# Patient Record
Sex: FEMALE | Race: BLACK OR AFRICAN AMERICAN | Employment: OTHER | URBAN - METROPOLITAN AREA
[De-identification: names, ages, dates, MRNs, and addresses within clinical notes are randomized per-mention and may not be internally consistent; named-entity substitution may affect disease eponyms.]

---

## 2017-01-05 ENCOUNTER — APPOINTMENT (OUTPATIENT)
Dept: CT IMAGING | Age: 67
End: 2017-01-05
Attending: EMERGENCY MEDICINE
Payer: MEDICARE

## 2017-01-05 ENCOUNTER — HOSPITAL ENCOUNTER (OUTPATIENT)
Age: 67
Setting detail: OBSERVATION
Discharge: HOME | End: 2017-01-06
Attending: EMERGENCY MEDICINE | Admitting: INTERNAL MEDICINE
Payer: MEDICARE

## 2017-01-05 ENCOUNTER — CLINICAL SUPPORT (OUTPATIENT)
Dept: CARDIOLOGY CLINIC | Age: 67
End: 2017-01-05

## 2017-01-05 ENCOUNTER — APPOINTMENT (OUTPATIENT)
Dept: MRI IMAGING | Age: 67
End: 2017-01-05
Attending: INTERNAL MEDICINE
Payer: MEDICARE

## 2017-01-05 DIAGNOSIS — E78.2 MIXED HYPERLIPIDEMIA: ICD-10-CM

## 2017-01-05 DIAGNOSIS — R20.2 NUMBNESS AND TINGLING OF RIGHT SIDE OF FACE: ICD-10-CM

## 2017-01-05 DIAGNOSIS — I25.10 CORONARY ARTERY DISEASE INVOLVING NATIVE CORONARY ARTERY OF NATIVE HEART WITHOUT ANGINA PECTORIS: ICD-10-CM

## 2017-01-05 DIAGNOSIS — E11.9 TYPE 2 DIABETES MELLITUS WITHOUT COMPLICATION, UNSPECIFIED LONG TERM INSULIN USE STATUS: ICD-10-CM

## 2017-01-05 DIAGNOSIS — F32.A DEPRESSION, UNSPECIFIED DEPRESSION TYPE: ICD-10-CM

## 2017-01-05 DIAGNOSIS — R06.02 SOB (SHORTNESS OF BREATH): ICD-10-CM

## 2017-01-05 DIAGNOSIS — G43.109 COMPLICATED MIGRAINE: ICD-10-CM

## 2017-01-05 DIAGNOSIS — R20.2 PARESTHESIA OF RIGHT ARM AND LEG: Primary | ICD-10-CM

## 2017-01-05 DIAGNOSIS — R20.0 NUMBNESS AND TINGLING OF RIGHT SIDE OF FACE: ICD-10-CM

## 2017-01-05 DIAGNOSIS — Z98.61 S/P PTCA (PERCUTANEOUS TRANSLUMINAL CORONARY ANGIOPLASTY): ICD-10-CM

## 2017-01-05 DIAGNOSIS — I65.23 STENOSIS OF BOTH INTERNAL CAROTID ARTERIES: ICD-10-CM

## 2017-01-05 DIAGNOSIS — G44.211 INTRACTABLE EPISODIC TENSION-TYPE HEADACHE: ICD-10-CM

## 2017-01-05 DIAGNOSIS — R20.2 FACIAL PARESTHESIA: ICD-10-CM

## 2017-01-05 DIAGNOSIS — R51.9 NONINTRACTABLE HEADACHE, UNSPECIFIED CHRONICITY PATTERN, UNSPECIFIED HEADACHE TYPE: ICD-10-CM

## 2017-01-05 DIAGNOSIS — R43.2 DYSGEUSIA: ICD-10-CM

## 2017-01-05 PROBLEM — I63.9 CVA (CEREBRAL VASCULAR ACCIDENT) (HCC): Status: ACTIVE | Noted: 2017-01-05

## 2017-01-05 LAB
ALBUMIN SERPL BCP-MCNC: 3.9 G/DL (ref 3.5–5)
ALBUMIN/GLOB SERPL: 1.1 {RATIO} (ref 1.1–2.2)
ALP SERPL-CCNC: 106 U/L (ref 45–117)
ALT SERPL-CCNC: 30 U/L (ref 12–78)
AMPHET UR QL SCN: NEGATIVE
ANION GAP BLD CALC-SCNC: 10 MMOL/L (ref 5–15)
APPEARANCE UR: ABNORMAL
APTT PPP: 30 SEC (ref 22.1–32.5)
AST SERPL W P-5'-P-CCNC: 34 U/L (ref 15–37)
BACTERIA URNS QL MICRO: NEGATIVE /HPF
BARBITURATES UR QL SCN: POSITIVE
BASOPHILS # BLD AUTO: 0 K/UL (ref 0–0.1)
BASOPHILS # BLD: 0 % (ref 0–1)
BENZODIAZ UR QL: POSITIVE
BILIRUB SERPL-MCNC: 0.2 MG/DL (ref 0.2–1)
BILIRUB UR QL: NEGATIVE
BUN SERPL-MCNC: 12 MG/DL (ref 6–20)
BUN/CREAT SERPL: 14 (ref 12–20)
CALCIUM SERPL-MCNC: 8.7 MG/DL (ref 8.5–10.1)
CANNABINOIDS UR QL SCN: NEGATIVE
CHLORIDE SERPL-SCNC: 107 MMOL/L (ref 97–108)
CO2 SERPL-SCNC: 27 MMOL/L (ref 21–32)
COCAINE UR QL SCN: NEGATIVE
COLOR UR: ABNORMAL
CREAT SERPL-MCNC: 0.86 MG/DL (ref 0.55–1.02)
DRUG SCRN COMMENT,DRGCM: ABNORMAL
EOSINOPHIL # BLD: 0.2 K/UL (ref 0–0.4)
EOSINOPHIL NFR BLD: 3 % (ref 0–7)
EPITH CASTS URNS QL MICRO: ABNORMAL /LPF
ERYTHROCYTE [DISTWIDTH] IN BLOOD BY AUTOMATED COUNT: 13.7 % (ref 11.5–14.5)
ERYTHROCYTE [SEDIMENTATION RATE] IN BLOOD: 27 MM/HR (ref 0–30)
EST. AVERAGE GLUCOSE BLD GHB EST-MCNC: 157 MG/DL
GLOBULIN SER CALC-MCNC: 3.7 G/DL (ref 2–4)
GLUCOSE BLD STRIP.AUTO-MCNC: 88 MG/DL (ref 65–100)
GLUCOSE BLD STRIP.AUTO-MCNC: 98 MG/DL (ref 65–100)
GLUCOSE SERPL-MCNC: 85 MG/DL (ref 65–100)
GLUCOSE UR STRIP.AUTO-MCNC: NEGATIVE MG/DL
HBA1C MFR BLD: 7.1 % (ref 4.2–6.3)
HCT VFR BLD AUTO: 38.3 % (ref 35–47)
HGB BLD-MCNC: 12.6 G/DL (ref 11.5–16)
HGB UR QL STRIP: NEGATIVE
HYALINE CASTS URNS QL MICRO: ABNORMAL /LPF (ref 0–5)
INR PPP: 1 (ref 0.9–1.1)
KETONES UR QL STRIP.AUTO: NEGATIVE MG/DL
LEUKOCYTE ESTERASE UR QL STRIP.AUTO: ABNORMAL
LYMPHOCYTES # BLD AUTO: 26 % (ref 12–49)
LYMPHOCYTES # BLD: 1.8 K/UL (ref 0.8–3.5)
MCH RBC QN AUTO: 28.8 PG (ref 26–34)
MCHC RBC AUTO-ENTMCNC: 32.9 G/DL (ref 30–36.5)
MCV RBC AUTO: 87.4 FL (ref 80–99)
METHADONE UR QL: NEGATIVE
MONOCYTES # BLD: 0.4 K/UL (ref 0–1)
MONOCYTES NFR BLD AUTO: 6 % (ref 5–13)
NEUTS SEG # BLD: 4.4 K/UL (ref 1.8–8)
NEUTS SEG NFR BLD AUTO: 65 % (ref 32–75)
NITRITE UR QL STRIP.AUTO: NEGATIVE
OPIATES UR QL: NEGATIVE
PCP UR QL: NEGATIVE
PH UR STRIP: 6 [PH] (ref 5–8)
PLATELET # BLD AUTO: 276 K/UL (ref 150–400)
POTASSIUM SERPL-SCNC: 4.7 MMOL/L (ref 3.5–5.1)
PROT SERPL-MCNC: 7.6 G/DL (ref 6.4–8.2)
PROT UR STRIP-MCNC: NEGATIVE MG/DL
PROTHROMBIN TIME: 9.9 SEC (ref 9–11.1)
RBC # BLD AUTO: 4.38 M/UL (ref 3.8–5.2)
RBC #/AREA URNS HPF: ABNORMAL /HPF (ref 0–5)
SERVICE CMNT-IMP: NORMAL
SERVICE CMNT-IMP: NORMAL
SODIUM SERPL-SCNC: 144 MMOL/L (ref 136–145)
SP GR UR REFRACTOMETRY: 1.01 (ref 1–1.03)
T4 FREE SERPL-MCNC: 0.7 NG/DL (ref 0.8–1.5)
THERAPEUTIC RANGE,PTTT: NORMAL SECS (ref 58–77)
TROPONIN I SERPL-MCNC: <0.04 NG/ML
TROPONIN I SERPL-MCNC: <0.04 NG/ML
TSH SERPL DL<=0.05 MIU/L-ACNC: 6.63 UIU/ML (ref 0.36–3.74)
UA: UC IF INDICATED,UAUC: ABNORMAL
UROBILINOGEN UR QL STRIP.AUTO: 0.2 EU/DL (ref 0.2–1)
WBC # BLD AUTO: 6.8 K/UL (ref 3.6–11)
WBC URNS QL MICRO: ABNORMAL /HPF (ref 0–4)

## 2017-01-05 PROCEDURE — 84481 FREE ASSAY (FT-3): CPT | Performed by: INTERNAL MEDICINE

## 2017-01-05 PROCEDURE — 80053 COMPREHEN METABOLIC PANEL: CPT | Performed by: EMERGENCY MEDICINE

## 2017-01-05 PROCEDURE — 96372 THER/PROPH/DIAG INJ SC/IM: CPT

## 2017-01-05 PROCEDURE — 70450 CT HEAD/BRAIN W/O DYE: CPT

## 2017-01-05 PROCEDURE — 86141 C-REACTIVE PROTEIN HS: CPT | Performed by: INTERNAL MEDICINE

## 2017-01-05 PROCEDURE — 99218 HC RM OBSERVATION: CPT

## 2017-01-05 PROCEDURE — 86592 SYPHILIS TEST NON-TREP QUAL: CPT | Performed by: INTERNAL MEDICINE

## 2017-01-05 PROCEDURE — 82962 GLUCOSE BLOOD TEST: CPT

## 2017-01-05 PROCEDURE — 84443 ASSAY THYROID STIM HORMONE: CPT | Performed by: INTERNAL MEDICINE

## 2017-01-05 PROCEDURE — 74011250636 HC RX REV CODE- 250/636: Performed by: INTERNAL MEDICINE

## 2017-01-05 PROCEDURE — 36415 COLL VENOUS BLD VENIPUNCTURE: CPT | Performed by: INTERNAL MEDICINE

## 2017-01-05 PROCEDURE — 84439 ASSAY OF FREE THYROXINE: CPT | Performed by: INTERNAL MEDICINE

## 2017-01-05 PROCEDURE — 65390000012 HC CONDITION CODE 44 OBSERVATION

## 2017-01-05 PROCEDURE — 87086 URINE CULTURE/COLONY COUNT: CPT | Performed by: INTERNAL MEDICINE

## 2017-01-05 PROCEDURE — 70551 MRI BRAIN STEM W/O DYE: CPT

## 2017-01-05 PROCEDURE — 83036 HEMOGLOBIN GLYCOSYLATED A1C: CPT | Performed by: INTERNAL MEDICINE

## 2017-01-05 PROCEDURE — 65660000000 HC RM CCU STEPDOWN

## 2017-01-05 PROCEDURE — 84484 ASSAY OF TROPONIN QUANT: CPT | Performed by: EMERGENCY MEDICINE

## 2017-01-05 PROCEDURE — 86038 ANTINUCLEAR ANTIBODIES: CPT | Performed by: INTERNAL MEDICINE

## 2017-01-05 PROCEDURE — 70544 MR ANGIOGRAPHY HEAD W/O DYE: CPT

## 2017-01-05 PROCEDURE — 85610 PROTHROMBIN TIME: CPT | Performed by: EMERGENCY MEDICINE

## 2017-01-05 PROCEDURE — 99285 EMERGENCY DEPT VISIT HI MDM: CPT

## 2017-01-05 PROCEDURE — 80061 LIPID PANEL: CPT | Performed by: INTERNAL MEDICINE

## 2017-01-05 PROCEDURE — 81001 URINALYSIS AUTO W/SCOPE: CPT | Performed by: INTERNAL MEDICINE

## 2017-01-05 PROCEDURE — 80307 DRUG TEST PRSMV CHEM ANLYZR: CPT | Performed by: INTERNAL MEDICINE

## 2017-01-05 PROCEDURE — 85025 COMPLETE CBC W/AUTO DIFF WBC: CPT | Performed by: EMERGENCY MEDICINE

## 2017-01-05 PROCEDURE — 85730 THROMBOPLASTIN TIME PARTIAL: CPT | Performed by: EMERGENCY MEDICINE

## 2017-01-05 PROCEDURE — 74011250637 HC RX REV CODE- 250/637: Performed by: EMERGENCY MEDICINE

## 2017-01-05 PROCEDURE — 74011250637 HC RX REV CODE- 250/637: Performed by: INTERNAL MEDICINE

## 2017-01-05 PROCEDURE — 85652 RBC SED RATE AUTOMATED: CPT | Performed by: INTERNAL MEDICINE

## 2017-01-05 PROCEDURE — 96365 THER/PROPH/DIAG IV INF INIT: CPT

## 2017-01-05 RX ORDER — SUMATRIPTAN 25 MG/1
50 TABLET, FILM COATED ORAL DAILY PRN
Status: DISCONTINUED | OUTPATIENT
Start: 2017-01-05 | End: 2017-01-06 | Stop reason: HOSPADM

## 2017-01-05 RX ORDER — INSULIN GLARGINE 100 [IU]/ML
14 INJECTION, SOLUTION SUBCUTANEOUS
Status: DISCONTINUED | OUTPATIENT
Start: 2017-01-05 | End: 2017-01-06 | Stop reason: HOSPADM

## 2017-01-05 RX ORDER — ASPIRIN 325 MG
325 TABLET ORAL DAILY
Status: DISCONTINUED | OUTPATIENT
Start: 2017-01-05 | End: 2017-01-05

## 2017-01-05 RX ORDER — DEXTROSE 50 % IN WATER (D50W) INTRAVENOUS SYRINGE
12.5-25 AS NEEDED
Status: DISCONTINUED | OUTPATIENT
Start: 2017-01-05 | End: 2017-01-06 | Stop reason: HOSPADM

## 2017-01-05 RX ORDER — INSULIN LISPRO 100 [IU]/ML
INJECTION, SOLUTION INTRAVENOUS; SUBCUTANEOUS
Status: DISCONTINUED | OUTPATIENT
Start: 2017-01-05 | End: 2017-01-06 | Stop reason: HOSPADM

## 2017-01-05 RX ORDER — ALPRAZOLAM 0.5 MG/1
0.5 TABLET ORAL
Status: DISCONTINUED | OUTPATIENT
Start: 2017-01-05 | End: 2017-01-06 | Stop reason: HOSPADM

## 2017-01-05 RX ORDER — BUTALBITAL, ACETAMINOPHEN AND CAFFEINE 50; 325; 40 MG/1; MG/1; MG/1
2 TABLET ORAL
Status: DISCONTINUED | OUTPATIENT
Start: 2017-01-05 | End: 2017-01-06 | Stop reason: HOSPADM

## 2017-01-05 RX ORDER — LABETALOL HCL 20 MG/4 ML
10 SYRINGE (ML) INTRAVENOUS
Status: DISCONTINUED | OUTPATIENT
Start: 2017-01-05 | End: 2017-01-06 | Stop reason: HOSPADM

## 2017-01-05 RX ORDER — MAGNESIUM SULFATE HEPTAHYDRATE 40 MG/ML
2 INJECTION, SOLUTION INTRAVENOUS ONCE
Status: COMPLETED | OUTPATIENT
Start: 2017-01-05 | End: 2017-01-05

## 2017-01-05 RX ORDER — ACETAMINOPHEN 325 MG/1
650 TABLET ORAL
Status: DISCONTINUED | OUTPATIENT
Start: 2017-01-05 | End: 2017-01-06 | Stop reason: HOSPADM

## 2017-01-05 RX ORDER — ASPIRIN 325 MG
325 TABLET ORAL ONCE
Status: COMPLETED | OUTPATIENT
Start: 2017-01-05 | End: 2017-01-05

## 2017-01-05 RX ORDER — SODIUM CHLORIDE 0.9 % (FLUSH) 0.9 %
5-10 SYRINGE (ML) INJECTION AS NEEDED
Status: DISCONTINUED | OUTPATIENT
Start: 2017-01-05 | End: 2017-01-06 | Stop reason: HOSPADM

## 2017-01-05 RX ORDER — ENOXAPARIN SODIUM 100 MG/ML
40 INJECTION SUBCUTANEOUS EVERY 24 HOURS
Status: DISCONTINUED | OUTPATIENT
Start: 2017-01-05 | End: 2017-01-06 | Stop reason: HOSPADM

## 2017-01-05 RX ORDER — IPRATROPIUM BROMIDE AND ALBUTEROL SULFATE 2.5; .5 MG/3ML; MG/3ML
3 SOLUTION RESPIRATORY (INHALATION)
Status: DISCONTINUED | OUTPATIENT
Start: 2017-01-05 | End: 2017-01-06 | Stop reason: HOSPADM

## 2017-01-05 RX ORDER — METFORMIN HYDROCHLORIDE 500 MG/1
500 TABLET, EXTENDED RELEASE ORAL
Status: DISCONTINUED | OUTPATIENT
Start: 2017-01-06 | End: 2017-01-06 | Stop reason: HOSPADM

## 2017-01-05 RX ORDER — METOPROLOL SUCCINATE 25 MG/1
25 TABLET, EXTENDED RELEASE ORAL DAILY
Status: DISCONTINUED | OUTPATIENT
Start: 2017-01-06 | End: 2017-01-06 | Stop reason: HOSPADM

## 2017-01-05 RX ORDER — ASPIRIN 325 MG
325 TABLET ORAL DAILY
Status: DISCONTINUED | OUTPATIENT
Start: 2017-01-06 | End: 2017-01-06 | Stop reason: HOSPADM

## 2017-01-05 RX ORDER — FLUTICASONE PROPIONATE 50 MCG
2 SPRAY, SUSPENSION (ML) NASAL DAILY
Status: DISCONTINUED | OUTPATIENT
Start: 2017-01-06 | End: 2017-01-06 | Stop reason: HOSPADM

## 2017-01-05 RX ORDER — LISINOPRIL 5 MG/1
2.5 TABLET ORAL DAILY
Status: DISCONTINUED | OUTPATIENT
Start: 2017-01-06 | End: 2017-01-06 | Stop reason: HOSPADM

## 2017-01-05 RX ORDER — OXYCODONE HYDROCHLORIDE 5 MG/1
5 TABLET ORAL
Status: DISCONTINUED | OUTPATIENT
Start: 2017-01-05 | End: 2017-01-06 | Stop reason: HOSPADM

## 2017-01-05 RX ORDER — AMITRIPTYLINE HYDROCHLORIDE 10 MG/1
10 TABLET, FILM COATED ORAL
Status: DISCONTINUED | OUTPATIENT
Start: 2017-01-05 | End: 2017-01-06 | Stop reason: HOSPADM

## 2017-01-05 RX ORDER — GLIPIZIDE 5 MG/1
5 TABLET, FILM COATED, EXTENDED RELEASE ORAL
Status: DISCONTINUED | OUTPATIENT
Start: 2017-01-06 | End: 2017-01-06 | Stop reason: HOSPADM

## 2017-01-05 RX ORDER — PRAVASTATIN SODIUM 40 MG/1
40 TABLET ORAL
Status: DISCONTINUED | OUTPATIENT
Start: 2017-01-05 | End: 2017-01-06 | Stop reason: HOSPADM

## 2017-01-05 RX ORDER — MAGNESIUM SULFATE 100 %
4 CRYSTALS MISCELLANEOUS AS NEEDED
Status: DISCONTINUED | OUTPATIENT
Start: 2017-01-05 | End: 2017-01-06 | Stop reason: HOSPADM

## 2017-01-05 RX ORDER — SODIUM CHLORIDE 0.9 % (FLUSH) 0.9 %
5-10 SYRINGE (ML) INJECTION EVERY 8 HOURS
Status: DISCONTINUED | OUTPATIENT
Start: 2017-01-05 | End: 2017-01-06 | Stop reason: HOSPADM

## 2017-01-05 RX ADMIN — BUTALBITAL, ACETAMINOPHEN AND CAFFEINE 2 TABLET: 50; 325; 40 TABLET ORAL at 22:29

## 2017-01-05 RX ADMIN — ASPIRIN 325 MG ORAL TABLET 325 MG: 325 PILL ORAL at 17:34

## 2017-01-05 RX ADMIN — MAGNESIUM SULFATE HEPTAHYDRATE 2 G: 40 INJECTION, SOLUTION INTRAVENOUS at 20:34

## 2017-01-05 RX ADMIN — ENOXAPARIN SODIUM 40 MG: 40 INJECTION SUBCUTANEOUS at 20:34

## 2017-01-05 RX ADMIN — AMITRIPTYLINE HYDROCHLORIDE 10 MG: 10 TABLET, FILM COATED ORAL at 22:05

## 2017-01-05 RX ADMIN — PRAVASTATIN SODIUM 40 MG: 40 TABLET ORAL at 22:05

## 2017-01-05 RX ADMIN — Medication 10 ML: at 22:17

## 2017-01-05 NOTE — IP AVS SNAPSHOT
[address] [facility] 
[phone] Patient: Shankar Donato MRN: [id number] VBI:4/2/9702 You are allergic to the following Allergen Reactions No Known Allergies Other (comments) Demerol (Meperidine) Unknown (comments) Pt unsure of reaction Recent Documentation Height Weight Breastfeeding? BMI OB Status Smoking Status 1.626 m 77.1 kg No 29.18 kg/m2 Hysterectomy Never Smoker Unresulted Labs Order Current Status OSBALDO QL, W/REFLEX CASCADE In process CULTURE, URINE In process RPR In process Emergency Contacts Name Discharge Info Relation Home Work Mobile Miguel Boggs DISCHARGE CAREGIVER [3] Spouse [3] [phone] About your hospitalization You were admitted on:  January 5, 2017 You last received care in the:  [facility] 3 NEUROSCIENCE TELEMETRY You were discharged on:  January 6, 2017 Unit phone number:  [phone] Why you were hospitalized Your primary diagnosis was:  Not on File Your diagnoses also included:  Cva (Cerebral Vascular Accident) (Hcc), Headache, Type 2 Diabetes Mellitus Without Complication (Hcc), Complicated Migraine, Numbness And Tingling Of Right Side Of Face, Stenosis Of Both Internal Carotid Arteries Providers Seen During Your Hospitalizations Provider Role Specialty Primary office phone Justin Portillo MD Attending Provider Emergency Medicine [phone] Garrison Catalan MD Attending Provider Hospitalist [phone] Your Primary Care Physician (PCP) Primary Care Physician Office Phone Office Fax Olya Vega [phone] Follow-up Information Follow up With Details Comments Contact Info Tae Glass MD In 1 week  [address] [id number] [phone] Rose Morris MD In 3 weeks Neurology NP/PA for hospital follow up [address] [facility] [address] [place] 
[phone] Your Appointments Wednesday January 11, 2017  3:00 PM EST  
ROUTINE CARE with Maikol Weston MD  
[facility] [address] [facility] [place]  [id number] [phone] Current Discharge Medication List  
  
START taking these medications Dose & Instructions Dispensing Information Comments Morning Noon Evening Bedtime  
 aspirin-acetaminophen-caffeine 250-250-65 mg per tablet Commonly known as:  Adina Best Your next dose is: Today, Tomorrow Other:  _________ Dose:  1 Tab Take 1 Tab by mouth every eight (8) hours as needed for Headache. Quantity:  30 Tab Refills:  0  
     
   
   
   
  
 levothyroxine 25 mcg tablet Commonly known as:  synthroid Your next dose is: Today, Tomorrow Other:  _________ Dose:  25 mcg Take 1 Tab by mouth Daily (before breakfast). Quantity:  30 Tab Refills:  0 CONTINUE these medications which have NOT CHANGED Dose & Instructions Dispensing Information Comments Morning Noon Evening Bedtime  
 albuterol-ipratropium 2.5 mg-0.5 mg/3 ml Nebu Commonly known as:  Matthew Asters Your next dose is: Today, Tomorrow Other:  _________ Dose:  3 mL  
3 mL by Nebulization route every forty-eight (48) hours as needed. Refills:  0  
     
   
   
   
  
 alcohol swabs Padm Commonly known as:  BD Single Use Swabs Regular Your next dose is: Today, Tomorrow Other:  _________ Use to cleanse skin prior to obtaining blood sample for diabetic testing three times daily Quantity:  300 Pad Refills:  3  
 e11.9 ALPRAZolam 1 mg tablet Commonly known as:  Yossi Boyd Your next dose is: Today, Tomorrow Other:  _________ TAKE 1/2 TABLET EVERY DAY AS NEEDED FOR ANXIETY  AND TAKE 1 TABLET AT BEDTIME AS NEEDED  FOR  SLEEP Quantity:  135 Tab Refills:  1  
     
   
   
   
  
 amitriptyline 10 mg tablet Commonly known as:  ELAVIL Your next dose is: Today, Tomorrow Other:  _________ TAKE 1 TABLET NIGHTLY Quantity:  90 Tab Refills:  3  
     
   
   
   
  
 aspirin, buffered 81 mg Tab Your next dose is: Today, Tomorrow Other:  _________ Dose:  81 mg Take 81 mg by mouth daily. Refills:  0 Blood-Glucose Meter Misc Commonly known as:  ACCU-CHEK KRISTOFER PLUS METER Your next dose is: Today, Tomorrow Other:  _________ Use to monitor blood sugar 3 times daily Quantity:  1 Each Refills:  0  
 E11.9 , I10 Calcium-Cholecalciferol (D3) 600 mg(1,500mg) -400 unit Cap Your next dose is: Today, Tomorrow Other:  _________ Dose:  1 Tab Take 1 Tab by mouth daily. Refills:  0  
     
   
   
   
  
 fluticasone 50 mcg/actuation nasal spray Commonly known as:  Domnick Means Your next dose is: Today, Tomorrow Other:  _________ INSTILL 2 SPRAYS INTO BOTH NOSTRILS DAILY AS NEEDED FOR RHINITIS. Quantity:  48 g Refills:  3  
     
   
   
   
  
 furosemide 20 mg tablet Commonly known as:  LASIX Your next dose is: Today, Tomorrow Other:  _________ Dose:  20 mg Take 1 Tab by mouth daily as needed. Quantity:  90 Tab Refills:  3  
     
   
   
   
  
 glipiZIDE SR 5 mg CR tablet Commonly known as:  GLUCOTROL XL Your next dose is: Today, Tomorrow Other:  _________ Dose:  5 mg Take 1 Tab by mouth daily. Quantity:  90 Tab Refills:  3  
     
   
   
   
  
 glucophage  mg tablet Generic drug:  metFORMIN ER  
   
 Your next dose is: Today, Tomorrow Other:  _________ Dose:  500 mg Take 500 mg by mouth daily (with breakfast). Refills:  0  
     
   
   
   
  
 glucose blood VI test strips strip Commonly known as:  ACCU-CHEK KRISTOFER PLUS TEST STRP Your next dose is: Today, Tomorrow Other:  _________ Use to check blood glucose level three times daily Quantity:  300 Strip Refills:  3  
 e11.9  
    
   
   
   
  
 insulin glargine 100 unit/mL (3 mL) pen Commonly known as:  LANTUS SOLOSTAR Your next dose is: Today, Tomorrow Other:  _________ Dose:  14 Units 14 Units by SubCUTAneous route every evening. Quantity:  2 Each Refills:  5  
 e11.9 * Insulin Needles (Disposable) 31 gauge x 5/16\" Ndle Your next dose is: Today, Tomorrow Other:  _________ Use with lantus pen Quantity:  1 Package Refills:  11  
     
   
   
   
  
 * BD INSULIN PEN NEEDLE UF MINI 31 gauge x 3/16\" Ndle Generic drug:  Insulin Needles (Disposable) Your next dose is: Today, Tomorrow Other:  _________ Use to check blood glucose level three times a daily Quantity:  300 Pen Needle Refills:  3  
 e11.9 JANUVIA 100 mg tablet Generic drug:  SITagliptin Your next dose is: Today, Tomorrow Other:  _________ TAKE 1 TABLET EVERY DAY FOR DIABETES Quantity:  90 Tab Refills:  1 Lancets Misc Commonly known as:  ACCU-CHEK SOFTCLIX LANCETS Your next dose is: Today, Tomorrow Other:  _________ Use to obtain blood sample for diabetic testing three times a day Quantity:  1 Each Refills:  11  
 E11.9  
    
   
   
   
  
 lisinopril 2.5 mg tablet Commonly known as:  Brandie Loss Your next dose is: Today, Tomorrow Other:  _________ TAKE 1 TABLET EVERY DAY Quantity:  90 Tab Refills:  3  
     
   
   
   
  
 metoprolol succinate 25 mg XL tablet Commonly known as:  TOPROL-XL Your next dose is: Today, Tomorrow Other:  _________ TAKE 1 TABLET EVERY DAY Quantity:  90 Tab Refills:  3  
     
   
   
   
  
 pravastatin 40 mg tablet Commonly known as:  PRAVACHOL Your next dose is: Today, Tomorrow Other:  _________ TAKE 1 TABLET EVERY NIGHT Quantity:  90 Tab Refills:  3 PROAIR HFA 90 mcg/actuation inhaler Generic drug:  albuterol Your next dose is: Today, Tomorrow Other:  _________ Dose:  1 Puff Take 1 Puff by inhalation every four (4) hours as needed. Refills:  0  
     
   
   
   
  
 * Notice: This list has 2 medication(s) that are the same as other medications prescribed for you. Read the directions carefully, and ask your doctor or other care provider to review them with you. STOP taking these medications   
 butalbital-acetaminophen-caff -40 mg per capsule Commonly known as:  FIORICET  
   
  
 ibuprofen 800 mg tablet Commonly known as:  MOTRIN Where to Get Your Medications Information on where to get these meds will be given to you by the nurse or doctor. ! Ask your nurse or doctor about these medications  
  aspirin-acetaminophen-caffeine 250-250-65 mg per tablet  
 levothyroxine 25 mcg tablet Discharge Instructions HOSPITALIST DISCHARGE INSTRUCTIONS 
 
NAME: Jeannine Daugherty :  1950 MRN:  [id number] Date/Time:  2017 2:17 PM 
 
ADMIT DATE: 2017 DISCHARGE DATE: 2017 · It is important that you take the medication exactly as they are prescribed. · Keep your medication in the bottles provided by the pharmacist and keep a list of the medication names, dosages, and times to be taken in your wallet. · Do not take other medications without consulting your doctor. What to do at [facility] Recommended diet:  Regular Diet Recommended activity: Activity as tolerated If you have questions regarding the hospital related prescriptions or hospital related issues please call [facility] at . You can always direct your questions to your primary care doctor if you are unable to reach your hospital physician; your PCP works as an extension of your hospital doctor just like your hospital doctor is an extension of your PCP for your time at the [facility], [facility]) If you experience any of the following symptoms then please call your primary care physician or return to the emergency room if you cannot get hold of your doctor: 
 
Fever, chills, nausea, vomiting, or persistent diarrhea Worsening weakness or new problems with your speech or balance Dark stools or visible blood in your stools New Leg swelling or shortness of breath as this could be signs of a clot Additional Instructions: Follow up with neurology in 2-4 weeks Follow up with pcp in 1 week. Repeat thyroid studies in 2-4 weeks. Continue with aspirin daily and excedrin migraine as needed. Bring these papers with you to your follow up appointments. The papers will help your doctors be sure to continue the care plan from the hospital. 
 
 
 
 
 
 
Information obtained by : 
I understand that if any problems occur once I am at home I am to contact my physician. I understand and acknowledge receipt of the instructions indicated above. Physician's or R.N.'s Signature                                                                  Date/Time Patient or Representative Signature Discharge Orders None Introducing [facility]! [facility] introduces [employer] patient portal. Now you can access parts of your medical record, email your doctor's office, and request medication refills online. 1. In your internet browser, go to [email or web address] 2. Click on the First Time User? Click Here link in the Sign In box. You will see the New Member Sign Up page. 3. Enter your [employer] Access Code exactly as it appears below. You will not need to use this code after youve completed the sign-up process. If you do not sign up before the expiration date, you must request a new code. · [employer] Access Code: [email or web address] Expires: 3/2/2017 12:27 PM 
 
4. Enter the last four digits of your Social Security Number (xxxx) and Date of Birth (mm/dd/yyyy) as indicated and click Submit. You will be taken to the next sign-up page. 5. Create a [employer] ID. This will be your [employer] login ID and cannot be changed, so think of one that is secure and easy to remember. 6. Create a [employer] password. You can change your password at any time. 7. Enter your Password Reset Question and Answer. This can be used at a later time if you forget your password. 8. Enter your e-mail address. You will receive e-mail notification when new information is available in [address] 9. Click Sign Up. You can now view and download portions of your medical record. 10. Click the Download Summary menu link to download a portable copy of your medical information. If you have questions, please visit the Frequently Asked Questions section of the [employer] website. Remember, [employer] is NOT to be used for urgent needs. For medical emergencies, dial 911. Now available from your iPhone and Android! General Information Please provide this summary of care documentation to your next provider. Patient Signature:  ____________________________________________________________ Date:  ____________________________________________________________  
  
Katty Moulding Provider Signature:  ____________________________________________________________ Date:  ____________________________________________________________

## 2017-01-05 NOTE — ED NOTES
TRANSFER - OUT REPORT:    Verbal report given to [facility] RN (name) on Yeyo Harrison  being transferred to [facility] (unit) for routine progression of care       Report consisted of patients Situation, Background, Assessment and   Recommendations(SBAR). Information from the following report(s) SBAR, ED Summary and MAR was reviewed with the receiving nurse. Lines:       Opportunity for questions and clarification was provided. Patient transported with:   Elmer Vallecillo with MRI, patient to be transported to floor from MRI.

## 2017-01-05 NOTE — H&P
Hospitalist Admission Note    NAME: Governor Perry   :  1950   MRN:  [id number]     Date/Time:  2017 5:49 PM    Patient PCP: Dulce Escamilla MD  ________________________________________________________________________    My assessment of this patient's clinical condition and my plan of care is as follows. Assessment / Plan:  CVA vs TIA vs Complex Migraine: will check MRI/MRA and stroke work up, I'm more inclined to think this is complex migraine, will provide symptomatic treatment, c/w ASA, get Neurology evaluation. DM: c/w metoprolol, januvia, Lantus, place SSI, HBA1C.  HTN: c/w ACE, BB, use labetalol prn allowing permissive HTN. Hyperlipidemia: c/w Statin, check lipids  CAD: no chest pain at this time, c/w ASA, BB, statin, monitor. Chronic Pain: c/w pain medication. 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 Pt notes associated symptoms of intermittent right sided facial numbness/tingling (currently experiencing) and right sided extremity numbness/tingling (not current). Pt states that the episodes of numbness last ~20 minutes each. Pt states that her cardiologist sent her to the ED today, due to her symptoms. Pt denies any history of stroke. Pt specifically denies any N/V/D, chest pain, SOB or left sided numbness.   At this time patient lying in bed c/o headache, right sided face tingling and numbness, right  arm and leg tingling and numbness, denies chest pain, no SOB, no fever, no N/V no diarrhea, no cough, no urinary symptoms, no other associated symptoms. We were asked to admit for work up and evaluation of the above problems.      Past Medical History   Diagnosis Date    Anxiety     Arthritis     ASHD (arteriosclerotic heart disease)     CAD (coronary artery disease)     Chronic pain     Constipation     Depression     Diabetes ([address]     Diarrhea     Dysgeusia 11/21/2014    Esophageal motor disorder 1/23/2012    Flatulence/gas pain/belching 11/21/2014    Gastroparesis     GERD (gastroesophageal reflux disease)     Hypercholesterolemia     Hypertension     Psychiatric disorder      anxiety    S/P bypass gastrojejunostomy 1/23/2012    Sleep apnea      no cpap        Past Surgical History   Procedure Laterality Date    Pr gastrojejunostomy      Hx laparotomy  8/02     2/2 intra abd abscess    Pr abdomen surgery proc unlisted       s/p partial gastrectomy 2/2 gastroparesis    Hx gastric bypass  6/02    Pr removal of heel spur  2001     bilat    Hc bld carpel tunnel release       bilat    Hx cholecystectomy      Hx hysterectomy      Egd  7/13/2009    Hx urological       bladder surgery-bladder tac    Hx orthopaedic  1/08     laminectomy    Hx orthopaedic       Bilateral feet bone spurs removed    Hx orthopaedic       bilateral carpal tunnel    Hx orthopaedic       back surgery    Pr egd transoral biopsy single/multiple  7/11/2011          Pr colonoscopy flx dx w/collj spec when pfrmd  5/03/2006     Dr. Danice Collet transoral biopsy single/multiple  1/23/2012          Pr egd balloon dilation esophagus <30 mm diam  1/23/2012          Hx appendectomy      Pr colonoscopy w/biopsy single/multiple  2/9/2012          Pr cardiac surg procedure unlist  6/2013     stent placement X 2    Hx coronary stent placement  06/2013     3 stents    Colonoscopy N/A 5/26/2016     COLONOSCOPY performed by Brody Licea MD at [facility]       Social History   Substance Use Topics    Smoking status: Never Smoker    Smokeless tobacco: Never Used    Alcohol use No        Family History   Problem Relation Age of Onset    Heart Disease Mother     Hypertension Mother     Diabetes Mother     Diabetes Father     Heart Disease Father     Hypertension Father     Alcohol abuse Brother     Heart Disease Sister     Hypertension Sister     Diabetes Sister     No Known Problems Sister     Alcohol abuse Brother     Alcohol abuse Brother     Diabetes Brother      Allergies   Allergen Reactions    No Known Allergies Other (comments)    Demerol [Meperidine] Unknown (comments)     Pt unsure of reaction        Prior to Admission medications    Medication Sig Start Date End Date Taking? Authorizing Provider   butalbital-acetaminophen-caff (FIORICET) -40 mg per capsule Take 1 Cap by mouth two (2) times daily as needed for Pain (headache). Max Daily Amount: 2 Caps. 12/29/16   Hilary Soto NP   ALPRAZolam Kathy Kanner) 1 mg tablet TAKE 1/2 TABLET EVERY DAY AS NEEDED FOR ANXIETY  AND TAKE 1 TABLET AT BEDTIME AS NEEDED  FOR  SLEEP 12/28/16   Lisa Holt MD   furosemide (LASIX) 20 mg tablet Take 1 Tab by mouth daily as needed. 12/2/16   Lisa Holt MD   glipiZIDE SR (GLUCOTROL) 5 mg CR tablet Take 1 Tab by mouth daily. 10/24/16   Lisa Holt MD   metoprolol succinate (TOPROL-XL) 25 mg XL tablet TAKE 1 TABLET EVERY DAY 10/18/16   Lisa Holt MD   ibuprofen (MOTRIN) 800 mg tablet TAKE 1 TABLET EVERY 6 HOURS AS NEEDED (SUBSTITUTED FOR MOTRIN) 10/18/16   Lisa Holt MD   fluticasone (FLONASE) 50 mcg/actuation nasal spray INSTILL 2 SPRAYS INTO BOTH NOSTRILS DAILY AS NEEDED FOR RHINITIS. 10/18/16   Lisa Holt MD   JANUVIA 100 mg tablet TAKE 1 TABLET EVERY DAY FOR DIABETES 10/17/16   Lisa Holt MD   metFORMIN ER (GLUCOPHAGE XR) 500 mg tablet Take 500 mg by mouth daily (with breakfast).     Historical Provider insulin glargine (LANTUS SOLOSTAR) 100 unit/mL (3 mL) pen 14 Units by SubCUTAneous route every evening. 8/30/16   Rober Nguyen MD   lisinopril (PRINIVIL, ZESTRIL) 2.5 mg tablet TAKE 1 TABLET EVERY DAY 5/31/16   Rober Nguyen MD   pravastatin (PRAVACHOL) 40 mg tablet TAKE 1 TABLET EVERY NIGHT 4/21/16   Rober Nguyen MD   amitriptyline (ELAVIL) 10 mg tablet TAKE 1 TABLET NIGHTLY 4/21/16   Rober Nguyen MD   BD INSULIN PEN NEEDLE UF MINI 31 gauge x 3/16\" ndle Use to check blood glucose level three times a daily 2/26/16   Rober Nguyen MD   Blood-Glucose Meter (ACCU-CHEK KRISTOFER PLUS METER) misc Use to monitor blood sugar 3 times daily 2/22/16   Rober Nguyen MD   glucose blood VI test strips (ACCU-CHEK KRISTOFER PLUS TEST STRP) strip Use to check blood glucose level three times daily 2/9/16   Rober Nguyen MD   Lancets (ACCU-CHEK SOFTCLIX LANCETS) misc Use to obtain blood sample for diabetic testing three times a day 2/9/16   Rober Nguyen MD   alcohol swabs (BD SINGLE USE SWABS REGULAR) padm Use to cleanse skin prior to obtaining blood sample for diabetic testing three times daily 2/9/16   Rober Nguyen MD   PROAIR HFA 90 mcg/actuation inhaler Take 1 Puff by inhalation every four (4) hours as needed. 12/5/15   Historical Provider   albuterol-ipratropium (DUO-NEB) 2.5 mg-0.5 mg/3 ml nebulizer solution 3 mL by Nebulization route every forty-eight (48) hours as needed. 12/4/15   Historical Provider   Calcium-Cholecalciferol, D3, 600 mg(1,500mg) -400 unit cap Take 1 Tab by mouth daily. Historical Provider   Insulin Needles, Disposable, 31 gauge x 5/16\" ndle Use with lantus pen 12/30/15   Rober Nguyen MD   Aspirin, Buffered 81 mg tab Take 81 mg by mouth daily. Historical Provider       REVIEW OF SYSTEMS:     I am not able to complete the review of systems because:    The patient is intubated and sedated    The patient has altered mental status due to his acute medical problems    The patient has baseline aphasia from prior stroke(s)    The patient has baseline dementia and is not reliable historian    The patient is in acute medical distress and unable to provide information           Total of 12 systems reviewed as follows:       POSITIVE= underlined text  Negative = text not underlined  General:  fever, chills, sweats, generalized weakness, weight loss/gain,      loss of appetite   Eyes:    blurred vision, eye pain, loss of vision, double vision  ENT:    rhinorrhea, pharyngitis   Respiratory:   cough, sputum production, SOB, WISEMAN, wheezing, pleuritic pain   Cardiology:   chest pain, palpitations, orthopnea, PND, edema, syncope   Gastrointestinal:  abdominal pain , N/V, diarrhea, dysphagia, constipation, bleeding   Genitourinary:  frequency, urgency, dysuria, hematuria, incontinence   Muskuloskeletal :  arthralgia, myalgia, back pain  Hematology:  easy bruising, nose or gum bleeding, lymphadenopathy   Dermatological: rash, ulceration, pruritis, color change / jaundice  Endocrine:   hot flashes or polydipsia   Neurological:  headache, dizziness, confusion, focal weakness, paresthesia,     Speech difficulties, memory loss, gait difficulty  Psychological: Feelings of anxiety, depression, agitation    Objective:   VITALS:    Visit Vitals    BP (!) 148/94    Pulse 70    Temp 97.9 °F (36.6 °C)    Resp 18    Ht 5' 4\" (1.626 m)    Wt 77.1 kg (170 lb)    SpO2 100%    BMI 29.18 kg/m2       PHYSICAL EXAM:    General:    Alert, cooperative, no distress, appears stated age. HEENT: Atraumatic, anicteric sclerae, pink conjunctivae     No oral ulcers, mucosa moist, throat clear, dentition fair  Neck:  Supple, symmetrical,  thyroid: non tender  Lungs:   Coarse BS  Chest wall:  No tenderness  No Accessory muscle use. Heart:   Regular  rhythm,  No  murmur   No edema  Abdomen:   Soft, non-tender. Not distended.   Bowel sounds normal  Extremities: No cyanosis. No clubbing      Capillary refill normal,  Radial pulse 2+  Skin:     Not pale. Not Jaundiced  No rashes   Psych:  Good insight. Not depressed. Not anxious or agitated. Neurologic: EOMs intact. No facial asymmetry. No aphasia or slurred speech. Symmetrical strength, Sensation grossly intact. Alert and oriented X 4.     _______________________________________________________________________  Care Plan discussed with:    Comments   Patient y    Family  y    RN y    Care Manager                    Consultant:      _______________________________________________________________________  Expected  Disposition:   Home with Family y   HH/PT/OT/RN y   SNF/LTC    MICHELLE    ________________________________________________________________________  TOTAL TIME:  61 Minutes    Critical Care Provided     Minutes non procedure based      Comments    y Reviewed previous records   >50% of visit spent in counseling and coordination of care y Discussion with patient and/or family and questions answered       ________________________________________________________________________  Signed: Aydin Lentz MD    Procedures: see electronic medical records for all procedures/Xrays and details which were not copied into this note but were reviewed prior to creation of Plan. LAB DATA REVIEWED:    Recent Results (from the past 24 hour(s))   CBC WITH AUTOMATED DIFF    Collection Time: 01/05/17  4:02 PM   Result Value Ref Range    WBC 6.8 3.6 - 11.0 K/uL    RBC 4.38 3.80 - 5.20 M/uL    HGB 12.6 11.5 - 16.0 g/dL    HCT 38.3 35.0 - 47.0 %    MCV 87.4 80.0 - 99.0 FL    MCH 28.8 26.0 - 34.0 PG    MCHC 32.9 30.0 - 36.5 g/dL    RDW 13.7 11.5 - 14.5 %    PLATELET 861 081 - 338 K/uL    NEUTROPHILS 65 32 - 75 %    LYMPHOCYTES 26 12 - 49 %    MONOCYTES 6 5 - 13 %    EOSINOPHILS 3 0 - 7 %    BASOPHILS 0 0 - 1 %    ABS. NEUTROPHILS 4.4 1.8 - 8.0 K/UL    ABS. LYMPHOCYTES 1.8 0.8 - 3.5 K/UL    ABS.  MONOCYTES 0.4 0.0 - 1.0 K/UL    ABS. EOSINOPHILS 0.2 0.0 - 0.4 K/UL    ABS. BASOPHILS 0.0 0.0 - 0.1 K/UL   METABOLIC PANEL, COMPREHENSIVE    Collection Time: 01/05/17  4:02 PM   Result Value Ref Range    Sodium 144 136 - 145 mmol/L    Potassium 4.7 3.5 - 5.1 mmol/L    Chloride 107 97 - 108 mmol/L    CO2 27 21 - 32 mmol/L    Anion gap 10 5 - 15 mmol/L    Glucose 85 65 - 100 mg/dL    BUN 12 6 - 20 MG/DL    Creatinine 0.86 0.55 - 1.02 MG/DL    BUN/Creatinine ratio 14 12 - 20      GFR est AA >60 >60 ml/min/1.73m2    GFR est non-AA >60 >60 ml/min/1.73m2    Calcium 8.7 8.5 - 10.1 MG/DL    Bilirubin, total 0.2 0.2 - 1.0 MG/DL    ALT 30 12 - 78 U/L    AST 34 15 - 37 U/L    Alk.  phosphatase 106 45 - 117 U/L    Protein, total 7.6 6.4 - 8.2 g/dL    Albumin 3.9 3.5 - 5.0 g/dL    Globulin 3.7 2.0 - 4.0 g/dL    A-G Ratio 1.1 1.1 - 2.2     PROTHROMBIN TIME + INR    Collection Time: 01/05/17  4:02 PM   Result Value Ref Range    INR 1.0 0.9 - 1.1      Prothrombin time 9.9 9.0 - 11.1 sec   PTT    Collection Time: 01/05/17  4:02 PM   Result Value Ref Range    aPTT 30.0 22.1 - 32.5 sec    aPTT, therapeutic range     58.0 - 77.0 SECS   TROPONIN I    Collection Time: 01/05/17  4:02 PM   Result Value Ref Range    Troponin-I, Qt. <0.04 <0.05 ng/mL   GLUCOSE, POC    Collection Time: 01/05/17  4:09 PM   Result Value Ref Range    Glucose (POC) 88 65 - 100 mg/dL    Performed by Coni Hull

## 2017-01-05 NOTE — ED NOTES
Assumed care of patient from triage. Patient arrives with complaints of headache x 1 week with numbness to right side of body. Patient reports that numbness starts in the back of head and goes down into R legs. Patient was seen at cardiology and referred to ED for symptoms. Patient placed on the monitor x 3 with side rails and call bell in reach.

## 2017-01-05 NOTE — ED PROVIDER NOTES
HPI Comments: Sharona Fairchild is a 61 y.o. female with pertinent PMHx of hypercholesterolemia, DM, HTN, CAD, and chronic pain presenting ambulatory to the ED c/o constant posterior headache that \"feels like a pressure\" x 1 week. Pt notes associated symptoms of intermittent right sided facial numbness/tingling (currently experiencing) and right sided extremity numbness/tingling (not current). Pt states that the episodes of numbness last ~20 minutes each. Pt states that her cardiologist sent her to the ED today due to her symptoms. Pt denies any history of stroke. Pt specifically denies any N/V/D, chest pain, SOB or left sided numbness. PCP: Genevieve Erazo MD  PHMx: gastroparesis, anxiety, depression, GERD, sleep apnea  Surgical Hx: gastrojejunostomy, gastric bypass, cholecystectomy, hysterectomy, orthopaedic surgery  Social Hx: - smoking, - alcohol use, - illicit drug use      There are no other complaints, changes, or physical findings at this time. The history is provided by the patient. No  was used.         Past Medical History:   Diagnosis Date    Anxiety     Arthritis     ASHD (arteriosclerotic heart disease)     CAD (coronary artery disease)     Chronic pain     Constipation     Depression     Diabetes ([address]     Diarrhea     Dysgeusia 11/21/2014    Esophageal motor disorder 1/23/2012    Flatulence/gas pain/belching 11/21/2014    Gastroparesis     GERD (gastroesophageal reflux disease)     Hypercholesterolemia     Hypertension     Psychiatric disorder      anxiety    S/P bypass gastrojejunostomy 1/23/2012    Sleep apnea      no cpap       Past Surgical History:   Procedure Laterality Date    Pr gastrojejunostomy      Hx laparotomy  8/02     2/2 intra abd abscess    Pr abdomen surgery proc unlisted       s/p partial gastrectomy 2/2 gastroparesis    Hx gastric bypass  6/02    Pr removal of heel spur  2001     bilat    Hc bld carpel tunnel release bilat    Hx cholecystectomy      Hx hysterectomy      Egd  7/13/2009    Hx urological       bladder surgery-bladder tac    Hx orthopaedic  1/08     laminectomy    Hx orthopaedic       Bilateral feet bone spurs removed    Hx orthopaedic       bilateral carpal tunnel    Hx orthopaedic       back surgery    Pr egd transoral biopsy single/multiple  7/11/2011          Pr colonoscopy flx dx w/collj spec when pfrmd  5/03/2006     Dr. Aceves Meals transoral biopsy single/multiple  1/23/2012          Pr egd balloon dilation esophagus <30 mm diam  1/23/2012          Hx appendectomy      Pr colonoscopy w/biopsy single/multiple  2/9/2012          Pr cardiac surg procedure unlist  6/2013     stent placement X 2    Hx coronary stent placement  06/2013     3 stents    Colonoscopy N/A 5/26/2016     COLONOSCOPY performed by Mervat King MD at [facility] ENDOSCOPY         Family History:   Problem Relation Age of Onset    Heart Disease Mother     Hypertension Mother     Diabetes Mother     Diabetes Father     Heart Disease Father     Hypertension Father     Alcohol abuse Brother     Heart Disease Sister     Hypertension Sister     Diabetes Sister     No Known Problems Sister     Alcohol abuse Brother     Alcohol abuse Brother     Diabetes Brother        Social History     Social History    Marital status:      Spouse name: N/A    Number of children: N/A    Years of education: N/A     Occupational History    Not on file. Social History Main Topics    Smoking status: Never Smoker    Smokeless tobacco: Never Used    Alcohol use No    Drug use: No    Sexual activity: Not Currently     Other Topics Concern    Not on file     Social History Narrative         ALLERGIES: No known allergies and Demerol [meperidine]    Review of Systems   Constitutional: Negative for chills, fatigue and fever. HENT: Negative for congestion, rhinorrhea and sore throat.     Eyes: Negative for pain, discharge and visual disturbance. Respiratory: Negative for cough, chest tightness, shortness of breath and wheezing. Cardiovascular: Negative for chest pain, palpitations and leg swelling. Gastrointestinal: Negative for abdominal pain, constipation, diarrhea, nausea and vomiting. Genitourinary: Negative for dysuria, frequency and hematuria. Musculoskeletal: Negative for arthralgias, back pain and myalgias. Skin: Negative for rash. Neurological: Positive for numbness (intermittent right sided face and extremities) and headaches (posterior). Negative for dizziness, weakness and light-headedness. Psychiatric/Behavioral: Negative. Vitals:    01/05/17 1445 01/05/17 1618 01/05/17 1619   BP: 143/83 (!) 148/94    Pulse: 92  70   Resp: 16  18   Temp: 97.9 °F (36.6 °C)     SpO2: 100%     Weight: 77.1 kg (170 lb)     Height: 5' 4\" (1.626 m)              Physical Exam   Constitutional: She is oriented to person, place, and time. She appears well-developed and well-nourished. No distress. HENT:   Head: Normocephalic and atraumatic. Eyes: EOM are normal. Right eye exhibits no discharge. Left eye exhibits no discharge. No scleral icterus. Neck: Normal range of motion. Neck supple. No tracheal deviation present. Cardiovascular: Normal rate, regular rhythm, normal heart sounds and intact distal pulses. Exam reveals no gallop and no friction rub. No murmur heard. Pulmonary/Chest: Effort normal and breath sounds normal. No respiratory distress. She has no wheezes. She has no rales. Abdominal: Soft. She exhibits no distension. There is no tenderness. Musculoskeletal: Normal range of motion. She exhibits no edema. Lymphadenopathy:     She has no cervical adenopathy. Neurological: She is alert and oriented to person, place, and time.    No focal neuro deficits  Facial symmetry, tongue midline  Negative romberg  No pronator drift  5/5 strength to upper and lower extremities  Decreased sensation to the right side of the face, sensation to extremities is intact  Normal speech  Gait is stable   Skin: Skin is warm and dry. No rash noted. Psychiatric: She has a normal mood and affect. Nursing note and vitals reviewed. MDM  Number of Diagnoses or Management Options  Facial paresthesia:   Nonintractable headache, unspecified chronicity pattern, unspecified headache type:   Paresthesia of right arm and leg:   Diagnosis management comments:     Differential includes TIA, CVA, ICH, complicated migraine, hypoglycemia, ACS. Do not suspect SAH, meningitis  - CBC, CMP  - Alexia  - Coags  - HCT  - Discuss with teleneurology         Amount and/or Complexity of Data Reviewed  Clinical lab tests: reviewed and ordered  Tests in the radiology section of CPT®: ordered and reviewed  Review and summarize past medical records: yes  Discuss the patient with other providers: yes (Neurology, Hospitalist)    Patient Progress  Patient progress: stable    ED Course       Procedures    Consult Note:  4:12 PM  Gael Sims MD spoke with Dr. Bev Carmichael,  Specialty: Neurology  Discussed pt's hx, disposition, and available diagnostic and imaging results. Reviewed care plans. Consultant agrees with plans as outlined. Dr. Bev Carmichael agrees that the pt should be admitted for a stroke work up. Written by JESSICA Catherine Ace, as dictated by Gael Sims MD.     Progress Note:  4:12 PM  Pt and/or family have been updated on the consult with neurology. Pt and/or pt's family are aware of the plan of care and are in agreement. Written by JESSICA Catherine Ace, as dictated by Gael Sims MD.     CONSULT NOTE:   5:10 PM  Gael Sims MD spoke with Dr. Clint Cook,   Specialty: Hospitalist  Discussed pt's hx, disposition, and available diagnostic and imaging results. Reviewed care plans. Consultant will admit the pt.   Written by JESSICA Valadez, as dictated by Gael Sims MD.     LABORATORY TESTS:  Recent Results (from the past 12 hour(s))   CBC WITH AUTOMATED DIFF    Collection Time: 01/05/17  4:02 PM   Result Value Ref Range    WBC 6.8 3.6 - 11.0 K/uL    RBC 4.38 3.80 - 5.20 M/uL    HGB 12.6 11.5 - 16.0 g/dL    HCT 38.3 35.0 - 47.0 %    MCV 87.4 80.0 - 99.0 FL    MCH 28.8 26.0 - 34.0 PG    MCHC 32.9 30.0 - 36.5 g/dL    RDW 13.7 11.5 - 14.5 %    PLATELET 398 975 - 406 K/uL    NEUTROPHILS 65 32 - 75 %    LYMPHOCYTES 26 12 - 49 %    MONOCYTES 6 5 - 13 %    EOSINOPHILS 3 0 - 7 %    BASOPHILS 0 0 - 1 %    ABS. NEUTROPHILS 4.4 1.8 - 8.0 K/UL    ABS. LYMPHOCYTES 1.8 0.8 - 3.5 K/UL    ABS. MONOCYTES 0.4 0.0 - 1.0 K/UL    ABS. EOSINOPHILS 0.2 0.0 - 0.4 K/UL    ABS. BASOPHILS 0.0 0.0 - 0.1 K/UL   METABOLIC PANEL, COMPREHENSIVE    Collection Time: 01/05/17  4:02 PM   Result Value Ref Range    Sodium 144 136 - 145 mmol/L    Potassium 4.7 3.5 - 5.1 mmol/L    Chloride 107 97 - 108 mmol/L    CO2 27 21 - 32 mmol/L    Anion gap 10 5 - 15 mmol/L    Glucose 85 65 - 100 mg/dL    BUN 12 6 - 20 MG/DL    Creatinine 0.86 0.55 - 1.02 MG/DL    BUN/Creatinine ratio 14 12 - 20      GFR est AA >60 >60 ml/min/1.73m2    GFR est non-AA >60 >60 ml/min/1.73m2    Calcium 8.7 8.5 - 10.1 MG/DL    Bilirubin, total 0.2 0.2 - 1.0 MG/DL    ALT 30 12 - 78 U/L    AST 34 15 - 37 U/L    Alk.  phosphatase 106 45 - 117 U/L    Protein, total 7.6 6.4 - 8.2 g/dL    Albumin 3.9 3.5 - 5.0 g/dL    Globulin 3.7 2.0 - 4.0 g/dL    A-G Ratio 1.1 1.1 - 2.2     PROTHROMBIN TIME + INR    Collection Time: 01/05/17  4:02 PM   Result Value Ref Range    INR 1.0 0.9 - 1.1      Prothrombin time 9.9 9.0 - 11.1 sec   PTT    Collection Time: 01/05/17  4:02 PM   Result Value Ref Range    aPTT 30.0 22.1 - 32.5 sec    aPTT, therapeutic range     58.0 - 77.0 SECS   TROPONIN I    Collection Time: 01/05/17  4:02 PM   Result Value Ref Range    Troponin-I, Qt. <0.04 <0.05 ng/mL   GLUCOSE, POC    Collection Time: 01/05/17  4:09 PM   Result Value Ref Range    Glucose (POC) 88 65 - 100 mg/dL    Performed by [employer] [employer]        IMAGING RESULTS:  CT Results  (Last 48 hours)               01/05/17 1537  CT HEAD WO CONT Final result    Impression:  IMPRESSION:        No acute intracranial abnormality               Narrative:  EXAM:  CT HEAD WO CONT       INDICATION:   R sided numbness       COMPARISON: Comparison to 7/20/2013. TECHNIQUE: Unenhanced CT of the head was performed using 5 mm images. Brain and   bone windows were generated. CT dose reduction was achieved through use of a   standardized protocol tailored for this examination and automatic exposure   control for dose modulation. FINDINGS:   The ventricles and sulci are normal in size, shape and configuration and   midline. There is no significant white matter disease. There is no intracranial   hemorrhage, extra-axial collection, mass, mass effect or midline shift. The   basilar cisterns are open. No acute infarct is identified. The bone windows   demonstrate no abnormalities. There is minimal sinus mucosal inflammatory   change. Shira Shan MEDICATIONS GIVEN:  Medications   aspirin (ASPIRIN) tablet 325 mg (not administered)       IMPRESSION:  Paresthesia of right arm and leg  Facial paresthesia  Headache, unspecified pattern, not intractable    PLAN:  1. Admit to hospitalist  Admit Note:  5:12 PM  Patient is being admitted to the hospital by Dr. Heber Figueroa. The results of their tests and reasons for their admission have been discussed with the patient and/or available family. They convey agreement and understanding for the need to be admitted and for their admission diagnosis. Written by Chacho Nguyen, ED Scribe, as dictated by Cornel Alcazar MD.    Attestation: This note is prepared by Jerry Arias. Criss Nguyen, acting as Scribe for Cornel Alcazar MD.    Cornel Alcazar MD: The scribe's documentation has been prepared under my direction and personally reviewed by me in its entirety.  I confirm that the note above accurately reflects all work, treatment, procedures, and medical decision making performed by me.

## 2017-01-05 NOTE — IP AVS SNAPSHOT
Current Discharge Medication List  
  
Take these medications at their scheduled times Dose & Instructions Dispensing Information Comments Morning Noon Evening Bedtime  
 aspirin, buffered 81 mg Tab Your next dose is: Today, Tomorrow Other:  ____________ Dose:  81 mg Take 81 mg by mouth daily. Refills:  0 Calcium-Cholecalciferol (D3) 600 mg(1,500mg) -400 unit Cap Your next dose is: Today, Tomorrow Other:  ____________ Dose:  1 Tab Take 1 Tab by mouth daily. Refills:  0  
     
   
   
   
  
 glipiZIDE SR 5 mg CR tablet Commonly known as:  GLUCOTROL XL Your next dose is: Today, Tomorrow Other:  ____________ Dose:  5 mg Take 1 Tab by mouth daily. Quantity:  90 Tab Refills:  3  
     
   
   
   
  
 glucophage  mg tablet Generic drug:  metFORMIN ER Your next dose is: Today, Tomorrow Other:  ____________ Dose:  500 mg Take 500 mg by mouth daily (with breakfast). Refills:  0  
     
   
   
   
  
 insulin glargine 100 unit/mL (3 mL) pen Commonly known as:  LANTUS SOLOSTAR Your next dose is: Today, Tomorrow Other:  ____________ Dose:  14 Units 14 Units by SubCUTAneous route every evening. Quantity:  2 Each Refills:  5  
 e11.9  
    
   
   
   
  
 levothyroxine 25 mcg tablet Commonly known as:  synthroid Your next dose is: Today, Tomorrow Other:  ____________ Dose:  25 mcg Take 1 Tab by mouth Daily (before breakfast). Quantity:  30 Tab Refills:  0 Take these medications as needed Dose & Instructions Dispensing Information Comments Morning Noon Evening Bedtime  
 albuterol-ipratropium 2.5 mg-0.5 mg/3 ml Nebu Commonly known as:  Bibiana Pilar Your next dose is: Today, Tomorrow Other:  ____________ Dose:  3 mL 3 mL by Nebulization route every forty-eight (48) hours as needed. Refills:  0  
     
   
   
   
  
 aspirin-acetaminophen-caffeine 250-250-65 mg per tablet Commonly known as:  Lopez Heading Your next dose is: Today, Tomorrow Other:  ____________ Dose:  1 Tab Take 1 Tab by mouth every eight (8) hours as needed for Headache. Quantity:  30 Tab Refills:  0  
     
   
   
   
  
 furosemide 20 mg tablet Commonly known as:  LASIX Your next dose is: Today, Tomorrow Other:  ____________ Dose:  20 mg Take 1 Tab by mouth daily as needed. Quantity:  90 Tab Refills:  3 PROAIR HFA 90 mcg/actuation inhaler Generic drug:  albuterol Your next dose is: Today, Tomorrow Other:  ____________ Dose:  1 Puff Take 1 Puff by inhalation every four (4) hours as needed. Refills:  0 Take these medications as directed Dose & Instructions Dispensing Information Comments Morning Noon Evening Bedtime  
 alcohol swabs Padm Commonly known as:  BD Single Use Swabs Regular Your next dose is: Today, Tomorrow Other:  ____________ Use to cleanse skin prior to obtaining blood sample for diabetic testing three times daily Quantity:  300 Pad Refills:  3  
 e11.9 ALPRAZolam 1 mg tablet Commonly known as:  Pixjeanmarie Oz Your next dose is: Today, Tomorrow Other:  ____________ TAKE 1/2 TABLET EVERY DAY AS NEEDED FOR ANXIETY  AND TAKE 1 TABLET AT BEDTIME AS NEEDED  FOR  SLEEP Quantity:  135 Tab Refills:  1  
     
   
   
   
  
 amitriptyline 10 mg tablet Commonly known as:  ELAVIL Your next dose is: Today, Tomorrow Other:  ____________ TAKE 1 TABLET NIGHTLY Quantity:  90 Tab Refills:  3 Blood-Glucose Meter Misc Commonly known as:  ACCU-CHEK KRISTOFER PLUS METER  
   
 Your next dose is: Today, Tomorrow Other:  ____________ Use to monitor blood sugar 3 times daily Quantity:  1 Each Refills:  0  
 E11.9 , I10  
    
   
   
   
  
 fluticasone 50 mcg/actuation nasal spray Commonly known as:  Criss Caller Your next dose is: Today, Tomorrow Other:  ____________ INSTILL 2 SPRAYS INTO BOTH NOSTRILS DAILY AS NEEDED FOR RHINITIS. Quantity:  48 g Refills:  3  
     
   
   
   
  
 glucose blood VI test strips strip Commonly known as:  ACCU-CHEK KRISTOFER PLUS TEST STRP Your next dose is: Today, Tomorrow Other:  ____________ Use to check blood glucose level three times daily Quantity:  300 Strip Refills:  3  
 e11.9 * Insulin Needles (Disposable) 31 gauge x 5/16\" Ndle Your next dose is: Today, Tomorrow Other:  ____________ Use with lantus pen Quantity:  1 Package Refills:  11  
     
   
   
   
  
 * BD INSULIN PEN NEEDLE UF MINI 31 gauge x 3/16\" Ndle Generic drug:  Insulin Needles (Disposable) Your next dose is: Today, Tomorrow Other:  ____________ Use to check blood glucose level three times a daily Quantity:  300 Pen Needle Refills:  3  
 e11.9 JANUVIA 100 mg tablet Generic drug:  SITagliptin Your next dose is: Today, Tomorrow Other:  ____________ TAKE 1 TABLET EVERY DAY FOR DIABETES Quantity:  90 Tab Refills:  1 Lancets Misc Commonly known as:  ACCU-CHEK SOFTCLIX LANCETS Your next dose is: Today, Tomorrow Other:  ____________ Use to obtain blood sample for diabetic testing three times a day Quantity:  1 Each Refills:  11  
 E11.9  
    
   
   
   
  
 lisinopril 2.5 mg tablet Commonly known as:  Jones Yarelis Your next dose is: Today, Tomorrow Other:  ____________  TAKE 1 TABLET EVERY DAY  
 Quantity:  90 Tab Refills:  3  
     
   
   
   
  
 metoprolol succinate 25 mg XL tablet Commonly known as:  TOPROL-XL Your next dose is: Today, Tomorrow Other:  ____________ TAKE 1 TABLET EVERY DAY Quantity:  90 Tab Refills:  3  
     
   
   
   
  
 pravastatin 40 mg tablet Commonly known as:  PRAVACHOL Your next dose is: Today, Tomorrow Other:  ____________ TAKE 1 TABLET EVERY NIGHT Quantity:  90 Tab Refills:  3  
     
   
   
   
  
 * Notice: This list has 2 medication(s) that are the same as other medications prescribed for you. Read the directions carefully, and ask your doctor or other care provider to review them with you. Where to Get Your Medications Information about where to get these medications is not yet available ! Ask your nurse or doctor about these medications  
  aspirin-acetaminophen-caffeine 250-250-65 mg per tablet  
 levothyroxine 25 mcg tablet

## 2017-01-06 VITALS
SYSTOLIC BLOOD PRESSURE: 114 MMHG | TEMPERATURE: 97.9 F | HEART RATE: 75 BPM | DIASTOLIC BLOOD PRESSURE: 67 MMHG | HEIGHT: 64 IN | OXYGEN SATURATION: 100 % | BODY MASS INDEX: 29.02 KG/M2 | WEIGHT: 170 LBS | RESPIRATION RATE: 18 BRPM

## 2017-01-06 PROBLEM — G43.109 COMPLICATED MIGRAINE: Status: ACTIVE | Noted: 2017-01-06

## 2017-01-06 PROBLEM — R20.0 NUMBNESS AND TINGLING OF RIGHT SIDE OF FACE: Status: ACTIVE | Noted: 2017-01-06

## 2017-01-06 PROBLEM — R20.2 NUMBNESS AND TINGLING OF RIGHT SIDE OF FACE: Status: ACTIVE | Noted: 2017-01-06

## 2017-01-06 PROBLEM — I65.23 STENOSIS OF BOTH INTERNAL CAROTID ARTERIES: Status: ACTIVE | Noted: 2017-01-06

## 2017-01-06 LAB
ALBUMIN SERPL BCP-MCNC: 3.6 G/DL (ref 3.5–5)
ALBUMIN/GLOB SERPL: 1.1 {RATIO} (ref 1.1–2.2)
ALP SERPL-CCNC: 104 U/L (ref 45–117)
ALT SERPL-CCNC: 27 U/L (ref 12–78)
ANION GAP BLD CALC-SCNC: 8 MMOL/L (ref 5–15)
AST SERPL W P-5'-P-CCNC: 28 U/L (ref 15–37)
ATRIAL RATE: 68 BPM
BASOPHILS # BLD AUTO: 0 K/UL (ref 0–0.1)
BASOPHILS # BLD: 0 % (ref 0–1)
BILIRUB SERPL-MCNC: 0.1 MG/DL (ref 0.2–1)
BUN SERPL-MCNC: 11 MG/DL (ref 6–20)
BUN/CREAT SERPL: 13 (ref 12–20)
CALCIUM SERPL-MCNC: 8.6 MG/DL (ref 8.5–10.1)
CALCULATED P AXIS, ECG09: 55 DEGREES
CALCULATED T AXIS, ECG11: 31 DEGREES
CHLORIDE SERPL-SCNC: 107 MMOL/L (ref 97–108)
CHOLEST SERPL-MCNC: 139 MG/DL
CK MB CFR SERPL CALC: 1.4 % (ref 0–2.5)
CK MB SERPL-MCNC: 1.3 NG/ML (ref 5–25)
CK SERPL-CCNC: 92 U/L (ref 26–192)
CO2 SERPL-SCNC: 27 MMOL/L (ref 21–32)
CREAT SERPL-MCNC: 0.87 MG/DL (ref 0.55–1.02)
CRP SERPL HS-MCNC: >9.5 MG/L
DIAGNOSIS, 93000: NORMAL
EOSINOPHIL # BLD: 0.3 K/UL (ref 0–0.4)
EOSINOPHIL NFR BLD: 3 % (ref 0–7)
ERYTHROCYTE [DISTWIDTH] IN BLOOD BY AUTOMATED COUNT: 13.6 % (ref 11.5–14.5)
GLOBULIN SER CALC-MCNC: 3.4 G/DL (ref 2–4)
GLUCOSE BLD STRIP.AUTO-MCNC: 136 MG/DL (ref 65–100)
GLUCOSE BLD STRIP.AUTO-MCNC: 93 MG/DL (ref 65–100)
GLUCOSE BLD STRIP.AUTO-MCNC: 93 MG/DL (ref 65–100)
GLUCOSE SERPL-MCNC: 97 MG/DL (ref 65–100)
HCT VFR BLD AUTO: 36.3 % (ref 35–47)
HDLC SERPL-MCNC: 40 MG/DL
HDLC SERPL: 3.5 {RATIO} (ref 0–5)
HGB BLD-MCNC: 11.9 G/DL (ref 11.5–16)
LDLC SERPL CALC-MCNC: 59.8 MG/DL (ref 0–100)
LIPID PROFILE,FLP: ABNORMAL
LYMPHOCYTES # BLD AUTO: 34 % (ref 12–49)
LYMPHOCYTES # BLD: 2.7 K/UL (ref 0.8–3.5)
MAGNESIUM SERPL-MCNC: 2.8 MG/DL (ref 1.6–2.4)
MCH RBC QN AUTO: 28.4 PG (ref 26–34)
MCHC RBC AUTO-ENTMCNC: 32.8 G/DL (ref 30–36.5)
MCV RBC AUTO: 86.6 FL (ref 80–99)
MONOCYTES # BLD: 0.5 K/UL (ref 0–1)
MONOCYTES NFR BLD AUTO: 6 % (ref 5–13)
NEUTS SEG # BLD: 4.4 K/UL (ref 1.8–8)
NEUTS SEG NFR BLD AUTO: 57 % (ref 32–75)
P-R INTERVAL, ECG05: 166 MS
PLATELET # BLD AUTO: 264 K/UL (ref 150–400)
POTASSIUM SERPL-SCNC: 4.2 MMOL/L (ref 3.5–5.1)
PROT SERPL-MCNC: 7 G/DL (ref 6.4–8.2)
Q-T INTERVAL, ECG07: 406 MS
QRS DURATION, ECG06: 74 MS
QTC CALCULATION (BEZET), ECG08: 431 MS
RBC # BLD AUTO: 4.19 M/UL (ref 3.8–5.2)
RPR SER QL: NONREACTIVE
SERVICE CMNT-IMP: ABNORMAL
SERVICE CMNT-IMP: NORMAL
SERVICE CMNT-IMP: NORMAL
SODIUM SERPL-SCNC: 142 MMOL/L (ref 136–145)
T3FREE SERPL-MCNC: 2.1 PG/ML (ref 2.2–4)
TRIGL SERPL-MCNC: 196 MG/DL (ref ?–150)
TROPONIN I SERPL-MCNC: <0.04 NG/ML
TROPONIN I SERPL-MCNC: <0.04 NG/ML
VENTRICULAR RATE, ECG03: 68 BPM
VLDLC SERPL CALC-MCNC: 39.2 MG/DL
WBC # BLD AUTO: 7.8 K/UL (ref 3.6–11)

## 2017-01-06 PROCEDURE — 97161 PT EVAL LOW COMPLEX 20 MIN: CPT

## 2017-01-06 PROCEDURE — 65390000012 HC CONDITION CODE 44 OBSERVATION

## 2017-01-06 PROCEDURE — 97166 OT EVAL MOD COMPLEX 45 MIN: CPT | Performed by: OCCUPATIONAL THERAPIST

## 2017-01-06 PROCEDURE — 82962 GLUCOSE BLOOD TEST: CPT

## 2017-01-06 PROCEDURE — G8979 MOBILITY GOAL STATUS: HCPCS

## 2017-01-06 PROCEDURE — G8989 SELF CARE D/C STATUS: HCPCS | Performed by: OCCUPATIONAL THERAPIST

## 2017-01-06 PROCEDURE — 93306 TTE W/DOPPLER COMPLETE: CPT

## 2017-01-06 PROCEDURE — 83735 ASSAY OF MAGNESIUM: CPT | Performed by: INTERNAL MEDICINE

## 2017-01-06 PROCEDURE — G8988 SELF CARE GOAL STATUS: HCPCS | Performed by: OCCUPATIONAL THERAPIST

## 2017-01-06 PROCEDURE — 36415 COLL VENOUS BLD VENIPUNCTURE: CPT | Performed by: INTERNAL MEDICINE

## 2017-01-06 PROCEDURE — 82550 ASSAY OF CK (CPK): CPT | Performed by: INTERNAL MEDICINE

## 2017-01-06 PROCEDURE — 97110 THERAPEUTIC EXERCISES: CPT | Performed by: OCCUPATIONAL THERAPIST

## 2017-01-06 PROCEDURE — 80053 COMPREHEN METABOLIC PANEL: CPT | Performed by: INTERNAL MEDICINE

## 2017-01-06 PROCEDURE — G8978 MOBILITY CURRENT STATUS: HCPCS

## 2017-01-06 PROCEDURE — 82553 CREATINE MB FRACTION: CPT | Performed by: INTERNAL MEDICINE

## 2017-01-06 PROCEDURE — 84484 ASSAY OF TROPONIN QUANT: CPT | Performed by: INTERNAL MEDICINE

## 2017-01-06 PROCEDURE — 93880 EXTRACRANIAL BILAT STUDY: CPT

## 2017-01-06 PROCEDURE — G8987 SELF CARE CURRENT STATUS: HCPCS | Performed by: OCCUPATIONAL THERAPIST

## 2017-01-06 PROCEDURE — 85025 COMPLETE CBC W/AUTO DIFF WBC: CPT | Performed by: INTERNAL MEDICINE

## 2017-01-06 PROCEDURE — 99218 HC RM OBSERVATION: CPT

## 2017-01-06 PROCEDURE — G8980 MOBILITY D/C STATUS: HCPCS

## 2017-01-06 PROCEDURE — 93041 RHYTHM ECG TRACING: CPT

## 2017-01-06 PROCEDURE — 74011250637 HC RX REV CODE- 250/637: Performed by: INTERNAL MEDICINE

## 2017-01-06 RX ORDER — CALCIUM CARBONATE 200(500)MG
200 TABLET,CHEWABLE ORAL
Status: DISCONTINUED | OUTPATIENT
Start: 2017-01-06 | End: 2017-01-06 | Stop reason: HOSPADM

## 2017-01-06 RX ORDER — LEVOTHYROXINE SODIUM 25 UG/1
25 TABLET ORAL
Qty: 30 TAB | Refills: 0 | Status: SHIPPED | OUTPATIENT
Start: 2017-01-06 | End: 2017-01-11 | Stop reason: SDUPTHER

## 2017-01-06 RX ADMIN — LISINOPRIL 2.5 MG: 5 TABLET ORAL at 08:42

## 2017-01-06 RX ADMIN — METFORMIN HYDROCHLORIDE 500 MG: 500 TABLET, EXTENDED RELEASE ORAL at 08:41

## 2017-01-06 RX ADMIN — Medication 10 ML: at 05:49

## 2017-01-06 RX ADMIN — ASPIRIN 325 MG ORAL TABLET 325 MG: 325 PILL ORAL at 08:43

## 2017-01-06 RX ADMIN — METOPROLOL SUCCINATE 25 MG: 25 TABLET, EXTENDED RELEASE ORAL at 08:43

## 2017-01-06 RX ADMIN — ALPRAZOLAM 0.5 MG: 0.5 TABLET ORAL at 00:43

## 2017-01-06 RX ADMIN — LINAGLIPTIN 5 MG: 5 TABLET, FILM COATED ORAL at 08:41

## 2017-01-06 RX ADMIN — GLIPIZIDE 5 MG: 5 TABLET, FILM COATED, EXTENDED RELEASE ORAL at 08:41

## 2017-01-06 NOTE — ROUTINE PROCESS
No surgery found *  Bedside and Verbal shift change report given to Andi Samaniego RN (oncoming nurse) by Cathie Sanchez (offgoing nurse).  Report included the following information SBAR, Kardex, Recent Results, Med Rec Status and Cardiac Rhythm SR.     Zone Phone: [phone]        Significant changes during shift: Rapid Response called for chest pain           Patient Information     Sugar Alas  61 y.o.  1/5/2017 3:30 PM by Lelo Martinez MD. Sugar Alas was admitted from Home     Problem List          Patient Active Problem List     Diagnosis Date Noted    CVA (cerebral vascular accident) ([facility] [address] 01/05/2017    Headache 01/05/2017    Constipation 05/26/2016    Osteopenia 12/30/2015    Encounter for medication monitoring 12/30/2015    Type 2 diabetes mellitus without complication ([facility] [address] 43/68/7445    Diarrhea 01/29/2015    Flatulence/gas pain/belching 11/21/2014    Dysgeusia 11/21/2014    Mixed hyperlipidemia 07/23/2013    S/P PTCA (percutaneous transluminal coronary angioplasty) 06/28/2013    Angina pectoris ([facility] [address] 06/18/2013    CAD (coronary artery disease) 06/11/2013    SOB (shortness of breath) 03/29/2013    Esophageal motor disorder 01/23/2012    S/P bypass gastrojejunostomy 01/23/2012    GERD (gastroesophageal reflux disease) 03/18/2011    Edema 03/18/2011    Hypovitaminosis D 08/24/2010    Anxiety      Depression              Past Medical History   Diagnosis Date    Anxiety      Arthritis      ASHD (arteriosclerotic heart disease)      CAD (coronary artery disease)      Chronic pain      Constipation      Depression      Diabetes ([facility] [address]      Diarrhea      Dysgeusia 11/21/2014    Esophageal motor disorder 1/23/2012    Flatulence/gas pain/belching 11/21/2014    Gastroparesis      GERD (gastroesophageal reflux disease)      Hypercholesterolemia      Hypertension      Psychiatric disorder         anxiety    S/P bypass gastrojejunostomy 1/23/2012    Sleep apnea         no cpap            Core Measures:     CVA: YES YES  CHF:NO NO  PNA:NO NO     Post Op Surgical (If Applicable):      Number times ambulated in hallway past shift: 0  Number of times OOB to chair past shift: 0  NG Tube: NO  Incentive Spirometer: NO  Drains: NO Volume 0  Dressing Present: NO  Flatus: NOT APPLICABLE     Activity Status:     OOB to Chair NO  Ambulated this shift YES   Bed Rest NO     Supplemental O2: (If Applicable)     NC NO  NRB NO  Venti-mask NO  On 0 Liters/min        LINES AND DRAINS:     Central Line? NO      PICC LINE? NO      Urinary Catheter? NO      DVT prophylaxis:     DVT prophylaxis Med- YES  DVT prophylaxis SCD or LAURO- NO      Wounds: (If Applicable)     Wounds- NO     Location 0     Patient Safety:     Falls Score Total Score: 3  Safety Level_______  Bed Alarm On? YES  Sitter?  NO     Plan for upcoming shift: Neurochecks, fs ac and hs, Echo in AM, Carotids in AM           Discharge Plan: YES when stable     Active Consults:  IP CONSULT TO NEUROLOGY

## 2017-01-06 NOTE — INTERDISCIPLINARY ROUNDS
NeuroScience Telemetry Unit Interdisciplinary rounds were held today to discuss patient plan of care and outcomes. The interdisciplinary team collaborated to facilitate discharge planning needs. The following members were present; Nurse Manager, Pharmacist, Primary Nurse, , Physical Therapy, [facility] Liaison,  Physician, and Case Management.      PLAN:  PT/OT pending  MRI negative   Possible discharge today  Awaiting Neurology recommendations

## 2017-01-06 NOTE — PROGRESS NOTES
Problem: Ischemic Stroke: 0-24 hours  Goal: Diagnostic Test/Procedures  Outcome: Progressing Towards Goal  MRI negative for CVA. Pt scheduled to have carotid duplex and 2D Echo tomorrow. Still has mild numbness in L hand.

## 2017-01-06 NOTE — PROGRESS NOTES
PT note:    Orders received and acknowledged. Chart reviewed and and noted patient off the floor for testing. Will continue to follow.      Siena Jeff, PT, DPT

## 2017-01-06 NOTE — PROGRESS NOTES
Occupational Therapy neurological EVALUATION with discharge  Patient: Jignesh Harris (54 y.o. female)  Date: 1/6/2017  Primary Diagnosis: CVA (cerebral vascular accident) [facility])  CVA (cerebral vascular accident) ([facility]        Precautions:   Bed Alarm    ASSESSMENT:  Based on the objective data described below, the patient presents with decreased right hand strength and coordination with pinch especially in thumb and index finger. This causes pt to have increased effort and time to perform ADLS but functional.  Issued HEP for increased strength and coordination (see below). Recommend OP OT for hand therapy at discharge. Further acute care OT is not indicated at this time. Further skilled acute occupational therapy is not indicated at this time.   Discharge Recommendations: OP OT hand therapy  Further Equipment Recommendations for Discharge: none needed     SUBJECTIVE:   Patient stated I feel back to normal.    OBJECTIVE DATA SUMMARY:   HISTORY:   Past Medical History   Diagnosis Date    Anxiety     Arthritis     ASHD (arteriosclerotic heart disease)     CAD (coronary artery disease)     Chronic pain     Constipation     Depression     [facility]     Diarrhea     Dysgeusia 11/21/2014    Esophageal motor disorder 1/23/2012    Flatulence/gas pain/belching 11/21/2014    Gastroparesis     GERD (gastroesophageal reflux disease)     Hypercholesterolemia     Hypertension     Psychiatric disorder      anxiety    S/P bypass gastrojejunostomy 1/23/2012    Sleep apnea      no cpap     Past Surgical History   Procedure Laterality Date    Pr gastrojejunostomy      Hx laparotomy  8/02     2/2 intra abd abscess    Pr abdomen surgery proc unlisted       s/p partial gastrectomy 2/2 gastroparesis    Hx gastric bypass  6/02    Pr removal of heel spur  2001     bilat    Hc bld carpel tunnel release       bilat    Hx cholecystectomy      Hx hysterectomy      Egd  7/13/2009    Hx urological       bladder surgery-bladder tac    Hx orthopaedic  1/08     laminectomy    Hx orthopaedic       Bilateral feet bone spurs removed    Hx orthopaedic       bilateral carpal tunnel    Hx orthopaedic       back surgery    Pr egd transoral biopsy single/multiple  7/11/2011          Pr colonoscopy flx dx w/collj spec when pfrmd  5/03/2006     Dr. Faby Gonzalez transoral biopsy single/multiple  1/23/2012          Pr egd balloon dilation esophagus <30 mm diam  1/23/2012          Hx appendectomy      Pr colonoscopy w/biopsy single/multiple  2/9/2012          Pr cardiac surg procedure unlist  6/2013     stent placement X 2    Hx coronary stent placement  06/2013     3 stents    Colonoscopy N/A 5/26/2016     COLONOSCOPY performed by Alfonzo Quinn MD at [facility] ENDOSCOPY       Prior Level of Function/Home Situation: independent without assist devices  Expanded or extensive additional review of patient history: reviewed old records from previous admit, deficits with [address] and grasp that limits independence with dressing/toileting/feeding     Home Situation  Home Environment: Private residence  # Steps to Enter: 3  Wheelchair Ramp: Yes  One/Two Story Residence: One story  Living Alone: No  Support Systems: Spouse/Significant Other/Partner  Patient Expects to be Discharged to[de-identified] Private residence  Current DME Used/Available at Home: Blood pressure cuff, Glucometer  Tub or Shower Type: Shower  [x]  Right hand dominant   []  Left hand dominant    EXAMINATION OF PERFORMANCE DEFICITS:  Cognitive/Behavioral Status:  Neurologic State: Alert  Orientation Level: Oriented X4  Cognition: Follows commands  Perception: Appears intact  Perseveration: No perseveration noted  Safety/Judgement: Awareness of environment; Fall prevention    Vision/Perceptual:    Tracking: Able to track stimulus in all quadrants w/o difficulty                      Acuity: Within Defined Limits       Range of Motion:    AROM: Within functional limits  PROM: Within functional limits                    Strength:    Strength: Generally decreased, functional (R hand  may be cognition)              Coordination:  Coordination: Within functional limits  Fine Motor Skills-Upper: Left Intact; Right Impaired    Gross Motor Skills-Upper: Left Intact; Right Impaired  Tone & Sensation:    Tone: Normal  Sensation: Intact                      Balance:  Sitting: Intact; Without support  Standing: Impaired; Without support  Standing - Static: Good  Standing - Dynamic : Good    Functional Mobility and Transfers for ADLs:  Bed Mobility:  Rolling: Independent  Supine to Sit: Independent  Scooting: Independent    Transfers:  Sit to Stand: Independent  Stand to Sit: Independent  Bed to Chair: Independent  Toilet Transfer : Independent    ADL Assessment:  Feeding: Modified independent    Oral Facial Hygiene/Grooming: Modified Independent    Bathing: Modified independent    Upper Body Dressing: Modified independent    Lower Body Dressing: Modified independent    Toileting: Modified independent                ADL Intervention and task modifications:  Donned bilateral socks seated with crossed leg technique. Increased effort and time. Cognitive Retraining  Safety/Judgement: Awareness of environment; Fall prevention    Therapeutic Exercise:  Issued thera putty  (medium resistance orange); informed pt on hiding objects in putty and pinching items out of putty with right hand; issued booklet with exercises to focus on gross grasp (as well as green resistive sponge), thumb strength and pinch    Functional Measure:   Fugl-Elena Assessment of Motor Recovery after Stroke:     Reflex Activity  Flexors/Biceps/Fingers: Can be elicited  Extensors/Triceps: Can be elicited  Reflex Subtotal: 4    Volitional Movement Within Synergies  Shoulder Retraction: Full  Shoulder Elevation: Full  Shoulder Abduction (90 degrees): Full  Shoulder External Rotation: Full  Elbow Flexion: Full  Forearm Supination: Full  Shoulder Adduction/Internal Rotation: Full  Elbow Extension: Full  Forearm Pronation: Full  Subtotal: 18    Volitional Movement Mixing Synergies  Hand to Lumbar Spine: Full  Shoulder Flexion (0-90 degrees): Full  Pronation-Supination: Full  Subtotal: 6    Volitional Movement With Little or No Synergy  Shoulder Abduction (0-90 degrees): Full  Shoulder Flexion ( degrees): Full  Pronation/Supination: Full  Subtotal : 6    Normal Reflex Activity  Biceps, Triceps, Finger Flexors: Full  Subtotal : 2    Upper Extremity Total   Upper Extremity Total: 36    Wrist  Stability at 15 Degree Dorsiflexion: Full  Repeated Dorsiflexion/ Volar Flexion: Full  Stability at 15 Degree Dorsiflexion: Full  Repeated Dorsiflexion/ Volar Flexion: Full  Circumduction: Full  Wrist Total: 10    Hand  Mass Flexion: Full  Mass Extension: Full  Grasp A: Partial  Grasp B: Partial  Grasp C: Partial  Grasp D: Full  Grasp E: Partial  Hand Total: 10    Coordination/Speed  Tremor: None  Dysmetria: Slight  Time: <1s  Coordination/Speed Total : 5    Total A-D  Total A-D (Motor Function): 61/66     Percentage of impairment CH  0% CI  1-19% CJ  20-39% CK  40-59% CL  60-79% CM  80-99% CN  100%   Fugl-Elena score: 0-66 66 53-65 39-52 26-38 13-25 1-12   0      This is a reliable/valid measure of arm function after a neurological event. It has established value to characterize functional status and for measuring spontaneous and therapy-induced recovery; tests proximal and distal motor functions. Fugl-Elena Assessment  UE scores recorded between five and 30 days post neurologic event can be used to predict UE recovery at six months post neurologic event. Severe = 0-21 points   Moderately Severe = 22-33 points   Moderate = 34-47 points   Mild = 48-66 points  SUZY Rouse, LANE Olivas, & DALTON Peña (1992). Measurement of motor recovery after stroke: Outcome assessment and sample size requirements. Stroke, 23, pp. 5686-9798. ------------------------------------------------------------------------------------------------------------------------------------------------------------------  MCID:  Stroke:   Davi Orosco et al, 2001; n = 171; mean age 79 (6) years; assessed within 16 (12) days of stroke, Acute Stroke)  FMA Motor Scores from Admission to Discharge   10 point increase in FMA Upper Extremity = 1.5 change in discharge FIM   10 point increase in FMA Lower Extremity = 1.9 change in discharge FIM  MDC:   Stroke:   Eloy Meyers et al, 2008, n = 14, mean age = 59.9 (14.6) years, assessed on average 14 (6.5) months post stroke, Chronic Stroke)   FMA = 5.2 points for the Upper Extremity portion of the assessment     G codes: In compliance with CMSs Claims Based Outcome Reporting, the following G-code set was chosen for this patient based on their primary functional limitation being treated: The outcome measure chosen to determine the severity of the functional limitation was the fugl hunter with a score of 61/66 which was correlated with the impairment scale. ? Self Care:     - CURRENT STATUS: CI - 1%-19% impaired, limited or restricted    - GOAL STATUS: CI - 1%-19% impaired, limited or restricted    - D/C STATUS:  CI - 1%-19% impaired, limited or restricted      Occupational Therapy Evaluation Charge Determination   History Examination Decision-Making   MEDIUM Complexity : Expanded review of history including physical, cognitive and psychosocial  history  MEDIUM Complexity : 3-5 performance deficits relating to physical, cognitive , or psychosocial skils that result in activity limitations and / or participation restrictions MEDIUM Complexity : Patient may present with comorbidities that affect occupational performnce.  Miniml to moderate modification of tasks or assistance (eg, physical or verbal ) with assesment(s) is necessary to enable patient to complete evaluation       Based on the above components, the patient evaluation is determined to be of the following complexity level: MEDIUM    Pain:  Pain Scale 1: Numeric (0 - 10)  Pain Intensity 1: 0              Activity Tolerance:     Please refer to the flowsheet for vital signs taken during this treatment. After treatment:   [x]  Patient left in no apparent distress sitting up in chair  []  Patient left in no apparent distress in bed  [x]  Call bell left within reach  [x]  Nursing notified  [x]  Caregiver present  [x]  Bed alarm activated    COMMUNICATION/EDUCATION:   The patients plan of care was discussed with: Physical Therapist, Registered Nurse and patient and her husbadn. Patient was educated regarding Her deficit(s) of decreased right hand pinch and grasp as this relates to Her possible diagnosis of CVA. Pt was also educated on the BEFAST scale. She demonstrated Good understanding as evidenced by teach back. [x]      Home safety education was provided and the patient/caregiver indicated understanding. [x]      Patient/family have participated as able and agree with findings and recommendations. []      Patient is unable to participate in plan of care at this time.   Findings and recommendations were discussed with: Physical Therapist and Speech Therapist    Thank you for this referral.  Deepika Hung OTR/L  Time Calculation: 23 mins

## 2017-01-06 NOTE — CONSULTS
Dictated Pt stable and back to normal  Suspect complicated Migraine and treat ASA and excedrin for Migraine

## 2017-01-06 NOTE — PROGRESS NOTES
physical Therapy neuro EVALUATION/discharge     Patient: Sherry Garcia (68 y.o. female)  Date: 1/6/2017  Primary Diagnosis: CVA (cerebral vascular accident) [facility])        Precautions:   Bed Alarm   CT and MRI are negative for acute CVA    ASSESSMENT :  Based on the objective data described below, the patient presents with good LE strength, mild R  strength, good functional mobility, and steady gait following admission for CVA. PTA patient was independent with all aspects of functional mobility and ADLS. She lives with her . Currently, patient presents at her baseline and denies any residual deficits. Patient denies any sensation impairments. Patient is independent with most aspects of functional mobility. Patient ambulated 150 feet independently with safe and steady gait. Patient performed head turns and nods with no gait deviations or LOB noted. Patient reports she feels at her baseline. Patient left sitting in the chair at the conclusion of PT evaluation. PT services are not indicated at discharge although may need OT services due to decreased R hand strength. Patient and/or family was verbally educated on the BE FAST acronym for signs/symptoms of CVA and TIA. BE FAST was written on patient's communication board  for visual education and reinforcement. All questions answered with patient indicating good-fair understanding. Skilled physical therapy is not indicated at this time.      PLAN :  Discharge Recommendations: None-possible OT services  Further Equipment Recommendations for Discharge: none       SUBJECTIVE:   Patient stated I feel at my normal.    OBJECTIVE DATA SUMMARY:   HISTORY:    Past Medical History   Diagnosis Date    Anxiety     Arthritis     ASHD (arteriosclerotic heart disease)     CAD (coronary artery disease)     Chronic pain     Constipation     Depression     [facility]     Diarrhea     Dysgeusia 11/21/2014    Esophageal motor disorder 1/23/2012    Flatulence/gas pain/belching 11/21/2014    Gastroparesis     GERD (gastroesophageal reflux disease)     Hypercholesterolemia     Hypertension     Psychiatric disorder      anxiety    S/P bypass gastrojejunostomy 1/23/2012    Sleep apnea      no cpap     Past Surgical History   Procedure Laterality Date    Pr gastrojejunostomy      Hx laparotomy  8/02     2/2 intra abd abscess    Pr abdomen surgery proc unlisted       s/p partial gastrectomy 2/2 gastroparesis    Hx gastric bypass  6/02    Pr removal of heel spur  2001     bilat    Hc bld carpel tunnel release       bilat    Hx cholecystectomy      Hx hysterectomy      Egd  7/13/2009    Hx urological       bladder surgery-bladder tac    Hx orthopaedic  1/08     laminectomy    Hx orthopaedic       Bilateral feet bone spurs removed    Hx orthopaedic       bilateral carpal tunnel    Hx orthopaedic       back surgery    Pr egd transoral biopsy single/multiple  7/11/2011          Pr colonoscopy flx dx w/collj spec when pfrmd  5/03/2006     Dr. Howard Hernandes transoral biopsy single/multiple  1/23/2012          Pr egd balloon dilation esophagus <30 mm diam  1/23/2012          Hx appendectomy      Pr colonoscopy w/biopsy single/multiple  2/9/2012          Pr cardiac surg procedure unlist  6/2013     stent placement X 2    Hx coronary stent placement  06/2013     3 stents    Colonoscopy N/A 5/26/2016     COLONOSCOPY performed by [facility]as MD at [facility] ENDOSCOPY     Prior Level of Function/Home Situation:  patient was independent with all aspects of functional mobility and ADLS. She lives with her .   Personal factors and/or comorbidities impacting plan of care:     Home Situation  Home Environment: Private residence  # Steps to Enter: 3  Wheelchair Ramp: Yes  One/Two Story Residence: One story  Living Alone: No  Support Systems: Spouse/Significant Other/Partner  Patient Expects to be Discharged to[de-identified] Private residence  Current DME Used/Available at Home: Blood pressure cuff, Glucometer  Tub or Shower Type: Shower    EXAMINATION/PRESENTATION/DECISION MAKING:   Critical Behavior:  Neurologic State: Alert  Orientation Level: Oriented X4  Cognition: Follows commands     Skin:  Appears intact  Strength:    Strength: Generally decreased, functional (R hand  may be cognition)                    Tone & Sensation:   Tone: Normal              Sensation: Intact               Range Of Motion:  AROM: Within functional limits           PROM: Within functional limits           Coordination:  Coordination: Within functional limits    Functional Mobility:  Bed Mobility:  Rolling: Independent  Supine to Sit: Independent     Scooting: Independent  Transfers:  Sit to Stand: Independent  Stand to Sit: Independent        Bed to Chair: Independent              Balance:   Sitting: Intact; Without support  Standing: Impaired; Without support  Standing - Static: Good  Standing - Dynamic : Good  Ambulation/Gait Training:  Distance (ft): 200 Feet (ft)  Assistive Device: Gait belt  Ambulation - Level of Assistance: Supervision     Gait Description (WDL): Exceptions to WDL           Base of Support: Widened     Speed/Danita: Pace decreased (<100 feet/min)  Step Length: Right shortened;Left shortened                    Therapeutic Exercises:       Functional Measure:  Villanueva Balance Test:    Sitting to Standin  Standing Unsupported: 4  Sitting with Back Unsupported: 4  Standing to Sittin  Transfers: 4  Standing Unsupported with Eyes Closed: 4  Standing Unsupported with Feet Together: 4  Reach Forward with Outstretched Arm: 4   Object: 4  Turn to Look Over Shoulders: 4  Turn 360 Degrees: 4  Alternate Foot on Step/Stool: 4  Standing Unsupported One Foot in Front: 3  Stand on One Le  Total: 53         56=Maximum possible score;   0-20=High fall risk  21-40=Moderate fall risk   41-56=Low fall risk     Villanueva Balance Test and G-code impairment scale:  Percentage of Impairment CH    0%   CI    1-19% CJ    20-39% CK    40-59% CL    60-79% CM    80-99% CN     100%   Villanueva   Score 0-56 56 45-55 34-44 23-33 12-22 1-11 0     G codes: In compliance with CMSs Claims Based Outcome Reporting, the following G-code set was chosen for this patient based on their primary functional limitation being treated: The outcome measure chosen to determine the severity of the functional limitation was the Mcgrath with a score of 53/56 which was correlated with the impairment scale. ? Mobility - Walking and Moving Around:     - CURRENT STATUS: CI - 1%-19% impaired, limited or restricted    - GOAL STATUS: CI - 1%-19% impaired, limited or restricted    - D/C STATUS:  CI - 1%-19% impaired, limited or restricted      Physical Therapy Evaluation Charge Determination   History Examination Presentation Decision-Making   LOW Complexity : Zero comorbidities / personal factors that will impact the outcome / POC MEDIUM Complexity : 3 Standardized tests and measures addressing body structure, function, activity limitation and / or participation in recreation  LOW Complexity : Stable, uncomplicated  LOW Complexity : FOTO score of       Based on the above components, the patient evaluation is determined to be of the following complexity level: LOW     Pain:  Pain Scale 1: Numeric (0 - 10)  Pain Intensity 1: 0              Activity Tolerance:   Good at baseline  Please refer to the flowsheet for vital signs taken during this treatment. After treatment:   [x]         Patient left in no apparent distress sitting up in chair  []         Patient left in no apparent distress in bed  [x]         Call bell left within reach  [x]         Nursing notified  [x]         Caregiver present  [x]         Bed alarm activated    COMMUNICATION/EDUCATION:   Patient was educated regarding the BE FAST acronym for signs/symptoms of CVA and TIA.  BE FAST was written on patient's communication board  for visual education and reinforcement. All questions answered with patient indicating good understanding. .  [x]   Fall prevention education was provided and the patient/caregiver indicated understanding. [x]   Patient/family have participated as able and agree with findings and recommendations. []   Patient is unable to participate in plan of care at this time.   Findings and recommendations were discussed with: Occupational Therapist, Registered Nurse and     Thank you for this referral.  Dewey Watts, PT, DPT   Time Calculation: 19 mins

## 2017-01-06 NOTE — PROGRESS NOTES
Pt complained of chest pain. Stated she thought it to be \"heartburn\", but said had similar pain when she had stents placed. [facility] called. New labs drawn. EKG done. RR nurse and Dr Luiz Clemens came to examine patient.

## 2017-01-06 NOTE — PROGRESS NOTES
[id number] [address] [facility]  Preliminary Report:  Carotid Duplex Scan    Right:  No plaque noted in the right carotid system. Right ICA velocities suggest 0% diameter reduction. Right vertebral artery flow is antegrade. Left:  Minimal plaque noted in the left carotid system. Left ICA velocities suggest less than 50% diameter reduction. Left vertebral artery flow is antegrade. Final report to follow.

## 2017-01-06 NOTE — DISCHARGE INSTRUCTIONS
HOSPITALIST DISCHARGE INSTRUCTIONS    NAME: Yasir Hendrix   :  1950   MRN:  [id number]     Date/Time:  2017 2:17 PM    ADMIT DATE: 2017   DISCHARGE DATE: 2017         · It is important that you take the medication exactly as they are prescribed. · Keep your medication in the bottles provided by the pharmacist and keep a list of the medication names, dosages, and times to be taken in your wallet. · Do not take other medications without consulting your doctor. What to do at [address]:  Regular Diet    Recommended activity: Activity as tolerated      If you have questions regarding the hospital related prescriptions or hospital related issues please call [facility] at . You can always direct your questions to your primary care doctor if you are unable to reach your hospital physician; your PCP works as an extension of your hospital doctor just like your hospital doctor is an extension of your PCP for your time at the hospital [facility], [facility])    If you experience any of the following symptoms then please call your primary care physician or return to the emergency room if you cannot get hold of your doctor:    Fever, chills, nausea, vomiting, or persistent diarrhea  Worsening weakness or new problems with your speech or balance  Dark stools or visible blood in your stools  New Leg swelling or shortness of breath as this could be signs of a clot    Additional Instructions: Follow up with neurology in 2-4 weeks  Follow up with pcp in 1 week. Repeat thyroid studies in 2-4 weeks. Continue with aspirin daily and excedrin migraine as needed. Bring these papers with you to your follow up appointments. The papers will help your doctors be sure to continue the care plan from the hospital.              Information obtained by :  I understand that if any problems occur once I am at home I am to contact my physician.     I understand and acknowledge receipt of the instructions indicated above.                                                                                                                                            Physician's or R.N.'s Signature                                                                  Date/Time                                                                                                                                              Patient or Representative Signature

## 2017-01-06 NOTE — PROGRESS NOTES
* No surgery found *  Bedside and Verbal shift change report given to [address] (oncoming nurse) by Shana Gordillo RN (offgoing nurse). Report included the following information SBAR, Kardex, Recent Results, Med Rec Status and Cardiac Rhythm SR.     Zone Phone:   [phone]      Significant changes during shift:  Admission        Patient Information    Colin Plaza  61 y.o.  1/5/2017  3:30 PM by Della Jackson MD. Colin Plaza was admitted from Home    Problem List    Patient Active Problem List    Diagnosis Date Noted    CVA (cerebral vascular accident) ([facility] [address] 01/05/2017    Headache 01/05/2017    Constipation 05/26/2016    Osteopenia 12/30/2015    Encounter for medication monitoring 12/30/2015    Type 2 diabetes mellitus without complication ([address] 57/53/5024    Diarrhea 01/29/2015    Flatulence/gas pain/belching 11/21/2014    Dysgeusia 11/21/2014    Mixed hyperlipidemia 07/23/2013    S/P PTCA (percutaneous transluminal coronary angioplasty) 06/28/2013    Angina pectoris ([address] 06/18/2013    CAD (coronary artery disease) 06/11/2013    SOB (shortness of breath) 03/29/2013    Esophageal motor disorder 01/23/2012    S/P bypass gastrojejunostomy 01/23/2012    GERD (gastroesophageal reflux disease) 03/18/2011    Edema 03/18/2011    Hypovitaminosis D 08/24/2010    Anxiety     Depression      Past Medical History   Diagnosis Date    Anxiety     Arthritis     ASHD (arteriosclerotic heart disease)     CAD (coronary artery disease)     Chronic pain     Constipation     Depression     Diabetes ([address]     Diarrhea     Dysgeusia 11/21/2014    Esophageal motor disorder 1/23/2012    Flatulence/gas pain/belching 11/21/2014    Gastroparesis     GERD (gastroesophageal reflux disease)     Hypercholesterolemia     Hypertension     Psychiatric disorder      anxiety    S/P bypass gastrojejunostomy 1/23/2012    Sleep apnea      no cpap         Core Measures:    CVA: YES YES  CHF:NO NO  PNA:NO NO    Post Op Surgical (If Applicable):     Number times ambulated in hallway past shift:  0  Number of times OOB to chair past shift:   0  NG Tube: NO  Incentive Spirometer: NO  Drains: NO   Volume  0  Dressing Present:  NO  Flatus:  NOT APPLICABLE    Activity Status:    OOB to Chair NO  Ambulated this shift YES   Bed Rest NO    Supplemental O2: (If Applicable)    NC NO  NRB NO  Venti-mask NO  On 0 Liters/min      LINES AND DRAINS:    Central Line? NO     PICC LINE? NO     Urinary Catheter? NO     DVT prophylaxis:    DVT prophylaxis Med- YES  DVT prophylaxis SCD or LAURO- NO     Wounds: (If Applicable)    Wounds- NO    Location 0    Patient Safety:    Falls Score Total Score: 3  Safety Level_______  Bed Alarm On? YES  Sitter?  NO    Plan for upcoming shift: Neurochecks, fs ac and hs, Echo in AM, Carotids in AM        Discharge Plan: YES when stable    Active Consults:  IP CONSULT TO NEUROLOGY

## 2017-01-06 NOTE — PROCEDURES
[facility]  *** FINAL REPORT ***    Name: Rick Amato  MRN: [id number]    Inpatient  : 1950  HIS Order #: [id number]  [id number] [facility] Visit #: [id number]  Date: 2017    TYPE OF TEST: Cerebrovascular Duplex    REASON FOR TEST  CVA    Right Carotid:-             Proximal               Mid                 Distal  cm/s  Systolic  Diastolic  Systolic  Diastolic  Systolic  Diastolic  CCA:     85.6                                      65.0  Bulb:  ECA:     52.0  ICA:     39.0                 57.0                 52.0  ICA/CCA:  0.6    ICA Stenosis: Normal    Right Vertebral:-  Finding: Antegrade  Sys:       28.0  Lupe:    Right Subclavian: Normal    Left Carotid:-            Proximal                Mid                 Distal  cm/s  Systolic  Diastolic  Systolic  Diastolic  Systolic  Diastolic  CCA:     61.7                                      78.0  Bulb:  ECA:     57.0  ICA:     71.0                 45.0                 57.0  ICA/CCA:  0.9    ICA Stenosis: <50%    Left Vertebral:-  Finding: Antegrade  Sys:       41.0  Lupe:    Left Subclavian: Normal    INTERPRETATION/FINDINGS  PROCEDURE:  Carotid Duplex Examination using B-mode, color and  spectral Doppler of the extracranial cerebrovascular arteries. 1. No significant stenosis of the right internal carotid artery. 2. <50% stenosis in the left internal carotid artery. 3. No significant stenosis in the external carotid arteries  bilaterally. 4. Antegrade flow in both vertebral arteries. 5. Normal flow in both subclavian arteries. Plaque Morphology:  1. Heterogeneous plaque in the left ICA. ADDITIONAL COMMENTS    I have personally reviewed the data relevant to the interpretation of  this  study. TECHNOLOGIST: Tyrone King RVT, ROSA ELENAMS  Signed: 2017 10:33 AM    PHYSICIAN: Edilia Kendrick MD  Signed: 2017 02:10 PM

## 2017-01-06 NOTE — PROGRESS NOTES
Hospitalist cross cover          Hospitalist Progress Note    NAME: Clara Eaton   :  1950   MRN:  [id number]       Interim Hospital Summary: 61 y.o. female whom presented on 2017 with      Assessment / Plan:  Chest pain: r/o ACS  CAD with h/o 4 stents   12 lead ECG without ischemia   Cycle troponin - if negative no further w/u  Cont tele monitoring   Cont BB, ASA   Tums prn GERD          Subjective:     Chief Complaint / Reason for Physician Visit: chest pain   I was called by RN to evaluate pt for CP  CP started 5-10 minutes prior to RR was called. Constant. Slowly improving per pt, 10 now. Non radiating. Burning in nature. No dyspnea/palpitation/N/V/dizziness. Pt has h/o similar CP on and off - sh is attributing it to her h/o GERD. However, she also stated she had similar CP when she had her stents. Discussed with RN events overnight. Review of Systems:  Symptom Y/N Comments  Symptom Y/N Comments   Fever/Chills    Chest Pain y    Poor Appetite    Edema     Cough    Abdominal Pain     Sputum    Joint Pain     SOB/WISEMAN n   Pruritis/Rash     Nausea/vomit n   Tolerating PT/OT     Diarrhea    Tolerating Diet     Constipation    Other       Could NOT obtain due to:      Objective:     VITALS:   Last 24hrs VS reviewed since prior progress note. Most recent are:  Patient Vitals for the past 24 hrs:   Temp Pulse Resp BP SpO2   17 0012 - 78 16 151/85 98 %   17 1932 97.8 °F (36.6 °C) 68 18 172/84 100 %   17 1800 - 66 16 155/76 100 %   17 1619 - 70 18 - -   17 1618 - - - (!) 148/94 -   17 1445 97.9 °F (36.6 °C) 92 16 143/83 100 %     No intake or output data in the 24 hours ending 17 0021     PHYSICAL EXAM:  General: WD, WN. Alert, cooperative, no acute distress    EENT:  EOMI. Anicteric sclerae. MMM  Resp:  CTA bilaterally, no wheezing or rales.   No accessory muscle use  CV:  Regular  rhythm,  No edema  GI:  Soft, Non distended, Non tender.  +Bowel sounds  Neurologic:  Alert and oriented X 3, normal speech,   Psych:   Good insight. Not anxious nor agitated  Skin:  No rashes. No jaundice    Reviewed most current lab test results and cultures  YES  Reviewed most current radiology test results   YES  Review and summation of old records today    NO  Reviewed patient's current orders and MAR    YES  PMH/SH reviewed - no change compared to H&P  ________________________________________________________________________  Care Plan discussed with:    Comments   Patient y    Family  y Bedside    RN y    Care Manager     Consultant                        Multidiciplinary team rounds were held today with , nursing, pharmacist and clinical coordinator. Patient's plan of care was discussed; medications were reviewed and discharge planning was addressed. ________________________________________________________________________  Total NON critical care TIME:  Minutes    Total CRITICAL CARE TIME Spent: 35  Minutes non procedure based  Patient is critically ill and at high risk for further deterioration. Complex decision making was performed which includes reviewing the patient's past medical records, current laboratory results, and actual Xray films. I was immediately available to the patient. Comments   >50% of visit spent in counseling and coordination of care     ________________________________________________________________________  Remy Martinez MD     Procedures: see electronic medical records for all procedures/Xrays and details which were not copied into this note but were reviewed prior to creation of Plan. LABS:  I reviewed today's most current labs and imaging studies.   Pertinent labs include:  Recent Labs      01/05/17   1602   WBC  6.8   HGB  12.6   HCT  38.3   PLT  276     Recent Labs      01/05/17   1602   NA  144   K  4.7   CL  107   CO2  27   GLU  85   BUN  12   CREA  0.86   CA  8.7   ALB  3.9   TBILI  0.2   SGOT  34   ALT 30   INR  1.0       Signed: Etienne Buckner MD

## 2017-01-06 NOTE — CONSULTS
[address]       Name:  Corinne Lot   MR#:  [id number]   :  1950   Account #:  [de-identified]    Date of Consultation:  2017   Date of Adm:  2017       CHIEF COMPLAINT: Numbness of the left side, severe headache,   neck pain. HISTORY OF PRESENT ILLNESS: We are requested to evaluate this   61-year-old right-handed [facility] female as a new patient to   us for a new problem of severe headache for one week that began in   December with the right side of her face going numb, at the request of   Dr. Ellie Espinosa. The patient gives a history that she has had a headache   like a pressure sensation that began in the back of her neck and that   for the last week it radiated up into the right frontal region. It is   associated with some numbness of the right side of the face. She has   a history of possibly some stress. She has been clenching her teeth. She went to the cardiologist. She went to the emergency room. Denies   any history of stroke in the past. She also had numbness   that radiated to the right arm and leg in addition. She had no real neck   pain or meningismus. No unusual fever. No trauma. No precipitating   event. Her MRI scan of the brain was normal. Her MRA of the brain   was essentially normal, showing minimal narrowing of her left vertebral   artery, but no flow abnormality. There was minimal narrowing of the   right posterior cerebral artery, again, no flow abnormality or stenosis   and a carotid Doppler was unremarkable. The patient's lab studies look   unremarkable. The patient's headache is gone now. She feels much   better.     PAST MEDICAL HISTORY: Pertinent for anxiety, arthritis,   arteriosclerotic heart disease, coronary artery disease, chronic pain,   constipation, depression, diabetes, diarrhea, dysgeusia, esophageal   motility disorder, flatulence, belching, gastroparesis, GERD,   hypercholesterolemia, hypertension, status post gastrojejunostomy   and sleep apnea. PAST SURGICAL HISTORY: She has had a gastrojejunostomy. She   has had a laparotomy. She has had abdominal surgery, status post   partial gastrectomy and gastroparesis. She has had gastric bypass. She has had removal of a heel spur. She has had a history of bilateral   carpal tunnel release. History of gallbladder surgery, history of   hysterectomy. She has had breast surgery, and bladder tucking. She   has had a laminectomy of her back. She has had bilateral bone spurs   removed. She said her transoral biopsy and EGD. She has had   colonoscopy with removal of polyps. She has had dilatation of her   esophagus. She has had an appendectomy. She had a colonoscopy. She has had cardiac surgery with placement of 3 stents in 2013 in the   past.    ALLERGIES: SHE HAS ALLERGIES TO DEMEROL. SOCIAL HISTORY: She does not smoke, does not drink. Lives with   her , . MEDICATIONS PRIOR TO ADMISSION    1. DuoNeb inhalers. 2. Xanax 1 mg 1/2 tablet p.r.n. for anxiety at nighttime. 3. Elavil 10 mg at night for sleep. 4. Aspirin 81 mg.   5. Fioricet p.r.n.   6. Vitamin D.   7. Flonase nasal inhalers to both nostrils in the morning. 8. Lasix 20 mg a day. 9. Glucotrol 5 mg a day. 10. Motrin 800 mg p.r.n. for pain. 11. Lantus insulin 14 units every morning. 12. Metformin  mg daily. 13. Zestril 2.5 mg a day. 14. Januvia 100 mg a day. 15. Pravachol 40 mg a day. 16. ProAir inhaler p.r.n. FAMILY HISTORY: She has a family history of 2 children in good   health. Siblings have a history of alcohol abuse and hypertension. Mother had peripheral arterial disease, heart disease and   hypertension. Father had diabetes, heart disease and hypertension. REVIEW OF SYSTEMS   NEUROLOGIC: She had right-sided numbness and the headache as   mentioned above. Rest of the review is negative.     HEAD, EARS, EYES, NOSE, AND THROAT: The patient had   headache as mentioned above, numbness in the right face with no   other complaint. NECK: No neck pain. No meningismus. CHEST: No cough, shortness of breath. CARDIAC: No chest pain or palpitations. ABDOMEN: No nausea. No vomiting. BOWEL AND BLADDER: She has normal bowel and bladder control. MUSCULOSKELETAL: She has no unusual swollen joints or tender   joints. SKIN: No rashes. No neurocutaneous lesions seen. She has no   claudication. No edema in the ankles. LYMPHATIC: She had no   lymphadenopathy or tender nodes. SYSTEMIC: No fever or meningitis. No dizziness. PSYCHIATRIC: She seems to be a little depressed. Somewhat   anxious. Normal mental status. PHYSICAL EXAMINATION   VITAL SIGNS: Shows that she has a temperature of 98 degrees. Her   pulse was 77. Blood sugar was 162/90. Respirations are 18. NEUROLOGIC: The patient is awake, alert, oriented x4, cooperative   and fluent with normal mental status, but seems to be somewhat   depressed. The patient has pupils equal and reactive to light. Her   extraocular motility is intact. Visual fields are full to confrontation. Hearing seems to be normal. Her facial sensations are normal. Her   bulbar function looks normal. Sensory examination to pin and double   simultaneous stimulation is intact. Deep tendon reflexes are symmetric   throughout. Plantar responses are downgoing. No Babinski or clonus   are present. The patient can walk with a normal gait. She has normal   muscle tone. Neat in appearance. CEREBELLAR TESTING: Finger-nose-finger, Romberg, tandem all   normal.    HEAD, EARS, EYES, NOSE, AND THROAT: She has a slight   tenderness on temporal TMJ region on the right side. No other lesions   noted. NECK: She has some neck muscle tightness. No meningismus. No   bruits. CHEST: Clear lung fields without rales or rhonchi. CARDIAC: Regular rhythm with a soft systolic ejection murmur.    ABDOMEN: No abdominal pain or masses. BOWEL AND BLADDER: She has no diapers on. No incontinence   noted. MUSCULOSKELETAL: No unusual muscle pain or joint pain or   swelling. SKIN: No rashes. No neurocutaneous lesions seen. VASCULAR: She has good pulses throughout. No edema noted. LYMPHATIC: No lymphadenopathy or tender nodes. SYSTEMIC: She has supple neck. No fever. NEUROPSYCH: She is somewhat depressed. Otherwise normal exam.    IMPRESSION: Most likely this is a complicated migraine with tension   vascular headaches. Otherwise negative. Continue baby aspirin. She   is trying to watch her stress or bruxism in the temporomandibular joint   region. For her migraine take Fioricet p.r.n. May need some therapy for the   stress if needed. The patient will call with any further problem. A   somewhat difficult case, but in review of the records, it is very difficult   to find out what she has. We will follow closely with you.         MD [facility] / [facility]   D:  01/06/2017   13:11   T:  01/06/2017   14:44   Job #:  [id number]

## 2017-01-06 NOTE — PROGRESS NOTES
Reviewed discharge instructions including follow up appointments and new medications including side effects with patient and . They verbalized understanding.   80 Discharged via wheelchair by Len Yun. to car with

## 2017-01-06 NOTE — PROGRESS NOTES
[facility] Stroke Education Eliud Craven and Stroke Education provided to patient and the following topics were discussed    1. Patients personal risk factors for stroke are hypertension, hyperlipidemia and Other heart stents    2. Warning signs of Stroke:        * Sudden numbness or weakness of the face, arm or leg, especially on one side of          The body            * Sudden confusion, trouble speaking or understanding        * Sudden trouble seeing in one or both eyes        * Sudden trouble walking, dizziness, loss of balance or coordination        * Sudden severe headache with no known cause      3. Importance of activation Emergency Medical Services ( 9-1-1 ) immediately if                       experience any warning signs of stroke. 4. Be sure and schedule a follow-up appointment with your primary care doctor or any                  specialists as instructed. 5. You must take medicine every day to treat your risk factors for stroke. Be sure to take your medicines exactly as your doctor tells you: no more, no less. Know what your medicines are for , what they do. Anti-thrombotics /anticoagulants can help prevent strokes. You are taking the following medicine(s)  Aspirin, lovenox     6. Smoking and second-hand smoke greatly increase your risk of stroke, cardiovascular disease and death.  Smoking history never

## 2017-01-06 NOTE — PROGRESS NOTES
Rapid Response called for pt having chest pain. Upon arrival to room, pt was saying it felt like indigestion. BS 93, /85, HR 78, 98% on RA. Sitting with HOB elevated, smiling. Alert and oriented X 4. Family member in [address] EKG done, cpk and trop ordered. Dr Fany Chand called and came to evaluate the EKG. Stated it looked good and thought her pain was from GERD. Will order something for that. Labs being drawn at this time.

## 2017-01-06 NOTE — PROGRESS NOTES
CM met with pt to provide observation letter and Code 44. CM placed copy on bedside chart.      OMEGA Borrego, [address] [place] [address]   [phone]

## 2017-01-06 NOTE — PROGRESS NOTES
TRANSFER - IN REPORT:    Verbal report received from [facility] [facility] RN(name) on [facility]  being received from ER(unit) for routine progression of care      Report consisted of patients Situation, Background, Assessment and   Recommendations(SBAR). Information from the following report(s) SBAR, Kardex, Recent Results, Med Rec Status and Cardiac Rhythm SR was reviewed with the receiving nurse. Opportunity for questions and clarification was provided. Assessment completed upon patients arrival to unit and care assumed.

## 2017-01-06 NOTE — PROGRESS NOTES
Speech Pathology Note  Orders received and acknowledged. Chart reviewed and and noted patient off the floor for testing. Will follow-up this afternoon for swallow evaluation.     Aurelia Minion

## 2017-01-07 LAB
ANA SER QL: NEGATIVE
BACTERIA SPEC CULT: NORMAL
CC UR VC: NORMAL
SEE BELOW:, 164879: NORMAL
SERVICE CMNT-IMP: NORMAL

## 2017-01-07 NOTE — DISCHARGE SUMMARY
Hospitalist Discharge Summary     Patient ID:  Dannielle Andrews  [id number]  61 y.o.  1950    PCP on record: Rober Nguyen MD    Admit date: 1/5/2017  Discharge date and time: 1/6/2017      DISCHARGE DIAGNOSIS:    Complicated migraine with tension vascular headaches  Hypothyroidism  DM  HTN  HLD  CAD with h/o stents    CONSULTATIONS:  IP CONSULT TO NEUROLOGY    Excerpted HPI from H&P of Ingris Pacheco MD:  Dannielle Andrews is a 61 y.o.  female  with pertinent PMHx of hypercholesterolemia, DM, HTN, CAD, and chronic pain presenting ambulatory to the ED c/o constant 10/10 posterior headache that \"feels like a pressure\" x 1 week. Pt notes associated symptoms of intermittent right sided facial numbness/tingling (currently experiencing) and right sided extremity numbness/tingling (not current). Pt states that the episodes of numbness last ~20 minutes each. Pt states that her cardiologist sent her to the ED today, due to her symptoms. Pt denies any history of stroke. Pt specifically denies any N/V/D, chest pain, SOB or left sided numbness. At this time patient lying in bed c/o headache, right sided face tingling and numbness, right arm and leg tingling and numbness, denies chest pain, no SOB, no fever, no N/V no diarrhea, no cough, no urinary symptoms, no other associated symptoms. We were asked to admit for work up and evaluation of the above problems. ______________________________________________________________________  DISCHARGE SUMMARY/HOSPITAL COURSE:  for full details see H&P, daily progress notes, labs, consult notes. Complicated migraine with tension vascular headaches  -Admitted for work up of CVA vs TIA vs Complicated Migraine  -Symptoms were ongoing for 2 weeks and resolved shortly after admission.  -Neurology evaluated.   -CT head no acute abn.  -Her MRA of the brain \"was essentially normal, showing minimal narrowing of her left vertebral   artery, but no flow abnormality. There was minimal narrowing of the right posterior cerebral artery, again, no flow abnormality or stenosis\"   -MRI unremarkable.  -carotid Doppler was unremarkable. -ECHO EF 55-60%, no wma, mild MR, mild pulm htn. -A1C 6.7, TSH 6.63, LDL 59.8  -Continue ASA and statin. Low dose synthroid started as below.  -Neuro rec prn excedrin migraine and follow up in 2-4 weeks.  -Patient to NH home. Hypothyroidism  -TSH 6.63, free T4 low 0.7, Free T3 low at 2.1  -complains of cold intolerance, low energy, constipation.  -start low dose 25mcg synthroid  -follow up with PCP for repeat thyroid studies in 4 weeks with adjustment as needed. DM  -A1C 6.7  -resume home meds    CAD with h/o stents   HTN  -Had some CP overnight. No ischemic change on ECG, troponin negative x 2, telemetry unremarkable. -c/w ACE, BB    Hyperlipidemia   -c/w Statin    _______________________________________________________________________  Patient seen and examined by me on discharge day. Pertinent Findings:  Gen:    Not in distress  Chest: Clear lungs  CVS:   Regular rhythm. No edema  Abd:  Soft, not distended, not tender  Neuro:  Alert, oriented x 4, no focal deficits  _______________________________________________________________________  DISCHARGE MEDICATIONS:   Discharge Medication List as of 1/6/2017  3:36 PM      START taking these medications    Details   aspirin-acetaminophen-caffeine (EXCEDRIN MIGRAINE) 250-250-65 mg per tablet Take 1 Tab by mouth every eight (8) hours as needed for Headache., Print, Disp-30 Tab, R-0      levothyroxine (SYNTHROID) 25 mcg tablet Take 1 Tab by mouth Daily (before breakfast). , Print, Disp-30 Tab, R-0         CONTINUE these medications which have NOT CHANGED    Details   ALPRAZolam (XANAX) 1 mg tablet TAKE 1/2 TABLET EVERY DAY AS NEEDED FOR ANXIETY  AND TAKE 1 TABLET AT BEDTIME AS NEEDED  FOR  SLEEP, Print, Disp-135 Tab, R-1      furosemide (LASIX) 20 mg tablet Take 1 Tab by mouth daily as needed., Normal, Disp-90 Tab, R-3      glipiZIDE SR (GLUCOTROL) 5 mg CR tablet Take 1 Tab by mouth daily. , Normal, Disp-90 Tab, R-3      metoprolol succinate (TOPROL-XL) 25 mg XL tablet TAKE 1 TABLET EVERY DAY, Normal, Disp-90 Tab, R-3      fluticasone (FLONASE) 50 mcg/actuation nasal spray INSTILL 2 SPRAYS INTO BOTH NOSTRILS DAILY AS NEEDED FOR RHINITIS., Normal, Disp-48 g, R-3      JANUVIA 100 mg tablet TAKE 1 TABLET EVERY DAY FOR DIABETES, Normal, Disp-90 Tab, R-1      metFORMIN ER (GLUCOPHAGE XR) 500 mg tablet Take 500 mg by mouth daily (with breakfast). , Historical Med      insulin glargine (LANTUS SOLOSTAR) 100 unit/mL (3 mL) pen 14 Units by SubCUTAneous route every evening., Puzmsvq58.9Disp-2 Each, R-5      lisinopril (PRINIVIL, ZESTRIL) 2.5 mg tablet TAKE 1 TABLET EVERY DAY, Normal, Disp-90 Tab, R-3      pravastatin (PRAVACHOL) 40 mg tablet TAKE 1 TABLET EVERY NIGHT, Normal, Disp-90 Tab, R-3      amitriptyline (ELAVIL) 10 mg tablet TAKE 1 TABLET NIGHTLY, Normal, Disp-90 Tab, R-3      BD INSULIN PEN NEEDLE UF MINI 31 gauge x 3/16\" ndle Use to check blood glucose level three times a daily, Hxvtrtg82.9Disp-300 Pen Needle, R-3, IRINA      PROAIR HFA 90 mcg/actuation inhaler Take 1 Puff by inhalation every four (4) hours as needed., Historical Med, R-0, IRINA      albuterol-ipratropium (DUO-NEB) 2.5 mg-0.5 mg/3 ml nebulizer solution 3 mL by Nebulization route every forty-eight (48) hours as needed., Historical Med, R-0      Calcium-Cholecalciferol, D3, 600 mg(1,500mg) -400 unit cap Take 1 Tab by mouth daily. , Historical Med      Aspirin, Buffered 81 mg tab Take 81 mg by mouth daily. , Historical Med      Blood-Glucose Meter (ACCU-CHEK KRISTOFER PLUS METER) misc Use to monitor blood sugar 3 times daily, GancauA60.9 , P15Sgpo-4 Each, R-0      glucose blood VI test strips (ACCU-CHEK KRISTOFER PLUS TEST STRP) strip Use to check blood glucose level three times daily, Normal, Disp-300 Strip, R-3      Lancets (ACCU-CHEK SOFTCLIX LANCETS) misc Use to obtain blood sample for diabetic testing three times a day, Normal, Disp-1 Each, R-11      alcohol swabs (BD SINGLE USE SWABS REGULAR) padm Use to cleanse skin prior to obtaining blood sample for diabetic testing three times daily, Byyphkz42.9Disp-300 Pad, R-3      Insulin Needles, Disposable, 31 gauge x 5/16\" ndle Use with lantus pen, Normal, Disp-1 Package, R-11         STOP taking these medications       butalbital-acetaminophen-caff (FIORICET) -40 mg per capsule Comments:   Reason for Stopping:         ibuprofen (MOTRIN) 800 mg tablet Comments:   Reason for Stopping:               My Recommended Diet, Activity, Wound Care, and follow-up labs are listed in the patient's Discharge Insturctions which I have personally completed and reviewed.     _______________________________________________________________________  DISPOSITION:    Home with Family: x   Home with HH/PT/OT/RN:    SNF/LTC:    MICHELLE:    OTHER:        Condition at Discharge:  Stable  _______________________________________________________________________  Follow up with:   PCP : Eloina Wyatt MD  Follow-up Information     Follow up With Details 82 Marichuy Figueredo MD In 1 week  [facility] [address]      Jeannette Guido MD In 3 weeks Neurology NP/PA for hospital follow up [address].[facility] [phone]                Total time in minutes spent coordinating this discharge (includes going over instructions, follow-up, prescriptions, and preparing report for sign off to her PCP) :  45 minutes    Signed:  Yolanda Dominique MD

## 2017-01-09 ENCOUNTER — PATIENT OUTREACH (OUTPATIENT)
Dept: FAMILY MEDICINE CLINIC | Age: 67
End: 2017-01-09

## 2017-01-09 NOTE — PROGRESS NOTES
Patient listed on discharge [facility]) report on . Patient  to [facility] on - with ? CVA/TIA/HA. Contacted patient to perform post hospital discharge assessment. Verified  and address with patient as identifiers. Provided introduction to self, and explanation of the NN role. Performed medication reconciliation with patient, and patient verbalizes understanding of administration of home medications. Reviewed discharge instructions with patient. Patient verbalizes understand of discharge instructions and follow-up care. Patient scheduled to f/u with  Dr Ludivina Rick on . Patient given an opportunity to ask questions. Contact information provided to the patient for future reference or further questions. Patient main concern during the call was her Xanax, she feels that she had \"xanax withdrawl\". Patient states she is out of Xanax, reviewed that she had 135 ordered on  but it went to [facility] mail out. Patient states she does not have any and they will not fill them. [facility]

## 2017-01-11 ENCOUNTER — OFFICE VISIT (OUTPATIENT)
Dept: FAMILY MEDICINE CLINIC | Age: 67
End: 2017-01-11

## 2017-01-11 VITALS
TEMPERATURE: 98.6 F | SYSTOLIC BLOOD PRESSURE: 140 MMHG | HEART RATE: 94 BPM | OXYGEN SATURATION: 98 % | DIASTOLIC BLOOD PRESSURE: 86 MMHG | BODY MASS INDEX: 28.95 KG/M2 | WEIGHT: 169.6 LBS | HEIGHT: 64 IN | RESPIRATION RATE: 16 BRPM

## 2017-01-11 DIAGNOSIS — F51.01 PRIMARY INSOMNIA: ICD-10-CM

## 2017-01-11 DIAGNOSIS — H10.9 CONJUNCTIVITIS OF BOTH EYES, UNSPECIFIED CONJUNCTIVITIS TYPE: ICD-10-CM

## 2017-01-11 DIAGNOSIS — E03.9 ACQUIRED HYPOTHYROIDISM: ICD-10-CM

## 2017-01-11 DIAGNOSIS — G43.109 COMPLICATED MIGRAINE: Primary | ICD-10-CM

## 2017-01-11 RX ORDER — BUTALBITAL, ACETAMINOPHEN AND CAFFEINE 300; 40; 50 MG/1; MG/1; MG/1
CAPSULE ORAL
Refills: 0 | COMMUNITY
Start: 2016-12-29 | End: 2018-03-05

## 2017-01-11 RX ORDER — LEVOTHYROXINE SODIUM 25 UG/1
TABLET ORAL
Qty: 30 TAB | Refills: 3 | Status: SHIPPED | OUTPATIENT
Start: 2017-01-11 | End: 2017-03-02 | Stop reason: SDUPTHER

## 2017-01-11 RX ORDER — METOPROLOL SUCCINATE 25 MG/1
TABLET, EXTENDED RELEASE ORAL
Qty: 90 TAB | Refills: 3
Start: 2017-01-11 | End: 2017-09-18 | Stop reason: SDUPTHER

## 2017-01-11 RX ORDER — IBUPROFEN 800 MG/1
800 TABLET ORAL
COMMUNITY
Start: 2016-10-19 | End: 2017-11-13 | Stop reason: SDUPTHER

## 2017-01-11 RX ORDER — TOBRAMYCIN AND DEXAMETHASONE 3; 1 MG/ML; MG/ML
1 SUSPENSION/ DROPS OPHTHALMIC 4 TIMES DAILY
Qty: 10 ML | Refills: 0 | Status: SHIPPED | OUTPATIENT
Start: 2017-01-11 | End: 2017-01-18

## 2017-01-11 RX ORDER — AMITRIPTYLINE HYDROCHLORIDE 10 MG/1
TABLET, FILM COATED ORAL
Qty: 180 TAB | Refills: 3 | Status: SHIPPED | OUTPATIENT
Start: 2017-01-11 | End: 2017-02-22

## 2017-01-11 NOTE — PROGRESS NOTES
HISTORY OF PRESENT ILLNESS  Farhad Myers is a 61 y.o. female. HPI   Follow up hospital for migraine headache and facial numbness. TIA/CVA ruled out. MRI and MRA WNL. Carotid doppler showed no flow abnormality. No further headache sx since her discharge. Also started on synthroid for elevated TSH. C/o eyes feeling irritated and burning for the past 2-3 days. Wakes up with crusty drainage in the eyes and eyes are puffy and swollen. Patient Active Problem List   Diagnosis Code    Anxiety F41.9    Depression F32.9    Hypovitaminosis D E55.9    GERD (gastroesophageal reflux disease) K21.9    Edema R60.9    Esophageal motor disorder K22.4    S/P bypass gastrojejunostomy Z98.0    SOB (shortness of breath) R06.02    CAD (coronary artery disease) I25.10    Angina pectoris (HCC) I20.9    S/P PTCA (percutaneous transluminal coronary angioplasty) Z98.61    Mixed hyperlipidemia E78.2    Flatulence/gas pain/belching R14.0    Dysgeusia R43.2    Diarrhea R19.7    Type 2 diabetes mellitus without complication ([facility] [address] E33.8    Osteopenia M85.80    Encounter for medication monitoring Z51.81    Constipation K59.00    CVA (cerebral vascular accident) ([facility] [address] I63.9    Headache B70    Complicated migraine M06. 109    Numbness and tingling of right side of face R20.0, R20.2    Stenosis of both internal carotid arteries I65.23       Current Outpatient Prescriptions   Medication Sig Dispense Refill    butalbital-acetaminophen-caff (FIORICET) -40 mg per capsule take 1 capsule by mouth twice a day if needed for headache pain (MAX OF 2 CAPSULES A DAY)  0    ibuprofen (MOTRIN) 800 mg tablet Take 800 mg by mouth every six (6) hours as needed.  levothyroxine (SYNTHROID) 25 mcg tablet Take one pill with no other pills first in the morning and wait 30 minutes prior to taking any other medications.  30 Tab 3    metoprolol succinate (TOPROL-XL) 25 mg XL tablet TAKE 1 and 1/2 TABLET EVERY DAY 90 Tab 3  amitriptyline (ELAVIL) 10 mg tablet TAKE 2 TABLETS NIGHTLY 180 Tab 3    tobramycin-dexamethasone (TOBRADEX) ophthalmic suspension Administer 1 Drop to both eyes four (4) times daily for 7 days. 10 mL 0    furosemide (LASIX) 20 mg tablet Take 1 Tab by mouth daily as needed. 90 Tab 3    glipiZIDE SR (GLUCOTROL) 5 mg CR tablet Take 1 Tab by mouth daily. 90 Tab 3    fluticasone (FLONASE) 50 mcg/actuation nasal spray INSTILL 2 SPRAYS INTO BOTH NOSTRILS DAILY AS NEEDED FOR RHINITIS. 48 g 3    JANUVIA 100 mg tablet TAKE 1 TABLET EVERY DAY FOR DIABETES 90 Tab 1    metFORMIN ER (GLUCOPHAGE XR) 500 mg tablet Take 500 mg by mouth daily (with breakfast).  insulin glargine (LANTUS SOLOSTAR) 100 unit/mL (3 mL) pen 14 Units by SubCUTAneous route every evening. 2 Each 5    lisinopril (PRINIVIL, ZESTRIL) 2.5 mg tablet TAKE 1 TABLET EVERY DAY 90 Tab 3    pravastatin (PRAVACHOL) 40 mg tablet TAKE 1 TABLET EVERY NIGHT 90 Tab 3    BD INSULIN PEN NEEDLE UF MINI 31 gauge x 3/16\" ndle Use to check blood glucose level three times a daily 300 Pen Needle 3    Blood-Glucose Meter (ACCU-CHEK KRISTOFER PLUS METER) misc Use to monitor blood sugar 3 times daily 1 Each 0    glucose blood VI test strips (ACCU-CHEK KRISTOFER PLUS TEST STRP) strip Use to check blood glucose level three times daily 300 Strip 3    Lancets (ACCU-CHEK SOFTCLIX LANCETS) misc Use to obtain blood sample for diabetic testing three times a day 1 Each 11    alcohol swabs (BD SINGLE USE SWABS REGULAR) padm Use to cleanse skin prior to obtaining blood sample for diabetic testing three times daily 300 Pad 3    PROAIR HFA 90 mcg/actuation inhaler Take 1 Puff by inhalation every four (4) hours as needed. 0    albuterol-ipratropium (DUO-NEB) 2.5 mg-0.5 mg/3 ml nebulizer solution 3 mL by Nebulization route every forty-eight (48) hours as needed. 0    Calcium-Cholecalciferol, D3, 600 mg(1,500mg) -400 unit cap Take 1 Tab by mouth daily.       Insulin Needles, Disposable, 31 gauge x 5/16\" ndle Use with lantus pen 1 Package 11    Aspirin, Buffered 81 mg tab Take 81 mg by mouth daily.          Allergies   Allergen Reactions    No Known Allergies Other (comments)    Demerol [Meperidine] Unknown (comments)     Pt unsure of reaction       Past Medical History   Diagnosis Date    Anxiety     Arthritis     ASHD (arteriosclerotic heart disease)     CAD (coronary artery disease)     Chronic pain     Constipation     Depression     Diabetes ([facility] [address]     Diarrhea     Dysgeusia 11/21/2014    Esophageal motor disorder 1/23/2012    Flatulence/gas pain/belching 11/21/2014    Gastroparesis     GERD (gastroesophageal reflux disease)     Hypercholesterolemia     Hypertension     Psychiatric disorder      anxiety    S/P bypass gastrojejunostomy 1/23/2012    Sleep apnea      no cpap       Past Surgical History   Procedure Laterality Date    Pr gastrojejunostomy      Hx laparotomy  8/02     2/2 intra abd abscess    Pr abdomen surgery proc unlisted       s/p partial gastrectomy 2/2 gastroparesis    Hx gastric bypass  6/02    Pr removal of heel spur  2001     bilat    Hc bld carpel tunnel release       bilat    Hx cholecystectomy      Hx hysterectomy      Egd  7/13/2009    Hx urological       bladder surgery-bladder tac    Hx orthopaedic  1/08     laminectomy    Hx orthopaedic       Bilateral feet bone spurs removed    Hx orthopaedic       bilateral carpal tunnel    Hx orthopaedic       back surgery    Pr egd transoral biopsy single/multiple  7/11/2011          Pr colonoscopy flx dx w/collj spec when pfrmd  5/03/2006     Dr. Dickey Situ transoral biopsy single/multiple  1/23/2012          Pr egd balloon dilation esophagus <30 mm diam  1/23/2012          Hx appendectomy      Pr colonoscopy w/biopsy single/multiple  2/9/2012          Pr cardiac surg procedure unlist  6/2013     stent placement X 2    Hx coronary stent placement  06/2013     3 stents    Colonoscopy N/A 5/26/2016     COLONOSCOPY performed by Louisa Bourne MD at [facility] ENDOSCOPY       Family History   Problem Relation Age of Onset    Heart Disease Mother     Hypertension Mother     Diabetes Mother     Diabetes Father     Heart Disease Father     Hypertension Father     Alcohol abuse Brother     Heart Disease Sister     Hypertension Sister     Diabetes Sister     No Known Problems Sister     Alcohol abuse Brother     Alcohol abuse Brother     Diabetes Brother        Social History   Substance Use Topics    Smoking status: Never Smoker    Smokeless tobacco: Never Used    Alcohol use No        Lab Results  Component Value Date/Time   WBC 7.8 01/06/2017 12:23 AM   HGB 11.9 01/06/2017 12:23 AM   HCT 36.3 01/06/2017 12:23 AM   PLATELET 579 08/84/6502 12:23 AM   MCV 86.6 01/06/2017 12:23 AM       Lab Results  Component Value Date/Time   Cholesterol, total 139 01/05/2017 09:18 PM   HDL Cholesterol 40 01/05/2017 09:18 PM   LDL, calculated 59.8 01/05/2017 09:18 PM   Triglyceride 196 01/05/2017 09:18 PM   CHOL/HDL Ratio 3.5 01/05/2017 09:18 PM       Lab Results  Component Value Date/Time   TSH 6.63 01/05/2017 04:02 PM   TSH 6.290 11/17/2015 08:54 AM   T4, Free 0.7 01/05/2017 04:02 PM   T4, Total 5.6 11/17/2015 08:54 AM      Lab Results   Component Value Date/Time    Sodium 142 01/06/2017 12:23 AM    Potassium 4.2 01/06/2017 12:23 AM    Chloride 107 01/06/2017 12:23 AM    CO2 27 01/06/2017 12:23 AM    Anion gap 8 01/06/2017 12:23 AM    Glucose 97 01/06/2017 12:23 AM    BUN 11 01/06/2017 12:23 AM    Creatinine 0.87 01/06/2017 12:23 AM    BUN/Creatinine ratio 13 01/06/2017 12:23 AM    GFR est AA >60 01/06/2017 12:23 AM    GFR est non-AA >60 01/06/2017 12:23 AM    Calcium 8.6 01/06/2017 12:23 AM    Bilirubin, total 0.1 01/06/2017 12:23 AM    ALT 27 01/06/2017 12:23 AM    AST 28 01/06/2017 12:23 AM    Alk.  phosphatase 104 01/06/2017 12:23 AM    Protein, total 7.0 01/06/2017 12:23 AM    Albumin 3.6 01/06/2017 12:23 AM    Globulin 3.4 01/06/2017 12:23 AM    A-G Ratio 1.1 01/06/2017 12:23 AM      Lab Results   Component Value Date/Time    Hemoglobin A1c 7.1 01/05/2017 04:02 PM    Hemoglobin A1c (POC) 6.7 12/02/2016 12:06 PM         Review of Systems   Constitutional: Negative for malaise/fatigue. HENT: Negative for congestion. Eyes: Positive for pain and discharge. Negative for blurred vision. Respiratory: Negative for cough and shortness of breath. Cardiovascular: Negative for chest pain, palpitations and leg swelling. Gastrointestinal: Negative for abdominal pain, constipation and heartburn. Genitourinary: Negative for dysuria, frequency and urgency. Musculoskeletal: Negative for back pain and joint pain. Neurological: Negative for dizziness, tingling and headaches. Endo/Heme/Allergies: Negative for environmental allergies. Psychiatric/Behavioral: Negative for depression. The patient does not have insomnia. Physical Exam   Constitutional: She appears well-developed and well-nourished. /86 (BP 1 Location: Left arm, BP Patient Position: Sitting)  Pulse 94  Temp 98.6 °F (37 °C) (Oral)   Resp 16  Ht 5' 4\" (1.626 m)  Wt 169 lb 9.6 oz (76.9 kg)  SpO2 98%  BMI 29.11 kg/m2     HENT:   Right Ear: Tympanic membrane and ear canal normal.   Left Ear: Tympanic membrane and ear canal normal.   Nose: No mucosal edema or rhinorrhea. Mouth/Throat: Oropharynx is clear and moist and mucous membranes are normal.   Eyes: EOM are normal. Pupils are equal, round, and reactive to light. Right eye exhibits no discharge. Left eye exhibits no discharge. Right conjunctiva is not injected. Lids are swollen   Neck: Normal range of motion. Neck supple. No thyromegaly present. Cardiovascular: Normal rate and regular rhythm. No murmur heard. Pulmonary/Chest: Effort normal and breath sounds normal.   Abdominal: Soft. Bowel sounds are normal. There is no tenderness.    Musculoskeletal: Normal range of motion. She exhibits no edema. Lymphadenopathy:     She has no cervical adenopathy. Skin: Skin is warm and dry. Psychiatric: She has a normal mood and affect. Nursing note and vitals reviewed. ASSESSMENT and PLAN  [place] was seen today for hospital follow up. Diagnoses and all orders for this visit:    Complicated migraine  Improved. Acquired hypothyroidism  -     levothyroxine (SYNTHROID) 25 mcg tablet; Take one pill with no other pills first in the morning and wait 30 minutes prior to taking any other medications. Primary insomnia  Has been off of the xanax and does not want to resume this medication for sleep. -     Increase amitriptyline (ELAVIL) 10 mg tablet; TAKE 2 TABLETS NIGHTLY    Conjunctivitis of both eyes, unspecified conjunctivitis type  -     tobramycin-dexamethasone (TOBRADEX) ophthalmic suspension; Administer 1 Drop to both eyes four (4) times daily for 7 days. Warm compresses. Other orders  -     metoprolol succinate (TOPROL-XL) 25 mg XL tablet; TAKE 1 and 1/2 TABLET EVERY DAY      Follow-up Disposition:  Return in about 6 weeks (around 2/22/2017). reviewed diet, exercise and weight control  cardiovascular risk and specific lipid/LDL goals reviewed  reviewed medications and side effects in detail    I have discussed diagnosis listed in this note with pt and/or family. I have discussed treatment plans and options and the risk/benefit analysis of those options, including safe use of medications and possible medication side effects. Through the use of shared decision making we have agreed to the above plan. The patient has received an after-visit summary and questions were answered concerning future plans and follow up. Advise pt of any urgent changes then to proceed to the ER.

## 2017-01-11 NOTE — PROGRESS NOTES
Pt here for follow up from [facility] for migraine. Pt reports given prescription for Excedrin Migraine which she did not get fill. Pt also reports she was told her thyroid level was high and given prescription for Synthroid 25mcg which she did not fill.       CMP 1/6/2017    Lipid 1/5/2017    A1C 1/5/2017    TSH/T4 1/5/2017

## 2017-01-11 NOTE — MR AVS SNAPSHOT
Visit Information Date & Time Provider Department Dept. Phone Encounter #  
 1/11/2017  3:00 PM Kumar Polk MD [facility] [phone] [id number] Follow-up Instructions Return in about 6 weeks (around 2/22/2017). Your Appointments 4/7/2017 10:45 AM  
ROUTINE CARE with Kumar Polk MD  
[address]) Appt Note: 4 mnth fu  
 [address] [place] [address] [phone] [address] [phone]  
  
    
 6/29/2017 10:00 AM  
6 MONTH with Lary Allen MD  
[facility] [address] [place] [address]) Appt Note: Per Dr CRANE $45CP REM  
 [address]  
[phone] [address] Upcoming Health Maintenance Date Due  
 EYE EXAM RETINAL OR DILATED Q1 2/19/2017 FOOT EXAM Q1 5/12/2017 HEMOGLOBIN A1C Q6M 7/5/2017 MICROALBUMIN Q1 8/30/2017 MEDICARE YEARLY EXAM 8/31/2017 BREAST CANCER SCRN MAMMOGRAM 12/16/2017 LIPID PANEL Q1 1/5/2018 COLONOSCOPY 5/26/2021 DTaP/Tdap/Td series (2 - Td) 9/29/2025 Allergies as of 1/11/2017  Review Complete On: 1/11/2017 By: Kumar Polk MD  
  
 Severity Noted Reaction Type Reactions No Known Allergies Medium 04/29/2016    Other (comments) Demerol [Meperidine]  03/12/2010    Unknown (comments) Pt unsure of reaction Current Immunizations  Reviewed on 12/2/2016 Name Date Influenza Vaccine 11/17/2014, 11/11/2013 Influenza Vaccine Sarah Celai) 9/29/2015 Influenza Vaccine (Quad) PF 8/30/2016 Influenza Vaccine Split 11/16/2012, 9/26/2011, 10/15/2010 Pneumococcal Vaccine (Pcv) 11/20/2006 Tdap 9/29/2015 Not reviewed this visit Vitals BP Pulse Temp Resp Height(growth percentile) Weight(growth percentile) 140/86 (BP 1 Location: Left arm, BP Patient Position: Sitting) 94 98.6 °F (37 °C) (Oral) 16 5' 4\" (1.626 m) 169 lb 9.6 oz (76.9 kg) SpO2 BMI OB Status Smoking Status 98% 29.11 kg/m2 Hysterectomy Never Smoker BMI and BSA Data Body Mass Index Body Surface Area  
 29.11 kg/m 2 1.86 m 2 Preferred Pharmacy Pharmacy Name Phone [facility] [id number] [place], [address] [facility] [phone] Your Updated Medication List  
  
   
This list is accurate as of: 1/11/17  3:28 PM.  Always use your most recent med list.  
  
  
  
  
 albuterol-ipratropium 2.5 mg-0.5 mg/3 ml Nebu Commonly known as:  DUO-NEB  
3 mL by Nebulization route every forty-eight (48) hours as needed. alcohol swabs Padm Commonly known as:  BD Single Use Swabs Regular Use to cleanse skin prior to obtaining blood sample for diabetic testing three times daily  
  
 amitriptyline 10 mg tablet Commonly known as:  ELAVIL TAKE 2 TABLETS NIGHTLY  
  
 aspirin, buffered 81 mg Tab Take 81 mg by mouth daily. Blood-Glucose Meter Misc Commonly known as:  ACCU-CHEK KRISTOFER PLUS METER Use to monitor blood sugar 3 times daily  
  
 butalbital-acetaminophen-caff -40 mg per capsule Commonly known as:  FIORICET  
take 1 capsule by mouth twice a day if needed for headache pain (MAX OF 2 CAPSULES A DAY) Calcium-Cholecalciferol (D3) 600 mg(1,500mg) -400 unit Cap Take 1 Tab by mouth daily. fluticasone 50 mcg/actuation nasal spray Commonly known as:  Kimberly Placer INSTILL 2 SPRAYS INTO BOTH NOSTRILS DAILY AS NEEDED FOR RHINITIS. furosemide 20 mg tablet Commonly known as:  LASIX Take 1 Tab by mouth daily as needed. glipiZIDE SR 5 mg CR tablet Commonly known as:  GLUCOTROL XL Take 1 Tab by mouth daily. glucophage  mg tablet Generic drug:  metFORMIN ER Take 500 mg by mouth daily (with breakfast). glucose blood VI test strips strip Commonly known as:  ACCU-CHEK KRISTOFER PLUS TEST STRP Use to check blood glucose level three times daily  
  
 ibuprofen 800 mg tablet Commonly known as:  MOTRIN Take 800 mg by mouth every six (6) hours as needed. insulin glargine 100 unit/mL (3 mL) pen Commonly known as:  LANTUS SOLOSTAR  
14 Units by SubCUTAneous route every evening. * Insulin Needles (Disposable) 31 gauge x 5/16\" Ndle Use with lantus pen * BD INSULIN PEN NEEDLE UF MINI 31 gauge x 3/16\" Ndle Generic drug:  Insulin Needles (Disposable) Use to check blood glucose level three times a daily JANUVIA 100 mg tablet Generic drug:  SITagliptin TAKE 1 TABLET EVERY DAY FOR DIABETES Lancets Misc Commonly known as:  ACCU-CHEK SOFTCLIX LANCETS Use to obtain blood sample for diabetic testing three times a day  
  
 levothyroxine 25 mcg tablet Commonly known as:  synthroid Take one pill with no other pills first in the morning and wait 30 minutes prior to taking any other medications. lisinopril 2.5 mg tablet Commonly known as:  PRINIVIL, ZESTRIL  
TAKE 1 TABLET EVERY DAY  
  
 metoprolol succinate 25 mg XL tablet Commonly known as:  TOPROL-XL  
TAKE 1 and 1/2 TABLET EVERY DAY  
  
 pravastatin 40 mg tablet Commonly known as:  PRAVACHOL  
TAKE 1 TABLET EVERY NIGHT  
  
 PROAIR HFA 90 mcg/actuation inhaler Generic drug:  albuterol Take 1 Puff by inhalation every four (4) hours as needed. tobramycin-dexamethasone ophthalmic suspension Commonly known as:  Jayna Sample Administer 1 Drop to both eyes four (4) times daily for 7 days. * Notice: This list has 2 medication(s) that are the same as other medications prescribed for you. Read the directions carefully, and ask your doctor or other care provider to review them with you. Prescriptions Sent to Pharmacy  Refills  
 levothyroxine (SYNTHROID) 25 mcg tablet 3  
 Sig: Take one pill with no other pills first in the morning and wait 30 minutes prior to taking any other medications. Class: Normal  
 Pharmacy: [address] Ph #: [phone]  
 amitriptyline (ELAVIL) 10 mg tablet 3 Sig: TAKE 2 TABLETS NIGHTLY Class: Normal  
 Pharmacy: [address] Ph #: [phone]  
 tobramycin-dexamethasone (TOBRADEX) ophthalmic suspension 0 Sig: Administer 1 Drop to both eyes four (4) times daily for 7 days. Class: Normal  
 [facility]acy: [facility] [address] Ph #: [phone] Route: Both Eyes Follow-up Instructions Return in about 6 weeks (around 2/22/2017). Introducing [facility]! Douglas Carey introduces [employer] patient portal. Now you can access parts of your medical record, email your doctor's office, and request medication refills online. 1. In your internet browser, go to [email or web address] 2. Click on the First Time User? Click Here link in the Sign In box. You will see the New Member Sign Up page. 3. Enter your At[employer] Access Code exactly as it appears below. You will not need to use this code after youve completed the sign-up process. If you do not sign up before the expiration date, you must request a new code. · At[employer] Access Code: [email or web address] Expires: 3/2/2017 12:27 PM 
 
4. Enter the last four digits of your Social Security Number (xxxx) and Date of Birth (mm/dd/yyyy) as indicated and click Submit. You will be taken to the next sign-up page. 5. Create a In[employer] System[employer] ID. This will be your [employer] login ID and cannot be changed, so think of one that is secure and easy to remember. 6. Create a [employer] password. You can change your password at any time. 7. Enter your Password Reset Question and Answer.  This can be used at a later time if you forget your password. 8. Enter your e-mail address. You will receive e-mail notification when new information is available in [address] 9. Click Sign Up. You can now view and download portions of your medical record. 10. Click the Download Summary menu link to download a portable copy of your medical information. If you have questions, please visit the Frequently Asked Questions section of the [employer] website. Remember, [employer]ura is NOT to be used for urgent needs. For medical emergencies, dial 911. Now available from your iPhone and Android! Please provide this summary of care documentation to your next provider. Your primary care clinician is listed as Danielle Hyde. If you have any questions after today's visit, please call [phone].

## 2017-01-26 ENCOUNTER — HOSPITAL ENCOUNTER (OUTPATIENT)
Dept: MAMMOGRAPHY | Age: 67
Discharge: HOME | End: 2017-01-26
Payer: MEDICARE

## 2017-01-26 DIAGNOSIS — Z12.31 VISIT FOR SCREENING MAMMOGRAM: ICD-10-CM

## 2017-01-26 PROCEDURE — 77067 SCR MAMMO BI INCL CAD: CPT

## 2017-02-10 ENCOUNTER — NURSE NAVIGATOR (OUTPATIENT)
Dept: FAMILY MEDICINE CLINIC | Age: 67
End: 2017-02-10

## 2017-02-10 NOTE — PROGRESS NOTES
Patient is greater than 30 days post episode without further incidents. Patient has attended follow  up appointment as scheduled. Will close episode and follow as needed.

## 2017-02-22 ENCOUNTER — OFFICE VISIT (OUTPATIENT)
Dept: FAMILY MEDICINE CLINIC | Age: 67
End: 2017-02-22

## 2017-02-22 VITALS
RESPIRATION RATE: 16 BRPM | DIASTOLIC BLOOD PRESSURE: 78 MMHG | TEMPERATURE: 97.2 F | HEART RATE: 82 BPM | SYSTOLIC BLOOD PRESSURE: 118 MMHG | BODY MASS INDEX: 28.51 KG/M2 | WEIGHT: 167 LBS | HEIGHT: 64 IN | OXYGEN SATURATION: 98 %

## 2017-02-22 DIAGNOSIS — K21.9 GASTROESOPHAGEAL REFLUX DISEASE WITHOUT ESOPHAGITIS: ICD-10-CM

## 2017-02-22 DIAGNOSIS — M65.9 TENOSYNOVITIS OF THUMB: ICD-10-CM

## 2017-02-22 DIAGNOSIS — E07.9 THYROID DISEASE: ICD-10-CM

## 2017-02-22 DIAGNOSIS — Z51.81 ENCOUNTER FOR MEDICATION MONITORING: ICD-10-CM

## 2017-02-22 DIAGNOSIS — E03.9 ACQUIRED HYPOTHYROIDISM: Primary | ICD-10-CM

## 2017-02-22 DIAGNOSIS — F41.9 ANXIETY: ICD-10-CM

## 2017-02-22 DIAGNOSIS — R49.0 HOARSENESS: ICD-10-CM

## 2017-02-22 DIAGNOSIS — R13.13 PHARYNGEAL DYSPHAGIA: ICD-10-CM

## 2017-02-22 RX ORDER — ALPRAZOLAM 1 MG/1
TABLET ORAL
COMMUNITY
Start: 2016-12-30 | End: 2017-05-16 | Stop reason: SDUPTHER

## 2017-02-22 RX ORDER — AMITRIPTYLINE HYDROCHLORIDE 10 MG/1
10 TABLET, FILM COATED ORAL
COMMUNITY
End: 2017-09-18 | Stop reason: SDUPTHER

## 2017-02-22 NOTE — PROGRESS NOTES
Chief Complaint   Patient presents with    Migraine     6 week follow up    Hand Pain     pt c/o right hand pain on and off    Neck Pain     pt c/o pain in front of neck      Pt denies any hand or neck pain at the present time.

## 2017-02-22 NOTE — MR AVS SNAPSHOT
Visit Information Date & Time Provider Department Dept. Phone Encounter #  
 2/22/2017  7:30 AM Matty Blue MD [facility] [phone] [id number] Your Appointments 2/23/2017  1:30 PM  
STRESS ECHOCARDIOGRAMS with [facility] [address]) Appt Note: 2/6/17 r/s'd since never had [place] done[place] [address] [facility]  
[phone] [address] [facility]  
  
    
 4/7/2017 10:45 AM  
ROUTINE CARE with Matty Blue MD  
[facility] [address] [place] [address]) [facility]  
 [address] [facility] [place] [address]  
[phone] [address] [phone]  
  
    
 6/29/2017 10:00 AM  
6 MONTH with Ethan Allen MD  
[facility] [address] [facility]) Appt Note: Per Dr C $45CP REM  
 [address] [facility]  
[phone] [address] [facility] Upcoming Health Maintenance Date Due  
 EYE EXAM RETINAL OR DILATED Q1 3/22/2017* FOOT EXAM Q1 5/12/2017 HEMOGLOBIN A1C Q6M 7/5/2017 MICROALBUMIN Q1 8/30/2017 MEDICARE YEARLY EXAM 8/31/2017 LIPID PANEL Q1 1/5/2018 BREAST CANCER SCRN MAMMOGRAM 1/26/2019 COLONOSCOPY 5/26/2021 DTaP/Tdap/Td series (2 - Td) 9/29/2025 *Topic was postponed. The date shown is not the original due date. Allergies as of 2/22/2017  Review Complete On: 2/22/2017 By: Matty Blue MD  
  
 Severity Noted Reaction Type Reactions No Known Allergies Medium 04/29/2016    Other (comments) Demerol [Meperidine]  03/12/2010    Unknown (comments) Pt unsure of reaction Current Immunizations  Reviewed on 2/22/2017 Name Date Influenza Vaccine 11/17/2014, 11/11/2013 Influenza Vaccine Marivel Gomez) 9/29/2015 Influenza Vaccine (Quad) PF 8/30/2016 Influenza Vaccine Split 11/16/2012, 9/26/2011, 10/15/2010 Pneumococcal Vaccine (Pcv) 11/20/2006 Tdap 9/29/2015 Reviewed by Matty Blue MD on 2/22/2017 at  8:06 AM  
You Were Diagnosed With   
  
 Codes Comments Acquired hypothyroidism    -  Primary ICD-10-CM: E03.9 ICD-9-CM: 281. 9 Thyroid disease     ICD-10-CM: E07.9 ICD-9-CM: 246.9 Gastroesophageal reflux disease without esophagitis     ICD-10-CM: K21.9 ICD-9-CM: 530.81 Anxiety     ICD-10-CM: F41.9 ICD-9-CM: 300.00 Tenosynovitis of thumb     ICD-10-CM: M65.9 ICD-9-CM: 727.05 Hoarseness     ICD-10-CM: R49.0 ICD-9-CM: 784.42 Pharyngeal dysphagia     ICD-10-CM: R13.13 ICD-9-CM: 787.23 Encounter for medication monitoring     ICD-10-CM: Z51.81 
ICD-9-CM: V58.83 Vitals BP  
  
  
  
  
  
 118/78 (BP 1 Location: Left arm, BP Patient Position: Sitting) Vitals History BMI and BSA Data Body Mass Index Body Surface Area  
 28.67 kg/m 2 1.85 m 2 Preferred Pharmacy Pharmacy Name Phone [facility] [address] [place], [address] [place] [phone] Your Updated Medication List  
  
   
This list is accurate as of: 2/22/17  8:21 AM.  Always use your most recent med list.  
  
  
  
  
 albuterol-ipratropium 2.5 mg-0.5 mg/3 ml Nebu Commonly known as:  DUO-NEB  
3 mL by Nebulization route every forty-eight (48) hours as needed. alcohol swabs Padm Commonly known as:  BD Single Use Swabs Regular Use to cleanse skin prior to obtaining blood sample for diabetic testing three times daily ALPRAZolam 1 mg tablet Commonly known as:  Laura Leaks Take 1/2 pill daily and take 1 pill at bedtime  
  
 amitriptyline 10 mg tablet Commonly known as:  ELAVIL Take 10 mg by mouth nightly. aspirin, buffered 81 mg Tab Take 81 mg by mouth daily. Blood-Glucose Meter Misc Commonly known as:  ACCU-CHEK KRISTOFER PLUS METER Use to monitor blood sugar 3 times daily  
  
 butalbital-acetaminophen-caff -40 mg per capsule Commonly known as:  FIORICET  
take 1 capsule by mouth twice a day if needed for headache pain (MAX OF 2 CAPSULES A DAY) Calcium-Cholecalciferol (D3) 600 mg(1,500mg) -400 unit Cap Take 1 Tab by mouth daily. fluticasone 50 mcg/actuation nasal spray Commonly known as:  Darcie Shames INSTILL 2 SPRAYS INTO BOTH NOSTRILS DAILY AS NEEDED FOR RHINITIS. furosemide 20 mg tablet Commonly known as:  LASIX Take 1 Tab by mouth daily as needed. glipiZIDE SR 5 mg CR tablet Commonly known as:  GLUCOTROL XL Take 1 Tab by mouth daily. glucophage  mg tablet Generic drug:  metFORMIN ER Take 500 mg by mouth daily (with breakfast). glucose blood VI test strips strip Commonly known as:  ACCU-CHEK KRISTOFER PLUS TEST STRP Use to check blood glucose level three times daily  
  
 ibuprofen 800 mg tablet Commonly known as:  MOTRIN Take 800 mg by mouth every six (6) hours as needed. insulin glargine 100 unit/mL (3 mL) pen Commonly known as:  LANTUS SOLOSTAR  
14 Units by SubCUTAneous route every evening. * Insulin Needles (Disposable) 31 gauge x 5/16\" Ndle Use with lantus pen * BD INSULIN PEN NEEDLE UF MINI 31 gauge x 3/16\" Ndle Generic drug:  Insulin Needles (Disposable) Use to check blood glucose level three times a daily JANUVIA 100 mg tablet Generic drug:  SITagliptin TAKE 1 TABLET EVERY DAY FOR DIABETES Lancets Misc Commonly known as:  ACCU-CHEK SOFTCLIX LANCETS Use to obtain blood sample for diabetic testing three times a day  
  
 levothyroxine 25 mcg tablet Commonly known as:  synthroid Take one pill with no other pills first in the morning and wait 30 minutes prior to taking any other medications. lisinopril 2.5 mg tablet Commonly known as:  Solomon Quiroz  
 TAKE 1 TABLET EVERY DAY  
  
 metoprolol succinate 25 mg XL tablet Commonly known as:  TOPROL-XL  
TAKE 1 and 1/2 TABLET EVERY DAY  
  
 pravastatin 40 mg tablet Commonly known as:  PRAVACHOL  
TAKE 1 TABLET EVERY NIGHT  
  
 PROAIR HFA 90 mcg/actuation inhaler Generic drug:  albuterol Take 1 Puff by inhalation every four (4) hours as needed. * Notice: This list has 2 medication(s) that are the same as other medications prescribed for you. Read the directions carefully, and ask your doctor or other care provider to review them with you. We Performed the Following REFERRAL TO ENT-OTOLARYNGOLOGY [WMT17 Custom] REFERRAL TO ORTHOPEDIC SURGERY [REF62 Custom] T4, FREE [id number] CPT(R)] TSH 3RD GENERATION [36315 CPT(R)] To-Do List   
 02/22/2017 Imaging:  US THYROID/PARATHYROID/SOFT TISS   
  
 02/22/2017 Imaging:  XR BA SWALLOW ESOPHOGRAM   
  
 02/22/2017 Imaging:  XR HAND RT MIN 3 V Referral Information Referral ID Referred By Referred To  
  
 [id number] Vanessa Herbert MD   
   [address] [facility] [address] [place] [address] [address] [facility] [address] Phone: [phone] Fax: [phone] Visits Status Start Date End Date 1 New Request 2/22/17 2/22/18 If your referral has a status of pending review or denied, additional information will be sent to support the outcome of this decision. Referral ID Referred By Referred To  
 [id number] Sue Hull MD  
   [facility] [address] [place], [facility] Phone: [phone] Fax: [phone] Visits Status Start Date End Date 1 New Request 2/22/17 2/22/18 If your referral has a status of pending review or denied, additional information will be sent to support the outcome of this decision. Introducing [facility]!    
 Mayra Chua introduces [place] patient portal. Now you can access parts of your medical record, email your doctor's office, and request medication refills online. 1. In your internet browser, go to [email or web address] 2. Click on the First Time User? Click Here link in the Sign In box. You will see the New Member Sign Up page. 3. Enter your Just[employer] Access Code exactly as it appears below. You will not need to use this code after youve completed the sign-up process. If you do not sign up before the expiration date, you must request a new code. · [employer] Access Code: [email or web address] Expires: 3/2/2017 12:27 PM 
 
4. Enter the last four digits of your Social Security Number (xxxx) and Date of Birth (mm/dd/yyyy) as indicated and click Submit. You will be taken to the next sign-up page. 5. Create a [employer] ID. This will be your [employer] login ID and cannot be changed, so think of one that is secure and easy to remember. 6. Create a [employer] password. You can change your password at any time. 7. Enter your Password Reset Question and Answer. This can be used at a later time if you forget your password. 8. Enter your e-mail address. You will receive e-mail notification when new information is available in [address] 9. Click Sign Up. You can now view and download portions of your medical record. 10. Click the Download Summary menu link to download a portable copy of your medical information. If you have questions, please visit the Frequently Asked Questions section of the [employer] website. Remember, [employer] is NOT to be used for urgent needs. For medical emergencies, dial 911. Now available from your iPhone and Android! Please provide this summary of care documentation to your next provider. Your primary care clinician is listed as Luz Oswald. If you have any questions after today's visit, please call [phone].

## 2017-02-22 NOTE — PROGRESS NOTES
HISTORY OF PRESENT ILLNESS  Jan Munoz is a 61 y.o. female. HPI   Follow up today is for her thyroid. Was started on synthroid on last month from her hospital admission. Tolerating medication. Claims that she has had hoarseness since starting on the medication. Also feels soreness in the neck. Has had problem with her swallowing and feels like food is getting stuck in the neck area. Thyroid Review:  Patient is seen for followup of hypothyroidism. Thyroid ROS: denies fatigue, weight changes, heat/cold intolerance, bowel/skin changes or CVS symptoms. C/o right hand hand. Hurts in the palm of the hand and the thumb. No injury noted. No swelling. Has been wearing splint which has help decrease the pain. Depression Review:  Patient is seen for followup of depression  Currently not on medication for depression but had to restart back on the xanax to help with her anxiety. .    She denies depressed mood and insomnia. Sleeping well with taking the elavil at night and xanax as needed. Patient Active Problem List   Diagnosis Code    Anxiety F41.9    Depression F32.9    Hypovitaminosis D E55.9    GERD (gastroesophageal reflux disease) K21.9    Edema R60.9    Esophageal motor disorder K22.4    S/P bypass gastrojejunostomy Z98.0    SOB (shortness of breath) R06.02    CAD (coronary artery disease) I25.10    Angina pectoris (HCC) I20.9    S/P PTCA (percutaneous transluminal coronary angioplasty) Z98.61    Mixed hyperlipidemia E78.2    Flatulence/gas pain/belching R14.0    Dysgeusia R43.2    Diarrhea R19.7    Type 2 diabetes mellitus without complication ([address] D79.3    Osteopenia M85.80    Encounter for medication monitoring Z51.81    Constipation K59.00    CVA (cerebral vascular accident) ([facility] [address] I63.9    Headache G88    Complicated migraine C44. 109    Numbness and tingling of right side of face R20.0, R20.2    Stenosis of both internal carotid arteries I65.23       Current Outpatient Prescriptions   Medication Sig Dispense Refill    ALPRAZolam (XANAX) 1 mg tablet       butalbital-acetaminophen-caff (FIORICET) -40 mg per capsule take 1 capsule by mouth twice a day if needed for headache pain (MAX OF 2 CAPSULES A DAY)  0    ibuprofen (MOTRIN) 800 mg tablet Take 800 mg by mouth every six (6) hours as needed.  levothyroxine (SYNTHROID) 25 mcg tablet Take one pill with no other pills first in the morning and wait 30 minutes prior to taking any other medications. 30 Tab 3    metoprolol succinate (TOPROL-XL) 25 mg XL tablet TAKE 1 and 1/2 TABLET EVERY DAY 90 Tab 3    amitriptyline (ELAVIL) 10 mg tablet TAKE 2 TABLETS NIGHTLY 180 Tab 3    furosemide (LASIX) 20 mg tablet Take 1 Tab by mouth daily as needed. 90 Tab 3    glipiZIDE SR (GLUCOTROL) 5 mg CR tablet Take 1 Tab by mouth daily. 90 Tab 3    fluticasone (FLONASE) 50 mcg/actuation nasal spray INSTILL 2 SPRAYS INTO BOTH NOSTRILS DAILY AS NEEDED FOR RHINITIS. 48 g 3    JANUVIA 100 mg tablet TAKE 1 TABLET EVERY DAY FOR DIABETES 90 Tab 1    metFORMIN ER (GLUCOPHAGE XR) 500 mg tablet Take 500 mg by mouth daily (with breakfast).  insulin glargine (LANTUS SOLOSTAR) 100 unit/mL (3 mL) pen 14 Units by SubCUTAneous route every evening.  2 Each 5    lisinopril (PRINIVIL, ZESTRIL) 2.5 mg tablet TAKE 1 TABLET EVERY DAY 90 Tab 3    pravastatin (PRAVACHOL) 40 mg tablet TAKE 1 TABLET EVERY NIGHT 90 Tab 3    BD INSULIN PEN NEEDLE UF MINI 31 gauge x 3/16\" ndle Use to check blood glucose level three times a daily 300 Pen Needle 3    Blood-Glucose Meter (ACCU-CHEK KRISTOFER PLUS METER) misc Use to monitor blood sugar 3 times daily 1 Each 0    glucose blood VI test strips (ACCU-CHEK KRISTOFER PLUS TEST STRP) strip Use to check blood glucose level three times daily 300 Strip 3    Lancets (ACCU-CHEK SOFTCLIX LANCETS) misc Use to obtain blood sample for diabetic testing three times a day 1 Each 11    alcohol swabs (BD SINGLE USE SWABS REGULAR) padm Use to cleanse skin prior to obtaining blood sample for diabetic testing three times daily 300 Pad 3    PROAIR HFA 90 mcg/actuation inhaler Take 1 Puff by inhalation every four (4) hours as needed. 0    albuterol-ipratropium (DUO-NEB) 2.5 mg-0.5 mg/3 ml nebulizer solution 3 mL by Nebulization route every forty-eight (48) hours as needed. 0    Calcium-Cholecalciferol, D3, 600 mg(1,500mg) -400 unit cap Take 1 Tab by mouth daily.  Insulin Needles, Disposable, 31 gauge x 5/16\" ndle Use with lantus pen 1 Package 11    Aspirin, Buffered 81 mg tab Take 81 mg by mouth daily.          Allergies   Allergen Reactions    No Known Allergies Other (comments)    Demerol [Meperidine] Unknown (comments)     Pt unsure of reaction       Past Medical History:   Diagnosis Date    Anxiety     Arthritis     ASHD (arteriosclerotic heart disease)     CAD (coronary artery disease)     Chronic pain     Constipation     Depression     Diabetes ([facility] [address]     Diarrhea     Dysgeusia 11/21/2014    Esophageal motor disorder 1/23/2012    Flatulence/gas pain/belching 11/21/2014    Gastroparesis     GERD (gastroesophageal reflux disease)     Hypercholesterolemia     Hypertension     Psychiatric disorder     anxiety    S/P bypass gastrojejunostomy 1/23/2012    Sleep apnea     no cpap       Past Surgical History:   Procedure Laterality Date    ABDOMEN SURGERY PROC UNLISTED      s/p partial gastrectomy 2/2 gastroparesis    CARDIAC SURG PROCEDURE UNLIST  6/2013    stent placement X 2    COLONOSCOPY N/A 5/26/2016    COLONOSCOPY performed [facility]el [facility] at [facility] ENDOSCOPY    EGD  7/13/2009    GASTROJEJUNOSTOMY      HC BLD CARPEL TUNNEL RELEASE      bilat    HX APPENDECTOMY      HX CHOLECYSTECTOMY      HX CORONARY STENT PLACEMENT  06/2013    3 stents    HX GASTRIC BYPASS  6/02    HX HYSTERECTOMY      HX LAPAROTOMY  8/02    2/2 intra abd abscess    HX ORTHOPAEDIC  1/08    laminectomy    HX ORTHOPAEDIC Bilateral feet bone spurs removed    HX ORTHOPAEDIC      bilateral carpal tunnel    HX ORTHOPAEDIC      back surgery    HX UROLOGICAL      bladder surgery-bladder tac    HI COLONOSCOPY FLX DX W/COLLJ SPEC WHEN PFRMD  5/03/2006    Dr. Lauryn Rueda    HI COLONOSCOPY W/BIOPSY SINGLE/MULTIPLE  2/9/2012         HI EGD BALLOON DILATION ESOPHAGUS <30 MM DIAM  1/23/2012         HI EGD TRANSORAL BIOPSY SINGLE/MULTIPLE  7/11/2011         HI EGD TRANSORAL BIOPSY SINGLE/MULTIPLE  1/23/2012         REMOVAL OF HEEL SPUR  2001    bilat       Family History   Problem Relation Age of Onset    Heart Disease Mother     Hypertension Mother     Diabetes Mother     Diabetes Father     Heart Disease Father     Hypertension Father     Alcohol abuse Brother     Heart Disease Sister     Hypertension Sister     Diabetes Sister     No Known Problems Sister     Alcohol abuse Brother     Alcohol abuse Brother     Diabetes Brother        Social History   Substance Use Topics    Smoking status: Never Smoker    Smokeless tobacco: Never Used    Alcohol use No        Lab Results  Component Value Date/Time   WBC 7.8 01/06/2017 12:23 AM   HGB 11.9 01/06/2017 12:23 AM   HCT 36.3 01/06/2017 12:23 AM   PLATELET 336 69/30/8301 12:23 AM   MCV 86.6 01/06/2017 12:23 AM       Lab Results  Component Value Date/Time   Cholesterol, total 139 01/05/2017 09:18 PM   HDL Cholesterol 40 01/05/2017 09:18 PM   LDL, calculated 59.8 01/05/2017 09:18 PM   Triglyceride 196 01/05/2017 09:18 PM   CHOL/HDL Ratio 3.5 01/05/2017 09:18 PM       Lab Results  Component Value Date/Time   TSH 6.63 01/05/2017 04:02 PM   T4, Free 0.7 01/05/2017 04:02 PM   T4, Total 5.6 11/17/2015 08:54 AM      Lab Results   Component Value Date/Time    Sodium 142 01/06/2017 12:23 AM    Potassium 4.2 01/06/2017 12:23 AM    Chloride 107 01/06/2017 12:23 AM    CO2 27 01/06/2017 12:23 AM    Anion gap 8 01/06/2017 12:23 AM    Glucose 97 01/06/2017 12:23 AM    BUN 11 01/06/2017 12:23 AM Creatinine 0.87 01/06/2017 12:23 AM    BUN/Creatinine ratio 13 01/06/2017 12:23 AM    GFR est AA >60 01/06/2017 12:23 AM    GFR est non-AA >60 01/06/2017 12:23 AM    Calcium 8.6 01/06/2017 12:23 AM    Bilirubin, total 0.1 01/06/2017 12:23 AM    ALT (SGPT) 27 01/06/2017 12:23 AM    AST (SGOT) 28 01/06/2017 12:23 AM    Alk. phosphatase 104 01/06/2017 12:23 AM    Protein, total 7.0 01/06/2017 12:23 AM    Albumin 3.6 01/06/2017 12:23 AM    Globulin 3.4 01/06/2017 12:23 AM    A-G Ratio 1.1 01/06/2017 12:23 AM      Lab Results   Component Value Date/Time    Hemoglobin A1c 7.1 01/05/2017 04:02 PM    Hemoglobin A1c (POC) 6.7 12/02/2016 12:06 PM         Review of Systems   Constitutional: Negative for malaise/fatigue. HENT: Negative for congestion. Eyes: Negative for blurred vision. Respiratory: Negative for cough and shortness of breath. Cardiovascular: Negative for chest pain, palpitations and leg swelling. Gastrointestinal: Negative for abdominal pain, constipation and heartburn. Genitourinary: Negative for dysuria, frequency and urgency. Musculoskeletal: Positive for neck pain. Negative for back pain and joint pain. Neurological: Negative for dizziness, tingling and headaches. Endo/Heme/Allergies: Negative for environmental allergies. Psychiatric/Behavioral: Negative for depression. The patient does not have insomnia. Physical Exam   Constitutional: She appears well-developed and well-nourished. /78 (BP 1 Location: Left arm, BP Patient Position: Sitting)  Pulse 82  Temp 97.2 °F (36.2 °C) (Oral)   Resp 16  Ht 5' 4\" (1.626 m)  Wt 167 lb (75.8 kg)  SpO2 98%  BMI 28.67 kg/m2     HENT:   Right Ear: Tympanic membrane and ear canal normal.   Left Ear: Tympanic membrane and ear canal normal.   Nose: No mucosal edema or rhinorrhea. Mouth/Throat: Oropharynx is clear and moist and mucous membranes are normal.   Neck: Normal range of motion. Neck supple.  No thyromegaly (tenderness over the thyroid.) present. Cardiovascular: Normal rate and regular rhythm. No murmur heard. Pulmonary/Chest: Effort normal and breath sounds normal.   Abdominal: Soft. Bowel sounds are normal. There is no tenderness. Musculoskeletal: Normal range of motion. She exhibits no edema. Right hand: She exhibits tenderness. She exhibits normal range of motion, no bony tenderness and no swelling. Normal sensation noted. Normal strength noted. She exhibits no finger abduction and no wrist extension trouble. Lymphadenopathy:     She has no cervical adenopathy. Skin: Skin is warm and dry. Psychiatric: She has a normal mood and affect. Nursing note and vitals reviewed. ASSESSMENT and PLAN  Jackson Daniel was seen today for hand pain and neck pain. Diagnoses and all orders for this visit:    Acquired hypothyroidism  -     T4, FREE  -     TSH 3RD GENERATION    Thyroid disease  -     US THYROID/PARATHYROID/SOFT TISS; Future    Anxiety  Stable back on the xanax     Tenosynovitis of thumb  -     XR HAND RT MIN 3 V; Future  -     REFERRAL TO ORTHOPEDIC SURGERY    Hoarseness  -     US THYROID/PARATHYROID/SOFT TISS; Future  -     REFERRAL TO ENT-OTOLARYNGOLOGY    Pharyngeal dysphagia  -     XR BA SWALLOW ESOPHOGRAM; Future    Encounter for medication monitoring      Follow-up Disposition: 3 months  reviewed medications and side effects in detail      I have discussed diagnosis listed in this note with pt and/or family. I have discussed treatment plans and options and the risk/benefit analysis of those options, including safe use of medications and possible medication side effects. Through the use of shared decision making we have agreed to the above plan. The patient has received an after-visit summary and questions were answered concerning future plans and follow up. Advise pt of any urgent changes then to proceed to the ER.

## 2017-02-23 ENCOUNTER — CLINICAL SUPPORT (OUTPATIENT)
Dept: CARDIOLOGY CLINIC | Age: 67
End: 2017-02-23

## 2017-02-23 DIAGNOSIS — R06.02 SOB (SHORTNESS OF BREATH): Primary | ICD-10-CM

## 2017-02-23 LAB
T4 FREE SERPL-MCNC: 0.97 NG/DL (ref 0.82–1.77)
TSH SERPL DL<=0.005 MIU/L-ACNC: 5.73 UIU/ML (ref 0.45–4.5)

## 2017-03-01 ENCOUNTER — HOSPITAL ENCOUNTER (OUTPATIENT)
Dept: ULTRASOUND IMAGING | Age: 67
Discharge: HOME | End: 2017-03-01
Attending: FAMILY MEDICINE
Payer: MEDICARE

## 2017-03-01 DIAGNOSIS — R49.0 HOARSENESS: ICD-10-CM

## 2017-03-01 DIAGNOSIS — E07.9 THYROID DISEASE: ICD-10-CM

## 2017-03-01 PROCEDURE — 76536 US EXAM OF HEAD AND NECK: CPT

## 2017-03-02 DIAGNOSIS — E03.9 ACQUIRED HYPOTHYROIDISM: ICD-10-CM

## 2017-03-02 RX ORDER — LEVOTHYROXINE SODIUM 25 UG/1
TABLET ORAL
Qty: 45 TAB | Refills: 3 | Status: SHIPPED | OUTPATIENT
Start: 2017-03-02 | End: 2017-04-07 | Stop reason: SDUPTHER

## 2017-03-06 DIAGNOSIS — E11.9 TYPE 2 DIABETES MELLITUS WITHOUT COMPLICATION (HCC): ICD-10-CM

## 2017-03-06 RX ORDER — METFORMIN HYDROCHLORIDE 500 MG/1
TABLET, EXTENDED RELEASE ORAL
Qty: 180 TAB | Refills: 3 | Status: ON HOLD | OUTPATIENT
Start: 2017-03-06 | End: 2017-03-17

## 2017-03-09 ENCOUNTER — OFFICE VISIT (OUTPATIENT)
Dept: CARDIOLOGY CLINIC | Age: 67
End: 2017-03-09

## 2017-03-09 VITALS
DIASTOLIC BLOOD PRESSURE: 70 MMHG | HEIGHT: 64 IN | SYSTOLIC BLOOD PRESSURE: 110 MMHG | HEART RATE: 86 BPM | WEIGHT: 169.9 LBS | OXYGEN SATURATION: 97 % | BODY MASS INDEX: 29.01 KG/M2

## 2017-03-09 DIAGNOSIS — I25.10 CORONARY ARTERY DISEASE INVOLVING NATIVE CORONARY ARTERY OF NATIVE HEART WITHOUT ANGINA PECTORIS: ICD-10-CM

## 2017-03-09 DIAGNOSIS — Z98.61 S/P PTCA (PERCUTANEOUS TRANSLUMINAL CORONARY ANGIOPLASTY): ICD-10-CM

## 2017-03-09 DIAGNOSIS — R06.02 SOB (SHORTNESS OF BREATH): ICD-10-CM

## 2017-03-09 DIAGNOSIS — I20.9 ANGINA PECTORIS (HCC): ICD-10-CM

## 2017-03-09 DIAGNOSIS — I20.9 ANGINA, CLASS III (HCC): ICD-10-CM

## 2017-03-09 DIAGNOSIS — E78.2 MIXED HYPERLIPIDEMIA: Primary | ICD-10-CM

## 2017-03-09 DIAGNOSIS — R94.39 ABNORMAL STRESS ECHOCARDIOGRAPHY: ICD-10-CM

## 2017-03-09 DIAGNOSIS — I65.23 STENOSIS OF BOTH INTERNAL CAROTID ARTERIES: ICD-10-CM

## 2017-03-09 RX ORDER — RANOLAZINE 500 MG/1
500 TABLET, EXTENDED RELEASE ORAL 2 TIMES DAILY
Qty: 60 TAB | Refills: 3 | Status: SHIPPED | OUTPATIENT
Start: 2017-03-09 | End: 2017-03-09 | Stop reason: SDUPTHER

## 2017-03-09 RX ORDER — RANOLAZINE 500 MG/1
500 TABLET, EXTENDED RELEASE ORAL 2 TIMES DAILY
Qty: 60 TAB | Refills: 3 | Status: SHIPPED | COMMUNITY
Start: 2017-03-09 | End: 2017-04-25 | Stop reason: SDUPTHER

## 2017-03-09 NOTE — MR AVS SNAPSHOT
Visit Information Date & Time Provider Department Dept. Phone Encounter #  
 3/9/2017 10:00 AM Ian Mckee, [address] [place] [address] [facility] [phone] [id number] Your Appointments 4/7/2017 10:45 AM  
ROUTINE CARE with Marcheta Krabbe, MD  
[facility] [address]) Appt Note: 4 mnth fu  
 [address] [place] [address] [address] [phone] [phone]  
  
    
 6/29/2017 10:00 AM  
6 MONTH with Ian Mckee MD  
[facility] [address]) Appt Note: Per Dr ROSA MARIA $45CP REM  
 [address] [facility]  
[phone] [address] [facility] Upcoming Health Maintenance Date Due  
 FOOT EXAM Q1 5/12/2017 HEMOGLOBIN A1C Q6M 7/5/2017 MICROALBUMIN Q1 8/30/2017 MEDICARE YEARLY EXAM 8/31/2017 LIPID PANEL Q1 1/5/2018 EYE EXAM RETINAL OR DILATED Q1 2/21/2018 BREAST CANCER SCRN MAMMOGRAM 1/26/2019 COLONOSCOPY 5/26/2021 DTaP/Tdap/Td series (2 - Td) 9/29/2025 Allergies as of 3/9/2017  Review Complete On: 3/9/2017 By: Ian Mckee MD  
  
 Severity Noted Reaction Type Reactions No Known Allergies Medium 04/29/2016    Other (comments) Demerol [Meperidine]  03/12/2010    Unknown (comments) Pt unsure of reaction Current Immunizations  Reviewed on 2/22/2017 Name Date Influenza Vaccine 11/17/2014, 11/11/2013 Influenza Vaccine Nadean Kebede) 9/29/2015 Influenza Vaccine (Quad) PF 8/30/2016 Influenza Vaccine Split 11/16/2012, 9/26/2011, 10/15/2010 Pneumococcal Vaccine (Pcv) 11/20/2006 Tdap 9/29/2015 Not reviewed this visit You Were Diagnosed With   
  
 Codes Comments Mixed hyperlipidemia    -  Primary ICD-10-CM: Q92.8 ICD-9-CM: 272.2 Vitals BP Pulse Height(growth percentile) Weight(growth percentile) SpO2 BMI 110/70 (BP 1 Location: Right arm, BP Patient Position: Sitting) 86 5' 4\" (1.626 m) 169 lb 14.4 oz (77.1 kg) 97% 29.16 kg/m2 OB Status Smoking Status Hysterectomy Never Smoker Vitals History BMI and BSA Data Body Mass Index Body Surface Area  
 29.16 kg/m 2 1.87 m 2 Preferred Pharmacy Pharmacy Name Phone [id number] [facility], [address] [phone] Your Updated Medication List  
  
   
This list is accurate as of: 3/9/17 10:20 AM.  Always use your most recent med list.  
  
  
  
  
 albuterol-ipratropium 2.5 mg-0.5 mg/3 ml Nebu Commonly known as:  DUO-NEB  
3 mL by Nebulization route every forty-eight (48) hours as needed. alcohol swabs Padm Commonly known as:  BD Single Use Swabs Regular Use to cleanse skin prior to obtaining blood sample for diabetic testing three times daily ALPRAZolam 1 mg tablet Commonly known as:  Kurt Sequin Take 1/2 pill daily and take 1 pill at bedtime  
  
 amitriptyline 10 mg tablet Commonly known as:  ELAVIL Take 10 mg by mouth nightly. aspirin, buffered 81 mg Tab Take 81 mg by mouth daily. Blood-Glucose Meter Misc Commonly known as:  ACCU-CHEK KRISTOFER PLUS METER Use to monitor blood sugar 3 times daily  
  
 butalbital-acetaminophen-caff -40 mg per capsule Commonly known as:  FIORICET  
take 1 capsule by mouth twice a day if needed for headache pain (MAX OF 2 CAPSULES A DAY) Calcium-Cholecalciferol (D3) 600 mg(1,500mg) -400 unit Cap Take 1 Tab by mouth daily. fluticasone 50 mcg/actuation nasal spray Commonly known as:  Filbert Chard INSTILL 2 SPRAYS INTO BOTH NOSTRILS DAILY AS NEEDED FOR RHINITIS. furosemide 20 mg tablet Commonly known as:  LASIX Take 1 Tab by mouth daily as needed. glipiZIDE SR 5 mg CR tablet Commonly known as:  GLUCOTROL XL Take 1 Tab by mouth daily. glucose blood VI test strips strip Commonly known as:  ACCU-CHEK KRISTOFER PLUS TEST STRP Use to check blood glucose level three times daily  
  
 ibuprofen 800 mg tablet Commonly known as:  MOTRIN Take 800 mg by mouth every six (6) hours as needed. insulin glargine 100 unit/mL (3 mL) pen Commonly known as:  LANTUS SOLOSTAR  
14 Units by SubCUTAneous route every evening. * Insulin Needles (Disposable) 31 gauge x 5/16\" Ndle Use with lantus pen * BD INSULIN PEN NEEDLE UF MINI 31 gauge x 3/16\" Ndle Generic drug:  Insulin Needles (Disposable) Use to check blood glucose level three times a daily JANUVIA 100 mg tablet Generic drug:  SITagliptin TAKE 1 TABLET EVERY DAY FOR DIABETES Lancets Misc Commonly known as:  ACCU-CHEK SOFTCLIX LANCETS Use to obtain blood sample for diabetic testing three times a day  
  
 levothyroxine 25 mcg tablet Commonly known as:  synthroid Take one and one-half pills with no other pills first in the morning and wait 30 minutes prior to taking any other medications. lisinopril 2.5 mg tablet Commonly known as:  PRINIVIL, ZESTRIL  
TAKE 1 TABLET EVERY DAY  
  
 metFORMIN  mg tablet Commonly known as:  GLUCOPHAGE XR  
TAKE 1 TABLET TWICE DAILY WITH MEALS  
  
 metoprolol succinate 25 mg XL tablet Commonly known as:  TOPROL-XL  
TAKE 1 and 1/2 TABLET EVERY DAY  
  
 pravastatin 40 mg tablet Commonly known as:  PRAVACHOL  
TAKE 1 TABLET EVERY NIGHT  
  
 PROAIR HFA 90 mcg/actuation inhaler Generic drug:  albuterol Take 1 Puff by inhalation every four (4) hours as needed. ranolazine  mg SR tablet Commonly known as:  RANEXA Take 1 Tab by mouth two (2) times a day. * Notice: This list has 2 medication(s) that are the same as other medications prescribed for you. Read the directions carefully, and ask your doctor or other care provider to review them with you. We Performed the Following AMB POC EKG ROUTINE  LEADS, INTER & REP [24859 CPT(R)] To-Do List   
 2017 9:30 AM  
  Appointment with Namita Hodge MD; [address] [facility] ([phone]) **Exams including a Small Bowel series may take up to 4 hours. Patient needs to register 30 minutes prior to exam.  1.  Adults:  Nothing to eat or drink after midnight. 2.   to 6 months:  Nothing to eat or drink for 3 hours prior to the study. 3.  6 months to 1 year:  Nothing to eat or drink for 4 hours prior to the study. 4.  1 year to 4 years:  Nothing to eat or drink after midnight except for routine medications, if early morning study.  Otherwise, nothing to eat or drink 4 hours prior to study. 5.  5 years to 12 years:  Nothing to eat or drink after midnight except for routine medications, if early morning study.  Otherwise, nothing to eat or drink 6 hours prior to study.  6.  13 years to 18 years:  Nothing to eat or drink after midnight except for routing medications, if early morning study.  Otherwise, nothing to eat or drink 8 hours prior to study.  7.  You must bring a LIST or BAG of all current medication you are taking with you to your appointment. Introducing [facility]! [employer] introduces [employer] patient portal. Now you can access parts of your medical record, email your doctor's office, and request medication refills online. 1. In your internet browser, go to [email or web address] 2. Click on the First Time User? Click Here link in the Sign In box. You will see the New Member Sign Up page. 3. Enter your [employer] Access Code exactly as it appears below. You will not need to use this code after youve completed the sign-up process. If you do not sign up before the expiration date, you must request a new code. · [employer] Access Code: [email or web address] Expires: 2017  9:41 AM 
 
4.  Enter the last four digits of your Social Security Number (xxxx) and Date of Birth (mm/dd/yyyy) as indicated and click Submit. You will be taken to the next sign-up page. 5. Create a Talk[employer] ID. This will be your [employer] login ID and cannot be changed, so think of one that is secure and easy to remember. 6. Create a [employer] password. You can change your password at any time. 7. Enter your Password Reset Question and Answer. This can be used at a later time if you forget your password. 8. Enter your e-mail address. You will receive e-mail notification when new information is available in [address] 9. Click Sign Up. You can now view and download portions of your medical record. 10. Click the Download Summary menu link to download a portable copy of your medical information. If you have questions, please visit the Frequently Asked Questions section of the [employer] website. Remember, [employer] is NOT to be used for urgent needs. For medical emergencies, dial 911. Now available from your iPhone and Android! Please provide this summary of care documentation to your next provider. Your primary care clinician is listed as Matty Blue. If you have any questions after today's visit, please call [phone].

## 2017-03-09 NOTE — PROGRESS NOTES
NAME:  Hannah Perez   :   1950   MRN:   [id number]   PCP:  Priscilla Mascorro MD           Subjective: The patient is a 61y.o. year old female  who returns for test results. She complains of WISEMAN. Past Medical History:   Diagnosis Date    Anxiety     Arthritis     ASHD (arteriosclerotic heart disease)     CAD (coronary artery disease)     Chronic pain     Constipation     Depression     Diabetes ([address]     Diarrhea     Dysgeusia 2014    Esophageal motor disorder 2012    Flatulence/gas pain/belching 2014    Gastroparesis     GERD (gastroesophageal reflux disease)     Hypercholesterolemia     Hypertension     Psychiatric disorder     anxiety    S/P bypass gastrojejunostomy 2012    Sleep apnea     no cpap       Social History   Substance Use Topics    Smoking status: Never Smoker    Smokeless tobacco: Never Used    Alcohol use No      Family History   Problem Relation Age of Onset    Heart Disease Mother     Hypertension Mother     Diabetes Mother     Diabetes Father     Heart Disease Father     Hypertension Father     Alcohol abuse Brother     Heart Disease Sister     Hypertension Sister     Diabetes Sister     No Known Problems Sister     Alcohol abuse Brother     Alcohol abuse Brother     Diabetes Brother         Review of Systems  Constitutional: Negative for fever, chills, and diaphoresis. Respiratory: Negative for cough, hemoptysis, sputum production, shortness of breath and wheezing. Cardiovascular: Negative for chest pain, palpitations, orthopnea, claudication, leg swelling and PND. Gastrointestinal: Negative for heartburn, nausea, vomiting, blood in stool and melena. Genitourinary: Negative for dysuria and flank pain. Musculoskeletal: Negative for joint pain and back pain. Skin: Negative for rash. Neurological: Negative for focal weakness, seizures, loss of consciousness, weakness and headaches. Endo/Heme/Allergies: Does not bruise/bleed easily. Psychiatric/Behavioral: Negative for memory loss. The patient does not have insomnia. Objective:       Vitals:    03/09/17 0947 03/09/17 0953   BP: 100/70 110/70   Pulse: 86    SpO2: 97%    Weight: 169 lb 14.4 oz (77.1 kg)    Height: 5' 4\" (1.626 m)     Body mass index is 29.16 kg/(m^2). General PE    Gen: NAD     Mental Status - Alert. General Appearance - Not in acute distress. Neck - no JVD     Chest and Lung Exam     Inspection: Accessory muscles - No use of accessory muscles in breathing. Auscultation:   Breath sounds: - Normal.     Cardiovascular   Inspection: Jugular vein - Bilateral - Inspection Normal.   Palpation/Percussion:   Apical Impulse: - Normal.   Auscultation: Rhythm - Regular. Heart Sounds - S1 WNL and S2 WNL. No S3 or S4. Murmurs & Other Heart Sounds: Auscultation of the heart reveals - No Murmurs. Peripheral Vascular   Upper Extremity: Inspection - Bilateral - No Cyanotic nailbeds or Digital clubbing. Lower Extremity:   Palpation: Edema - Bilateral - No edema. Abdomen: Soft, non-tender, bowel sounds are active. Neuro: A&O times 3, CN and motor grossly WNL      Data Review:     EKG - Sinus  Rhythm   Low voltage in precordial leads. -RSR(V1) -nondiagnostic. Stress Test ECG conclusions: The stress ECG was normal with non specific ST changes. Baseline: Left ventricular size was normal. Overall left ventricular  systolic function was normal.    Peak stress: There was hypokinesis of the anterolateral and apical  anterior walls with peak stress, but all the other walls of the left  ventricle exhibited normal reduction and augmentation during exercise. Impressions and recommendations: Markedly abnormal study. This was an  abnormal stress echocardiography study.     Allergies reviewed  Allergies   Allergen Reactions    No Known Allergies Other (comments)    Demerol [Meperidine] Unknown (comments)     Pt unsure of reaction       Medications reviewed  Current Outpatient Prescriptions   Medication Sig    ranolazine ER (RANEXA) 500 mg SR tablet Take 1 Tab by mouth two (2) times a day.  metFORMIN ER (GLUCOPHAGE XR) 500 mg tablet TAKE 1 TABLET TWICE DAILY WITH MEALS    levothyroxine (SYNTHROID) 25 mcg tablet Take one and one-half pills with no other pills first in the morning and wait 30 minutes prior to taking any other medications.  ALPRAZolam (XANAX) 1 mg tablet Take 1/2 pill daily and take 1 pill at bedtime    amitriptyline (ELAVIL) 10 mg tablet Take 10 mg by mouth nightly.  butalbital-acetaminophen-caff (FIORICET) -40 mg per capsule take 1 capsule by mouth twice a day if needed for headache pain (MAX OF 2 CAPSULES A DAY)    ibuprofen (MOTRIN) 800 mg tablet Take 800 mg by mouth every six (6) hours as needed.  metoprolol succinate (TOPROL-XL) 25 mg XL tablet TAKE 1 and 1/2 TABLET EVERY DAY    furosemide (LASIX) 20 mg tablet Take 1 Tab by mouth daily as needed.  glipiZIDE SR (GLUCOTROL) 5 mg CR tablet Take 1 Tab by mouth daily.  fluticasone (FLONASE) 50 mcg/actuation nasal spray INSTILL 2 SPRAYS INTO BOTH NOSTRILS DAILY AS NEEDED FOR RHINITIS.  JANUVIA 100 mg tablet TAKE 1 TABLET EVERY DAY FOR DIABETES    insulin glargine (LANTUS SOLOSTAR) 100 unit/mL (3 mL) pen 14 Units by SubCUTAneous route every evening.     lisinopril (PRINIVIL, ZESTRIL) 2.5 mg tablet TAKE 1 TABLET EVERY DAY    pravastatin (PRAVACHOL) 40 mg tablet TAKE 1 TABLET EVERY NIGHT    BD INSULIN PEN NEEDLE UF MINI 31 gauge x 3/16\" ndle Use to check blood glucose level three times a daily    Blood-Glucose Meter (ACCU-CHEK KRISTOFER PLUS METER) misc Use to monitor blood sugar 3 times daily    glucose blood VI test strips (ACCU-CHEK KRISTOFER PLUS TEST STRP) strip Use to check blood glucose level three times daily    Lancets (ACCU-CHEK SOFTCLIX LANCETS) misc Use to obtain blood sample for diabetic testing three times a day    alcohol swabs (BD SINGLE USE SWABS REGULAR) padm Use to cleanse skin prior to obtaining blood sample for diabetic testing three times daily    PROAIR HFA 90 mcg/actuation inhaler Take 1 Puff by inhalation every four (4) hours as needed.  albuterol-ipratropium (DUO-NEB) 2.5 mg-0.5 mg/3 ml nebulizer solution 3 mL by Nebulization route every forty-eight (48) hours as needed.  Calcium-Cholecalciferol, D3, 600 mg(1,500mg) -400 unit cap Take 1 Tab by mouth daily.  Insulin Needles, Disposable, 31 gauge x 5/16\" ndle Use with lantus pen    Aspirin, Buffered 81 mg tab Take 81 mg by mouth daily. No current facility-administered medications for this visit. Assessment:       ICD-10-CM ICD-9-CM    1. Mixed hyperlipidemia E78.2 272.2 AMB POC EKG ROUTINE W/ 12 LEADS, INTER & REP      CBC W/O DIFF      METABOLIC PANEL, COMPREHENSIVE      PROTHROMBIN TIME + INR   2. Coronary artery disease involving native coronary artery of native heart without angina pectoris I25.10 414.01 CBC W/O DIFF      METABOLIC PANEL, COMPREHENSIVE      PROTHROMBIN TIME + INR   3. Angina pectoris (HCC) I20.9 413.9 CBC W/O DIFF      METABOLIC PANEL, COMPREHENSIVE      PROTHROMBIN TIME + INR   4. Stenosis of both internal carotid arteries I65.23 433.10 CBC W/O DIFF     930.58 METABOLIC PANEL, COMPREHENSIVE      PROTHROMBIN TIME + INR   5. SOB (shortness of breath) R06.02 786.05 CBC W/O DIFF      METABOLIC PANEL, COMPREHENSIVE      PROTHROMBIN TIME + INR   6. S/P PTCA (percutaneous transluminal coronary angioplasty) Z98.61 V45.82 CBC W/O DIFF      METABOLIC PANEL, COMPREHENSIVE      PROTHROMBIN TIME + INR   7.  Abnormal stress echocardiography R94.39 794.39 CBC W/O DIFF      METABOLIC PANEL, COMPREHENSIVE      PROTHROMBIN TIME + INR        Orders Placed This Encounter    CBC W/O DIFF    METABOLIC PANEL, COMPREHENSIVE    PROTHROMBIN TIME + INR    AMB POC EKG ROUTINE W/ 12 LEADS, INTER & REP     Order Specific Question:   Reason for Exam:     Answer:   Routine    DISCONTD: ranolazine ER (RANEXA) 500 mg SR tablet     Sig: Take 1 Tab by mouth two (2) times a day. Dispense:  60 Tab     Refill:  3    ranolazine ER (RANEXA) 500 mg SR tablet     Sig: Take 1 Tab by mouth two (2) times a day. Dispense:  60 Tab     Refill:  3       Patient Active Problem List   Diagnosis Code    Anxiety F41.9    Depression F32.9    Hypovitaminosis D E55.9    GERD (gastroesophageal reflux disease) K21.9    Edema R60.9    Esophageal motor disorder K22.4    S/P bypass gastrojejunostomy Z98.0    SOB (shortness of breath) R06.02    CAD (coronary artery disease) I25.10    Angina pectoris (HCC) I20.9    S/P PTCA (percutaneous transluminal coronary angioplasty) Z98.61    Mixed hyperlipidemia E78.2    Flatulence/gas pain/belching R14.0    Dysgeusia R43.2    Diarrhea R19.7    Type 2 diabetes mellitus without complication ([address] M79.5    Osteopenia M85.80    Encounter for medication monitoring Z51.81    Constipation K59.00    CVA (cerebral vascular accident) ([facility] [address] I63.9    Headache P85    Complicated migraine R21. 109    Numbness and tingling of right side of face R20.0, R20.2    Stenosis of both internal carotid arteries I65.23       Plan:     Patient presents for follow up. 1.  Atherosclerotic heart disease: Patient reporting dyspnea on exertion worse compared to 6 months previous. Markedly abnormal stress echo. Will schedule for cardiac cath. On beta blocker. Bp cannot tolerate IMDUR. Add Ranexa. 2. Hypertension: Controlled continue current therapy  3. High cholesterol: On statin therapy, most recent LDL 58 recheck next year. Maribel Peoples ANP       Pt seen and examined in details. Agree with NP A&P. 1. CAD s/p LAD, RCA, LCx BIA PCI 6/2013: Remy Doan is angina equivalent. Limiting symptoms. Stress test noted. Recommend cardiac cath. I discussed the risks/benefits/alternatives of the procedure with the patient.  Risks include (but are not limited to) bleeding, infection, cva/mi/tamponade/death. The patient understands and agrees to proceed. 2. Previously normal lower extremity arterial doppler.  No evidence of PAD    Ian Mckee MD

## 2017-03-10 LAB
ALBUMIN SERPL-MCNC: 4.3 G/DL (ref 3.6–4.8)
ALBUMIN/GLOB SERPL: 1.7 {RATIO} (ref 1.1–2.5)
ALP SERPL-CCNC: 135 IU/L (ref 39–117)
ALT SERPL-CCNC: 26 IU/L (ref 0–32)
AST SERPL-CCNC: 31 IU/L (ref 0–40)
BILIRUB SERPL-MCNC: 0.3 MG/DL (ref 0–1.2)
BUN SERPL-MCNC: 13 MG/DL (ref 8–27)
BUN/CREAT SERPL: 16 (ref 11–26)
CALCIUM SERPL-MCNC: 9.3 MG/DL (ref 8.7–10.3)
CHLORIDE SERPL-SCNC: 99 MMOL/L (ref 96–106)
CO2 SERPL-SCNC: 25 MMOL/L (ref 18–29)
CREAT SERPL-MCNC: 0.81 MG/DL (ref 0.57–1)
ERYTHROCYTE [DISTWIDTH] IN BLOOD BY AUTOMATED COUNT: 14.3 % (ref 12.3–15.4)
GLOBULIN SER CALC-MCNC: 2.5 G/DL (ref 1.5–4.5)
GLUCOSE SERPL-MCNC: 104 MG/DL (ref 65–99)
HCT VFR BLD AUTO: 36 % (ref 34–46.6)
HGB BLD-MCNC: 11.9 G/DL (ref 11.1–15.9)
INR PPP: 1 (ref 0.8–1.2)
MCH RBC QN AUTO: 28.1 PG (ref 26.6–33)
MCHC RBC AUTO-ENTMCNC: 33.1 G/DL (ref 31.5–35.7)
MCV RBC AUTO: 85 FL (ref 79–97)
PLATELET # BLD AUTO: 264 X10E3/UL (ref 150–379)
POTASSIUM SERPL-SCNC: 4.6 MMOL/L (ref 3.5–5.2)
PROT SERPL-MCNC: 6.8 G/DL (ref 6–8.5)
PROTHROMBIN TIME: 10.4 SEC (ref 9.1–12)
RBC # BLD AUTO: 4.23 X10E6/UL (ref 3.77–5.28)
SODIUM SERPL-SCNC: 140 MMOL/L (ref 134–144)
WBC # BLD AUTO: 5.3 X10E3/UL (ref 3.4–10.8)

## 2017-03-17 ENCOUNTER — HOSPITAL ENCOUNTER (OUTPATIENT)
Dept: CARDIAC CATH/INVASIVE PROCEDURES | Age: 67
Discharge: HOME | End: 2017-03-17
Attending: INTERNAL MEDICINE | Admitting: INTERNAL MEDICINE
Payer: MEDICARE

## 2017-03-17 VITALS
BODY MASS INDEX: 29.02 KG/M2 | HEIGHT: 64 IN | HEART RATE: 84 BPM | SYSTOLIC BLOOD PRESSURE: 133 MMHG | TEMPERATURE: 97.4 F | WEIGHT: 169.97 LBS | RESPIRATION RATE: 16 BRPM | DIASTOLIC BLOOD PRESSURE: 68 MMHG | OXYGEN SATURATION: 100 %

## 2017-03-17 DIAGNOSIS — E11.9 TYPE 2 DIABETES MELLITUS WITHOUT COMPLICATION (HCC): ICD-10-CM

## 2017-03-17 PROCEDURE — 77030029065 HC DRSG HEMO QCLOT ZMED -B

## 2017-03-17 PROCEDURE — C1769 GUIDE WIRE: HCPCS

## 2017-03-17 PROCEDURE — 74011250636 HC RX REV CODE- 250/636

## 2017-03-17 PROCEDURE — 77030022017 HC DRSG HEMO QCLOT ZMED -A

## 2017-03-17 PROCEDURE — 99152 MOD SED SAME PHYS/QHP 5/>YRS: CPT

## 2017-03-17 PROCEDURE — 74011636320 HC RX REV CODE- 636/320

## 2017-03-17 PROCEDURE — C1894 INTRO/SHEATH, NON-LASER: HCPCS

## 2017-03-17 PROCEDURE — 74011250636 HC RX REV CODE- 250/636: Performed by: INTERNAL MEDICINE

## 2017-03-17 PROCEDURE — 74011000250 HC RX REV CODE- 250

## 2017-03-17 PROCEDURE — 77030002996 HC SUT SLK J&J -A

## 2017-03-17 RX ORDER — LIDOCAINE HYDROCHLORIDE 10 MG/ML
1-20 INJECTION INFILTRATION; PERINEURAL
Status: DISCONTINUED | OUTPATIENT
Start: 2017-03-17 | End: 2017-03-17 | Stop reason: HOSPADM

## 2017-03-17 RX ORDER — FENTANYL CITRATE 50 UG/ML
25-50 INJECTION, SOLUTION INTRAMUSCULAR; INTRAVENOUS
Status: DISCONTINUED | OUTPATIENT
Start: 2017-03-17 | End: 2017-03-17 | Stop reason: HOSPADM

## 2017-03-17 RX ORDER — HEPARIN SODIUM 200 [USP'U]/100ML
500 INJECTION, SOLUTION INTRAVENOUS ONCE
Status: COMPLETED | OUTPATIENT
Start: 2017-03-17 | End: 2017-03-17

## 2017-03-17 RX ORDER — LIDOCAINE HYDROCHLORIDE 10 MG/ML
1-30 INJECTION, SOLUTION EPIDURAL; INFILTRATION; INTRACAUDAL; PERINEURAL
Status: DISCONTINUED | OUTPATIENT
Start: 2017-03-17 | End: 2017-03-17

## 2017-03-17 RX ORDER — LIDOCAINE HYDROCHLORIDE 10 MG/ML
INJECTION INFILTRATION; PERINEURAL
Status: COMPLETED
Start: 2017-03-17 | End: 2017-03-17

## 2017-03-17 RX ORDER — MIDAZOLAM HYDROCHLORIDE 1 MG/ML
INJECTION, SOLUTION INTRAMUSCULAR; INTRAVENOUS
Status: COMPLETED
Start: 2017-03-17 | End: 2017-03-17

## 2017-03-17 RX ORDER — FENTANYL CITRATE 50 UG/ML
INJECTION, SOLUTION INTRAMUSCULAR; INTRAVENOUS
Status: COMPLETED
Start: 2017-03-17 | End: 2017-03-17

## 2017-03-17 RX ORDER — HEPARIN SODIUM 200 [USP'U]/100ML
INJECTION, SOLUTION INTRAVENOUS
Status: COMPLETED
Start: 2017-03-17 | End: 2017-03-17

## 2017-03-17 RX ORDER — SODIUM CHLORIDE 9 MG/ML
100 INJECTION, SOLUTION INTRAVENOUS CONTINUOUS
Status: DISCONTINUED | OUTPATIENT
Start: 2017-03-17 | End: 2017-03-18 | Stop reason: HOSPADM

## 2017-03-17 RX ORDER — MIDAZOLAM HYDROCHLORIDE 1 MG/ML
.5-2 INJECTION, SOLUTION INTRAMUSCULAR; INTRAVENOUS
Status: DISCONTINUED | OUTPATIENT
Start: 2017-03-17 | End: 2017-03-17 | Stop reason: HOSPADM

## 2017-03-17 RX ORDER — HEPARIN SODIUM 1000 [USP'U]/ML
2500 INJECTION, SOLUTION INTRAVENOUS; SUBCUTANEOUS ONCE
Status: DISCONTINUED | OUTPATIENT
Start: 2017-03-17 | End: 2017-03-17

## 2017-03-17 RX ORDER — VERAPAMIL HYDROCHLORIDE 2.5 MG/ML
2.5 INJECTION, SOLUTION INTRAVENOUS ONCE
Status: DISCONTINUED | OUTPATIENT
Start: 2017-03-17 | End: 2017-03-17

## 2017-03-17 RX ORDER — METFORMIN HYDROCHLORIDE 500 MG/1
TABLET, EXTENDED RELEASE ORAL
Qty: 180 TAB | Refills: 3 | Status: ON HOLD | OUTPATIENT
Start: 2017-03-17 | End: 2017-11-17

## 2017-03-17 RX ADMIN — MIDAZOLAM HYDROCHLORIDE 1 MG: 1 INJECTION, SOLUTION INTRAMUSCULAR; INTRAVENOUS at 15:34

## 2017-03-17 RX ADMIN — HEPARIN SODIUM 1000 UNITS: 200 INJECTION, SOLUTION INTRAVENOUS at 15:35

## 2017-03-17 RX ADMIN — FENTANYL CITRATE 25 MCG: 50 INJECTION, SOLUTION INTRAMUSCULAR; INTRAVENOUS at 15:38

## 2017-03-17 RX ADMIN — LIDOCAINE HYDROCHLORIDE 6 ML: 10 INJECTION, SOLUTION INFILTRATION; PERINEURAL at 15:36

## 2017-03-17 RX ADMIN — FENTANYL CITRATE 50 MCG: 50 INJECTION, SOLUTION INTRAMUSCULAR; INTRAVENOUS at 15:34

## 2017-03-17 RX ADMIN — LIDOCAINE HYDROCHLORIDE 20 ML: 10 INJECTION INFILTRATION; PERINEURAL at 15:34

## 2017-03-17 RX ADMIN — LIDOCAINE HYDROCHLORIDE 6 ML: 10 INJECTION INFILTRATION; PERINEURAL at 15:36

## 2017-03-17 RX ADMIN — IOPAMIDOL 50 ML: 755 INJECTION, SOLUTION INTRAVENOUS at 15:43

## 2017-03-17 RX ADMIN — MIDAZOLAM HYDROCHLORIDE 1 MG: 1 INJECTION, SOLUTION INTRAMUSCULAR; INTRAVENOUS at 15:38

## 2017-03-17 RX ADMIN — MIDAZOLAM HYDROCHLORIDE 1 MG: 1 INJECTION, SOLUTION INTRAMUSCULAR; INTRAVENOUS at 15:15

## 2017-03-17 RX ADMIN — MIDAZOLAM HYDROCHLORIDE 1 MG: 1 INJECTION, SOLUTION INTRAMUSCULAR; INTRAVENOUS at 15:33

## 2017-03-17 RX ADMIN — LIDOCAINE HYDROCHLORIDE 20 ML: 10 INJECTION, SOLUTION INFILTRATION; PERINEURAL at 15:34

## 2017-03-17 NOTE — DISCHARGE INSTRUCTIONS
[id number] [id number]        Patient ID:  Dannielle Andrews  [id number]  39 y.o.  1950    Admit Date: 3/17/2017    Discharge Date: 3/17/2017     Admitting Physician: Armando Henning MD     Discharge Physician: Armando Henning MD    Admission Diagnoses:   cath    Discharge Diagnoses: Active Problems:    * No active hospital problems. *      Discharge Condition: Good    Cardiology Procedures this Admission:  Diagnostic left heart catheterization    Disposition: home    Reference discharge instructions provided by nursing for diet and activity. Signed: Armando Henning MD  3/17/2017  3:55 PM      Radial Cardiac Catheterization/Angiography Discharge Instructions    It is normal to feel tired the first couple days. Take it easy and follow the physicians instructions. CHECK THE CATHETER INSERTION SITE DAILY:    Remove the wrist dressing 24 hours after the procedure. You may shower 24 hours after the procedure. Wash with soap and water and pat dry. Gentle cleaning of the site with soap and water is sufficient, cover with a dry clean dressing or bandage. Do not apply creams or powders to the area. No soaking the wrist for 3 days. Leave the puncture site open to air after 24 hours post-procedure. CALL THE PHYSICIANS:     If the site becomes red, swollen or feels warm to the touch  If there is bleeding or drainage or if there is unusual pain at the radial site. If there is any minor oozing, you may apply a band-aid and remove after 12 hours. If the bleeding continues, hold pressure with the middle finger against the puncture site and the thumb against the back of the wrist,call 911 to be transported to the hospital.  DO NOT DRIVE YOURSELF, [address]     ACTIVITY:   For the first 24 hours do not manipulate the wrist.  No lifting, pushing or pulling over 3-5 pounds with the affected wrist for 7 daysand no straining the insertion site. Do not life grocery bags or the garbage can, do not run the vacuum  or  for 7 days. Start with short walks as in the hospital and gradually increase as tolerated each day. It is recommended to walk 30 minutes 5-7 days per week. Follow your physicians instructions on activity. Avoid walking outside in extremes of heat or cold. Walk inside when it is cold and windy or hot and humid. Things to keep in mind:  No driving for at least 24 hours, or as designated by your physician. Limit the number of times you go up and down the stairs  Take rests and pace yourself with activity. Be careful and do not strain with bowel movements. MEDICATIONS:    Take all medications as prescribed  Call your physician if you have any questions  Keep an updated list of your medications with you at all times and give a list to your physician and pharmacist    SIGNS AND SYMPTOMS:   Be cautious of symptoms of angina or recurrent symptoms such as chest discomfort, unusual shortness of breath or fatigue. These could be symptoms of restenosis, a new blockage or a heart attack. If your symptoms are relieved with rest it is still recommended that you notify your physician of recurrent chest pain or discomfort. For CHEST PAIN or symptoms of angina not relieved with rest:  If the discomfort is not relieved with rest, and you have been prescribed Nitroglycerin, take as directed (taken under the tongue, one at a time 5 minutes apart for a total of 3 doses). If the discomfort is not relieved after the 3rd nitroglycerin, call 911. If you have not been prescribed Nitroglycerin  and your chest discomfort is not relieved with rest, call 911. AFTER CARE:   Follow up with your physician as instructed.   Follow a heart healthy diet with proper portion control, daily stress management, daily exercise, blood pressure and cholesterol control , and smoking cessation.

## 2017-03-17 NOTE — IP AVS SNAPSHOT
[address] [facility] 
[phone] Patient: Deng Gonzales MRN: [id number] KEQ:2/3/0775 You are allergic to the following Allergen Reactions No Known Allergies Other (comments) Demerol (Meperidine) Unknown (comments) Pt unsure of reaction Recent Documentation Height Weight BMI OB Status Smoking Status 1.626 m 77.1 kg 29.18 kg/m2 Hysterectomy Never Smoker Emergency Contacts Name Discharge Info Relation Home Work Mobile Miguel Boggs DISCHARGE CAREGIVER [3] Spouse [3] [phone] About your hospitalization You were admitted on:  2017 You last received care in the:  [facility] 2 INTRVNTNL CARDIO You were discharged on:  2017 Unit phone number:  [phone] Why you were hospitalized Your primary diagnosis was:  Not on File Providers Seen During Your Hospitalizations Provider Role Specialty Primary office phone Omega Duque MD Attending Provider Cardiology [phone] Your Primary Care Physician (PCP) Primary Care Physician Office Phone Office Fax Raven Carpio [phone] Follow-up Information Follow up With Details [place]   [address] [facility] 7 [address] 
[phone] Your Appointments 2017  9:30 AM EDT  
XR BA SWALLOW with Rock Salinas MD, [facility] FLUORO 1  
[facility] RADIOLOGY ([address]) [facility]  
[phone] **Exams including a Small Bowel series may take up to 4 hours. Patient needs to register 30 minutes prior to exam.  1.  Adults:  Nothing to eat or drink after midnight.  2.  Rosalie to 6 months:  Nothing to eat or drink for 3 hours prior to the study. 3.  6 months to 1 year:  Nothing to eat or drink for 4 hours prior to the study. 4.  1 year to 4 years:  Nothing to eat or drink after midnight except for routine medications, if early morning study.  Otherwise, nothing to eat or drink 4 hours prior to study. 5.  5 years to 12 years:  Nothing to eat or drink after midnight except for routine medications, if early morning study.  Otherwise, nothing to eat or drink 6 hours prior to study.  6.  13 years to 18 years:  Nothing to eat or drink after midnight except for routing medications, if early morning study.  Otherwise, nothing to eat or drink 8 hours prior to study.  7.  You must bring a LIST or BAG of all current medication you are taking with you to your appointment. Patient should report to outpatient registration ([facility]) 30 minutes prior to the appointment time unless instructed otherwise. Friday April 07, 2017 10:45 AM EDT ROUTINE CARE with Renee Guzmán MD  
[place] [address] [place] [id number] [phone] Current Discharge Medication List  
  
CONTINUE these medications which have CHANGED Dose & Instructions Dispensing Information Comments Morning Noon Evening Bedtime  
 metFORMIN  mg tablet Commonly known as:  GLUCOPHAGE XR What changed:  See the new instructions. Your last dose was: Your next dose is: TAKE 1 TABLET TWICE DAILY WITH MEALS. RESUME FROM Steve 3/19/2017. Quantity:  180 Tab Refills:  3 CONTINUE these medications which have NOT CHANGED Dose & Instructions Dispensing Information Comments Morning Noon Evening Bedtime  
 albuterol-ipratropium 2.5 mg-0.5 mg/3 ml Nebu Commonly known as:  Sourav Joel Your last dose was: Your next dose is:    
   
   
 Dose:  3 mL 3 mL by Nebulization route every forty-eight (48) hours as needed. Refills:  0  
     
   
   
   
  
 alcohol swabs Padm Commonly known as:  BD Single Use Swabs Regular Your last dose was: Your next dose is:    
   
   
 Use to cleanse skin prior to obtaining blood sample for diabetic testing three times daily Quantity:  300 Pad Refills:  3  
 e11.9 ALPRAZolam 1 mg tablet Commonly known as:  [place] Fine Your last dose was: Your next dose is: Take 1/2 pill daily and take 1 pill at bedtime Refills:  0  
     
   
   
   
  
 amitriptyline 10 mg tablet Commonly known as:  ELAVIL Your last dose was: Your next dose is:    
   
   
 Dose:  10 mg Take 10 mg by mouth nightly. Refills:  0  
     
   
   
   
  
 aspirin, buffered 81 mg Tab Your last dose was: Your next dose is:    
   
   
 Dose:  81 mg Take 81 mg by mouth daily. Refills:  0 Blood-Glucose Meter Misc Commonly known as:  ACCU-CHEK KRISTOFER PLUS METER Your last dose was: Your next dose is:    
   
   
 Use to monitor blood sugar 3 times daily Quantity:  1 Each Refills:  0  
 E11.9 , I10  
    
   
   
   
  
 butalbital-acetaminophen-caff -40 mg per capsule Commonly known as:  FoodBox Your last dose was: Your next dose is:    
   
   
 take 1 capsule by mouth twice a day if needed for headache pain (MAX OF 2 CAPSULES A DAY) Refills:  0 Calcium-Cholecalciferol (D3) 600 mg(1,500mg) -400 unit Cap Your last dose was: Your next dose is:    
   
   
 Dose:  1 Tab Take 1 Tab by mouth daily. Refills:  0  
     
   
   
   
  
 fluticasone 50 mcg/actuation nasal spray Commonly known as:  Andres Mcelroy Your last dose was: Your next dose is:    
   
   
 INSTILL 2 SPRAYS INTO BOTH NOSTRILS DAILY AS NEEDED FOR RHINITIS. Quantity:  48 g Refills:  3  
     
   
   
   
  
 furosemide 20 mg tablet Commonly known as:  LASIX Your last dose was: Your next dose is:    
   
   
 Dose:  20 mg Take 1 Tab by mouth daily as needed. Quantity:  90 Tab Refills:  3  
     
   
   
   
  
 glipiZIDE SR 5 mg CR tablet Commonly known as:  GLUCOTROL XL Your last dose was: Your next dose is:    
   
   
 Dose:  5 mg Take 1 Tab by mouth daily. Quantity:  90 Tab Refills:  3  
     
   
   
   
  
 glucose blood VI test strips strip Commonly known as:  ACCU-CHEK KRISTOFER PLUS TEST STRP Your last dose was: Your next dose is:    
   
   
 Use to check blood glucose level three times daily Quantity:  300 Strip Refills:  3  
 e11.9  
    
   
   
   
  
 ibuprofen 800 mg tablet Commonly known as:  MOTRIN Your last dose was: Your next dose is:    
   
   
 Dose:  800 mg Take 800 mg by mouth every six (6) hours as needed. Refills:  0  
     
   
   
   
  
 insulin glargine 100 unit/mL (3 mL) pen Commonly known as:  LANTUS SOLOSTAR Your last dose was: Your next dose is:    
   
   
 Dose:  14 Units 14 Units by SubCUTAneous route every evening. Quantity:  2 Each Refills:  5  
 e11.9 * Insulin Needles (Disposable) 31 gauge x 5/16\" Ndle Your last dose was: Your next dose is:    
   
   
 Use with lantus pen Quantity:  1 Package Refills:  11  
     
   
   
   
  
 * BD INSULIN PEN NEEDLE UF MINI 31 gauge x 3/16\" Ndle Generic drug:  Insulin Needles (Disposable) Your last dose was: Your next dose is:    
   
   
 Use to check blood glucose level three times a daily Quantity:  300 Pen Needle Refills:  3  
 e11.9 JANUVIA 100 mg tablet Generic drug:  SITagliptin Your last dose was: Your next dose is: TAKE 1 TABLET EVERY DAY FOR DIABETES Quantity:  90 Tab Refills:  1 Lancets Misc Commonly known as:  ACCU-CHEK SOFTCLIX LANCETS Your last dose was: Your next dose is:    
   
   
 Use to obtain blood sample for diabetic testing three times a day Quantity:  1 Each Refills:  11  
 E11.9  
    
   
   
   
  
 levothyroxine 25 mcg tablet Commonly known as:  synthroid Your last dose was: Your next dose is: Take one and one-half pills with no other pills first in the morning and wait 30 minutes prior to taking any other medications. Quantity:  45 Tab Refills:  3  
     
   
   
   
  
 lisinopril 2.5 mg tablet Commonly known as:  Marge Lexy Your last dose was: Your next dose is: TAKE 1 TABLET EVERY DAY Quantity:  90 Tab Refills:  3  
     
   
   
   
  
 metoprolol succinate 25 mg XL tablet Commonly known as:  TOPROL-XL Your last dose was: Your next dose is: TAKE 1 and 1/2 TABLET EVERY DAY Quantity:  90 Tab Refills:  3  
     
   
   
   
  
 pravastatin 40 mg tablet Commonly known as:  PRAVACHOL Your last dose was: Your next dose is: TAKE 1 TABLET EVERY NIGHT Quantity:  90 Tab Refills:  3 PROAIR HFA 90 mcg/actuation inhaler Generic drug:  albuterol Your last dose was: Your next dose is:    
   
   
 Dose:  1 Puff Take 1 Puff by inhalation every four (4) hours as needed. Refills:  0  
     
   
   
   
  
 ranolazine  mg SR tablet Commonly known as:  RANEXA Your last dose was: Your next dose is:    
   
   
 Dose:  500 mg Take 1 Tab by mouth two (2) times a day. Quantity:  60 Tab Refills:  3  
     
   
   
   
  
 * Notice: This list has 2 medication(s) that are the same as other medications prescribed for you. Read the directions carefully, and ask your doctor or other care provider to review them with you. Where to Get Your Medications These medications were sent to [address] [facility], [address]  [address] [facility] [address] Phone:  [phone]  
  metFORMIN  mg tablet Discharge Instructions [address] [facility] [place], [address] [facility]  [phone] Patient ID: Jen Lion [id number] 
24 y.o. 
1950 Admit Date: 3/17/2017 Discharge Date: 3/17/2017 Admitting Physician: Breana Wallace MD  
 
Discharge Physician: Breana Wallace MD 
 
Admission Diagnoses:  
cath Discharge Diagnoses: Active Problems: * No active hospital problems. * Discharge Condition: Good Cardiology Procedures this Admission:  Diagnostic left heart catheterization Disposition: home Reference discharge instructions provided by nursing for diet and activity. Signed: Breana Wallace MD 
3/17/2017 
3:55 PM 
 
 
Radial Cardiac Catheterization/Angiography Discharge Instructions It is normal to feel tired the first couple days. Take it easy and follow the physicians instructions. CHECK THE CATHETER INSERTION SITE DAILY: 
 
Remove the wrist dressing 24 hours after the procedure. You may shower 24 hours after the procedure. Wash with soap and water and pat dry. Gentle cleaning of the site with soap and water is sufficient, cover with a dry clean dressing or bandage. Do not apply creams or powders to the area. No soaking the wrist for 3 days. Leave the puncture site open to air after 24 hours post-procedure. CALL THE PHYSICIANS:  
 
If the site becomes red, swollen or feels warm to the touch If there is bleeding or drainage or if there is unusual pain at the radial site. If there is any minor oozing, you may apply a band-aid and remove after 12 hours.   
If the bleeding continues, hold pressure with the middle finger against the puncture site and the thumb against the back of the wrist,call 911 to be transported to the hospital. 
DO  [place] [address]. ACTIVITY:  
For the first 24 hours do not manipulate the wrist. 
No lifting, pushing or pulling over 3-5 pounds with the affected wrist for 7 daysand no straining the insertion site. Do not life grocery bags or the garbage can, do not run the vacuum  or  for 7 days. Start with short walks as in the hospital and gradually increase as tolerated each day. It is recommended to walk 30 minutes 5-7 days per week. Follow your physicians instructions on activity. Avoid walking outside in extremes of heat or cold. Walk inside when it is cold and windy or hot and humid. Things to keep in mind: 
No driving for at least 24 hours, or as designated by your physician. Limit the number of times you go up and down the stairs Take rests and pace yourself with activity. Be careful and do not strain with bowel movements. MEDICATIONS: 
 
Take all medications as prescribed Call your physician if you have any questions Keep an updated list of your medications with you at all times and give a list to your physician and pharmacist 
 
SIGNS AND SYMPTOMS:  
Be cautious of symptoms of angina or recurrent symptoms such as chest discomfort, unusual shortness of breath or fatigue. These could be symptoms of restenosis, a new blockage or a heart attack. If your symptoms are relieved with rest it is still recommended that you notify your physician of recurrent chest pain or discomfort. For CHEST PAIN or symptoms of angina not relieved with rest:  If the discomfort is not relieved with rest, and you have been prescribed Nitroglycerin, take as directed (taken under the tongue, one at a time 5 minutes apart for a total of 3 doses). If the discomfort is not relieved after the 3rd nitroglycerin, call 911.   If you have not been prescribed Nitroglycerin  and your chest discomfort is not relieved with rest, call 911. AFTER CARE:  
Follow up with your physician as instructed. Follow a heart healthy diet with proper portion control, daily stress management, daily exercise, blood pressure and cholesterol control , and smoking cessation. Discharge Orders None Introducing [facility]! Elva Rizokirt introduces [employer] patient portal. Now you can access parts of your medical record, email your doctor's office, and request medication refills online. 1. In your internet browser, go to [email or web address] 2. Click on the First Time User? Click Here link in the Sign In box. You will see the New Member Sign Up page. 3. Enter your [employer] Access Code exactly as it appears below. You will not need to use this code after youve completed the sign-up process. If you do not sign up before the expiration date, you must request a new code. · [employer] Access Code: [email or web address] Expires: 6/7/2017 10:41 AM 
 
4. Enter the last four digits of your Social Security Number (xxxx) and Date of Birth (mm/dd/yyyy) as indicated and click Submit. You will be taken to the next sign-up page. 5. Create a [employer] ID. This will be your [employer] login ID and cannot be changed, so think of one that is secure and easy to remember. 6. Create a [employer] password. You can change your password at any time. 7. Enter your Password Reset Question and Answer. This can be used at a later time if you forget your password. 8. Enter your e-mail address. You will receive e-mail notification when new information is available in [address] 9. Click Sign Up. You can now view and download portions of your medical record. 10. Click the Download Summary menu link to download a portable copy of your medical information.  
 
If you have questions, please visit the Frequently Asked Questions section of the [employer]. Remember, MyChart is NOT to be used for urgent needs. For medical emergencies, dial 911. Now available from your iPhone and Android! General Information Please provide this summary of care documentation to your next provider. Patient Signature:  ____________________________________________________________ Date:  ____________________________________________________________  
  
Joelene Batman Provider Signature:  ____________________________________________________________ Date:  ____________________________________________________________

## 2017-03-17 NOTE — IP AVS SNAPSHOT
Current Discharge Medication List  
  
CONTINUE these medications which have CHANGED Dose & Instructions Dispensing Information Comments Morning Noon Evening Bedtime  
 metFORMIN  mg tablet Commonly known as:  GLUCOPHAGE XR What changed:  See the new instructions. Your last dose was: Your next dose is: TAKE 1 TABLET TWICE DAILY WITH MEALS. RESUME FROM Steve 3/19/2017. Quantity:  180 Tab Refills:  3 CONTINUE these medications which have NOT CHANGED Dose & Instructions Dispensing Information Comments Morning Noon Evening Bedtime  
 albuterol-ipratropium 2.5 mg-0.5 mg/3 ml Nebu Commonly known as:  Brittany Paco Your last dose was: Your next dose is:    
   
   
 Dose:  3 mL  
3 mL by Nebulization route every forty-eight (48) hours as needed. Refills:  0  
     
   
   
   
  
 alcohol swabs Padm Commonly known as:  BD Single Use Swabs Regular Your last dose was: Your next dose is:    
   
   
 Use to cleanse skin prior to obtaining blood sample for diabetic testing three times daily Quantity:  300 Pad Refills:  3  
 e11.9 ALPRAZolam 1 mg tablet Commonly known as:  Marielle Yee Your last dose was: Your next dose is: Take 1/2 pill daily and take 1 pill at bedtime Refills:  0  
     
   
   
   
  
 amitriptyline 10 mg tablet Commonly known as:  ELAVIL Your last dose was: Your next dose is:    
   
   
 Dose:  10 mg Take 10 mg by mouth nightly. Refills:  0  
     
   
   
   
  
 aspirin, buffered 81 mg Tab Your last dose was: Your next dose is:    
   
   
 Dose:  81 mg Take 81 mg by mouth daily. Refills:  0 Blood-Glucose Meter Misc Commonly known as:  ACCU-CHEK KRISTOFER PLUS METER Your last dose was: Your next dose is: Use to monitor blood sugar 3 times daily Quantity:  1 Each Refills:  0  
 E11.9 , I10  
    
   
   
   
  
 butalbital-acetaminophen-caff -40 mg per capsule Commonly known as:  [employer] Your last dose was: Your next dose is:    
   
   
 take 1 capsule by mouth twice a day if needed for headache pain (MAX OF 2 CAPSULES A DAY) Refills:  0 Calcium-Cholecalciferol (D3) 600 mg(1,500mg) -400 unit Cap Your last dose was: Your next dose is:    
   
   
 Dose:  1 Tab Take 1 Tab by mouth daily. Refills:  0  
     
   
   
   
  
 fluticasone 50 mcg/actuation nasal spray Commonly known as:  Yuni Gut Your last dose was: Your next dose is:    
   
   
 INSTILL 2 SPRAYS INTO BOTH NOSTRILS DAILY AS NEEDED FOR RHINITIS. Quantity:  48 g Refills:  3  
     
   
   
   
  
 furosemide 20 mg tablet Commonly known as:  LASIX Your last dose was: Your next dose is:    
   
   
 Dose:  20 mg Take 1 Tab by mouth daily as needed. Quantity:  90 Tab Refills:  3  
     
   
   
   
  
 glipiZIDE SR 5 mg CR tablet Commonly known as:  GLUCOTROL XL Your last dose was: Your next dose is:    
   
   
 Dose:  5 mg Take 1 Tab by mouth daily. Quantity:  90 Tab Refills:  3  
     
   
   
   
  
 glucose blood VI test strips strip Commonly known as:  ACCU-CHEK KRISTOFER PLUS TEST STRP Your last dose was: Your next dose is:    
   
   
 Use to check blood glucose level three times daily Quantity:  300 Strip Refills:  3  
 e11.9  
    
   
   
   
  
 ibuprofen 800 mg tablet Commonly known as:  MOTRIN Your last dose was: Your next dose is:    
   
   
 Dose:  800 mg Take 800 mg by mouth every six (6) hours as needed. Refills:  0  
     
   
   
   
  
 insulin glargine 100 unit/mL (3 mL) pen Commonly known as:  LANTUS SOLOSTAR Your last dose was: Your next dose is:    
   
   
 Dose:  14 Units 14 Units by SubCUTAneous route every evening. Quantity:  2 Each Refills:  5  
 e11.9 * Insulin Needles (Disposable) 31 gauge x 5/16\" Ndle Your last dose was: Your next dose is:    
   
   
 Use with lantus pen Quantity:  1 Package Refills:  11  
     
   
   
   
  
 * BD INSULIN PEN NEEDLE UF MINI 31 gauge x 3/16\" Ndle Generic drug:  Insulin Needles (Disposable) Your last dose was: Your next dose is:    
   
   
 Use to check blood glucose level three times a daily Quantity:  300 Pen Needle Refills:  3  
 e11.9 JANUVIA 100 mg tablet Generic drug:  SITagliptin Your last dose was: Your next dose is: TAKE 1 TABLET EVERY DAY FOR DIABETES Quantity:  90 Tab Refills:  1 Lancets Misc Commonly known as:  ACCU-CHEK SOFTCLIX LANCETS Your last dose was: Your next dose is:    
   
   
 Use to obtain blood sample for diabetic testing three times a day Quantity:  1 Each Refills:  11  
 E11.9  
    
   
   
   
  
 levothyroxine 25 mcg tablet Commonly known as:  synthroid Your last dose was: Your next dose is: Take one and one-half pills with no other pills first in the morning and wait 30 minutes prior to taking any other medications. Quantity:  45 Tab Refills:  3  
     
   
   
   
  
 lisinopril 2.5 mg tablet Commonly known as:  Ramiro Morgan Your last dose was: Your next dose is: TAKE 1 TABLET EVERY DAY Quantity:  90 Tab Refills:  3  
     
   
   
   
  
 metoprolol succinate 25 mg XL tablet Commonly known as:  TOPROL-XL Your last dose was: Your next dose is: TAKE 1 and 1/2 TABLET EVERY DAY Quantity:  90 Tab Refills:  3  
     
   
   
   
  
 pravastatin 40 mg tablet Commonly known as:  PRAVACHOL  
   
 Your last dose was: Your next dose is: TAKE 1 TABLET EVERY NIGHT Quantity:  90 Tab Refills:  3 PROAIR HFA 90 mcg/actuation inhaler Generic drug:  albuterol Your last dose was: Your next dose is:    
   
   
 Dose:  1 Puff Take 1 Puff by inhalation every four (4) hours as needed. Refills:  0  
     
   
   
   
  
 ranolazine  mg SR tablet Commonly known as:  RANEXA Your last dose was: Your next dose is:    
   
   
 Dose:  500 mg Take 1 Tab by mouth two (2) times a day. Quantity:  60 Tab Refills:  3  
     
   
   
   
  
 * Notice: This list has 2 medication(s) that are the same as other medications prescribed for you. Read the directions carefully, and ask your doctor or other care provider to review them with you. Where to Get Your Medications These medications were sent to [address] [place] [address], [address] [place] [address] [address] Phone:  [phone]  
  metFORMIN  mg tablet

## 2017-03-17 NOTE — PROGRESS NOTES
SHEATH PULL NOTE:    Patient informed of procedure with questions answered with review. Sheath site prepped with Chloraprep swab. 4 fr sheath in rfv pulled by JANETTE Ayala RN. Hand hold and quick clot 4x4, with manual compression to site. No bleeding, no hematoma, no pain at site. Hemostasis obtained with hand hold/manual compression at site. Patient tolerated well. No change in status. Handhold for 10 minutes. No change at site. Occlusive dressing applied to site. No bleeding, no hematoma, no pain/discomfort at site. Groin instructions provided with review. Continue to monitor procedure site and patient status. *Advised patient to keep head flat and extremity flat to decrease risk of bleeding. *Recommended that patient not drink for ONE HOUR post sheath pull completion. *Recommended that patient not eat for TWO HOURS post sheath pull completion. *Instructed patient on rationale for delay of PO products to decrease risk for aspiration and if additional treatment to procedure site is required. Patient verbalized understanding of instructions with review.

## 2017-03-17 NOTE — PROGRESS NOTES
Bedside and Verbal shift change report given to Charlotte Desai (oncoming nurse) by Mable Tejeda RN (offgoing nurse). Report included the following information SBAR, Procedure Summary, Intake/Output, MAR, Recent Results, Med Rec Status and Cardiac Rhythm NSR.

## 2017-03-18 NOTE — PROGRESS NOTES
Assisted out of bed at 2025. Pt ambulated to bathroom. Voided large amount of clear, yellow urine. Pt then ambulated in hallway with stand by assistance from nurse. Tolerated well. No bleeding or hematoma noted to right groin  cath site.

## 2017-03-18 NOTE — PROGRESS NOTES
Pt dressed. Reviewed discharge instructions with pt and . Transported pt via w/c to [facility] for home discharge. Denied pain at that time. Right groin site without bleeding or hematoma.

## 2017-04-07 ENCOUNTER — OFFICE VISIT (OUTPATIENT)
Dept: FAMILY MEDICINE CLINIC | Age: 67
End: 2017-04-07

## 2017-04-07 VITALS
SYSTOLIC BLOOD PRESSURE: 118 MMHG | OXYGEN SATURATION: 98 % | TEMPERATURE: 97.1 F | RESPIRATION RATE: 16 BRPM | HEIGHT: 64 IN | DIASTOLIC BLOOD PRESSURE: 68 MMHG | WEIGHT: 170.2 LBS | BODY MASS INDEX: 29.06 KG/M2 | HEART RATE: 90 BPM

## 2017-04-07 DIAGNOSIS — E11.9 TYPE 2 DIABETES MELLITUS WITHOUT COMPLICATION, UNSPECIFIED LONG TERM INSULIN USE STATUS: Primary | ICD-10-CM

## 2017-04-07 DIAGNOSIS — L65.9 HAIR THINNING: ICD-10-CM

## 2017-04-07 DIAGNOSIS — K21.9 GASTROESOPHAGEAL REFLUX DISEASE WITHOUT ESOPHAGITIS: ICD-10-CM

## 2017-04-07 DIAGNOSIS — I25.10 CORONARY ARTERY DISEASE INVOLVING NATIVE CORONARY ARTERY OF NATIVE HEART WITHOUT ANGINA PECTORIS: ICD-10-CM

## 2017-04-07 DIAGNOSIS — Z91.09 ENVIRONMENTAL ALLERGIES: ICD-10-CM

## 2017-04-07 DIAGNOSIS — F41.9 ANXIETY: ICD-10-CM

## 2017-04-07 DIAGNOSIS — E78.2 MIXED HYPERLIPIDEMIA: ICD-10-CM

## 2017-04-07 DIAGNOSIS — E03.9 ACQUIRED HYPOTHYROIDISM: ICD-10-CM

## 2017-04-07 DIAGNOSIS — Z51.81 ENCOUNTER FOR MEDICATION MONITORING: ICD-10-CM

## 2017-04-07 DIAGNOSIS — F32.A DEPRESSION, UNSPECIFIED DEPRESSION TYPE: ICD-10-CM

## 2017-04-07 LAB
GLUCOSE POC: 68 MG/DL
HBA1C MFR BLD HPLC: 6.6 %

## 2017-04-07 RX ORDER — LEVOTHYROXINE SODIUM 25 UG/1
TABLET ORAL
Qty: 135 TAB | Refills: 3 | Status: SHIPPED | OUTPATIENT
Start: 2017-04-07 | End: 2017-09-14 | Stop reason: SDUPTHER

## 2017-04-07 RX ORDER — CHOLECALCIFEROL (VITAMIN D3) 125 MCG
1 CAPSULE ORAL DAILY
COMMUNITY
End: 2017-12-05

## 2017-04-07 RX ORDER — FLUTICASONE PROPIONATE 50 MCG
SPRAY, SUSPENSION (ML) NASAL
Qty: 48 G | Refills: 3 | Status: SHIPPED | OUTPATIENT
Start: 2017-04-07 | End: 2018-07-05 | Stop reason: SDUPTHER

## 2017-04-07 RX ORDER — BISMUTH SUBSALICYLATE 262 MG
1 TABLET,CHEWABLE ORAL DAILY
COMMUNITY
End: 2017-12-05

## 2017-04-07 NOTE — PROGRESS NOTES
Pt here for follow up of her chronic medical conditions.     Patient complains that she is losing her hair X one month    CMP 3/9/2017    Lipid 1/5/2017    A1C 1/5/2017    TSH/T4 2/22/2017    Mammo 1/26/17    Colonoscopy 5/26/16 5 yrs Dr Sheyla Wright exam 2/21/17 Dr Susan Muñoz

## 2017-04-08 LAB
CHOLEST SERPL-MCNC: 138 MG/DL (ref 100–199)
HDLC SERPL-MCNC: 55 MG/DL
INTERPRETATION, 910389: NORMAL
LDLC SERPL CALC-MCNC: 46 MG/DL (ref 0–99)
TRIGL SERPL-MCNC: 187 MG/DL (ref 0–149)
VLDLC SERPL CALC-MCNC: 37 MG/DL (ref 5–40)

## 2017-04-10 NOTE — PROGRESS NOTES
HISTORY OF PRESENT ILLNESS  Symone Stoll is a 61 y.o. female. HPI   For routine follow up on chronic medical conditions. Overall has been feeling ogod. C/o hair thinning. She has noticed this over the past several months. Cardiovascular Review:  The patient has coronary artery disease and status post coronary artery stenting. Diet and Lifestyle: generally follows a low fat low cholesterol diet, generally follows a low sodium diet, follows a diabetic diet regularly, exercises sporadically  Home BP Monitoring: is not measured at home. Pertinent ROS: taking medications as instructed, no medication side effects noted, no TIA's, no chest pain on exertion, no dyspnea on exertion, no swelling of ankles. DM type II follow up:  Compliant w/ meds, diabetic diet, and exercise. Tolerating the lantus and feels much better with the lower sugars. Obtains home glucose monitoring averaging 100-140. Checks BS BID on most days and prn. Pt does not have BS log at visit today. No Rf needed for today. Denies any tingling sensation, polyuria and polydipsia. No blurred vision. No significant weight changes. Feeling well since last OV. Hypercholesterolemia follow up:  Compliant w/ meds, low fat, low cholesterol diet. Exercising some. No muscle nor abdominal pain, no skin discoloration. Patient fasting today. Depression Review:  Patient is seen for followup of depression and anxiety. Overall doing well. She denies depressed mood and insomnia. Sleeping well with taking the elavil at night and xanax as needed.       Patient Active Problem List   Diagnosis Code    Anxiety F41.9    Depression F32.9    Hypovitaminosis D E55.9    GERD (gastroesophageal reflux disease) K21.9    Edema R60.9    Esophageal motor disorder K22.4    S/P bypass gastrojejunostomy Z98.0    SOB (shortness of breath) R06.02    CAD (coronary artery disease) I25.10    Angina pectoris (HCC) I20.9    S/P PTCA (percutaneous transluminal coronary angioplasty) Z98.61    Mixed hyperlipidemia E78.2    Flatulence/gas pain/belching R14.0    Diarrhea R19.7    Type 2 diabetes mellitus without complication ([facility]) Z69.4    Osteopenia M85.80    Encounter for medication monitoring Z51.81    Constipation K59.00    CVA (cerebral vascular accident) ([facility] [address] I63.9    Headache B02    Complicated migraine U20. 109    Numbness and tingling of right side of face R20.0, R20.2    Stenosis of both internal carotid arteries I65.23    Angina, class III ([facility]) I20.9       Current Outpatient Prescriptions   Medication Sig Dispense Refill    fluticasone (FLONASE) 50 mcg/actuation nasal spray INSTILL 2 SPRAYS INTO BOTH NOSTRILS DAILY AS NEEDED FOR RHINITIS. 48 g 3    cholecalciferol, vitamin D3, (VITAMIN D3) 2,000 unit tab Take 1 Tab by mouth daily.  multivitamin (ONE A DAY) tablet Take 1 Tab by mouth daily.  levothyroxine (SYNTHROID) 25 mcg tablet Take one and one-half pills with no other pills first in the morning and wait 30 minutes prior to taking any other medications. 135 Tab 3    metFORMIN ER (GLUCOPHAGE XR) 500 mg tablet TAKE 1 TABLET TWICE DAILY WITH MEALS. RESUME FROM Steve 3/19/2017. 180 Tab 3    ranolazine ER (RANEXA) 500 mg SR tablet Take 1 Tab by mouth two (2) times a day. 60 Tab 3    ALPRAZolam (XANAX) 1 mg tablet Take 1/2 pill daily and take 1 pill at bedtime      amitriptyline (ELAVIL) 10 mg tablet Take 10 mg by mouth nightly.  butalbital-acetaminophen-caff (FIORICET) -40 mg per capsule take 1 capsule by mouth twice a day if needed for headache pain (MAX OF 2 CAPSULES A DAY)  0    metoprolol succinate (TOPROL-XL) 25 mg XL tablet TAKE 1 and 1/2 TABLET EVERY DAY (Patient taking differently: 25 mg daily. TAKE 1 and 1/2 TABLET EVERY DAY) 90 Tab 3    furosemide (LASIX) 20 mg tablet Take 1 Tab by mouth daily as needed. 90 Tab 3    glipiZIDE SR (GLUCOTROL) 5 mg CR tablet Take 1 Tab by mouth daily.  90 Tab 3    JANUVIA 100 mg tablet TAKE 1 TABLET EVERY DAY FOR DIABETES 90 Tab 1    insulin glargine (LANTUS SOLOSTAR) 100 unit/mL (3 mL) pen 14 Units by SubCUTAneous route every evening. 2 Each 5    lisinopril (PRINIVIL, ZESTRIL) 2.5 mg tablet TAKE 1 TABLET EVERY DAY 90 Tab 3    pravastatin (PRAVACHOL) 40 mg tablet TAKE 1 TABLET EVERY NIGHT 90 Tab 3    BD INSULIN PEN NEEDLE UF MINI 31 gauge x 3/16\" ndle Use to check blood glucose level three times a daily 300 Pen Needle 3    Blood-Glucose Meter (ACCU-CHEK KRISTOFER PLUS METER) misc Use to monitor blood sugar 3 times daily 1 Each 0    glucose blood VI test strips (ACCU-CHEK KRISTOFER PLUS TEST STRP) strip Use to check blood glucose level three times daily 300 Strip 3    Lancets (ACCU-CHEK SOFTCLIX LANCETS) misc Use to obtain blood sample for diabetic testing three times a day 1 Each 11    alcohol swabs (BD SINGLE USE SWABS REGULAR) padm Use to cleanse skin prior to obtaining blood sample for diabetic testing three times daily 300 Pad 3    Insulin Needles, Disposable, 31 gauge x 5/16\" ndle Use with lantus pen 1 Package 11    Aspirin, Buffered 81 mg tab Take 81 mg by mouth daily.  ibuprofen (MOTRIN) 800 mg tablet Take 800 mg by mouth every six (6) hours as needed.  Calcium-Cholecalciferol, D3, 600 mg(1,500mg) -400 unit cap Take 1 Tab by mouth daily.          Allergies   Allergen Reactions    No Known Allergies Other (comments)    Demerol [Meperidine] Unknown (comments)     Pt unsure of reaction         Past Medical History:   Diagnosis Date    Anxiety     Arthritis     ASHD (arteriosclerotic heart disease)     CAD (coronary artery disease)     Chronic pain     Constipation     Depression     Diabetes ([facility] [address]     Diarrhea     Dysgeusia 11/21/2014    Esophageal motor disorder 1/23/2012    Flatulence/gas pain/belching 11/21/2014    Gastroparesis     GERD (gastroesophageal reflux disease)     Hypercholesterolemia     Hypertension     Psychiatric disorder     anxiety    S/P bypass gastrojejunostomy 1/23/2012    Sleep apnea     no cpap         Past Surgical History:   Procedure Laterality Date    ABDOMEN SURGERY PROC UNLISTED      s/p partial gastrectomy 2/2 gastroparesis    CARDIAC SURG PROCEDURE UNLIST  6/2013    stent placement X 2    COLONOSCOPY N/A 5/26/2016    COLON[facility]    EGD  7/13/2009    GASTROJEJUNOSTOMY      HC BLD CARPEL TUNNEL RELEASE      bilat    HX APPENDECTOMY      HX CHOLECYSTECTOMY      HX CORONARY STENT PLACEMENT  06/2013    3 stents    HX GASTRIC BYPASS  6/02    HX HYSTERECTOMY      HX LAPAROTOMY  8/02    2/2 intra abd abscess    HX ORTHOPAEDIC  1/08    laminectomy    HX ORTHOPAEDIC      Bilateral feet bone spurs removed    HX ORTHOPAEDIC      bilateral carpal tunnel    HX ORTHOPAEDIC      back surgery    HX UROLOGICAL      bladder surgery-bladder tac    ND COLONOSCOPY FLX DX W/COLLJ SPEC WHEN PFRMD  5/03/2006    Dr. Antoine Sleet COLONOSCOPY W/BIOPSY SINGLE/MULTIPLE  2/9/2012         ND EGD BALLOON DILATION ESOPHAGUS <30 MM DIAM  1/23/2012         ND EGD TRANSORAL BIOPSY SINGLE/MULTIPLE  7/11/2011         ND EGD TRANSORAL BIOPSY SINGLE/MULTIPLE  1/23/2012         REMOVAL OF HEEL SPUR  2001    bilat         Family History   Problem Relation Age of Onset    Heart Disease Mother     Hypertension Mother     Diabetes Mother     Diabetes Father     Heart Disease Father     Hypertension Father     Alcohol abuse Brother     Heart Disease Sister     Hypertension Sister     Diabetes Sister     No Known Problems Sister     Alcohol abuse Brother     Alcohol abuse Brother     Diabetes Brother        Social History   Substance Use Topics    Smoking status: Never Smoker    Smokeless tobacco: Never Used    Alcohol use No        Lab Results   Component Value Date/Time    WBC 5.3 03/09/2017 10:53 AM    HGB 11.9 03/09/2017 10:53 AM    HCT 36.0 03/09/2017 10:53 AM    PLATELET 369 55/06/0964 10:53 AM    MCV 85 03/09/2017 10:53 AM       Lab Results   Component Value Date/Time    Cholesterol, total 138 04/07/2017 12:46 PM    HDL Cholesterol 55 04/07/2017 12:46 PM    LDL, calculated 46 04/07/2017 12:46 PM    Triglyceride 187 04/07/2017 12:46 PM    CHOL/HDL Ratio 3.5 01/05/2017 09:18 PM       Lab Results   Component Value Date/Time    Sodium 140 03/09/2017 10:53 AM    Potassium 4.6 03/09/2017 10:53 AM    Chloride 99 03/09/2017 10:53 AM    CO2 25 03/09/2017 10:53 AM    Anion gap 8 01/06/2017 12:23 AM    Glucose 104 03/09/2017 10:53 AM    BUN 13 03/09/2017 10:53 AM    Creatinine 0.81 03/09/2017 10:53 AM    BUN/Creatinine ratio 16 03/09/2017 10:53 AM    GFR est AA 89 03/09/2017 10:53 AM    GFR est non-AA 77 03/09/2017 10:53 AM    Calcium 9.3 03/09/2017 10:53 AM    Bilirubin, total 0.3 03/09/2017 10:53 AM    ALT (SGPT) 26 03/09/2017 10:53 AM    AST (SGOT) 31 03/09/2017 10:53 AM    Alk. phosphatase 135 03/09/2017 10:53 AM    Protein, total 6.8 03/09/2017 10:53 AM    Albumin 4.3 03/09/2017 10:53 AM    Globulin 3.4 01/06/2017 12:23 AM    A-G Ratio 1.7 03/09/2017 10:53 AM      Lab Results   Component Value Date/Time    Hemoglobin A1c 7.1 01/05/2017 04:02 PM    Hemoglobin A1c (POC) 6.6 04/07/2017 12:46 PM         Review of Systems   Constitutional: Negative for malaise/fatigue. HENT: Negative for congestion. Eyes: Negative for blurred vision. Respiratory: Negative for cough and shortness of breath. Cardiovascular: Negative for chest pain, palpitations and leg swelling. Gastrointestinal: Negative for abdominal pain, constipation and heartburn. Genitourinary: Negative for dysuria, frequency and urgency. Musculoskeletal: Negative for back pain and joint pain. Neurological: Negative for dizziness, tingling and headaches. Endo/Heme/Allergies: Negative for environmental allergies. Psychiatric/Behavioral: Negative for depression. The patient does not have insomnia.         Physical Exam Constitutional: She appears well-developed and well-nourished. Visit Vitals    /68 (BP 1 Location: Left arm, BP Patient Position: Sitting)    Pulse 90    Temp 97.1 °F (36.2 °C) (Oral)    Resp 16    Ht 5' 4\" (1.626 m)    Wt 170 lb 3.2 oz (77.2 kg)    SpO2 98%    BMI 29.21 kg/m2          HENT:   Right Ear: Tympanic membrane and ear canal normal.   Left Ear: Tympanic membrane and ear canal normal.   Nose: No mucosal edema or rhinorrhea. Mouth/Throat: Oropharynx is clear and moist and mucous membranes are normal.   Neck: Normal range of motion. Neck supple. No thyromegaly present. Cardiovascular: Normal rate and regular rhythm. No murmur heard. Pulmonary/Chest: Effort normal and breath sounds normal.   Abdominal: Soft. Bowel sounds are normal. There is no tenderness. Musculoskeletal: Normal range of motion. She exhibits no edema. Lymphadenopathy:     She has no cervical adenopathy. Skin: Skin is warm and dry. Psychiatric: She has a normal mood and affect. Nursing note and vitals reviewed. ASSESSMENT and PLAN  Jeanie Goodell was seen today for diabetes, cholesterol problem, hypertension, thyroid problem and coronary artery disease. Diagnoses and all orders for this visit:    Type 2 diabetes mellitus without complication, unspecified long term insulin use status  -     AMB POC HEMOGLOBIN A1C  -     AMB POC GLUCOSE, QUANTITATIVE, BLOOD    Coronary artery disease involving native coronary artery of native heart without angina pectoris  Stable     Mixed hyperlipidemia  -     LIPID PANEL    Acquired hypothyroidism  -     levothyroxine (SYNTHROID) 25 mcg tablet;  Take one and one-half pills with no other pills first in the morning and wait 30 minutes prior to taking any other medications.  -     TSH 3RD GENERATION    Depression, unspecified depression type//  Anxiety  Stable     Environmental allergies  -     fluticasone (FLONASE) 50 mcg/actuation nasal spray; INSTILL 2 SPRAYS INTO BOTH NOSTRILS DAILY AS NEEDED FOR RHINITIS. Gastroesophageal reflux disease without esophagitis  Stable     Encounter for medication monitoring    Hair thinning  -     TSH 3RD GENERATION    Other orders  -     CVD REPORT      Follow-up Disposition:  Return in about 5 months (around 9/7/2017). reviewed diet, exercise and weight control  cardiovascular risk and specific lipid/LDL goals reviewed  reviewed medications and side effects in detail  specific diabetic recommendations: low cholesterol diet, weight control and daily exercise discussed, home glucose monitoring emphasized, foot care discussed and Podiatry visits discussed, annual eye examinations at Ophthalmology discussed and glycohemoglobin and other lab monitoring discussed     I have discussed diagnosis listed in this note with pt and/or family. I have discussed treatment plans and options and the risk/benefit analysis of those options, including safe use of medications and possible medication side effects. Through the use of shared decision making we have agreed to the above plan. The patient has received an after-visit summary and questions were answered concerning future plans and follow up. Advise pt of any urgent changes then to proceed to the ER.

## 2017-04-12 DIAGNOSIS — E11.9 TYPE 2 DIABETES MELLITUS WITHOUT COMPLICATION (HCC): ICD-10-CM

## 2017-04-12 RX ORDER — PEN NEEDLE, DIABETIC 31 GX5/16"
NEEDLE, DISPOSABLE MISCELLANEOUS
Qty: 100 PEN NEEDLE | Refills: 3 | Status: SHIPPED | OUTPATIENT
Start: 2017-04-12 | End: 2017-04-19 | Stop reason: SDUPTHER

## 2017-04-13 ENCOUNTER — TELEPHONE (OUTPATIENT)
Dept: FAMILY MEDICINE CLINIC | Age: 67
End: 2017-04-13

## 2017-04-13 NOTE — TELEPHONE ENCOUNTER
----- Message from Yaya Hendricks sent at 4/11/2017  3:12 PM EDT -----      ----- Message -----     From: Areatha Mcardle, MD     Sent: 4/9/2017   9:53 PM       To:  Yaya Hendricks    Please ask lab to add TSH from Friday labs

## 2017-04-17 ENCOUNTER — OFFICE VISIT (OUTPATIENT)
Dept: CARDIOLOGY CLINIC | Age: 67
End: 2017-04-17

## 2017-04-17 VITALS
HEIGHT: 64 IN | HEART RATE: 98 BPM | WEIGHT: 169.9 LBS | BODY MASS INDEX: 29.01 KG/M2 | RESPIRATION RATE: 16 BRPM | OXYGEN SATURATION: 98 % | DIASTOLIC BLOOD PRESSURE: 70 MMHG | SYSTOLIC BLOOD PRESSURE: 116 MMHG

## 2017-04-17 DIAGNOSIS — E11.9 TYPE 2 DIABETES MELLITUS WITHOUT COMPLICATION, UNSPECIFIED LONG TERM INSULIN USE STATUS: ICD-10-CM

## 2017-04-17 DIAGNOSIS — K21.9 GASTROESOPHAGEAL REFLUX DISEASE WITHOUT ESOPHAGITIS: ICD-10-CM

## 2017-04-17 DIAGNOSIS — E78.2 MIXED HYPERLIPIDEMIA: ICD-10-CM

## 2017-04-17 DIAGNOSIS — Z98.61 S/P PTCA (PERCUTANEOUS TRANSLUMINAL CORONARY ANGIOPLASTY): ICD-10-CM

## 2017-04-17 DIAGNOSIS — I25.10 CORONARY ARTERY DISEASE INVOLVING NATIVE CORONARY ARTERY OF NATIVE HEART WITHOUT ANGINA PECTORIS: Primary | ICD-10-CM

## 2017-04-17 NOTE — MR AVS SNAPSHOT
Visit Information Date & Time Provider Department Dept. Phone Encounter #  
 4/17/2017  2:15 PM Everton Abdalla, [address] [facility] [phone] [id number] Follow-up Instructions Return in about 6 months (around 10/17/2017). Your Appointments 9/12/2017  8:15 AM  
COMPLETE PHYSICAL with Ricky Arreola MD  
[facility]) Appt Note: medicare wellness ok to schedule 2 slots per [address] [place] [address],[address] [facility] 7 [address] [phone] [address] [facility] [address]  
  
    
 11/7/2017 10:45 AM  
6 MONTH with Everton Abdalla MD  
[facility]) Appt Note: Per Dr CRANE $45CP REM; $45CP 4/17/17ksr /per Dr Leora Childers [facility]  
[phone] [address] [facility] Upcoming Health Maintenance Date Due  
 FOOT EXAM Q1 5/12/2017 MICROALBUMIN Q1 8/30/2017 MEDICARE YEARLY EXAM 8/31/2017 HEMOGLOBIN A1C Q6M 10/7/2017 EYE EXAM RETINAL OR DILATED Q1 2/21/2018 LIPID PANEL Q1 4/7/2018 BREAST CANCER SCRN MAMMOGRAM 1/26/2019 COLONOSCOPY 5/26/2021 DTaP/Tdap/Td series (2 - Td) 9/29/2025 Allergies as of 4/17/2017  Review Complete On: 4/17/2017 By: Everton Abdalla MD  
  
 Severity Noted Reaction Type Reactions No Known Allergies Medium 04/29/2016    Other (comments) Demerol [Meperidine]  03/12/2010    Unknown (comments) Pt unsure of reaction Current Immunizations  Reviewed on 2/22/2017 Name Date Influenza Vaccine 11/17/2014, 11/11/2013 Influenza Vaccine Wilber Brito) 9/29/2015 Influenza Vaccine (Quad) PF 8/30/2016 Influenza Vaccine Split 11/16/2012, 9/26/2011, 10/15/2010 Pneumococcal Vaccine (Pcv) 11/20/2006 Tdap 9/29/2015 Not reviewed this visit You Were Diagnosed With   
  
 Codes Comments Coronary artery disease involving native coronary artery of native heart without angina pectoris    -  Primary ICD-10-CM: I25.10 ICD-9-CM: 414.01 S/P PTCA (percutaneous transluminal coronary angioplasty)     ICD-10-CM: Z98.61 ICD-9-CM: V45.82 Mixed hyperlipidemia     ICD-10-CM: E78.2 ICD-9-CM: 272.2 Gastroesophageal reflux disease without esophagitis     ICD-10-CM: K21.9 ICD-9-CM: 530.81 Type 2 diabetes mellitus without complication, unspecified long term insulin use status     ICD-10-CM: E11.9 ICD-9-CM: 250.00 Vitals BP Pulse Resp Height(growth percentile) Weight(growth percentile) SpO2  
 116/70 (BP 1 Location: Left arm) 98 16 5' 4\" (1.626 m) 169 lb 14.4 oz (77.1 kg) 98% BMI OB Status Smoking Status 29.16 kg/m2 Hysterectomy Never Smoker Vitals History BMI and BSA Data Body Mass Index Body Surface Area  
 29.16 kg/m 2 1.87 m 2 Preferred Pharmacy Pharmacy Name Phone [address] [facility] [address] [place] [address] [phone] Your Updated Medication List  
  
   
This list is accurate as of: 4/17/17  2:34 PM.  Always use your most recent med list.  
  
  
  
  
 alcohol swabs Padm Commonly known as:  BD Single Use Swabs Regular Use to cleanse skin prior to obtaining blood sample for diabetic testing three times daily ALPRAZolam 1 mg tablet Commonly known as:  Peola My Take 1/2 pill daily and take 1 pill at bedtime  
  
 amitriptyline 10 mg tablet Commonly known as:  ELAVIL Take 10 mg by mouth nightly. aspirin, buffered 81 mg Tab Take 81 mg by mouth daily. Blood-Glucose Meter Misc Commonly known as:  ACCU-CHEK KRISTOFER PLUS METER Use to monitor blood sugar 3 times daily  
  
 butalbital-acetaminophen-caff -40 mg per capsule Commonly known as:  FIORICET  
take 1 capsule by mouth twice a day if needed for headache pain (MAX OF 2 CAPSULES A DAY) Calcium-Cholecalciferol (D3) 600 mg(1,500mg) -400 unit Cap Take 1 Tab by mouth daily. fluticasone 50 mcg/actuation nasal spray Commonly known as:  Ala Lips INSTILL 2 SPRAYS INTO BOTH NOSTRILS DAILY AS NEEDED FOR RHINITIS. furosemide 20 mg tablet Commonly known as:  LASIX Take 1 Tab by mouth daily as needed. glipiZIDE SR 5 mg CR tablet Commonly known as:  GLUCOTROL XL Take 1 Tab by mouth daily. glucose blood VI test strips strip Commonly known as:  ACCU-CHEK KRISTOFER PLUS TEST STRP Use to check blood glucose level three times daily  
  
 ibuprofen 800 mg tablet Commonly known as:  MOTRIN Take 800 mg by mouth every six (6) hours as needed. insulin glargine 100 unit/mL (3 mL) pen Commonly known as:  LANTUS SOLOSTAR  
14 Units by SubCUTAneous route every evening. * Insulin Needles (Disposable) 31 gauge x 5/16\" Ndle Use with lantus pen * BD INSULIN PEN NEEDLE UF MINI 31 gauge x 3/16\" Ndle Generic drug:  Insulin Needles (Disposable) USE TO CHECK BLOOD GLUCOSE LEVEL THREE TIMES A DAY  
  
 JANUVIA 100 mg tablet Generic drug:  SITagliptin TAKE 1 TABLET EVERY DAY FOR DIABETES Lancets Misc Commonly known as:  ACCU-CHEK SOFTCLIX LANCETS Use to obtain blood sample for diabetic testing three times a day  
  
 levothyroxine 25 mcg tablet Commonly known as:  synthroid Take one and one-half pills with no other pills first in the morning and wait 30 minutes prior to taking any other medications. lisinopril 2.5 mg tablet Commonly known as:  PRINIVIL, ZESTRIL  
TAKE 1 TABLET EVERY DAY  
  
 metFORMIN  mg tablet Commonly known as:  GLUCOPHAGE XR  
TAKE 1 TABLET TWICE DAILY WITH MEALS. RESUME FROM Steve 3/19/2017. metoprolol succinate 25 mg XL tablet Commonly known as:  TOPROL-XL  
TAKE 1 and 1/2 TABLET EVERY DAY  
  
 multivitamin tablet Commonly known as:  ONE A DAY Take 1 Tab by mouth daily. pravastatin 40 mg tablet Commonly known as:  PRAVACHOL  
TAKE 1 TABLET EVERY NIGHT  
  
 ranolazine  mg SR tablet Commonly known as:  RANEXA Take 1 Tab by mouth two (2) times a day. VITAMIN D3 2,000 unit Tab Generic drug:  cholecalciferol (vitamin D3) Take 1 Tab by mouth daily. * Notice: This list has 2 medication(s) that are the same as other medications prescribed for you. Read the directions carefully, and ask your doctor or other care provider to review them with you. We Performed the Following AMB POC EKG ROUTINE W/ 12 LEADS, INTER & REP [01676 CPT(R)] Follow-up Instructions Return in about 6 months (around 10/17/2017). Introducing [facility]! Michael Montoya introduces [employer] patient portal. Now you can access parts of your medical record, email your doctor's office, and request medication refills online. 1. In your internet browser, go to [email or web address]. [employer] 2. Click on the First Time User? Click Here link in the Sign In box. You will see the New Member Sign Up page. 3. Enter your [employer] Access Code exactly as it appears below. You will not need to use this code after youve completed the sign-up process. If you do not sign up before the expiration date, you must request a new code. · [employer] Access Code: [email or web address] Expires: 6/7/2017 10:41 AM 
 
4. Enter the last four digits of your Social Security Number (xxxx) and Date of Birth (mm/dd/yyyy) as indicated and click Submit. You will be taken to the next sign-up page. 5. Create a [employer] ID. This will be your [employer] login ID and cannot be changed, so think of one that is secure and easy to remember. 6. Create a [employer] password. You can change your password at any time. 7. Enter your Password Reset Question and Answer. This can be used at a later time if you forget your password. 8. Enter your e-mail address.  You will receive e-mail notification when new information is available in [employer]. 9. Click Sign Up. You can now view and download portions of your medical record. 10. Click the Download Summary menu link to download a portable copy of your medical information. If you have questions, please visit the Frequently Asked Questions section of the [employer] website. Remember, [employer] is NOT to be used for urgent needs. For medical emergencies, dial 911. Now available from your iPhone and Android! Please provide this summary of care documentation to your next provider. Your primary care clinician is listed as Ophelia Wagner. If you have any questions after today's visit, please call [phone].

## 2017-04-17 NOTE — PROGRESS NOTES
4/17/2017 2:32 PM      Subjective:     Migel Maria is seen in office today for f/u visit. After cardiac cath last month no significant CAD noted. Since added ranexa, feels much better. No cardiac complaints. Visit Vitals    /70 (BP 1 Location: Left arm)    Pulse 98    Resp 16    Ht 5' 4\" (1.626 m)    Wt 169 lb 14.4 oz (77.1 kg)    SpO2 98%    BMI 29.16 kg/m2     Current Outpatient Prescriptions   Medication Sig    fluticasone (FLONASE) 50 mcg/actuation nasal spray INSTILL 2 SPRAYS INTO BOTH NOSTRILS DAILY AS NEEDED FOR RHINITIS.  cholecalciferol, vitamin D3, (VITAMIN D3) 2,000 unit tab Take 1 Tab by mouth daily.  levothyroxine (SYNTHROID) 25 mcg tablet Take one and one-half pills with no other pills first in the morning and wait 30 minutes prior to taking any other medications.  metFORMIN ER (GLUCOPHAGE XR) 500 mg tablet TAKE 1 TABLET TWICE DAILY WITH MEALS. RESUME FROM Steve 3/19/2017.  ranolazine ER (RANEXA) 500 mg SR tablet Take 1 Tab by mouth two (2) times a day.  ALPRAZolam (XANAX) 1 mg tablet Take 1/2 pill daily and take 1 pill at bedtime    amitriptyline (ELAVIL) 10 mg tablet Take 10 mg by mouth nightly.  butalbital-acetaminophen-caff (FIORICET) -40 mg per capsule take 1 capsule by mouth twice a day if needed for headache pain (MAX OF 2 CAPSULES A DAY)    ibuprofen (MOTRIN) 800 mg tablet Take 800 mg by mouth every six (6) hours as needed.  metoprolol succinate (TOPROL-XL) 25 mg XL tablet TAKE 1 and 1/2 TABLET EVERY DAY (Patient taking differently: 25 mg daily. TAKE 1 and 1/2 TABLET EVERY DAY)    furosemide (LASIX) 20 mg tablet Take 1 Tab by mouth daily as needed.  glipiZIDE SR (GLUCOTROL) 5 mg CR tablet Take 1 Tab by mouth daily.  JANUVIA 100 mg tablet TAKE 1 TABLET EVERY DAY FOR DIABETES    insulin glargine (LANTUS SOLOSTAR) 100 unit/mL (3 mL) pen 14 Units by SubCUTAneous route every evening.     lisinopril (PRINIVIL, ZESTRIL) 2.5 mg tablet TAKE 1 TABLET EVERY DAY    pravastatin (PRAVACHOL) 40 mg tablet TAKE 1 TABLET EVERY NIGHT    Calcium-Cholecalciferol, D3, 600 mg(1,500mg) -400 unit cap Take 1 Tab by mouth daily.  Aspirin, Buffered 81 mg tab Take 81 mg by mouth daily.  BD INSULIN PEN NEEDLE UF MINI 31 gauge x 3/16\" ndle USE TO CHECK BLOOD GLUCOSE LEVEL THREE TIMES A DAY     multivitamin (ONE A DAY) tablet Take 1 Tab by mouth daily.  Blood-Glucose Meter (ACCU-CHEK KRISTOFER PLUS METER) misc Use to monitor blood sugar 3 times daily    glucose blood VI test strips (ACCU-CHEK KRISTOFER PLUS TEST STRP) strip Use to check blood glucose level three times daily    Lancets (ACCU-CHEK SOFTCLIX LANCETS) misc Use to obtain blood sample for diabetic testing three times a day    alcohol swabs (BD SINGLE USE SWABS REGULAR) padm Use to cleanse skin prior to obtaining blood sample for diabetic testing three times daily    Insulin Needles, Disposable, 31 gauge x 5/16\" ndle Use with lantus pen     No current facility-administered medications for this visit.           Objective:      Visit Vitals    /70 (BP 1 Location: Left arm)    Pulse 98    Resp 16    Ht 5' 4\" (1.626 m)    Wt 169 lb 14.4 oz (77.1 kg)    SpO2 98%    BMI 29.16 kg/m2       Data Review:     EKG: Normal sinus rhythm, old inferior infarct    Reviewed and/or ordered active problem list, medication list tests    Past Medical History:   Diagnosis Date    Anxiety     Arthritis     ASHD (arteriosclerotic heart disease)     CAD (coronary artery disease)     Chronic pain     Constipation     Depression     Diabetes ([facility] [address]     Diarrhea     Dysgeusia 11/21/2014    Esophageal motor disorder 1/23/2012    Flatulence/gas pain/belching 11/21/2014    Gastroparesis     GERD (gastroesophageal reflux disease)     Hypercholesterolemia     Hypertension     Psychiatric disorder     anxiety    S/P bypass gastrojejunostomy 1/23/2012    Sleep apnea     no cpap Past Surgical History:   Procedure Laterality Date    ABDOMEN SURGERY PROC UNLISTED      s/p partial gastrectomy 2/2 gastroparesis    CARDIAC SURG PROCEDURE UNLIST  6/2013    stent placement X 2    COLONOSCOPY N/A 5/26/2016    COL[facility]COPY [facility] ENDOSCOPY    EGD  7/13/2009    GASTROJEJUNOSTOMY      HC BLD CARPEL TUNNEL RELEASE      bilat    HX APPENDECTOMY      HX CHOLECYSTECTOMY      HX CORONARY STENT PLACEMENT  06/2013    3 stents    HX GASTRIC BYPASS  6/02    HX HYSTERECTOMY      HX LAPAROTOMY  8/02    2/2 intra abd abscess    HX ORTHOPAEDIC  1/08    laminectomy    HX ORTHOPAEDIC      Bilateral feet bone spurs removed    HX ORTHOPAEDIC      bilateral carpal tunnel    HX ORTHOPAEDIC      back surgery    HX UROLOGICAL      bladder surgery-bladder tac    NV COLONOSCOPY FLX DX W/COLLJ SPEC WHEN PFRMD  5/03/2006    Dr. Kami Agustin COLONOSCOPY W/BIOPSY SINGLE/MULTIPLE  2/9/2012         NV EGD BALLOON DILATION ESOPHAGUS <30 MM DIAM  1/23/2012         NV EGD TRANSORAL BIOPSY SINGLE/MULTIPLE  7/11/2011         NV EGD TRANSORAL BIOPSY SINGLE/MULTIPLE  1/23/2012         REMOVAL OF HEEL SPUR  2001    bilat     Allergies   Allergen Reactions    No Known Allergies Other (comments)    Demerol [Meperidine] Unknown (comments)     Pt unsure of reaction      Family History   Problem Relation Age of Onset    Heart Disease Mother     Hypertension Mother     Diabetes Mother     Diabetes Father     Heart Disease Father     Hypertension Father     Alcohol abuse Brother     Heart Disease Sister     Hypertension Sister     Diabetes Sister     No Known Problems Sister     Alcohol abuse Brother     Alcohol abuse Brother     Diabetes Brother       Social History     Social History    Marital status:      Spouse name: N/A    Number of children: N/A    Years of education: N/A     Occupational History    Not on file.      Social History Main Topics    Smoking status: Never Smoker    Smokeless tobacco: Never Used    Alcohol use No    Drug use: No    Sexual activity: Not Currently     Other Topics Concern    Not on file     Social History Narrative         Review of Systems     General: Not Present- Anorexia, Chills, Dietary Changes, Fatigue, Fever, Medication Changes, Night Sweats, Weight Gain > 10lbs. and Weight Loss > 10lbs. .  Skin: Not Present- Bruising and Excessive Sweating. HEENT: Not Present- Headache, Visual Loss and Vertigo. Respiratory: Not Present- Cough, Decreased Exercise Tolerance, Difficulty Breathing, Snoring and Wheezing. Cardiovascular: Not Present- Abnormal Blood Pressure, Chest Pain, Claudications, Difficulty Breathing On Exertion, Edema, Fainting / Blacking Out, Irregular Heart Beat, Night Cramps, Orthopnea, Palpitations, Paroxysmal Nocturnal Dyspnea, Rapid Heart Rate, Shortness of Breath and Swelling of Extremities. Gastrointestinal: Not Present- Black, Tarry Stool, Bloody Stool, Diarrhea, Hematemesis, Rectal Bleeding and Vomiting. Musculoskeletal: Not Present- Muscle Pain and Muscle Weakness. Neurological: Not Present- Dizziness. Psychiatric: Not Present- Depression. Endocrine: Not Present- Cold Intolerance, Heat Intolerance and Thyroid Problems. Hematology: Not Present- Abnormal Bleeding, Anemia, Blood Clots and Easy Bruising.       Physical Exam   The physical exam findings are as follows:       General   Mental Status - Alert. General Appearance - Not in acute distress. Chest and Lung Exam   Inspection: Accessory muscles - No use of accessory muscles in breathing. Auscultation:   Breath sounds: - Normal.      Cardiovascular   Inspection: Jugular vein - Bilateral - Inspection Normal.  Palpation/Percussion:   Apical Impulse: - Normal.  Auscultation: Rhythm - Regular. Heart Sounds - S1 WNL and S2 WNL. No S3 or S4. Murmurs & Other Heart Sounds: Auscultation of the heart reveals - No Murmurs.   Carotid arteries - No Carotid bruit. Peripheral Vascular   Upper Extremity: Inspection - Bilateral - No Cyanotic nailbeds or Digital clubbing. Lower Extremity:   Palpation: Edema - Bilateral - No edema. Assessment:       ICD-10-CM ICD-9-CM    1. Coronary artery disease involving native coronary artery of native heart without angina pectoris I25.10 414.01 AMB POC EKG ROUTINE W/ 12 LEADS, INTER & REP   2. S/P PTCA (percutaneous transluminal coronary angioplasty) Z98.61 V45.82    3. Mixed hyperlipidemia E78.2 272.2    4. Gastroesophageal reflux disease without esophagitis K21.9 530.81    5. Type 2 diabetes mellitus without complication, unspecified long term insulin use status E11.9 250.00        Plan:     1. CAD s/p LAD, RCA, LCx BIA PCI 6/2013: no significant stenosis on recent cath. Clinically stable. Continue current meds. 2. On statin. Last FLP noted. 3. Previously normal lower extremity arterial doppler.  No evidence of PAD

## 2017-04-18 LAB — TSH SERPL DL<=0.005 MIU/L-ACNC: 1.97 UIU/ML (ref 0.45–4.5)

## 2017-04-19 DIAGNOSIS — E11.9 TYPE 2 DIABETES MELLITUS WITHOUT COMPLICATION, WITH LONG-TERM CURRENT USE OF INSULIN (HCC): ICD-10-CM

## 2017-04-19 DIAGNOSIS — Z79.4 TYPE 2 DIABETES MELLITUS WITHOUT COMPLICATION, WITH LONG-TERM CURRENT USE OF INSULIN (HCC): ICD-10-CM

## 2017-04-19 RX ORDER — PEN NEEDLE, DIABETIC 30 GX3/16"
NEEDLE, DISPOSABLE MISCELLANEOUS
Qty: 3 PACKAGE | Refills: 3 | Status: SHIPPED | OUTPATIENT
Start: 2017-04-19 | End: 2018-07-11 | Stop reason: SDUPTHER

## 2017-04-19 RX ORDER — INSULIN GLARGINE 100 [IU]/ML
14 INJECTION, SOLUTION SUBCUTANEOUS EVERY EVENING
Qty: 3 EACH | Refills: 5 | Status: SHIPPED | OUTPATIENT
Start: 2017-04-19 | End: 2017-12-05

## 2017-04-26 RX ORDER — RANOLAZINE 500 MG/1
500 TABLET, EXTENDED RELEASE ORAL 2 TIMES DAILY
Qty: 180 TAB | Refills: 3 | Status: SHIPPED | COMMUNITY
Start: 2017-04-26 | End: 2018-07-27 | Stop reason: SDUPTHER

## 2017-05-11 ENCOUNTER — TELEPHONE (OUTPATIENT)
Dept: CARDIOLOGY CLINIC | Age: 67
End: 2017-05-11

## 2017-05-11 NOTE — TELEPHONE ENCOUNTER
FAXED RANEXA 500 MG TAB 1 TAB BY MOUTH DAILY 180 TABS REFILL 3. HAD [place] OUR NP SIGN IT AND FAXED TO [facility] AT [phone]. RECEIVED CONFIRMATION.

## 2017-05-16 DIAGNOSIS — F41.9 ANXIETY: ICD-10-CM

## 2017-05-16 RX ORDER — SITAGLIPTIN 100 MG/1
TABLET, FILM COATED ORAL
Qty: 90 TAB | Refills: 1 | Status: SHIPPED | OUTPATIENT
Start: 2017-05-16 | End: 2017-11-13 | Stop reason: SDUPTHER

## 2017-05-16 RX ORDER — PRAVASTATIN SODIUM 40 MG/1
TABLET ORAL
Qty: 90 TAB | Refills: 3 | Status: SHIPPED | OUTPATIENT
Start: 2017-05-16 | End: 2018-05-14 | Stop reason: SDUPTHER

## 2017-05-16 RX ORDER — ALPRAZOLAM 1 MG/1
TABLET ORAL
Qty: 135 TAB | Refills: 1 | OUTPATIENT
Start: 2017-05-16 | End: 2017-05-16 | Stop reason: SDUPTHER

## 2017-05-16 RX ORDER — ALPRAZOLAM 1 MG/1
TABLET ORAL
Qty: 135 TAB | Refills: 1 | Status: SHIPPED | OUTPATIENT
Start: 2017-05-16 | End: 2017-10-02 | Stop reason: SDUPTHER

## 2017-09-12 ENCOUNTER — OFFICE VISIT (OUTPATIENT)
Dept: FAMILY MEDICINE CLINIC | Age: 67
End: 2017-09-12

## 2017-09-12 VITALS
SYSTOLIC BLOOD PRESSURE: 128 MMHG | BODY MASS INDEX: 33.61 KG/M2 | TEMPERATURE: 96.4 F | WEIGHT: 171.2 LBS | HEIGHT: 60 IN | HEART RATE: 84 BPM | RESPIRATION RATE: 15 BRPM | DIASTOLIC BLOOD PRESSURE: 77 MMHG | OXYGEN SATURATION: 99 %

## 2017-09-12 DIAGNOSIS — Z00.00 MEDICARE ANNUAL WELLNESS VISIT, SUBSEQUENT: Primary | ICD-10-CM

## 2017-09-12 DIAGNOSIS — E55.9 HYPOVITAMINOSIS D: ICD-10-CM

## 2017-09-12 DIAGNOSIS — E78.2 MIXED HYPERLIPIDEMIA: ICD-10-CM

## 2017-09-12 DIAGNOSIS — Z51.81 ENCOUNTER FOR MEDICATION MONITORING: ICD-10-CM

## 2017-09-12 DIAGNOSIS — I25.10 CORONARY ARTERY DISEASE INVOLVING NATIVE CORONARY ARTERY OF NATIVE HEART WITHOUT ANGINA PECTORIS: ICD-10-CM

## 2017-09-12 DIAGNOSIS — L98.9 FACIAL SKIN LESION: ICD-10-CM

## 2017-09-12 DIAGNOSIS — Z23 ENCOUNTER FOR IMMUNIZATION: ICD-10-CM

## 2017-09-12 DIAGNOSIS — E03.9 ACQUIRED HYPOTHYROIDISM: ICD-10-CM

## 2017-09-12 DIAGNOSIS — E11.9 TYPE 2 DIABETES MELLITUS WITHOUT COMPLICATION, UNSPECIFIED LONG TERM INSULIN USE STATUS: ICD-10-CM

## 2017-09-12 LAB
BILIRUB UR QL STRIP: NEGATIVE
GLUCOSE POC: 90 MG/DL
GLUCOSE UR-MCNC: NEGATIVE MG/DL
KETONES P FAST UR STRIP-MCNC: NEGATIVE MG/DL
PH UR STRIP: 5.5 [PH] (ref 4.6–8)
PROT UR QL STRIP: NEGATIVE MG/DL
SP GR UR STRIP: 1.01 (ref 1–1.03)
UA UROBILINOGEN AMB POC: NORMAL (ref 0.2–1)
URINALYSIS CLARITY POC: CLEAR
URINALYSIS COLOR POC: YELLOW
URINE BLOOD POC: NEGATIVE
URINE LEUKOCYTES POC: NORMAL
URINE NITRITES POC: NEGATIVE

## 2017-09-12 NOTE — MR AVS SNAPSHOT
Visit Information Date & Time Provider Department Dept. Phone Encounter #  
 9/12/2017  8:15 AM Ana Vazquez MD [facility] [phone] [id number] Follow-up Instructions Return in about 4 months (around 1/12/2018). Your Appointments 11/7/2017 10:45 AM  
6 MONTH with Ladan Del Rio MD  
[facility] [address] [facility]) Appt Note: Per Dr CRANE $45CP REM; $45CP 4/17/17ksr /per Dr Patience Choi [facility]  
[phone] [address] [facility] Upcoming Health Maintenance Date Due Pneumococcal 65+ Low/Medium Risk (2 of 2 - PPSV23) 6/7/2017 INFLUENZA AGE 9 TO ADULT 8/1/2017 MICROALBUMIN Q1 8/30/2017 HEMOGLOBIN A1C Q6M 10/7/2017 EYE EXAM RETINAL OR DILATED Q1 2/21/2018 LIPID PANEL Q1 4/7/2018 FOOT EXAM Q1 9/12/2018 MEDICARE YEARLY EXAM 9/13/2018 BREAST CANCER SCRN MAMMOGRAM 1/26/2019 GLAUCOMA SCREENING Q2Y 2/21/2019 COLONOSCOPY 5/26/2021 DTaP/Tdap/Td series (2 - Td) 9/29/2025 Allergies as of 9/12/2017  Review Complete On: 9/12/2017 By: Ana Vazquez MD  
  
 Severity Noted Reaction Type Reactions No Known Allergies Medium 04/29/2016    Other (comments) Demerol [Meperidine]  03/12/2010    Unknown (comments) Pt unsure of reaction Current Immunizations  Reviewed on 9/12/2017 Name Date Influenza Vaccine 11/17/2014, 11/11/2013 Influenza Vaccine Felicia ) 9/29/2015 Influenza Vaccine (Quad) PF 8/30/2016 Influenza Vaccine Split 11/16/2012, 9/26/2011, 10/15/2010 Pneumococcal Vaccine (Pcv) 11/20/2006 Tdap 9/29/2015 Reviewed by Ana Vazquez MD on 9/12/2017 at  8:51 AM  
 Reviewed by Ana Vazquez MD on 9/12/2017 at  8:52 AM  
You Were Diagnosed With   
  
 Codes Comments Medicare annual wellness visit, subsequent    -  Primary ICD-10-CM: Z00.00 ICD-9-CM: V70.0 Type 2 diabetes mellitus without complication, unspecified long term insulin use status (HCC)     ICD-10-CM: E11.9 ICD-9-CM: 250.00 Mixed hyperlipidemia     ICD-10-CM: E78.2 ICD-9-CM: 272.2 Coronary artery disease involving native coronary artery of native heart without angina pectoris     ICD-10-CM: I25.10 ICD-9-CM: 414.01 Acquired hypothyroidism     ICD-10-CM: E03.9 ICD-9-CM: 244.9 Hypovitaminosis D     ICD-10-CM: E55.9 ICD-9-CM: 268.9 Encounter for medication monitoring     ICD-10-CM: Z51.81 
ICD-9-CM: V58.83 Vitals BP Pulse Temp Resp Height(growth percentile) Weight(growth percentile) 128/77 (BP 1 Location: Left arm, BP Patient Position: Sitting) 84 96.4 °F (35.8 °C) (Oral) 15 5' (1.524 m) 171 lb 3.2 oz (77.7 kg) SpO2 BMI OB Status Smoking Status 99% 33.44 kg/m2 Hysterectomy Never Smoker BMI and BSA Data Body Mass Index Body Surface Area  
 33.44 kg/m 2 1.81 m 2 Preferred Pharmacy Pharmacy Name Phone [place] [address] [address] [phone] Your Updated Medication List  
  
   
This list is accurate as of: 9/12/17  9:08 AM.  Always use your most recent med list.  
  
  
  
  
 alcohol swabs Padm Commonly known as:  BD Single Use Swabs Regular Use to cleanse skin prior to obtaining blood sample for diabetic testing three times daily ALPRAZolam 1 mg tablet Commonly known as:  XANAX  
TAKE 1/2 TABLET EVERY DAY AS NEEDED FOR ANXIETY  AND TAKE 1 TABLET AT BEDTIME AS NEEDED FOR SLEEP  
  
 amitriptyline 10 mg tablet Commonly known as:  ELAVIL Take 10 mg by mouth nightly. aspirin, buffered 81 mg Tab Take 81 mg by mouth daily. Blood-Glucose Meter Misc Commonly known as:  ACCU-CHEK KRISTOFER PLUS METER Use to monitor blood sugar 3 times daily  
  
 butalbital-acetaminophen-caff -40 mg per capsule Commonly known as:  [employer] take 1 capsule by mouth twice a day if needed for headache pain (MAX OF 2 CAPSULES A DAY) Calcium-Cholecalciferol (D3) 600 mg(1,500mg) -400 unit Cap Take 1 Tab by mouth daily. fluticasone 50 mcg/actuation nasal spray Commonly known as:  Jonatan Kathy INSTILL 2 SPRAYS INTO BOTH NOSTRILS DAILY AS NEEDED FOR RHINITIS. furosemide 20 mg tablet Commonly known as:  LASIX Take 1 Tab by mouth daily as needed. glipiZIDE SR 5 mg CR tablet Commonly known as:  GLUCOTROL XL Take 1 Tab by mouth daily. glucose blood VI test strips strip Commonly known as:  ACCU-CHEK KRISTOFER PLUS TEST STRP Use to check blood glucose level three times daily  
  
 ibuprofen 800 mg tablet Commonly known as:  MOTRIN Take 800 mg by mouth every six (6) hours as needed. insulin glargine 100 unit/mL (3 mL) Inpn Commonly known as:  LANTUS,BASAGLAR  
14 Units by SubCUTAneous route every evening. Insulin Needles (Disposable) 31 gauge x 5/16\" Ndle Use with lantus pen JANUVIA 100 mg tablet Generic drug:  SITagliptin TAKE 1 TABLET EVERY DAY FOR DIABETES Lancets Misc Commonly known as:  ACCU-CHEK SOFTCLIX LANCETS Use to obtain blood sample for diabetic testing three times a day  
  
 levothyroxine 25 mcg tablet Commonly known as:  synthroid Take one and one-half pills with no other pills first in the morning and wait 30 minutes prior to taking any other medications. lisinopril 2.5 mg tablet Commonly known as:  PRINIVIL, ZESTRIL  
TAKE 1 TABLET EVERY DAY  
  
 metFORMIN  mg tablet Commonly known as:  GLUCOPHAGE XR  
TAKE 1 TABLET TWICE DAILY WITH MEALS. RESUME FROM Steve 3/19/2017. metoprolol succinate 25 mg XL tablet Commonly known as:  TOPROL-XL  
TAKE 1 and 1/2 TABLET EVERY DAY  
  
 multivitamin tablet Commonly known as:  ONE A DAY Take 1 Tab by mouth daily. pravastatin 40 mg tablet Commonly known as:  PRAVACHOL  
TAKE 1 TABLET EVERY NIGHT ranolazine  mg SR tablet Commonly known as:  RANEXA Take 1 Tab by mouth two (2) times a day. VITAMIN D3 2,000 unit Tab Generic drug:  cholecalciferol (vitamin D3) Take 1 Tab by mouth daily. We Performed the Following AMB POC GLUCOSE, QUANTITATIVE, BLOOD [38344 CPT(R)] AMB POC URINALYSIS DIP STICK AUTO W/ MICRO [34058 CPT(R)] CBC W/O DIFF [46773 CPT(R)] HEMOGLOBIN A1C WITH EAG [39498 CPT(R)] LIPID PANEL [01966 CPT(R)] METABOLIC PANEL, COMPREHENSIVE [07282 CPT(R)] MICROALBUMIN, UR, RAND W/ MICROALBUMIN/CREA RATIO [id number] CPT(R)] TSH 3RD GENERATION [59423 CPT(R)] VITAMIN D, 25 HYDROXY [id number] CPT(R)] Follow-up Instructions Return in about 4 months (around 1/12/2018). Introducing [facility]! [facility] introduces [employer] patient portal. Now you can access parts of your medical record, email your doctor's office, and request medication refills online. 1. In your internet browser, go to [email or web address] 2. Click on the First Time User? Click Here link in the Sign In box. You will see the New Member Sign Up page. 3. Enter your [employer] Access Code exactly as it appears below. You will not need to use this code after youve completed the sign-up process. If you do not sign up before the expiration date, you must request a new code. · [employer] Access Code: [email or web address] Expires: 12/11/2017  9:08 AM 
 
4. Enter the last four digits of your Social Security Number (xxxx) and Date of Birth (mm/dd/yyyy) as indicated and click Submit. You will be taken to the next sign-up page. 5. Create a [employer] ID. This will be your [employer] login ID and cannot be changed, so think of one that is secure and easy to remember. 6. Create a [employer] password. You can change your password at any time. 7. Enter your Password Reset Question and Answer. This can be used at a later time if you forget your password. 8. Enter your e-mail address. You will receive e-mail notification when new information is available in [address] 9. Click Sign Up. You can now view and download portions of your medical record. 10. Click the Download Summary menu link to download a portable copy of your medical information. If you have questions, please visit the Frequently Asked Questions section of the [employer] website. Remember, [employer] is NOT to be used for urgent needs. For medical emergencies, dial 911. Now available from your iPhone and Android! Please provide this summary of care documentation to your next provider. Your primary care clinician is listed as Jade Albright. If you have any questions after today's visit, please call [phone].

## 2017-09-12 NOTE — PROGRESS NOTES
Chief Complaint   Patient presents with    Annual Wellness Visit     Medicare     TSH 4/17/2017    A1C 4/7/2017    CMP 3/9/2017    Mammo 1/26/17    Colonoscopy 5/26/16 by Dr Faheem Hicks- repeat in 5 years    Eye exam 2/21/17 Dr Dayna Dietz.

## 2017-09-12 NOTE — PROGRESS NOTES
Chief Complaint   Patient presents with    Annual Wellness Visit     Medicare     Pt states her face is breaking out. 1. Have you been to the ER, urgent care clinic since your last visit? Hospitalized since your last visit? No    2. Have you seen or consulted any other health care providers outside of the [address] [place] [address] since your last visit? Include any pap smears or colon screening.  No

## 2017-09-12 NOTE — PROGRESS NOTES
This is a Subsequent Medicare Annual Wellness Visit providing Personalized Prevention Plan Services (PPPS) (Performed 12 months after initial AWV and PPPS )    I have reviewed the patient's medical history in detail and updated the computerized patient record. Cardiovascular Review:  The patient has coronary artery disease and status post coronary artery stenting. Diet and Lifestyle: generally follows a low fat low cholesterol diet, generally follows a low sodium diet, follows a diabetic diet regularly, exercises sporadically  Home BP Monitoring: is not measured at home. Pertinent ROS: taking medications as instructed, no medication side effects noted, no TIA's, no chest pain on exertion, no dyspnea on exertion, no swelling of ankles. DM type II follow up:  Compliant w/ meds, diabetic diet, and exercise. Tolerating the lantus and feels much better with the lower sugars. Obtains home glucose monitoring averaging 100-140. Checks BS BID on most days and prn. Pt does not have BS log at visit today. No Rf needed for today. Denies any tingling sensation, polyuria and polydipsia. No blurred vision. No significant weight changes. Feeling well since last OV. Hypercholesterolemia follow up:  Compliant w/ meds, low fat, low cholesterol diet. Exercising some. No muscle nor abdominal pain, no skin discoloration. Patient fasting today. Depression Review:  Patient is seen for followup of depression and anxiety. Overall doing well. She denies depressed mood and insomnia. Sleeping well with taking the elavil at night and xanax as needed.       History     Past Medical History:   Diagnosis Date    Arthritis     osteo - right knee    GERD (gastroesophageal reflux disease)     Goiter     Ill-defined condition     borderline diabetes    Obstructive sleep apnea (adult) (pediatric)     Unspecified sleep apnea     CPAP      Past Surgical History:   Procedure Laterality Date    COLONOSCOPY N/A 1/23/2017 COLONOSCOPY performed by Amandeep Chen MD at [address] [facility], DIAGNOSTIC  2004    repeat 10 yrs     HX CATARACT REMOVAL  6/12    BILATERAL    HX GYN      C - section x 1    HX HEENT  2006    thyroid goiter removed    HX ORTHOPAEDIC  11/13/2013    right knee replacement    HX OTHER SURGICAL      no colon polyps per pt    FL COLONOSCOPY FLX DX W/COLLJ SPEC WHEN PFRMD  3/14/2007    dr. Kenisha Cao    FL COLONOSCOPY W/BIOPSY SINGLE/MULTIPLE  1/23/2012    dr Kenisha Cao     Current Outpatient Prescriptions   Medication Sig Dispense Refill    fexofenadine-pseudoephedrine (ALLEGRA-D 12 HOUR)  mg Tb12 Take 1 Tab by mouth every twelve (12) hours as needed.  traMADol (ULTRAM) 50 mg tablet TAKE 1 TABLET BY MOUTH EVERY 6 HOURS AS NEEDED FOR PAIN MAX 4/DAY 30 Tab 1    VAGIFEM 10 mcg tab vaginal tablet Insert 10 mcg into vagina two (2) times a week. 12    aspirin delayed-release 81 mg tablet Take 81 mg by mouth daily.  cholecalciferol, vitamin d3, (VITAMIN D) 1,000 unit tablet Take 2,000 Units by mouth daily.  MULTIVITAMIN PO Take 1 Tab by mouth daily. Allergies   Allergen Reactions    Oxycodone Other (comments)     Hallucinations     Family History   Problem Relation Age of Onset    Hypertension Mother     Asthma Mother     Heart Disease Mother     Hypertension Father     Heart Disease Father     Hypertension Sister     Alcohol abuse Brother     Heart Disease Brother     Hypertension Sister     Hypertension Sister     Hypertension Sister     Diabetes Brother     Hypertension Brother     Cancer Brother 71     stomach    Hypertension Brother     Other Brother      took awhile to wake up after anesthesia/pt states he had surgery on a Mon and then [de-identified] and Wed is when he had trouble waking up   [employer] Son      colon.     No Known Problems Sister     Hypertension Brother     Diabetes Maternal Uncle     Diabetes Maternal Grandmother      Social History   Substance Use Topics    Smoking status: Never Smoker    Smokeless tobacco: Never Used    Alcohol use Yes      Comment: occasional     Patient Active Problem List   Diagnosis Code    GERD (gastroesophageal reflux disease) K21.9    Obstructive sleep apnea (adult) (pediatric) G47.33    Thyroid disease E07.9    Family history of colon cancer Z80.0    OA (osteoarthritis) M19.90    Multinodular goiter E04.2    Osteoarthritis of right knee M17.11    Primary osteoarthritis of both knees M17.0    Prediabetes R73.03    Encounter for medication monitoring Z51.81    IGT (impaired glucose tolerance) R73.02     Lab Results  Component Value Date/Time   WBC 5.3 03/09/2017 10:53 AM   HGB 11.9 03/09/2017 10:53 AM   HCT 36.0 03/09/2017 10:53 AM   PLATELET 780 06/86/9851 10:53 AM   MCV 85 03/09/2017 10:53 AM   Lab Results  Component Value Date/Time   Cholesterol, total 138 04/07/2017 12:46 PM   HDL Cholesterol 55 04/07/2017 12:46 PM   LDL, calculated 46 04/07/2017 12:46 PM   Triglyceride 187 04/07/2017 12:46 PM   CHOL/HDL Ratio 3.5 01/05/2017 09:18 PM   Lab Results  Component Value Date/Time   TSH 1.970 04/17/2017 03:36 PM   T4, Free 0.97 02/22/2017 08:26 AM   T4, Total 5.6 11/17/2015 08:54 AM      Lab Results   Component Value Date/Time    Sodium 140 03/09/2017 10:53 AM    Potassium 4.6 03/09/2017 10:53 AM    Chloride 99 03/09/2017 10:53 AM    CO2 25 03/09/2017 10:53 AM    Anion gap 8 01/06/2017 12:23 AM    Glucose 104 03/09/2017 10:53 AM    BUN 13 03/09/2017 10:53 AM    Creatinine 0.81 03/09/2017 10:53 AM    BUN/Creatinine ratio 16 03/09/2017 10:53 AM    GFR est AA 89 03/09/2017 10:53 AM    GFR est non-AA 77 03/09/2017 10:53 AM    Calcium 9.3 03/09/2017 10:53 AM    Bilirubin, total 0.3 03/09/2017 10:53 AM    ALT (SGPT) 26 03/09/2017 10:53 AM    AST (SGOT) 31 03/09/2017 10:53 AM    Alk.  phosphatase 135 03/09/2017 10:53 AM    Protein, total 6.8 03/09/2017 10:53 AM    Albumin 4.3 03/09/2017 10:53 AM    Globulin 3.4 01/06/2017 12:23 AM    A-G Ratio 1.7 03/09/2017 10:53 AM      Lab Results   Component Value Date/Time    Hemoglobin A1c 7.1 01/05/2017 04:02 PM    Hemoglobin A1c (POC) 6.6 04/07/2017 12:46 PM        Depression Risk Factor Screening:     PHQ over the last two weeks 6/20/2017   Little interest or pleasure in doing things Not at all   Feeling down, depressed or hopeless Not at all   Total Score PHQ 2 0     Alcohol Risk Factor Screening: You do not drink alcohol or very rarely. Functional Ability and Level of Safety:     Hearing Loss   none    Activities of Daily Living   Self-care. Requires assistance with: no ADLs    Fall Risk     Fall Risk Assessment, last 12 mths 6/20/2017   Able to walk? Yes   Fall in past 12 months? No     Functional Ability:   Does the patient exhibit a steady gait? yes    How long did it take the patient to get up and walk from a sitting position? seconds    Is the patient self reliant? (ie can do own laundry, meals, household chores)  yes   Does the patient handle his/her own medications? yes   Does the patient handle his/her own money? yes   Is the patients home safe (ie good lighting, handrails on stairs and bath, etc.)? yes   Did you notice or did patient express any hearing difficulties? no   Did you notice or did patient express any vision difficulties?  no   Were distance and reading eye charts used? yes     Advance Care Planning:   Patient was offered the opportunity to discuss advance care planning:  yes    Does patient have an Advance Directive:  no   If no, did you provide information on Caring Connections?   yes          Abuse Screen   Patient is not abused    Review of Systems   Constitutional: negative for fatigue and malaise  Eyes: negative for irritation and redness  Ears, nose, mouth, throat, and face: negative for earaches, nasal congestion and hoarseness  Respiratory: negative for cough, sputum or dyspnea on exertion  Cardiovascular: negative for chest pain, chest pressure/discomfort, dyspnea, palpitations, irregular heart beats, fatigue, lower extremity edema  Gastrointestinal: negative for dysphagia, dyspepsia, reflux symptoms, nausea, vomiting, change in bowel habits, melena, constipation and abdominal pain  Genitourinary:negative for frequency, dysuria, nocturia, urinary incontinence   Integument/breast: negative for rash and dryness  Hematologic/lymphatic: negative for easy bruising and lymphadenopathy  Musculoskeletal:negative for myalgias, arthralgias, stiff joints, neck pain, back pain and muscle weakness  Neurological: negative for headaches, dizziness, tremor and weakness  Endocrine: negative for diabetic symptoms including polyuria and polydipsia        Physical Examination     Evaluation of Cognitive Function:  Mood/affect:  neutral  Appearance: age appropriate  Family member/caregiver input: NA    Visit Vitals    /77 (BP 1 Location: Left arm, BP Patient Position: Sitting)    Pulse 84    Temp 96.4 °F (35.8 °C) (Oral)    Resp 15    Ht 5' (1.524 m)    Wt 171 lb 3.2 oz (77.7 kg)    SpO2 99%    BMI 33.44 kg/m2     General:  Alert, cooperative, no distress, appears stated age. Head:  Normocephalic, without obvious abnormality, atraumatic. Ears:  Normal TMs and external ear canals both ears. Nose: Nares normal. Septum midline. Mucosa normal. No drainage or sinus tenderness. Throat: Lips, mucosa, and tongue normal. Teeth and gums normal.   Neck: Supple, symmetrical, trachea midline, no adenopathy, thyroid: no enlargement/tenderness/nodules, no carotid bruit and no JVD. Back:   Symmetric, no curvature. ROM normal. No CVA tenderness. Lungs:   Clear to auscultation bilaterally. Chest wall:  No tenderness or deformity. Heart:  Regular rate and rhythm, S1, S2 normal, no murmur, click, rub or gallop. Breast Exam:  No tenderness, masses, or nipple abnormality. Abdomen:   Soft, non-tender.  Bowel sounds normal. No masses,  No organomegaly. Rectal:  Normal tone,  no masses or tenderness  Guaiac negative stool. Extremities: Extremities normal, atraumatic, no cyanosis or edema. Pulses: 2+ and symmetric all extremities. Skin: Skin color, texture, turgor normal. No rashes or lesions. Has hyperpigmented lesion on the right side of the face (which pt describes as being \"tender\". )    Lymph nodes: Cervical, supraclavicular, and axillary nodes normal.   Neurologic: CNII-XII intact. Normal strength, sensation and reflexes throughout. Patient Care Team:  Mariam Means MD as PCP - Santy Shelby MD as Physician (Sleep Medicine)  Hima Christianson MD as Consulting Provider (Endocrinology)    Advice/Referrals/Counseling   Education and counseling provided:  Are appropriate based on today's review and evaluation  End-of-Life planning (with patient's consent)      Assessment/Plan     ASSESSMENT and PLAN  Diagnoses and all orders for this visit:    1. Medicare annual wellness visit, subsequent  -     AMB POC URINALYSIS DIP STICK AUTO W/ MICRO    2. Type 2 diabetes mellitus without complication, unspecified long term insulin use status (HCC)  -     AMB POC GLUCOSE, QUANTITATIVE, BLOOD  -     HEMOGLOBIN A1C WITH EAG  -     MICROALBUMIN, UR, RAND W/ MICROALBUMIN/CREA RATIO    3. Mixed hyperlipidemia  -     LIPID PANEL    4. Coronary artery disease involving native coronary artery of native heart without angina pectoris  Stable   Has appt with cardiology at the end of this month. 5. Acquired hypothyroidism  -     TSH 3RD GENERATION    6. Hypovitaminosis D  -     VITAMIN D, 25 HYDROXY    7. Encounter for medication monitoring  -     METABOLIC PANEL, COMPREHENSIVE  -     CBC W/O DIFF    8.  Skin Mole   Refer to derm    Follow-up Disposition: 4 months  reviewed diet, exercise and weight control  cardiovascular risk and specific lipid/LDL goals reviewed  reviewed medications and side effects in detail  specific diabetic recommendations: low cholesterol diet, weight control and daily exercise discussed, home glucose monitoring emphasized, all medications, side effects and compliance discussed carefully, foot care discussed and Podiatry visits discussed, annual eye examinations at Ophthalmology discussed and glycohemoglobin and other lab monitoring discussed     I have discussed diagnosis listed in this note with pt and/or family. I have discussed treatment plans and options and the risk/benefit analysis of those options, including safe use of medications and possible medication side effects. Through the use of shared decision making we have agreed to the above plan. The patient has received an after-visit summary and questions were answered concerning future plans and follow up. Advise pt of any urgent changes then to proceed to the ER.

## 2017-09-13 DIAGNOSIS — L98.9 FACIAL SKIN LESION: Primary | ICD-10-CM

## 2017-09-14 DIAGNOSIS — E03.9 ACQUIRED HYPOTHYROIDISM: ICD-10-CM

## 2017-09-14 LAB
25(OH)D3+25(OH)D2 SERPL-MCNC: 45.2 NG/ML (ref 30–100)
ALBUMIN SERPL-MCNC: 4.5 G/DL (ref 3.6–4.8)
ALBUMIN/CREAT UR: 5.6 MG/G CREAT (ref 0–30)
ALBUMIN/GLOB SERPL: 2 {RATIO} (ref 1.2–2.2)
ALP SERPL-CCNC: 100 IU/L (ref 39–117)
ALT SERPL-CCNC: 14 IU/L (ref 0–32)
AST SERPL-CCNC: 22 IU/L (ref 0–40)
BILIRUB SERPL-MCNC: 0.3 MG/DL (ref 0–1.2)
BUN SERPL-MCNC: 12 MG/DL (ref 8–27)
BUN/CREAT SERPL: 14 (ref 12–28)
CALCIUM SERPL-MCNC: 9.3 MG/DL (ref 8.7–10.3)
CHLORIDE SERPL-SCNC: 101 MMOL/L (ref 96–106)
CHOLEST SERPL-MCNC: 126 MG/DL (ref 100–199)
CO2 SERPL-SCNC: 20 MMOL/L (ref 18–29)
CREAT SERPL-MCNC: 0.85 MG/DL (ref 0.57–1)
CREAT UR-MCNC: 73.5 MG/DL
ERYTHROCYTE [DISTWIDTH] IN BLOOD BY AUTOMATED COUNT: 13.5 % (ref 12.3–15.4)
EST. AVERAGE GLUCOSE BLD GHB EST-MCNC: 137 MG/DL
GLOBULIN SER CALC-MCNC: 2.3 G/DL (ref 1.5–4.5)
GLUCOSE SERPL-MCNC: 88 MG/DL (ref 65–99)
HBA1C MFR BLD: 6.4 % (ref 4.8–5.6)
HCT VFR BLD AUTO: 38.5 % (ref 34–46.6)
HDLC SERPL-MCNC: 45 MG/DL
HGB BLD-MCNC: 12.8 G/DL (ref 11.1–15.9)
INTERPRETATION, 910389: NORMAL
LDLC SERPL CALC-MCNC: 49 MG/DL (ref 0–99)
Lab: NORMAL
MCH RBC QN AUTO: 29 PG (ref 26.6–33)
MCHC RBC AUTO-ENTMCNC: 33.2 G/DL (ref 31.5–35.7)
MCV RBC AUTO: 87 FL (ref 79–97)
MICROALBUMIN UR-MCNC: 4.1 UG/ML
PDF IMAGE, 910387: NORMAL
PLATELET # BLD AUTO: 325 X10E3/UL (ref 150–379)
POTASSIUM SERPL-SCNC: 4.7 MMOL/L (ref 3.5–5.2)
PROT SERPL-MCNC: 6.8 G/DL (ref 6–8.5)
RBC # BLD AUTO: 4.42 X10E6/UL (ref 3.77–5.28)
SODIUM SERPL-SCNC: 142 MMOL/L (ref 134–144)
TRIGL SERPL-MCNC: 158 MG/DL (ref 0–149)
TSH SERPL DL<=0.005 MIU/L-ACNC: 4.64 UIU/ML (ref 0.45–4.5)
VLDLC SERPL CALC-MCNC: 32 MG/DL (ref 5–40)
WBC # BLD AUTO: 6.5 X10E3/UL (ref 3.4–10.8)

## 2017-09-14 RX ORDER — LEVOTHYROXINE SODIUM 25 UG/1
TABLET ORAL
Qty: 180 TAB | Refills: 3 | Status: SHIPPED | OUTPATIENT
Start: 2017-09-14 | End: 2017-12-05

## 2017-09-18 DIAGNOSIS — F41.9 ANXIETY: ICD-10-CM

## 2017-09-18 RX ORDER — FUROSEMIDE 20 MG/1
TABLET ORAL
Qty: 90 TAB | Refills: 3 | Status: SHIPPED | OUTPATIENT
Start: 2017-09-18 | End: 2018-10-25 | Stop reason: SDUPTHER

## 2017-09-18 RX ORDER — AMITRIPTYLINE HYDROCHLORIDE 10 MG/1
TABLET, FILM COATED ORAL
Qty: 90 TAB | Refills: 3 | Status: SHIPPED | OUTPATIENT
Start: 2017-09-18 | End: 2018-05-30

## 2017-09-18 RX ORDER — METOPROLOL SUCCINATE 25 MG/1
TABLET, EXTENDED RELEASE ORAL
Qty: 90 TAB | Refills: 3 | Status: SHIPPED | OUTPATIENT
Start: 2017-09-18 | End: 2018-11-23 | Stop reason: SDUPTHER

## 2017-09-18 RX ORDER — GLIPIZIDE 5 MG/1
TABLET, FILM COATED, EXTENDED RELEASE ORAL
Qty: 90 TAB | Refills: 3 | Status: SHIPPED | OUTPATIENT
Start: 2017-09-18 | End: 2018-11-15 | Stop reason: SDUPTHER

## 2017-09-18 RX ORDER — LISINOPRIL 2.5 MG/1
TABLET ORAL
Qty: 90 TAB | Refills: 3 | Status: SHIPPED | OUTPATIENT
Start: 2017-09-18 | End: 2018-10-20 | Stop reason: SDUPTHER

## 2017-09-22 DIAGNOSIS — F41.9 ANXIETY: ICD-10-CM

## 2017-09-22 RX ORDER — ALPRAZOLAM 1 MG/1
TABLET ORAL
Qty: 135 TAB | Refills: 1 | OUTPATIENT
Start: 2017-09-22

## 2017-09-25 ENCOUNTER — TELEPHONE (OUTPATIENT)
Dept: FAMILY MEDICINE CLINIC | Age: 67
End: 2017-09-25

## 2017-09-25 NOTE — TELEPHONE ENCOUNTER
----- Message from Stephanie Storm sent at 9/25/2017  2:23 PM EDT -----  Regarding: Dr Yeyo Stevens  Pt said she was referred to a doctor due to blistering on her face, however the doctor she was referred to is not in her insurance network, and would like to be referred to another doctor. Contact [phone]

## 2017-10-02 DIAGNOSIS — F41.9 ANXIETY: ICD-10-CM

## 2017-10-02 RX ORDER — ALPRAZOLAM 1 MG/1
TABLET ORAL
Qty: 135 TAB | Refills: 1 | OUTPATIENT
Start: 2017-10-02 | End: 2017-11-13 | Stop reason: SDUPTHER

## 2017-10-04 ENCOUNTER — TELEPHONE (OUTPATIENT)
Dept: FAMILY MEDICINE CLINIC | Age: 67
End: 2017-10-04

## 2017-10-04 NOTE — TELEPHONE ENCOUNTER
Called pt LM please call back with which pharm you would like Xanax prescription to go to.  Again please call back

## 2017-10-05 ENCOUNTER — TELEPHONE (OUTPATIENT)
Dept: FAMILY MEDICINE CLINIC | Age: 67
End: 2017-10-05

## 2017-10-05 NOTE — TELEPHONE ENCOUNTER
----- Message from Jolene Lock sent at 10/5/2017 10:47 AM EDT -----  Regarding: Dr. Ivy Reid  Pt is returning call concerning which location to send Rx \"Alprazolam\" 1 mg. please send to the [facility], pharmacy number [phone]. Best contact number [phone].

## 2017-11-07 ENCOUNTER — OFFICE VISIT (OUTPATIENT)
Dept: CARDIOLOGY CLINIC | Age: 67
End: 2017-11-07

## 2017-11-07 VITALS
SYSTOLIC BLOOD PRESSURE: 118 MMHG | WEIGHT: 171 LBS | RESPIRATION RATE: 18 BRPM | BODY MASS INDEX: 33.57 KG/M2 | HEART RATE: 91 BPM | DIASTOLIC BLOOD PRESSURE: 80 MMHG | HEIGHT: 60 IN | OXYGEN SATURATION: 98 %

## 2017-11-07 DIAGNOSIS — I25.10 CORONARY ARTERY DISEASE INVOLVING NATIVE CORONARY ARTERY OF NATIVE HEART WITHOUT ANGINA PECTORIS: ICD-10-CM

## 2017-11-07 DIAGNOSIS — Z98.61 S/P PTCA (PERCUTANEOUS TRANSLUMINAL CORONARY ANGIOPLASTY): ICD-10-CM

## 2017-11-07 DIAGNOSIS — I73.9 PAD (PERIPHERAL ARTERY DISEASE) (HCC): Primary | ICD-10-CM

## 2017-11-07 DIAGNOSIS — E78.2 MIXED HYPERLIPIDEMIA: ICD-10-CM

## 2017-11-07 NOTE — PROGRESS NOTES
Chief Complaint   Patient presents with    Coronary Artery Disease    Cholesterol Problem    Results     patient states a nurse came to her home to do PAD testing and was told her SANDIE in her left leg was 0.60 and 0.87 in right leg    Medication Evaluation     patient would like to discuss coming off some of her medications     Irvin San RN

## 2017-11-07 NOTE — MR AVS SNAPSHOT
Visit Information Date & Time Provider Department Dept. Phone Encounter #  
 11/7/2017 10:45 AM Kari Traylor, [address] [place] [address] [facility] [phone] [id number] Follow-up Instructions Routing History Follow-up and Disposition History Your Appointments 12/15/2017 10:45 AM  
ROUTINE CARE with Josiah Johnston MD  
[facility]) Appt Note: 3 mnth fu  
 [address] [facility] [address] [phone] [address] Upcoming Health Maintenance Date Due  
 EYE EXAM RETINAL OR DILATED Q1 2/21/2018 HEMOGLOBIN A1C Q6M 3/12/2018 FOOT EXAM Q1 9/12/2018 MICROALBUMIN Q1 9/12/2018 LIPID PANEL Q1 9/12/2018 MEDICARE YEARLY EXAM 9/13/2018 BREAST CANCER SCRN MAMMOGRAM 1/26/2019 GLAUCOMA SCREENING Q2Y 2/21/2019 COLONOSCOPY 5/26/2021 DTaP/Tdap/Td series (2 - Td) 9/29/2025 Allergies as of 11/7/2017  Review Complete On: 11/7/2017 By: Kari Traylor MD  
  
 Severity Noted Reaction Type Reactions No Known Allergies Medium 04/29/2016    Other (comments) Demerol [Meperidine]  03/12/2010    Unknown (comments) Pt unsure of reaction Current Immunizations  Reviewed on 9/12/2017 Name Date Influenza High Dose Vaccine PF 9/12/2017 Influenza Vaccine 11/17/2014, 11/11/2013 Influenza Vaccine Manuelita Forge) 9/29/2015 Influenza Vaccine (Quad) PF 8/30/2016 Influenza Vaccine Split 11/16/2012, 9/26/2011, 10/15/2010 Pneumococcal Polysaccharide (PPSV-23) 9/12/2017 Pneumococcal Vaccine (Pcv) 11/20/2006 Tdap 9/29/2015 Not reviewed this visit You Were Diagnosed With   
  
 Codes Comments PAD (peripheral artery disease) (HCC)    -  Primary ICD-10-CM: I73.9 ICD-9-CM: 443.9 Coronary artery disease involving native coronary artery of native heart without angina pectoris     ICD-10-CM: I25.10 ICD-9-CM: 414.01 Mixed hyperlipidemia     ICD-10-CM: E78.2 ICD-9-CM: 272.2 S/P PTCA (percutaneous transluminal coronary angioplasty)     ICD-10-CM: Z98.61 ICD-9-CM: V45.82 Vitals BP Pulse Resp Height(growth percentile) Weight(growth percentile) SpO2  
 118/80 (BP 1 Location: Left arm, BP Patient Position: Sitting) 91 18 5' (1.524 m) 171 lb (77.6 kg) 98% BMI OB Status Smoking Status 33.4 kg/m2 Hysterectomy Never Smoker Vitals History BMI and BSA Data Body Mass Index Body Surface Area  
 33.4 kg/m 2 1.81 m 2 Preferred Pharmacy Pharmacy Name Phone [facility]  [address] [facility] [address] [phone] Your Updated Medication List  
  
   
This list is accurate as of: 11/7/17 11:42 AM.  Always use your most recent med list.  
  
  
  
  
 alcohol swabs Padm Commonly known as:  BD Single Use Swabs Regular Use to cleanse skin prior to obtaining blood sample for diabetic testing three times daily ALPRAZolam 1 mg tablet Commonly known as:  XANAX  
TAKE 1/2 TABLET EVERY DAY AS NEEDED FOR ANXIETY AND TAKE 1 TABLET AT BEDTIME AS NEEDED FOR SLEEP  
  
 amitriptyline 10 mg tablet Commonly known as:  ELAVIL TAKE 1 TABLET NIGHTLY  
  
 aspirin, buffered 81 mg Tab Take 81 mg by mouth daily. Blood-Glucose Meter Misc Commonly known as:  ACCU-CHEK KRISTOFER PLUS METER Use to monitor blood sugar 3 times daily  
  
 butalbital-acetaminophen-caff -40 mg per capsule Commonly known as:  FIORICET  
take 1 capsule by mouth twice a day if needed for headache pain (MAX OF 2 CAPSULES A DAY) Calcium-Cholecalciferol (D3) 600 mg(1,500mg) -400 unit Cap Take 1 Tab by mouth daily. fluticasone 50 mcg/actuation nasal spray Commonly known as:  Giorgio Mcnamara INSTILL 2 SPRAYS INTO BOTH NOSTRILS DAILY AS NEEDED FOR RHINITIS. furosemide 20 mg tablet Commonly known as:  LASIX TAKE 1 TABLET EVERY DAY AS NEEDED  
  
 glipiZIDE SR 5 mg CR tablet Commonly known as:  GLUCOTROL XL  
TAKE 1 TABLET EVERY DAY  
  
 glucose blood VI test strips strip Commonly known as:  ACCU-CHEK KRISTOFER PLUS TEST STRP Use to check blood glucose level three times daily  
  
 ibuprofen 800 mg tablet Commonly known as:  MOTRIN Take 800 mg by mouth every six (6) hours as needed. insulin glargine 100 unit/mL (3 mL) Inpn Commonly known as:  LANVIJIBASAGLAR  
14 Units by SubCUTAneous route every evening. Insulin Needles (Disposable) 31 gauge x 5/16\" Ndle Use with lantus pen JANUVIA 100 mg tablet Generic drug:  SITagliptin TAKE 1 TABLET EVERY DAY FOR DIABETES Lancets Misc Commonly known as:  ACCU-CHEK SOFTCLIX LANCETS Use to obtain blood sample for diabetic testing three times a day  
  
 levothyroxine 25 mcg tablet Commonly known as:  synthroid Take 2 pills with no other pills first in the morning and wait 30 minutes prior to taking any other medications. lisinopril 2.5 mg tablet Commonly known as:  PRINIVIL, ZESTRIL  
TAKE 1 TABLET EVERY DAY  
  
 metFORMIN  mg tablet Commonly known as:  GLUCOPHAGE XR  
TAKE 1 TABLET TWICE DAILY WITH MEALS. RESUME FROM Steve 3/19/2017. metoprolol succinate 25 mg XL tablet Commonly known as:  TOPROL-XL  
TAKE 1 TABLET EVERY DAY  
  
 multivitamin tablet Commonly known as:  ONE A DAY Take 1 Tab by mouth daily. pravastatin 40 mg tablet Commonly known as:  PRAVACHOL  
TAKE 1 TABLET EVERY NIGHT  
  
 ranolazine  mg SR tablet Commonly known as:  RANEXA Take 1 Tab by mouth two (2) times a day. VITAMIN D3 2,000 unit Tab Generic drug:  cholecalciferol (vitamin D3) Take 1 Tab by mouth daily. We Performed the Following AMB POC EKG ROUTINE W/ 12 LEADS, INTER & REP [89208 CPT(R)] CBC W/O DIFF [93585 CPT(R)] METABOLIC PANEL, COMPREHENSIVE [80118 CPT(R)] PROTHROMBIN TIME + INR [04544 CPT(R)] To-Do List   
 11/07/2017 Imaging:  IR ANGIO EXT LOWER BI   
  
 01/08/2018 1:00 PM  
  Appointment with [facility] CARLA 1 at [facility] ([phone]) Shower or bathe using soap and water. Do not use deodorant, powder, perfumes, or lotion the day of your exam.  If your prior mammograms were not performed at [facility] 6 please bring films with you or forward prior images 2 days before your procedure. Check in at registration 15min before your appointment time unless you were instructed to do otherwise. A script is not necessary, but if you have one, please bring it on the day of the mammogram or have it faxed to the department. [facility] [phone] [facility]  [phone] [facility]  [phone] [facility] [phone] [place] [phone] Introducing [facility] SERVICES! Romayne Duster introduces [employer] patient portal. Now you can access parts of your medical record, email your doctor's office, and request medication refills online. 1. In your internet browser, go to [email or web address] 2. Click on the First Time User? Click Here link in the Sign In box. You will see the New Member Sign Up page. 3. Enter your [employer] Access Code exactly as it appears below. You will not need to use this code after youve completed the sign-up process. If you do not sign up before the expiration date, you must request a new code. · [employer] Access Code: [email or web address] Expires: 12/11/2017  8:08 AM 
 
4. Enter the last four digits of your Social Security Number (xxxx) and Date of Birth (mm/dd/yyyy) as indicated and click Submit. You will be taken to the next sign-up page. 5. Create a [employer] ID. This will be your [employer] login ID and cannot be changed, so think of one that is secure and easy to remember. 6. Create a [employer] password. You can change your password at any time. 7. Enter your Password Reset Question and Answer. This can be used at a later time if you forget your password. 8. Enter your e-mail address. You will receive e-mail notification when new information is available in [address] 9. Click Sign Up. You can now view and download portions of your medical record. 10. Click the Download Summary menu link to download a portable copy of your medical information. If you have questions, please visit the Frequently Asked Questions section of the [employer] website. Remember, [employer] is NOT to be used for urgent needs. For medical emergencies, dial 911. Now available from your iPhone and Android! Please provide this summary of care documentation to your next provider. Your primary care clinician is listed as Lavell Patel. If you have any questions after today's visit, please call [phone].

## 2017-11-07 NOTE — PROGRESS NOTES
11/7/2017 11:35 AM      Subjective:     Tiana Mercer is here for f/u visit. Main complaint today is b/l LE claudication. [place] class 3 symptom. She had SANDIE study recently and noted to have moderate PAD. She denies chest pain, chest pressure/discomfort, dyspnea, palpitations, irregular heart beats, near-syncope, syncope, orthopnea, paroxysmal nocturnal dyspnea, exertional chest pressure/discomfort. Visit Vitals    /80 (BP 1 Location: Left arm, BP Patient Position: Sitting)    Pulse 91    Resp 18    Ht 5' (1.524 m)    Wt 171 lb (77.6 kg)    SpO2 98%    BMI 33.4 kg/m2     Current Outpatient Prescriptions   Medication Sig    ALPRAZolam (XANAX) 1 mg tablet TAKE 1/2 TABLET EVERY DAY AS NEEDED FOR ANXIETY AND TAKE 1 TABLET AT BEDTIME AS NEEDED FOR SLEEP    amitriptyline (ELAVIL) 10 mg tablet TAKE 1 TABLET NIGHTLY    lisinopril (PRINIVIL, ZESTRIL) 2.5 mg tablet TAKE 1 TABLET EVERY DAY    metoprolol succinate (TOPROL-XL) 25 mg XL tablet TAKE 1 TABLET EVERY DAY    glipiZIDE SR (GLUCOTROL XL) 5 mg CR tablet TAKE 1 TABLET EVERY DAY    furosemide (LASIX) 20 mg tablet TAKE 1 TABLET EVERY DAY AS NEEDED    levothyroxine (SYNTHROID) 25 mcg tablet Take 2 pills with no other pills first in the morning and wait 30 minutes prior to taking any other medications. (Patient taking differently: 50 mcg. Take 2 pills with no other pills first in the morning and wait 30 minutes prior to taking any other medications.)    JANUVIA 100 mg tablet TAKE 1 TABLET EVERY DAY FOR DIABETES    pravastatin (PRAVACHOL) 40 mg tablet TAKE 1 TABLET EVERY NIGHT    ranolazine ER (RANEXA) 500 mg SR tablet Take 1 Tab by mouth two (2) times a day.  insulin glargine (LANTUS SOLOSTAR) 100 unit/mL (3 mL) pen 14 Units by SubCUTAneous route every evening.     Insulin Needles, Disposable, 31 gauge x 5/16\" ndle Use with lantus pen    cholecalciferol, vitamin D3, (VITAMIN D3) 2,000 unit tab Take 1 Tab by mouth daily.    metFORMIN ER (GLUCOPHAGE XR) 500 mg tablet TAKE 1 TABLET TWICE DAILY WITH MEALS. RESUME FROM Steve 3/19/2017.  ibuprofen (MOTRIN) 800 mg tablet Take 800 mg by mouth every six (6) hours as needed.  Blood-Glucose Meter (ACCU-CHEK KRISTOFER PLUS METER) misc Use to monitor blood sugar 3 times daily    glucose blood VI test strips (ACCU-CHEK KRISTOFER PLUS TEST STRP) strip Use to check blood glucose level three times daily    Lancets (ACCU-CHEK SOFTCLIX LANCETS) misc Use to obtain blood sample for diabetic testing three times a day    alcohol swabs (BD SINGLE USE SWABS REGULAR) padm Use to cleanse skin prior to obtaining blood sample for diabetic testing three times daily    Aspirin, Buffered 81 mg tab Take 81 mg by mouth daily.  fluticasone (FLONASE) 50 mcg/actuation nasal spray INSTILL 2 SPRAYS INTO BOTH NOSTRILS DAILY AS NEEDED FOR RHINITIS.  multivitamin (ONE A DAY) tablet Take 1 Tab by mouth daily.  butalbital-acetaminophen-caff (FIORICET) -40 mg per capsule take 1 capsule by mouth twice a day if needed for headache pain (MAX OF 2 CAPSULES A DAY)    Calcium-Cholecalciferol, D3, 600 mg(1,500mg) -400 unit cap Take 1 Tab by mouth daily. No current facility-administered medications for this visit.           Objective:      Visit Vitals    /80 (BP 1 Location: Left arm, BP Patient Position: Sitting)    Pulse 91    Resp 18    Ht 5' (1.524 m)    Wt 171 lb (77.6 kg)    SpO2 98%    BMI 33.4 kg/m2       Data Review:     EKG: Normal sinus rhythm, no acute st/t changes    Reviewed and/or ordered active problem list, medication list tests    Past Medical History:   Diagnosis Date    Anxiety     Arthritis     ASHD (arteriosclerotic heart disease)     CAD (coronary artery disease)     Chronic pain     Constipation     Depression     [facility]     Diarrhea     Dysgeusia 11/21/2014    Esophageal motor disorder 1/23/2012    Flatulence/gas pain/belching 11/21/2014    Gastroparesis     GERD (gastroesophageal reflux disease)     Hypercholesterolemia     Hypertension     Psychiatric disorder     anxiety    S/P bypass gastrojejunostomy 1/23/2012    Sleep apnea     no cpap      Past Surgical History:   Procedure Laterality Date    ABDOMEN SURGERY PROC UNLISTED      s/p partial gastrectomy 2/2 gastroparesis    CARDIAC SURG PROCEDURE UNLIST  6/2013    stent placement X 2    COLONOSCOPY N/A 5/26/2016    COL[facility] ENDOSCOPY    EGD  7/13/2009    GASTROJEJUNOSTOMY      HC BLD CARPEL TUNNEL RELEASE      bilat    HX APPENDECTOMY      HX CHOLECYSTECTOMY      HX CORONARY STENT PLACEMENT  06/2013    3 stents    HX GASTRIC BYPASS  6/02    HX HYSTERECTOMY      HX LAPAROTOMY  8/02    2/2 intra abd abscess    HX ORTHOPAEDIC  1/08    laminectomy    HX ORTHOPAEDIC      Bilateral feet bone spurs removed    HX ORTHOPAEDIC      bilateral carpal tunnel    HX ORTHOPAEDIC      back surgery    HX UROLOGICAL      bladder surgery-bladder tac    CO COLONOSCOPY FLX DX W/COLLJ SPEC WHEN PFRMD  5/03/2006    Dr. Mary Aikns COLONOSCOPY W/BIOPSY SINGLE/MULTIPLE  2/9/2012         CO EGD BALLOON DILATION ESOPHAGUS <30 MM DIAM  1/23/2012         CO EGD TRANSORAL BIOPSY SINGLE/MULTIPLE  7/11/2011         CO EGD TRANSORAL BIOPSY SINGLE/MULTIPLE  1/23/2012         REMOVAL OF HEEL SPUR  2001    bilat     Allergies   Allergen Reactions    No Known Allergies Other (comments)    Demerol [Meperidine] Unknown (comments)     Pt unsure of reaction      Family History   Problem Relation Age of Onset    Heart Disease Mother     Hypertension Mother     Diabetes Mother     Diabetes Father     Heart Disease Father     Hypertension Father     Alcohol abuse Brother     Heart Disease Sister     Hypertension Sister     Diabetes Sister     No Known Problems Sister     Alcohol abuse Brother     Alcohol abuse Brother     Diabetes Brother       Social History Social History    Marital status:      Spouse name: N/A    Number of children: N/A    Years of education: N/A     Occupational History    Not on file. Social History Main Topics    Smoking status: Never Smoker    Smokeless tobacco: Never Used    Alcohol use No    Drug use: No    Sexual activity: Not Currently     Other Topics Concern    Not on file     Social History Narrative         Review of Systems     General: Not Present- Anorexia, Chills, Dietary Changes, Fatigue, Fever, Medication Changes, Night Sweats, Weight Gain > 10lbs. and Weight Loss > 10lbs. .  Skin: Not Present- Bruising and Excessive Sweating. HEENT: Not Present- Headache, Visual Loss and Vertigo. Respiratory: Not Present- Cough, Decreased Exercise Tolerance, Difficulty Breathing, Snoring and Wheezing. Cardiovascular: Not Present- Abnormal Blood Pressure, Chest Pain, Difficulty Breathing On Exertion, Edema, Fainting / Blacking Out, Irregular Heart Beat, Night Cramps, Orthopnea, Palpitations, Paroxysmal Nocturnal Dyspnea, Rapid Heart Rate, Shortness of Breath and Swelling of Extremities. Gastrointestinal: Not Present- Black, Tarry Stool, Bloody Stool, Diarrhea, Hematemesis, Rectal Bleeding and Vomiting. Musculoskeletal: Not Present- Muscle Pain and Muscle Weakness. Neurological: Not Present- Dizziness. Psychiatric: Not Present- Depression. Endocrine: Not Present- Cold Intolerance, Heat Intolerance and Thyroid Problems. Hematology: Not Present- Abnormal Bleeding, Anemia, Blood Clots and Easy Bruising.       Physical Exam   The physical exam findings are as follows:       General   Mental Status - Alert. General Appearance - Not in acute distress. Chest and Lung Exam   Inspection: Accessory muscles - No use of accessory muscles in breathing.   Auscultation:   Breath sounds: - Normal.      Cardiovascular   Inspection: Jugular vein - Bilateral - Inspection Normal.  Palpation/Percussion:   Apical Impulse: - Normal.  Auscultation: Rhythm - Regular. Heart Sounds - S1 WNL and S2 WNL. No S3 or S4. Murmurs & Other Heart Sounds: Auscultation of the heart reveals - No Murmurs. Carotid arteries - No Carotid bruit. Peripheral Vascular   Upper Extremity: Inspection - Bilateral - No Cyanotic nailbeds or Digital clubbing. Lower Extremity:   Palpation: Dorsalis pedis pulse - Bilateral - NP. Posterior tibia pulse - Bilateral - NP. Edema - Bilateral - No edema. Assessment:       ICD-10-CM ICD-9-CM    1. PAD (peripheral artery disease) ([facility]) I73.9 443.9 IR ANGIO EXT LOWER BI      CBC W/O DIFF      METABOLIC PANEL, COMPREHENSIVE      PROTHROMBIN TIME + INR   2. Coronary artery disease involving native coronary artery of native heart without angina pectoris I25.10 414.01 AMB POC EKG ROUTINE W/ 12 LEADS, INTER & REP      IR ANGIO EXT LOWER BI      CBC W/O DIFF      METABOLIC PANEL, COMPREHENSIVE      PROTHROMBIN TIME + INR   3. Mixed hyperlipidemia E78.2 272.2 IR ANGIO EXT LOWER BI      CBC W/O DIFF      METABOLIC PANEL, COMPREHENSIVE      PROTHROMBIN TIME + INR   4. S/P PTCA (percutaneous transluminal coronary angioplasty) Z98.61 V45.82 IR ANGIO EXT LOWER BI      CBC W/O DIFF      METABOLIC PANEL, COMPREHENSIVE      PROTHROMBIN TIME + INR       Plan:     1. CAD s/p LAD, RCA, LCx BIA PCI 6/2013: no significant stenosis on last cath. Clinically stable. Continue current meds. 2. On statin. Last FLP noted. 3. PAD: now symptomatic. Recent SANDIE reviewed. Recommend LE angio. Will schedule.

## 2017-11-08 LAB
ALBUMIN SERPL-MCNC: 4.3 G/DL (ref 3.6–4.8)
ALBUMIN/GLOB SERPL: 1.8 {RATIO} (ref 1.2–2.2)
ALP SERPL-CCNC: 104 IU/L (ref 39–117)
ALT SERPL-CCNC: 15 IU/L (ref 0–32)
AST SERPL-CCNC: 18 IU/L (ref 0–40)
BILIRUB SERPL-MCNC: 0.3 MG/DL (ref 0–1.2)
BUN SERPL-MCNC: 9 MG/DL (ref 8–27)
BUN/CREAT SERPL: 11 (ref 12–28)
CALCIUM SERPL-MCNC: 9.3 MG/DL (ref 8.7–10.3)
CHLORIDE SERPL-SCNC: 101 MMOL/L (ref 96–106)
CO2 SERPL-SCNC: 19 MMOL/L (ref 18–29)
CREAT SERPL-MCNC: 0.84 MG/DL (ref 0.57–1)
GFR SERPLBLD CREATININE-BSD FMLA CKD-EPI: 73 ML/MIN/1.73
GFR SERPLBLD CREATININE-BSD FMLA CKD-EPI: 84 ML/MIN/1.73
GLOBULIN SER CALC-MCNC: 2.4 G/DL (ref 1.5–4.5)
GLUCOSE SERPL-MCNC: 124 MG/DL (ref 65–99)
HCT VFR BLD AUTO: NORMAL %
HGB BLD-MCNC: NORMAL G/DL
INR PPP: 1 (ref 0.8–1.2)
PLATELET # BLD AUTO: NORMAL 10*3/UL
POTASSIUM SERPL-SCNC: 4.1 MMOL/L (ref 3.5–5.2)
PROT SERPL-MCNC: 6.7 G/DL (ref 6–8.5)
PROTHROMBIN TIME: 10.5 SEC (ref 9.1–12)
RBC # BLD AUTO: NORMAL 10*6/UL
SODIUM SERPL-SCNC: 141 MMOL/L (ref 134–144)
WBC # BLD AUTO: NORMAL X10E3/UL

## 2017-11-13 DIAGNOSIS — F41.9 ANXIETY: ICD-10-CM

## 2017-11-13 RX ORDER — SITAGLIPTIN 100 MG/1
TABLET, FILM COATED ORAL
Qty: 90 TAB | Refills: 3 | Status: SHIPPED | OUTPATIENT
Start: 2017-11-13 | End: 2018-05-05 | Stop reason: SDUPTHER

## 2017-11-13 RX ORDER — IBUPROFEN 800 MG/1
TABLET ORAL
Qty: 90 TAB | Refills: 1 | Status: SHIPPED | OUTPATIENT
Start: 2017-11-13 | End: 2017-11-17

## 2017-11-13 RX ORDER — ALPRAZOLAM 1 MG/1
TABLET ORAL
Qty: 135 TAB | Refills: 1 | OUTPATIENT
Start: 2017-11-13 | End: 2018-02-21 | Stop reason: SDUPTHER

## 2017-11-17 ENCOUNTER — HOSPITAL ENCOUNTER (OUTPATIENT)
Dept: CARDIAC CATH/INVASIVE PROCEDURES | Age: 67
Discharge: HOME | End: 2017-11-17
Attending: INTERNAL MEDICINE | Admitting: INTERNAL MEDICINE
Payer: MEDICARE

## 2017-11-17 VITALS
SYSTOLIC BLOOD PRESSURE: 111 MMHG | HEART RATE: 85 BPM | DIASTOLIC BLOOD PRESSURE: 55 MMHG | HEIGHT: 64 IN | OXYGEN SATURATION: 100 % | RESPIRATION RATE: 16 BRPM | WEIGHT: 160 LBS | TEMPERATURE: 97.9 F | BODY MASS INDEX: 27.31 KG/M2

## 2017-11-17 DIAGNOSIS — E78.2 MIXED HYPERLIPIDEMIA: ICD-10-CM

## 2017-11-17 DIAGNOSIS — I25.10 CORONARY ARTERY DISEASE INVOLVING NATIVE CORONARY ARTERY OF NATIVE HEART WITHOUT ANGINA PECTORIS: ICD-10-CM

## 2017-11-17 DIAGNOSIS — Z98.61 S/P PTCA (PERCUTANEOUS TRANSLUMINAL CORONARY ANGIOPLASTY): ICD-10-CM

## 2017-11-17 DIAGNOSIS — I73.9 PAD (PERIPHERAL ARTERY DISEASE) (HCC): ICD-10-CM

## 2017-11-17 LAB
GLUCOSE BLD STRIP.AUTO-MCNC: 95 MG/DL (ref 65–100)
SERVICE CMNT-IMP: NORMAL

## 2017-11-17 PROCEDURE — 76942 ECHO GUIDE FOR BIOPSY: CPT

## 2017-11-17 PROCEDURE — 74011250636 HC RX REV CODE- 250/636: Performed by: INTERNAL MEDICINE

## 2017-11-17 PROCEDURE — 77030029065 HC DRSG HEMO QCLOT ZMED -B

## 2017-11-17 PROCEDURE — 77030028837 HC SYR ANGI PWR INJ COEU -A

## 2017-11-17 PROCEDURE — 74011250637 HC RX REV CODE- 250/637: Performed by: INTERNAL MEDICINE

## 2017-11-17 PROCEDURE — 75630 X-RAY AORTA LEG ARTERIES: CPT

## 2017-11-17 PROCEDURE — C1894 INTRO/SHEATH, NON-LASER: HCPCS

## 2017-11-17 PROCEDURE — C1769 GUIDE WIRE: HCPCS

## 2017-11-17 PROCEDURE — 74011636320 HC RX REV CODE- 636/320

## 2017-11-17 PROCEDURE — C1887 CATHETER, GUIDING: HCPCS

## 2017-11-17 PROCEDURE — 74011250636 HC RX REV CODE- 250/636

## 2017-11-17 PROCEDURE — 77030010852 HC CATH NB ADVNTG COOK -B

## 2017-11-17 PROCEDURE — 82962 GLUCOSE BLOOD TEST: CPT

## 2017-11-17 PROCEDURE — 74011000250 HC RX REV CODE- 250

## 2017-11-17 PROCEDURE — 36246 INS CATH ABD/L-EXT ART 2ND: CPT

## 2017-11-17 PROCEDURE — 77030021533 HC CATH ANGI DX PRFMA MRTM -A

## 2017-11-17 RX ORDER — HEPARIN SODIUM 200 [USP'U]/100ML
500 INJECTION, SOLUTION INTRAVENOUS ONCE
Status: COMPLETED | OUTPATIENT
Start: 2017-11-17 | End: 2017-11-17

## 2017-11-17 RX ORDER — LIDOCAINE HYDROCHLORIDE 10 MG/ML
INJECTION INFILTRATION; PERINEURAL
Status: COMPLETED
Start: 2017-11-17 | End: 2017-11-17

## 2017-11-17 RX ORDER — HEPARIN SODIUM 1000 [USP'U]/ML
INJECTION, SOLUTION INTRAVENOUS; SUBCUTANEOUS
Status: DISCONTINUED
Start: 2017-11-17 | End: 2017-11-17 | Stop reason: HOSPADM

## 2017-11-17 RX ORDER — IODIXANOL 320 MG/ML
INJECTION, SOLUTION INTRAVASCULAR
Status: COMPLETED
Start: 2017-11-17 | End: 2017-11-17

## 2017-11-17 RX ORDER — IODIXANOL 320 MG/ML
100 INJECTION, SOLUTION INTRAVASCULAR
Status: DISCONTINUED | OUTPATIENT
Start: 2017-11-17 | End: 2017-11-17

## 2017-11-17 RX ORDER — FENTANYL CITRATE 50 UG/ML
25-50 INJECTION, SOLUTION INTRAMUSCULAR; INTRAVENOUS
Status: DISCONTINUED | OUTPATIENT
Start: 2017-11-17 | End: 2017-11-17

## 2017-11-17 RX ORDER — HEPARIN SODIUM 200 [USP'U]/100ML
INJECTION, SOLUTION INTRAVENOUS
Status: COMPLETED
Start: 2017-11-17 | End: 2017-11-17

## 2017-11-17 RX ORDER — LIDOCAINE HYDROCHLORIDE 10 MG/ML
1-20 INJECTION INFILTRATION; PERINEURAL
Status: DISCONTINUED | OUTPATIENT
Start: 2017-11-17 | End: 2017-11-17

## 2017-11-17 RX ORDER — MIDAZOLAM HYDROCHLORIDE 1 MG/ML
.5-2 INJECTION, SOLUTION INTRAMUSCULAR; INTRAVENOUS
Status: DISCONTINUED | OUTPATIENT
Start: 2017-11-17 | End: 2017-11-17

## 2017-11-17 RX ORDER — ISOSORBIDE MONONITRATE 30 MG/1
30 TABLET, EXTENDED RELEASE ORAL DAILY
Qty: 30 TAB | Refills: 11 | Status: SHIPPED | OUTPATIENT
Start: 2017-11-17 | End: 2017-12-07

## 2017-11-17 RX ORDER — MIDAZOLAM HYDROCHLORIDE 1 MG/ML
INJECTION, SOLUTION INTRAMUSCULAR; INTRAVENOUS
Status: COMPLETED
Start: 2017-11-17 | End: 2017-11-17

## 2017-11-17 RX ORDER — FENTANYL CITRATE 50 UG/ML
INJECTION, SOLUTION INTRAMUSCULAR; INTRAVENOUS
Status: COMPLETED
Start: 2017-11-17 | End: 2017-11-17

## 2017-11-17 RX ORDER — SODIUM CHLORIDE 9 MG/ML
125 INJECTION, SOLUTION INTRAVENOUS CONTINUOUS
Status: DISCONTINUED | OUTPATIENT
Start: 2017-11-17 | End: 2017-11-17 | Stop reason: HOSPADM

## 2017-11-17 RX ORDER — HEPARIN SODIUM 1000 [USP'U]/ML
1000-10000 INJECTION, SOLUTION INTRAVENOUS; SUBCUTANEOUS
Status: DISCONTINUED | OUTPATIENT
Start: 2017-11-17 | End: 2017-11-17

## 2017-11-17 RX ORDER — METFORMIN HYDROCHLORIDE 500 MG/1
TABLET, EXTENDED RELEASE ORAL
Qty: 180 TAB | Refills: 3 | Status: SHIPPED | OUTPATIENT
Start: 2017-11-17 | End: 2018-12-14 | Stop reason: ALTCHOICE

## 2017-11-17 RX ORDER — SODIUM CHLORIDE 9 MG/ML
100 INJECTION, SOLUTION INTRAVENOUS CONTINUOUS
Status: DISCONTINUED | OUTPATIENT
Start: 2017-11-17 | End: 2017-11-17 | Stop reason: HOSPADM

## 2017-11-17 RX ORDER — GUAIFENESIN 100 MG/5ML
LIQUID (ML) ORAL
Status: DISCONTINUED
Start: 2017-11-17 | End: 2017-11-17 | Stop reason: HOSPADM

## 2017-11-17 RX ORDER — GUAIFENESIN 100 MG/5ML
81 LIQUID (ML) ORAL DAILY
Status: DISCONTINUED | OUTPATIENT
Start: 2017-11-17 | End: 2017-11-17 | Stop reason: HOSPADM

## 2017-11-17 RX ADMIN — SODIUM CHLORIDE 125 ML/HR: 900 INJECTION, SOLUTION INTRAVENOUS at 09:00

## 2017-11-17 RX ADMIN — HEPARIN SODIUM: 200 INJECTION, SOLUTION INTRAVENOUS at 12:00

## 2017-11-17 RX ADMIN — MIDAZOLAM HYDROCHLORIDE 1 MG: 1 INJECTION, SOLUTION INTRAMUSCULAR; INTRAVENOUS at 12:48

## 2017-11-17 RX ADMIN — FENTANYL CITRATE 25 MCG: 50 INJECTION, SOLUTION INTRAMUSCULAR; INTRAVENOUS at 12:31

## 2017-11-17 RX ADMIN — IODIXANOL 24 ML: 320 INJECTION, SOLUTION INTRAVASCULAR at 13:03

## 2017-11-17 RX ADMIN — MIDAZOLAM 1 MG: 1 INJECTION INTRAMUSCULAR; INTRAVENOUS at 12:59

## 2017-11-17 RX ADMIN — MIDAZOLAM HYDROCHLORIDE 1 MG: 1 INJECTION, SOLUTION INTRAMUSCULAR; INTRAVENOUS at 13:02

## 2017-11-17 RX ADMIN — ASPIRIN 81 MG 81 MG: 81 TABLET ORAL at 09:00

## 2017-11-17 RX ADMIN — HEPARIN SODIUM 1000 UNITS: 200 INJECTION, SOLUTION INTRAVENOUS at 12:00

## 2017-11-17 RX ADMIN — LIDOCAINE HYDROCHLORIDE 5 ML: 10 INJECTION, SOLUTION INFILTRATION; PERINEURAL at 12:00

## 2017-11-17 RX ADMIN — MIDAZOLAM HYDROCHLORIDE 1 MG: 1 INJECTION, SOLUTION INTRAMUSCULAR; INTRAVENOUS at 12:52

## 2017-11-17 RX ADMIN — MIDAZOLAM 2 MG: 1 INJECTION INTRAMUSCULAR; INTRAVENOUS at 12:40

## 2017-11-17 RX ADMIN — FENTANYL CITRATE 25 MCG: 50 INJECTION, SOLUTION INTRAMUSCULAR; INTRAVENOUS at 13:02

## 2017-11-17 RX ADMIN — FENTANYL CITRATE 50 MCG: 50 INJECTION, SOLUTION INTRAMUSCULAR; INTRAVENOUS at 12:42

## 2017-11-17 RX ADMIN — MIDAZOLAM HYDROCHLORIDE 1 MG: 1 INJECTION, SOLUTION INTRAMUSCULAR; INTRAVENOUS at 12:45

## 2017-11-17 RX ADMIN — IODIXANOL 90 ML: 320 INJECTION, SOLUTION INTRAVASCULAR at 13:04

## 2017-11-17 RX ADMIN — MIDAZOLAM HYDROCHLORIDE 2 MG: 1 INJECTION, SOLUTION INTRAMUSCULAR; INTRAVENOUS at 12:40

## 2017-11-17 RX ADMIN — MIDAZOLAM HYDROCHLORIDE 1 MG: 1 INJECTION, SOLUTION INTRAMUSCULAR; INTRAVENOUS at 12:59

## 2017-11-17 RX ADMIN — FENTANYL CITRATE 25 MCG: 50 INJECTION, SOLUTION INTRAMUSCULAR; INTRAVENOUS at 12:49

## 2017-11-17 RX ADMIN — LIDOCAINE HYDROCHLORIDE 5 ML: 10 INJECTION INFILTRATION; PERINEURAL at 12:00

## 2017-11-17 NOTE — DISCHARGE INSTRUCTIONS
[address] [place] [address] [address] [facility]  [phone]      CARDIOLOGY DISCHARGE INSTRUCTIONS      Patient ID:  Clara Eaton  [id number]  74 y.o.  1950    Admit Date: 11/17/2017    Discharge Date: 11/17/2017     Admitting Physician: Elis Garcia MD     Discharge Physician: Elis Garcia MD    Admission Diagnoses:   Coronary artery disease involving native coronary artery of native heart without angina pectoris [I25.10]  PAD (peripheral artery disease) ([address] [I73.9]  Mixed hyperlipidemia [E78.2]  S/P PTCA (percutaneous transluminal coronary angioplasty) [Z98.61]    Discharge Diagnoses: Active Problems:    * No active hospital problems. *      Discharge Condition: Good    Cardiology Procedures this Admission:  abdominal aortogram, LE angio    Disposition: home    Reference discharge instructions provided by nursing for diet and activity. Signed: Elis Garcia MD  11/17/2017  1:21 PM  PERIPHERAL CATHETERIZATION/PCI DISCHARGE INSTRUCTIONS    It is normal to feel tired the first couple days. Take it easy and follow the physicians instructions. CHECK THE CATHETER INSERTION SITE DAILY:    You may shower 24 hours after the procedure, remove the bandage during showering. Wash with soap and water and pat dry. Gentle cleaning of the site with soap and water is sufficient, cover with a dry clean dressing or bandage. Do not apply creams or powders to the area. Do not sit in a bathtub or pool of water for 7 days or until wound has completely healed. Temporary bruising and discomfort is normal and may last a few weeks. You may have a  formation of a small lump at the site which may last up to 6 weeks. CALL THE PHYSICIANS:    If the site becomes red, swollen or feels warm to the touch  If there is bleeding or drainage or if there is unusual pain at the groin or down the leg.   If there is any bleeding, lie down, apply pressure or have someone apply pressure with a clean cloth until the bleeding stops. If the bleeding continues, call 911 to be transported to the hospital.  DO NOT DRIVE YOURSELF, [address] ACTIVITY:    For the first 24-48 hours or as instructed by the physician:  No lifting, pushing or pulling over 10 pounds and no straining the insertion site. Do not life grocery bags or the garbage can, do not run the vacuum  or  for 7 days. Start with short walks as in the hospital and gradually increase as tolerated each day. It is recommended to walk 30 minutes 5-7 days per week. Follow your physicians instructions on activity. Avoid walking outside in extremes of heat or cold. Walk inside when it is cold and windy or hot and humid. Things to keep in mind:  No driving for at least 24 hours, or as designated by your physician. Limit the number of times you go up and down the stairs  Take rests and pace yourself with activity. Be careful and do not strain with bowel movements. MEDICATIONS:    Take all medications as prescribed  Call your physician if you have any questions  Keep an updated list of your medications with you at all times and give a list to your physician and pharmacist      AFTER CARE:    Follow up with your physician as instructed. Follow a heart healthy diet with proper portion control, daily stress management, daily exercise, blood pressure and cholesterol control , and smoking cessation.

## 2017-11-17 NOTE — IP AVS SNAPSHOT
[address] [facility] 
[phone] Patient: Sugar Alas MRN: [id number] QYI:[id number] My Medications STOP taking these medications   
 ibuprofen 800 mg tablet Commonly known as:  MOTRIN  
   
  
  
TAKE these medications as instructed Instructions Each Dose to Equal  
 Morning Noon Evening Bedtime  
 alcohol swabs Padm Commonly known as:  BD Single Use Swabs Regular Your last dose was: Your next dose is:    
   
   
 Use to cleanse skin prior to obtaining blood sample for diabetic testing three times daily ALPRAZolam 1 mg tablet Commonly known as:  Gareth Griggs Your last dose was: Your next dose is: TAKE 1/2 TABLET EVERY DAY AS NEEDED FOR ANXIETY AND TAKE 1 TABLET AT BEDTIME AS NEEDED FOR SLEEP  
     
   
   
   
  
 amitriptyline 10 mg tablet Commonly known as:  ELAVIL Your last dose was: Your next dose is: TAKE 1 TABLET NIGHTLY  
     
   
   
   
  
 aspirin, buffered 81 mg Tab Your last dose was: Your next dose is: Take 81 mg by mouth daily. 81 mg Blood-Glucose Meter Misc Commonly known as:  ACCU-CHEK KRISTOFER PLUS METER Your last dose was: Your next dose is:    
   
   
 Use to monitor blood sugar 3 times daily  
     
   
   
   
  
 butalbital-acetaminophen-caff -40 mg per capsule Commonly known as:  CoolClouds Your last dose was: Your next dose is:    
   
   
 take 1 capsule by mouth twice a day if needed for headache pain (MAX OF 2 CAPSULES A DAY) Calcium-Cholecalciferol (D3) 600 mg(1,500mg) -400 unit Cap Your last dose was: Your next dose is: Take 1 Tab by mouth daily. 1 Tab  
    
   
   
   
  
 fluticasone 50 mcg/actuation nasal spray Commonly known as:  Joshua Padgett Your last dose was: Your next dose is:    
   
   
 INSTILL 2 SPRAYS INTO BOTH NOSTRILS DAILY AS NEEDED FOR RHINITIS. furosemide 20 mg tablet Commonly known as:  LASIX Your last dose was: Your next dose is: TAKE 1 TABLET EVERY DAY AS NEEDED  
     
   
   
   
  
 glipiZIDE SR 5 mg CR tablet Commonly known as:  GLUCOTROL XL Your last dose was: Your next dose is: TAKE 1 TABLET EVERY DAY  
     
   
   
   
  
 glucose blood VI test strips strip Commonly known as:  ACCU-CHEK KRISTOFER PLUS TEST STRP Your last dose was: Your next dose is:    
   
   
 Use to check blood glucose level three times daily  
     
   
   
   
  
 insulin glargine 100 unit/mL (3 mL) Inpn Commonly known as:  Archie Ingram Your last dose was: Your next dose is:    
   
   
 14 Units by SubCUTAneous route every evening. 14 Units Insulin Needles (Disposable) 31 gauge x 5/16\" Ndle Your last dose was: Your next dose is:    
   
   
 Use with lantus pen  
     
   
   
   
  
 isosorbide mononitrate ER 30 mg tablet Commonly known as:  IMDUR Your last dose was: Your next dose is: Take 1 Tab by mouth daily. 30 mg  
    
   
   
   
  
 JANUVIA 100 mg tablet Generic drug:  SITagliptin Your last dose was: Your next dose is: TAKE 1 TABLET EVERY DAY FOR DIABETES Lancets Misc Commonly known as:  ACCU-CHEK SOFTCLIX LANCETS Your last dose was: Your next dose is:    
   
   
 Use to obtain blood sample for diabetic testing three times a day  
     
   
   
   
  
 levothyroxine 25 mcg tablet Commonly known as:  synthroid Your last dose was: Your next dose is: Take 2 pills with no other pills first in the morning and wait 30 minutes prior to taking any other medications. lisinopril 2.5 mg tablet Commonly known as:  Ramiro Morgan Your last dose was: Your next dose is: TAKE 1 TABLET EVERY DAY  
     
   
   
   
  
 metFORMIN  mg tablet Commonly known as:  GLUCOPHAGE XR Your last dose was: Your next dose is: TAKE 1 TABLET TWICE DAILY WITH MEALS. RESUME FROM Sunday 11/19/2017. metoprolol succinate 25 mg XL tablet Commonly known as:  TOPROL-XL Your last dose was: Your next dose is: TAKE 1 TABLET EVERY DAY  
     
   
   
   
  
 multivitamin tablet Commonly known as:  ONE A DAY Your last dose was: Your next dose is: Take 1 Tab by mouth daily. 1 Tab  
    
   
   
   
  
 pravastatin 40 mg tablet Commonly known as:  PRAVACHOL Your last dose was: Your next dose is: TAKE 1 TABLET EVERY NIGHT  
     
   
   
   
  
 ranolazine  mg SR tablet Commonly known as:  RANEXA Your last dose was: Your next dose is: Take 1 Tab by mouth two (2) times a day. 500 mg  
    
   
   
   
  
 VITAMIN D3 2,000 unit Tab Generic drug:  cholecalciferol (vitamin D3) Your last dose was: Your next dose is: Take 1 Tab by mouth daily. 1 Tab Where to Get Your Medications These medications were sent to [address] [place] [address], [facility]  [address] [place] [address]. [address] Phone:  [phone]  
  isosorbide mononitrate ER 30 mg tablet  
 metFORMIN  mg tablet

## 2017-11-17 NOTE — IP AVS SNAPSHOT
[address] [facility] 
[phone] Patient: Samuel Toure MRN: [id number] HBE:[id number] About your hospitalization You were admitted on:  November 17, 2017 You last received care in the:  MRM 2 INTRVNTNL CARDIO You were discharged on:  November 17, 2017 Why you were hospitalized Your primary diagnosis was:  Not on File Things You Need To Do (next 8 weeks) Follow up with Belén Turner MD  
  
Phone:  [phone] Where:  [address], [facility], [facility] 7 [address] Friday Dec 15, 2017 ROUTINE CARE with Belén Turner MD at 10:45 AM  
Where:  [facility] Monday Jan 08, 2018 [facility] MAMMO SCREENING with [facility] 1 at  1:00 PM  
Shower or bathe using soap and water. Do not use deodorant, powder, perfumes, or lotion the day of your exam.  If your prior mammograms were not performed at [facility] 6 please bring films with you or forward prior images 2 days before your procedure. Check in at registration 15min before your appointment time unless you were instructed to do otherwise. A script is not necessary, but if you have one, please bring it on the day of the mammogram or have it faxed to the department. [facility] [phone] [facility] PSYCHIATRIC [place]  [phone] [address]  [phone] [facility] [phone] [address] [phone] Where: [facility] ([place] [address]) Discharge Orders None A check donovan indicates which time of day the medication should be taken. My Medications STOP taking these medications   
 ibuprofen 800 mg tablet Commonly known as:  MOTRIN  
   
  
  
TAKE these medications as instructed Instructions Each Dose to Equal  
 Morning Noon Evening Bedtime  
 alcohol swabs Padm Commonly known as:  BD Single Use Swabs Regular Your last dose was: Your next dose is:    
   
   
 Use to cleanse skin prior to obtaining blood sample for diabetic testing three times daily ALPRAZolam 1 mg tablet Commonly known as:  Trena Bautista Your last dose was: Your next dose is: TAKE 1/2 TABLET EVERY DAY AS NEEDED FOR ANXIETY AND TAKE 1 TABLET AT BEDTIME AS NEEDED FOR SLEEP  
     
   
   
   
  
 amitriptyline 10 mg tablet Commonly known as:  ELAVIL Your last dose was: Your next dose is: TAKE 1 TABLET NIGHTLY  
     
   
   
   
  
 aspirin, buffered 81 mg Tab Your last dose was: Your next dose is: Take 81 mg by mouth daily. 81 mg Blood-Glucose Meter Misc Commonly known as:  ACCU-CHEK KRISTOFER PLUS METER Your last dose was: Your next dose is:    
   
   
 Use to monitor blood sugar 3 times daily  
     
   
   
   
  
 butalbital-acetaminophen-caff -40 mg per capsule Commonly known as:  [employer] Your last dose was: Your next dose is:    
   
   
 take 1 capsule by mouth twice a day if needed for headache pain (MAX OF 2 CAPSULES A DAY) Calcium-Cholecalciferol (D3) 600 mg(1,500mg) -400 unit Cap Your last dose was: Your next dose is: Take 1 Tab by mouth daily. 1 Tab  
    
   
   
   
  
 fluticasone 50 mcg/actuation nasal spray Commonly known as:  Euna Jarred Your last dose was: Your next dose is:    
   
   
 INSTILL 2 SPRAYS INTO BOTH NOSTRILS DAILY AS NEEDED FOR RHINITIS. furosemide 20 mg tablet Commonly known as:  LASIX Your last dose was: Your next dose is: TAKE 1 TABLET EVERY DAY AS NEEDED  
     
   
   
   
  
 glipiZIDE SR 5 mg CR tablet Commonly known as:  GLUCOTROL XL Your last dose was: Your next dose is: TAKE 1 TABLET EVERY DAY  
     
   
   
   
  
 glucose blood VI test strips strip Commonly known as:  ACCU-CHEK KRISTOFER PLUS TEST STRP Your last dose was: Your next dose is:    
   
   
 Use to check blood glucose level three times daily  
     
   
   
   
  
 insulin glargine 100 unit/mL (3 mL) Inpn Commonly known as:  Chilo Fordyce Your last dose was: Your next dose is:    
   
   
 14 Units by SubCUTAneous route every evening. 14 Units Insulin Needles (Disposable) 31 gauge x 5/16\" Ndle Your last dose was: Your next dose is:    
   
   
 Use with lantus pen  
     
   
   
   
  
 isosorbide mononitrate ER 30 mg tablet Commonly known as:  IMDUR Your last dose was: Your next dose is: Take 1 Tab by mouth daily. 30 mg  
    
   
   
   
  
 JANUVIA 100 mg tablet Generic drug:  SITagliptin Your last dose was: Your next dose is: TAKE 1 TABLET EVERY DAY FOR DIABETES Lancets Misc Commonly known as:  ACCU-CHEK SOFTCLIX LANCETS Your last dose was: Your next dose is:    
   
   
 Use to obtain blood sample for diabetic testing three times a day  
     
   
   
   
  
 levothyroxine 25 mcg tablet Commonly known as:  synthroid Your last dose was: Your next dose is: Take 2 pills with no other pills first in the morning and wait 30 minutes prior to taking any other medications. lisinopril 2.5 mg tablet Commonly known as:  Auna Everardo Your last dose was: Your next dose is: TAKE 1 TABLET EVERY DAY  
     
   
   
   
  
 metFORMIN  mg tablet Commonly known as:  GLUCOPHAGE XR Your last dose was: Your next dose is: TAKE 1 TABLET TWICE DAILY WITH MEALS. RESUME FROM Sunday 11/19/2017. metoprolol succinate 25 mg XL tablet Commonly known as:  TOPROL-XL Your last dose was: Your next dose is: TAKE 1 TABLET EVERY DAY  
     
   
   
   
  
 multivitamin tablet Commonly known as:  ONE A DAY Your last dose was: Your next dose is: Take 1 Tab by mouth daily. 1 Tab  
    
   
   
   
  
 pravastatin 40 mg tablet Commonly known as:  PRAVACHOL Your last dose was: Your next dose is: TAKE 1 TABLET EVERY NIGHT  
     
   
   
   
  
 ranolazine  mg SR tablet Commonly known as:  RANEXA Your last dose was: Your next dose is: Take 1 Tab by mouth two (2) times a day. 500 mg  
    
   
   
   
  
 VITAMIN D3 2,000 unit Tab Generic drug:  cholecalciferol (vitamin D3) Your last dose was: Your next dose is: Take 1 Tab by mouth daily. 1 Tab Where to Get Your Medications These medications were sent to [facility], [address] [place] [address]. [facility] 21 Phone:  [phone]  
  isosorbide mononitrate ER 30 mg tablet  
 metFORMIN  mg tablet Discharge Instructions [address] [place] [address]  [phone] CARDIOLOGY DISCHARGE INSTRUCTIONS Patient ID: Belgica Coronel [id number] 
74 y.o. 
1950 Admit Date: 11/17/2017 Discharge Date: 11/17/2017 Admitting Physician: Milvia Cano MD  
 
Discharge Physician: Milvia Cano MD 
 
Admission Diagnoses:  
Coronary artery disease involving native coronary artery of native heart without angina pectoris [I25.10] PAD (peripheral artery disease) ([facility] [address] [I73.9] Mixed hyperlipidemia [E78.2] S/P PTCA (percutaneous transluminal coronary angioplasty) [Z98.61] Discharge Diagnoses: Active Problems: * No active hospital problems.  * 
 
 
 Discharge Condition: Good Cardiology Procedures this Admission:  abdominal aortogram, LE angio Disposition: home Reference discharge instructions provided by nursing for diet and activity. Signed: Con Siegel MD 
11/17/2017 
1:21 PM 
PERIPHERAL CATHETERIZATION/PCI DISCHARGE INSTRUCTIONS It is normal to feel tired the first couple days. Take it easy and follow the physicians instructions. CHECK THE CATHETER INSERTION SITE DAILY: 
 
You may shower 24 hours after the procedure, remove the bandage during showering. Wash with soap and water and pat dry. Gentle cleaning of the site with soap and water is sufficient, cover with a dry clean dressing or bandage. Do not apply creams or powders to the area. Do not sit in a bathtub or pool of water for 7 days or until wound has completely healed. Temporary bruising and discomfort is normal and may last a few weeks. You may have a  formation of a small lump at the site which may last up to 6 weeks. CALL THE PHYSICIANS: 
 
If the site becomes red, swollen or feels warm to the touch If there is bleeding or drainage or if there is unusual pain at the groin or down the leg. If there is any bleeding, lie down, apply pressure or have someone apply pressure with a clean cloth until the bleeding stops. If the bleeding continues, call 911 to be transported to the hospital. 
DO  [place] [address] ACTIVITY: 
 
For the first 24-48 hours or as instructed by the physician: No lifting, pushing or pulling over 10 pounds and no straining the insertion site. Do not life grocery bags or the garbage can, do not run the vacuum  or  for 7 days. Start with short walks as in the hospital and gradually increase as tolerated each day. It is recommended to walk 30 minutes 5-7 days per week. Follow your physicians instructions on activity.   Avoid walking outside in extremes of heat or cold. Walk inside when it is cold and windy or hot and humid. Things to keep in mind: 
No driving for at least 24 hours, or as designated by your physician. Limit the number of times you go up and down the stairs Take rests and pace yourself with activity. Be careful and do not strain with bowel movements. MEDICATIONS: 
 
Take all medications as prescribed Call your physician if you have any questions Keep an updated list of your medications with you at all times and give a list to your physician and pharmacist 
 
 
AFTER CARE: 
 
Follow up with your physician as instructed. Follow a heart healthy diet with proper portion control, daily stress management, daily exercise, blood pressure and cholesterol control , and smoking cessation. Introducing [facility] SERVICES! Rambo Lieberman introduces [employer] patient portal. Now you can access parts of your medical record, email your doctor's office, and request medication refills online. 1. In your internet browser, go to [email or web address] 2. Click on the First Time User? Click Here link in the Sign In box. You will see the New Member Sign Up page. 3. Enter your [employer] Access Code exactly as it appears below. You will not need to use this code after youve completed the sign-up process. If you do not sign up before the expiration date, you must request a new code. · [employer] Access Code: [email or web address] Expires: 12/11/2017  8:08 AM 
 
4. Enter the last four digits of your Social Security Number (xxxx) and Date of Birth (mm/dd/yyyy) as indicated and click Submit. You will be taken to the next sign-up page. 5. Create a [employer] ID. This will be your [employer] login ID and cannot be changed, so think of one that is secure and easy to remember. 6. Create a [employer] password. You can change your password at any time. 7. Enter your Password Reset Question and Answer. This can be used at a later time if you forget your password. 8. Enter your e-mail address. You will receive e-mail notification when new information is available in [address] 9. Click Sign Up. You can now view and download portions of your medical record. 10. Click the Download Summary menu link to download a portable copy of your medical information. If you have questions, please visit the Frequently Asked Questions section of the [employer] website. Remember, [employer] is NOT to be used for urgent needs. For medical emergencies, dial 911. Now available from your iPhone and Android! Providers Seen During Your Hospitalization Provider Specialty Primary office phone Lary Allen MD Cardiology [phone] Your Primary Care Physician (PCP) Primary Care Physician Office Phone Office Fax Champ Sidhu [phone] You are allergic to the following Allergen Reactions Demerol (Meperidine) Unknown (comments) Pt unsure of reaction Recent Documentation Height Weight BMI OB Status Smoking Status 1.626 m 72.6 kg 27.46 kg/m2 Hysterectomy Never Smoker Emergency Contacts Name Discharge Info Relation Home Work Mobile AmbroseViviMiguel DISCHARGE CAREGIVER [3] Spouse [3] [phone] Patient Belongings The following personal items are in your possession at time of discharge: 
  Dental Appliances: Uppers, At home         Home Medications: None   Jewelry: None  Clothing: At bedside    Other Valuables: None Please provide this summary of care documentation to your next provider. Signatures-by signing, you are acknowledging that this After Visit Summary has been reviewed with you and you have received a copy. Patient Signature:  ____________________________________________________________ Date:  ____________________________________________________________  
  
Joelene Batman Provider Signature:  ____________________________________________________________ Date:  ____________________________________________________________

## 2017-11-17 NOTE — PROGRESS NOTES
Pt ambulated in [place] without any difficulty. Dressing to right groin remains D&I. Pt denies any pain. Informed  that new med Imdur prescribed at discharge was sent to pt's mail order pharmacy; confirmed that pt was able to go a few days without med until med delivery.

## 2017-11-18 NOTE — PROCEDURES
[place], [facility]   PERIPHERAL ANGIOGRAPHY       Name:  Navi Michael   MR#:  [id number]   :  1950   Account #:  [de-identified]        Date of Adm:  2017       PROCEDURE   1. Abdominal aortogram with bilateral lower extremity angiography. 2. Third order catheter placement, right common femoral artery access   into the left common femoral artery. 3. Right common femoral arterial access using ultrasound guidance. 4. Moderate sedation for 30 minutes. INDICATION: Lower extremity claudication and abnormal SANDIE. FINDINGS     1. Abdominal aorta: No significant stenosis or aortic aneurysm   noted. Bilateral renal arteries are angiographically patent. 2. Right common iliac artery: No significant disease. 3. Right internal iliac artery: No significant disease. 4. Right external iliac artery: No significant disease. 5. Right common femoral artery: No significant disease. 6. Right profunda femoris artery: No significant disease. 7. Right superficial femoral artery: No significant disease. 8. Right popliteal artery: No significant disease. 9. Right infrapopliteal vessels: All 3 infrapopliteal vessels are   angiographically patent; however, angiographically appears to have a   component of vasospasm. 10. Left common iliac artery: No significant disease. 11. Left internal iliac artery: No significant disease. 12. Left external iliac artery: No significant disease. 13. Left common femoral artery: No significant disease. 14. Left profunda femoris artery: No significant disease. 15. Left superficial femoral artery: No significant disease. 16. Left popliteal artery: No significant disease. 17. Left infrapopliteal vessels: All 3 infrapopliteal vessels are   angiographically patent with 3-vessel distal runoff. Component of   vasospasm noted.  After administration of IA nitroglycerin significant Improvement of vasospasm. COMPLICATIONS: None. ESTIMATED BLOOD LOSS: Minimal.     SPECIMENS REMOVED: None. ANESTHESIA: IV conscious sedation with local anesthesia. BRIEF PROCEDURE NOTE: The right groin was prepped and draped. Right common femoral arterial access was obtained using the modified   Seldinger technique and ultrasound guidance. A 5-Danish femoral   artery sheath was placed. A 5-Danish pigtail catheter was advanced   into the abdominal aorta. Abdominal aortogram was performed. Dedicated angiogram of right lower extremity was performed   through the right common femoral arterial sheath. Rim catheter & stiff angled guide   wire was used to cross into contralateral superficial femoral artery. Dedicated   angiogram was performed. Afterwards, a DaPlutonium PaintyanSnipd catheter was   advanced over a long Versacore wire into popliteal artery. IV   nitroglycerin was given and angiogram was performed.        Danae Medina MD      [address] [place] [address] / Nereida Hollins   D:  11/17/2017   13:17   T:  11/18/2017   14:36   Job #:  [id number]

## 2017-12-05 ENCOUNTER — OFFICE VISIT (OUTPATIENT)
Dept: FAMILY MEDICINE CLINIC | Age: 67
End: 2017-12-05

## 2017-12-05 VITALS
HEIGHT: 64 IN | RESPIRATION RATE: 18 BRPM | BODY MASS INDEX: 28.99 KG/M2 | HEART RATE: 98 BPM | OXYGEN SATURATION: 100 % | WEIGHT: 169.8 LBS | SYSTOLIC BLOOD PRESSURE: 128 MMHG | TEMPERATURE: 97.1 F | DIASTOLIC BLOOD PRESSURE: 71 MMHG

## 2017-12-05 DIAGNOSIS — E11.9 TYPE 2 DIABETES MELLITUS WITHOUT COMPLICATION, UNSPECIFIED LONG TERM INSULIN USE STATUS: Primary | ICD-10-CM

## 2017-12-05 DIAGNOSIS — E78.2 MIXED HYPERLIPIDEMIA: ICD-10-CM

## 2017-12-05 DIAGNOSIS — R82.90 NONSPECIFIC FINDING ON EXAMINATION OF URINE: ICD-10-CM

## 2017-12-05 DIAGNOSIS — Z51.81 ENCOUNTER FOR MEDICATION MONITORING: ICD-10-CM

## 2017-12-05 DIAGNOSIS — I25.10 CORONARY ARTERY DISEASE INVOLVING NATIVE CORONARY ARTERY OF NATIVE HEART WITHOUT ANGINA PECTORIS: ICD-10-CM

## 2017-12-05 DIAGNOSIS — M51.36 DDD (DEGENERATIVE DISC DISEASE), LUMBAR: ICD-10-CM

## 2017-12-05 DIAGNOSIS — E03.9 ACQUIRED HYPOTHYROIDISM: ICD-10-CM

## 2017-12-05 PROBLEM — R51.9 HEADACHE: Status: RESOLVED | Noted: 2017-01-05 | Resolved: 2017-12-05

## 2017-12-05 LAB
BILIRUB UR QL STRIP: NEGATIVE
GLUCOSE POC: 87 MG/DL
GLUCOSE UR-MCNC: NEGATIVE MG/DL
HBA1C MFR BLD HPLC: 6 %
KETONES P FAST UR STRIP-MCNC: NEGATIVE MG/DL
PH UR STRIP: 5 [PH] (ref 4.6–8)
PROT UR QL STRIP: NEGATIVE
SP GR UR STRIP: 1 (ref 1–1.03)
UA UROBILINOGEN AMB POC: NORMAL (ref 0.2–1)
URINALYSIS CLARITY POC: CLEAR
URINALYSIS COLOR POC: YELLOW
URINE BLOOD POC: NEGATIVE
URINE LEUKOCYTES POC: NORMAL
URINE NITRITES POC: NEGATIVE

## 2017-12-05 RX ORDER — LEVOTHYROXINE SODIUM 25 UG/1
50 TABLET ORAL DAILY
COMMUNITY
Start: 2017-12-05 | End: 2017-12-07 | Stop reason: SDUPTHER

## 2017-12-05 RX ORDER — INSULIN GLARGINE 100 [IU]/ML
10 INJECTION, SOLUTION SUBCUTANEOUS
COMMUNITY
End: 2017-12-07 | Stop reason: SDUPTHER

## 2017-12-05 RX ORDER — LEVOTHYROXINE SODIUM 25 UG/1
TABLET ORAL DAILY
COMMUNITY
End: 2017-12-05 | Stop reason: SDUPTHER

## 2017-12-05 NOTE — PROGRESS NOTES
HISTORY OF PRESENT ILLNESS  Morales Short is a 74 y.o. female. HPI   For routine follow up on chronic medical conditions. Overall feeling well. Cardiovascular Review:  The patient has coronary artery disease, HF and status post coronary artery stenting. Diet and Lifestyle: generally follows a low fat low cholesterol diet, generally follows a low sodium diet, follows a diabetic diet regularly, exercises sporadically  Home BP Monitoring: is not measured at home. Pertinent ROS: taking medications as instructed, no medication side effects noted, no TIA's, no chest pain on exertion, no dyspnea on exertion, no swelling of ankles. DM type II follow up:  Compliant w/ meds, diabetic diet, and exercise. Tolerating the lantus and feels much better with the lower sugars. Obtains home glucose monitoring averaging 100-140. Checks BS BID on most days and prn. Pt does not have BS log at visit today. No Rf needed for today. Denies any tingling sensation, polyuria and polydipsia. No blurred vision. No significant weight changes. Feeling well since last OV. Hypercholesterolemia follow up:  Compliant w/ meds, low fat, low cholesterol diet. Exercising some. No muscle nor abdominal pain, no skin discoloration. Patient fasting today. Osteoarthritis:  Patient has osteoarthritis. She has been having more aching in the lower back. She is not getting in very much exercise. No radiation of pain from the back. Symptoms onset: problem is longstanding. Rheumatological ROS: stable, mild-to-moderate joint symptoms intermittently, reasonably well controlled by PRN meds. Response to treatment plan: stable and intermittent.        Patient Active Problem List   Diagnosis Code    Anxiety F41.9    Depression F32.9    Hypovitaminosis D E55.9    GERD (gastroesophageal reflux disease) K21.9    Edema R60.9    Esophageal motor disorder K22.4    S/P bypass gastrojejunostomy Z98.0    SOB (shortness of breath) R06.02    CAD (coronary artery disease) I25.10    Angina pectoris (HCC) I20.9    S/P PTCA (percutaneous transluminal coronary angioplasty) Z98.61    Mixed hyperlipidemia E78.2    Diarrhea R19.7    Type 2 diabetes mellitus without complication (HCC) C70.1    Osteopenia M85.80    Encounter for medication monitoring Z51.81    CVA (cerebral vascular accident) ([facility] [address] D86.2    Complicated migraine T19. 109    Numbness and tingling of right side of face R20.0, R20.2    Stenosis of both internal carotid arteries I65.23    Angina, class III ([facility]) I20.9       Current Outpatient Prescriptions   Medication Sig Dispense Refill    metFORMIN ER (GLUCOPHAGE XR) 500 mg tablet TAKE 1 TABLET TWICE DAILY WITH MEALS. RESUME FROM Sunday 11/19/2017. 180 Tab 3    isosorbide mononitrate ER (IMDUR) 30 mg tablet Take 1 Tab by mouth daily. 30 Tab 11    ALPRAZolam (XANAX) 1 mg tablet TAKE 1/2 TABLET EVERY DAY AS NEEDED FOR ANXIETY AND TAKE 1 TABLET AT BEDTIME AS NEEDED FOR SLEEP 135 Tab 1    JANUVIA 100 mg tablet TAKE 1 TABLET EVERY DAY FOR DIABETES 90 Tab 3    amitriptyline (ELAVIL) 10 mg tablet TAKE 1 TABLET NIGHTLY 90 Tab 3    lisinopril (PRINIVIL, ZESTRIL) 2.5 mg tablet TAKE 1 TABLET EVERY DAY 90 Tab 3    metoprolol succinate (TOPROL-XL) 25 mg XL tablet TAKE 1 TABLET EVERY DAY 90 Tab 3    glipiZIDE SR (GLUCOTROL XL) 5 mg CR tablet TAKE 1 TABLET EVERY DAY 90 Tab 3    furosemide (LASIX) 20 mg tablet TAKE 1 TABLET EVERY DAY AS NEEDED 90 Tab 3    levothyroxine (SYNTHROID) 25 mcg tablet Take 2 pills with no other pills first in the morning and wait 30 minutes prior to taking any other medications. (Patient taking differently: 25 mcg. Take 2 pills with no other pills first in the morning and wait 30 minutes prior to taking any other medications. ) 180 Tab 3    pravastatin (PRAVACHOL) 40 mg tablet TAKE 1 TABLET EVERY NIGHT 90 Tab 3    ranolazine ER (RANEXA) 500 mg SR tablet Take 1 Tab by mouth two (2) times a day.  180 Tab 3    insulin glargine (LANTUS SOLOSTAR) 100 unit/mL (3 mL) pen 14 Units by SubCUTAneous route every evening. (Patient taking differently: 10 Units by SubCUTAneous route every evening.) 3 Each 5    Insulin Needles, Disposable, 31 gauge x 5/16\" ndle Use with lantus pen 3 Package 3    fluticasone (FLONASE) 50 mcg/actuation nasal spray INSTILL 2 SPRAYS INTO BOTH NOSTRILS DAILY AS NEEDED FOR RHINITIS. 48 g 3    butalbital-acetaminophen-caff (FIORICET) -40 mg per capsule take 1 capsule by mouth twice a day if needed for headache pain (MAX OF 2 CAPSULES A DAY)  0    Aspirin, Buffered 81 mg tab Take 81 mg by mouth daily.          Allergies   Allergen Reactions    Demerol [Meperidine] Unknown (comments)     Pt unsure of reaction         Past Medical History:   Diagnosis Date    Anxiety     Arthritis     ASHD (arteriosclerotic heart disease)     CAD (coronary artery disease)     Chronic pain     Constipation     Depression     Diabetes ([facility] [address]     Diarrhea     Dysgeusia 11/21/2014    Esophageal motor disorder 1/23/2012    Flatulence/gas pain/belching 11/21/2014    Gastroparesis     GERD (gastroesophageal reflux disease)     Hypercholesterolemia     Hypertension     Psychiatric disorder     anxiety    S/P bypass gastrojejunostomy 1/23/2012    Sleep apnea     no cpap         Past Surgical History:   Procedure Laterality Date    ABDOMEN SURGERY PROC UNLISTED      s/p partial gastrectomy 2/2 gastroparesis    CARDIAC SURG PROCEDURE UNLIST  6/2013    stent placement X 2    COLONOSCOPY N/A 5/26/2016    COLONOSCOPY performed [facility] ENDOSCOPY    EGD  7/13/2009    GASTROJEJUNOSTOMY      HC BLD CARPEL TUNNEL RELEASE      bilat    HX APPENDECTOMY      HX CHOLECYSTECTOMY      HX CORONARY STENT PLACEMENT  06/2013    3 stents    HX FEMORAL BYPASS Right 11/2017    HX GASTRIC BYPASS  6/02    HX HYSTERECTOMY      HX LAPAROTOMY  8/02    2/2 intra abd abscess    HX ORTHOPAEDIC  1/08    laminectomy  HX ORTHOPAEDIC      Bilateral feet bone spurs removed    HX ORTHOPAEDIC      bilateral carpal tunnel    HX ORTHOPAEDIC      back surgery    HX UROLOGICAL      bladder surgery-bladder tac    GA COLONOSCOPY FLX DX W/COLLJ SPEC WHEN PFRMD  5/03/2006    Dr. Viv Chavez    GA COLONOSCOPY W/BIOPSY SINGLE/MULTIPLE  2/9/2012         GA EGD BALLOON DILATION ESOPHAGUS <30 MM DIAM  1/23/2012         GA EGD TRANSORAL BIOPSY SINGLE/MULTIPLE  7/11/2011         GA EGD TRANSORAL BIOPSY SINGLE/MULTIPLE  1/23/2012         REMOVAL OF HEEL SPUR  2001    bilat         Family History   Problem Relation Age of Onset    Heart Disease Mother     Hypertension Mother     Diabetes Mother     Diabetes Father     Heart Disease Father     Hypertension Father     Alcohol abuse Brother     Heart Disease Sister     Hypertension Sister     Diabetes Sister     No Known Problems Sister     Alcohol abuse Brother     Alcohol abuse Brother     Diabetes Brother        Social History   Substance Use Topics    Smoking status: Never Smoker    Smokeless tobacco: Never Used    Alcohol use No        Lab Results   Component Value Date/Time    WBC CANCELED 11/07/2017 12:15 PM    HGB CANCELED 11/07/2017 12:15 PM    HCT CANCELED 11/07/2017 12:15 PM    PLATELET CANCELED 33/27/8499 12:15 PM    MCV 87 09/12/2017 09:35 AM       Lab Results   Component Value Date/Time    Cholesterol, total 126 09/12/2017 09:35 AM    HDL Cholesterol 45 09/12/2017 09:35 AM    LDL, calculated 49 09/12/2017 09:35 AM    Triglyceride 158 09/12/2017 09:35 AM    CHOL/HDL Ratio 3.5 01/05/2017 09:18 PM       Lab Results   Component Value Date/Time    Sodium 141 11/07/2017 12:15 PM    Potassium 4.1 11/07/2017 12:15 PM    Chloride 101 11/07/2017 12:15 PM    CO2 19 11/07/2017 12:15 PM    Anion gap 8 01/06/2017 12:23 AM    Glucose 124 11/07/2017 12:15 PM    BUN 9 11/07/2017 12:15 PM    Creatinine 0.84 11/07/2017 12:15 PM    BUN/Creatinine ratio 11 11/07/2017 12:15 PM    GFR est AA 84 11/07/2017 12:15 PM    GFR est non-AA 73 11/07/2017 12:15 PM    Calcium 9.3 11/07/2017 12:15 PM    Bilirubin, total 0.3 11/07/2017 12:15 PM    ALT (SGPT) 15 11/07/2017 12:15 PM    AST (SGOT) 18 11/07/2017 12:15 PM    Alk. phosphatase 104 11/07/2017 12:15 PM    Protein, total 6.7 11/07/2017 12:15 PM    Albumin 4.3 11/07/2017 12:15 PM    Globulin 3.4 01/06/2017 12:23 AM    A-G Ratio 1.8 11/07/2017 12:15 PM      Lab Results   Component Value Date/Time    Hemoglobin A1c 6.4 09/12/2017 09:35 AM    Hemoglobin A1c (POC) 6.6 04/07/2017 12:46 PM         Review of Systems   Constitutional: Negative for malaise/fatigue. HENT: Negative for congestion. Eyes: Negative for blurred vision. Respiratory: Negative for cough and shortness of breath. Cardiovascular: Negative for chest pain, palpitations and leg swelling. Gastrointestinal: Negative for abdominal pain, constipation and heartburn. Genitourinary: Negative for dysuria, frequency and urgency. Musculoskeletal: Negative for back pain and joint pain. Neurological: Negative for dizziness, tingling and headaches. Endo/Heme/Allergies: Negative for environmental allergies. Psychiatric/Behavioral: Negative for depression. The patient does not have insomnia. Physical Exam   Constitutional: She appears well-developed and well-nourished. /71 (BP 1 Location: Right arm, BP Patient Position: Sitting)  Pulse 98  Temp 97.1 °F (36.2 °C) (Oral)   Resp 18  Ht 5' 4\" (1.626 m)  Wt 169 lb 12.8 oz (77 kg)  SpO2 100%  BMI 29.15 kg/m2     HENT:   Right Ear: Tympanic membrane and ear canal normal.   Left Ear: Tympanic membrane and ear canal normal.   Nose: No mucosal edema or rhinorrhea. Mouth/Throat: Oropharynx is clear and moist and mucous membranes are normal.   Neck: Normal range of motion. Neck supple. No thyromegaly present. Cardiovascular: Normal rate and regular rhythm. No murmur heard.   Pulmonary/Chest: Effort normal and breath sounds normal.   Abdominal: Soft. Bowel sounds are normal. There is no tenderness. Musculoskeletal: Normal range of motion. She exhibits no edema. Right sided lower back tenderness. Lymphadenopathy:     She has no cervical adenopathy. Skin: Skin is warm and dry. Psychiatric: She has a normal mood and affect. Nursing note and vitals reviewed. ASSESSMENT and PLAN  Diagnoses and all orders for this visit:    1. Type 2 diabetes mellitus without complication, unspecified long term insulin use status (HCC)  -     AMB POC HEMOGLOBIN A1C  -     AMB POC GLUCOSE, QUANTITATIVE, BLOOD  -     AMB POC URINALYSIS DIP STICK AUTO W/ MICRO    2. Mixed hyperlipidemia  Stable   Continue to monitor. Work on diet and exercise. 3. Coronary artery disease involving native coronary artery of native heart without angina pectoris  Stable     4. DDD (degenerative disc disease), lumbar  Instructions for exercises given and reviewed with pt. Pt also to use heat to the area 3-4 times a day over the next several days until sx are improved. 5. Acquired hypothyroidism  -     TSH 3RD GENERATION    6. Encounter for medication monitoring  -     METABOLIC PANEL, BASIC  -     CBC W/O DIFF    7. Nonspecific finding on examination of urine  -     CULTURE, URINE      Follow-up Disposition:  Return in about 3 months (around 3/5/2018). reviewed diet, exercise and weight control  cardiovascular risk and specific lipid/LDL goals reviewed  reviewed medications and side effects in detail  specific diabetic recommendations: low cholesterol diet, weight control and daily exercise discussed, home glucose monitoring emphasized, all medications, side effects and compliance discussed carefully, foot care discussed and Podiatry visits discussed, annual eye examinations at Ophthalmology discussed and glycohemoglobin and other lab monitoring discussed     I have discussed diagnosis listed in this note with pt and/or family.  I have discussed treatment plans and options and the risk/benefit analysis of those options, including safe use of medications and possible medication side effects. Through the use of shared decision making we have agreed to the above plan. The patient has received an after-visit summary and questions were answered concerning future plans and follow up. Advise pt of any urgent changes then to proceed to the ER.

## 2017-12-05 NOTE — PROGRESS NOTES
Chief Complaint   Patient presents with    Diabetes     3 months follow up     1. Have you been to the ER, urgent care clinic since your last visit? Hospitalized since your last visit? No    2. Have you seen or consulted any other health care providers outside of the [address] [place] [address] since your last visit? Include any pap smears or colon screening.  No

## 2017-12-05 NOTE — MR AVS SNAPSHOT
Visit Information Date & Time Provider Department Dept. Phone Encounter #  
 12/5/2017  3:30 PM Matty Blue MD [address] [phone] [id number] Follow-up Instructions Return in about 3 months (around 3/5/2018). Your Appointments 12/7/2017  1:00 PM  
1 MONTH with Ethan Allen MD  
[facility]) Appt Note: Per Pt Dr CRANE wanted to see a month-scc [id number] [facility]  
[phone] [id number] [facility] Upcoming Health Maintenance Date Due  
 EYE EXAM RETINAL OR DILATED Q1 2/21/2018 HEMOGLOBIN A1C Q6M 3/12/2018 FOOT EXAM Q1 9/12/2018 MICROALBUMIN Q1 9/12/2018 LIPID PANEL Q1 9/12/2018 MEDICARE YEARLY EXAM 9/13/2018 BREAST CANCER SCRN MAMMOGRAM 1/26/2019 GLAUCOMA SCREENING Q2Y 2/21/2019 COLONOSCOPY 5/26/2021 DTaP/Tdap/Td series (2 - Td) 9/29/2025 Allergies as of 12/5/2017  Review Complete On: 12/5/2017 By: Matty Blue MD  
  
 Severity Noted Reaction Type Reactions Demerol [Meperidine]  03/12/2010    Unknown (comments) Pt unsure of reaction Current Immunizations  Reviewed on 12/5/2017 Name Date Influenza High Dose Vaccine PF 9/12/2017 Influenza Vaccine 11/17/2014, 11/11/2013 Influenza Vaccine Jancuba Jason) 9/29/2015 Influenza Vaccine (Quad) PF 8/30/2016 Influenza Vaccine Split 11/16/2012, 9/26/2011, 10/15/2010 Pneumococcal Polysaccharide (PPSV-23) 9/12/2017 Pneumococcal Vaccine (Pcv) 11/20/2006 Tdap 9/29/2015 Reviewed by Matty Blue MD on 12/5/2017 at  3:58 PM  
You Were Diagnosed With   
  
 Codes Comments Type 2 diabetes mellitus without complication, unspecified long term insulin use status (HCC)    -  Primary ICD-10-CM: E11.9 ICD-9-CM: 250.00 Mixed hyperlipidemia     ICD-10-CM: E78.2 ICD-9-CM: 272.2 Coronary artery disease involving native coronary artery of native heart without angina pectoris     ICD-10-CM: I25.10 ICD-9-CM: 414.01   
 DDD (degenerative disc disease), lumbar     ICD-10-CM: M51.36 
ICD-9-CM: 722.52 Acquired hypothyroidism     ICD-10-CM: E03.9 ICD-9-CM: 244.9 Hypovitaminosis D     ICD-10-CM: E55.9 ICD-9-CM: 268.9 Gastroesophageal reflux disease without esophagitis     ICD-10-CM: K21.9 ICD-9-CM: 530.81 Encounter for medication monitoring     ICD-10-CM: Z51.81 
ICD-9-CM: V58.83 Nonspecific finding on examination of urine     ICD-10-CM: R82.90 ICD-9-CM: 791.9 Vitals BP Pulse Temp Resp Height(growth percentile) Weight(growth percentile) 128/71 (BP 1 Location: Right arm, BP Patient Position: Sitting) 98 97.1 °F (36.2 °C) (Oral) 18 5' 4\" (1.626 m) 169 lb 12.8 oz (77 kg) SpO2 BMI OB Status Smoking Status 100% 29.15 kg/m2 Hysterectomy Never Smoker BMI and BSA Data Body Mass Index Body Surface Area  
 29.15 kg/m 2 1.86 m 2 Preferred Pharmacy Pharmacy Name Phone [facility] [place] [facility] [place] [facility] [phone] Your Updated Medication List  
  
   
This list is accurate as of: 12/5/17  5:13 PM.  Always use your most recent med list.  
  
  
  
  
 ALPRAZolam 1 mg tablet Commonly known as:  XANAX  
TAKE 1/2 TABLET EVERY DAY AS NEEDED FOR ANXIETY AND TAKE 1 TABLET AT BEDTIME AS NEEDED FOR SLEEP  
  
 amitriptyline 10 mg tablet Commonly known as:  ELAVIL TAKE 1 TABLET NIGHTLY  
  
 aspirin, buffered 81 mg Tab Take 81 mg by mouth daily. butalbital-acetaminophen-caff -40 mg per capsule Commonly known as:  FIORICET  
take 1 capsule by mouth twice a day if needed for headache pain (MAX OF 2 CAPSULES A DAY)  
  
 fluticasone 50 mcg/actuation nasal spray Commonly known as:  Criss Arzate INSTILL 2 SPRAYS INTO BOTH NOSTRILS DAILY AS NEEDED FOR RHINITIS. furosemide 20 mg tablet Commonly known as:  LASIX TAKE 1 TABLET EVERY DAY AS NEEDED  
  
 glipiZIDE SR 5 mg CR tablet Commonly known as:  GLUCOTROL XL  
TAKE 1 TABLET EVERY DAY  
  
 * insulin glargine 100 unit/mL injection Commonly known as:  LANTUS  
10 Units by SubCUTAneous route nightly. * insulin glargine 100 unit/mL (3 mL) Inpn Commonly known as:  LANTUS,BASAGLAR  
14 Units by SubCUTAneous route every evening. Insulin Needles (Disposable) 31 gauge x 5/16\" Ndle Use with lantus pen  
  
 isosorbide mononitrate ER 30 mg tablet Commonly known as:  IMDUR Take 1 Tab by mouth daily. JANUVIA 100 mg tablet Generic drug:  SITagliptin TAKE 1 TABLET EVERY DAY FOR DIABETES  
  
 * levothyroxine 25 mcg tablet Commonly known as:  SYNTHROID Take  by mouth daily. * levothyroxine 25 mcg tablet Commonly known as:  synthroid Take 2 pills with no other pills first in the morning and wait 30 minutes prior to taking any other medications. lisinopril 2.5 mg tablet Commonly known as:  PRINIVIL, ZESTRIL  
TAKE 1 TABLET EVERY DAY  
  
 metFORMIN  mg tablet Commonly known as:  GLUCOPHAGE XR  
TAKE 1 TABLET TWICE DAILY WITH MEALS. RESUME FROM Sunday 11/19/2017. metoprolol succinate 25 mg XL tablet Commonly known as:  TOPROL-XL  
TAKE 1 TABLET EVERY DAY  
  
 pravastatin 40 mg tablet Commonly known as:  PRAVACHOL  
TAKE 1 TABLET EVERY NIGHT  
  
 ranolazine  mg SR tablet Commonly known as:  RANEXA Take 1 Tab by mouth two (2) times a day. * Notice: This list has 4 medication(s) that are the same as other medications prescribed for you. Read the directions carefully, and ask your doctor or other care provider to review them with you. We Performed the Following AMB POC GLUCOSE, QUANTITATIVE, BLOOD [33092 CPT(R)] AMB POC HEMOGLOBIN A1C [12946 CPT(R)] AMB POC URINALYSIS DIP STICK AUTO W/ MICRO [02552 CPT(R)] CBC W/O DIFF [71185 CPT(R)] CULTURE, URINE [id number] CPT(R)] METABOLIC PANEL, BASIC [33660 CPT(R)] Follow-up Instructions Return in about 3 months (around 3/5/2018). To-Do List   
 01/08/2018 1:00 PM  
  Appointment with [facility] CARLA 1 at [facility] ([phone]) Shower or bathe using soap and water. Do not use deodorant, powder, perfumes, or lotion the day of your exam.  If your prior mammograms were not performed at [facility] please bring films with you or forward prior images 2 days before your procedure. Check in at registration 15min before your appointment time unless you were instructed to do otherwise. A script is not necessary, but if you have one, please bring it on the day of the mammogram or have it faxed to the department. [facility] [phone] [facility]  [phone] [facility] [address] [place]  [phone] [facility] [phone] [address] [place] [phone] Introducing [facility]! [facility] introduces [employer] patient portal. Now you can access parts of your medical record, email your doctor's office, and request medication refills online. 1. In your internet browser, go to [email or web address] - [email or web address] 2. Click on the First Time User? Click Here link in the Sign In box. You will see the New Member Sign Up page. 3. Enter your [employer] Access Code exactly as it appears below. You will not need to use this code after youve completed the sign-up process. If you do not sign up before the expiration date, you must request a new code. · Team[employer] Access Code: [email or web address] Expires: 12/11/2017  8:08 AM 
 
4. Enter the last four digits of your Social Security Number (xxxx) and Date of Birth (mm/dd/yyyy) as indicated and click Submit. You will be taken to the next sign-up page. 5. Create a [employer] ID. This will be your [employer] login ID and cannot be changed, so think of one that is secure and easy to remember. 6. Create a [employer] password. You can change your password at any time. 7. Enter your Password Reset Question and Answer. This can be used at a later time if you forget your password. 8. Enter your e-mail address. You will receive e-mail notification when new information is available in [address] 9. Click Sign Up. You can now view and download portions of your medical record. 10. Click the Download Summary menu link to download a portable copy of your medical information. If you have questions, please visit the Frequently Asked Questions section of the [employer] website. Remember, [employer] is NOT to be used for urgent needs. For medical emergencies, dial 911. Now available from your iPhone and Android! Please provide this summary of care documentation to your next provider. Your primary care clinician is listed as Moe Vargas. If you have any questions after today's visit, please call [phone].

## 2017-12-06 LAB
BUN SERPL-MCNC: 10 MG/DL (ref 8–27)
BUN/CREAT SERPL: 14 (ref 12–28)
CALCIUM SERPL-MCNC: 9.2 MG/DL (ref 8.7–10.3)
CHLORIDE SERPL-SCNC: 101 MMOL/L (ref 96–106)
CO2 SERPL-SCNC: 25 MMOL/L (ref 18–29)
CREAT SERPL-MCNC: 0.72 MG/DL (ref 0.57–1)
ERYTHROCYTE [DISTWIDTH] IN BLOOD BY AUTOMATED COUNT: 13.4 % (ref 12.3–15.4)
GFR SERPLBLD CREATININE-BSD FMLA CKD-EPI: 102 ML/MIN/1.73
GFR SERPLBLD CREATININE-BSD FMLA CKD-EPI: 88 ML/MIN/1.73
GLUCOSE SERPL-MCNC: 97 MG/DL (ref 65–99)
HCT VFR BLD AUTO: 34.8 % (ref 34–46.6)
HGB BLD-MCNC: 11.6 G/DL (ref 11.1–15.9)
MCH RBC QN AUTO: 29.2 PG (ref 26.6–33)
MCHC RBC AUTO-ENTMCNC: 33.3 G/DL (ref 31.5–35.7)
MCV RBC AUTO: 88 FL (ref 79–97)
PLATELET # BLD AUTO: 296 X10E3/UL (ref 150–379)
POTASSIUM SERPL-SCNC: 4.4 MMOL/L (ref 3.5–5.2)
RBC # BLD AUTO: 3.97 X10E6/UL (ref 3.77–5.28)
SODIUM SERPL-SCNC: 142 MMOL/L (ref 134–144)
TSH SERPL DL<=0.005 MIU/L-ACNC: 0.27 UIU/ML (ref 0.45–4.5)
WBC # BLD AUTO: 6 X10E3/UL (ref 3.4–10.8)

## 2017-12-07 ENCOUNTER — OFFICE VISIT (OUTPATIENT)
Dept: CARDIOLOGY CLINIC | Age: 67
End: 2017-12-07

## 2017-12-07 VITALS
RESPIRATION RATE: 14 BRPM | WEIGHT: 168.7 LBS | DIASTOLIC BLOOD PRESSURE: 70 MMHG | BODY MASS INDEX: 28.8 KG/M2 | HEART RATE: 96 BPM | OXYGEN SATURATION: 97 % | HEIGHT: 64 IN | SYSTOLIC BLOOD PRESSURE: 120 MMHG

## 2017-12-07 DIAGNOSIS — E78.2 MIXED HYPERLIPIDEMIA: ICD-10-CM

## 2017-12-07 DIAGNOSIS — Z98.61 S/P PTCA (PERCUTANEOUS TRANSLUMINAL CORONARY ANGIOPLASTY): ICD-10-CM

## 2017-12-07 DIAGNOSIS — I25.10 CORONARY ARTERY DISEASE INVOLVING NATIVE CORONARY ARTERY OF NATIVE HEART WITHOUT ANGINA PECTORIS: Primary | ICD-10-CM

## 2017-12-07 DIAGNOSIS — E03.9 ACQUIRED HYPOTHYROIDISM: ICD-10-CM

## 2017-12-07 LAB — BACTERIA UR CULT: NORMAL

## 2017-12-07 RX ORDER — LEVOTHYROXINE SODIUM 25 UG/1
37.5 TABLET ORAL DAILY
COMMUNITY
Start: 2017-12-07 | End: 2018-05-30

## 2017-12-07 RX ORDER — INSULIN GLARGINE 100 [IU]/ML
10 INJECTION, SOLUTION SUBCUTANEOUS
COMMUNITY
Start: 2017-11-24 | End: 2018-07-11 | Stop reason: SDUPTHER

## 2017-12-07 RX ORDER — IBUPROFEN 800 MG/1
TABLET ORAL
COMMUNITY
Start: 2017-11-14 | End: 2018-03-05

## 2017-12-07 RX ORDER — METRONIDAZOLE 7.5 MG/G
CREAM TOPICAL
Refills: 0 | COMMUNITY
Start: 2017-10-11 | End: 2018-05-14

## 2017-12-07 NOTE — PROGRESS NOTES
Chief Complaint   Patient presents with   [facility] Follow Up     no cardiac complaints. 1. Have you been to the ER, urgent care clinic since your last visit? Hospitalized since your last visit? No    2. Have you seen or consulted any other health care providers outside of the [address] [place] [address] since your last visit? Include any pap smears or colon screening.  No

## 2017-12-30 ENCOUNTER — HOSPITAL ENCOUNTER (OUTPATIENT)
Dept: MRI IMAGING | Age: 67
Discharge: HOME | End: 2017-12-30
Attending: ORTHOPAEDIC SURGERY
Payer: MEDICARE

## 2017-12-30 DIAGNOSIS — M48.061 SPINAL STENOSIS OF LUMBAR REGION WITH RADICULOPATHY: ICD-10-CM

## 2017-12-30 DIAGNOSIS — Z98.1 HISTORY OF LUMBAR SPINAL FUSION: ICD-10-CM

## 2017-12-30 DIAGNOSIS — M54.16 LUMBAR RADICULOPATHY: ICD-10-CM

## 2017-12-30 DIAGNOSIS — M43.16 SPONDYLOLISTHESIS OF LUMBAR REGION: ICD-10-CM

## 2017-12-30 DIAGNOSIS — M54.16 SPINAL STENOSIS OF LUMBAR REGION WITH RADICULOPATHY: ICD-10-CM

## 2017-12-30 PROCEDURE — 72148 MRI LUMBAR SPINE W/O DYE: CPT

## 2018-01-02 DIAGNOSIS — M89.9 DISORDER OF BONE AND CARTILAGE: Primary | ICD-10-CM

## 2018-01-02 DIAGNOSIS — M94.9 DISORDER OF BONE AND CARTILAGE: Primary | ICD-10-CM

## 2018-02-07 ENCOUNTER — HOSPITAL ENCOUNTER (OUTPATIENT)
Dept: MAMMOGRAPHY | Age: 68
Discharge: HOME | End: 2018-02-07
Attending: FAMILY MEDICINE
Payer: MEDICARE

## 2018-02-07 ENCOUNTER — HOSPITAL ENCOUNTER (OUTPATIENT)
Dept: BONE DENSITY | Age: 68
Discharge: HOME | End: 2018-02-07
Attending: FAMILY MEDICINE
Payer: MEDICARE

## 2018-02-07 DIAGNOSIS — M94.9 DISORDER OF BONE AND CARTILAGE: ICD-10-CM

## 2018-02-07 DIAGNOSIS — M89.9 DISORDER OF BONE AND CARTILAGE: ICD-10-CM

## 2018-02-07 DIAGNOSIS — Z12.31 VISIT FOR SCREENING MAMMOGRAM: ICD-10-CM

## 2018-02-07 PROCEDURE — 77080 DXA BONE DENSITY AXIAL: CPT

## 2018-02-07 PROCEDURE — 77067 SCR MAMMO BI INCL CAD: CPT

## 2018-02-21 DIAGNOSIS — F41.9 ANXIETY: ICD-10-CM

## 2018-02-21 RX ORDER — ALPRAZOLAM 1 MG/1
TABLET ORAL
Qty: 135 TAB | Refills: 1 | OUTPATIENT
Start: 2018-02-21 | End: 2018-05-14 | Stop reason: SDUPTHER

## 2018-02-21 NOTE — TELEPHONE ENCOUNTER
Pt called in requesting a refill on Alprazolam 1mg 1/2 tablet during the day and 1 tablet at night. [address] [facility] [phone]. Pt# [phone]. Pt is requesting 90 days.  Pt does not use mail order any more

## 2018-03-05 ENCOUNTER — OFFICE VISIT (OUTPATIENT)
Dept: FAMILY MEDICINE CLINIC | Age: 68
End: 2018-03-05

## 2018-03-05 VITALS
TEMPERATURE: 98.6 F | BODY MASS INDEX: 29.16 KG/M2 | DIASTOLIC BLOOD PRESSURE: 78 MMHG | OXYGEN SATURATION: 96 % | WEIGHT: 170.8 LBS | SYSTOLIC BLOOD PRESSURE: 126 MMHG | RESPIRATION RATE: 14 BRPM | HEIGHT: 64 IN | HEART RATE: 109 BPM

## 2018-03-05 DIAGNOSIS — I25.10 CORONARY ARTERY DISEASE INVOLVING NATIVE CORONARY ARTERY OF NATIVE HEART WITHOUT ANGINA PECTORIS: ICD-10-CM

## 2018-03-05 DIAGNOSIS — E11.9 TYPE 2 DIABETES MELLITUS WITHOUT COMPLICATION, UNSPECIFIED LONG TERM INSULIN USE STATUS: Primary | ICD-10-CM

## 2018-03-05 DIAGNOSIS — Z51.81 ENCOUNTER FOR MEDICATION MONITORING: ICD-10-CM

## 2018-03-05 DIAGNOSIS — E78.2 MIXED HYPERLIPIDEMIA: ICD-10-CM

## 2018-03-05 DIAGNOSIS — E03.9 ACQUIRED HYPOTHYROIDISM: ICD-10-CM

## 2018-03-05 LAB
GLUCOSE POC: 156 MG/DL
HBA1C MFR BLD HPLC: 6.4 %

## 2018-03-05 NOTE — PROGRESS NOTES
Chief Complaint   Patient presents with    Diabetes     follow up    Cholesterol Problem     follow up    Coronary Artery Disease     follow up     Depression     follow up     1. Have you been to the ER, urgent care clinic since your last visit? Hospitalized since your last visit? NO    2. Have you seen or consulted any other health care providers outside of the [address] [place] [address] since your last visit? Include any pap smears or colon screening.  NO

## 2018-03-05 NOTE — MR AVS SNAPSHOT
Romana Halo 
 
 
 [address] [facility] [address] 
[phone] Patient: Lady Jurado MRN: [id number] FYD:6[birth year] Visit Information Date & Time Provider Department Dept. Phone Encounter #  
 3/5/2018  3:00 PM Rob Matamoros MD [facility] [phone] [id number] Follow-up Instructions Return in about 4 months (around 7/18/2018). Your Appointments 6/14/2018  1:15 PM  
6 MONTH with Stephany Lennox, MD  
[facility] Appt Note: Dr. Brinda Dumont [facility]  
[phone] [address] [facility] Upcoming Health Maintenance Date Due  
 EYE EXAM RETINAL OR DILATED Q1 2/21/2018 HEMOGLOBIN A1C Q6M 6/5/2018 FOOT EXAM Q1 9/12/2018 MICROALBUMIN Q1 9/12/2018 LIPID PANEL Q1 9/12/2018 MEDICARE YEARLY EXAM 9/13/2018 GLAUCOMA SCREENING Q2Y 2/21/2019 BREAST CANCER SCRN MAMMOGRAM 2/7/2020 COLONOSCOPY 5/26/2021 DTaP/Tdap/Td series (2 - Td) 9/29/2025 Allergies as of 3/5/2018  Review Complete On: 3/5/2018 By: Suzan Rodriguez LPN Severity Noted Reaction Type Reactions Demerol [Meperidine]  03/12/2010    Unknown (comments) Pt unsure of reaction Current Immunizations  Reviewed on 3/5/2018 Name Date Influenza High Dose Vaccine PF 9/12/2017 Influenza Vaccine 11/17/2014, 11/11/2013 Influenza Vaccine Roise Fiddler) 9/29/2015 Influenza Vaccine (Quad) PF 8/30/2016 Influenza Vaccine Split 11/16/2012, 9/26/2011, 10/15/2010 Pneumococcal Polysaccharide (PPSV-23) 9/12/2017 Pneumococcal Vaccine (Pcv) 11/20/2006 Tdap 9/29/2015 Reviewed by Rob Matamoros MD on 3/5/2018 at  3:29 PM  
You Were Diagnosed With   
  
 Codes Comments  Type 2 diabetes mellitus without complication, unspecified long term insulin use status (HCC)    -  Primary ICD-10-CM: E11.9 ICD-9-CM: 250.00 Mixed hyperlipidemia     ICD-10-CM: E78.2 ICD-9-CM: 272.2 Coronary artery disease involving native coronary artery of native heart without angina pectoris     ICD-10-CM: I25.10 ICD-9-CM: 414.01 Mild depression (HCC)     ICD-10-CM: F32.0 ICD-9-CM: 441 Encounter for medication monitoring     ICD-10-CM: Z51.81 
ICD-9-CM: V58.83 Vitals BP Pulse Temp Resp Height(growth percentile) Weight(growth percentile) 126/78 (BP 1 Location: Left arm, BP Patient Position: Sitting) (!) 109 98.6 °F (37 °C) (Oral) 14 5' 4\" (1.626 m) 170 lb 12.8 oz (77.5 kg) SpO2 BMI OB Status Smoking Status 96% 29.32 kg/m2 Hysterectomy Never Smoker Vitals History BMI and BSA Data Body Mass Index Body Surface Area  
 29.32 kg/m 2 1.87 m 2 Preferred Pharmacy Pharmacy Name Phone [id number] [address] [facility] [phone] Your Updated Medication List  
  
   
This list is accurate as of 3/5/18  4:01 PM.  Always use your most recent med list.  
  
  
  
  
 ALPRAZolam 1 mg tablet Commonly known as:  XANAX  
TAKE 1/2 TABLET EVERY DAY AS NEEDED FOR ANXIETY AND TAKE 1 TABLET AT BEDTIME AS NEEDED FOR SLEEP  
  
 amitriptyline 10 mg tablet Commonly known as:  ELAVIL TAKE 1 TABLET NIGHTLY  
  
 aspirin, buffered 81 mg Tab Take 81 mg by mouth daily. fluticasone 50 mcg/actuation nasal spray Commonly known as:  Marlaine Romance INSTILL 2 SPRAYS INTO BOTH NOSTRILS DAILY AS NEEDED FOR RHINITIS. furosemide 20 mg tablet Commonly known as:  LASIX TAKE 1 TABLET EVERY DAY AS NEEDED  
  
 glipiZIDE SR 5 mg CR tablet Commonly known as:  GLUCOTROL XL  
TAKE 1 TABLET EVERY DAY Insulin Needles (Disposable) 31 gauge x 5/16\" Ndle Use with lantus pen JANUVIA 100 mg tablet Generic drug:  SITagliptin TAKE 1 TABLET EVERY DAY FOR DIABETES  
  
 LANTUS SOLOSTAR U-100 INSULIN 100 unit/mL (3 mL) Inpn Generic drug:  insulin glargine 10 Units by SubCUTAneous route nightly. levothyroxine 25 mcg tablet Commonly known as:  SYNTHROID Take 1.5 Tabs by mouth daily. lisinopril 2.5 mg tablet Commonly known as:  PRINIVIL, ZESTRIL  
TAKE 1 TABLET EVERY DAY  
  
 metFORMIN  mg tablet Commonly known as:  GLUCOPHAGE XR  
TAKE 1 TABLET TWICE DAILY WITH MEALS. RESUME FROM Sunday 11/19/2017. metoprolol succinate 25 mg XL tablet Commonly known as:  TOPROL-XL  
TAKE 1 TABLET EVERY DAY  
  
 metroNIDAZOLE 0.75 % topical cream  
Commonly known as:  METROCREAM  
APPLY CREAM TWO TIMES A DAY TO FACE  
  
 pravastatin 40 mg tablet Commonly known as:  PRAVACHOL  
TAKE 1 TABLET EVERY NIGHT  
  
 ranolazine  mg SR tablet Commonly known as:  RANEXA Take 1 Tab by mouth two (2) times a day. We Performed the Following AMB POC GLUCOSE, QUANTITATIVE, BLOOD [12871 CPT(R)] AMB POC HEMOGLOBIN A1C [12793 CPT(R)] LIPID PANEL [85851 CPT(R)] METABOLIC PANEL, COMPREHENSIVE [87017 CPT(R)] Follow-up Instructions Return in about 4 months (around 7/18/2018). Introducing [facility]! Ingrid Tony introduces [employer] patient portal. Now you can access parts of your medical record, email your doctor's office, and request medication refills online. 1. In your internet browser, go to [email or web address] 2. Click on the First Time User? Click Here link in the Sign In box. You will see the New Member Sign Up page. 3. Enter your [employer] Access Code exactly as it appears below. You will not need to use this code after youve completed the sign-up process. If you do not sign up before the expiration date, you must request a new code. · [employer] Access Code: [email or web address] Expires: 3/20/2018  3:42 PM 
 
4.  Enter the last four digits of your Social Security Number (xxxx) and Date of Birth (mm/dd/yyyy) as indicated and click Submit. You will be taken to the next sign-up page. 5. Create a [employer] ID. This will be your [employer] login ID and cannot be changed, so think of one that is secure and easy to remember. 6. Create a [employer] password. You can change your password at any time. 7. Enter your Password Reset Question and Answer. This can be used at a later time if you forget your password. 8. Enter your e-mail address. You will receive e-mail notification when new information is available in [address] 9. Click Sign Up. You can now view and download portions of your medical record. 10. Click the Download Summary menu link to download a portable copy of your medical information. If you have questions, please visit the Frequently Asked Questions section of the [employer] website. Remember, [employer] is NOT to be used for urgent needs. For medical emergencies, dial 911. Now available from your iPhone and Android! Please provide this summary of care documentation to your next provider. Your primary care clinician is listed as Clarence Huerta. If you have any questions after today's visit, please call [phone].

## 2018-03-05 NOTE — PROGRESS NOTES
HISTORY OF PRESENT ILLNESS  Glenn Riojas is a 74 y.o. female. HPI   For routine follow up on chronic medical conditions. Overall feeling well. Cardiovascular Review:  The patient has coronary artery disease, HF and status post coronary artery stenting. Diet and Lifestyle: generally follows a low fat low cholesterol diet, generally follows a low sodium diet, follows a diabetic diet regularly, exercises sporadically  Home BP Monitoring: is not measured at home. Pertinent ROS: taking medications as instructed, no medication side effects noted, no TIA's, no chest pain on exertion, no dyspnea on exertion, no swelling of ankles. DM type II follow up:  Compliant w/ meds, diabetic diet, and exercise. Tolerating the lantus and feels much better with the lower sugars. Obtains home glucose monitoring averaging 100-140. Checks BS BID on most days and prn. Pt does not have BS log at visit today. No Rf needed for today. Denies any tingling sensation, polyuria and polydipsia. No blurred vision. No significant weight changes. Feeling well since last OV. Hypercholesterolemia follow up:  Compliant w/ meds, low fat, low cholesterol diet. Exercising some. No muscle nor abdominal pain, no skin discoloration. Patient fasting today. Patient Active Problem List   Diagnosis Code    Anxiety F41.9    Depression F32.9    Hypovitaminosis D E55.9    GERD (gastroesophageal reflux disease) K21.9    Edema R60.9    Esophageal motor disorder K22.4    S/P bypass gastrojejunostomy Z98.0    SOB (shortness of breath) R06.02    CAD (coronary artery disease) I25.10    Angina pectoris (HCC) I20.9    S/P PTCA (percutaneous transluminal coronary angioplasty) Z98.61    Mixed hyperlipidemia E78.2    Diarrhea R19.7    Type 2 diabetes mellitus without complication (HCC) V01.0    Osteopenia M85.80    Encounter for medication monitoring Z51.81    CVA (cerebral vascular accident) ([facility] [address] X94.8    Complicated migraine E34. [facility]  Numbness and tingling of right side of face R20.0, R20.2    Stenosis of both internal carotid arteries I65.23    Angina, class III (HCC) I20.9       Current Outpatient Prescriptions   Medication Sig Dispense Refill    ALPRAZolam (XANAX) 1 mg tablet TAKE 1/2 TABLET EVERY DAY AS NEEDED FOR ANXIETY AND TAKE 1 TABLET AT BEDTIME AS NEEDED FOR SLEEP 135 Tab 1    levothyroxine (SYNTHROID) 25 mcg tablet Take 1.5 Tabs by mouth daily.  LANTUS SOLOSTAR 100 unit/mL (3 mL) inpn 10 Units by SubCUTAneous route nightly.  metroNIDAZOLE (METROCREAM) 0.75 % topical cream APPLY CREAM TWO TIMES A DAY TO FACE  0    metFORMIN ER (GLUCOPHAGE XR) 500 mg tablet TAKE 1 TABLET TWICE DAILY WITH MEALS. RESUME FROM Sunday 11/19/2017. 180 Tab 3    JANUVIA 100 mg tablet TAKE 1 TABLET EVERY DAY FOR DIABETES 90 Tab 3    amitriptyline (ELAVIL) 10 mg tablet TAKE 1 TABLET NIGHTLY 90 Tab 3    lisinopril (PRINIVIL, ZESTRIL) 2.5 mg tablet TAKE 1 TABLET EVERY DAY 90 Tab 3    metoprolol succinate (TOPROL-XL) 25 mg XL tablet TAKE 1 TABLET EVERY DAY 90 Tab 3    glipiZIDE SR (GLUCOTROL XL) 5 mg CR tablet TAKE 1 TABLET EVERY DAY 90 Tab 3    furosemide (LASIX) 20 mg tablet TAKE 1 TABLET EVERY DAY AS NEEDED 90 Tab 3    pravastatin (PRAVACHOL) 40 mg tablet TAKE 1 TABLET EVERY NIGHT 90 Tab 3    ranolazine ER (RANEXA) 500 mg SR tablet Take 1 Tab by mouth two (2) times a day. 180 Tab 3    Insulin Needles, Disposable, 31 gauge x 5/16\" ndle Use with lantus pen 3 Package 3    fluticasone (FLONASE) 50 mcg/actuation nasal spray INSTILL 2 SPRAYS INTO BOTH NOSTRILS DAILY AS NEEDED FOR RHINITIS. 48 g 3    Aspirin, Buffered 81 mg tab Take 81 mg by mouth daily.          Allergies   Allergen Reactions    Demerol [Meperidine] Unknown (comments)     Pt unsure of reaction       Past Medical History:   Diagnosis Date    Anxiety     Arthritis     ASHD (arteriosclerotic heart disease)     CAD (coronary artery disease)     Chronic pain     Constipation     Depression     Diabetes ([facility] [address]     Diarrhea     Dysgeusia 11/21/2014    Esophageal motor disorder 1/23/2012    Flatulence/gas pain/belching 11/21/2014    Gastroparesis     GERD (gastroesophageal reflux disease)     Hypercholesterolemia     Hypertension     Psychiatric disorder     anxiety    S/P bypass gastrojejunostomy 1/23/2012    Sleep apnea     no cpap       Past Surgical History:   Procedure Laterality Date    ABDOMEN SURGERY PROC UNLISTED      s/p partial gastrectomy 2/2 gastroparesis    CARDIAC SURG PROCEDURE UNLIST  6/2013    stent placement X 2    COLONOSCOPY N/A 5/26/2016    COLONOSCOPY performed [facility] Levon Sorensen [facility] ENDOSCOPY    EGD  7/13/2009    GASTROJEJUNOSTOMY      HC BLD CARPEL TUNNEL RELEASE      bilat    HX APPENDECTOMY      HX CHOLECYSTECTOMY      HX CORONARY STENT PLACEMENT  06/2013    3 stents    HX FEMORAL BYPASS Right 11/2017    HX GASTRIC BYPASS  6/02    HX HYSTERECTOMY      HX LAPAROTOMY  8/02    2/2 intra abd abscess    HX ORTHOPAEDIC  1/08    laminectomy    HX ORTHOPAEDIC      Bilateral feet bone spurs removed    HX ORTHOPAEDIC      bilateral carpal tunnel    HX ORTHOPAEDIC      back surgery    HX UROLOGICAL      bladder surgery-bladder tac    SC COLONOSCOPY FLX DX W/COLLJ SPEC WHEN PFRMD  5/03/2006    Dr. Velazquez [facility] COLONOSCOPY W/BIOPSY SINGLE/MULTIPLE  2/9/2012         SC EGD BALLOON DILATION ESOPHAGUS <30 MM DIAM  1/23/2012         SC EGD TRANSORAL BIOPSY SINGLE/MULTIPLE  7/11/2011         SC EGD TRANSORAL BIOPSY SINGLE/MULTIPLE  1/23/2012         REMOVAL OF HEEL SPUR  2001    bilat       Family History   Problem Relation Age of Onset    Heart Disease Mother     Hypertension Mother     Diabetes Mother     Diabetes Father     Heart Disease Father     Hypertension Father     Alcohol abuse Brother     Heart Disease Sister     Hypertension Sister     Diabetes Sister     No Known Problems Sister     Alcohol abuse Brother  Alcohol abuse Brother     Diabetes Brother        Social History   Substance Use Topics    Smoking status: Never Smoker    Smokeless tobacco: Never Used    Alcohol use No        Lab Results  Component Value Date/Time   WBC 6.0 12/05/2017 05:28 PM   HGB 11.6 12/05/2017 05:28 PM   HCT 34.8 12/05/2017 05:28 PM   PLATELET 176 17/56/8873 05:28 PM   MCV 88 12/05/2017 05:28 PM     Lab Results  Component Value Date/Time   Cholesterol, total 126 09/12/2017 09:35 AM   HDL Cholesterol 45 09/12/2017 09:35 AM   LDL, calculated 49 09/12/2017 09:35 AM   Triglyceride 158 (H) 09/12/2017 09:35 AM   CHOL/HDL Ratio 3.5 01/05/2017 09:18 PM     Lab Results  Component Value Date/Time   TSH 0.273 (L) 12/05/2017 05:28 PM   T4, Free 0.97 02/22/2017 08:26 AM   T4, Total 5.6 11/17/2015 08:54 AM      Lab Results   Component Value Date/Time    Sodium 142 12/05/2017 05:28 PM    Potassium 4.4 12/05/2017 05:28 PM    Chloride 101 12/05/2017 05:28 PM    CO2 25 12/05/2017 05:28 PM    Anion gap 8 01/06/2017 12:23 AM    Glucose 97 12/05/2017 05:28 PM    BUN 10 12/05/2017 05:28 PM    Creatinine 0.72 12/05/2017 05:28 PM    BUN/Creatinine ratio 14 12/05/2017 05:28 PM    GFR est  12/05/2017 05:28 PM    GFR est non-AA 88 12/05/2017 05:28 PM    Calcium 9.2 12/05/2017 05:28 PM    Bilirubin, total 0.3 11/07/2017 12:15 PM    ALT (SGPT) 15 11/07/2017 12:15 PM    AST (SGOT) 18 11/07/2017 12:15 PM    Alk. phosphatase 104 11/07/2017 12:15 PM    Protein, total 6.7 11/07/2017 12:15 PM    Albumin 4.3 11/07/2017 12:15 PM    Globulin 3.4 01/06/2017 12:23 AM    A-G Ratio 1.8 11/07/2017 12:15 PM      Lab Results   Component Value Date/Time    Hemoglobin A1c 6.4 (H) 09/12/2017 09:35 AM    Hemoglobin A1c (POC) 6.4 03/05/2018 03:40 PM       Review of Systems   Constitutional: Negative for malaise/fatigue. HENT: Negative for congestion. Eyes: Negative for blurred vision. Respiratory: Negative for cough and shortness of breath.     Cardiovascular: Negative for chest pain, palpitations and leg swelling. Gastrointestinal: Negative for abdominal pain, constipation and heartburn. Genitourinary: Negative for dysuria, frequency and urgency. Musculoskeletal: Negative for back pain and joint pain. Neurological: Negative for dizziness, tingling and headaches. Endo/Heme/Allergies: Negative for environmental allergies. Psychiatric/Behavioral: Negative for depression. The patient does not have insomnia. Physical Exam   Constitutional: She appears well-developed and well-nourished. /78 (BP 1 Location: Left arm, BP Patient Position: Sitting)  Pulse (!) 109  Temp 98.6 °F (37 °C) (Oral)   Resp 14  Ht 5' 4\" (1.626 m)  Wt 170 lb 12.8 oz (77.5 kg)  SpO2 96%  BMI 29.32 kg/m2     HENT:   Right Ear: Tympanic membrane and ear canal normal.   Left Ear: Tympanic membrane and ear canal normal.   Nose: No mucosal edema or rhinorrhea. Mouth/Throat: Oropharynx is clear and moist and mucous membranes are normal.   Neck: Normal range of motion. Neck supple. No thyromegaly present. Cardiovascular: Normal rate and regular rhythm. No murmur heard. Pulmonary/Chest: Effort normal and breath sounds normal.   Abdominal: Soft. Bowel sounds are normal. There is no tenderness. Musculoskeletal: Normal range of motion. She exhibits no edema. Lymphadenopathy:     She has no cervical adenopathy. Skin: Skin is warm and dry. Psychiatric: She has a normal mood and affect. Nursing note and vitals reviewed. ASSESSMENT and PLAN  Diagnoses and all orders for this visit:    1. Type 2 diabetes mellitus without complication, unspecified long term insulin use status ([facility])  a1c level stable at 6.4%  -     AMB POC GLUCOSE, QUANTITATIVE, BLOOD  -     AMB POC HEMOGLOBIN A1C  -     REFERRAL TO OPHTHALMOLOGY    2. Mixed hyperlipidemia  -     LIPID PANEL    3. Coronary artery disease involving native coronary artery of native heart without angina pectoris  Stable     4. Acquired hypothyroidism  -     TSH 3RD GENERATION    5. Encounter for medication monitoring  -     METABOLIC PANEL, COMPREHENSIVE      Follow-up Disposition:  Return in about 4 months (around 7/18/2018). reviewed diet, exercise and weight control  cardiovascular risk and specific lipid/LDL goals reviewed  reviewed medications and side effects in detail  specific diabetic recommendations: low cholesterol diet, weight control and daily exercise discussed, home glucose monitoring emphasized, all medications, side effects and compliance discussed carefully, foot care discussed and Podiatry visits discussed, annual eye examinations at Ophthalmology discussed and glycohemoglobin and other lab monitoring discussed      I have discussed diagnosis listed in this note with pt and/or family. I have discussed treatment plans and options and the risk/benefit analysis of those options, including safe use of medications and possible medication side effects. Through the use of shared decision making we have agreed to the above plan. The patient has received an after-visit summary and questions were answered concerning future plans and follow up. Advise pt of any urgent changes then to proceed to the ER.

## 2018-03-06 LAB
ALBUMIN SERPL-MCNC: 4.2 G/DL (ref 3.6–4.8)
ALBUMIN/GLOB SERPL: 1.8 {RATIO} (ref 1.2–2.2)
ALP SERPL-CCNC: 102 IU/L (ref 39–117)
ALT SERPL-CCNC: 17 IU/L (ref 0–32)
AST SERPL-CCNC: 21 IU/L (ref 0–40)
BILIRUB SERPL-MCNC: 0.2 MG/DL (ref 0–1.2)
BUN SERPL-MCNC: 18 MG/DL (ref 8–27)
BUN/CREAT SERPL: 23 (ref 12–28)
CALCIUM SERPL-MCNC: 8.8 MG/DL (ref 8.7–10.3)
CHLORIDE SERPL-SCNC: 102 MMOL/L (ref 96–106)
CHOLEST SERPL-MCNC: 120 MG/DL (ref 100–199)
CO2 SERPL-SCNC: 18 MMOL/L (ref 18–29)
CREAT SERPL-MCNC: 0.8 MG/DL (ref 0.57–1)
GFR SERPLBLD CREATININE-BSD FMLA CKD-EPI: 78 ML/MIN/1.73
GFR SERPLBLD CREATININE-BSD FMLA CKD-EPI: 89 ML/MIN/1.73
GLOBULIN SER CALC-MCNC: 2.3 G/DL (ref 1.5–4.5)
GLUCOSE SERPL-MCNC: 142 MG/DL (ref 65–99)
HDLC SERPL-MCNC: 38 MG/DL
INTERPRETATION, 910389: NORMAL
LDLC SERPL CALC-MCNC: 25 MG/DL (ref 0–99)
POTASSIUM SERPL-SCNC: 5 MMOL/L (ref 3.5–5.2)
PROT SERPL-MCNC: 6.5 G/DL (ref 6–8.5)
SODIUM SERPL-SCNC: 141 MMOL/L (ref 134–144)
TRIGL SERPL-MCNC: 286 MG/DL (ref 0–149)
VLDLC SERPL CALC-MCNC: 57 MG/DL (ref 5–40)

## 2018-03-08 LAB
SPECIMEN STATUS REPORT, ROLRST: NORMAL
TSH SERPL DL<=0.005 MIU/L-ACNC: 0.98 UIU/ML (ref 0.45–4.5)

## 2018-03-08 RX ORDER — FENOFIBRATE 145 MG/1
145 TABLET, COATED ORAL DAILY
Qty: 30 TAB | Refills: 3 | Status: SHIPPED | OUTPATIENT
Start: 2018-03-08 | End: 2018-04-17 | Stop reason: SDUPTHER

## 2018-04-17 DIAGNOSIS — E78.2 MIXED HYPERLIPIDEMIA: Primary | ICD-10-CM

## 2018-04-17 RX ORDER — FENOFIBRATE 145 MG/1
145 TABLET, COATED ORAL DAILY
Qty: 90 TAB | Refills: 3 | Status: SHIPPED | OUTPATIENT
Start: 2018-04-17 | End: 2018-12-14 | Stop reason: ALTCHOICE

## 2018-05-14 ENCOUNTER — APPOINTMENT (OUTPATIENT)
Dept: CT IMAGING | Age: 68
End: 2018-05-14
Attending: EMERGENCY MEDICINE
Payer: MEDICARE

## 2018-05-14 ENCOUNTER — APPOINTMENT (OUTPATIENT)
Dept: GENERAL RADIOLOGY | Age: 68
End: 2018-05-14
Attending: EMERGENCY MEDICINE
Payer: MEDICARE

## 2018-05-14 ENCOUNTER — HOSPITAL ENCOUNTER (EMERGENCY)
Age: 68
Discharge: HOME | End: 2018-05-14
Attending: EMERGENCY MEDICINE
Payer: MEDICARE

## 2018-05-14 VITALS
OXYGEN SATURATION: 99 % | BODY MASS INDEX: 29.18 KG/M2 | WEIGHT: 170 LBS | TEMPERATURE: 98.3 F | RESPIRATION RATE: 17 BRPM | HEART RATE: 87 BPM | DIASTOLIC BLOOD PRESSURE: 81 MMHG | SYSTOLIC BLOOD PRESSURE: 149 MMHG

## 2018-05-14 DIAGNOSIS — M25.511 ACUTE PAIN OF RIGHT SHOULDER: ICD-10-CM

## 2018-05-14 DIAGNOSIS — F41.9 ANXIETY: ICD-10-CM

## 2018-05-14 DIAGNOSIS — N39.0 URINARY TRACT INFECTION WITHOUT HEMATURIA, SITE UNSPECIFIED: ICD-10-CM

## 2018-05-14 DIAGNOSIS — R56.9 SEIZURE (HCC): Primary | ICD-10-CM

## 2018-05-14 LAB
ALBUMIN SERPL-MCNC: 3.9 G/DL (ref 3.5–5)
ALBUMIN/GLOB SERPL: 1.4 {RATIO} (ref 1.1–2.2)
ALP SERPL-CCNC: 70 U/L (ref 45–117)
ALT SERPL-CCNC: 40 U/L (ref 12–78)
ANION GAP SERPL CALC-SCNC: 6 MMOL/L (ref 5–15)
APPEARANCE UR: ABNORMAL
AST SERPL-CCNC: 33 U/L (ref 15–37)
BACTERIA URNS QL MICRO: ABNORMAL /HPF
BASOPHILS # BLD: 0.1 K/UL (ref 0–0.1)
BASOPHILS NFR BLD: 1 % (ref 0–1)
BILIRUB SERPL-MCNC: 0.3 MG/DL (ref 0.2–1)
BILIRUB UR QL: NEGATIVE
BUN SERPL-MCNC: 13 MG/DL (ref 6–20)
BUN/CREAT SERPL: 12 (ref 12–20)
CALCIUM SERPL-MCNC: 9 MG/DL (ref 8.5–10.1)
CHLORIDE SERPL-SCNC: 108 MMOL/L (ref 97–108)
CK SERPL-CCNC: 144 U/L (ref 26–192)
CO2 SERPL-SCNC: 24 MMOL/L (ref 21–32)
COLOR UR: ABNORMAL
CREAT SERPL-MCNC: 1.06 MG/DL (ref 0.55–1.02)
DIFFERENTIAL METHOD BLD: ABNORMAL
EOSINOPHIL # BLD: 0.1 K/UL (ref 0–0.4)
EOSINOPHIL NFR BLD: 1 % (ref 0–7)
EPITH CASTS URNS QL MICRO: ABNORMAL /LPF
ERYTHROCYTE [DISTWIDTH] IN BLOOD BY AUTOMATED COUNT: 13.3 % (ref 11.5–14.5)
GLOBULIN SER CALC-MCNC: 2.8 G/DL (ref 2–4)
GLUCOSE BLD STRIP.AUTO-MCNC: 74 MG/DL (ref 65–100)
GLUCOSE BLD STRIP.AUTO-MCNC: 83 MG/DL (ref 65–100)
GLUCOSE SERPL-MCNC: 152 MG/DL (ref 65–100)
GLUCOSE UR STRIP.AUTO-MCNC: NEGATIVE MG/DL
HCT VFR BLD AUTO: 36.5 % (ref 35–47)
HGB BLD-MCNC: 11.6 G/DL (ref 11.5–16)
HGB UR QL STRIP: NEGATIVE
IMM GRANULOCYTES # BLD: 0 K/UL (ref 0–0.04)
IMM GRANULOCYTES NFR BLD AUTO: 0 % (ref 0–0.5)
KETONES UR QL STRIP.AUTO: NEGATIVE MG/DL
LEUKOCYTE ESTERASE UR QL STRIP.AUTO: ABNORMAL
LYMPHOCYTES # BLD: 1 K/UL (ref 0.8–3.5)
LYMPHOCYTES NFR BLD: 10 % (ref 12–49)
MCH RBC QN AUTO: 29.4 PG (ref 26–34)
MCHC RBC AUTO-ENTMCNC: 31.8 G/DL (ref 30–36.5)
MCV RBC AUTO: 92.4 FL (ref 80–99)
MONOCYTES # BLD: 0.5 K/UL (ref 0–1)
MONOCYTES NFR BLD: 5 % (ref 5–13)
NEUTS SEG # BLD: 8.5 K/UL (ref 1.8–8)
NEUTS SEG NFR BLD: 83 % (ref 32–75)
NITRITE UR QL STRIP.AUTO: NEGATIVE
NRBC # BLD: 0 K/UL (ref 0–0.01)
NRBC BLD-RTO: 0 PER 100 WBC
PH UR STRIP: 6 [PH] (ref 5–8)
PLATELET # BLD AUTO: 312 K/UL (ref 150–400)
PMV BLD AUTO: 10.5 FL (ref 8.9–12.9)
POTASSIUM SERPL-SCNC: 4.6 MMOL/L (ref 3.5–5.1)
PROT SERPL-MCNC: 6.7 G/DL (ref 6.4–8.2)
PROT UR STRIP-MCNC: NEGATIVE MG/DL
RBC # BLD AUTO: 3.95 M/UL (ref 3.8–5.2)
RBC #/AREA URNS HPF: ABNORMAL /HPF (ref 0–5)
SERVICE CMNT-IMP: NORMAL
SERVICE CMNT-IMP: NORMAL
SODIUM SERPL-SCNC: 138 MMOL/L (ref 136–145)
SP GR UR REFRACTOMETRY: 1.02 (ref 1–1.03)
TROPONIN I SERPL-MCNC: <0.04 NG/ML
UA: UC IF INDICATED,UAUC: ABNORMAL
UROBILINOGEN UR QL STRIP.AUTO: 1 EU/DL (ref 0.2–1)
WBC # BLD AUTO: 10.2 K/UL (ref 3.6–11)
WBC URNS QL MICRO: ABNORMAL /HPF (ref 0–4)

## 2018-05-14 PROCEDURE — 87077 CULTURE AEROBIC IDENTIFY: CPT | Performed by: PHYSICIAN ASSISTANT

## 2018-05-14 PROCEDURE — 99285 EMERGENCY DEPT VISIT HI MDM: CPT

## 2018-05-14 PROCEDURE — 93005 ELECTROCARDIOGRAM TRACING: CPT

## 2018-05-14 PROCEDURE — 96372 THER/PROPH/DIAG INJ SC/IM: CPT

## 2018-05-14 PROCEDURE — 73030 X-RAY EXAM OF SHOULDER: CPT

## 2018-05-14 PROCEDURE — 82962 GLUCOSE BLOOD TEST: CPT

## 2018-05-14 PROCEDURE — 80053 COMPREHEN METABOLIC PANEL: CPT | Performed by: PHYSICIAN ASSISTANT

## 2018-05-14 PROCEDURE — 74011250637 HC RX REV CODE- 250/637: Performed by: EMERGENCY MEDICINE

## 2018-05-14 PROCEDURE — 85025 COMPLETE CBC W/AUTO DIFF WBC: CPT | Performed by: PHYSICIAN ASSISTANT

## 2018-05-14 PROCEDURE — 87086 URINE CULTURE/COLONY COUNT: CPT | Performed by: PHYSICIAN ASSISTANT

## 2018-05-14 PROCEDURE — 87186 SC STD MICRODIL/AGAR DIL: CPT | Performed by: PHYSICIAN ASSISTANT

## 2018-05-14 PROCEDURE — 36415 COLL VENOUS BLD VENIPUNCTURE: CPT | Performed by: PHYSICIAN ASSISTANT

## 2018-05-14 PROCEDURE — 84484 ASSAY OF TROPONIN QUANT: CPT | Performed by: PHYSICIAN ASSISTANT

## 2018-05-14 PROCEDURE — 82550 ASSAY OF CK (CPK): CPT | Performed by: PHYSICIAN ASSISTANT

## 2018-05-14 PROCEDURE — 70450 CT HEAD/BRAIN W/O DYE: CPT

## 2018-05-14 PROCEDURE — 81001 URINALYSIS AUTO W/SCOPE: CPT | Performed by: PHYSICIAN ASSISTANT

## 2018-05-14 PROCEDURE — 74011250636 HC RX REV CODE- 250/636: Performed by: EMERGENCY MEDICINE

## 2018-05-14 RX ORDER — ALPRAZOLAM 1 MG/1
TABLET ORAL
Qty: 45 TAB | Refills: 3 | OUTPATIENT
Start: 2018-05-14 | End: 2018-05-30 | Stop reason: SDUPTHER

## 2018-05-14 RX ORDER — CEPHALEXIN 500 MG/1
500 CAPSULE ORAL 3 TIMES DAILY
Qty: 21 CAP | Refills: 0 | Status: SHIPPED | OUTPATIENT
Start: 2018-05-14 | End: 2018-05-21

## 2018-05-14 RX ORDER — ALPRAZOLAM 0.5 MG/1
1 TABLET ORAL
Status: COMPLETED | OUTPATIENT
Start: 2018-05-14 | End: 2018-05-14

## 2018-05-14 RX ORDER — KETOROLAC TROMETHAMINE 30 MG/ML
15 INJECTION, SOLUTION INTRAMUSCULAR; INTRAVENOUS
Status: COMPLETED | OUTPATIENT
Start: 2018-05-14 | End: 2018-05-14

## 2018-05-14 RX ORDER — PRAVASTATIN SODIUM 40 MG/1
TABLET ORAL
Qty: 90 TAB | Refills: 3 | Status: SHIPPED | OUTPATIENT
Start: 2018-05-14 | End: 2018-12-14 | Stop reason: ALTCHOICE

## 2018-05-14 RX ADMIN — KETOROLAC TROMETHAMINE 15 MG: 30 INJECTION, SOLUTION INTRAMUSCULAR; INTRAVENOUS at 20:54

## 2018-05-14 RX ADMIN — ALPRAZOLAM 1 MG: 0.5 TABLET ORAL at 20:15

## 2018-05-14 NOTE — ED NOTES
Bedside and Verbal shift change report given to Tran Rick RN (oncoming nurse) by Hina Quevedo RN (offgoing nurse). Report included the following information SBAR, Kardex, ED Summary, Intake/Output, MAR, Recent Results, Med Rec Status and Cardiac Rhythm Sinus Rhythm.

## 2018-05-14 NOTE — ED PROVIDER NOTES
PROVIDER IN TRIAGE NOTE:  3:27 PM  Ronit Mcclure PA-C has evaluated the patient as the Provider in Triage (PIT). Pt presents to the Emergency Dept with c/o altered mental status. Pt has only eaten an apple today per EMS, but pt has reportedly had similar sx with withdrawal from her anxiety medications in the past and states the pharmacist was unable to refill her medication until she is seen by her provider. They have reviewed the vital signs and the triage nurse assessment. They have talked with the patient and any available family and advised that the appropriate studies have been ordered to initiate the work up based on the clinical presentation during the assessment. The pt has been advised that they will be accommodated in the Main ED as soon as possible. The pt has been requested to contact the triage nurse or PIT immediately if they experiences any changes in their condition during this brief waiting period. EMERGENCY DEPARTMENT HISTORY AND PHYSICAL EXAM      Date: 5/14/2018  Patient Name: Antonette Babcock    History of Presenting Illness     Chief Complaint   Patient presents with    Withdrawal     Pt states she is going through withdraws from xanax. Last dose Saturday. Pt c/o acute onset of diarrhea, fatigue, decreased appetite this afternoon       History Provided By: Patient and Patient's     HPI: Antonette Babcock, 74 y.o. female with PMHx significant for depression/anxiety, DM, CAD, HTN, presents via EMS to the ED for evaluation after seizure like activity lasting 3 minutes witnessed by her  just PTA. Her  reports pt was shaking uncontrollably. Patient's  notes she was also foaming at the mouth and bit her tongue. According to , once activity stopped, pt was slower than normal. He notes pt was sitting at the time and denies any injuries. Pt with no prior hx of seizures. She currently c/o generalized weakness and R shoulder pain, which began after seizure like activity. She notes she is prescribed Xanax 1mg BID, but ran out of prescription 3 days ago. Pt reports chronic leg swelling. She further adds experiencing constant diarrhea all day today. Pt notes her urine has been foul smelling and darker than normal. She specifically denies any recent CP, SOB, HA, visual changes, N/V, lightheadedness, or dizziness. There are no other complaints, changes, or physical findings at this time. PCP: Anthony Espinal MD    Current Outpatient Prescriptions   Medication Sig Dispense Refill    pravastatin (PRAVACHOL) 40 mg tablet take 1 tablet by mouth EVERY NIGHT 90 Tab 3    ALPRAZolam (XANAX) 1 mg tablet TAKE 1/2 TABLET EVERY DAY AS NEEDED FOR ANXIETY AND TAKE 1 TABLET AT BEDTIME AS NEEDED FOR SLEEP 45 Tab 3    cephALEXin (KEFLEX) 500 mg capsule Take 1 Cap by mouth three (3) times daily for 7 days. 21 Cap 0    JANUVIA 100 mg tablet take 1 tablet by mouth once daily for diabetes 90 Tab 3    fenofibrate nanocrystallized (TRICOR) 145 mg tablet Take 1 Tab by mouth daily. 90 Tab 3    levothyroxine (SYNTHROID) 25 mcg tablet Take 1.5 Tabs by mouth daily.  LANTUS SOLOSTAR 100 unit/mL (3 mL) inpn 10 Units by SubCUTAneous route nightly.  metFORMIN ER (GLUCOPHAGE XR) 500 mg tablet TAKE 1 TABLET TWICE DAILY WITH MEALS. RESUME FROM Sunday 11/19/2017. 180 Tab 3    amitriptyline (ELAVIL) 10 mg tablet TAKE 1 TABLET NIGHTLY 90 Tab 3    lisinopril (PRINIVIL, ZESTRIL) 2.5 mg tablet TAKE 1 TABLET EVERY DAY 90 Tab 3    metoprolol succinate (TOPROL-XL) 25 mg XL tablet TAKE 1 TABLET EVERY DAY 90 Tab 3    glipiZIDE SR (GLUCOTROL XL) 5 mg CR tablet TAKE 1 TABLET EVERY DAY 90 Tab 3    furosemide (LASIX) 20 mg tablet TAKE 1 TABLET EVERY DAY AS NEEDED 90 Tab 3    ranolazine ER (RANEXA) 500 mg SR tablet Take 1 Tab by mouth two (2) times a day.  180 Tab 3    Insulin Needles, Disposable, 31 gauge x 5/16\" ndle Use with lantus pen 3 Package 3    fluticasone (FLONASE) 50 mcg/actuation nasal spray INSTILL 2 SPRAYS INTO BOTH NOSTRILS DAILY AS NEEDED FOR RHINITIS. 48 g 3    Aspirin, Buffered 81 mg tab Take 81 mg by mouth daily.          Past History     Past Medical History:  Past Medical History:   Diagnosis Date    Anxiety     Arthritis     ASHD (arteriosclerotic heart disease)     CAD (coronary artery disease)     Chronic pain     Constipation     Depression     Diabetes ([address]     Diarrhea     Dysgeusia 11/21/2014    Esophageal motor disorder 1/23/2012    Flatulence/gas pain/belching 11/21/2014    Gastroparesis     GERD (gastroesophageal reflux disease)     Hypercholesterolemia     Hypertension     Psychiatric disorder     anxiety    S/P bypass gastrojejunostomy 1/23/2012    Sleep apnea     no cpap       Past Surgical History:  Past Surgical History:   Procedure Laterality Date    ABDOMEN SURGERY PROC UNLISTED      s/p partial gastrectomy 2/2 gastroparesis    CARDIAC SURG PROCEDURE UNLIST  6/2013    stent placement X 2    COLONOSCOPY N/A 5/26/2016    COLONOSCOPY performed by Tierra [facility] at [facility] ENDOSCOPY    EGD  7/13/2009    GASTROJEJUNOSTOMY      HC BLD CARPEL TUNNEL RELEASE      bilat    HX APPENDECTOMY      HX CHOLECYSTECTOMY      HX CORONARY STENT PLACEMENT  06/2013    3 stents    HX FEMORAL BYPASS Right 11/2017    HX GASTRIC BYPASS  6/02    HX HYSTERECTOMY      HX LAPAROTOMY  8/02    2/2 intra abd abscess    HX ORTHOPAEDIC  1/08    laminectomy    HX ORTHOPAEDIC      Bilateral feet bone spurs removed    HX ORTHOPAEDIC      bilateral carpal tunnel    HX ORTHOPAEDIC      back surgery    HX UROLOGICAL      bladder surgery-bladder tac    ND COLONOSCOPY FLX DX W/COLLJ SPEC WHEN PFRMD  5/03/2006    Dr. Marv Perez COLONOSCOPY W/BIOPSY SINGLE/MULTIPLE  2/9/2012         ND EGD BALLOON DILATION ESOPHAGUS <30 MM DIAM  1/23/2012         ND EGD TRANSORAL BIOPSY SINGLE/MULTIPLE  7/11/2011         ND EGD TRANSORAL BIOPSY SINGLE/MULTIPLE  1/23/2012         REMOVAL OF HEEL SPUR  2001    bilat       Family History:  Family History   Problem Relation Age of Onset    Heart Disease Mother     Hypertension Mother     Diabetes Mother     Diabetes Father     Heart Disease Father     Hypertension Father     Alcohol abuse Brother     Heart Disease Sister     Hypertension Sister     Diabetes Sister     No Known Problems Sister     Alcohol abuse Brother     Alcohol abuse Brother     Diabetes Brother        Social History:  Social History   Substance Use Topics    Smoking status: Never Smoker    Smokeless tobacco: Never Used    Alcohol use No       Allergies: Allergies   Allergen Reactions    Demerol [Meperidine] Unknown (comments)     Pt unsure of reaction         Review of Systems   Review of Systems   Constitutional: Negative for fatigue and fever. HENT: Negative. Eyes: Negative. Negative for visual disturbance. Respiratory: Negative for shortness of breath and wheezing. Cardiovascular: Negative for chest pain and leg swelling. Gastrointestinal: Negative for abdominal pain, blood in stool, constipation, diarrhea, nausea and vomiting. Endocrine: Negative. Genitourinary: Negative for difficulty urinating and dysuria. Musculoskeletal: Negative. Skin: Negative for rash. Allergic/Immunologic: Negative. Neurological: Positive for weakness (generalized). Negative for dizziness, light-headedness and numbness. +seizure like activity   Hematological: Negative. Psychiatric/Behavioral: Negative. Physical Exam   Physical Exam   Constitutional: She is oriented to person, place, and time. She appears well-developed and well-nourished. No distress. HENT:   Head: Normocephalic. Mouth/Throat: Oropharynx is clear and moist.   R tongue contusion and L lower lip contusion   Eyes: Conjunctivae and EOM are normal.   Neck: Neck supple. No JVD present. No tracheal deviation present.    Cardiovascular: Normal rate, regular rhythm and intact distal pulses. Exam reveals no gallop and no friction rub. No murmur heard. Pulmonary/Chest: Effort normal and breath sounds normal. No stridor. No respiratory distress. She has no wheezes. Abdominal: Soft. Bowel sounds are normal. She exhibits no distension and no mass. There is no tenderness. There is no guarding. Musculoskeletal: She exhibits no edema or deformity. Full ROM of R wrist and R elbow, but diminished R shoulder ROM secondary to pain. No obvious deformity or swelling of shoulder. 2+ radial pulses on the R.    Neurological: She is alert and oriented to person, place, and time. She has normal strength. No focal deficits. Sensation intact throughout all extremities. 5/5 strength bilateral upper and lower extremities. Skin: Skin is warm, dry and intact. No rash noted. Psychiatric: She has a normal mood and affect. Her behavior is normal. Judgment and thought content normal.   Nursing note and vitals reviewed.         Diagnostic Study Results     Labs -     Recent Results (from the past 12 hour(s))   GLUCOSE, POC    Collection Time: 05/14/18  3:26 PM   Result Value Ref Range    Glucose (POC) 74 65 - 100 mg/dL    Performed by Marlin Gill    EKG, 12 LEAD, INITIAL    Collection Time: 05/14/18  3:31 PM   Result Value Ref Range    Ventricular Rate 88 BPM    Atrial Rate 88 BPM    P-R Interval 154 ms    QRS Duration 82 ms    Q-T Interval 378 ms    QTC Calculation (Bezet) 457 ms    Calculated P Axis 45 degrees    Calculated R Axis -8 degrees    Calculated T Axis 28 degrees    Diagnosis       Normal sinus rhythm  Normal ECG  When compared with ECG of 06-JAN-2017 00:04,  premature ventricular complexes are no longer present     GLUCOSE, POC    Collection Time: 05/14/18  3:46 PM   Result Value Ref Range    Glucose (POC) 83 65 - 100 mg/dL    Performed by Valente Stout    CBC WITH AUTOMATED DIFF    Collection Time: 05/14/18  4:08 PM   Result Value Ref Range    WBC 10.2 3.6 - 11.0 K/uL    RBC 3.95 3.80 - 5.20 M/uL    HGB 11.6 11.5 - 16.0 g/dL    HCT 36.5 35.0 - 47.0 %    MCV 92.4 80.0 - 99.0 FL    MCH 29.4 26.0 - 34.0 PG    MCHC 31.8 30.0 - 36.5 g/dL    RDW 13.3 11.5 - 14.5 %    PLATELET 744 052 - 109 K/uL    MPV 10.5 8.9 - 12.9 FL    NRBC 0.0 0  WBC    ABSOLUTE NRBC 0.00 0.00 - 0.01 K/uL    NEUTROPHILS 83 (H) 32 - 75 %    LYMPHOCYTES 10 (L) 12 - 49 %    MONOCYTES 5 5 - 13 %    EOSINOPHILS 1 0 - 7 %    BASOPHILS 1 0 - 1 %    IMMATURE GRANULOCYTES 0 0.0 - 0.5 %    ABS. NEUTROPHILS 8.5 (H) 1.8 - 8.0 K/UL    ABS. LYMPHOCYTES 1.0 0.8 - 3.5 K/UL    ABS. MONOCYTES 0.5 0.0 - 1.0 K/UL    ABS. EOSINOPHILS 0.1 0.0 - 0.4 K/UL    ABS. BASOPHILS 0.1 0.0 - 0.1 K/UL    ABS. IMM. GRANS. 0.0 0.00 - 0.04 K/UL    DF AUTOMATED     METABOLIC PANEL, COMPREHENSIVE    Collection Time: 05/14/18  4:08 PM   Result Value Ref Range    Sodium 138 136 - 145 mmol/L    Potassium 4.6 3.5 - 5.1 mmol/L    Chloride 108 97 - 108 mmol/L    CO2 24 21 - 32 mmol/L    Anion gap 6 5 - 15 mmol/L    Glucose 152 (H) 65 - 100 mg/dL    BUN 13 6 - 20 MG/DL    Creatinine 1.06 (H) 0.55 - 1.02 MG/DL    BUN/Creatinine ratio 12 12 - 20      GFR est AA >60 >60 ml/min/1.73m2    GFR est non-AA 52 (L) >60 ml/min/1.73m2    Calcium 9.0 8.5 - 10.1 MG/DL    Bilirubin, total 0.3 0.2 - 1.0 MG/DL    ALT (SGPT) 40 12 - 78 U/L    AST (SGOT) 33 15 - 37 U/L    Alk.  phosphatase 70 45 - 117 U/L    Protein, total 6.7 6.4 - 8.2 g/dL    Albumin 3.9 3.5 - 5.0 g/dL    Globulin 2.8 2.0 - 4.0 g/dL    A-G Ratio 1.4 1.1 - 2.2     CK W/ REFLX CKMB    Collection Time: 05/14/18  4:08 PM   Result Value Ref Range     26 - 192 U/L   TROPONIN I    Collection Time: 05/14/18  4:08 PM   Result Value Ref Range    Troponin-I, Qt. <0.04 <0.05 ng/mL   URINALYSIS W/ REFLEX CULTURE    Collection Time: 05/14/18  5:55 PM   Result Value Ref Range    Color YELLOW/STRAW      Appearance CLOUDY (A) CLEAR      Specific gravity 1.017 1.003 - 1.030      pH (UA) 6.0 5.0 - 8.0      Protein NEGATIVE  NEG mg/dL Glucose NEGATIVE  NEG mg/dL    Ketone NEGATIVE  NEG mg/dL    Bilirubin NEGATIVE  NEG      Blood NEGATIVE  NEG      Urobilinogen 1.0 0.2 - 1.0 EU/dL    Nitrites NEGATIVE  NEG      Leukocyte Esterase LARGE (A) NEG      WBC 20-50 0 - 4 /hpf    RBC 0-5 0 - 5 /hpf    Epithelial cells FEW FEW /lpf    Bacteria 2+ (A) NEG /hpf    UA:UC IF INDICATED URINE CULTURE ORDERED (A) CNI         Radiologic Studies -   XR SHOULDER RT AP/LAT MIN 2 V   Final Result   EXAM:  XR SHOULDER RT AP/LAT MIN 2 V     INDICATION:   Trauma.     COMPARISON: None.     FINDINGS: Three views of the right shoulder demonstrate no fracture, dislocation  or other acute abnormality. DJD     IMPRESSION  IMPRESSION:  No acute abnormality.        CT Results  (Last 48 hours)               05/14/18 1846  CT HEAD WO CONT Final result    Impression:   impression: No acute changes . Narrative:  EXAM:  CT HEAD WO CONT       INDICATION:   Seizure, new, >39yo, no trauma       COMPARISON: January 5, 2017. CONTRAST:  None. TECHNIQUE: Unenhanced CT of the head was performed using 5 mm images. Brain and   bone windows were generated. CT dose reduction was achieved through use of a   standardized protocol tailored for this examination and automatic exposure   control for dose modulation. FINDINGS:   There is no extra-axial fluid collection hemorrhage or shift. Ventricles are   normal in size and midline. No masses . Medical Decision Making   I am the first provider for this patient. I reviewed the vital signs, available nursing notes, past medical history, past surgical history, family history and social history. Vital Signs-Reviewed the patient's vital signs.   Patient Vitals for the past 12 hrs:   Temp Pulse Resp BP SpO2   05/14/18 2000 - 87 17 149/81 99 %   05/14/18 1931 - 89 17 166/74 98 %   05/14/18 1522 98.3 °F (36.8 °C) 84 16 156/87 100 %       EKG interpretation: (Preliminary) 15:31  Rhythm: normal sinus rhythm; and regular . Rate (approx.): 88; Axis: normal; UT interval: normal; QRS interval: normal ; ST/T wave: normal.  Written by Shedrick Meigs, ED Scribe, as dictated by Yadira Alvarenga DO. Records Reviewed: Nursing Notes, Old Medical Records, Previous Radiology Studies and Previous Laboratory Studies    Provider Notes (Medical Decision Making):   Pt with no hx of seizure presents s/p seizure now at baseline mental status. Has not had chronic Xanax for several days now because she ran out of prescription. Pt has no other complaints except for R shoulder pain. Will check labs, UA, head CT, and shoulder X-Ray to evaluate for electrolyte abnormality, dehydration, UTI, and trauma. Will tx with home dose of Xanax. ED Course:   Initial assessment performed. The patients presenting problems have been discussed, and they are in agreement with the care plan formulated and outlined with them. I have encouraged them to ask questions as they arise throughout their visit. 8:30 PM  Discussed all labs and imaging results with patient and family. She states she is feeling better after home dose of Xanax. Discharge Note:  8:44 PM  The patient has been re-evaluated and is ready for discharge. Reviewed available results with patient. Counseled patient on diagnosis and care plan. Patient has expressed understanding, and all questions have been answered. Patient agrees with plan and agrees to follow up as recommended, or to return to the ED if their symptoms worsen. Discharge instructions have been provided and explained to the patient, along with reasons to return to the ED. PLAN:  1.    Discharge Medication List as of 5/14/2018  8:37 PM      CONTINUE these medications which have CHANGED    Details   pravastatin (PRAVACHOL) 40 mg tablet take 1 tablet by mouth EVERY NIGHT, Normal, Disp-90 Tab, R-3      ALPRAZolam (XANAX) 1 mg tablet TAKE 1/2 TABLET EVERY DAY AS NEEDED FOR ANXIETY AND TAKE 1 TABLET AT BEDTIME AS NEEDED FOR SLEEP, Phone In, Disp-45 Tab, R-3         CONTINUE these medications which have NOT CHANGED    Details   JANUVIA 100 mg tablet take 1 tablet by mouth once daily for diabetes, Normal, Disp-90 Tab, R-3      fenofibrate nanocrystallized (TRICOR) 145 mg tablet Take 1 Tab by mouth daily. , Normal, Disp-90 Tab, R-3      levothyroxine (SYNTHROID) 25 mcg tablet Take 1.5 Tabs by mouth daily. , Historical Med      LANTUS SOLOSTAR 100 unit/mL (3 mL) inpn 10 Units by SubCUTAneous route nightly., Historical Med, IRINA      metFORMIN ER (GLUCOPHAGE XR) 500 mg tablet TAKE 1 TABLET TWICE DAILY WITH MEALS. RESUME FROM Sunday 11/19/2017., Normal, Disp-180 Tab, R-3      amitriptyline (ELAVIL) 10 mg tablet TAKE 1 TABLET NIGHTLY, Normal, Disp-90 Tab, R-3      lisinopril (PRINIVIL, ZESTRIL) 2.5 mg tablet TAKE 1 TABLET EVERY DAY, Normal, Disp-90 Tab, R-3      metoprolol succinate (TOPROL-XL) 25 mg XL tablet TAKE 1 TABLET EVERY DAY, Normal, Disp-90 Tab, R-3      glipiZIDE SR (GLUCOTROL XL) 5 mg CR tablet TAKE 1 TABLET EVERY DAY, Normal, Disp-90 Tab, R-3      furosemide (LASIX) 20 mg tablet TAKE 1 TABLET EVERY DAY AS NEEDED, Normal, Disp-90 Tab, R-3      ranolazine ER (RANEXA) 500 mg SR tablet Take 1 Tab by mouth two (2) times a day., Mail Order, Disp-180 Tab, R-3      Insulin Needles, Disposable, 31 gauge x 5/16\" ndle Use with lantus pen, Normal, Disp-3 Package, R-3      fluticasone (FLONASE) 50 mcg/actuation nasal spray INSTILL 2 SPRAYS INTO BOTH NOSTRILS DAILY AS NEEDED FOR RHINITIS., Normal, Disp-48 g, R-3      Aspirin, Buffered 81 mg tab Take 81 mg by mouth daily. , Historical Med           2.    Follow-up Information     Follow up With Details Comments Contact Lilian Becker MD Schedule an appointment as soon as possible for a visit  [address] [place] [address]  [phone]      [facility] EMERGENCY DEPT  As needed, If symptoms worsen [address]  [place] [phone]        Return to ED if worse     Diagnosis     Clinical Impression:   1. Seizure ([address]    2. Acute pain of right shoulder    3. Urinary tract infection without hematuria, site unspecified        Attestations: This note is prepared by Pradeep Crow, acting as Scribe for Sho Campos DO. The scribe's documentation has been prepared under my direction and personally reviewed by me in its entirety. I confirm that the note above accurately reflects all work, treatment, procedures, and medical decision making performed by me. Sho Campos DO        This note will not be viewable in [address]

## 2018-05-14 NOTE — ED NOTES
Patient returned from x-ray at this time. Bedside shift report received from [facility], [address]. Assumed care of patient. Patient placed in position of comfort. Call bell in reach.

## 2018-05-14 NOTE — ED NOTES
Patient presents to ED with complaints of a witnessed seizure that lasted about 1-3 minutes per her .  states the patient did not hit her head, however, she bit her tongue and was foaming at the mouth. When patient's seizure was complete, the  states she was weak and slow reacting, but was not confused. Patient was unaware that she was about to have a seizure. She states she has had a seizure before. Patient does not have a neurologist.     Patient is complaining of right arm pain. Family at bedside. Call bell within reach. Side rails x2.  Patient on the monitor x3

## 2018-05-15 DIAGNOSIS — F41.9 ANXIETY: ICD-10-CM

## 2018-05-15 LAB
ATRIAL RATE: 88 BPM
CALCULATED P AXIS, ECG09: 45 DEGREES
CALCULATED R AXIS, ECG10: -8 DEGREES
CALCULATED T AXIS, ECG11: 28 DEGREES
DIAGNOSIS, 93000: NORMAL
P-R INTERVAL, ECG05: 154 MS
Q-T INTERVAL, ECG07: 378 MS
QRS DURATION, ECG06: 82 MS
QTC CALCULATION (BEZET), ECG08: 457 MS
VENTRICULAR RATE, ECG03: 88 BPM

## 2018-05-15 RX ORDER — ALPRAZOLAM 1 MG/1
TABLET ORAL
Qty: 45 TAB | Refills: 3 | Status: CANCELLED | OUTPATIENT
Start: 2018-05-15

## 2018-05-15 NOTE — ED NOTES
Dr. Leo Serrano at bedside to provide discharge paperwork. Vital signs stable. Pt in no apparent distress at this time. Mental status at baseline. Patient wheeled to waiting room with discharge paperwork in hand. Accompanied by family.

## 2018-05-15 NOTE — DISCHARGE INSTRUCTIONS
Shoulder Pain: Care Instructions  Your Care Instructions    You can hurt your shoulder by using it too much during an activity, such as fishing or baseball. It can also happen as part of the everyday wear and tear of getting older. Shoulder injuries can be slow to heal, but your shoulder should get better with time. Your doctor may recommend a sling to rest your shoulder. If you have injured your shoulder, you may need testing and treatment. Follow-up care is a key part of your treatment and safety. Be sure to make and go to all appointments, and call your doctor if you are having problems. It's also a good idea to know your test results and keep a list of the medicines you take. How can you care for yourself at home? · Take pain medicines exactly as directed. ¨ If the doctor gave you a prescription medicine for pain, take it as prescribed. ¨ If you are not taking a prescription pain medicine, ask your doctor if you can take an over-the-counter medicine. ¨ Do not take two or more pain medicines at the same time unless the doctor told you to. Many pain medicines contain acetaminophen, which is Tylenol. Too much acetaminophen (Tylenol) can be harmful. · If your doctor recommends that you wear a sling, use it as directed. Do not take it off before your doctor tells you to. · Put ice or a cold pack on the sore area for 10 to 20 minutes at a time. Put a thin cloth between the ice and your skin. · If there is no swelling, you can put moist heat, a heating pad, or a warm cloth on your shoulder. Some doctors suggest alternating between hot and cold. · Rest your shoulder for a few days. If your doctor recommends it, you can then begin gentle exercise of the shoulder, but do not lift anything heavy. When should you call for help? Call 911 anytime you think you may need emergency care. For example, call if:  ? · You have chest pain or pressure. This may occur with:  ¨ Sweating.   ¨ Shortness of breath. ¨ Nausea or vomiting. ¨ Pain that spreads from the chest to the neck, jaw, or one or both shoulders or arms. ¨ Dizziness or lightheadedness. ¨ A fast or uneven pulse. After calling 911, chew 1 adult-strength aspirin. Wait for an ambulance. Do not try to drive yourself. ? · Your arm or hand is cool or pale or changes color. ?Call your doctor now or seek immediate medical care if:  ? · You have signs of infection, such as:  ¨ Increased pain, swelling, warmth, or redness in your shoulder. ¨ Red streaks leading from a place on your shoulder. ¨ Pus draining from an area of your shoulder. ¨ Swollen lymph nodes in your neck, armpits, or groin. ¨ A fever. ? Watch closely for changes in your health, and be sure to contact your doctor if:  ? · You cannot use your shoulder. ? · Your shoulder does not get better as expected. Where can you learn more? Go to http://kassi-eliu.info/. Enter S862 in the search box to learn more about \"Shoulder Pain: Care Instructions. \"  Current as of: March 21, 2017  Content Version: 11.4  © 2266-9949 [employer]. Care instructions adapted under license by [employer] (which disclaims liability or warranty for this information). If you have questions about a medical condition or this instruction, always ask your healthcare professional. [place] [address] any warranty or liability for your use of this information. Seizure: Care Instructions  Your Care Instructions    Seizures are caused by abnormal patterns of electrical signals in the brain. They are different for each person. Seizures can affect movement, speech, vision, or awareness. Some people have only slight shaking of a hand and do not pass out. Other people may pass out and have violent shaking of the whole body. Some people appear to stare into space. They are awake, but they can't respond normally.  Later, they may not remember what happened. You may need tests to identify the type and cause of the seizures. A seizure may occur only once, or you may have them more than one time. Taking medicines as directed and following up with your doctor may help keep you from having more seizures. The doctor has checked you carefully, but problems can develop later. If you notice any problems or new symptoms, get medical treatment right away. Follow-up care is a key part of your treatment and safety. Be sure to make and go to all appointments, and call your doctor if you are having problems. It's also a good idea to know your test results and keep a list of the medicines you take. How can you care for yourself at home? · Be safe with medicines. Take your medicines exactly as prescribed. Call your doctor if you think you are having a problem with your medicine. · Do not do any activity that could be dangerous to you or others until your doctor says it is safe to do so. For example, do not drive a car, operate machinery, swim, or climb ladders. · Be sure that anyone treating you for any health problem knows that you have had a seizure and what medicines you are taking for it. · Identify and avoid things that may make you more likely to have a seizure. These may include lack of sleep, alcohol or drug use, stress, or not eating. · Make sure you go to your follow-up appointment. When should you call for help? Call 911 anytime you think you may need emergency care. For example, call if:  ? · You have another seizure. ? · You have more than one seizure in 24 hours. ? · You have new symptoms, such as trouble walking, speaking, or thinking clearly. ?Call your doctor now or seek immediate medical care if:  ? · You are not acting normally. ? Watch closely for changes in your health, and be sure to contact your doctor if you have any problems. Where can you learn more? Go to http://kassi-eliu.info/.   Enter L737 in the search box to learn more about \"Seizure: Care Instructions. \"  Current as of: October 14, 2016  Content Version: 11.4  © 5423-1094 [employer], [employer]. Care instructions adapted under license by [employer] (which disclaims liability or warranty for this information). If you have questions about a medical condition or this instruction, always ask your healthcare professional. [address] any warranty or liability for your use of this information.

## 2018-05-16 LAB
BACTERIA SPEC CULT: ABNORMAL
BACTERIA SPEC CULT: ABNORMAL
CC UR VC: ABNORMAL
SERVICE CMNT-IMP: ABNORMAL

## 2018-05-30 ENCOUNTER — OFFICE VISIT (OUTPATIENT)
Dept: FAMILY MEDICINE CLINIC | Age: 68
End: 2018-05-30

## 2018-05-30 DIAGNOSIS — F41.9 ANXIETY: Primary | ICD-10-CM

## 2018-05-30 DIAGNOSIS — Z51.81 ENCOUNTER FOR MEDICATION MONITORING: ICD-10-CM

## 2018-05-30 DIAGNOSIS — Z91.14 NONCOMPLIANCE WITH MEDICATION REGIMEN: ICD-10-CM

## 2018-05-30 DIAGNOSIS — F13.939 WITHDRAWAL FROM SEDATIVE, HYPNOTIC, OR ANXIOLYTIC DRUG (HCC): ICD-10-CM

## 2018-05-30 RX ORDER — MELOXICAM 15 MG/1
15 TABLET ORAL DAILY
COMMUNITY
End: 2018-12-14 | Stop reason: ALTCHOICE

## 2018-05-30 RX ORDER — ALPRAZOLAM 1 MG/1
TABLET ORAL
Qty: 45 TAB | Refills: 3 | Status: SHIPPED | OUTPATIENT
Start: 2018-05-30 | End: 2018-10-22 | Stop reason: SDUPTHER

## 2018-05-30 RX ORDER — CHOLECALCIFEROL (VITAMIN D3) 125 MCG
CAPSULE ORAL
COMMUNITY

## 2018-05-30 RX ORDER — LEVOTHYROXINE SODIUM 25 UG/1
TABLET ORAL
COMMUNITY
End: 2018-12-20 | Stop reason: SDUPTHER

## 2018-05-30 NOTE — MR AVS SNAPSHOT
[address] [facility], [place], operated by [facility] 
 
 
 [address] [facility] [address] 
[phone] Patient: Donna Wade MRN: [id number] IAK:[id number] Visit Information Date & Time Provider Department Dept. Phone Encounter #  
 5/30/2018 11:15 AM Valeriy Weems MD [facility] [phone] [id number] Your Appointments 6/14/2018  1:15 PM  
6 MONTH with Chester Huitron MD  
[facility] [address] [place] [address]) Appt Note: Dr. Low [address]  
[phone] [id number] [facility]  
  
    
 7/16/2018  2:00 PM  
ROUTINE CARE with Valeriy Weems MD  
[address]) Appt Note: routine follow up; r/s from 07/11  
 [address] [place] [id number]  
[phone] [facility] [address] Upcoming Health Maintenance Date Due Influenza Age 5 to Adult 8/1/2018 HEMOGLOBIN A1C Q6M 9/5/2018 FOOT EXAM Q1 9/12/2018 MICROALBUMIN Q1 9/12/2018 MEDICARE YEARLY EXAM 9/13/2018 EYE EXAM RETINAL OR DILATED Q1 2/23/2019 LIPID PANEL Q1 3/5/2019 BREAST CANCER SCRN MAMMOGRAM 2/7/2020 GLAUCOMA SCREENING Q2Y 2/23/2020 COLONOSCOPY 5/26/2021 DTaP/Tdap/Td series (2 - Td) 9/29/2025 Allergies as of 5/30/2018  Review Complete On: 5/30/2018 By: Charlotte Goodman LPN Severity Noted Reaction Type Reactions Demerol [Meperidine]  03/12/2010    Unknown (comments) Pt unsure of reaction Current Immunizations  Reviewed on 3/5/2018 Name Date Influenza High Dose Vaccine PF 9/12/2017 Influenza Vaccine 11/17/2014, 11/11/2013 Influenza Vaccine Rocio Elise) 9/29/2015 Influenza Vaccine (Quad) PF 8/30/2016 Influenza Vaccine Split 11/16/2012, 9/26/2011, 10/15/2010 Pneumococcal Polysaccharide (PPSV-23) 9/12/2017 Pneumococcal Vaccine (Pcv) 11/20/2006 Tdap 9/29/2015 Not reviewed this visit You Were Diagnosed With   
  
 Codes Comments Anxiety     ICD-10-CM: F41.9 ICD-9-CM: 300.00 Vitals BP Pulse Temp Resp Height(growth percentile) Weight(growth percentile) (P) 125/73 (BP 1 Location: Left arm, BP Patient Position: Sitting) (P) 83 (P) 98.4 °F (36.9 °C) (Oral) (P) 18 (P) 5' 4\" (1.626 m) (P) 169 lb (76.7 kg) SpO2 BMI OB Status Smoking Status (P) 98% (P) 29.01 kg/m2 Hysterectomy Never Smoker BMI and BSA Data Body Mass Index Body Surface Area (P) 29.01 kg/m 2 (P) 1.86 m 2 Preferred Pharmacy Pharmacy Name Phone [facility] [phone] Your Updated Medication List  
  
   
This list is accurate as of 5/30/18  2:58 PM.  Always use your most recent med list.  
  
  
  
  
 ALPRAZolam 1 mg tablet Commonly known as:  XANAX  
TAKE 1/2 TABLET EVERY DAY AS NEEDED FOR ANXIETY AND TAKE 1 TABLET AT BEDTIME AS NEEDED FOR SLEEP  
  
 aspirin, buffered 81 mg Tab Take 81 mg by mouth daily. CENTRUM VITAMINTS PO Take 1 tablet by mouth daily  
  
 fenofibrate nanocrystallized 145 mg tablet Commonly known as:  Jimmye Leather Take 1 Tab by mouth daily. fluticasone 50 mcg/actuation nasal spray Commonly known as:  Star Serve INSTILL 2 SPRAYS INTO BOTH NOSTRILS DAILY AS NEEDED FOR RHINITIS. furosemide 20 mg tablet Commonly known as:  LASIX TAKE 1 TABLET EVERY DAY AS NEEDED  
  
 glipiZIDE SR 5 mg CR tablet Commonly known as:  GLUCOTROL XL  
TAKE 1 TABLET EVERY DAY Insulin Needles (Disposable) 31 gauge x 5/16\" Ndle Use with lantus pen JANUVIA 100 mg tablet Generic drug:  SITagliptin  
take 1 tablet by mouth once daily for diabetes LANTUS SOLOSTAR U-100 INSULIN 100 unit/mL (3 mL) Inpn Generic drug:  insulin glargine 10 Units by SubCUTAneous route nightly. levothyroxine 25 mcg tablet Commonly known as:  SYNTHROID Take 2 tablets by mouth before breakfast  
  
 lisinopril 2.5 mg tablet Commonly known as:  PRINIVIL, ZESTRIL  
TAKE 1 TABLET EVERY DAY  
  
 meloxicam 15 mg tablet Commonly known as:  MOBIC Take 15 mg by mouth daily. metFORMIN  mg tablet Commonly known as:  GLUCOPHAGE XR  
TAKE 1 TABLET TWICE DAILY WITH MEALS. RESUME FROM 2017. metoprolol succinate 25 mg XL tablet Commonly known as:  TOPROL-XL  
TAKE 1 TABLET EVERY DAY  
  
 pravastatin 40 mg tablet Commonly known as:  PRAVACHOL  
take 1 tablet by mouth EVERY NIGHT  
  
 ranolazine  mg SR tablet Commonly known as:  RANEXA Take 1 Tab by mouth two (2) times a day. varicella-zoster recombinant (PF) 50 mcg/0.5 mL Susr injection Commonly known as:  SHINGRIX  
0.5 mL by IntraMUSCular route once for 1 dose. Repeat second dose in 6 months. VITAMIN D3 2,000 unit Tab Generic drug:  cholecalciferol (vitamin D3) Take 1 capsule by mouth daily Prescriptions Printed Refills  
 varicella-zoster recombinant, PF, (SHINGRIX) 50 mcg/0.5 mL susr injection 1 Si.5 mL by IntraMUSCular route once for 1 dose. Repeat second dose in 6 months. Class: Print Route: IntraMUSCular ALPRAZolam (XANAX) 1 mg tablet 3 Sig: TAKE 1/2 TABLET EVERY DAY AS NEEDED FOR ANXIETY AND TAKE 1 TABLET AT BEDTIME AS NEEDED FOR SLEEP Class: Print Introducing [facility]! Nakia Cano introduces Epunchit patient portal. Now you can access parts of your medical record, email your doctor's office, and request medication refills online. 1. In your internet browser, go to [email or web address]. [employer] 2. Click on the First Time User? Click Here link in the Sign In box. You will see the New Member Sign Up page. 3. Enter your Epunchit Access Code exactly as it appears below.  You will not need to use this code after youve completed the sign-up process. If you do not sign up before the expiration date, you must request a new code. · BioBeats Access Code: [email or web address] Expires: 8/12/2018  3:21 PM 
 
4. Enter the last four digits of your Social Security Number (xxxx) and Date of Birth (mm/dd/yyyy) as indicated and click Submit. You will be taken to the next sign-up page. 5. Create a BioBeats ID. This will be your BioBeats login ID and cannot be changed, so think of one that is secure and easy to remember. 6. Create a BioBeats password. You can change your password at any time. 7. Enter your Password Reset Question and Answer. This can be used at a later time if you forget your password. 8. Enter your e-mail address. You will receive e-mail notification when new information is available in [address]. 9. Click Sign Up. You can now view and download portions of your medical record. 10. Click the Download Summary menu link to download a portable copy of your medical information. If you have questions, please visit the Frequently Asked Questions section of the BioBeats website. Remember, BioBeats is NOT to be used for urgent needs. For medical emergencies, dial 911. Now available from your iPhone and Android! Please provide this summary of care documentation to your next provider. Your primary care clinician is listed as Johnny Gerard. If you have any questions after today's visit, please call [phone].

## 2018-05-30 NOTE — PROGRESS NOTES
Chief Complaint   Patient presents with   [facility] Follow Up     patient was seen at the hospital from fall     Mammogram 02/07/2018    Colonoscopy 05/26/2016  repeat in 5 years    Eye exam 02/23/2018    Patient denies pain at this time. 1. Have you been to the ER, urgent care clinic since your last visit? Yes for fall 05/14/2018    Hospitalized since your last visit?no    2. Have you seen or consulted any other health care providers outside of the [address] [place] [address] since your last visit? Include any pap smears or colon screening.  No

## 2018-05-30 NOTE — PROGRESS NOTES
HISTORY OF PRESENT ILLNESS  Akash Culver is a 74 y.o. female. HPI   Follow up from the ER. Had ran of out the xanax d/t taking more that what had been prescribed. She become very anxious with shaking all over and had generalized weakness. She also suffered a fall d/t her feeling weak in the legs. Was told thru the ER that she was going thru [employer]". Medication was refill thru the ER. She is feeling back to her usual baseline. She understand that she has to not take more than what is prescribed. We reviewed all of her medications and pill bottles at this visit. Patient Active Problem List   Diagnosis Code    Anxiety F41.9    Depression F32.9    Hypovitaminosis D E55.9    GERD (gastroesophageal reflux disease) K21.9    Edema R60.9    Esophageal motor disorder K22.4    S/P bypass gastrojejunostomy Z98.0    SOB (shortness of breath) R06.02    CAD (coronary artery disease) I25.10    Angina pectoris (HCC) I20.9    S/P PTCA (percutaneous transluminal coronary angioplasty) Z98.61    Mixed hyperlipidemia E78.2    Diarrhea R19.7    Type 2 diabetes mellitus without complication (HCC) C14.1    Osteopenia M85.80    Encounter for medication monitoring Z51.81    CVA (cerebral vascular accident) ([facility] [address] W13.7    Complicated migraine V26. 109    Numbness and tingling of right side of face R20.0, R20.2    Stenosis of both internal carotid arteries I65.23    Angina, class III (HCC) I20.9       Current Outpatient Prescriptions   Medication Sig Dispense Refill    meloxicam (MOBIC) 15 mg tablet Take 15 mg by mouth daily.       levothyroxine (SYNTHROID) 25 mcg tablet Take 2 tablets by mouth before breakfast      multivit-minerals/folic acid (CENTRUM VITAMINTS PO) Take 1 tablet by mouth daily      cholecalciferol, vitamin D3, (VITAMIN D3) 2,000 unit tab Take 1 capsule by mouth daily      varicella-zoster recombinant, PF, (SHINGRIX) 50 mcg/0.5 mL susr injection 0.5 mL by IntraMUSCular route once for 1 dose. Repeat second dose in 6 months. 0.5 mL 1    ALPRAZolam (XANAX) 1 mg tablet TAKE 1/2 TABLET EVERY DAY AS NEEDED FOR ANXIETY AND TAKE 1 TABLET AT BEDTIME AS NEEDED FOR SLEEP 45 Tab 3    pravastatin (PRAVACHOL) 40 mg tablet take 1 tablet by mouth EVERY NIGHT 90 Tab 3    JANUVIA 100 mg tablet take 1 tablet by mouth once daily for diabetes 90 Tab 3    fenofibrate nanocrystallized (TRICOR) 145 mg tablet Take 1 Tab by mouth daily. 90 Tab 3    LANTUS SOLOSTAR 100 unit/mL (3 mL) inpn 10 Units by SubCUTAneous route nightly.  metFORMIN ER (GLUCOPHAGE XR) 500 mg tablet TAKE 1 TABLET TWICE DAILY WITH MEALS. RESUME FROM Sunday 11/19/2017. 180 Tab 3    lisinopril (PRINIVIL, ZESTRIL) 2.5 mg tablet TAKE 1 TABLET EVERY DAY 90 Tab 3    metoprolol succinate (TOPROL-XL) 25 mg XL tablet TAKE 1 TABLET EVERY DAY 90 Tab 3    glipiZIDE SR (GLUCOTROL XL) 5 mg CR tablet TAKE 1 TABLET EVERY DAY 90 Tab 3    furosemide (LASIX) 20 mg tablet TAKE 1 TABLET EVERY DAY AS NEEDED 90 Tab 3    ranolazine ER (RANEXA) 500 mg SR tablet Take 1 Tab by mouth two (2) times a day. 180 Tab 3    Insulin Needles, Disposable, 31 gauge x 5/16\" ndle Use with lantus pen 3 Package 3    fluticasone (FLONASE) 50 mcg/actuation nasal spray INSTILL 2 SPRAYS INTO BOTH NOSTRILS DAILY AS NEEDED FOR RHINITIS. 48 g 3    Aspirin, Buffered 81 mg tab Take 81 mg by mouth daily.          Allergies   Allergen Reactions    Demerol [Meperidine] Unknown (comments)     Pt unsure of reaction       Past Medical History:   Diagnosis Date    Anxiety     Arthritis     ASHD (arteriosclerotic heart disease)     CAD (coronary artery disease)     Chronic pain     Constipation     Depression     Diabetes ([address]     Diarrhea     Dysgeusia 11/21/2014    Esophageal motor disorder 1/23/2012    Flatulence/gas pain/belching 11/21/2014    Gastroparesis     GERD (gastroesophageal reflux disease)     Hypercholesterolemia     Hypertension     Psychiatric disorder anxiety    S/P bypass gastrojejunostomy 1/23/2012    Sleep apnea     no cpap       Past Surgical History:   Procedure Laterality Date    ABDOMEN SURGERY PROC UNLISTED      s/p partial gastrectomy 2/2 gastroparesis    CARDIAC SURG PROCEDURE UNLIST  6/2013    stent placement X 2    COLONOSCOPY N/A 5/26/2016    COLON[facility] ENDOSCOPY    EGD  7/13/2009    GASTROJEJUNOSTOMY      HC BLD CARPEL TUNNEL RELEASE      bilat    HX APPENDECTOMY      HX CHOLECYSTECTOMY      HX CORONARY STENT PLACEMENT  06/2013    3 stents    HX FEMORAL BYPASS Right 11/2017    HX GASTRIC BYPASS  6/02    HX HYSTERECTOMY      HX LAPAROTOMY  8/02    2/2 intra abd abscess    HX ORTHOPAEDIC  1/08    laminectomy    HX ORTHOPAEDIC      Bilateral feet bone spurs removed    HX ORTHOPAEDIC      bilateral carpal tunnel    HX ORTHOPAEDIC      back surgery    HX UROLOGICAL      bladder surgery-bladder tac    HI COLONOSCOPY FLX DX W/COLLJ SPEC WHEN PFRMD  5/03/2006    Dr. Johnny Shin COLONOSCOPY W/BIOPSY SINGLE/MULTIPLE  2/9/2012         HI EGD BALLOON DILATION ESOPHAGUS <30 MM DIAM  1/23/2012         HI EGD TRANSORAL BIOPSY SINGLE/MULTIPLE  7/11/2011         HI EGD TRANSORAL BIOPSY SINGLE/MULTIPLE  1/23/2012         REMOVAL OF HEEL SPUR  2001    bilat       Family History   Problem Relation Age of Onset    Heart Disease Mother     Hypertension Mother     Diabetes Mother     Diabetes Father     Heart Disease Father     Hypertension Father     Alcohol abuse Brother     Heart Disease Sister     Hypertension Sister     Diabetes Sister     No Known Problems Sister     Alcohol abuse Brother     Alcohol abuse Brother     Diabetes Brother        Social History   Substance Use Topics    Smoking status: Never Smoker    Smokeless tobacco: Never Used    Alcohol use No        Lab Results  Component Value Date/Time   WBC 10.2 05/14/2018 04:08 PM   HGB 11.6 05/14/2018 04:08 PM   HCT 36.5 05/14/2018 04:08 PM   PLATELET 870 [birth year]1986 04:08 PM   MCV 92.4 05/14/2018 04:08 PM     Lab Results  Component Value Date/Time   Cholesterol, total 120 03/05/2018 03:40 PM   HDL Cholesterol 38 (L) 03/05/2018 03:40 PM   LDL, calculated 25 03/05/2018 03:40 PM   Triglyceride 286 (H) 03/05/2018 03:40 PM   CHOL/HDL Ratio 3.5 01/05/2017 09:18 PM     Lab Results  Component Value Date/Time   TSH 0.979 03/05/2018 03:40 PM   T4, Free 0.97 02/22/2017 08:26 AM   T4, Total 5.6 11/17/2015 08:54 AM      Lab Results   Component Value Date/Time    Sodium 138 05/14/2018 04:08 PM    Potassium 4.6 05/14/2018 04:08 PM    Chloride 108 05/14/2018 04:08 PM    CO2 24 05/14/2018 04:08 PM    Anion gap 6 05/14/2018 04:08 PM    Glucose 152 (H) 05/14/2018 04:08 PM    BUN 13 05/14/2018 04:08 PM    Creatinine 1.06 (H) 05/14/2018 04:08 PM    BUN/Creatinine ratio 12 05/14/2018 04:08 PM    GFR est AA >60 05/14/2018 04:08 PM    GFR est non-AA 52 (L) 05/14/2018 04:08 PM    Calcium 9.0 05/14/2018 04:08 PM    Bilirubin, total 0.3 05/14/2018 04:08 PM    ALT (SGPT) 40 05/14/2018 04:08 PM    AST (SGOT) 33 05/14/2018 04:08 PM    Alk. phosphatase 70 05/14/2018 04:08 PM    Protein, total 6.7 05/14/2018 04:08 PM    Albumin 3.9 05/14/2018 04:08 PM    Globulin 2.8 05/14/2018 04:08 PM    A-G Ratio 1.4 05/14/2018 04:08 PM      Lab Results   Component Value Date/Time    Hemoglobin A1c 6.4 (H) 09/12/2017 09:35 AM    Hemoglobin A1c (POC) 6.4 03/05/2018 03:40 PM         Review of Systems   Constitutional: Negative for malaise/fatigue. HENT: Negative for congestion. Eyes: Negative for blurred vision. Respiratory: Negative for cough and shortness of breath. Cardiovascular: Negative for chest pain, palpitations and leg swelling. Gastrointestinal: Negative for abdominal pain, constipation and heartburn. Genitourinary: Negative for dysuria, frequency and urgency. Musculoskeletal: Negative for back pain and joint pain.    Neurological: Negative for dizziness, tingling and headaches. Endo/Heme/Allergies: Negative for environmental allergies. Psychiatric/Behavioral: Negative for depression. The patient does not have insomnia. Physical Exam   Constitutional: She is oriented to person, place, and time. She appears well-developed and well-nourished. BP (P) 125/73 (BP 1 Location: Left arm, BP Patient Position: Sitting)  Pulse (P) 83  Temp (P) 98.4 °F (36.9 °C) (Oral)   Resp (P) 18  Ht (P) 5' 4\" (1.626 m)  Wt (P) 169 lb (76.7 kg)  SpO2 (P) 98%  BMI (P) 29.01 kg/m2   HENT:   Right Ear: Tympanic membrane and ear canal normal.   Left Ear: Tympanic membrane and ear canal normal.   Nose: No mucosal edema or rhinorrhea. Mouth/Throat: Oropharynx is clear and moist and mucous membranes are normal.   Neck: Normal range of motion. Neck supple. No thyromegaly present. Cardiovascular: Normal rate and regular rhythm. No murmur heard. Pulmonary/Chest: Effort normal and breath sounds normal.   Abdominal: Soft. Bowel sounds are normal. There is no tenderness. Musculoskeletal: Normal range of motion. She exhibits no edema. Lymphadenopathy:     She has no cervical adenopathy. Neurological: She is alert and oriented to person, place, and time. She has normal strength. No cranial nerve deficit or sensory deficit. She exhibits normal muscle tone. Coordination and gait normal.   Skin: Skin is warm and dry. Psychiatric: She has a normal mood and affect. Nursing note and vitals reviewed. ASSESSMENT and PLAN  Diagnoses and all orders for this visit:    1. Anxiety  -     ALPRAZolam (XANAX) 1 mg tablet; TAKE 1/2 TABLET EVERY DAY AS NEEDED FOR ANXIETY AND TAKE 1 TABLET AT BEDTIME AS NEEDED FOR SLEEP    2. Withdrawal from sedative, hypnotic, or anxiolytic drug ([address]  Resolved     3. Noncompliance with medication regimen  Discussed compliance to take medication as directed.       4. Encounter for medication monitoring    Follow-up Disposition:   reviewed medications and side effects in detail    I have discussed diagnosis listed in this note with pt and/or family. I have discussed treatment plans and options and the risk/benefit analysis of those options, including safe use of medications and possible medication side effects. Through the use of shared decision making we have agreed to the above plan. The patient has received an after-visit summary and questions were answered concerning future plans and follow up. Advise pt of any urgent changes then to proceed to the ER.

## 2018-06-16 ENCOUNTER — HOSPITAL ENCOUNTER (OUTPATIENT)
Dept: MRI IMAGING | Age: 68
Discharge: HOME | End: 2018-06-16
Attending: ORTHOPAEDIC SURGERY
Payer: MEDICARE

## 2018-06-16 DIAGNOSIS — S46.011A STRAIN OF TENDON OF RIGHT ROTATOR CUFF: ICD-10-CM

## 2018-06-16 PROCEDURE — 73221 MRI JOINT UPR EXTREM W/O DYE: CPT

## 2018-06-29 ENCOUNTER — HOSPITAL ENCOUNTER (OUTPATIENT)
Dept: CT IMAGING | Age: 68
Discharge: HOME | End: 2018-06-29
Attending: ORTHOPAEDIC SURGERY
Payer: MEDICARE

## 2018-06-29 DIAGNOSIS — M25.511 RIGHT SHOULDER PAIN: ICD-10-CM

## 2018-06-29 PROCEDURE — 73200 CT UPPER EXTREMITY W/O DYE: CPT

## 2018-07-02 ENCOUNTER — OFFICE VISIT (OUTPATIENT)
Dept: CARDIOLOGY CLINIC | Age: 68
End: 2018-07-02

## 2018-07-02 VITALS
BODY MASS INDEX: 27.42 KG/M2 | RESPIRATION RATE: 16 BRPM | HEART RATE: 88 BPM | HEIGHT: 64 IN | DIASTOLIC BLOOD PRESSURE: 62 MMHG | OXYGEN SATURATION: 97 % | WEIGHT: 160.6 LBS | SYSTOLIC BLOOD PRESSURE: 112 MMHG

## 2018-07-02 DIAGNOSIS — E11.9 TYPE 2 DIABETES MELLITUS WITHOUT COMPLICATION, WITHOUT LONG-TERM CURRENT USE OF INSULIN (HCC): ICD-10-CM

## 2018-07-02 DIAGNOSIS — Z98.61 S/P PTCA (PERCUTANEOUS TRANSLUMINAL CORONARY ANGIOPLASTY): ICD-10-CM

## 2018-07-02 DIAGNOSIS — I25.10 ASHD (ARTERIOSCLEROTIC HEART DISEASE): Primary | ICD-10-CM

## 2018-07-02 DIAGNOSIS — E78.2 MIXED HYPERLIPIDEMIA: ICD-10-CM

## 2018-07-02 NOTE — MR AVS SNAPSHOT
[address] [facility] 
[phone] Patient: Tiana Mercer MRN:  [id number] Visit Information Date & Time Provider Department Dept. Phone Encounter #  
 7/2/2018 10:15 AM Saray Pacheco, [address] [facility] [phone] [id number] Follow-up Instructions Return in about 6 months (around 1/2/2019). Routing History Follow-up and Disposition History Your Appointments 7/16/2018  2:00 PM  
ROUTINE CARE with Sofya Gloria MD  
[facility]) Appt Note: routine follow up; r/s from 07/11  
 [address] [facility] [address] [id number] [phone] [id number] [id number]  
  
    
 1/3/2019  1:00 PM  
ESTABLISHED PATIENT with Saray Pacheco MD  
[facility]) Appt Note: Per Dr. Chuck Mcbride, 6 mo f/u, cardiac patient-scc07/02/2018  
 [id number] [facility]  
[phone] [address] [facility] Upcoming Health Maintenance Date Due Influenza Age 5 to Adult 8/1/2018 HEMOGLOBIN A1C Q6M 9/5/2018 FOOT EXAM Q1 9/12/2018 MICROALBUMIN Q1 9/12/2018 MEDICARE YEARLY EXAM 9/13/2018 EYE EXAM RETINAL OR DILATED Q1 2/23/2019 LIPID PANEL Q1 3/5/2019 BREAST CANCER SCRN MAMMOGRAM 2/7/2020 GLAUCOMA SCREENING Q2Y 2/23/2020 COLONOSCOPY 5/26/2021 DTaP/Tdap/Td series (2 - Td) 9/29/2025 Allergies as of 7/2/2018  Review Complete On: 7/2/2018 By: Saray Pacheco MD  
  
 Severity Noted Reaction Type Reactions Demerol [Meperidine]  03/12/2010    Unknown (comments) Pt unsure of reaction Current Immunizations  Reviewed on 3/5/2018 Name Date Influenza High Dose Vaccine PF 9/12/2017 Influenza Vaccine 11/17/2014, 11/11/2013 Influenza Vaccine Yanet Monse) 9/29/2015 Influenza Vaccine (Quad) PF 8/30/2016 Influenza Vaccine Split 11/16/2012, 9/26/2011, 10/15/2010 Pneumococcal Polysaccharide (PPSV-23) 9/12/2017 Pneumococcal Vaccine (Pcv) 11/20/2006 Tdap 9/29/2015 Not reviewed this visit You Were Diagnosed With   
  
 Codes Comments ASHD (arteriosclerotic heart disease)    -  Primary ICD-10-CM: I25.10 ICD-9-CM: 414.00 Mixed hyperlipidemia     ICD-10-CM: E78.2 ICD-9-CM: 272.2 S/P PTCA (percutaneous transluminal coronary angioplasty)     ICD-10-CM: Z98.61 ICD-9-CM: V45.82 Type 2 diabetes mellitus without complication, without long-term current use of insulin (HCC)     ICD-10-CM: E11.9 ICD-9-CM: 250.00 Vitals BP Pulse Resp Height(growth percentile) Weight(growth percentile) SpO2  
 112/62 (BP 1 Location: Right arm, BP Patient Position: Sitting) 88 16 5' 4\" (1.626 m) 160 lb 9.6 oz (72.8 kg) 97% BMI OB Status Smoking Status 27.57 kg/m2 Hysterectomy Never Smoker Vitals History BMI and BSA Data Body Mass Index Body Surface Area  
 27.57 kg/m 2 1.81 m 2 Preferred Pharmacy Pharmacy Name [facility] [id number] [phone] Your Updated Medication List  
  
   
This list is accurate as of 7/2/18 11:02 AM.  Always use your most recent med list.  
  
  
  
  
 ALPRAZolam 1 mg tablet Commonly known as:  XANAX  
TAKE 1/2 TABLET EVERY DAY AS NEEDED FOR ANXIETY AND TAKE 1 TABLET AT BEDTIME AS NEEDED FOR SLEEP  
  
 aspirin, buffered 81 mg Tab Take 81 mg by mouth daily. CENTRUM VITAMINTS PO Take 1 tablet by mouth daily  
  
 fenofibrate nanocrystallized 145 mg tablet Commonly known as:  [employer] Take 1 Tab by mouth daily. fluticasone 50 mcg/actuation nasal spray Commonly known as:  Darcie Shames INSTILL 2 SPRAYS INTO BOTH NOSTRILS DAILY AS NEEDED FOR RHINITIS. furosemide 20 mg tablet Commonly known as:  LASIX TAKE 1 TABLET EVERY DAY AS NEEDED  
  
 glipiZIDE SR 5 mg CR tablet Commonly known as:  GLUCOTROL XL  
TAKE 1 TABLET EVERY DAY Insulin Needles (Disposable) 31 gauge x 5/16\" Ndle Use with lantus pen JANUVIA 100 mg tablet Generic drug:  SITagliptin  
take 1 tablet by mouth once daily for diabetes LANTUS SOLOSTAR U-100 INSULIN 100 unit/mL (3 mL) Inpn Generic drug:  insulin glargine 10 Units by SubCUTAneous route nightly. levothyroxine 25 mcg tablet Commonly known as:  SYNTHROID Take 2 tablets by mouth before breakfast  
  
 lisinopril 2.5 mg tablet Commonly known as:  PRINIVIL, ZESTRIL  
TAKE 1 TABLET EVERY DAY  
  
 meloxicam 15 mg tablet Commonly known as:  MOBIC Take 15 mg by mouth daily. metFORMIN  mg tablet Commonly known as:  GLUCOPHAGE XR  
TAKE 1 TABLET TWICE DAILY WITH MEALS. RESUME FROM Sunday 11/19/2017. metoprolol succinate 25 mg XL tablet Commonly known as:  TOPROL-XL  
TAKE 1 TABLET EVERY DAY  
  
 pravastatin 40 mg tablet Commonly known as:  PRAVACHOL  
take 1 tablet by mouth EVERY NIGHT  
  
 ranolazine  mg SR tablet Commonly known as:  RANEXA Take 1 Tab by mouth two (2) times a day. VITAMIN D3 2,000 unit Tab Generic drug:  cholecalciferol (vitamin D3) Take 1 capsule by mouth daily We Performed the Following AMB POC EKG ROUTINE W/ 12 LEADS, INTER & REP [34551 CPT(R)] Follow-up Instructions Return in about 6 months (around 1/2/2019). Introducing [facility]! [employer] introduces [employer] patient portal. Now you can access parts of your medical record, email your doctor's office, and request medication refills online. 1. In your internet browser, go to [email or web address] [email or web address] 2. Click on the First Time User? Click Here link in the Sign In box. You will see the New Member Sign Up page. 3. Enter your Nonoba Access Code exactly as it appears below. You will not need to use this code after youve completed the sign-up process. If you do not sign up before the expiration date, you must request a new code. · Nonoba Access Code: [email or web address] Expires: 8/12/2018  3:21 PM 
 
4. Enter the last four digits of your Social Security Number (xxxx) and Date of Birth (mm/dd/yyyy) as indicated and click Submit. You will be taken to the next sign-up page. 5. Create a Nonoba ID. This will be your Nonoba login ID and cannot be changed, so think of one that is secure and easy to remember. 6. Create a Nonoba password. You can change your password at any time. 7. Enter your Password Reset Question and Answer. This can be used at a later time if you forget your password. 8. Enter your e-mail address. You will receive e-mail notification when new information is available in [address] 9. Click Sign Up. You can now view and download portions of your medical record. 10. Click the Download Summary menu link to download a portable copy of your medical information. If you have questions, please visit the Frequently Asked Questions section of the Nonoba website. Remember, Nonoba is NOT to be used for urgent needs. For medical emergencies, dial 911. Now available from your iPhone and Android! Please provide this summary of care documentation to your next provider. Your primary care clinician is listed as [facility]. If you have any questions after today's visit, please call [phone].

## 2018-07-02 NOTE — PROGRESS NOTES
Justyn Christopher DNP, ANP-BC  Subjective/HPI:     Cassi Estrada is a 79 y.o. female is here for routine f/u. The patient denies chest pain/ shortness of breath, orthopnea, PND, LE edema, palpitations, syncope, presyncope or fatigue. Ms. Anuel Barraza reports feeling well in her usual state of health. She suffered a seizure in May which she has been dealing with a right shoulder injury recent CT MRI pending Ortho follow-up next week. Denies ambulatory leg pain, denies dizziness denies lightheadedness with positional change. She has been taking all medications without side effects. Tolerating statins well.          PCP Provider  Regulo De La Vega MD  Past Medical History:   Diagnosis Date    Anxiety     Arthritis     ASHD (arteriosclerotic heart disease)     CAD (coronary artery disease)     Chronic pain     Constipation     Depression     Diabetes ([address]     Diarrhea     Dysgeusia 11/21/2014    Esophageal motor disorder 1/23/2012    Flatulence/gas pain/belching 11/21/2014    Gastroparesis     GERD (gastroesophageal reflux disease)     Hypercholesterolemia     Hypertension     Psychiatric disorder     anxiety    S/P bypass gastrojejunostomy 1/23/2012    Sleep apnea     no cpap      Past Surgical History:   Procedure Laterality Date    ABDOMEN SURGERY PROC UNLISTED      s/p partial gastrectomy 2/2 gastroparesis    CARDIAC SURG PROCEDURE UNLIST  6/2013    stent placement X 2    COLONOSCOPY N/A 5/26/2016    COLONOSCOPY performed [facility] ENDOSCOPY    EGD  7/13/2009    GASTROJEJUNOSTOMY      HC BLD CARPEL TUNNEL RELEASE      bilat    HX APPENDECTOMY      HX CHOLECYSTECTOMY      HX CORONARY STENT PLACEMENT  06/2013    3 stents    HX FEMORAL BYPASS Right 11/2017    HX GASTRIC BYPASS  6/02    HX HYSTERECTOMY      HX LAPAROTOMY  8/02    2/2 intra abd abscess    HX ORTHOPAEDIC  1/08    laminectomy    HX ORTHOPAEDIC      Bilateral feet bone spurs removed    HX ORTHOPAEDIC bilateral carpal tunnel    HX ORTHOPAEDIC      back surgery    HX UROLOGICAL      bladder surgery-bladder tac    FL COLONOSCOPY FLX DX W/COLLJ SPEC WHEN PFRMD  5/03/2006    Dr. Joselyn Llanes  2/9/2012         FL EGD BALLOON DILATION ESOPHAGUS <30 MM DIAM  1/23/2012         FL EGD TRANSORAL BIOPSY SINGLE/MULTIPLE  7/11/2011         FL EGD TRANSORAL BIOPSY SINGLE/MULTIPLE  1/23/2012         REMOVAL OF HEEL SPUR  2001    bilat     Allergies   Allergen Reactions    Demerol [Meperidine] Unknown (comments)     Pt unsure of reaction      Family History   Problem Relation Age of Onset    Heart Disease Mother     Hypertension Mother     Diabetes Mother     Diabetes Father     Heart Disease Father     Hypertension Father     Alcohol abuse Brother     Heart Disease Sister     Hypertension Sister     Diabetes Sister     No Known Problems Sister     Alcohol abuse Brother     Alcohol abuse Brother     Diabetes Brother       Current Outpatient Prescriptions   Medication Sig    meloxicam (MOBIC) 15 mg tablet Take 15 mg by mouth daily.  levothyroxine (SYNTHROID) 25 mcg tablet Take 2 tablets by mouth before breakfast    multivit-minerals/folic acid (CENTRUM VITAMINTS PO) Take 1 tablet by mouth daily    cholecalciferol, vitamin D3, (VITAMIN D3) 2,000 unit tab Take 1 capsule by mouth daily    ALPRAZolam (XANAX) 1 mg tablet TAKE 1/2 TABLET EVERY DAY AS NEEDED FOR ANXIETY AND TAKE 1 TABLET AT BEDTIME AS NEEDED FOR SLEEP    pravastatin (PRAVACHOL) 40 mg tablet take 1 tablet by mouth EVERY NIGHT    JANUVIA 100 mg tablet take 1 tablet by mouth once daily for diabetes    fenofibrate nanocrystallized (TRICOR) 145 mg tablet Take 1 Tab by mouth daily.  LANTUS SOLOSTAR 100 unit/mL (3 mL) inpn 10 Units by SubCUTAneous route nightly.  metFORMIN ER (GLUCOPHAGE XR) 500 mg tablet TAKE 1 TABLET TWICE DAILY WITH MEALS. RESUME FROM Sunday 11/19/2017.     lisinopril (PRINIVIL, ZESTRIL) 2.5 mg tablet TAKE 1 TABLET EVERY DAY    metoprolol succinate (TOPROL-XL) 25 mg XL tablet TAKE 1 TABLET EVERY DAY    glipiZIDE SR (GLUCOTROL XL) 5 mg CR tablet TAKE 1 TABLET EVERY DAY    furosemide (LASIX) 20 mg tablet TAKE 1 TABLET EVERY DAY AS NEEDED    ranolazine ER (RANEXA) 500 mg SR tablet Take 1 Tab by mouth two (2) times a day.  Insulin Needles, Disposable, 31 gauge x 5/16\" ndle Use with lantus pen    fluticasone (FLONASE) 50 mcg/actuation nasal spray INSTILL 2 SPRAYS INTO BOTH NOSTRILS DAILY AS NEEDED FOR RHINITIS.  Aspirin, Buffered 81 mg tab Take 81 mg by mouth daily. No current facility-administered medications for this visit. Vitals:    07/02/18 1003 07/02/18 1012   BP: 110/60 112/62   Pulse: 88    Resp: 16    SpO2: 97%    Weight: 160 lb 9.6 oz (72.8 kg)    Height: 5' 4\" (1.626 m)      Social History     Social History    Marital status:      Spouse name: N/A    Number of children: N/A    Years of education: N/A     Occupational History    Not on file. Social History Main Topics    Smoking status: Never Smoker    Smokeless tobacco: Never Used    Alcohol use No    Drug use: No    Sexual activity: Not Currently     Other Topics Concern    Not on file     Social History Narrative       I have reviewed the nurses notes, vitals, problem list, allergy list, medical history, family, social history and medications. Review of Symptoms:    General: Pt denies excessive weight gain or loss. Pt is able to conduct ADL's  HEENT: Denies blurred vision, headaches, epistaxis and difficulty swallowing. Respiratory: Denies shortness of breath, WISEMAN, wheezing or stridor.   Cardiovascular: Denies precordial pain, palpitations, edema or PND  Gastrointestinal: Denies poor appetite, indigestion, abdominal pain or blood in stool  Musculoskeletal: Denies pain or swelling from muscles or joints with the exception of the right shoulder which he is seeing orthopedics for possible tear.  Neurologic: Denies tremor, paresthesias, or sensory motor disturbance  Skin: Denies rash, itching or texture change. Physical Exam:      General: Well developed, in no acute distress, cooperative and alert  HEENT: No carotid bruits, no JVD, trach is midline. Neck Supple, PEERL, EOM intact. Heart:  Normal S1/S2 negative S3 or S4. Regular, no murmur, gallop or rub.   Respiratory: Clear bilaterally x 4, no wheezing or rales  Abdomen:   Soft, non-tender, no masses, bowel sounds are active.   Extremities:  No edema, normal cap refill, no cyanosis, atraumatic. Neuro: A&Ox3, speech clear, gait stable. Skin: Skin color is normal. No rashes or lesions.  Non diaphoretic  Vascular: 2+ pulses symmetric in all extremities    Cardiographics    ECG: Normal sinus rhythm  Results for orders placed or performed during the hospital encounter of 05/14/18   EKG, 12 LEAD, INITIAL   Result Value Ref Range    Ventricular Rate 88 BPM    Atrial Rate 88 BPM    P-R Interval 154 ms    QRS Duration 82 ms    Q-T Interval 378 ms    QTC Calculation (Bezet) 457 ms    Calculated P Axis 45 degrees    Calculated R Axis -8 degrees    Calculated T Axis 28 degrees    Diagnosis       Normal sinus rhythm  Normal ECG  When compared with ECG of 06-JAN-2017 00:04,  premature ventricular complexes are no longer present  Confirmed by Kristine Gutierrez ([id number]) on 5/15/2018 8:41:04 AM     Results for orders placed or performed in visit on 08/18/10   AMB POC EKG ROUTINE W/ 12 LEADS, INTER & REP    Impression    WNL         Cardiology Labs:  Lab Results   Component Value Date/Time    Cholesterol, total 120 03/05/2018 03:40 PM    HDL Cholesterol 38 (L) 03/05/2018 03:40 PM    LDL, calculated 25 03/05/2018 03:40 PM    Triglyceride 286 (H) 03/05/2018 03:40 PM    CHOL/HDL Ratio 3.5 01/05/2017 09:18 PM       Lab Results   Component Value Date/Time    Sodium 138 05/14/2018 04:08 PM    Potassium 4.6 05/14/2018 04:08 PM    Chloride 108 05/14/2018 04:08 PM    CO2 24 05/14/2018 04:08 PM    Anion gap 6 05/14/2018 04:08 PM    Glucose 152 (H) 05/14/2018 04:08 PM    BUN 13 05/14/2018 04:08 PM    Creatinine 1.06 (H) 05/14/2018 04:08 PM    BUN/Creatinine ratio 12 05/14/2018 04:08 PM    GFR est AA >60 05/14/2018 04:08 PM    GFR est non-AA 52 (L) 05/14/2018 04:08 PM    Calcium 9.0 05/14/2018 04:08 PM    Bilirubin, total 0.3 05/14/2018 04:08 PM    AST (SGOT) 33 05/14/2018 04:08 PM    Alk. phosphatase 70 05/14/2018 04:08 PM    Protein, total 6.7 05/14/2018 04:08 PM    Albumin 3.9 05/14/2018 04:08 PM    Globulin 2.8 05/14/2018 04:08 PM    A-G Ratio 1.4 05/14/2018 04:08 PM    ALT (SGPT) 40 05/14/2018 04:08 PM           Assessment:     Assessment:     Diagnoses and all orders for this visit:    1. ASHD (arteriosclerotic heart disease)    2. Mixed hyperlipidemia  -     AMB POC EKG ROUTINE W/ 12 LEADS, INTER & REP    3. S/P PTCA (percutaneous transluminal coronary angioplasty)    4. Type 2 diabetes mellitus without complication, without long-term current use of insulin ([facility]        ICD-10-CM ICD-9-CM    1. ASHD (arteriosclerotic heart disease) I25.10 414.00    2. Mixed hyperlipidemia E78.2 272.2 AMB POC EKG ROUTINE W/ 12 LEADS, INTER & REP   3. S/P PTCA (percutaneous transluminal coronary angioplasty) Z98.61 V45.82    4. Type 2 diabetes mellitus without complication, without long-term current use of insulin ([facility]) E11.9 250.00      Orders Placed This Encounter    AMB POC EKG ROUTINE W/ 12 LEADS, INTER & REP     Order Specific Question:   Reason for Exam:     Answer:   routine        Plan:     1. Atherosclerotic heart disease: History of PTCA stenting 2013 total RCA. Continue aspirin therapy long-term  2. Hypertension controlled 112/62  3. Hyperlipidemia: LDL at target 25 followed by primary care  4. Carotid stenosis as diagnosis in chart: 2017 carotid duplex negative obstructions. Follow-up in 6 months. Ricarda Awe, NP    This note was created using voice recognition software.  Despite editing, there may be syntax errors. Pt seen and examined in details. Agree with NP A&P. No significant PAD noted on last LE angio. Component of vasospasm. She never started imdur and not interested in taking. Feels better without medicine. Exercise d/w pt.      Norman Mcneil MD

## 2018-07-02 NOTE — PROGRESS NOTES
Chief Complaint   Patient presents with    Coronary Artery Disease     6 mo f/u      1. Have you been to the ER, urgent care clinic since your last visit? Hospitalized since your last visit? Yes Reason for visit: Seizure/Shoulder Out of Place, May 2018    2. Have you seen or consulted any other health care providers outside of the [address] [place] [address] since your last visit? Include any pap smears or colon screening.  Yes, Roslyn Bright, Dr. Gilford Punter

## 2018-07-05 DIAGNOSIS — Z91.09 ENVIRONMENTAL ALLERGIES: ICD-10-CM

## 2018-07-05 RX ORDER — FLUTICASONE PROPIONATE 50 MCG
SPRAY, SUSPENSION (ML) NASAL
Qty: 48 G | Refills: 0 | Status: SHIPPED | OUTPATIENT
Start: 2018-07-05 | End: 2019-01-03 | Stop reason: ALTCHOICE

## 2018-07-11 DIAGNOSIS — Z79.4 TYPE 2 DIABETES MELLITUS WITHOUT COMPLICATION, WITH LONG-TERM CURRENT USE OF INSULIN (HCC): ICD-10-CM

## 2018-07-11 DIAGNOSIS — E11.9 TYPE 2 DIABETES MELLITUS WITHOUT COMPLICATION, WITH LONG-TERM CURRENT USE OF INSULIN (HCC): ICD-10-CM

## 2018-07-12 RX ORDER — INSULIN GLARGINE 100 [IU]/ML
INJECTION, SOLUTION SUBCUTANEOUS
Qty: 15 ML | Refills: 0 | Status: SHIPPED | OUTPATIENT
Start: 2018-07-12 | End: 2018-07-27 | Stop reason: SDUPTHER

## 2018-07-12 RX ORDER — PEN NEEDLE, DIABETIC 31 GX5/16"
NEEDLE, DISPOSABLE MISCELLANEOUS
Qty: 100 PEN NEEDLE | Refills: 0 | Status: SHIPPED | OUTPATIENT
Start: 2018-07-12 | End: 2018-12-14 | Stop reason: ALTCHOICE

## 2018-07-26 RX ORDER — RANOLAZINE 500 MG/1
TABLET, FILM COATED, EXTENDED RELEASE ORAL
Qty: 60 TAB | Refills: 3 | Status: SHIPPED | OUTPATIENT
Start: 2018-07-26 | End: 2018-12-14 | Stop reason: ALTCHOICE

## 2018-07-27 ENCOUNTER — TELEPHONE (OUTPATIENT)
Dept: CARDIOLOGY CLINIC | Age: 68
End: 2018-07-27

## 2018-07-27 ENCOUNTER — OFFICE VISIT (OUTPATIENT)
Dept: FAMILY MEDICINE CLINIC | Age: 68
End: 2018-07-27

## 2018-07-27 VITALS
RESPIRATION RATE: 14 BRPM | BODY MASS INDEX: 26.8 KG/M2 | DIASTOLIC BLOOD PRESSURE: 73 MMHG | HEIGHT: 64 IN | TEMPERATURE: 97 F | OXYGEN SATURATION: 97 % | HEART RATE: 89 BPM | WEIGHT: 157 LBS | SYSTOLIC BLOOD PRESSURE: 137 MMHG

## 2018-07-27 DIAGNOSIS — K21.9 GASTROESOPHAGEAL REFLUX DISEASE WITHOUT ESOPHAGITIS: ICD-10-CM

## 2018-07-27 DIAGNOSIS — E11.9 TYPE 2 DIABETES MELLITUS WITHOUT COMPLICATION, WITHOUT LONG-TERM CURRENT USE OF INSULIN (HCC): Primary | ICD-10-CM

## 2018-07-27 DIAGNOSIS — I25.10 CORONARY ARTERY DISEASE INVOLVING NATIVE CORONARY ARTERY OF NATIVE HEART WITHOUT ANGINA PECTORIS: ICD-10-CM

## 2018-07-27 DIAGNOSIS — F41.9 ANXIETY: ICD-10-CM

## 2018-07-27 DIAGNOSIS — Z51.81 ENCOUNTER FOR MEDICATION MONITORING: ICD-10-CM

## 2018-07-27 DIAGNOSIS — E78.2 MIXED HYPERLIPIDEMIA: ICD-10-CM

## 2018-07-27 LAB
GLUCOSE POC: 193 MG/DL
HBA1C MFR BLD HPLC: 5.6 %

## 2018-07-27 RX ORDER — TRAMADOL HYDROCHLORIDE 50 MG/1
50-100 TABLET ORAL
COMMUNITY
Start: 2018-06-25 | End: 2018-12-14 | Stop reason: ALTCHOICE

## 2018-07-27 RX ORDER — INSULIN GLARGINE 100 [IU]/ML
5 INJECTION, SOLUTION SUBCUTANEOUS
Qty: 15 ML | Refills: 0 | Status: SHIPPED | OUTPATIENT
Start: 2018-07-27 | End: 2018-10-28 | Stop reason: SDUPTHER

## 2018-07-27 RX ORDER — NAPROXEN 375 MG/1
TABLET ORAL
Refills: 0 | COMMUNITY
Start: 2018-07-16 | End: 2018-12-14 | Stop reason: ALTCHOICE

## 2018-07-27 NOTE — PROGRESS NOTES
HISTORY OF PRESENT ILLNESS Aisha Park is a 79 y.o. female. HPI For routine follow up on chronic medical conditions. Overall feeling well. Cardiovascular Review: 
The patient has coronary artery disease, HF and status post coronary artery stenting. Diet and Lifestyle: generally follows a low fat low cholesterol diet, generally follows a low sodium diet, follows a diabetic diet regularly, exercises sporadically Home BP Monitoring: is not measured at home. Pertinent ROS: taking medications as instructed, no medication side effects noted, no TIA's, no chest pain on exertion, no dyspnea on exertion, no swelling of ankles. DM type II follow up: 
Compliant w/ meds, diabetic diet, and exercise. Has been watching her diet and lost weight since her last visit. Her BS have been in the 80-90s. Checks BS BID on most days and prn. Pt does not have BS log at visit today. No Rf needed for today. Denies any tingling sensation, polyuria and polydipsia. No blurred vision. No significant weight changes. Feeling well since last OV. Hypercholesterolemia follow up: 
Compliant w/ meds, low fat, low cholesterol diet. Exercising some. No muscle nor abdominal pain, no skin discoloration. Patient fasting today. Depression Review: 
Patient is seen for followup of depression and anxiety. Overall doing well. She denies depressed mood and insomnia. Sleeping well at night. Patient Active Problem List  
Diagnosis Code  Anxiety F41.9  Depression F32.9  Hypovitaminosis D E55.9  GERD (gastroesophageal reflux disease) K21.9  Edema R60.9  Esophageal motor disorder K22.4  
 S/P bypass gastrojejunostomy Z98.0  
 SOB (shortness of breath) R06.02  
 CAD (coronary artery disease) I25.10  Angina pectoris (HCC) I20.9  S/P PTCA (percutaneous transluminal coronary angioplasty) Z98.61  
 Mixed hyperlipidemia E78.2  Diarrhea R19.7  Type 2 diabetes mellitus without complication (HCC) Z91.1  Osteopenia M85.80  Encounter for medication monitoring Z51.81  
 CVA (cerebral vascular accident) ([facility] [address] I63.9  Complicated migraine F27. [address] [facility]  Numbness and tingling of right side of face R20.0, R20.2  Stenosis of both internal carotid arteries I65.23  
 Angina, class III (HCC) I20.9 Current Outpatient Prescriptions Medication Sig Dispense Refill  traMADol (ULTRAM) 50 mg tablet Take  mg by mouth daily as needed.  naproxen (NAPROSYN) 375 mg tablet take 1 tablet by mouth every 12 hours if needed for pain  0  
 insulin glargine (LANTUS SOLOSTAR U-100 INSULIN) 100 unit/mL (3 mL) inpn 5 Units by SubCUTAneous route nightly. 15 mL 0  
 RANEXA 500 mg SR tablet take 1 tablet by mouth twice a day 60 Tab 3  BD ULTRA-FINE SHORT PEN NEEDLE 31 gauge x 5/16\" ndle use to inject LANTUS INSULIN 100 Pen Needle 0  
 fluticasone (FLONASE) 50 mcg/actuation nasal spray instill 2 sprays into each nostril once daily if needed for RHINITIS 48 g 0  
 meloxicam (MOBIC) 15 mg tablet Take 15 mg by mouth daily.  levothyroxine (SYNTHROID) 25 mcg tablet Take 2 tablets by mouth before breakfast    
 multivit-minerals/folic acid (CENTRUM VITAMINTS PO) Take 1 tablet by mouth daily  cholecalciferol, vitamin D3, (VITAMIN D3) 2,000 unit tab Take 1 capsule by mouth daily  ALPRAZolam (XANAX) 1 mg tablet TAKE 1/2 TABLET EVERY DAY AS NEEDED FOR ANXIETY AND TAKE 1 TABLET AT BEDTIME AS NEEDED FOR SLEEP 45 Tab 3  pravastatin (PRAVACHOL) 40 mg tablet take 1 tablet by mouth EVERY NIGHT 90 Tab 3  JANUVIA 100 mg tablet take 1 tablet by mouth once daily for diabetes 90 Tab 3  
 fenofibrate nanocrystallized (TRICOR) 145 mg tablet Take 1 Tab by mouth daily. 90 Tab 3  
 metFORMIN ER (GLUCOPHAGE XR) 500 mg tablet TAKE 1 TABLET TWICE DAILY WITH MEALS.  RESUME FROM Sunday 11/19/2017. 180 Tab 3  
 lisinopril (PRINIVIL, ZESTRIL) 2.5 mg tablet TAKE 1 TABLET EVERY DAY 90 Tab 3  
 metoprolol succinate (TOPROL-XL) 25 mg XL tablet TAKE 1 TABLET EVERY DAY 90 Tab 3  
 glipiZIDE SR (GLUCOTROL XL) 5 mg CR tablet TAKE 1 TABLET EVERY DAY 90 Tab 3  furosemide (LASIX) 20 mg tablet TAKE 1 TABLET EVERY DAY AS NEEDED 90 Tab 3  Aspirin, Buffered 81 mg tab Take 81 mg by mouth daily. Allergies Allergen Reactions  Demerol [Meperidine] Unknown (comments) Pt unsure of reaction Past Medical History:  
Diagnosis Date  Anxiety  Arthritis  ASHD (arteriosclerotic heart disease)  CAD (coronary artery disease)  Chronic pain  Constipation  Depression  Diabetes ([facility] [address]  Diarrhea  Dysgeusia 11/21/2014  Esophageal motor disorder 1/23/2012  Flatulence/gas pain/belching 11/21/2014  Gastroparesis  GERD (gastroesophageal reflux disease)  Hypercholesterolemia  Hypertension  Psychiatric disorder   
 anxiety  S/P bypass gastrojejunostomy 1/23/2012  Sleep apnea   
 no cpap Past Surgical History:  
Procedure Laterality Date  ABDOMEN SURGERY PROC UNLISTED    
 s/p partial gastrectomy 2/2 gastroparesis  CARDIAC SURG PROCEDURE UNLIST  6/2013  
 stent placement X 2  
 COLONOSCOPY N/A 5/26/2016 COLONOSCOPY performed by Mikel Norton MD at [facility] ENDOSCOPY  EGD  7/13/2009  GASTROJEJUNOSTOMY  HC BLD CARPEL TUNNEL RELEASE    
 bilat  HX APPENDECTOMY  HX CHOLECYSTECTOMY  HX CORONARY STENT PLACEMENT  06/2013  
 3 stents  HX FEMORAL BYPASS Right 11/2017  HX GASTRIC BYPASS  6/02  HX HYSTERECTOMY  HX LAPAROTOMY  8/02  
 2/2 intra abd abscess  HX ORTHOPAEDIC  1/08  
 laminectomy  HX ORTHOPAEDIC Bilateral feet bone spurs removed  HX ORTHOPAEDIC    
 bilateral carpal tunnel  HX ORTHOPAEDIC    
 back surgery  HX UROLOGICAL    
 bladder surgery-bladder tac  DC COLONOSCOPY FLX DX W/COLLJ SPEC WHEN PFRMD  5/03/2006 Dr. Raymond Patterson  DC COLONOSCOPY W/BIOPSY SINGLE/MULTIPLE  2/9/2012  DC EGD BALLOON DILATION ESOPHAGUS <30 MM DIAM  1/23/2012  TX EGD TRANSORAL BIOPSY SINGLE/MULTIPLE  7/11/2011  TX EGD TRANSORAL BIOPSY SINGLE/MULTIPLE  1/23/2012  REMOVAL OF HEEL SPUR  2001  
 bilat Family History Problem Relation Age of Onset  Heart Disease Mother  Hypertension Mother  Diabetes Mother  Diabetes Father  Heart Disease Father  Hypertension Father  Alcohol abuse Brother  Heart Disease Sister  Hypertension Sister  Diabetes Sister  No Known Problems Sister  Alcohol abuse Brother  Alcohol abuse Brother  Diabetes Brother Social History Substance Use Topics  Smoking status: Never Smoker  Smokeless tobacco: Never Used  Alcohol use No  
 
  
Lab Results Component Value Date/Time WBC 10.2 05/14/2018 04:08 PM  
HGB 11.6 05/14/2018 04:08 PM  
HCT 36.5 05/14/2018 04:08 PM  
PLATELET 758 93/71/0413 04:08 PM  
MCV 92.4 05/14/2018 04:08 PM  
 
Lab Results Component Value Date/Time Cholesterol, total 120 03/05/2018 03:40 PM  
HDL Cholesterol 38 (L) 03/05/2018 03:40 PM  
LDL, calculated 25 03/05/2018 03:40 PM  
Triglyceride 286 (H) 03/05/2018 03:40 PM  
CHOL/HDL Ratio 3.5 01/05/2017 09:18 PM  
 
Lab Results Component Value Date/Time TSH 0.979 03/05/2018 03:40 PM  
T4, Free 0.97 02/22/2017 08:26 AM  
T4, Total 5.6 11/17/2015 08:54 AM  
  
Lab Results Component Value Date/Time  Sodium 138 05/14/2018 04:08 PM  
 Potassium 4.6 05/14/2018 04:08 PM  
 Chloride 108 05/14/2018 04:08 PM  
 CO2 24 05/14/2018 04:08 PM  
 Anion gap 6 05/14/2018 04:08 PM  
 Glucose 152 (H) 05/14/2018 04:08 PM  
 BUN 13 05/14/2018 04:08 PM  
 Creatinine 1.06 (H) 05/14/2018 04:08 PM  
 BUN/Creatinine ratio 12 05/14/2018 04:08 PM  
 GFR est AA >60 05/14/2018 04:08 PM  
 GFR est non-AA 52 (L) 05/14/2018 04:08 PM  
 Calcium 9.0 05/14/2018 04:08 PM  
 Bilirubin, total 0.3 05/14/2018 04:08 PM  
 ALT (SGPT) 40 05/14/2018 04:08 PM  
 AST (SGOT) 33 05/14/2018 04:08 PM  
 Alk. phosphatase 70 05/14/2018 04:08 PM  
 Protein, total 6.7 05/14/2018 04:08 PM  
 Albumin 3.9 05/14/2018 04:08 PM  
 Globulin 2.8 05/14/2018 04:08 PM  
 A-G Ratio 1.4 05/14/2018 04:08 PM  
  
Lab Results Component Value Date/Time Hemoglobin A1c 6.4 (H) 09/12/2017 09:35 AM  
 Hemoglobin A1c (POC) 5.6 07/27/2018 09:01 AM  
   
 
Review of Systems Constitutional: Negative for malaise/fatigue. HENT: Negative for congestion. Eyes: Negative for blurred vision. Respiratory: Negative for cough and shortness of breath. Cardiovascular: Negative for chest pain, palpitations and leg swelling. Gastrointestinal: Negative for abdominal pain, constipation and heartburn. Genitourinary: Negative for dysuria, frequency and urgency. Musculoskeletal: Negative for back pain and joint pain. Neurological: Negative for dizziness, tingling and headaches. Endo/Heme/Allergies: Negative for environmental allergies. Psychiatric/Behavioral: Negative for depression. The patient does not have insomnia. Physical Exam  
Constitutional: She appears well-developed and well-nourished. /73 (BP 1 Location: Right arm, BP Patient Position: Sitting)  Pulse 89  Temp 97 °F (36.1 °C) (Oral)   Resp 14  Ht 5' 4\" (1.626 m)  Wt 157 lb (71.2 kg)  SpO2 97%  BMI 26.95 kg/m2 HENT:  
Right Ear: Tympanic membrane and ear canal normal.  
Left Ear: Tympanic membrane and ear canal normal.  
Nose: No mucosal edema or rhinorrhea. Mouth/Throat: Oropharynx is clear and moist and mucous membranes are normal.  
Neck: Normal range of motion. Neck supple. No thyromegaly present. Cardiovascular: Normal rate and regular rhythm. No murmur heard. Pulmonary/Chest: Effort normal and breath sounds normal.  
Abdominal: Soft. Bowel sounds are normal. There is no tenderness. Musculoskeletal: Normal range of motion. She exhibits no edema. Lymphadenopathy:  
  She has no cervical adenopathy.   
Skin: Skin is warm and dry. Psychiatric: She has a normal mood and affect. Nursing note and vitals reviewed. ASSESSMENT and PLAN Diagnoses and all orders for this visit: 
 
1. Type 2 diabetes mellitus without complication, without long-term current use of insulin ([address] a1c level is at 5.6%. Her RASHID is 193. -     AMB POC HEMOGLOBIN A1C 
-     AMB POC GLUCOSE, QUANTITATIVE, BLOOD 
-     Reduce insulin glargine (LANTUS SOLOSTAR U-100 INSULIN) 100 unit/mL (3 mL) inpn; 5 Units by SubCUTAneous route nightly. May be able to come off of the Lantus. 2. Mixed hyperlipidemia -     LIPID PANEL 3. Coronary artery disease involving native coronary artery of native heart without angina pectoris Stable 4. Gastroesophageal reflux disease without esophagitis Stable 5. Anxiety Stable. Taking prn Xanax. 6. Encounter for medication monitoring -     METABOLIC PANEL, BASIC Follow-up Disposition: 
Return in about 4 months (around 12/3/2018) for physical. 
reviewed diet, exercise and weight control 
cardiovascular risk and specific lipid/LDL goals reviewed 
reviewed medications and side effects in detail 
specific diabetic recommendations: low cholesterol diet, weight control and daily exercise discussed, home glucose monitoring emphasized, all medications, side effects and compliance discussed carefully, foot care discussed and Podiatry visits discussed, annual eye examinations at Ophthalmology discussed and glycohemoglobin and other lab monitoring discussed I have discussed diagnosis listed in this note with pt and/or family. I have discussed treatment plans and options and the risk/benefit analysis of those options, including safe use of medications and possible medication side effects. Through the use of shared decision making we have agreed to the above plan. The patient has received an after-visit summary and questions were answered concerning future plans and follow up.   Advise pt of any urgent changes then to proceed to the ER.

## 2018-07-27 NOTE — MR AVS SNAPSHOT
[facility] 
 
 
 [address] [facility] [address] [address] 
[phone] Patient: Sebastián Casey MRN: [id number] NZN:[id number] Visit Information Date & Time Provider Department Dept. Phone Encounter #  
 7/27/2018  8:30 AM Jameson Araya [phone] [id number] Follow-up Instructions Return in about 4 months (around 12/3/2018) for physical.  
  
Your Appointments 1/3/2019  1:00 PM  
ESTABLISHED PATIENT with Rachel Cardenas MD  
[facility]) Appt Note: Per Dr. Charlotte Talbot, 6 mo f/u, cardiac patient-scc07/02/2018  
 [id number] [facility]  
[phone] [id number] [facility] Upcoming Health Maintenance Date Due Influenza Age 5 to Adult 8/1/2018 HEMOGLOBIN A1C Q6M 9/5/2018 FOOT EXAM Q1 9/12/2018 MICROALBUMIN Q1 9/12/2018 MEDICARE YEARLY EXAM 9/13/2018 EYE EXAM RETINAL OR DILATED Q1 2/23/2019 LIPID PANEL Q1 3/5/2019 BREAST CANCER SCRN MAMMOGRAM 2/7/2020 GLAUCOMA SCREENING Q2Y 2/23/2020 COLONOSCOPY 5/26/2021 DTaP/Tdap/Td series (2 - Td) 9/29/2025 Allergies as of 7/27/2018  Review Complete On: 7/27/2018 By: Darlyn Cobb MD  
  
 Severity Noted Reaction Type Reactions Demerol [Meperidine]  03/12/2010    Unknown (comments) Pt unsure of reaction Current Immunizations  Reviewed on 7/27/2018 Name Date Influenza High Dose Vaccine PF 9/12/2017 Influenza Vaccine 11/17/2014, 11/11/2013 Influenza Vaccine Isabel Xochilt) 9/29/2015 Influenza Vaccine (Quad) PF 8/30/2016 Influenza Vaccine Split 11/16/2012, 9/26/2011, 10/15/2010 Pneumococcal Polysaccharide (PPSV-23) 9/12/2017 Pneumococcal Vaccine (Pcv) 11/20/2006 Tdap 9/29/2015 Reviewed by Darlyn Cobb MD on 7/27/2018 at  8:44 AM  
You Were Diagnosed With   
  
 Codes Comments Type 2 diabetes mellitus without complication, without long-term current use of insulin (HCC)    -  Primary ICD-10-CM: E11.9 ICD-9-CM: 250.00 Mixed hyperlipidemia     ICD-10-CM: E78.2 ICD-9-CM: 272.2 Coronary artery disease involving native coronary artery of native heart without angina pectoris     ICD-10-CM: I25.10 ICD-9-CM: 414.01 Gastroesophageal reflux disease without esophagitis     ICD-10-CM: K21.9 ICD-9-CM: 530.81 Anxiety     ICD-10-CM: F41.9 ICD-9-CM: 300.00 Encounter for medication monitoring     ICD-10-CM: Z51.81 
ICD-9-CM: V58.83 Type 2 diabetes mellitus without complication, with long-term current use of insulin (HCC)     ICD-10-CM: E11.9, Z79.4 ICD-9-CM: 250.00, V58.67 Vitals BP Pulse Temp Resp Height(growth percentile) Weight(growth percentile)  
 137/73 (BP 1 Location: Right arm, BP Patient Position: Sitting) 89 97 °F (36.1 °C) (Oral) 14 5' 4\" (1.626 m) 157 lb (71.2 kg) SpO2 BMI OB Status Smoking Status 97% 26.95 kg/m2 Hysterectomy Never Smoker Vitals History BMI and BSA Data Body Mass Index Body Surface Area  
 26.95 kg/m 2 1.79 m 2 Preferred Pharmacy Pharmacy Name Phone [id number] [address] [facility] [phone] Your Updated Medication List  
  
   
This list is accurate as of 7/27/18  9:28 AM.  Always use your most recent med list.  
  
  
  
  
 ALPRAZolam 1 mg tablet Commonly known as:  XANAX  
TAKE 1/2 TABLET EVERY DAY AS NEEDED FOR ANXIETY AND TAKE 1 TABLET AT BEDTIME AS NEEDED FOR SLEEP  
  
 aspirin, buffered 81 mg Tab Take 81 mg by mouth daily. BD ULTRA-FINE SHORT PEN NEEDLE 31 gauge x 5/16\" Ndle Generic drug:  Insulin Needles (Disposable)  
use to inject LANTUS INSULIN  
  
 CENTRUM VITAMINTS PO Take 1 tablet by mouth daily  
  
 fenofibrate nanocrystallized 145 mg tablet Commonly known as:  [employer] Take 1 Tab by mouth daily. fluticasone 50 mcg/actuation nasal spray Commonly known as:  FLONASE  
instill 2 sprays into each nostril once daily if needed for RHINITIS  
  
 furosemide 20 mg tablet Commonly known as:  LASIX TAKE 1 TABLET EVERY DAY AS NEEDED  
  
 glipiZIDE SR 5 mg CR tablet Commonly known as:  GLUCOTROL XL  
TAKE 1 TABLET EVERY DAY  
  
 insulin glargine 100 unit/mL (3 mL) Inpn Commonly known as:  LANTUS SOLOSTAR U-100 INSULIN  
5 Units by SubCUTAneous route nightly. JANUVIA 100 mg tablet Generic drug:  SITagliptin  
take 1 tablet by mouth once daily for diabetes  
  
 levothyroxine 25 mcg tablet Commonly known as:  SYNTHROID Take 2 tablets by mouth before breakfast  
  
 lisinopril 2.5 mg tablet Commonly known as:  PRINIVIL, ZESTRIL  
TAKE 1 TABLET EVERY DAY  
  
 meloxicam 15 mg tablet Commonly known as:  MOBIC Take 15 mg by mouth daily. metFORMIN  mg tablet Commonly known as:  GLUCOPHAGE XR  
TAKE 1 TABLET TWICE DAILY WITH MEALS. RESUME FROM 2017. metoprolol succinate 25 mg XL tablet Commonly known as:  TOPROL-XL  
TAKE 1 TABLET EVERY DAY  
  
 naproxen 375 mg tablet Commonly known as:  NAPROSYN  
take 1 tablet by mouth every 12 hours if needed for pain  
  
 pravastatin 40 mg tablet Commonly known as:  PRAVACHOL  
take 1 tablet by mouth EVERY NIGHT  
  
 RANEXA 500 mg SR tablet Generic drug:  ranolazine ER  
take 1 tablet by mouth twice a day  
  
 traMADol 50 mg tablet Commonly known as:  ULTRAM  
Take  mg by mouth daily as needed. VITAMIN D3 2,000 unit Tab Generic drug:  cholecalciferol (vitamin D3) Take 1 capsule by mouth daily Prescriptions Sent to Pharmacy Refills  
 insulin glargine (LANTUS SOLOSTAR U-100 INSULIN) 100 unit/mL (3 mL) inpn 0 Si Units by SubCUTAneous route nightly. Class: Normal  
 Pharmacy: [id number] [address], [address]  #: [phone] Route: SubCUTAneous We Performed the Following AMB POC GLUCOSE, QUANTITATIVE, BLOOD [14807 CPT(R)] AMB POC HEMOGLOBIN A1C [77798 CPT(R)] LIPID PANEL [26538 CPT(R)] METABOLIC PANEL, BASIC [54823 CPT(R)] Follow-up Instructions Return in about 4 months (around 12/3/2018) for physical.  
  
  
Introducing [facility] SERVICES! Poonam Ortega introduces [employer] patient portal. Now you can access parts of your medical record, email your doctor's office, and request medication refills online. 1. In your internet browser, go to [email or web address] 2. Click on the First Time User? Click Here link in the Sign In box. You will see the New Member Sign Up page. 3. Enter your [employer] Access Code exactly as it appears below. You will not need to use this code after youve completed the sign-up process. If you do not sign up before the expiration date, you must request a new code. · [employer] Access Code: [email or web address] Expires: 8/12/2018  3:21 PM 
 
4. Enter the last four digits of your Social Security Number (xxxx) and Date of Birth (mm/dd/yyyy) as indicated and click Submit. You will be taken to the next sign-up page. 5. Create a [employer] ID. This will be your [employer] login ID and cannot be changed, so think of one that is secure and easy to remember. 6. Create a [employer] password. You can change your password at any time. 7. Enter your Password Reset Question and Answer. This can be used at a later time if you forget your password. 8. Enter your e-mail address. You will receive e-mail notification when new information is available in [address] 9. Click Sign Up. You can now view and download portions of your medical record. 10. Click the Download Summary menu link to download a portable copy of your medical information. If you have questions, please visit the Frequently Asked Questions section of the [employer] website.  Remember, [employer] is NOT to be used for urgent needs. For medical emergencies, dial 911. Now available from your iPhone and Android! Please provide this summary of care documentation to your next provider. Your primary care clinician is listed as Tiago Givens. If you have any questions after today's visit, please call [phone].

## 2018-07-27 NOTE — PROGRESS NOTES
Chief Complaint Patient presents with  Diabetes  Hypertension  Thyroid Problem 1. Have you been to the ER, urgent care clinic since your last visit? Hospitalized since your last visit? No 
 
2. Have you seen or consulted any other health care providers outside of the [address] [place] [address] since your last visit? Include any pap smears or colon screening.  No

## 2018-07-27 NOTE — TELEPHONE ENCOUNTER
Received prior auth on pt's Ranexa. Called [facility] prior auth, spoke to Haim, she advised that she could start the prior auth process. She advised that everything was put in the computer and someone from the pharmacy will be calling me or faxing me the prior auth. Filled out prior auth for Ranexa and had the Dr sign. Faxed into Ranexa prior auth @ [phone] and received fax confirmation. Received fax stating that Ranexa is not covered. Re-faxed in the prior auth to [phone] and received fax confirmation. Received fax stating that Ranexa is not covered. Called pt,verified pt with two pt identifiers, advised pt that I had been working on her prior auth for Ranexa since the beginning of August but her insurance is denying the medication. Pt advised she does not have any of the medication at home. I advised her I would call the pharmacy to see what can be done and call her back on Monday. Pt verbalized understanding. Called the [address] [place] [address] pharmacy to see when the last time the pt had her Ranexa filled. They advised that the last time she had it filled was 3/10/17. She advised her insurance has probably changed also.         MD Kaela Juares, VIOLET       Caller: Unspecified (1 month ago)                     Stop it      Called pt,verified pt with two pt identifiers, told pt that  advised since she was not taking the Ranexa and she is having no symptoms and her insurance will cover it, she can stop taking it. Pt verbalized understanding.

## 2018-07-28 LAB
BUN SERPL-MCNC: 27 MG/DL (ref 8–27)
BUN/CREAT SERPL: 17 (ref 12–28)
CALCIUM SERPL-MCNC: 9.4 MG/DL (ref 8.7–10.3)
CHLORIDE SERPL-SCNC: 101 MMOL/L (ref 96–106)
CHOLEST SERPL-MCNC: 119 MG/DL (ref 100–199)
CO2 SERPL-SCNC: 22 MMOL/L (ref 20–29)
CREAT SERPL-MCNC: 1.56 MG/DL (ref 0.57–1)
GLUCOSE SERPL-MCNC: 186 MG/DL (ref 65–99)
HDLC SERPL-MCNC: 50 MG/DL
INTERPRETATION, 910389: NORMAL
INTERPRETATION: NORMAL
LDLC SERPL CALC-MCNC: 43 MG/DL (ref 0–99)
PDF IMAGE, 910387: NORMAL
POTASSIUM SERPL-SCNC: 4.5 MMOL/L (ref 3.5–5.2)
SODIUM SERPL-SCNC: 140 MMOL/L (ref 134–144)
TRIGL SERPL-MCNC: 128 MG/DL (ref 0–149)
VLDLC SERPL CALC-MCNC: 26 MG/DL (ref 5–40)

## 2018-10-08 ENCOUNTER — TELEPHONE (OUTPATIENT)
Dept: CARDIOLOGY CLINIC | Age: 68
End: 2018-10-08

## 2018-10-08 NOTE — TELEPHONE ENCOUNTER
Returned pt's call,verified pt with two pt identifiers, pt stated that for the past month her feet have been swelling and itching. They are swelling,puffy , red on top of feet to her toes. Swelling up to ankles. No wt gain, no pain, no new medications. No itching any where else or different soaps or detergents or lotions. Some warmth. She has been elevating, icing her feet and using hydrocortisone cream. No other cardiac symptoms. She is diabetic. Advised her to f/u with her PCP and she may refer her to a podiatrist. Néstor Loose her it doesn't sound cardiac, could be diabetic related and to f/u with her PCP. Pt verbalized understanding and she would do that.

## 2018-10-22 DIAGNOSIS — F41.9 ANXIETY: ICD-10-CM

## 2018-10-22 RX ORDER — LISINOPRIL 2.5 MG/1
TABLET ORAL
Qty: 90 TAB | Refills: 1 | Status: SHIPPED | OUTPATIENT
Start: 2018-10-22 | End: 2019-04-11 | Stop reason: SDUPTHER

## 2018-10-22 RX ORDER — ALPRAZOLAM 1 MG/1
TABLET ORAL
Qty: 45 TAB | Refills: 3 | OUTPATIENT
Start: 2018-10-22 | End: 2019-04-19 | Stop reason: SDUPTHER

## 2018-10-25 RX ORDER — FUROSEMIDE 20 MG/1
TABLET ORAL
Qty: 90 TAB | Refills: 1 | Status: SHIPPED | OUTPATIENT
Start: 2018-10-25 | End: 2018-12-14 | Stop reason: SDUPTHER

## 2018-10-28 DIAGNOSIS — E11.9 TYPE 2 DIABETES MELLITUS WITHOUT COMPLICATION, WITHOUT LONG-TERM CURRENT USE OF INSULIN (HCC): ICD-10-CM

## 2018-10-29 RX ORDER — INSULIN GLARGINE 100 [IU]/ML
INJECTION, SOLUTION SUBCUTANEOUS
Qty: 3 ML | Refills: 5 | Status: SHIPPED | OUTPATIENT
Start: 2018-10-29 | End: 2018-12-14 | Stop reason: ALTCHOICE

## 2018-11-15 RX ORDER — GLIPIZIDE 5 MG/1
TABLET, FILM COATED, EXTENDED RELEASE ORAL
Qty: 90 TAB | Refills: 1 | Status: SHIPPED | OUTPATIENT
Start: 2018-11-15 | End: 2018-12-14 | Stop reason: ALTCHOICE

## 2018-11-23 RX ORDER — METOPROLOL SUCCINATE 25 MG/1
TABLET, EXTENDED RELEASE ORAL
Qty: 90 TAB | Refills: 3 | Status: SHIPPED | OUTPATIENT
Start: 2018-11-23 | End: 2020-01-27 | Stop reason: SDUPTHER

## 2018-11-23 NOTE — TELEPHONE ENCOUNTER
Last Visit: 7/27  Next Appt: 12/14  Previous Refill Encounter: 9/18/17-90+3    Requested Prescriptions     Pending Prescriptions Disp Refills    metoprolol succinate (TOPROL-XL) 25 mg XL tablet 90 Tab 3     Sig: TAKE 1 TABLET EVERY DAY

## 2018-12-05 DIAGNOSIS — E11.9 TYPE 2 DIABETES MELLITUS WITHOUT COMPLICATION, WITHOUT LONG-TERM CURRENT USE OF INSULIN (HCC): Primary | ICD-10-CM

## 2018-12-05 RX ORDER — BLOOD-GLUCOSE METER
EACH MISCELLANEOUS
Qty: 1 EACH | Refills: 0 | Status: SHIPPED | OUTPATIENT
Start: 2018-12-05 | End: 2020-08-31 | Stop reason: SDUPTHER

## 2018-12-05 NOTE — TELEPHONE ENCOUNTER
Patient called stating that she would like a call back regarding her monitor. Patient stated that her monitor stopped working and her insurance will only cover two meters. Patient can be reached at  [phone].

## 2018-12-14 ENCOUNTER — OFFICE VISIT (OUTPATIENT)
Dept: FAMILY MEDICINE CLINIC | Age: 68
End: 2018-12-14

## 2018-12-14 VITALS
TEMPERATURE: 96.2 F | OXYGEN SATURATION: 99 % | HEART RATE: 72 BPM | DIASTOLIC BLOOD PRESSURE: 62 MMHG | BODY MASS INDEX: 26.53 KG/M2 | RESPIRATION RATE: 16 BRPM | WEIGHT: 155.4 LBS | HEIGHT: 64 IN | SYSTOLIC BLOOD PRESSURE: 115 MMHG

## 2018-12-14 DIAGNOSIS — E03.9 ACQUIRED HYPOTHYROIDISM: ICD-10-CM

## 2018-12-14 DIAGNOSIS — Z51.81 ENCOUNTER FOR MEDICATION MONITORING: ICD-10-CM

## 2018-12-14 DIAGNOSIS — I25.10 CORONARY ARTERY DISEASE INVOLVING NATIVE CORONARY ARTERY OF NATIVE HEART WITHOUT ANGINA PECTORIS: ICD-10-CM

## 2018-12-14 DIAGNOSIS — Z12.31 ENCOUNTER FOR SCREENING MAMMOGRAM FOR BREAST CANCER: ICD-10-CM

## 2018-12-14 DIAGNOSIS — Z23 NEED FOR SHINGLES VACCINE: ICD-10-CM

## 2018-12-14 DIAGNOSIS — Z23 ENCOUNTER FOR IMMUNIZATION: ICD-10-CM

## 2018-12-14 DIAGNOSIS — E11.9 TYPE 2 DIABETES MELLITUS WITHOUT COMPLICATION, WITHOUT LONG-TERM CURRENT USE OF INSULIN (HCC): ICD-10-CM

## 2018-12-14 DIAGNOSIS — Z00.00 MEDICARE ANNUAL WELLNESS VISIT, SUBSEQUENT: Primary | ICD-10-CM

## 2018-12-14 DIAGNOSIS — E78.2 MIXED HYPERLIPIDEMIA: ICD-10-CM

## 2018-12-14 DIAGNOSIS — F41.9 ANXIETY: ICD-10-CM

## 2018-12-14 DIAGNOSIS — Z79.82 LONG TERM (CURRENT) USE OF ASPIRIN: ICD-10-CM

## 2018-12-14 DIAGNOSIS — Z12.11 ENCOUNTER FOR SCREENING FECAL OCCULT BLOOD TESTING: ICD-10-CM

## 2018-12-14 PROBLEM — E11.21 TYPE 2 DIABETES WITH NEPHROPATHY (HCC): Status: ACTIVE | Noted: 2018-12-14

## 2018-12-14 LAB
BILIRUB UR QL STRIP: NEGATIVE
GLUCOSE POC: 70 MG/DL
GLUCOSE UR-MCNC: NEGATIVE MG/DL
HBA1C MFR BLD HPLC: 5.8 %
HEMOCCULT STL QL: NEGATIVE
KETONES P FAST UR STRIP-MCNC: NORMAL MG/DL
PH UR STRIP: 5 [PH] (ref 4.6–8)
PROT UR QL STRIP: NEGATIVE
SP GR UR STRIP: 1.02 (ref 1–1.03)
UA UROBILINOGEN AMB POC: NORMAL (ref 0.2–1)
URINALYSIS CLARITY POC: CLEAR
URINALYSIS COLOR POC: YELLOW
URINE BLOOD POC: NEGATIVE
URINE LEUKOCYTES POC: NORMAL
URINE NITRITES POC: NEGATIVE
VALID INTERNAL CONTROL?: YES

## 2018-12-14 RX ORDER — FUROSEMIDE 20 MG/1
20 TABLET ORAL DAILY
COMMUNITY
End: 2019-02-08 | Stop reason: SDUPTHER

## 2018-12-14 RX ORDER — METFORMIN HYDROCHLORIDE 500 MG/1
500 TABLET, EXTENDED RELEASE ORAL DAILY
COMMUNITY
End: 2018-12-14 | Stop reason: SINTOL

## 2018-12-14 RX ORDER — PRAVASTATIN SODIUM 40 MG/1
40 TABLET ORAL
COMMUNITY
End: 2019-06-21 | Stop reason: SDUPTHER

## 2018-12-14 RX ORDER — FENOFIBRATE 145 MG/1
145 TABLET, COATED ORAL DAILY
COMMUNITY
End: 2019-06-21 | Stop reason: SDUPTHER

## 2018-12-14 NOTE — PROGRESS NOTES
This is a Subsequent Medicare Annual Wellness Visit providing Personalized Prevention Plan Services (PPPS) (Performed 12 months after initial AWV and PPPS )    I have reviewed the patient's medical history in detail and updated the computerized patient record. Cardiovascular Review:  The patient has coronary artery disease and status post coronary artery stenting. Diet and Lifestyle: generally follows a low fat low cholesterol diet, generally follows a low sodium diet, follows a diabetic diet regularly, exercises sporadically  Home BP Monitoring: is not measured at home. Pertinent ROS: taking medications as instructed, no medication side effects noted, no TIA's, no chest pain on exertion, no dyspnea on exertion, no swelling of ankles since she has been taking the lasix daily. DM type II follow up:  Compliant w/ meds, diabetic diet, and exercise. She is still having low BS. Also c/o loose stools related to the metformin. Her lantus insulin was stopped from her last visit d/t A1c level at 5.6%. Obtains home glucose monitoring averaging 90s. Checks BS BID on most days and prn. Pt does not have BS log at visit today. No Rf needed for today. Denies any polyuria and polydipsia. She does admit to having tingling in the feet. No blurred vision. No significant weight changes. Feeling well since last OV. Hypercholesterolemia follow up:  Compliant w/ meds, low fat, low cholesterol diet. Exercising some. No muscle nor abdominal pain, no skin discoloration. Patient fasting today. Depression Review:  Patient is seen for followup of depression and anxiety. Overall doing well with just taking the xanax as needed. She denies depressed mood and insomnia. HM:  Colonoscopy 5/26/16 by Dr Claudia Loving- repeat in 5 years  Eye exam 2/23/2018 Dr Oddis Goldberg.       History     Past Medical History:   Diagnosis Date    Arthritis     osteo - right knee    GERD (gastroesophageal reflux disease)     Goiter     Ill-defined condition     borderline diabetes    Obstructive sleep apnea (adult) (pediatric)     Unspecified sleep apnea     CPAP      Past Surgical History:   Procedure Laterality Date    COLONOSCOPY N/A 1/23/2017    COLONOSCOPY performed by Jones Ordoñez MD at [address] [facility], DIAGNOSTIC  2004    repeat 10 yrs     HX CATARACT REMOVAL  6/12    BILATERAL    HX GYN      C - section x 1    HX HEENT  2006    thyroid goiter removed    HX ORTHOPAEDIC  11/13/2013    right knee replacement    HX OTHER SURGICAL      no colon polyps per pt    MS COLONOSCOPY FLX DX W/COLLJ SPEC WHEN PFRMD  3/14/2007    dr. Jin Tomlin    MS COLONOSCOPY W/BIOPSY SINGLE/MULTIPLE  1/23/2012    dr Jin Tomlin     Current Outpatient Prescriptions   Medication Sig Dispense Refill    fexofenadine-pseudoephedrine (ALLEGRA-D 12 HOUR)  mg Tb12 Take 1 Tab by mouth every twelve (12) hours as needed.  traMADol (ULTRAM) 50 mg tablet TAKE 1 TABLET BY MOUTH EVERY 6 HOURS AS NEEDED FOR PAIN MAX 4/DAY 30 Tab 1    VAGIFEM 10 mcg tab vaginal tablet Insert 10 mcg into vagina two (2) times a week. 12    aspirin delayed-release 81 mg tablet Take 81 mg by mouth daily.  cholecalciferol, vitamin d3, (VITAMIN D) 1,000 unit tablet Take 2,000 Units by mouth daily.  MULTIVITAMIN PO Take 1 Tab by mouth daily.        Allergies   Allergen Reactions    Oxycodone Other (comments)     Hallucinations     Family History   Problem Relation Age of Onset    Hypertension Mother     Asthma Mother     Heart Disease Mother     Hypertension Father     Heart Disease Father     Hypertension Sister     Alcohol abuse Brother     Heart Disease Brother     Hypertension Sister     Hypertension Sister     Hypertension Sister     Diabetes Brother     Hypertension Brother     Cancer Brother 71     stomach    Hypertension Brother     Other Brother      took awhile to wake up after anesthesia/pt states he had surgery on a Mon and then Wed and Wed is when he had trouble waking up    Cancer Son      colon.     No Known Problems Sister     Hypertension Brother     Diabetes Maternal Uncle     Diabetes Maternal Grandmother      Social History   Substance Use Topics    Smoking status: Never Smoker    Smokeless tobacco: Never Used    Alcohol use Yes      Comment: occasional     Patient Active Problem List   Diagnosis Code    GERD (gastroesophageal reflux disease) K21.9    Obstructive sleep apnea (adult) (pediatric) G47.33    Thyroid disease E07.9    Family history of colon cancer Z80.0    OA (osteoarthritis) M19.90    Multinodular goiter E04.2    Osteoarthritis of right knee M17.11    Primary osteoarthritis of both knees M17.0    Prediabetes R73.03    Encounter for medication monitoring Z51.81    IGT (impaired glucose tolerance) R73.02     Lab Results   Component Value Date/Time    WBC 10.2 05/14/2018 04:08 PM    HGB 11.6 05/14/2018 04:08 PM    HCT 36.5 05/14/2018 04:08 PM    PLATELET 035 70/09/2988 04:08 PM    MCV 92.4 05/14/2018 04:08 PM     Lab Results   Component Value Date/Time    Cholesterol, total 119 07/27/2018 09:01 AM    HDL Cholesterol 50 07/27/2018 09:01 AM    LDL, calculated 43 07/27/2018 09:01 AM    Triglyceride 128 07/27/2018 09:01 AM    CHOL/HDL Ratio 3.5 01/05/2017 09:18 PM     Lab Results   Component Value Date/Time    TSH 0.979 03/05/2018 03:40 PM    T4, Free 0.97 02/22/2017 08:26 AM    T4, Total 5.6 11/17/2015 08:54 AM      Lab Results   Component Value Date/Time    Sodium 140 07/27/2018 09:01 AM    Potassium 4.5 07/27/2018 09:01 AM    Chloride 101 07/27/2018 09:01 AM    CO2 22 07/27/2018 09:01 AM    Anion gap 6 05/14/2018 04:08 PM    Glucose 186 (H) 07/27/2018 09:01 AM    BUN 27 07/27/2018 09:01 AM    Creatinine 1.56 (H) 07/27/2018 09:01 AM    BUN/Creatinine ratio 17 07/27/2018 09:01 AM    GFR est AA 40 (L) 07/27/2018 09:01 AM    GFR est non-AA 34 (L) 07/27/2018 09:01 AM    Calcium 9.4 07/27/2018 09:01 AM    Bilirubin, total 0.3 05/14/2018 04:08 PM    ALT (SGPT) 40 05/14/2018 04:08 PM    AST (SGOT) 33 05/14/2018 04:08 PM    Alk. phosphatase 70 05/14/2018 04:08 PM    Protein, total 6.7 05/14/2018 04:08 PM    Albumin 3.9 05/14/2018 04:08 PM    Globulin 2.8 05/14/2018 04:08 PM    A-G Ratio 1.4 05/14/2018 04:08 PM      Lab Results   Component Value Date/Time    Hemoglobin A1c 6.4 (H) 09/12/2017 09:35 AM    Hemoglobin A1c (POC) 5.6 07/27/2018 09:01 AM        Depression Risk Factor Screening:     PHQ over the last two weeks 6/20/2017   Little interest or pleasure in doing things Not at all   Feeling down, depressed or hopeless Not at all   Total Score PHQ 2 0     Alcohol Risk Factor Screening: You do not drink alcohol or very rarely. Functional Ability and Level of Safety:     Hearing Loss   none    Activities of Daily Living   Self-care. Requires assistance with: no ADLs    Fall Risk     Fall Risk Assessment, last 12 mths 6/20/2017   Able to walk? Yes   Fall in past 12 months? No     Functional Ability:   Does the patient exhibit a steady gait? yes    How long did it take the patient to get up and walk from a sitting position? seconds    Is the patient self reliant? (ie can do own laundry, meals, household chores)  yes   Does the patient handle his/her own medications? yes   Does the patient handle his/her own money? yes   Is the patients home safe (ie good lighting, handrails on stairs and bath, etc.)? yes   Did you notice or did patient express any hearing difficulties? no   Did you notice or did patient express any vision difficulties? no        Advance Care Planning:   Patient was offered the opportunity to discuss advance care planning:  yes    Does patient have an Advance Directive:  no   If no, did you provide information on Caring Connections?   yes          Abuse Screen   Patient is not abused    Review of Systems   Constitutional: negative for fatigue and malaise  Eyes: negative for irritation and redness  Ears, nose, mouth, throat, and face: negative for earaches, nasal congestion and hoarseness  Respiratory: negative for cough, sputum or dyspnea on exertion  Cardiovascular: negative for chest pain, chest pressure/discomfort, dyspnea, palpitations, irregular heart beats, fatigue, lower extremity edema  Gastrointestinal: negative for dysphagia, dyspepsia, reflux symptoms, nausea, vomiting, melena, constipation and abdominal pain  Genitourinary:negative for frequency, dysuria, nocturia, urinary incontinence   Integument/breast: negative for rash and dryness  Hematologic/lymphatic: negative for easy bruising and lymphadenopathy  Musculoskeletal:negative for myalgias, arthralgias, stiff joints, neck pain, back pain and muscle weakness  Neurological: negative for headaches, dizziness, tremor and weakness  Endocrine: negative for diabetic symptoms including polyuria and polydipsia        Physical Examination     Evaluation of Cognitive Function:  Mood/affect:  neutral  Appearance: age appropriate  Family member/caregiver input: NA    Visit Vitals  /62 (BP 1 Location: Left arm, BP Patient Position: Sitting)   Pulse 72   Temp 96.2 °F (35.7 °C) (Oral)   Resp 16   Ht 5' 3.5\" (1.613 m)   Wt 155 lb 6.4 oz (70.5 kg)   SpO2 99%   BMI 27.10 kg/m²     General:  Alert, cooperative, no distress, appears stated age. Head:  Normocephalic, without obvious abnormality, atraumatic. Ears:  Normal TMs and external ear canals both ears. Nose: Nares normal. Septum midline. Mucosa normal. No drainage or sinus tenderness. Throat: Lips, mucosa, and tongue normal. Teeth and gums normal.   Neck: Supple, symmetrical, trachea midline, no adenopathy, thyroid: no enlargement/tenderness/nodules, no carotid bruit and no JVD. Back:   Symmetric, no curvature. ROM normal. No CVA tenderness. Lungs:   Clear to auscultation bilaterally. Chest wall:  No tenderness or deformity.    Heart:  Regular rate and rhythm, S1, S2 normal, no murmur, click, rub or gallop. Breast Exam:  No tenderness, masses, or nipple abnormality. Abdomen:   Soft, non-tender. Bowel sounds normal. No masses,  No organomegaly. Rectal:  Normal tone,  no masses or tenderness  Guaiac negative stool. Extremities: Extremities normal, atraumatic, no cyanosis or edema. Foot exam done . Normal sensation to PP, LT and vibration. Sensation intact to microfilament testing. Pulses were decreased in the feet. No swelling. No skin lesions or sores noted. No tinea present. Pulses: 2+ and symmetric all extremities. Skin: Skin color, texture, turgor normal. No rashes or lesions. Lymph nodes: Cervical, supraclavicular, and axillary nodes normal.   Neurologic: CNII-XII intact. Normal strength, sensation and reflexes throughout. Advice/Referrals/Counseling   Education and counseling provided:  Are appropriate based on today's review and evaluation  End-of-Life planning (with patient's consent)      Assessment/Plan     ASSESSMENT and PLAN  Diagnoses and all orders for this visit:    1. Medicare annual wellness visit, subsequent  -     AMB POC URINALYSIS DIP STICK AUTO W/ MICRO    2. Type 2 diabetes mellitus without complication, without long-term current use of insulin (HCC)  -     AMB POC HEMOGLOBIN A1C  -     AMB POC GLUCOSE, QUANTITATIVE, BLOOD  -     MICROALBUMIN, UR, RAND W/ MICROALB/CREAT RATIO  A1c level is 5.8% will stop metformin and glipizide. 3. Mixed hyperlipidemia  -     LIPID PANEL    4. Coronary artery disease involving native coronary artery of native heart without angina pectoris    5. Acquired hypothyroidism  -     TSH 3RD GENERATION    6. Anxiety  Stable with prn xanax. 7. Encounter for medication monitoring  -     METABOLIC PANEL, COMPREHENSIVE  -     CBC W/O DIFF    8. Encounter for screening fecal occult blood testing  9. Long term (current) use of aspirin  -     AMB POC FECAL BLOOD, OCCULT, QL 1 CARD    10. Encounter for screening mammogram for breast cancer  -     [facility] MAMMO BI SCREENING INCL CAD; Future    11. Encounter for immunization  -     PNEUMOCOCCAL CONJ VACCINE 13 VALENT IM  -     [place] IMMUNIZ ADMIN,1 SINGLE/COMB VAC/TOXOID    12. Need for shingles vaccine  -     varicella-zoster recombinant, PF, (SHINGRIX) 50 mcg/0.5 mL susr injection; 0.5 mL by IntraMUSCular route once for 1 dose. Repeat second dose in 6 months. Follow-up Disposition:  Return in about 6 weeks (around 1/25/2019). reviewed diet, exercise and weight control  cardiovascular risk and specific lipid/LDL goals reviewed  reviewed medications and side effects in detail    I have discussed diagnosis listed in this note with pt and/or family. I have discussed treatment plans and options and the risk/benefit analysis of those options, including safe use of medications and possible medication side effects. Through the use of shared decision making we have agreed to the above plan. The patient has received an after-visit summary and questions were answered concerning future plans and follow up. Advise pt of any urgent changes then to proceed to the ER.

## 2018-12-14 NOTE — PROGRESS NOTES
Chief Complaint   Patient presents with    Annual Wellness Visit     Medicare         Colonoscopy 5/26/16 by Dr Travis Ruelas- repeat in 5 years    Eye exam 2/23/2018 Dr Chinyere Stephens. Verbal order received per Dr. Karla Soto 13 IM-VORB. Pt received Prevnar 13 IM in left deltoid without any difficulty. 1. Have you been to the ER, urgent care clinic since your last visit? Hospitalized since your last visit? 2. Have you seen or consulted any other health care providers outside of the [address] [place] [address] since your last visit? Include any pap smears or colon screening.

## 2018-12-15 LAB
ALBUMIN SERPL-MCNC: 4.5 G/DL (ref 3.6–4.8)
ALBUMIN/CREAT UR: 3.3 MG/G CREAT (ref 0–30)
ALBUMIN/GLOB SERPL: 2.1 {RATIO} (ref 1.2–2.2)
ALP SERPL-CCNC: 70 IU/L (ref 39–117)
ALT SERPL-CCNC: 22 IU/L (ref 0–32)
AST SERPL-CCNC: 29 IU/L (ref 0–40)
BILIRUB SERPL-MCNC: <0.2 MG/DL (ref 0–1.2)
BUN SERPL-MCNC: 18 MG/DL (ref 8–27)
BUN/CREAT SERPL: 17 (ref 12–28)
CALCIUM SERPL-MCNC: 9.9 MG/DL (ref 8.7–10.3)
CHLORIDE SERPL-SCNC: 108 MMOL/L (ref 96–106)
CHOLEST SERPL-MCNC: 132 MG/DL (ref 100–199)
CO2 SERPL-SCNC: 23 MMOL/L (ref 20–29)
CREAT SERPL-MCNC: 1.05 MG/DL (ref 0.57–1)
CREAT UR-MCNC: 103.5 MG/DL
ERYTHROCYTE [DISTWIDTH] IN BLOOD BY AUTOMATED COUNT: 14.2 % (ref 12.3–15.4)
GLOBULIN SER CALC-MCNC: 2.1 G/DL (ref 1.5–4.5)
GLUCOSE SERPL-MCNC: 68 MG/DL (ref 65–99)
HCT VFR BLD AUTO: 37.2 % (ref 34–46.6)
HDLC SERPL-MCNC: 47 MG/DL
HGB BLD-MCNC: 12.3 G/DL (ref 11.1–15.9)
INTERPRETATION, 910389: NORMAL
INTERPRETATION: NORMAL
LDLC SERPL CALC-MCNC: 58 MG/DL (ref 0–99)
Lab: NORMAL
MCH RBC QN AUTO: 29.3 PG (ref 26.6–33)
MCHC RBC AUTO-ENTMCNC: 33.1 G/DL (ref 31.5–35.7)
MCV RBC AUTO: 89 FL (ref 79–97)
MICROALBUMIN UR-MCNC: 3.4 UG/ML
PDF IMAGE, 910387: NORMAL
PLATELET # BLD AUTO: 329 X10E3/UL (ref 150–379)
POTASSIUM SERPL-SCNC: 4.5 MMOL/L (ref 3.5–5.2)
PROT SERPL-MCNC: 6.6 G/DL (ref 6–8.5)
RBC # BLD AUTO: 4.2 X10E6/UL (ref 3.77–5.28)
SODIUM SERPL-SCNC: 144 MMOL/L (ref 134–144)
TRIGL SERPL-MCNC: 136 MG/DL (ref 0–149)
TSH SERPL DL<=0.005 MIU/L-ACNC: 0.18 UIU/ML (ref 0.45–4.5)
VLDLC SERPL CALC-MCNC: 27 MG/DL (ref 5–40)
WBC # BLD AUTO: 5.9 X10E3/UL (ref 3.4–10.8)

## 2018-12-17 ENCOUNTER — DOCUMENTATION ONLY (OUTPATIENT)
Dept: FAMILY MEDICINE CLINIC | Age: 68
End: 2018-12-17

## 2018-12-20 RX ORDER — LEVOTHYROXINE SODIUM 25 UG/1
TABLET ORAL
COMMUNITY
Start: 2018-12-20 | End: 2019-01-22 | Stop reason: SDUPTHER

## 2019-01-03 ENCOUNTER — OFFICE VISIT (OUTPATIENT)
Dept: CARDIOLOGY CLINIC | Age: 69
End: 2019-01-03

## 2019-01-03 VITALS
HEART RATE: 76 BPM | WEIGHT: 155 LBS | BODY MASS INDEX: 26.46 KG/M2 | HEIGHT: 64 IN | RESPIRATION RATE: 16 BRPM | OXYGEN SATURATION: 97 % | DIASTOLIC BLOOD PRESSURE: 60 MMHG | SYSTOLIC BLOOD PRESSURE: 110 MMHG

## 2019-01-03 DIAGNOSIS — I10 ESSENTIAL HYPERTENSION: ICD-10-CM

## 2019-01-03 DIAGNOSIS — E11.9 TYPE 2 DIABETES MELLITUS WITHOUT COMPLICATION, WITHOUT LONG-TERM CURRENT USE OF INSULIN (HCC): ICD-10-CM

## 2019-01-03 DIAGNOSIS — I63.9 CEREBROVASCULAR ACCIDENT (CVA), UNSPECIFIED MECHANISM (HCC): ICD-10-CM

## 2019-01-03 DIAGNOSIS — I25.10 CORONARY ARTERY DISEASE INVOLVING NATIVE CORONARY ARTERY OF NATIVE HEART WITHOUT ANGINA PECTORIS: Primary | ICD-10-CM

## 2019-01-03 DIAGNOSIS — E78.2 MIXED HYPERLIPIDEMIA: ICD-10-CM

## 2019-01-03 DIAGNOSIS — Z98.61 S/P PTCA (PERCUTANEOUS TRANSLUMINAL CORONARY ANGIOPLASTY): ICD-10-CM

## 2019-01-03 NOTE — PROGRESS NOTES
1. Have you been to the ER, urgent care clinic since your last visit? Hospitalized since your last visit? No    2. Have you seen or consulted any other health care providers outside of the [address] [place] [address] since your last visit? Include any pap smears or colon screening. Yes When: Yes Nov 2018 Orthopedic     Patient has no cardiac complaints.

## 2019-01-03 NOTE — PROGRESS NOTES
1/3/2019 1:17 PM      Subjective:     Alejandra Felipe is here for f/u visit. She denies chest pain, chest pressure/discomfort, dyspnea, palpitations, irregular heart beats, near-syncope, syncope, fatigue, orthopnea, paroxysmal nocturnal dyspnea, exertional chest pressure/discomfort, claudication, lower extremity edema, tachypnea. Visit Vitals  /60 (BP 1 Location: Right arm, BP Patient Position: Sitting)   Pulse 76   Resp 16   Ht 5' 3.5\" (1.613 m)   Wt 155 lb (70.3 kg)   SpO2 97%   BMI 27.03 kg/m²     Current Outpatient Medications   Medication Sig    levothyroxine (SYNTHROID) 25 mcg tablet Take 2 tabs Monday thru Friday and 1 tab on Saturday and Sunday    fenofibrate nanocrystallized (TRICOR) 145 mg tablet Take 145 mg by mouth daily.  pravastatin (PRAVACHOL) 40 mg tablet Take 40 mg by mouth nightly.  furosemide (LASIX) 20 mg tablet Take 20 mg by mouth daily.  Blood-Glucose Meter (CONTOUR NEXT METER) misc Use to check blood sugar 2 times daily    glucose blood VI test strips (CONTOUR NEXT TEST STRIPS) strip Use to check blood sugar 2 times daily    metoprolol succinate (TOPROL-XL) 25 mg XL tablet TAKE 1 TABLET EVERY DAY    lisinopril (PRINIVIL, ZESTRIL) 2.5 mg tablet take 1 tablet by mouth once daily    ALPRAZolam (XANAX) 1 mg tablet take 1/2 tablet by mouth once daily if needed for anxiety and take 1 tablet by mouth at bedtime if needed for sleep    multivit-minerals/folic acid (CENTRUM VITAMINTS PO) Take 1 tablet by mouth daily    cholecalciferol, vitamin D3, (VITAMIN D3) 2,000 unit tab Take 1 capsule by mouth daily    JANUVIA 100 mg tablet take 1 tablet by mouth once daily for diabetes    Aspirin, Buffered 81 mg tab Take 81 mg by mouth daily. No current facility-administered medications for this visit.           Objective:      Visit Vitals  /60 (BP 1 Location: Right arm, BP Patient Position: Sitting)   Pulse 76   Resp 16   Ht 5' 3.5\" (1.613 m)   Wt 155 lb (70.3 kg)   SpO2 97%   BMI 27.03 kg/m²       Data Review:    EKG: Normal sinus rhythm, no acute st/t changes    Reviewed and/or ordered active problem list, medication list tests    Past Medical History:   Diagnosis Date    Anxiety     Arthritis     ASHD (arteriosclerotic heart disease)     CAD (coronary artery disease)     Chronic pain     Constipation     Depression     Diabetes ([facility] [address]     Diarrhea     Dysgeusia 11/21/2014    Esophageal motor disorder 1/23/2012    Flatulence/gas pain/belching 11/21/2014    Gastroparesis     GERD (gastroesophageal reflux disease)     Hypercholesterolemia     Hypertension     Psychiatric disorder     anxiety    S/P bypass gastrojejunostomy 1/23/2012    Sleep apnea     no cpap      Past Surgical History:   Procedure Laterality Date    ABDOMEN SURGERY PROC UNLISTED      s/p partial gastrectomy 2/2 gastroparesis    CARDIAC SURG PROCEDURE UNLIST  6/2013    stent placement X 2    COLONOSCOPY N/A 5/26/2016    [facility]COPY    EGD  7/13/2009    GASTROJEJUNOSTOMY      HC BLD CARPEL TUNNEL RELEASE      bilat    HX APPENDECTOMY      HX CHOLECYSTECTOMY      HX CORONARY STENT PLACEMENT  06/2013    3 stents    HX FEMORAL BYPASS Right 11/2017    HX GASTRIC BYPASS  6/02    HX HYSTERECTOMY      HX LAPAROTOMY  8/02    2/2 intra abd abscess    HX ORTHOPAEDIC  1/08    laminectomy    HX ORTHOPAEDIC      Bilateral feet bone spurs removed    HX ORTHOPAEDIC      bilateral carpal tunnel    HX ORTHOPAEDIC      back surgery    HX UROLOGICAL      bladder surgery-bladder tac    NM COLONOSCOPY FLX DX W/COLLJ SPEC WHEN PFRMD  5/03/2006    Dr. Stan Martinez COLONOSCOPY W/BIOPSY SINGLE/MULTIPLE  2/9/2012         NM EGD BALLOON DILATION ESOPHAGUS <30 MM DIAM  1/23/2012         NM EGD TRANSORAL BIOPSY SINGLE/MULTIPLE  7/11/2011         NM EGD TRANSORAL BIOPSY SINGLE/MULTIPLE  1/23/2012         REMOVAL OF HEEL SPUR  2001    bilat Allergies   Allergen Reactions    Demerol [Meperidine] Unknown (comments)     Pt unsure of reaction      Family History   Problem Relation Age of Onset    Heart Disease Mother     Hypertension Mother     Diabetes Mother     Diabetes Father     Heart Disease Father     Hypertension Father     Alcohol abuse Brother     Heart Disease Sister     Hypertension Sister     Diabetes Sister     No Known Problems Sister     Alcohol abuse Brother     Alcohol abuse Brother     Diabetes Brother       Social History     Socioeconomic History    Marital status:      Spouse name: Not on file    Number of children: Not on file    Years of education: Not on file    Highest education level: Not on file   Social Needs    Financial resource strain: Not on file    Food insecurity - worry: Not on file    Food insecurity - inability: Not on file   [employer] needs - medical: Not on file   [employer] needs - non-medical: Not on file   Occupational History    Not on file   Tobacco Use    Smoking status: Never Smoker    Smokeless tobacco: Never Used   Substance and Sexual Activity    Alcohol use: No     Alcohol/week: 0.0 oz    Drug use: No    Sexual activity: Not Currently   Other Topics Concern    Not on file   Social History Narrative    Not on file         Review of Systems     General: Not Present- Anorexia, Chills, Dietary Changes, Fatigue, Fever, Medication Changes, Night Sweats, Weight Gain > 10lbs. and Weight Loss > 10lbs. .  Skin: Not Present- Bruising and Excessive Sweating. HEENT: Not Present- Headache, Visual Loss and Vertigo. Respiratory: Not Present- Cough, Decreased Exercise Tolerance, Difficulty Breathing, Snoring and Wheezing.   Cardiovascular: Not Present- Abnormal Blood Pressure, Chest Pain, Claudications, Difficulty Breathing On Exertion, Edema, Fainting / Blacking Out, Irregular Heart Beat, Night Cramps, Orthopnea, Palpitations, Paroxysmal Nocturnal Dyspnea, Rapid Heart Rate, Shortness of Breath and Swelling of Extremities. Gastrointestinal: Not Present- Black, Tarry Stool, Bloody Stool, Diarrhea, Hematemesis, Rectal Bleeding and Vomiting. Musculoskeletal: Not Present- Muscle Pain and Muscle Weakness. Neurological: Not Present- Dizziness. Psychiatric: Not Present- Depression. Endocrine: Not Present- Cold Intolerance, Heat Intolerance and Thyroid Problems. Hematology: Not Present- Abnormal Bleeding, Anemia, Blood Clots and Easy Bruising.       Physical Exam   The physical exam findings are as follows:       General   Mental Status - Alert. General Appearance - Not in acute distress. Chest and Lung Exam   Inspection: Accessory muscles - No use of accessory muscles in breathing. Auscultation:   Breath sounds: - Normal.      Cardiovascular   Inspection: Jugular vein - Bilateral - Inspection Normal.  Palpation/Percussion:   Apical Impulse: - Normal.  Auscultation: Rhythm - Regular. Heart Sounds - S1 WNL and S2 WNL. No S3 or S4. Murmurs & Other Heart Sounds: Auscultation of the heart reveals - No Murmurs. Carotid arteries - No Carotid bruit. Peripheral Vascular   Upper Extremity: Inspection - Bilateral - No Cyanotic nailbeds or Digital clubbing. Lower Extremity:   Palpation: Edema - Bilateral - No edema. Assessment:       ICD-10-CM ICD-9-CM    1. Coronary artery disease involving native coronary artery of native heart without angina pectoris I25.10 414.01 AMB POC EKG ROUTINE W/ 12 LEADS, INTER & REP   2. S/P PTCA (percutaneous transluminal coronary angioplasty) Z98.61 V45.82    3. Mixed hyperlipidemia E78.2 272.2    4. Cerebrovascular accident (CVA), unspecified mechanism ([facility] [address] I63.9 434.91    5. Type 2 diabetes mellitus without complication, without long-term current use of insulin (HCC) E11.9 250.00    6. Essential hypertension I10 401.9        Plan:     1. Atherosclerotic heart disease: History of PTCA stenting 2013 RCA. Continue current meds.   2. Hypertension controlled. Continue current meds. 3. Hyperlipidemia: on statin. Last FLP noted. 4. No significant PAD noted on LE angio previously. Component of vasospasm. She never started imdur and not interested in taking. Asymptomatic now. Exercise d/w pt.

## 2019-01-18 RX ORDER — METFORMIN HYDROCHLORIDE 500 MG/1
TABLET, EXTENDED RELEASE ORAL
Qty: 180 TAB | Refills: 3 | Status: SHIPPED | OUTPATIENT
Start: 2019-01-18 | End: 2019-06-21

## 2019-01-22 RX ORDER — LEVOTHYROXINE SODIUM 25 UG/1
TABLET ORAL
Qty: 144 TAB | Refills: 3 | Status: SHIPPED | OUTPATIENT
Start: 2019-01-22 | End: 2019-06-21

## 2019-01-22 NOTE — TELEPHONE ENCOUNTER
Last Visit: 12/14  Next Appt: 2/8  Previous Refill Encounter: 12/20    Requested Prescriptions     Pending Prescriptions Disp Refills    levothyroxine (SYNTHROID) 25 mcg tablet 144 Tab 3     Sig: Take 2 tabs Monday thru Friday and 1 tab on Saturday and Sunday

## 2019-02-08 ENCOUNTER — OFFICE VISIT (OUTPATIENT)
Dept: FAMILY MEDICINE CLINIC | Age: 69
End: 2019-02-08

## 2019-02-08 VITALS
BODY MASS INDEX: 26.19 KG/M2 | SYSTOLIC BLOOD PRESSURE: 112 MMHG | OXYGEN SATURATION: 100 % | RESPIRATION RATE: 16 BRPM | TEMPERATURE: 96.3 F | HEART RATE: 74 BPM | WEIGHT: 153.4 LBS | DIASTOLIC BLOOD PRESSURE: 60 MMHG | HEIGHT: 64 IN

## 2019-02-08 DIAGNOSIS — E07.89 PAINFUL THYROID: ICD-10-CM

## 2019-02-08 DIAGNOSIS — E03.9 ACQUIRED HYPOTHYROIDISM: Primary | ICD-10-CM

## 2019-02-08 RX ORDER — FUROSEMIDE 20 MG/1
20 TABLET ORAL DAILY
Qty: 90 TAB | Refills: 3 | Status: SHIPPED | OUTPATIENT
Start: 2019-02-08 | End: 2020-04-03

## 2019-02-08 NOTE — PROGRESS NOTES
HISTORY OF PRESENT ILLNESS Yoli Ross is a 79 y.o. female. HPI Follow up on her thyroid. She admits that she sometimes is not taking the medication correctly and forgets to cut it back to just one tab on the weekend. She is also feeling a pain over her thyroid in the neck for the past week. No swelling noted. No sore throat. No trouble with her swallowing. Patient Active Problem List  
Diagnosis Code  Anxiety F41.9  Depression F32.9  Hypovitaminosis D E55.9  GERD (gastroesophageal reflux disease) K21.9  Edema R60.9  Esophageal motor disorder K22.4  
 S/P bypass gastrojejunostomy Z98.0  
 SOB (shortness of breath) R06.02  
 CAD (coronary artery disease) I25.10  Angina pectoris (HCC) I20.9  S/P PTCA (percutaneous transluminal coronary angioplasty) Z98.61  
 Mixed hyperlipidemia E78.2  Diarrhea R19.7  Type 2 diabetes mellitus without complication ([facility]) P88.8  Osteopenia M85.80  Encounter for medication monitoring Z51.81  
 CVA (cerebral vascular accident) ([facility] [address] I63.9  Complicated migraine K95. [facility]  Numbness and tingling of right side of face R20.0, R20.2  Stenosis of both internal carotid arteries I65.23  
 Angina, class III ([facility]) I20.9  Type 2 diabetes with nephropathy ([facility]) E11.21  
 Essential hypertension I10 Current Outpatient Medications Medication Sig Dispense Refill  furosemide (LASIX) 20 mg tablet Take 1 Tab by mouth daily. 90 Tab 3  
 levothyroxine (SYNTHROID) 25 mcg tablet Take 2 tabs Monday thru Friday and 1 tab on Saturday and Sunday 144 Tab 3  
 fenofibrate nanocrystallized (TRICOR) 145 mg tablet Take 145 mg by mouth daily.  pravastatin (PRAVACHOL) 40 mg tablet Take 40 mg by mouth nightly.     
 Blood-Glucose Meter (CONTOUR NEXT METER) misc Use to check blood sugar 2 times daily 1 Each 0  
 glucose blood VI test strips (CONTOUR NEXT TEST STRIPS) strip Use to check blood sugar 2 times daily 100 Strip 11  
  metoprolol succinate (TOPROL-XL) 25 mg XL tablet TAKE 1 TABLET EVERY DAY 90 Tab 3  
 lisinopril (PRINIVIL, ZESTRIL) 2.5 mg tablet take 1 tablet by mouth once daily 90 Tab 1  ALPRAZolam (XANAX) 1 mg tablet take 1/2 tablet by mouth once daily if needed for anxiety and take 1 tablet by mouth at bedtime if needed for sleep 45 Tab 3  
 multivit-minerals/folic acid (CENTRUM VITAMINTS PO) Take 1 tablet by mouth daily  cholecalciferol, vitamin D3, (VITAMIN D3) 2,000 unit tab Take 1 capsule by mouth daily  JANUVIA 100 mg tablet take 1 tablet by mouth once daily for diabetes 90 Tab 3  Aspirin, Buffered 81 mg tab Take 81 mg by mouth daily.  metFORMIN ER (GLUCOPHAGE XR) 500 mg tablet take 1 tablet by mouth twice a day with meals 180 Tab 3 Allergies Allergen Reactions  Demerol [Meperidine] Unknown (comments) Pt unsure of reaction Past Medical History:  
Diagnosis Date  Anxiety  Arthritis  ASHD (arteriosclerotic heart disease)  CAD (coronary artery disease)  Chronic pain  Constipation  Depression  Diabetes ([facility] [address]  Diarrhea  Dysgeusia 11/21/2014  Esophageal motor disorder 1/23/2012  Flatulence/gas pain/belching 11/21/2014  Gastroparesis  GERD (gastroesophageal reflux disease)  Hypercholesterolemia  Hypertension  Psychiatric disorder   
 anxiety  S/P bypass gastrojejunostomy 1/23/2012  Sleep apnea   
 no cpap Past Surgical History:  
Procedure Laterality Date  ABDOMEN SURGERY PROC UNLISTED    
 s/p partial gastrectomy 2/2 gastroparesis  CARDIAC SURG PROCEDURE UNLIST  6/2013  
 stent placement X 2  
 COLONOSCOPY N/A 5/26/2016 COLONOSCOPY performed by Agatha Mcneil MD at [facility] ENDOSCOPY  EGD  7/13/2009  GASTROJEJUNOSTOMY  HC BLD CARPEL TUNNEL RELEASE    
 bilat  HX APPENDECTOMY  HX CHOLECYSTECTOMY  HX CORONARY STENT PLACEMENT  06/2013  
 3 stents  HX FEMORAL BYPASS Right 11/2017  HX GASTRIC BYPASS  6/02  HX HYSTERECTOMY  HX LAPAROTOMY  8/02  
 2/2 intra abd abscess  HX ORTHOPAEDIC  1/08  
 laminectomy  HX ORTHOPAEDIC Bilateral feet bone spurs removed  HX ORTHOPAEDIC    
 bilateral carpal tunnel  HX ORTHOPAEDIC    
 back surgery  HX UROLOGICAL    
 bladder surgery-bladder tac  VT COLONOSCOPY FLX DX W/COLLJ SPEC WHEN PFRMD  5/03/2006 Dr. Prasanth Saldivar  VT COLONOSCOPY W/BIOPSY SINGLE/MULTIPLE  2/9/2012  VT EGD BALLOON DILATION ESOPHAGUS <30 MM DIAM  1/23/2012  VT EGD TRANSORAL BIOPSY SINGLE/MULTIPLE  7/11/2011  VT EGD TRANSORAL BIOPSY SINGLE/MULTIPLE  1/23/2012  REMOVAL OF HEEL SPUR  2001  
 bilat Family History Problem Relation Age of Onset  Heart Disease Mother  Hypertension Mother  Diabetes Mother  Diabetes Father  Heart Disease Father  Hypertension Father  Alcohol abuse Brother  Heart Disease Sister  Hypertension Sister  Diabetes Sister  No Known Problems Sister  Alcohol abuse Brother  Alcohol abuse Brother  Diabetes Brother Social History Tobacco Use  Smoking status: Never Smoker  Smokeless tobacco: Never Used Substance Use Topics  Alcohol use: No  
  Alcohol/week: 0.0 oz Lab Results Component Value Date/Time WBC 5.9 12/14/2018 11:40 AM  
 HGB 12.3 12/14/2018 11:40 AM  
 HCT 37.2 12/14/2018 11:40 AM  
 PLATELET 066 45/30/2742 11:40 AM  
 MCV 89 12/14/2018 11:40 AM  
 
Lab Results Component Value Date/Time Cholesterol, total 132 12/14/2018 11:40 AM  
 HDL Cholesterol 47 12/14/2018 11:40 AM  
 LDL, calculated 58 12/14/2018 11:40 AM  
 Triglyceride 136 12/14/2018 11:40 AM  
 CHOL/HDL Ratio 3.5 01/05/2017 09:18 PM  
 
Lab Results Component Value Date/Time TSH 0.184 (L) 12/14/2018 11:40 AM  
 T4, Free 0.97 02/22/2017 08:26 AM  
 T4, Total 5.6 11/17/2015 08:54 AM  
  
Lab Results Component Value Date/Time Sodium 144 12/14/2018 11:40 AM  
 Potassium 4.5 12/14/2018 11:40 AM  
 Chloride 108 (H) 12/14/2018 11:40 AM  
 CO2 23 12/14/2018 11:40 AM  
 Anion gap 6 05/14/2018 04:08 PM  
 Glucose 68 12/14/2018 11:40 AM  
 BUN 18 12/14/2018 11:40 AM  
 Creatinine 1.05 (H) 12/14/2018 11:40 AM  
 BUN/Creatinine ratio 17 12/14/2018 11:40 AM  
 GFR est AA 64 12/14/2018 11:40 AM  
 GFR est non-AA 55 (L) 12/14/2018 11:40 AM  
 Calcium 9.9 12/14/2018 11:40 AM  
 Bilirubin, total <0.2 12/14/2018 11:40 AM  
 ALT (SGPT) 22 12/14/2018 11:40 AM  
 AST (SGOT) 29 12/14/2018 11:40 AM  
 Alk. phosphatase 70 12/14/2018 11:40 AM  
 Protein, total 6.6 12/14/2018 11:40 AM  
 Albumin 4.5 12/14/2018 11:40 AM  
 Globulin 2.8 05/14/2018 04:08 PM  
 A-G Ratio 2.1 12/14/2018 11:40 AM  
  
Lab Results Component Value Date/Time Hemoglobin A1c 6.4 (H) 09/12/2017 09:35 AM  
 Hemoglobin A1c (POC) 5.8 12/14/2018 11:40 AM  
   
Review of Systems Constitutional: Negative for malaise/fatigue. HENT: Negative for congestion. Eyes: Negative for blurred vision. Respiratory: Negative for cough and shortness of breath. Cardiovascular: Negative for chest pain, palpitations and leg swelling. Gastrointestinal: Negative for abdominal pain, constipation and heartburn. Genitourinary: Negative for dysuria, frequency and urgency. Musculoskeletal: Negative for back pain and joint pain. Neurological: Negative for dizziness, tingling and headaches. Endo/Heme/Allergies: Negative for environmental allergies. Psychiatric/Behavioral: Negative for depression. The patient does not have insomnia. Physical Exam  
Constitutional: She appears well-developed and well-nourished. /60 (BP 1 Location: Left arm, BP Patient Position: Sitting)   Pulse 74   Temp 96.3 °F (35.7 °C) (Oral)   Resp 16   Ht 5' 3.5\" (1.613 m)   Wt 153 lb 6.4 oz (69.6 kg)   SpO2 100%   BMI 26.75 kg/m² HENT:  
Right Ear: Tympanic membrane and ear canal normal.  
 Left Ear: Tympanic membrane and ear canal normal.  
Nose: No mucosal edema or rhinorrhea. Mouth/Throat: Oropharynx is clear and moist and mucous membranes are normal.  
Neck: Normal range of motion. Neck supple. Tracheal tenderness (at the base of the neck over the thyroid but no swelling noted.  ) present. No thyroid mass and no thyromegaly present. Cardiovascular: Normal rate, regular rhythm and normal heart sounds. Exam reveals no gallop. Pulmonary/Chest: Effort normal and breath sounds normal.  
Abdominal: Soft. Normal appearance and bowel sounds are normal. She exhibits no mass. There is no tenderness. Musculoskeletal: Normal range of motion. She exhibits no edema. Lymphadenopathy:  
  She has no cervical adenopathy. Skin: Skin is warm and dry. Psychiatric: She has a normal mood and affect. Nursing note and vitals reviewed. ASSESSMENT and PLAN Diagnoses and all orders for this visit: 
 
1. Acquired hypothyroidism 
-     TSH 3RD GENERATION 
-     T4, FREE 2. Painful thyroid ? thyroiditis -     US THYROID/PARATHYROID/SOFT TISS; Future Follow-up Disposition: 
Return in about 4 months (around 6/8/2019). I have discussed diagnosis listed in this note with pt and/or family. I have discussed treatment plans and options and the risk/benefit analysis of those options, including safe use of medications and possible medication side effects. Through the use of shared decision making we have agreed to the above plan. The patient has received an after-visit summary and questions were answered concerning future plans and follow up. Advise pt of any urgent changes then to proceed to the ER.

## 2019-02-08 NOTE — PROGRESS NOTES
Chief Complaint Patient presents with  Thyroid Problem 6 week follow up 1. Have you been to the ER, urgent care clinic since your last visit? Hospitalized since your last visit? no 
 
2. Have you seen or consulted any other health care providers outside of the [address] [place] [address] since your last visit? Include any pap smears or colon screening.  no

## 2019-02-09 LAB
T4 FREE SERPL-MCNC: 1.77 NG/DL (ref 0.82–1.77)
TSH SERPL DL<=0.005 MIU/L-ACNC: 0.22 UIU/ML (ref 0.45–4.5)

## 2019-02-11 NOTE — PROGRESS NOTES
Please call and have her to reduce her synthroid 50 mcg to just one pill daily.  (change in med list). Please schedule her to come in for lab only in 2 months to repeat the thyroid level.

## 2019-02-12 ENCOUNTER — TELEPHONE (OUTPATIENT)
Dept: FAMILY MEDICINE CLINIC | Age: 69
End: 2019-02-12

## 2019-02-12 NOTE — TELEPHONE ENCOUNTER
Called patient LM, per Dr. Butcher Peoples need reduce her synthroid 50 mcg just take one pill daily and need to follow up for lab only in 2 months. Appt has been scheduled for 4/2/2019 at 11:45am. Please call back to verify message was received.

## 2019-02-15 ENCOUNTER — HOSPITAL ENCOUNTER (OUTPATIENT)
Dept: ULTRASOUND IMAGING | Age: 69
Discharge: HOME | End: 2019-02-15
Attending: FAMILY MEDICINE
Payer: MEDICARE

## 2019-02-15 DIAGNOSIS — E03.9 ACQUIRED HYPOTHYROIDISM: ICD-10-CM

## 2019-02-15 DIAGNOSIS — E07.89 PAINFUL THYROID: ICD-10-CM

## 2019-02-15 PROCEDURE — 76536 US EXAM OF HEAD AND NECK: CPT

## 2019-02-21 ENCOUNTER — TELEPHONE (OUTPATIENT)
Dept: FAMILY MEDICINE CLINIC | Age: 69
End: 2019-02-21

## 2019-02-21 DIAGNOSIS — R22.1 SENSATION OF LUMP IN THROAT: Primary | ICD-10-CM

## 2019-04-02 ENCOUNTER — LAB ONLY (OUTPATIENT)
Dept: FAMILY MEDICINE CLINIC | Age: 69
End: 2019-04-02

## 2019-04-02 DIAGNOSIS — E03.9 ACQUIRED HYPOTHYROIDISM: Primary | ICD-10-CM

## 2019-04-03 LAB — TSH SERPL DL<=0.005 MIU/L-ACNC: 2.8 UIU/ML (ref 0.45–4.5)

## 2019-04-04 ENCOUNTER — TELEPHONE (OUTPATIENT)
Dept: FAMILY MEDICINE CLINIC | Age: 69
End: 2019-04-04

## 2019-04-04 ENCOUNTER — DOCUMENTATION ONLY (OUTPATIENT)
Dept: FAMILY MEDICINE CLINIC | Age: 69
End: 2019-04-04

## 2019-04-04 NOTE — PROGRESS NOTES
Please call pt and let her know that her thyroid level is normal and to continue with current dose of the thyroid medication.

## 2019-04-04 NOTE — TELEPHONE ENCOUNTER
----- Message from Phong Stallworth MD sent at 4/4/2019  9:35 AM EDT -----  Please call pt and let her know that her thyroid level is normal and to continue with current dose of the thyroid medication.

## 2019-04-12 RX ORDER — LISINOPRIL 2.5 MG/1
TABLET ORAL
Qty: 30 TAB | Refills: 0 | Status: SHIPPED | OUTPATIENT
Start: 2019-04-12 | End: 2019-07-22 | Stop reason: SDUPTHER

## 2019-04-16 RX ORDER — PRAVASTATIN SODIUM 40 MG/1
TABLET ORAL
Qty: 90 TAB | Refills: 3 | Status: SHIPPED | OUTPATIENT
Start: 2019-04-16 | End: 2020-03-31

## 2019-04-17 DIAGNOSIS — E78.2 MIXED HYPERLIPIDEMIA: ICD-10-CM

## 2019-04-17 RX ORDER — FENOFIBRATE 145 MG/1
TABLET, COATED ORAL
Qty: 90 TAB | Refills: 3 | Status: SHIPPED | OUTPATIENT
Start: 2019-04-17 | End: 2020-04-03

## 2019-04-17 RX ORDER — SITAGLIPTIN 100 MG/1
TABLET, FILM COATED ORAL
Qty: 90 TAB | Refills: 3 | Status: SHIPPED | OUTPATIENT
Start: 2019-04-17 | End: 2020-03-31

## 2019-04-19 DIAGNOSIS — F41.9 ANXIETY: ICD-10-CM

## 2019-04-19 RX ORDER — ALPRAZOLAM 1 MG/1
TABLET ORAL
Qty: 45 TAB | Refills: 3 | OUTPATIENT
Start: 2019-04-19 | End: 2019-09-13 | Stop reason: SDUPTHER

## 2019-04-22 ENCOUNTER — TELEPHONE (OUTPATIENT)
Dept: FAMILY MEDICINE CLINIC | Age: 69
End: 2019-04-22

## 2019-04-22 NOTE — TELEPHONE ENCOUNTER
Alprazolam called in on 4/19/19 by Remy Urbano. Contacted pharmacy to see if they received. Pharmacy states that they never received a call. Called in script. Tried to reach patient at number left but no answer or VM.

## 2019-06-08 NOTE — PROGRESS NOTES
Speech pathology note  Reviewed chart and discussed case with RN. Note patient passed the STAND and a regular diet was ordered. Also note MRI revealed no acute abnormality. Introduced self and role of SLP to patient. Patient denies change in swallow function, including no coughing, choking, or globus sensation. Patient also denies decline in motor speech, cognitive, or language function. Patient alert and oriented x4. Formal SLP evaluation not clinically indicated at this time. Will sign off. Please re-consult if further needs arise. Thank you.     Reinaldo Gonzalez, Najma Holland, CCC-SLP Subjective:  Susanna Cavanaugh is 61 year old formerly smoker lady with significant past medical history included but not limited to hypertension that is here today for her routine follow-up, she has been compliant with her medication and denies having any chest pain, orthopnea, leg swelling. She denies any side effect of her medications.  Current Outpatient Medications   Medication Sig Dispense Refill   • lisinopril (ZESTRIL) 10 MG tablet TAKE 1 TABLET BY MOUTH DAILY 90 tablet 0   • atorvastatin (LIPITOR) 40 MG tablet TAKE 1 TABLET BY MOUTH DAILY 90 tablet 0   • HYDROcodone-acetaminophen (NORCO) 5-325 MG per tablet Take 1 tablet by mouth daily as needed for Pain. Do not start before May 11, 2019. 30 tablet 0   • pregabalin (LYRICA) 200 MG capsule Take 1 capsule by mouth 3 times daily. 90 capsule 3   • tiZANidine (ZANAFLEX) 4 MG tablet Take 1 tablet by mouth 3 times daily. 90 tablet 3   • DULoxetine (CYMBALTA) 60 MG capsule Take 1 capsule by mouth daily. 30 capsule 3   • HYDROcodone-acetaminophen (NORCO) 5-325 MG per tablet Take 1 tablet by mouth daily as needed for Pain. 30 tablet 0   • zolpidem (AMBIEN) 5 MG tablet Take 1 tablet by mouth nightly as needed for Sleep. 30 tablet 3   • docusate sodium (COLACE) 100 MG capsule Take 1 capsule by mouth 2 times daily. 20 capsule 0   • albuterol 108 (90 Base) MCG/ACT inhaler Inhale 2 puffs into the lungs every 4 hours as needed for Shortness of Breath or Wheezing. 1 Inhaler 0   • Lidocaine HCl 3 % Gel Apply 2 g topically daily as needed (pain). 1 Bottle 0   • Lidocaine 0.5 % Gel Apply 2 g topically daily as needed (pain). 1 Tube 0   • ketotifen (ZADITOR) 0.025 % ophthalmic solution Place 1 drop into both eyes 2 times daily. 5 mL 0   • diclofenac (VOLTAREN) 1 % gel Apply topically 4 times daily. Apply to the right wrist in painful area 300 g 3     No current facility-administered medications for this visit.       Objective:  Visit Vitals  /70 (BP Location: LUE, Patient  Position: Sitting, Cuff Size: Regular)   Pulse 94   Temp 98.3 °F (36.8 °C) (Oral)   Ht 5' (1.524 m)   Wt 74.7 kg   SpO2 98%   BMI 32.15 kg/m²   Physical Exam   Constitutional: She is oriented to person, place, and time. She appears well-developed and well-nourished.   Cardiovascular: Normal rate and regular rhythm.   No murmur heard.  Pulmonary/Chest: Effort normal and breath sounds normal. She has no rales.   Genitourinary: Vagina normal and uterus normal. Cervix exhibits no motion tenderness, no discharge and no friability. Right adnexum displays no mass, no tenderness and no fullness. Left adnexum displays no mass, no tenderness and no fullness.   Neurological: She is alert and oriented to person, place, and time. No cranial nerve deficit.   Nursing note and vitals reviewed.    System and plan:  (I10) Essential hypertension  (primary encounter diagnosis)  Blood pressure is in goal range, patient has preserved kidney function, continue same treatment and follow-up in 6 months or sooner if symptoms change.  Advised to follow a low carbohydrate, low saturated fat, low sugar, low glycemic index, high fiber diet.   Advised to exercise for at least 30 minutes most days of the week.   Advised to add a daily vitamin D supplement.   Cervical cancer screening performed today.    (Z12.4) Screening for cervical cancer  Plan: THINPREP PAP TEST WITH HPV REGARDLESS

## 2019-06-21 ENCOUNTER — TELEPHONE (OUTPATIENT)
Dept: CARDIOLOGY CLINIC | Age: 69
End: 2019-06-21

## 2019-06-21 ENCOUNTER — OFFICE VISIT (OUTPATIENT)
Dept: FAMILY MEDICINE CLINIC | Age: 69
End: 2019-06-21

## 2019-06-21 VITALS
RESPIRATION RATE: 18 BRPM | DIASTOLIC BLOOD PRESSURE: 66 MMHG | HEART RATE: 68 BPM | SYSTOLIC BLOOD PRESSURE: 115 MMHG | TEMPERATURE: 96.9 F | OXYGEN SATURATION: 100 % | BODY MASS INDEX: 26.36 KG/M2 | HEIGHT: 64 IN | WEIGHT: 154.4 LBS

## 2019-06-21 DIAGNOSIS — I25.10 CORONARY ARTERY DISEASE INVOLVING NATIVE CORONARY ARTERY OF NATIVE HEART WITHOUT ANGINA PECTORIS: ICD-10-CM

## 2019-06-21 DIAGNOSIS — Z51.81 ENCOUNTER FOR MEDICATION MONITORING: ICD-10-CM

## 2019-06-21 DIAGNOSIS — K21.9 GASTROESOPHAGEAL REFLUX DISEASE WITHOUT ESOPHAGITIS: ICD-10-CM

## 2019-06-21 DIAGNOSIS — E03.9 ACQUIRED HYPOTHYROIDISM: ICD-10-CM

## 2019-06-21 DIAGNOSIS — E78.2 MIXED HYPERLIPIDEMIA: ICD-10-CM

## 2019-06-21 DIAGNOSIS — E11.9 TYPE 2 DIABETES MELLITUS WITHOUT COMPLICATION, WITHOUT LONG-TERM CURRENT USE OF INSULIN (HCC): Primary | ICD-10-CM

## 2019-06-21 LAB
GLUCOSE POC: 154 MG/DL
HBA1C MFR BLD HPLC: 7.3 %

## 2019-06-21 RX ORDER — LEVOTHYROXINE SODIUM 25 UG/1
25 TABLET ORAL
COMMUNITY
End: 2019-12-15 | Stop reason: SDUPTHER

## 2019-06-21 RX ORDER — METFORMIN HYDROCHLORIDE 500 MG/1
TABLET, EXTENDED RELEASE ORAL
Qty: 90 TAB | Refills: 3 | Status: SHIPPED | OUTPATIENT
Start: 2019-06-21 | End: 2020-01-27

## 2019-06-21 RX ORDER — PANTOPRAZOLE SODIUM 40 MG/1
40 TABLET, DELAYED RELEASE ORAL DAILY
Qty: 90 TAB | Refills: 1 | Status: SHIPPED | OUTPATIENT
Start: 2019-06-21 | End: 2020-01-27

## 2019-06-21 NOTE — PROGRESS NOTES
Chief Complaint   Patient presents with    Thyroid Problem     follow up    Diabetes     follow up    Hypertension     follow up    Cholesterol Problem     follow up       Colonoscopy 5/26/16 by Dr Tamica Comer- repeat in 5 years    Eye exam 2/23/2018 Dr Leodan Amaro. 1. Have you been to the ER, urgent care clinic since your last visit? Hospitalized since your last visit? No    2. Have you seen or consulted any other health care providers outside of the [address] [place] [address] since your last visit? Include any pap smears or colon screening. No                1. Have you been to the ER, urgent care clinic since your last visit? Hospitalized since your last visit? 2. Have you seen or consulted any other health care providers outside of the [address] [place] [address] since your last visit? Include any pap smears or colon screening.

## 2019-06-21 NOTE — PROGRESS NOTES
HISTORY OF PRESENT ILLNESS  Belgica Coronel is a 81 y.o. female. HPI   For routine follow up on chronic medical conditions. Overall feeling well. Cardiovascular Review:  The patient has coronary artery disease, HF and status post coronary artery stenting. Diet and Lifestyle: generally follows a low fat low cholesterol diet, generally follows a low sodium diet, follows a diabetic diet regularly, exercises sporadically  Home BP Monitoring: is not measured at home. Pertinent ROS: taking medications as instructed, no medication side effects noted, no TIA's, no chest pain on exertion, no dyspnea on exertion, no swelling of ankles. DM type II follow up:  Compliant w/ meds, diabetic diet, and exercise. Has been watching her diet. Her BS have been in the 80-90s. Checks BS BID on most days and prn. Pt does not have BS log at visit today. No Rf needed for today. Denies any tingling sensation, polyuria and polydipsia. No blurred vision. No significant weight changes. Feeling well since last OV. Hypercholesterolemia follow up:  Compliant w/ meds, low fat, low cholesterol diet. Exercising some. No muscle nor abdominal pain, no skin discoloration. Patient fasting today. Depression Review:  Patient is seen for followup of depression and anxiety. Overall doing well. She denies depressed mood and insomnia. Sleeping well at night.         Patient Active Problem List   Diagnosis Code    Anxiety F41.9    Depression F32.9    Hypovitaminosis D E55.9    GERD (gastroesophageal reflux disease) K21.9    Edema R60.9    Esophageal motor disorder K22.4    S/P bypass gastrojejunostomy Z98.0    SOB (shortness of breath) R06.02    CAD (coronary artery disease) I25.10    Angina pectoris (HCC) I20.9    S/P PTCA (percutaneous transluminal coronary angioplasty) Z98.61    Mixed hyperlipidemia E78.2    Diarrhea R19.7    Type 2 diabetes mellitus without complication ([facility] [address] D57.3    Osteopenia M85.80    Encounter for medication monitoring Z51.81    CVA (cerebral vascular accident) ([facility] [address] S05.4    Complicated migraine G20. 109    Numbness and tingling of right side of face R20.0, R20.2    Stenosis of both internal carotid arteries I65.23    Angina, class III (HCC) I20.9    Type 2 diabetes with nephropathy ([facility]) E11.21    Essential hypertension I10       Current Outpatient Medications   Medication Sig Dispense Refill    ALPRAZolam (XANAX) 1 mg tablet take 1/2 tablet by mouth once daily if needed for anxiety and take 1 tablet by mouth at bedtime if needed for sleep 45 Tab 3    fenofibrate nanocrystallized (TRICOR) 145 mg tablet take 1 tablet by mouth once daily 90 Tab 3    JANUVIA 100 mg tablet take 1 tablet by mouth once daily for diabetes 90 Tab 3    pravastatin (PRAVACHOL) 40 mg tablet TAKE 1 TABLET BY MOUTH EVERY NIGHT 90 Tab 3    lisinopril (PRINIVIL, ZESTRIL) 2.5 mg tablet take 1 tablet by mouth once daily 30 Tab 0    furosemide (LASIX) 20 mg tablet Take 1 Tab by mouth daily. 90 Tab 3    levothyroxine (SYNTHROID) 25 mcg tablet Take 2 tabs Monday thru Friday and 1 tab on Saturday and Sunday (Patient taking differently: Take 1 tab by mouth daily) 144 Tab 3    metFORMIN ER (GLUCOPHAGE XR) 500 mg tablet take 1 tablet by mouth twice a day with meals 180 Tab 3    fenofibrate nanocrystallized (TRICOR) 145 mg tablet Take 145 mg by mouth daily.  pravastatin (PRAVACHOL) 40 mg tablet Take 40 mg by mouth nightly.       Blood-Glucose Meter (CONTOUR NEXT METER) misc Use to check blood sugar 2 times daily 1 Each 0    glucose blood VI test strips (CONTOUR NEXT TEST STRIPS) strip Use to check blood sugar 2 times daily 100 Strip 11    metoprolol succinate (TOPROL-XL) 25 mg XL tablet TAKE 1 TABLET EVERY DAY 90 Tab 3    multivit-minerals/folic acid (CENTRUM VITAMINTS PO) Take 1 tablet by mouth daily      cholecalciferol, vitamin D3, (VITAMIN D3) 2,000 unit tab Take 1 capsule by mouth daily      Aspirin, Buffered 81 mg tab Take 81 mg by mouth daily.          Allergies   Allergen Reactions    Demerol [Meperidine] Unknown (comments)     Pt unsure of reaction         Past Medical History:   Diagnosis Date    Anxiety     Arthritis     ASHD (arteriosclerotic heart disease)     CAD (coronary artery disease)     Chronic pain     Constipation     Depression     Diabetes ([address]     Diarrhea     Dysgeusia 11/21/2014    Esophageal motor disorder 1/23/2012    Flatulence/gas pain/belching 11/21/2014    Gastroparesis     GERD (gastroesophageal reflux disease)     Hypercholesterolemia     Hypertension     Psychiatric disorder     anxiety    S/P bypass gastrojejunostomy 1/23/2012    Sleep apnea     no cpap         Past Surgical History:   Procedure Laterality Date    ABDOMEN SURGERY PROC UNLISTED      s/p partial gastrectomy 2/2 gastroparesis    CARDIAC SURG PROCEDURE UNLIST  6/2013    stent placement X 2    COLONOSCOPY N/A 5/26/2016    COLONOSCOPY performed by Louisa Bourne MD at \[facility]\"" ENDOSCOPY    EGD  7/13/2009    GASTROJEJUNOSTOMY      HC BLD CARPEL TUNNEL RELEASE      bilat    HX APPENDECTOMY      HX CHOLECYSTECTOMY      HX CORONARY STENT PLACEMENT  06/2013    3 stents    HX FEMORAL BYPASS Right 11/2017    HX GASTRIC BYPASS  6/02    HX HYSTERECTOMY      HX LAPAROTOMY  8/02    2/2 intra abd abscess    HX ORTHOPAEDIC  1/08    laminectomy    HX ORTHOPAEDIC      Bilateral feet bone spurs removed    HX ORTHOPAEDIC      bilateral carpal tunnel    HX ORTHOPAEDIC      back surgery    HX UROLOGICAL      bladder surgery-bladder tac    SD COLONOSCOPY FLX DX W/COLLJ SPEC WHEN PFRMD  5/03/2006    Dr. Lisa Quintanilla COLONOSCOPY W/BIOPSY SINGLE/MULTIPLE  2/9/2012         SD EGD BALLOON DILATION ESOPHAGUS <30 MM DIAM  1/23/2012         SD EGD TRANSORAL BIOPSY SINGLE/MULTIPLE  7/11/2011         SD EGD TRANSORAL BIOPSY SINGLE/MULTIPLE  1/23/2012         REMOVAL OF HEEL SPUR  2001    bilat         Family History   Problem Relation Age of Onset    Heart Disease Mother     Hypertension Mother     Diabetes Mother     Diabetes Father     Heart Disease Father     Hypertension Father     Alcohol abuse Brother     Heart Disease Sister     Hypertension Sister     Diabetes Sister     No Known Problems Sister     Alcohol abuse Brother     Alcohol abuse Brother     Diabetes Brother        Social History     Tobacco Use    Smoking status: Never Smoker    Smokeless tobacco: Never Used   Substance Use Topics    Alcohol use: No     Alcohol/week: 0.0 oz        Lab Results   Component Value Date/Time    WBC 5.9 12/14/2018 11:40 AM    HGB 12.3 12/14/2018 11:40 AM    HCT 37.2 12/14/2018 11:40 AM    PLATELET 023 53/28/4443 11:40 AM    MCV 89 12/14/2018 11:40 AM     Lab Results   Component Value Date/Time    Cholesterol, total 132 12/14/2018 11:40 AM    HDL Cholesterol 47 12/14/2018 11:40 AM    LDL, calculated 58 12/14/2018 11:40 AM    Triglyceride 136 12/14/2018 11:40 AM    CHOL/HDL Ratio 3.5 01/05/2017 09:18 PM     Lab Results   Component Value Date/Time    TSH 2.800 04/02/2019 12:09 PM    T4, Free 1.77 02/08/2019 12:18 PM    T4, Total 5.6 11/17/2015 08:54 AM      Lab Results   Component Value Date/Time    Sodium 144 12/14/2018 11:40 AM    Potassium 4.5 12/14/2018 11:40 AM    Chloride 108 (H) 12/14/2018 11:40 AM    CO2 23 12/14/2018 11:40 AM    Anion gap 6 05/14/2018 04:08 PM    Glucose 68 12/14/2018 11:40 AM    BUN 18 12/14/2018 11:40 AM    Creatinine 1.05 (H) 12/14/2018 11:40 AM    BUN/Creatinine ratio 17 12/14/2018 11:40 AM    GFR est AA 64 12/14/2018 11:40 AM    GFR est non-AA 55 (L) 12/14/2018 11:40 AM    Calcium 9.9 12/14/2018 11:40 AM    Bilirubin, total <0.2 12/14/2018 11:40 AM    ALT (SGPT) 22 12/14/2018 11:40 AM    AST (SGOT) 29 12/14/2018 11:40 AM    Alk.  phosphatase 70 12/14/2018 11:40 AM    Protein, total 6.6 12/14/2018 11:40 AM    Albumin 4.5 12/14/2018 11:40 AM    Globulin 2.8 05/14/2018 04:08 PM    A-G Ratio 2.1 12/14/2018 11:40 AM      Lab Results   Component Value Date/Time    Hemoglobin A1c 6.4 (H) 09/12/2017 09:35 AM    Hemoglobin A1c (POC) 5.8 12/14/2018 11:40 AM         Review of Systems   Constitutional: Negative for malaise/fatigue. HENT: Negative for congestion. Eyes: Negative for blurred vision. Respiratory: Negative for cough and shortness of breath. Cardiovascular: Negative for chest pain, palpitations and leg swelling. Gastrointestinal: Negative for abdominal pain, constipation and heartburn. Seen by ENT for throat pain and told \"coming from reflux\". However was not given any mediation for reflux. Genitourinary: Negative for dysuria, frequency and urgency. Musculoskeletal: Negative for back pain and joint pain. Neurological: Negative for dizziness, tingling and headaches. Endo/Heme/Allergies: Negative for environmental allergies. Psychiatric/Behavioral: Negative for depression. The patient does not have insomnia. Physical Exam   Constitutional: She appears well-developed and well-nourished. /66 (BP 1 Location: Left arm, BP Patient Position: Sitting)   Pulse 68   Temp 96.9 °F (36.1 °C) (Oral)   Resp 18   Ht 5' 3.5\" (1.613 m)   Wt 154 lb 6.4 oz (70 kg)   SpO2 100%   BMI 26.92 kg/m²    HENT:   Right Ear: Tympanic membrane and ear canal normal.   Left Ear: Tympanic membrane and ear canal normal.   Nose: No mucosal edema or rhinorrhea. Mouth/Throat: Oropharynx is clear and moist and mucous membranes are normal.   Neck: Normal range of motion. Neck supple. No thyromegaly present. Cardiovascular: Normal rate, regular rhythm and normal heart sounds. Exam reveals no gallop. Pulmonary/Chest: Effort normal and breath sounds normal.   Abdominal: Soft. Normal appearance and bowel sounds are normal. She exhibits no mass. There is no tenderness. Musculoskeletal: Normal range of motion. She exhibits no edema. Lymphadenopathy:     She has no cervical adenopathy. Skin: Skin is warm and dry. Psychiatric: She has a normal mood and affect. Nursing note and vitals reviewed. ASSESSMENT and PLAN  Diagnoses and all orders for this visit:    1. Type 2 diabetes mellitus without complication, without long-term current use of insulin ([facility])  a1c level is 7.4%. Was 5.8% in December.    -     AMB POC HEMOGLOBIN A1C  -     AMB POC GLUCOSE, QUANTITATIVE, BLOOD  -    Restart metFORMIN ER (GLUCOPHAGE XR) 500 mg tablet; take 1 tablet by mouth once a day with dinner    2. Mixed hyperlipidemia  -     LIPID PANEL    3. Coronary artery disease involving native coronary artery of native heart without angina pectoris  Stable     4. Acquired hypothyroidism  -     TSH 3RD GENERATION    5. Gastroesophageal reflux disease without esophagitis  -     pantoprazole (PROTONIX) 40 mg tablet; Take 1 Tab by mouth daily. For acid reflux  The pathophysiology of reflux is discussed. Anti-reflux measures such as raising the head of the bed, avoiding tight clothing or belts, avoiding eating late at night and not lying down shortly after mealtime and achieving weight loss are discussed.  Avoid ASA, NSAID's, caffeine, peppermints, chocolate and other food triggers, alcohol and tobacco.     6. Encounter for medication monitoring  -     METABOLIC PANEL, COMPREHENSIVE  -     CBC W/O DIFF      Follow-up and Dispositions    · Return in about 3 months        reviewed diet, exercise and weight control  cardiovascular risk and specific lipid/LDL goals reviewed  reviewed medications and side effects in detail  specific diabetic recommendations: low cholesterol diet, weight control and daily exercise discussed, home glucose monitoring emphasized, all medications, side effects and compliance discussed carefully, annual eye examinations at Ophthalmology discussed, glycohemoglobin and other lab monitoring discussed and long term diabetic complications discussed     I have discussed diagnosis listed in this note with pt and/or family. I have discussed treatment plans and options and the risk/benefit analysis of those options, including safe use of medications and possible medication side effects. Through the use of shared decision making we have agreed to the above plan. The patient has received an after-visit summary and questions were answered concerning future plans and follow up. Advise pt of any urgent changes then to proceed to the ER.

## 2019-06-21 NOTE — TELEPHONE ENCOUNTER
Spoke to patient using 2 identifiers. She called the office with c/o bluish discoloration to her left leg from the groin area down to her ankle x 2-3 wks. She denied any recent procedures/injuries/falls/pain/numbness/tingling sensation/or any other cardiac complaints. She stated she has been elevating her legs and applied ice which helped with the swelling and can walk. Please advise.

## 2019-06-22 LAB
ALBUMIN SERPL-MCNC: 4.5 G/DL (ref 3.6–4.8)
ALBUMIN/GLOB SERPL: 2 {RATIO} (ref 1.2–2.2)
ALP SERPL-CCNC: 73 IU/L (ref 39–117)
ALT SERPL-CCNC: 15 IU/L (ref 0–32)
AST SERPL-CCNC: 22 IU/L (ref 0–40)
BILIRUB SERPL-MCNC: 0.3 MG/DL (ref 0–1.2)
BUN SERPL-MCNC: 18 MG/DL (ref 8–27)
BUN/CREAT SERPL: 14 (ref 12–28)
CALCIUM SERPL-MCNC: 9.5 MG/DL (ref 8.7–10.3)
CHLORIDE SERPL-SCNC: 105 MMOL/L (ref 96–106)
CHOLEST SERPL-MCNC: 131 MG/DL (ref 100–199)
CO2 SERPL-SCNC: 23 MMOL/L (ref 20–29)
CREAT SERPL-MCNC: 1.3 MG/DL (ref 0.57–1)
ERYTHROCYTE [DISTWIDTH] IN BLOOD BY AUTOMATED COUNT: 14.2 % (ref 12.3–15.4)
GLOBULIN SER CALC-MCNC: 2.2 G/DL (ref 1.5–4.5)
GLUCOSE SERPL-MCNC: 139 MG/DL (ref 65–99)
HCT VFR BLD AUTO: 35.7 % (ref 34–46.6)
HDLC SERPL-MCNC: 48 MG/DL
HGB BLD-MCNC: 12 G/DL (ref 11.1–15.9)
INTERPRETATION, 910389: NORMAL
INTERPRETATION: NORMAL
LDLC SERPL CALC-MCNC: 58 MG/DL (ref 0–99)
MCH RBC QN AUTO: 29.8 PG (ref 26.6–33)
MCHC RBC AUTO-ENTMCNC: 33.6 G/DL (ref 31.5–35.7)
MCV RBC AUTO: 89 FL (ref 79–97)
PDF IMAGE, 910387: NORMAL
PLATELET # BLD AUTO: 302 X10E3/UL (ref 150–450)
POTASSIUM SERPL-SCNC: 4.2 MMOL/L (ref 3.5–5.2)
PROT SERPL-MCNC: 6.7 G/DL (ref 6–8.5)
RBC # BLD AUTO: 4.03 X10E6/UL (ref 3.77–5.28)
SODIUM SERPL-SCNC: 143 MMOL/L (ref 134–144)
TRIGL SERPL-MCNC: 125 MG/DL (ref 0–149)
TSH SERPL DL<=0.005 MIU/L-ACNC: 3.05 UIU/ML (ref 0.45–4.5)
VLDLC SERPL CALC-MCNC: 25 MG/DL (ref 5–40)
WBC # BLD AUTO: 5.9 X10E3/UL (ref 3.4–10.8)

## 2019-06-24 ENCOUNTER — OFFICE VISIT (OUTPATIENT)
Dept: CARDIOLOGY CLINIC | Age: 69
End: 2019-06-24

## 2019-06-24 ENCOUNTER — TELEPHONE (OUTPATIENT)
Dept: FAMILY MEDICINE CLINIC | Age: 69
End: 2019-06-24

## 2019-06-24 VITALS
HEIGHT: 64 IN | OXYGEN SATURATION: 98 % | BODY MASS INDEX: 26.21 KG/M2 | HEART RATE: 83 BPM | RESPIRATION RATE: 16 BRPM | SYSTOLIC BLOOD PRESSURE: 120 MMHG | WEIGHT: 153.5 LBS | DIASTOLIC BLOOD PRESSURE: 64 MMHG

## 2019-06-24 DIAGNOSIS — K21.9 GASTROESOPHAGEAL REFLUX DISEASE WITHOUT ESOPHAGITIS: Primary | ICD-10-CM

## 2019-06-24 DIAGNOSIS — E78.2 MIXED HYPERLIPIDEMIA: ICD-10-CM

## 2019-06-24 DIAGNOSIS — I25.10 CORONARY ARTERY DISEASE INVOLVING NATIVE CORONARY ARTERY OF NATIVE HEART WITHOUT ANGINA PECTORIS: Primary | ICD-10-CM

## 2019-06-24 DIAGNOSIS — I10 ESSENTIAL HYPERTENSION: ICD-10-CM

## 2019-06-24 NOTE — TELEPHONE ENCOUNTER
Notified patient of upcoming appointment with Dr. Trish COLEMAN , Berenice Belcher on July 26 th at 9:30 am     [facility] [address]  [address]

## 2019-06-24 NOTE — TELEPHONE ENCOUNTER
Patient called to say Pantoprazole not working, food is backing up in her throat after eating, per Dr. Cahmbers  refer to GI.

## 2019-06-24 NOTE — PROGRESS NOTES
1. Have you been to the ER, urgent care clinic since your last visit? Hospitalized since your last visit? No    2. Have you seen or consulted any other health care providers outside of the [address] [place] [address] since your last visit? Include any pap smears or colon screening.  No     Chief Complaint   Patient presents with    Other     pt c/o bluish discoloration, swelling, and soreness to L leg from groin down to ankle x 2 wks; got better with ice and elevation; denies any procedure/injury or pain now

## 2019-06-24 NOTE — PROGRESS NOTES
Jacob Beck, [facility]    Subjective/HPI:     Ms. Juan Jose John is a 81 y.o. female is here with concerns of left leg bruising. She has a PMHx of CAD, DM2, HTN and HLD. She called in late last week complaining of bruising of her left leg, starting from the groin going down to the left ankle. She reported a bluish discoloration. It felt a little sore, but otherwise not painful. There was no swelling, redness. No decreased in ROM of her leg. She was able to bear weight on it without assistance. During visit today, her discoloration has completely resolved. She notes no new symptoms. She otherwise denies complaints of chest pains, dizziness, orthopnea, PND or edema. She denies shortness of breath or palpitations. She denies claudication symptoms.          PCP Provider  Chandrakant Meraz MD  Past Medical History:   Diagnosis Date    Anxiety     Arthritis     ASHD (arteriosclerotic heart disease)     CAD (coronary artery disease)     Chronic pain     Constipation     Depression     Diabetes ([address]     Diarrhea     Dysgeusia 11/21/2014    Esophageal motor disorder 1/23/2012    Flatulence/gas pain/belching 11/21/2014    Gastroparesis     GERD (gastroesophageal reflux disease)     Hypercholesterolemia     Hypertension     Psychiatric disorder     anxiety    S/P bypass gastrojejunostomy 1/23/2012    Sleep apnea     no cpap      Past Surgical History:   Procedure Laterality Date    ABDOMEN SURGERY PROC UNLISTED      s/p partial gastrectomy 2/2 gastroparesis    CARDIAC SURG PROCEDURE UNLIST  6/2013    stent placement X 2    COLONOSCOPY N/A 5/26/2016    COL[facility]COPY performed by Luz [facility] at [facility] END[facility]COPY    EGD  7/13/2009    GASTROJEJUNOSTOMY      HC BLD CARPEL TUNNEL RELEASE      bilat    HX APPENDECTOMY      HX CHOLECYSTECTOMY      HX CORONARY STENT PLACEMENT  06/2013    3 stents    HX FEMORAL BYPASS Right 11/2017    HX GASTRIC BYPASS  6/02    HX HYSTERECTOMY  HX LAPAROTOMY  8/02    2/2 intra abd abscess    HX ORTHOPAEDIC  1/08    laminectomy    HX ORTHOPAEDIC      Bilateral feet bone spurs removed    HX ORTHOPAEDIC      bilateral carpal tunnel    HX ORTHOPAEDIC      back surgery    HX UROLOGICAL      bladder surgery-bladder tac    TX COLONOSCOPY FLX DX W/COLLJ SPEC WHEN PFRMD  5/03/2006    Dr. Tai Pisano  2/9/2012         TX EGD BALLOON DILATION ESOPHAGUS <30 MM DIAM  1/23/2012         TX EGD TRANSORAL BIOPSY SINGLE/MULTIPLE  7/11/2011         TX EGD TRANSORAL BIOPSY SINGLE/MULTIPLE  1/23/2012         REMOVAL OF HEEL SPUR  2001    bilat     Family History   Problem Relation Age of Onset    Heart Disease Mother     Hypertension Mother     Diabetes Mother     Diabetes Father     Heart Disease Father     Hypertension Father     Alcohol abuse Brother     Heart Disease Sister     Hypertension Sister     Diabetes Sister     No Known Problems Sister     Alcohol abuse Brother     Alcohol abuse Brother     Diabetes Brother      Social History     Socioeconomic History    Marital status:      Spouse name: Not on file    Number of children: Not on file    Years of education: Not on file    Highest education level: Not on file   Occupational History    Not on file   Social Needs    Financial resource strain: Not on file    Food insecurity:     Worry: Not on file     Inability: Not on file    Transportation needs:     Medical: Not on file     Non-medical: Not on file   Tobacco Use    Smoking status: Never Smoker    Smokeless tobacco: Never Used   Substance and Sexual Activity    Alcohol use: No     Alcohol/week: 0.0 oz    Drug use: No    Sexual activity: Not Currently   Lifestyle    Physical activity:     Days per week: Not on file     Minutes per session: Not on file    Stress: Not on file   Relationships    Social connections:     Talks on phone: Not on file     Gets together: Not on file Attends Qu[employer] service: Not on file     Active member of club or organization: Not on file     Attends meetings of clubs or organizations: Not on file     Relationship status: Not on file    Intimate partner violence:     Fear of current or ex partner: Not on file     Emotionally abused: Not on file     Physically abused: Not on file     Forced sexual activity: Not on file   Other Topics Concern    Not on file   Social History Narrative    Not on file       Allergies   Allergen Reactions    Demerol [Meperidine] Unknown (comments)     Pt unsure of reaction        Current Outpatient Medications   Medication Sig    levothyroxine (SYNTHROID) 25 mcg tablet Take 25 mcg by mouth Daily (before breakfast).  pantoprazole (PROTONIX) 40 mg tablet Take 1 Tab by mouth daily. For acid reflux    metFORMIN ER (GLUCOPHAGE XR) 500 mg tablet take 1 tablet by mouth once a day with dinner    ALPRAZolam (XANAX) 1 mg tablet take 1/2 tablet by mouth once daily if needed for anxiety and take 1 tablet by mouth at bedtime if needed for sleep    fenofibrate nanocrystallized (TRICOR) 145 mg tablet take 1 tablet by mouth once daily    JANUVIA 100 mg tablet take 1 tablet by mouth once daily for diabetes    pravastatin (PRAVACHOL) 40 mg tablet TAKE 1 TABLET BY MOUTH EVERY NIGHT    lisinopril (PRINIVIL, ZESTRIL) 2.5 mg tablet take 1 tablet by mouth once daily    furosemide (LASIX) 20 mg tablet Take 1 Tab by mouth daily.  Blood-Glucose Meter (CONTOUR NEXT METER) misc Use to check blood sugar 2 times daily    glucose blood VI test strips (CONTOUR NEXT TEST STRIPS) strip Use to check blood sugar 2 times daily    metoprolol succinate (TOPROL-XL) 25 mg XL tablet TAKE 1 TABLET EVERY DAY    multivit-minerals/folic acid (CENTRUM VITAMINTS PO) Take 1 tablet by mouth daily    cholecalciferol, vitamin D3, (VITAMIN D3) 2,000 unit tab Take 1 capsule by mouth daily    Aspirin, Buffered 81 mg tab Take 81 mg by mouth daily.      No current facility-administered medications for this visit. I have reviewed the problem list, allergy list, medical history, family, social history and medications. Review of Symptoms:    Review of Systems   Constitutional: Negative for chills, fever and weight loss. HENT: Negative for nosebleeds. Eyes: Negative for blurred vision and double vision. Respiratory: Negative for cough, shortness of breath and wheezing. Cardiovascular: Negative for chest pain, palpitations, orthopnea, leg swelling and PND. Gastrointestinal: Negative for abdominal pain, blood in stool, diarrhea, nausea and vomiting. Musculoskeletal: Negative for joint pain. Skin: Negative for rash. Neurological: Negative for dizziness, tingling and loss of consciousness. Endo/Heme/Allergies: Does not bruise/bleed easily. Physical Exam:      General: Well developed, in no acute distress, cooperative and alert  HEENT: No carotid bruits, no JVD, trach is midline. Neck Supple, PEERL, EOM intact. Heart:  reg rate and rhythm; normal S1/S2; no murmurs, gallops or rubs. Respiratory: Clear bilaterally x 4, no wheezing or rales  Abdomen:   Soft, non-tender, no distention, no masses. + BS. Extremities:  Normal cap refill, no cyanosis, atraumatic. No edema. Neuro: A&Ox3, speech clear, gait stable. Skin: Skin color is normal. No rashes or lesions.  Non diaphoretic  Vascular: 2+ pulses symmetric in all extremities    Vitals:    06/24/19 1028 06/24/19 1040   BP: 110/60 120/64   Pulse: 83    Resp: 16    SpO2: 98%    Weight: 153 lb 8 oz (69.6 kg)    Height: 5' 3.5\" (1.613 m)        Cardiographics    ECG: sinus rhythm  Results for orders placed or performed during the hospital encounter of 05/14/18   EKG, 12 LEAD, INITIAL   Result Value Ref Range    Ventricular Rate 88 BPM    Atrial Rate 88 BPM    P-R Interval 154 ms    QRS Duration 82 ms    Q-T Interval 378 ms    QTC Calculation (Bezet) 457 ms    Calculated P Axis 45 degrees Calculated R Axis -8 degrees    Calculated T Axis 28 degrees    Diagnosis       Normal sinus rhythm  Normal ECG  When compared with ECG of 06-JAN-2017 00:04,  premature ventricular complexes are no longer present  Confirmed by Kiki Jeffries ([id number]) on 5/15/2018 8:41:04 AM     Results for orders placed or performed in visit on 08/18/10   AMB POC EKG ROUTINE W/ 12 LEADS, INTER & REP    Impression    WNL       Cardiology Labs:  Lab Results   Component Value Date/Time    Cholesterol, total 131 06/21/2019 12:50 PM    HDL Cholesterol 48 06/21/2019 12:50 PM    LDL, calculated 58 06/21/2019 12:50 PM    Triglyceride 125 06/21/2019 12:50 PM    CHOL/HDL Ratio 3.5 01/05/2017 09:18 PM       Lab Results   Component Value Date/Time    Sodium 143 06/21/2019 12:50 PM    Potassium 4.2 06/21/2019 12:50 PM    Chloride 105 06/21/2019 12:50 PM    CO2 23 06/21/2019 12:50 PM    Anion gap 6 05/14/2018 04:08 PM    Glucose 139 (H) 06/21/2019 12:50 PM    BUN 18 06/21/2019 12:50 PM    Creatinine 1.30 (H) 06/21/2019 12:50 PM    BUN/Creatinine ratio 14 06/21/2019 12:50 PM    GFR est AA 49 (L) 06/21/2019 12:50 PM    GFR est non-AA 43 (L) 06/21/2019 12:50 PM    Calcium 9.5 06/21/2019 12:50 PM    Bilirubin, total 0.3 06/21/2019 12:50 PM    AST (SGOT) 22 06/21/2019 12:50 PM    Alk. phosphatase 73 06/21/2019 12:50 PM    Protein, total 6.7 06/21/2019 12:50 PM    Albumin 4.5 06/21/2019 12:50 PM    Globulin 2.8 05/14/2018 04:08 PM    A-G Ratio 2.0 06/21/2019 12:50 PM    ALT (SGPT) 15 06/21/2019 12:50 PM           Assessment:     Assessment:       ICD-10-CM ICD-9-CM    1. Coronary artery disease involving native coronary artery of native heart without angina pectoris I25.10 414.01    2. Mixed hyperlipidemia E78.2 272.2 AMB POC EKG ROUTINE W/ 12 LEADS, INTER & REP   3. Essential hypertension I10 401.9         Plan:     1.  Coronary artery disease involving native coronary artery of native heart without angina pectoris  Stable; recent cath in 3/2017 without evidence of new obstructive lesions  Denies anginal or anginal equivalent symptoms  Continue with present medical management and risk factor modification. 2. Mixed hyperlipidemia  LDL 58 in 6/2019  Continue statin therapy and low fat, low cholesterol diet    3. Essential hypertension  BP controlled. Continue anti-hypertensive therapy and low sodium diet    4. Leg bruise  Resolved now; bruise extended from the left groin down correction to the mid-thigh. No pain, no edema, no redness reported. Reassured no concern for DVT at this time. LE angio in 11/2017 without evidence of PAD. If recurs, then would f/u with us. Maryjean Hatchet, NP       Patient seen and examined by me with nurse practitioner. Aniceto Lopez personally performed all components of the history, physical, and medical decision making and agree with the assessment and plan with minor modifications as noted. No bruise or skin changes at this time. Stable from cardiac standpoint. Labs reviewed.      Jaylin Patel MD

## 2019-08-02 ENCOUNTER — HOSPITAL ENCOUNTER (OUTPATIENT)
Dept: GENERAL RADIOLOGY | Age: 69
Discharge: HOME | End: 2019-08-02
Payer: MEDICARE

## 2019-08-02 DIAGNOSIS — R13.19 OTHER DYSPHAGIA: ICD-10-CM

## 2019-08-02 PROCEDURE — 74018 RADEX ABDOMEN 1 VIEW: CPT

## 2019-09-12 NOTE — PROGRESS NOTES
HISTORY OF PRESENT ILLNESS  Remy Romero is a 81 y.o. female. HPI   For routine follow up on chronic medical conditions. Overall feeling well. Cardiovascular Review:  The patient has coronary artery disease, HF and status post coronary artery stenting. Diet and Lifestyle: generally follows a low fat low cholesterol diet, generally follows a low sodium diet, follows a diabetic diet regularly, exercises sporadically  Home BP Monitoring: is not measured at home. Pertinent ROS: taking medications as instructed, no medication side effects noted, no TIA's, no chest pain on exertion, no dyspnea on exertion, no swelling of ankles. DM type II follow up:  Compliant w/ meds, diabetic diet, and exercise. Has been watching her diet. Her BS have been in the 80-90s. Checks BS BID on most days and prn. Pt does not have BS log at visit today. No Rf needed for today. Denies any tingling sensation, polyuria and polydipsia. No blurred vision. No significant weight changes. Feeling well since last OV. Hypercholesterolemia follow up:  Compliant w/ meds, low fat, low cholesterol diet. Exercising some. No muscle nor abdominal pain, no skin discoloration. Patient fasting today. Depression Review:  Patient is seen for followup of depression and anxiety. Overall doing well. She denies depressed mood and insomnia. Sleeping well at night. Thyroid Review:  Patient is seen for followup of hypothyroidism. Thyroid ROS: denies fatigue, weight changes, heat/cold intolerance, bowel/skin changes or CVS symptoms.         Patient Active Problem List   Diagnosis Code    Anxiety F41.9    Depression F32.9    Hypovitaminosis D E55.9    GERD (gastroesophageal reflux disease) K21.9    Edema R60.9    Esophageal motor disorder K22.4    S/P bypass gastrojejunostomy Z98.0    SOB (shortness of breath) R06.02    CAD (coronary artery disease) I25.10    Angina pectoris (HCC) I20.9    S/P PTCA (percutaneous transluminal coronary angioplasty) Z98.61    Mixed hyperlipidemia E78.2    Diarrhea R19.7    Type 2 diabetes mellitus without complication (HCC) H12.0    Osteopenia M85.80    Encounter for medication monitoring Z51.81    CVA (cerebral vascular accident) ([facility] [address] D68.4    Complicated migraine Q93. 109    Numbness and tingling of right side of face R20.0, R20.2    Stenosis of both internal carotid arteries I65.23    Angina, class III ([facility]) I20.9    Type 2 diabetes with nephropathy ([facility]) E11.21    Essential hypertension I10       Current Outpatient Medications   Medication Sig Dispense Refill    lisinopril (PRINIVIL, ZESTRIL) 2.5 mg tablet Take 1 Tab by mouth daily. 90 Tab 3    levothyroxine (SYNTHROID) 25 mcg tablet Take 25 mcg by mouth Daily (before breakfast).  pantoprazole (PROTONIX) 40 mg tablet Take 1 Tab by mouth daily. For acid reflux 90 Tab 1    metFORMIN ER (GLUCOPHAGE XR) 500 mg tablet take 1 tablet by mouth once a day with dinner 90 Tab 3    ALPRAZolam (XANAX) 1 mg tablet take 1/2 tablet by mouth once daily if needed for anxiety and take 1 tablet by mouth at bedtime if needed for sleep 45 Tab 3    fenofibrate nanocrystallized (TRICOR) 145 mg tablet take 1 tablet by mouth once daily 90 Tab 3    JANUVIA 100 mg tablet take 1 tablet by mouth once daily for diabetes 90 Tab 3    pravastatin (PRAVACHOL) 40 mg tablet TAKE 1 TABLET BY MOUTH EVERY NIGHT 90 Tab 3    furosemide (LASIX) 20 mg tablet Take 1 Tab by mouth daily.  90 Tab 3    Blood-Glucose Meter (CONTOUR NEXT METER) misc Use to check blood sugar 2 times daily 1 Each 0    glucose blood VI test strips (CONTOUR NEXT TEST STRIPS) strip Use to check blood sugar 2 times daily 100 Strip 11    metoprolol succinate (TOPROL-XL) 25 mg XL tablet TAKE 1 TABLET EVERY DAY 90 Tab 3    multivit-minerals/folic acid (CENTRUM VITAMINTS PO) Take 1 tablet by mouth daily      cholecalciferol, vitamin D3, (VITAMIN D3) 2,000 unit tab Take 1 capsule by mouth daily      Aspirin, Buffered 81 mg tab Take 81 mg by mouth daily.          Allergies   Allergen Reactions    Demerol [Meperidine] Unknown (comments)     Pt unsure of reaction       Past Medical History:   Diagnosis Date    Anxiety     Arthritis     ASHD (arteriosclerotic heart disease)     CAD (coronary artery disease)     Chronic pain     Constipation     Depression     Diabetes ([address]     Diarrhea     Dysgeusia 11/21/2014    Esophageal motor disorder 1/23/2012    Flatulence/gas pain/belching 11/21/2014    Gastroparesis     GERD (gastroesophageal reflux disease)     Hypercholesterolemia     Hypertension     Psychiatric disorder     anxiety    S/P bypass gastrojejunostomy 1/23/2012    Sleep apnea     no cpap       Past Surgical History:   Procedure Laterality Date    ABDOMEN SURGERY PROC UNLISTED      s/p partial gastrectomy 2/2 gastroparesis    CARDIAC SURG PROCEDURE UNLIST  6/2013    stent placement X 2    COLONOSCOPY N/A 5/26/2016    [facility]    EGD  7/13/2009    GASTROJEJUNOSTOMY      HC BLD CARPEL TUNNEL RELEASE      bilat    HX APPENDECTOMY      HX CHOLECYSTECTOMY      HX CORONARY STENT PLACEMENT  06/2013    3 stents    HX FEMORAL BYPASS Right 11/2017    HX GASTRIC BYPASS  6/02    HX HYSTERECTOMY      HX LAPAROTOMY  8/02    2/2 intra abd abscess    HX ORTHOPAEDIC  1/08    laminectomy    HX ORTHOPAEDIC      Bilateral feet bone spurs removed    HX ORTHOPAEDIC      bilateral carpal tunnel    HX ORTHOPAEDIC      back surgery    HX UROLOGICAL      bladder surgery-bladder tac    CT COLONOSCOPY FLX DX W/COLLJ SPEC WHEN PFRMD  5/03/2006    Dr. Bradly Donaldson COLONOSCOPY W/BIOPSY SINGLE/MULTIPLE  2/9/2012         CT EGD BALLOON DILATION ESOPHAGUS <30 MM DIAM  1/23/2012         CT EGD TRANSORAL BIOPSY SINGLE/MULTIPLE  7/11/2011         CT EGD TRANSORAL BIOPSY SINGLE/MULTIPLE  1/23/2012         REMOVAL OF HEEL SPUR  2001    bilat       Family History   Problem Relation Age of Onset    Heart Disease Mother     Hypertension Mother     Diabetes Mother     Diabetes Father     Heart Disease Father     Hypertension Father     Alcohol abuse Brother     Heart Disease Sister     Hypertension Sister     Diabetes Sister     No Known Problems Sister     Alcohol abuse Brother     Alcohol abuse Brother     Diabetes Brother        Social History     Tobacco Use    Smoking status: Never Smoker    Smokeless tobacco: Never Used   Substance Use Topics    Alcohol use: No     Alcohol/week: 0.0 standard drinks        Lab Results   Component Value Date/Time    WBC 5.9 06/21/2019 12:50 PM    HGB 12.0 06/21/2019 12:50 PM    HCT 35.7 06/21/2019 12:50 PM    PLATELET 039 10/73/3451 12:50 PM    MCV 89 06/21/2019 12:50 PM     Lab Results   Component Value Date/Time    Cholesterol, total 131 06/21/2019 12:50 PM    HDL Cholesterol 48 06/21/2019 12:50 PM    LDL, calculated 58 06/21/2019 12:50 PM    Triglyceride 125 06/21/2019 12:50 PM    CHOL/HDL Ratio 3.5 01/05/2017 09:18 PM     Lab Results   Component Value Date/Time    TSH 3.050 06/21/2019 12:50 PM    T4, Free 1.77 02/08/2019 12:18 PM    T4, Total 5.6 11/17/2015 08:54 AM      Lab Results   Component Value Date/Time    Sodium 143 06/21/2019 12:50 PM    Potassium 4.2 06/21/2019 12:50 PM    Chloride 105 06/21/2019 12:50 PM    CO2 23 06/21/2019 12:50 PM    Anion gap 6 05/14/2018 04:08 PM    Glucose 139 (H) 06/21/2019 12:50 PM    BUN 18 06/21/2019 12:50 PM    Creatinine 1.30 (H) 06/21/2019 12:50 PM    BUN/Creatinine ratio 14 06/21/2019 12:50 PM    GFR est AA 49 (L) 06/21/2019 12:50 PM    GFR est non-AA 43 (L) 06/21/2019 12:50 PM    Calcium 9.5 06/21/2019 12:50 PM    Bilirubin, total 0.3 06/21/2019 12:50 PM    ALT (SGPT) 15 06/21/2019 12:50 PM    AST (SGOT) 22 06/21/2019 12:50 PM    Alk.  phosphatase 73 06/21/2019 12:50 PM    Protein, total 6.7 06/21/2019 12:50 PM    Albumin 4.5 06/21/2019 12:50 PM    Globulin 2.8 05/14/2018 04:08 PM    A-G Ratio 2.0 06/21/2019 12:50 PM      Lab Results   Component Value Date/Time    Hemoglobin A1c 6.4 (H) 09/12/2017 09:35 AM    Hemoglobin A1c (POC) 7.3 06/21/2019 12:50 PM         Review of Systems   Constitutional: Negative for malaise/fatigue. HENT: Negative for congestion. Eyes: Negative for blurred vision. Respiratory: Negative for cough and shortness of breath. Cardiovascular: Negative for chest pain, palpitations and leg swelling. Gastrointestinal: Negative for abdominal pain, constipation and heartburn. Genitourinary: Negative for dysuria, frequency and urgency. Musculoskeletal: Negative for back pain and joint pain. Neurological: Negative for dizziness, tingling and headaches. Endo/Heme/Allergies: Negative for environmental allergies. Psychiatric/Behavioral: Negative for depression. The patient does not have insomnia. Physical Exam   Constitutional: She appears well-developed and well-nourished. /59 (BP 1 Location: Left arm, BP Patient Position: Sitting)   Pulse 67   Temp 96.2 °F (35.7 °C) (Oral)   Resp 16   Ht 5' 3.5\" (1.613 m)   Wt 151 lb 9.6 oz (68.8 kg)   SpO2 98%   BMI 26.43 kg/m²    HENT:   Right Ear: Tympanic membrane and ear canal normal.   Left Ear: Tympanic membrane and ear canal normal.   Nose: No mucosal edema or rhinorrhea. Mouth/Throat: Oropharynx is clear and moist and mucous membranes are normal.   Neck: Normal range of motion. Neck supple. No thyromegaly present. Cardiovascular: Normal rate, regular rhythm and normal heart sounds. Exam reveals no gallop. Pulmonary/Chest: Effort normal and breath sounds normal.   Abdominal: Soft. Normal appearance and bowel sounds are normal. She exhibits no mass. There is no tenderness. Musculoskeletal: Normal range of motion. She exhibits no edema. Lymphadenopathy:     She has no cervical adenopathy. Skin: Skin is warm and dry. Psychiatric: She has a normal mood and affect. Nursing note and vitals reviewed. ASSESSMENT and PLAN  Diagnoses and all orders for this visit:    1. Type 2 diabetes mellitus without complication, without long-term current use of insulin ([facility])  -     AMB POC HEMOGLOBIN A1C  -     AMB POC GLUCOSE, QUANTITATIVE, BLOOD    2. Mixed hyperlipidemia  At goal.      3. Coronary artery disease involving native coronary artery of native heart without angina pectoris  Stable     4. PAD (peripheral artery disease) ([facility])  Stable     5. Acquired hypothyroidism  Stable     6. Anxiety  -     ALPRAZolam (XANAX) 1 mg tablet; take 1/2 tablet by mouth once daily if needed for anxiety and take 1 tablet by mouth at bedtime if needed for sleep    7. Gastroesophageal reflux disease without esophagitis  Stable on Zantac    8. Environmental allergies  -     fluticasone propionate (FLONASE) 50 mcg/actuation nasal spray; 2 Sprays by Both Nostrils route daily as needed for Rhinitis. 9. Encounter for medication monitoring  -     METABOLIC PANEL, BASIC      Follow-up and Dispositions    · Return in about 4 months (around 1/13/2020) for medicare wellness exam.       reviewed diet, exercise and weight control  cardiovascular risk and specific lipid/LDL goals reviewed  reviewed medications and side effects in detail  specific diabetic recommendations: low cholesterol diet, weight control and daily exercise discussed, home glucose monitoring emphasized, all medications, side effects and compliance discussed carefully, foot care discussed and Podiatry visits discussed, annual eye examinations at Ophthalmology discussed and glycohemoglobin and other lab monitoring discussed     I have discussed diagnosis listed in this note with pt and/or family. I have discussed treatment plans and options and the risk/benefit analysis of those options, including safe use of medications and possible medication side effects. Through the use of shared decision making we have agreed to the above plan.  The patient has received an after-visit summary and questions were answered concerning future plans and follow up. Advise pt of any urgent changes then to proceed to the ER.

## 2019-09-13 ENCOUNTER — OFFICE VISIT (OUTPATIENT)
Dept: FAMILY MEDICINE CLINIC | Age: 69
End: 2019-09-13

## 2019-09-13 VITALS
DIASTOLIC BLOOD PRESSURE: 59 MMHG | TEMPERATURE: 96.2 F | HEART RATE: 67 BPM | SYSTOLIC BLOOD PRESSURE: 101 MMHG | RESPIRATION RATE: 16 BRPM | BODY MASS INDEX: 25.88 KG/M2 | OXYGEN SATURATION: 98 % | WEIGHT: 151.6 LBS | HEIGHT: 64 IN

## 2019-09-13 DIAGNOSIS — K21.9 GASTROESOPHAGEAL REFLUX DISEASE WITHOUT ESOPHAGITIS: ICD-10-CM

## 2019-09-13 DIAGNOSIS — Z91.09 ENVIRONMENTAL ALLERGIES: ICD-10-CM

## 2019-09-13 DIAGNOSIS — F41.9 ANXIETY: ICD-10-CM

## 2019-09-13 DIAGNOSIS — E11.9 TYPE 2 DIABETES MELLITUS WITHOUT COMPLICATION, WITHOUT LONG-TERM CURRENT USE OF INSULIN (HCC): Primary | ICD-10-CM

## 2019-09-13 DIAGNOSIS — E78.2 MIXED HYPERLIPIDEMIA: ICD-10-CM

## 2019-09-13 DIAGNOSIS — E03.9 ACQUIRED HYPOTHYROIDISM: ICD-10-CM

## 2019-09-13 DIAGNOSIS — Z23 ENCOUNTER FOR IMMUNIZATION: ICD-10-CM

## 2019-09-13 DIAGNOSIS — I25.10 CORONARY ARTERY DISEASE INVOLVING NATIVE CORONARY ARTERY OF NATIVE HEART WITHOUT ANGINA PECTORIS: ICD-10-CM

## 2019-09-13 DIAGNOSIS — I73.9 PAD (PERIPHERAL ARTERY DISEASE) (HCC): ICD-10-CM

## 2019-09-13 DIAGNOSIS — Z51.81 ENCOUNTER FOR MEDICATION MONITORING: ICD-10-CM

## 2019-09-13 PROBLEM — I20.9 ANGINA, CLASS III (HCC): Status: RESOLVED | Noted: 2017-03-09 | Resolved: 2019-09-13

## 2019-09-13 LAB
GLUCOSE POC: 113 MG/DL
HBA1C MFR BLD HPLC: 6.6 %

## 2019-09-13 RX ORDER — RANITIDINE 150 MG/1
150 CAPSULE ORAL 2 TIMES DAILY
Refills: 1 | COMMUNITY
Start: 2019-08-20 | End: 2020-01-27

## 2019-09-13 RX ORDER — ALPRAZOLAM 1 MG/1
TABLET ORAL
Qty: 45 TAB | Refills: 3 | Status: SHIPPED | OUTPATIENT
Start: 2019-09-13 | End: 2019-10-05 | Stop reason: SDUPTHER

## 2019-09-13 RX ORDER — FLUTICASONE PROPIONATE 50 MCG
2 SPRAY, SUSPENSION (ML) NASAL
Qty: 1 BOTTLE | Refills: 6 | Status: SHIPPED | OUTPATIENT
Start: 2019-09-13 | End: 2021-04-02 | Stop reason: SDUPTHER

## 2019-09-13 NOTE — PROGRESS NOTES
Chief Complaint   Patient presents with    Cholesterol Problem     follow up    Hypertension     follow up    Diabetes     follow up    Thyroid Problem     follow up     Mammogram  2/7/18    Bone Density  2/7/18    Colonoscopy 5/26/16  5 years [place]    A1C  6/21/19    Microalbumin  12/14/18    Eye Exam  5/7/19  Mitzy Head    1. Have you been to the ER, urgent care clinic since your last visit? Hospitalized since your last visit? No    2. Have you seen or consulted any other health care providers outside of the [address] [place] [address] since your last visit? Include any pap smears or colon screening. No      Order placed for flu shot,  per Verbal Order from Dr. Collins on 9/13/2019 due to need. Fluad 0.5 ml given in right arm IM, no reaction noted.

## 2019-09-14 LAB
BUN SERPL-MCNC: 16 MG/DL (ref 8–27)
BUN/CREAT SERPL: 13 (ref 12–28)
CALCIUM SERPL-MCNC: 9.7 MG/DL (ref 8.7–10.3)
CHLORIDE SERPL-SCNC: 104 MMOL/L (ref 96–106)
CO2 SERPL-SCNC: 22 MMOL/L (ref 20–29)
CREAT SERPL-MCNC: 1.25 MG/DL (ref 0.57–1)
GLUCOSE SERPL-MCNC: 107 MG/DL (ref 65–99)
INTERPRETATION: NORMAL
POTASSIUM SERPL-SCNC: 4.5 MMOL/L (ref 3.5–5.2)
SODIUM SERPL-SCNC: 142 MMOL/L (ref 134–144)

## 2019-09-27 NOTE — PERIOP NOTES
[facility]  Ambulatory Surgery Unit  Pre-operative Instructions for Endo Procedures    Procedure Date  Tuesday, October 1, 2019            Tentative Arrival Time 115      1. On the day of your procedure, please report to the Ambulatory Surgery Unit Registration Desk and sign in at your designated time. The Ambulatory Surgery Unit is located in [facility] on the [facility] side of the [facility] across from the [address] [place] [address]. Please have all of your health insurance cards and a photo ID. 2. You must have someone with you to drive you home, as you should not drive a car for 24 hours following anesthesia. Please make arrangements for a responsible adult friend or family member to stay with you for at least the first 24 hours after your procedure. 3. Do not have anything to eat or drink (including water, gum, mints, coffee, juice) after 11:59 PM, Monday. This may not apply to medications prescribed by your physician. (Please note below the special instructions with medications to take the morning of your procedure.)    4. If applicable, follow the clear liquid diet and bowel prep instructions provided by your physician's office. If you do not have this information, or have any questions, please contact your physician's office. 5. We recommend you do not drink any alcoholic beverages for 24 hours before and after your procedure. 6. Contact your surgeons office for instructions on the following medications: non-steroidal anti-inflammatory drugs (i.e. Advil, Aleve), vitamins, and supplements. (Some surgeons will want you to stop these medications prior to surgery and others may allow you to take them)   **If you are currently taking Plavix, Coumadin, Aspirin and/or other blood-thinning agents, contact your surgeon for instructions. ** Your surgeon will partner with the physician prescribing these medications to determine if it is safe to stop or if you need to continue taking. Please do not stop taking these medications without instructions from your surgeon. 7. In an effort to help prevent surgical site infection, we ask that you shower with an anti-bacterial soap (i.e. Dial or Safeguard) on the morning of your procedure. Do not apply any lotions, powders, or deodorants after showering. 8. Wear comfortable clothes. Wear glasses instead of contacts. Do not bring any jewelry or money (other than copays or fees as instructed). Do not wear make-up, particularly mascara, the morning of your procedure. Wear your hair loose or down, no ponytails, buns, america pins or clips. All body piercings must be removed. 9. You should understand that if you do not follow these instructions your procedure may be cancelled. If your physical condition changes (i.e. fever, cold or flu) please contact your surgeon as soon as possible. 10. It is important that you be on time. If a situation occurs where you may be late, or if you have any questions or problems, please call [phone]. 11. Your procedure time may be subject to change. You will receive a phone call the day prior to confirm your arrival time. Special Instructions: Take all medications and inhalers, as prescribed, on the morning of surgery with a sip of water EXCEPT: no diabetic medications day of surgery. Insulin Dependent Diabetic patients: Take your diabetic medications as prescribed the day before surgery. Hold all diabetic medications the day of surgery. If you are scheduled to arrive for surgery after 8:00 AM, and your AM blood sugar is >200, please call Ambulatory Surgery. I understand a pre-operative phone call will be made to verify my procedure time. In the event that I am not available, I give permission for a message to be left on my answering service and/or with another person?       yes    Preop instructions reviewed  Pt verbalized understanding.      ___________________      ___________________ ___________________  (Signature of Patient)          (Witness)                   (Date and Time)

## 2019-09-30 ENCOUNTER — ANESTHESIA EVENT (OUTPATIENT)
Dept: SURGERY | Age: 69
End: 2019-09-30
Payer: MEDICARE

## 2019-10-01 ENCOUNTER — HOSPITAL ENCOUNTER (OUTPATIENT)
Age: 69
Setting detail: OUTPATIENT SURGERY
Discharge: HOME | End: 2019-10-01
Attending: SPECIALIST | Admitting: SPECIALIST
Payer: MEDICARE

## 2019-10-01 ENCOUNTER — ANESTHESIA (OUTPATIENT)
Dept: SURGERY | Age: 69
End: 2019-10-01
Payer: MEDICARE

## 2019-10-01 VITALS
HEART RATE: 70 BPM | BODY MASS INDEX: 27.11 KG/M2 | DIASTOLIC BLOOD PRESSURE: 64 MMHG | SYSTOLIC BLOOD PRESSURE: 157 MMHG | OXYGEN SATURATION: 99 % | WEIGHT: 153 LBS | HEIGHT: 63 IN | TEMPERATURE: 97.8 F | RESPIRATION RATE: 18 BRPM

## 2019-10-01 LAB
GLUCOSE BLD STRIP.AUTO-MCNC: 120 MG/DL (ref 65–100)
GLUCOSE BLD STRIP.AUTO-MCNC: 72 MG/DL (ref 65–100)
GLUCOSE BLD STRIP.AUTO-MCNC: 96 MG/DL (ref 65–100)
SERVICE CMNT-IMP: ABNORMAL
SERVICE CMNT-IMP: NORMAL
SERVICE CMNT-IMP: NORMAL

## 2019-10-01 PROCEDURE — 88342 IMHCHEM/IMCYTCHM 1ST ANTB: CPT

## 2019-10-01 PROCEDURE — 76030000002 HC AMB SURG OR TIME FIRST 0.: Performed by: SPECIALIST

## 2019-10-01 PROCEDURE — 77030021352 HC CBL LD SYS DISP COVD -B: Performed by: SPECIALIST

## 2019-10-01 PROCEDURE — 76210000046 HC AMBSU PH II REC FIRST 0.5 HR: Performed by: SPECIALIST

## 2019-10-01 PROCEDURE — 77030020255 HC SOL INJ LR 1000ML BG: Performed by: SPECIALIST

## 2019-10-01 PROCEDURE — 76060000073 HC AMB SURG ANES FIRST 0.5 HR: Performed by: SPECIALIST

## 2019-10-01 PROCEDURE — 74011250636 HC RX REV CODE- 250/636: Performed by: ANESTHESIOLOGY

## 2019-10-01 PROCEDURE — 74011250636 HC RX REV CODE- 250/636: Performed by: NURSE ANESTHETIST, CERTIFIED REGISTERED

## 2019-10-01 PROCEDURE — 82962 GLUCOSE BLOOD TEST: CPT

## 2019-10-01 PROCEDURE — 76210000040 HC AMBSU PH I REC FIRST 0.5 HR: Performed by: SPECIALIST

## 2019-10-01 PROCEDURE — 88305 TISSUE EXAM BY PATHOLOGIST: CPT

## 2019-10-01 PROCEDURE — 74011000258 HC RX REV CODE- 258

## 2019-10-01 PROCEDURE — 74011000250 HC RX REV CODE- 250: Performed by: NURSE ANESTHETIST, CERTIFIED REGISTERED

## 2019-10-01 RX ORDER — LIDOCAINE HYDROCHLORIDE 20 MG/ML
INJECTION, SOLUTION EPIDURAL; INFILTRATION; INTRACAUDAL; PERINEURAL AS NEEDED
Status: DISCONTINUED | OUTPATIENT
Start: 2019-10-01 | End: 2019-10-01 | Stop reason: HOSPADM

## 2019-10-01 RX ORDER — DEXTROMETHORPHAN/PSEUDOEPHED 2.5-7.5/.8
1.2 DROPS ORAL
Status: DISCONTINUED | OUTPATIENT
Start: 2019-10-01 | End: 2019-10-01 | Stop reason: HOSPADM

## 2019-10-01 RX ORDER — SODIUM CHLORIDE 0.9 % (FLUSH) 0.9 %
5-40 SYRINGE (ML) INJECTION EVERY 8 HOURS
Status: DISCONTINUED | OUTPATIENT
Start: 2019-10-01 | End: 2019-10-01 | Stop reason: HOSPADM

## 2019-10-01 RX ORDER — SODIUM CHLORIDE, SODIUM LACTATE, POTASSIUM CHLORIDE, CALCIUM CHLORIDE 600; 310; 30; 20 MG/100ML; MG/100ML; MG/100ML; MG/100ML
25 INJECTION, SOLUTION INTRAVENOUS CONTINUOUS
Status: DISCONTINUED | OUTPATIENT
Start: 2019-10-01 | End: 2019-10-01 | Stop reason: HOSPADM

## 2019-10-01 RX ORDER — ATROPINE SULFATE 0.1 MG/ML
0.5 INJECTION INTRAVENOUS
Status: DISCONTINUED | OUTPATIENT
Start: 2019-10-01 | End: 2019-10-01 | Stop reason: HOSPADM

## 2019-10-01 RX ORDER — DIPHENHYDRAMINE HYDROCHLORIDE 50 MG/ML
12.5 INJECTION, SOLUTION INTRAMUSCULAR; INTRAVENOUS AS NEEDED
Status: DISCONTINUED | OUTPATIENT
Start: 2019-10-01 | End: 2019-10-01 | Stop reason: HOSPADM

## 2019-10-01 RX ORDER — SODIUM CHLORIDE 0.9 % (FLUSH) 0.9 %
5-40 SYRINGE (ML) INJECTION AS NEEDED
Status: DISCONTINUED | OUTPATIENT
Start: 2019-10-01 | End: 2019-10-01 | Stop reason: HOSPADM

## 2019-10-01 RX ORDER — FLUCONAZOLE 100 MG/1
100 TABLET ORAL DAILY
Qty: 12 TAB | Refills: 0 | Status: SHIPPED | OUTPATIENT
Start: 2019-10-01 | End: 2019-10-13

## 2019-10-01 RX ORDER — LIDOCAINE HYDROCHLORIDE 10 MG/ML
0.1 INJECTION, SOLUTION EPIDURAL; INFILTRATION; INTRACAUDAL; PERINEURAL AS NEEDED
Status: DISCONTINUED | OUTPATIENT
Start: 2019-10-01 | End: 2019-10-01 | Stop reason: HOSPADM

## 2019-10-01 RX ORDER — FLUMAZENIL 0.1 MG/ML
0.2 INJECTION INTRAVENOUS
Status: DISCONTINUED | OUTPATIENT
Start: 2019-10-01 | End: 2019-10-01 | Stop reason: HOSPADM

## 2019-10-01 RX ORDER — PROPOFOL 10 MG/ML
INJECTION, EMULSION INTRAVENOUS AS NEEDED
Status: DISCONTINUED | OUTPATIENT
Start: 2019-10-01 | End: 2019-10-01 | Stop reason: HOSPADM

## 2019-10-01 RX ORDER — FENTANYL CITRATE 50 UG/ML
25 INJECTION, SOLUTION INTRAMUSCULAR; INTRAVENOUS
Status: DISCONTINUED | OUTPATIENT
Start: 2019-10-01 | End: 2019-10-01 | Stop reason: HOSPADM

## 2019-10-01 RX ORDER — DEXTROSE MONOHYDRATE 100 MG/ML
250 INJECTION, SOLUTION INTRAVENOUS ONCE
Status: COMPLETED | OUTPATIENT
Start: 2019-10-01 | End: 2019-10-01

## 2019-10-01 RX ORDER — DEXTROSE MONOHYDRATE 100 MG/ML
INJECTION, SOLUTION INTRAVENOUS
Status: COMPLETED
Start: 2019-10-01 | End: 2019-10-01

## 2019-10-01 RX ORDER — MIDAZOLAM HYDROCHLORIDE 1 MG/ML
.25-5 INJECTION, SOLUTION INTRAMUSCULAR; INTRAVENOUS
Status: DISCONTINUED | OUTPATIENT
Start: 2019-10-01 | End: 2019-10-01 | Stop reason: HOSPADM

## 2019-10-01 RX ORDER — ONDANSETRON 2 MG/ML
4 INJECTION INTRAMUSCULAR; INTRAVENOUS AS NEEDED
Status: DISCONTINUED | OUTPATIENT
Start: 2019-10-01 | End: 2019-10-01 | Stop reason: HOSPADM

## 2019-10-01 RX ORDER — NALOXONE HYDROCHLORIDE 0.4 MG/ML
0.4 INJECTION, SOLUTION INTRAMUSCULAR; INTRAVENOUS; SUBCUTANEOUS
Status: DISCONTINUED | OUTPATIENT
Start: 2019-10-01 | End: 2019-10-01 | Stop reason: HOSPADM

## 2019-10-01 RX ORDER — SODIUM CHLORIDE 9 MG/ML
75 INJECTION, SOLUTION INTRAVENOUS CONTINUOUS
Status: DISCONTINUED | OUTPATIENT
Start: 2019-10-01 | End: 2019-10-01 | Stop reason: HOSPADM

## 2019-10-01 RX ADMIN — LIDOCAINE HYDROCHLORIDE 20 MG: 20 INJECTION, SOLUTION EPIDURAL; INFILTRATION; INTRACAUDAL; PERINEURAL at 15:19

## 2019-10-01 RX ADMIN — DEXTROSE MONOHYDRATE 250 ML: 10 INJECTION, SOLUTION INTRAVENOUS at 14:23

## 2019-10-01 RX ADMIN — PROPOFOL 50 MG: 10 INJECTION, EMULSION INTRAVENOUS at 15:25

## 2019-10-01 RX ADMIN — DEXTROSE MONOHYDRATE 250 ML: 100 INJECTION, SOLUTION INTRAVENOUS at 14:23

## 2019-10-01 RX ADMIN — PROPOFOL 50 MG: 10 INJECTION, EMULSION INTRAVENOUS at 15:21

## 2019-10-01 RX ADMIN — SODIUM CHLORIDE, SODIUM LACTATE, POTASSIUM CHLORIDE, AND CALCIUM CHLORIDE 25 ML/HR: 600; 310; 30; 20 INJECTION, SOLUTION INTRAVENOUS at 14:22

## 2019-10-01 RX ADMIN — PROPOFOL 50 MG: 10 INJECTION, EMULSION INTRAVENOUS at 15:19

## 2019-10-01 NOTE — DISCHARGE INSTRUCTIONS
[facility]  [id number]  1950              Procedure  Discharge Instructions:      Discomfort:  Redness at IV site- apply warm compress to area; if redness or soreness persist- contact your physician  There may be a slight amount of blood passed from the rectum  Gaseous discomfort- walking, belching will help relieve any discomfort  You may not operate a vehicle for 12 hours  You may not engage in an occupation involving machinery or appliances for rest of today  You may not drink alcoholic beverages for at least 12 hours  Avoid making any critical decisions for at least 24 hour  DIET:   You may resume your normal diet today. You should not overeat or \"feast\" today as your abdomen may become distended or uncomfortable. MEDICATIONS:   I reconciled this list from the list you gave us when you came today for the procedure. Please clarify with me, your primary care physician and the nurse who is discharging you if we have any discrepancies. Aspirin and or non-steroidal medication (Ibuprofen, Motrin, naproxen, etc.) is ok in limited quantities. ACTIVITY:  You may resume your normal daily activities it is recommended that you spend the remainder of the day resting -  avoid any strenuous activity. CALL M.D. ANY SIGN OF:  Increasing pain, nausea, vomiting  Abdominal distension (swelling)  New increased bleeding (oral or rectal)  Fever (chills)  Pain in chest area  Bloody discharge from nose or mouth  Shortness of breath          Follow-up Instructions:   Call Dr. Bebo Leo for the results of  biopsy in approximately one week  Telephone #  [phone]  Follow up visit as previously scheduled. Maryjane Keenan MD  3:34 PM  10/1/2019           DO NOT TAKE SLEEPING MEDICATIONS OR ANTIANXIETY MEDICATIONS WHILE TAKING NARCOTIC PAIN MEDICATIONS,  ESPECIALLY THE NIGHT OF ANESTHESIA. CPAP PATIENTS BE SURE TO WEAR MACHINE WHENEVER NAPPING OR SLEEPING.     DISCHARGE SUMMARY from Nurse    The following personal items collected during your admission are returned to you:   Dental Appliance: Dental Appliances: Uppers, Partials, At home  Vision: Visual Aid: Glasses, At home  Hearing Aid:    Jewelry:    Clothing:    Other Valuables:    Valuables sent to safe:        PATIENT INSTRUCTIONS:    After General Anesthesia or Intravenous Sedation, for 24 hours or while taking prescription Narcotics:        Someone should be with you for the next 24 hours. For your own safety, a responsible adult must drive you home. · Limit your activities  · Recommended activity: Rest today, up with assistance today. Do not climb stairs or shower unattended for the next 24 hours. · Please start with a soft bland diet and advance as tolerated (no nausea) to regular diet. · If you have a sore throat you should try the following: fluids, warm salt water gargles, or throat lozenges. If it does not improve after several days please follow up with your primary physician. · Do not drive and operate hazardous machinery  · Do not make important personal or business decisions  · Do  not drink alcoholic beverages  · If you have not urinated within 8 hours after discharge, please contact your surgeon on call. Report the following to your surgeon:  · Excessive pain, swelling, redness or odor of or around the surgical area  · Temperature over 100.5  · Nausea and vomiting lasting longer than 4 hours or if unable to take medications  · Any signs of decreased circulation or nerve impairment to extremity: change in color, persistent  numbness, tingling, coldness or increase pain      · You will receive a Post Operative Call from one of the Recovery Room Nurses on the day after your surgery to check on you. It is very important for us to know how you are recovering after your surgery. If you have an issue or need to speak with someone, please call your surgeon, do not wait for the post operative call.     · You may receive an e-mail or letter in the mail from [employer] regarding your experience with us in the Endoscopy Unit. Your feedback is valuable to us and we appreciate your participation in the survey. · We wish you a speedy recovery ? What to do at Home:      *  Please give a list of your current medications to your Primary Care Provider. *  Please update this list whenever your medications are discontinued, doses are      changed, or new medications (including over-the-counter products) are added. *  Please carry medication information at all times in case of emergency situations. If you have not received your influenza and/or pneumococcal vaccine, please follow up with your primary care physician. The discharge information has been reviewed with the patient and caregiver. The patient and caregiver verbalized understanding.

## 2019-10-01 NOTE — H&P
Pre-endoscopy H and P    The patient was seen and examined in the [facility] pre op. The airway was assessed and docuemented. The problem list, past medical history, and medications were reviewed. We are doing this for hb and diarrhea. Patient Active Problem List   Diagnosis Code    Anxiety F41.9    Depression F32.9    Hypovitaminosis D E55.9    GERD (gastroesophageal reflux disease) K21.9    Edema R60.9    Esophageal motor disorder K22.4    S/P bypass gastrojejunostomy Z98.0    SOB (shortness of breath) R06.02    CAD (coronary artery disease) I25.10    Angina pectoris (HCC) I20.9    S/P PTCA (percutaneous transluminal coronary angioplasty) Z98.61    Mixed hyperlipidemia E78.2    Diarrhea R19.7    Type 2 diabetes mellitus without complication (HCC) T18.3    Osteopenia M85.80    Encounter for medication monitoring Z51.81    CVA (cerebral vascular accident) ([facility] [address] P81.3    Complicated migraine Q48. 109    Numbness and tingling of right side of face R20.0, R20.2    Stenosis of both internal carotid arteries I65.23    Type 2 diabetes with nephropathy (HCC) E11.21    Essential hypertension I10     Social History     Socioeconomic History    Marital status:      Spouse name: Not on file    Number of children: Not on file    Years of education: Not on file    Highest education level: Not on file   Occupational History    Not on file   Social Needs    Financial resource strain: Not on file    Food insecurity:     Worry: Not on file     Inability: Not on file    Transportation needs:     Medical: Not on file     Non-medical: Not on file   Tobacco Use    Smoking status: Never Smoker    Smokeless tobacco: Never Used   Substance and Sexual Activity    Alcohol use: No     Alcohol/week: 0.0 standard drinks    Drug use: Never    Sexual activity: Not Currently   Lifestyle    Physical activity:     Days per week: Not on file     Minutes per session: Not on file    Stress: Not on file Relationships    Social connections:     Talks on phone: Not on file     Gets together: Not on file     Attends Spiritism service: Not on file     Active member of club or organization: Not on file     Attends meetings of clubs or organizations: Not on file     Relationship status: Not on file    Intimate partner violence:     Fear of current or ex partner: Not on file     Emotionally abused: Not on file     Physically abused: Not on file     Forced sexual activity: Not on file   Other Topics Concern    Not on file   Social History Narrative    Not on file     Past Medical History:   Diagnosis Date    Anxiety     Arthritis     ASHD (arteriosclerotic heart disease)     CAD (coronary artery disease)     Chronic pain     Constipation     Depression     Diabetes ([facility] [address]     Diarrhea     Dysgeusia 11/21/2014    Esophageal motor disorder     Flatulence/gas pain/belching 11/21/2014    Gastroparesis     GERD (gastroesophageal reflux disease)     Hypercholesterolemia     Hypertension     Nausea & vomiting     Psychiatric disorder     anxiety    S/P bypass gastrojejunostomy 1/23/2012    Sleep apnea     no cpap, as stated 9/2019     The patient has a family history of na    Prior to Admission Medications   Prescriptions Last Dose Informant Patient Reported? Taking? ALPRAZolam (XANAX) 1 mg tablet 9/30/2019 at Unknown time  No Yes   Sig: take 1/2 tablet by mouth once daily if needed for anxiety and take 1 tablet by mouth at bedtime if needed for sleep   Aspirin, Buffered 81 mg tab 9/30/2019  Yes Yes   Sig: Take 81 mg by mouth daily.    Blood-Glucose Meter (CONTOUR NEXT METER) [facility] 9/30/2019 at Unknown time  No Yes   Sig: Use to check blood sugar 2 times daily   JANUVIA 100 mg tablet 9/30/2019 at Unknown time  No Yes   Sig: take 1 tablet by mouth once daily for diabetes   cholecalciferol, vitamin D3, (VITAMIN D3) 2,000 unit tab 9/30/2019 at Unknown time  Yes Yes   Sig: Take 1 capsule by mouth daily fenofibrate nanocrystallized (TRICOR) 145 mg tablet 2019 at Unknown time  No Yes   Sig: take 1 tablet by mouth once daily   fluticasone propionate (FLONASE) 50 mcg/actuation nasal spray 2019 at Unknown time  No Yes   Si Sprays by Both Nostrils route daily as needed for Rhinitis. furosemide (LASIX) 20 mg tablet 2019 at Unknown time  No Yes   Sig: Take 1 Tab by mouth daily. glucose blood VI test strips (CONTOUR NEXT TEST STRIPS) strip 2019 at Unknown time  No Yes   Sig: Use to check blood sugar 2 times daily   levothyroxine (SYNTHROID) 25 mcg tablet 2019 at Unknown time  Yes Yes   Sig: Take 25 mcg by mouth Daily (before breakfast). lisinopril (PRINIVIL, ZESTRIL) 2.5 mg tablet 2019 at Unknown time  No Yes   Sig: Take 1 Tab by mouth daily. metFORMIN ER (GLUCOPHAGE XR) 500 mg tablet 2019 at Unknown time  No Yes   Sig: take 1 tablet by mouth once a day with dinner   metoprolol succinate (TOPROL-XL) 25 mg XL tablet 2019 at 0930  No Yes   Sig: TAKE 1 TABLET EVERY DAY   multivit-minerals/folic acid (CENTRUM VITAMINTS PO) 2019 at Unknown time  Yes Yes   Sig: Take 1 tablet by mouth daily   pantoprazole (PROTONIX) 40 mg tablet 2019 at Unknown time  No Yes   Sig: Take 1 Tab by mouth daily. For acid reflux   pravastatin (PRAVACHOL) 40 mg tablet 2019 at Unknown time  No Yes   Sig: TAKE 1 TABLET BY MOUTH EVERY NIGHT   raNITIdine hcl 150 mg capsule 2019 at Unknown time  Yes Yes   Sig: Take 150 mg by mouth two (2) times a day. Facility-Administered Medications: None           The review of systems is:  negative for shortness of breath or chest pain      The heart, lungs, and mental status were satisfactory for the administration of anesthesia sedation and for the procedure. I discussed with the patient the objectives, risks, consequences and alternatives to the procedure.       Jolanta Ferrell MD  10/1/2019  1:36 PM

## 2019-10-01 NOTE — PERIOP NOTES
Dr Adelia Cohen called prescription for flagyl into patient's pharmacy. Pt's blood sugar 120 on admissionto PACU. Instructed to eat a meal after discharge. Pt discharged via wheelchair, accompanied by RN. Pt discharged awake and alert, respirations equal and unlabored, skin warm, dry, and intact. Pt and family members' questions and concerns addressed prior to discharge.

## 2019-10-01 NOTE — PROCEDURES
Esophagogastroduodenoscopy    Indications:  Epigastric pain  diarrhea    Medications:  See anesthesia form    Assistants:  Alveria Crigler RN      Post procedure diagnosis:  THRUSH, TINY ULCER AT SUTURE, HIATAL HERNIA    Description of Procedure:    Prior to the procedure its objectives, risks, consequences and alternatives were discussed with the patient who then elected to proceed. The Olympus video endoscope was inserted under direct vision into the mouth. Yoli Rom was seen at the base of the tongue. The scope was then into the esophagus. The esophagus looked normal.    The z-line was located at 33cm. A one to two cm hiatal hernia was present. A four cm gastric pouch was seen with a typical double lumen gastroenterostomy. There was one suture at 8:00 with a tiny erosion around it. There were no diagnostic abnormalities of the visualized gastric pouch mucosa. I did direct exam only, no retroflexion. The mucosa of both lumens of the jejunum distal to the gastrojejunostomy appeared normal.  I took biopsies of the jejunum, the stomach (incluidng the erosion) and the mid esophagus. Complications: There were no apparent complications and the patient tolerated the procedure well. Implants:  none    Estimated Blood Loss:  none  Specimens Removed:  Jejunum  stomach remnant  Mid esophagus  Impressions:  Post op exam  Erosion on gastric side of anastomosis at suture line.     thrush        Signed By: Yazan Hernandez MD                        October 1, 2019     3:30 PM

## 2019-10-01 NOTE — ANESTHESIA POSTPROCEDURE EVALUATION
Procedure(s):  ESOPHAGOGASTRODUODENOSCOPY (EGD)  ESOPHAGOGASTRODUODENAL (EGD) BIOPSY. total IV anesthesia, general    Anesthesia Post Evaluation      Multimodal analgesia: multimodal analgesia not used between 6 hours prior to anesthesia start to PACU discharge  Patient location during evaluation: bedside  Patient participation: complete - patient participated  Level of consciousness: awake and alert  Pain score: 0  Airway patency: patent  Anesthetic complications: no  Cardiovascular status: acceptable  Respiratory status: acceptable  Hydration status: acceptable  Comments: Pt's blood glucose responded well to D10 IVPB. Last check: 120.   Post anesthesia nausea and vomiting:  none      Vitals Value Taken Time   /64 10/1/2019  3:59 PM   Temp     Pulse 70 10/1/2019  3:59 PM   Resp 18 10/1/2019  3:59 PM   SpO2 99 % 10/1/2019  3:59 PM

## 2019-10-01 NOTE — PERIOP NOTES
Sunni Plano  1950  [id number]    Situation:  Verbal report given from: PADMINI Camarillo RN and Glenny Otero  Procedure: Procedure(s):  ESOPHAGOGASTRODUODENOSCOPY (EGD)  ESOPHAGOGASTRODUODENAL (EGD) BIOPSY    Background:    Preoperative diagnosis: DYSPHAGIA ,DIARRHEA OVERFLOW , FULL INCONTINENCE OF FECES, MOTILTY DISORDER, ESOPHAGEAL JACKHAMMER ESOPHAGUS    Postoperative diagnosis: THRUSH, TINY ULCER AT SUTURE, HIATAL HERNIA    :  Dr. Bea Mortimer    Assistant(s): Circ-1: Yan Sabillon RN  Circ-2: Tomeka Klein RN  Scrub RN-1: Yudith Fierro RN    Specimens:   ID Type Source Tests Collected by Time Destination   1 : JEJUNUM BIOPSY Preservative Jejunum  Daisy Fraser MD 10/1/2019 1522 Pathology   2 : STOMACH BIOPSY Preservative Stomach  Daisy Fraser MD 10/1/2019 1524 Pathology   3 : MID ESOPHAGUS BIOPSY Preservative Esophagus, Mid  Daisy Fraser MD 10/1/2019 1524 Pathology       Assessment:  Intra-procedure medications         Anesthesia gave intra-procedure sedation and medications, see anesthesia flow sheet     Intravenous fluids: LR@ KVO     Vital signs stable       Recommendation:    Permission to share finding with

## 2019-10-01 NOTE — ANESTHESIA PREPROCEDURE EVALUATION
Anesthetic History   No history of anesthetic complications            Review of Systems / Medical History  Patient summary reviewed, nursing notes reviewed and pertinent labs reviewed    Pulmonary        Sleep apnea: No treatment           Neuro/Psych         TIA and psychiatric history (anxiety)     Cardiovascular    Hypertension          CAD, cardiac stents (x 3 in 6/2013) and hyperlipidemia    Exercise tolerance: >4 METS  Comments:  ECHO: 55-60% EF, mild MR, TR, and AR    06/19 EKG: SR   GI/Hepatic/Renal     GERD          Comments: Diverticulosis  Gastroparesis s/p partial gastrectomy,   esophageal motor disorder  S/P bypass gastrojejunostomy (hx of gastric bypass)  Hx of diarrhea/constipation Endo/Other    Diabetes  Hypothyroidism  Obesity and arthritis     Other Findings   Comments: Chronic pain, abdomen  Vit D defic         Physical Exam    Airway  Mallampati: III    Neck ROM: normal range of motion   Mouth opening: Normal     Cardiovascular    Rhythm: regular  Rate: normal         Dental    Dentition: Upper partial plate     Pulmonary  Breath sounds clear to auscultation               Abdominal  GI exam deferred       Other Findings            Anesthetic Plan    ASA: 3  Anesthesia type: total IV anesthesia and general          Induction: Intravenous  Anesthetic plan and risks discussed with: Patient      preop glucose 72: giving 250 ml D10 IVPB preop.  Took BB yesterday at 930 am

## 2019-10-01 NOTE — PERIOP NOTES
Patient: Pauline Nelson MRN: [id number]  SSN: [id number]   YOB: 1950  Age: 81 y.o. Sex: female     Patient is status post Procedure(s):  ESOPHAGOGASTRODUODENOSCOPY (EGD)  ESOPHAGOGASTRODUODENAL (EGD) BIOPSY. Surgeon(s) and Role:     * Nicholas Lee MD - Primary                Peripheral IV 10/01/19 Left Hand (Active)   Site Assessment Clean, dry, & intact 10/1/2019  2:21 PM   Phlebitis Assessment 0 10/1/2019  2:21 PM   Infiltration Assessment 0 10/1/2019  2:21 PM   Dressing Status New 10/1/2019  2:21 PM   Dressing Type Transparent;Tape 10/1/2019  2:21 PM   Hub Color/Line Status Yellow; Infusing 10/1/2019  2:21 PM                           Dressing/Packing:     NONE      Other:  Endoscope was pre-cleaned at bedside immediately by RUTH RIOS.

## 2019-10-01 NOTE — PERIOP NOTES
Permission received to review discharge instructions and discuss private health information with anna Giraldo.

## 2019-10-05 DIAGNOSIS — F41.9 ANXIETY: ICD-10-CM

## 2019-10-07 RX ORDER — ALPRAZOLAM 1 MG/1
TABLET ORAL
Qty: 45 TAB | Refills: 3 | OUTPATIENT
Start: 2019-10-07 | End: 2020-01-27 | Stop reason: SDUPTHER

## 2019-11-18 ENCOUNTER — TELEPHONE (OUTPATIENT)
Dept: FAMILY MEDICINE CLINIC | Age: 69
End: 2019-11-18

## 2019-11-18 NOTE — TELEPHONE ENCOUNTER
----- Message from Abdon Vu sent at 11/18/2019 10:50 AM EST -----  Regarding: Dr. Fabiola Lemon telephone  Appointment not available    Caller's first and last name and relationship to patient (if not the patient):      Best contact number: [phone]      Preferred date and time: Dec 16th morning       Scheduled appointment date and time: none available that fir pts needs       Reason for appointment: CPE      Details to clarify the request:      Abdon Vu

## 2019-11-18 NOTE — TELEPHONE ENCOUNTER
----- Message from Artis Oconnor sent at 11/18/2019 10:56 AM EST -----  Regarding: Dr. Reagan Piña- Ac/ telephone   Level 1/Escalated Issue      Caller's first and last name and relationship (if not the patient):      Best contact number(s): [phone]       What are the symptoms: abdominal pain/ loose bowls       Transfer successful - yes/no (include outcome): No       Transfer declined - yes/no (include reason): yes / Paticia Nissen advised me to \" Just send a message that's just what their going to tell me to do\"       Was caller advised to seek appropriate level of care - yes/no: yes       Details to clarify the request:        Artis Oconnor

## 2019-12-09 ENCOUNTER — HOSPITAL ENCOUNTER (EMERGENCY)
Age: 69
Discharge: HOME | End: 2019-12-09
Attending: EMERGENCY MEDICINE
Payer: MEDICARE

## 2019-12-09 ENCOUNTER — APPOINTMENT (OUTPATIENT)
Dept: CT IMAGING | Age: 69
End: 2019-12-09
Payer: MEDICARE

## 2019-12-09 VITALS
HEART RATE: 84 BPM | DIASTOLIC BLOOD PRESSURE: 96 MMHG | WEIGHT: 155.65 LBS | HEIGHT: 63 IN | BODY MASS INDEX: 27.58 KG/M2 | TEMPERATURE: 98.3 F | SYSTOLIC BLOOD PRESSURE: 120 MMHG | RESPIRATION RATE: 16 BRPM | OXYGEN SATURATION: 100 %

## 2019-12-09 DIAGNOSIS — R10.13 ABDOMINAL PAIN, EPIGASTRIC: Primary | ICD-10-CM

## 2019-12-09 DIAGNOSIS — Z86.19 HISTORY OF HELICOBACTER PYLORI INFECTION: ICD-10-CM

## 2019-12-09 DIAGNOSIS — Z98.0 S/P BYPASS GASTROJEJUNOSTOMY: ICD-10-CM

## 2019-12-09 DIAGNOSIS — R11.2 INTRACTABLE VOMITING WITH NAUSEA, UNSPECIFIED VOMITING TYPE: ICD-10-CM

## 2019-12-09 LAB
ALBUMIN SERPL-MCNC: 4.2 G/DL (ref 3.5–5)
ALBUMIN/GLOB SERPL: 1.4 {RATIO} (ref 1.1–2.2)
ALP SERPL-CCNC: 85 U/L (ref 45–117)
ALT SERPL-CCNC: 24 U/L (ref 12–78)
ANION GAP SERPL CALC-SCNC: 5 MMOL/L (ref 5–15)
APPEARANCE UR: CLEAR
AST SERPL-CCNC: 22 U/L (ref 15–37)
BACTERIA URNS QL MICRO: NEGATIVE /HPF
BASOPHILS # BLD: 0.1 K/UL (ref 0–0.1)
BASOPHILS NFR BLD: 1 % (ref 0–1)
BILIRUB SERPL-MCNC: 0.3 MG/DL (ref 0.2–1)
BILIRUB UR QL: NEGATIVE
BUN SERPL-MCNC: 14 MG/DL (ref 6–20)
BUN/CREAT SERPL: 13 (ref 12–20)
CALCIUM SERPL-MCNC: 9.4 MG/DL (ref 8.5–10.1)
CHLORIDE SERPL-SCNC: 111 MMOL/L (ref 97–108)
CO2 SERPL-SCNC: 27 MMOL/L (ref 21–32)
COLOR UR: ABNORMAL
CREAT SERPL-MCNC: 1.07 MG/DL (ref 0.55–1.02)
DIFFERENTIAL METHOD BLD: NORMAL
EOSINOPHIL # BLD: 0.2 K/UL (ref 0–0.4)
EOSINOPHIL NFR BLD: 2 % (ref 0–7)
EPITH CASTS URNS QL MICRO: ABNORMAL /LPF
ERYTHROCYTE [DISTWIDTH] IN BLOOD BY AUTOMATED COUNT: 13.2 % (ref 11.5–14.5)
GLOBULIN SER CALC-MCNC: 2.9 G/DL (ref 2–4)
GLUCOSE BLD STRIP.AUTO-MCNC: 102 MG/DL (ref 65–100)
GLUCOSE SERPL-MCNC: 109 MG/DL (ref 65–100)
GLUCOSE UR STRIP.AUTO-MCNC: NEGATIVE MG/DL
HCT VFR BLD AUTO: 37.7 % (ref 35–47)
HGB BLD-MCNC: 11.9 G/DL (ref 11.5–16)
HGB UR QL STRIP: NEGATIVE
HYALINE CASTS URNS QL MICRO: ABNORMAL /LPF (ref 0–5)
IMM GRANULOCYTES # BLD AUTO: 0 K/UL (ref 0–0.04)
IMM GRANULOCYTES NFR BLD AUTO: 0 % (ref 0–0.5)
KETONES UR QL STRIP.AUTO: NEGATIVE MG/DL
LEUKOCYTE ESTERASE UR QL STRIP.AUTO: ABNORMAL
LIPASE SERPL-CCNC: 206 U/L (ref 73–393)
LYMPHOCYTES # BLD: 2 K/UL (ref 0.8–3.5)
LYMPHOCYTES NFR BLD: 32 % (ref 12–49)
MCH RBC QN AUTO: 29.3 PG (ref 26–34)
MCHC RBC AUTO-ENTMCNC: 31.6 G/DL (ref 30–36.5)
MCV RBC AUTO: 92.9 FL (ref 80–99)
MONOCYTES # BLD: 0.5 K/UL (ref 0–1)
MONOCYTES NFR BLD: 8 % (ref 5–13)
NEUTS SEG # BLD: 3.6 K/UL (ref 1.8–8)
NEUTS SEG NFR BLD: 57 % (ref 32–75)
NITRITE UR QL STRIP.AUTO: NEGATIVE
NRBC # BLD: 0 K/UL (ref 0–0.01)
NRBC BLD-RTO: 0 PER 100 WBC
PH UR STRIP: 6.5 [PH] (ref 5–8)
PLATELET # BLD AUTO: 299 K/UL (ref 150–400)
PMV BLD AUTO: 10.7 FL (ref 8.9–12.9)
POTASSIUM SERPL-SCNC: 4.2 MMOL/L (ref 3.5–5.1)
PROT SERPL-MCNC: 7.1 G/DL (ref 6.4–8.2)
PROT UR STRIP-MCNC: NEGATIVE MG/DL
RBC # BLD AUTO: 4.06 M/UL (ref 3.8–5.2)
RBC #/AREA URNS HPF: ABNORMAL /HPF (ref 0–5)
SERVICE CMNT-IMP: ABNORMAL
SODIUM SERPL-SCNC: 143 MMOL/L (ref 136–145)
SP GR UR REFRACTOMETRY: 1.02 (ref 1–1.03)
UA: UC IF INDICATED,UAUC: ABNORMAL
UROBILINOGEN UR QL STRIP.AUTO: 1 EU/DL (ref 0.2–1)
WBC # BLD AUTO: 6.3 K/UL (ref 3.6–11)
WBC URNS QL MICRO: ABNORMAL /HPF (ref 0–4)

## 2019-12-09 PROCEDURE — C9113 INJ PANTOPRAZOLE SODIUM, VIA: HCPCS | Performed by: PHYSICIAN ASSISTANT

## 2019-12-09 PROCEDURE — 74177 CT ABD & PELVIS W/CONTRAST: CPT

## 2019-12-09 PROCEDURE — 96375 TX/PRO/DX INJ NEW DRUG ADDON: CPT

## 2019-12-09 PROCEDURE — 36415 COLL VENOUS BLD VENIPUNCTURE: CPT

## 2019-12-09 PROCEDURE — 96374 THER/PROPH/DIAG INJ IV PUSH: CPT

## 2019-12-09 PROCEDURE — 74011636320 HC RX REV CODE- 636/320: Performed by: PHYSICIAN ASSISTANT

## 2019-12-09 PROCEDURE — 74011250636 HC RX REV CODE- 250/636: Performed by: PHYSICIAN ASSISTANT

## 2019-12-09 PROCEDURE — 99285 EMERGENCY DEPT VISIT HI MDM: CPT

## 2019-12-09 PROCEDURE — 83690 ASSAY OF LIPASE: CPT

## 2019-12-09 PROCEDURE — 80053 COMPREHEN METABOLIC PANEL: CPT

## 2019-12-09 PROCEDURE — 74011000250 HC RX REV CODE- 250: Performed by: PHYSICIAN ASSISTANT

## 2019-12-09 PROCEDURE — 74011250637 HC RX REV CODE- 250/637: Performed by: PHYSICIAN ASSISTANT

## 2019-12-09 PROCEDURE — 96376 TX/PRO/DX INJ SAME DRUG ADON: CPT

## 2019-12-09 PROCEDURE — 82962 GLUCOSE BLOOD TEST: CPT

## 2019-12-09 PROCEDURE — 81001 URINALYSIS AUTO W/SCOPE: CPT

## 2019-12-09 PROCEDURE — 85025 COMPLETE CBC W/AUTO DIFF WBC: CPT

## 2019-12-09 RX ORDER — PANTOPRAZOLE SODIUM 40 MG/10ML
40 INJECTION, POWDER, LYOPHILIZED, FOR SOLUTION INTRAVENOUS
Status: COMPLETED | OUTPATIENT
Start: 2019-12-09 | End: 2019-12-09

## 2019-12-09 RX ORDER — MORPHINE SULFATE 2 MG/ML
4 INJECTION, SOLUTION INTRAMUSCULAR; INTRAVENOUS
Status: COMPLETED | OUTPATIENT
Start: 2019-12-09 | End: 2019-12-09

## 2019-12-09 RX ORDER — DIPHENHYDRAMINE HYDROCHLORIDE 50 MG/ML
25 INJECTION, SOLUTION INTRAMUSCULAR; INTRAVENOUS
Status: COMPLETED | OUTPATIENT
Start: 2019-12-09 | End: 2019-12-09

## 2019-12-09 RX ORDER — METOCLOPRAMIDE HYDROCHLORIDE 5 MG/ML
10 INJECTION INTRAMUSCULAR; INTRAVENOUS
Status: COMPLETED | OUTPATIENT
Start: 2019-12-09 | End: 2019-12-09

## 2019-12-09 RX ORDER — SODIUM CHLORIDE 0.9 % (FLUSH) 0.9 %
10 SYRINGE (ML) INJECTION
Status: DISCONTINUED | OUTPATIENT
Start: 2019-12-09 | End: 2019-12-10 | Stop reason: HOSPADM

## 2019-12-09 RX ORDER — SUCRALFATE 1 G/1
1 TABLET ORAL 4 TIMES DAILY
Qty: 30 TAB | Refills: 0 | Status: SHIPPED | OUTPATIENT
Start: 2019-12-09 | End: 2019-12-17

## 2019-12-09 RX ORDER — ONDANSETRON 4 MG/1
4 TABLET, ORALLY DISINTEGRATING ORAL
Qty: 15 TAB | Refills: 0 | Status: SHIPPED | OUTPATIENT
Start: 2019-12-09 | End: 2019-12-24

## 2019-12-09 RX ORDER — ONDANSETRON 2 MG/ML
4 INJECTION INTRAMUSCULAR; INTRAVENOUS
Status: COMPLETED | OUTPATIENT
Start: 2019-12-09 | End: 2019-12-09

## 2019-12-09 RX ORDER — PROMETHAZINE HYDROCHLORIDE 25 MG/1
25 TABLET ORAL
Qty: 12 TAB | Refills: 0 | Status: SHIPPED | OUTPATIENT
Start: 2019-12-09 | End: 2020-01-27

## 2019-12-09 RX ORDER — PANTOPRAZOLE SODIUM 40 MG/10ML
40 INJECTION, POWDER, LYOPHILIZED, FOR SOLUTION INTRAVENOUS
Status: DISCONTINUED | OUTPATIENT
Start: 2019-12-09 | End: 2019-12-09

## 2019-12-09 RX ADMIN — DIPHENHYDRAMINE HYDROCHLORIDE 25 MG: 50 INJECTION, SOLUTION INTRAMUSCULAR; INTRAVENOUS at 19:07

## 2019-12-09 RX ADMIN — ONDANSETRON 4 MG: 2 INJECTION INTRAMUSCULAR; INTRAVENOUS at 16:57

## 2019-12-09 RX ADMIN — PANTOPRAZOLE SODIUM 40 MG: 40 INJECTION, POWDER, FOR SOLUTION INTRAVENOUS at 15:57

## 2019-12-09 RX ADMIN — MORPHINE SULFATE 4 MG: 2 INJECTION, SOLUTION INTRAMUSCULAR; INTRAVENOUS at 15:54

## 2019-12-09 RX ADMIN — IOHEXOL 50 ML: 240 INJECTION, SOLUTION INTRATHECAL; INTRAVASCULAR; INTRAVENOUS; ORAL at 15:57

## 2019-12-09 RX ADMIN — SODIUM CHLORIDE 1000 ML: 900 INJECTION, SOLUTION INTRAVENOUS at 15:49

## 2019-12-09 RX ADMIN — MORPHINE SULFATE 4 MG: 2 INJECTION, SOLUTION INTRAMUSCULAR; INTRAVENOUS at 16:59

## 2019-12-09 RX ADMIN — ONDANSETRON 4 MG: 2 INJECTION INTRAMUSCULAR; INTRAVENOUS at 15:49

## 2019-12-09 RX ADMIN — LIDOCAINE HYDROCHLORIDE 40 ML: 20 SOLUTION ORAL; TOPICAL at 18:23

## 2019-12-09 RX ADMIN — METOCLOPRAMIDE 10 MG: 5 INJECTION, SOLUTION INTRAMUSCULAR; INTRAVENOUS at 19:10

## 2019-12-09 NOTE — ED PROVIDER NOTES
EMERGENCY DEPARTMENT HISTORY AND PHYSICAL EXAM      Date: 12/9/2019  Patient Name: Mandi Dahl  Patient Age and Sex: 81 y.o. female    History of Presenting Illness     Chief Complaint   Patient presents with    Abdominal Pain     Patient states that she had diarrhea last night with the pain. Her PCP referred her here. She reports the pain at the top of her abdomen       History Provided By: Patient    HPI: Mandi Dahl, is a 81 y.o. female presents with chronic epigastric disc comfort and dysphasia that has been gradually worsening over months. She was recently diagnosed with an H. pylori infection, completed all of her treatment. She sees Dr. Jonah Gold for GI care. She feels like when she eats her food is getting \"stuck \"in her stomach and she is not digesting properly. She also c/o intermittent nausea, vomiting, diarrhea. No fever/chills. She states the primary source of her pain is LUQ. She is s/o cholecystectomy, appendectomy, and hysterectomy. She has had abdominal surgery in the past.  She reports she is able to pass gas without difficulty. She denied dysuria/hematuria. She denied reflux. She reportedly takes Protonix daily. She denied chest pain or shortness of breath. No known ill contacts. Pt denies any other alleviating or exacerbating factors. There are no other complaints, changes or physical findings at this time.      Past Medical History:   Diagnosis Date    Anxiety     Arthritis     ASHD (arteriosclerotic heart disease)     CAD (coronary artery disease)     Chronic pain     Constipation     Depression     Diabetes ([facility] [address]     Diarrhea     Dysgeusia 11/21/2014    Esophageal motor disorder     Flatulence/gas pain/belching 11/21/2014    Gastroparesis     GERD (gastroesophageal reflux disease)     Hypercholesterolemia     Hypertension     Nausea & vomiting     Psychiatric disorder     anxiety    S/P bypass gastrojejunostomy 1/23/2012    Sleep apnea     no cpap, as stated 9/2019     Past Surgical History:   Procedure Laterality Date    ABDOMEN SURGERY PROC UNLISTED      s/p partial gastrectomy 2/2 gastroparesis    COLONOSCOPY N/A 5/26/2016    COL[facility] ENDOSCOPY    EGD  7/13/2009    GASTROJEJUNOSTOMY      HC BLD CARPEL TUNNEL RELEASE      bilat    HX APPENDECTOMY      HX CHOLECYSTECTOMY      HX CORONARY STENT PLACEMENT  06/2013    3 stents    HX FEMORAL BYPASS Right 11/2017    HX GASTRIC BYPASS  6/02    HX HEART CATHETERIZATION  2017    stents remain open, no new stents placed    HX HYSTERECTOMY      HX LAPAROTOMY  8/02    2/2 intra abd abscess    HX ORTHOPAEDIC  1/08    laminectomy, with frannie and 4 screws    HX UROLOGICAL      bladder surgery-bladder tac    SC COLONOSCOPY FLX DX W/COLLJ SPEC WHEN PFRMD  5/03/2006    Dr. Destiny Izaguirre W/BIOPSY SINGLE/MULTIPLE  2/9/2012         SC EGD BALLOON DILATION ESOPHAGUS <30 MM DIAM  1/23/2012         SC EGD TRANSORAL BIOPSY SINGLE/MULTIPLE  7/11/2011         SC EGD TRANSORAL BIOPSY SINGLE/MULTIPLE  1/23/2012         REMOVAL OF HEEL SPUR  2001    bilat    UPPER GI ENDOSCOPY,BIOPSY  10/1/2019            PCP: Ann Marie Hensley MD    Past History   Past Medical History:  Past Medical History:   Diagnosis Date    Anxiety     Arthritis     ASHD (arteriosclerotic heart disease)     CAD (coronary artery disease)     Chronic pain     Constipation     Depression     Diabetes ([address]     Diarrhea     Dysgeusia 11/21/2014    Esophageal motor disorder     Flatulence/gas pain/belching 11/21/2014    Gastroparesis     GERD (gastroesophageal reflux disease)     Hypercholesterolemia     Hypertension     Nausea & vomiting     Psychiatric disorder     anxiety    S/P bypass gastrojejunostomy 1/23/2012    Sleep apnea     no cpap, as stated 9/2019       Past Surgical History:  Past Surgical History:   Procedure Laterality Date    ABDOMEN SURGERY PROC UNLISTED s/p partial gastrectomy 2/2 gastroparesis    COLONOSCOPY N/A 5/26/2016    COLONOSCOPY performed by Yazan Hernandez [facility] at [facility] ENDOSCOPY    EGD  7/13/2009    GASTROJEJUNOSTOMY      HC BLD CARPEL TUNNEL RELEASE      bilat    HX APPENDECTOMY      HX CHOLECYSTECTOMY      HX CORONARY STENT PLACEMENT  06/2013    3 stents    HX FEMORAL BYPASS Right 11/2017    HX GASTRIC BYPASS  6/02    HX HEART CATHETERIZATION  2017    stents remain open, no new stents placed    HX HYSTERECTOMY      HX LAPAROTOMY  8/02    2/2 intra abd abscess    HX ORTHOPAEDIC  1/08    laminectomy, with frannie and 4 screws    HX UROLOGICAL      bladder surgery-bladder tac    CA COLONOSCOPY FLX DX W/COLLJ SPEC WHEN PFRMD  5/03/2006    Dr. Trinidad West COLONOSCOPY W/BIOPSY SINGLE/MULTIPLE  2/9/2012         CA EGD BALLOON DILATION ESOPHAGUS <30 MM DIAM  1/23/2012         CA EGD TRANSORAL BIOPSY SINGLE/MULTIPLE  7/11/2011         CA EGD TRANSORAL BIOPSY SINGLE/MULTIPLE  1/23/2012         REMOVAL OF HEEL SPUR  2001    bilat    UPPER GI ENDOSCOPY,BIOPSY  10/1/2019            Family History:  Family History   Problem Relation Age of Onset    Heart Disease Mother     Hypertension Mother     Diabetes Mother     Diabetes Father     Heart Disease Father     Hypertension Father     Alcohol abuse Brother     Heart Disease Sister     Hypertension Sister     Diabetes Sister     No Known Problems Sister     Alcohol abuse Brother     Alcohol abuse Brother     Diabetes Brother        Social History:  Social History     Tobacco Use    Smoking status: Never Smoker    Smokeless tobacco: Never Used   Substance Use Topics    Alcohol use: No     Alcohol/week: 0.0 standard drinks    Drug use: Never       Allergies: Allergies   Allergen Reactions    Demerol [Meperidine] Unknown (comments)     Pt unsure of reaction       Current Medications:  No current facility-administered medications on file prior to encounter.       Current Outpatient Medications on File Prior to Encounter   Medication Sig Dispense Refill    ALPRAZolam (XANAX) 1 mg tablet take 1/2 tablet by mouth once daily if needed for anxiety then take 1 tablet by mouth at bedtime if needed for sleep 45 Tab 3    raNITIdine hcl 150 mg capsule Take 150 mg by mouth two (2) times a day. 1    fluticasone propionate (FLONASE) 50 mcg/actuation nasal spray 2 Sprays by Both Nostrils route daily as needed for Rhinitis. 1 Bottle 6    lisinopril (PRINIVIL, ZESTRIL) 2.5 mg tablet Take 1 Tab by mouth daily. 90 Tab 3    levothyroxine (SYNTHROID) 25 mcg tablet Take 25 mcg by mouth Daily (before breakfast).  pantoprazole (PROTONIX) 40 mg tablet Take 1 Tab by mouth daily. For acid reflux 90 Tab 1    metFORMIN ER (GLUCOPHAGE XR) 500 mg tablet take 1 tablet by mouth once a day with dinner 90 Tab 3    fenofibrate nanocrystallized (TRICOR) 145 mg tablet take 1 tablet by mouth once daily 90 Tab 3    JANUVIA 100 mg tablet take 1 tablet by mouth once daily for diabetes 90 Tab 3    pravastatin (PRAVACHOL) 40 mg tablet TAKE 1 TABLET BY MOUTH EVERY NIGHT 90 Tab 3    furosemide (LASIX) 20 mg tablet Take 1 Tab by mouth daily. 90 Tab 3    Blood-Glucose Meter (CONTOUR NEXT METER) misc Use to check blood sugar 2 times daily 1 Each 0    glucose blood VI test strips (CONTOUR NEXT TEST STRIPS) strip Use to check blood sugar 2 times daily 100 Strip 11    metoprolol succinate (TOPROL-XL) 25 mg XL tablet TAKE 1 TABLET EVERY DAY 90 Tab 3    multivit-minerals/folic acid (CENTRUM VITAMINTS PO) Take 1 tablet by mouth daily      cholecalciferol, vitamin D3, (VITAMIN D3) 2,000 unit tab Take 1 capsule by mouth daily      Aspirin, Buffered 81 mg tab Take 81 mg by mouth daily. Review of Systems   Review of Systems   Constitutional: Negative for chills and fever. HENT: Negative for congestion, rhinorrhea and sore throat. Respiratory: Negative for cough and shortness of breath.     Cardiovascular: Negative for chest pain and palpitations. Gastrointestinal: Positive for abdominal pain, diarrhea, nausea and vomiting. Negative for blood in stool and constipation. Endocrine: Negative for polydipsia, polyphagia and polyuria. Genitourinary: Negative for decreased urine volume, difficulty urinating, dysuria, flank pain, hematuria, vaginal bleeding, vaginal discharge and vaginal pain. Musculoskeletal: Negative for neck pain and neck stiffness. Skin: Negative for rash and wound. Allergic/Immunologic: Positive for immunocompromised state. Negative for environmental allergies. Neurological: Negative for dizziness, weakness and headaches. Hematological: Negative for adenopathy. Does not bruise/bleed easily. Psychiatric/Behavioral: Negative for agitation and confusion. Physical Exam   Physical Exam  Vitals signs and nursing note reviewed. Constitutional:       General: She is not in acute distress. Appearance: She is well-developed. She is not diaphoretic. HENT:      Head: Normocephalic and atraumatic. Nose: Nose normal.      Mouth/Throat:      Pharynx: No oropharyngeal exudate. Eyes:      General: No scleral icterus. Right eye: No discharge. Left eye: No discharge. Conjunctiva/sclera: Conjunctivae normal.   Neck:      Musculoskeletal: Normal range of motion and neck supple. Thyroid: No thyromegaly. Vascular: No JVD. Trachea: No tracheal deviation. Cardiovascular:      Rate and Rhythm: Normal rate and regular rhythm. Heart sounds: Normal heart sounds. Pulmonary:      Effort: Pulmonary effort is normal. No respiratory distress. Breath sounds: Normal breath sounds. No wheezing. Abdominal:      General: Abdomen is flat. A surgical scar is present. Bowel sounds are normal. There is no distension. Palpations: Abdomen is soft. There is no mass. Tenderness: There is tenderness in the left upper quadrant.  There is no right CVA tenderness or left CVA tenderness. Hernia: No hernia is present. Musculoskeletal: Normal range of motion. Lymphadenopathy:      Cervical: No cervical adenopathy. Skin:     General: Skin is warm and dry. Neurological:      General: No focal deficit present. Mental Status: She is alert and oriented to person, place, and time. Motor: No abnormal muscle tone. Coordination: Coordination normal.   Psychiatric:         Mood and Affect: Mood normal.         Behavior: Behavior normal.         Judgment: Judgment normal.         Diagnostic Study Results     Labs -  Recent Results (from the past 24 hour(s))   URINALYSIS W/ REFLEX CULTURE    Collection Time: 12/09/19  2:13 PM   Result Value Ref Range    Color YELLOW/STRAW      Appearance CLEAR CLEAR      Specific gravity 1.019 1.003 - 1.030      pH (UA) 6.5 5.0 - 8.0      Protein NEGATIVE  NEG mg/dL    Glucose NEGATIVE  NEG mg/dL    Ketone NEGATIVE  NEG mg/dL    Bilirubin NEGATIVE  NEG      Blood NEGATIVE  NEG      Urobilinogen 1.0 0.2 - 1.0 EU/dL    Nitrites NEGATIVE  NEG      Leukocyte Esterase MODERATE (A) NEG      WBC 10-20 0 - 4 /hpf    RBC 0-5 0 - 5 /hpf    Epithelial cells FEW FEW /lpf    Bacteria NEGATIVE  NEG /hpf    UA:UC IF INDICATED URINE CULTURE ORDERED (A) CNI      Hyaline cast 0-2 0 - 5 /lpf   CBC WITH AUTOMATED DIFF    Collection Time: 12/09/19  2:27 PM   Result Value Ref Range    WBC 6.3 3.6 - 11.0 K/uL    RBC 4.06 3.80 - 5.20 M/uL    HGB 11.9 11.5 - 16.0 g/dL    HCT 37.7 35.0 - 47.0 %    MCV 92.9 80.0 - 99.0 FL    MCH 29.3 26.0 - 34.0 PG    MCHC 31.6 30.0 - 36.5 g/dL    RDW 13.2 11.5 - 14.5 %    PLATELET 113 352 - 946 K/uL    MPV 10.7 8.9 - 12.9 FL    NRBC 0.0 0  WBC    ABSOLUTE NRBC 0.00 0.00 - 0.01 K/uL    NEUTROPHILS 57 32 - 75 %    LYMPHOCYTES 32 12 - 49 %    MONOCYTES 8 5 - 13 %    EOSINOPHILS 2 0 - 7 %    BASOPHILS 1 0 - 1 %    IMMATURE GRANULOCYTES 0 0.0 - 0.5 %    ABS. NEUTROPHILS 3.6 1.8 - 8.0 K/UL    ABS.  LYMPHOCYTES 2.0 0.8 - 3.5 K/UL ABS. MONOCYTES 0.5 0.0 - 1.0 K/UL    ABS. EOSINOPHILS 0.2 0.0 - 0.4 K/UL    ABS. BASOPHILS 0.1 0.0 - 0.1 K/UL    ABS. IMM. GRANS. 0.0 0.00 - 0.04 K/UL    DF AUTOMATED     METABOLIC PANEL, COMPREHENSIVE    Collection Time: 12/09/19  2:27 PM   Result Value Ref Range    Sodium 143 136 - 145 mmol/L    Potassium 4.2 3.5 - 5.1 mmol/L    Chloride 111 (H) 97 - 108 mmol/L    CO2 27 21 - 32 mmol/L    Anion gap 5 5 - 15 mmol/L    Glucose 109 (H) 65 - 100 mg/dL    BUN 14 6 - 20 MG/DL    Creatinine 1.07 (H) 0.55 - 1.02 MG/DL    BUN/Creatinine ratio 13 12 - 20      GFR est AA >60 >60 ml/min/1.73m2    GFR est non-AA 51 (L) >60 ml/min/1.73m2    Calcium 9.4 8.5 - 10.1 MG/DL    Bilirubin, total 0.3 0.2 - 1.0 MG/DL    ALT (SGPT) 24 12 - 78 U/L    AST (SGOT) 22 15 - 37 U/L    Alk. phosphatase 85 45 - 117 U/L    Protein, total 7.1 6.4 - 8.2 g/dL    Albumin 4.2 3.5 - 5.0 g/dL    Globulin 2.9 2.0 - 4.0 g/dL    A-G Ratio 1.4 1.1 - 2.2     GLUCOSE, POC    Collection Time: 12/09/19  3:15 PM   Result Value Ref Range    Glucose (POC) 102 (H) 65 - 100 mg/dL    Performed by [facility])        Radiologic Studies -   CT ABD PELV W CONT   Final Result   IMPRESSION:   No acute findings. Medical Decision Making   I am the first provider for this patient. Records Reviewed: I reviewed our electronic medical record system for any past medical records that were available that may contribute to the patient's current condition, including their PMH, surgical history, social and family history. Reviewed the nursing notes and vital signs from today's visit. Vital Signs-Reviewed the patient's vital signs. Patient Vitals for the past 24 hrs:   Temp Pulse Resp BP SpO2   12/09/19 1354 98 °F (36.7 °C) 82 18 157/69 100 %           Provider Notes (Medical Decision Making):   Gastroenteritis, diverticulitis, SBO, dehydration, PUD    ED Course:   Initial assessment performed.  The patients presenting problems have been discussed, and they are in agreement with the care plan formulated and outlined with them. I have encouraged them to ask questions as they arise throughout their visit. Medications Administered During ED Course:  Medications   sodium chloride 0.9 % bolus infusion 1,000 mL (1,000 mL IntraVENous New Bag 12/9/19 1549)   iopamidol (ISOVUE-370) 76 % injection 100 mL (has no administration in time range)   sodium chloride (NS) flush 10 mL (has no administration in time range)   iohexol (OMNIPAQUE) ORAL mixture (50 mL Oral Given 12/9/19 1557)   morphine injection 4 mg (4 mg IntraVENous Given 12/9/19 1554)   ondansetron (ZOFRAN) injection 4 mg (4 mg IntraVENous Given 12/9/19 1549)   pantoprazole (PROTONIX) injection 40 mg (40 mg IntraVENous Given 12/9/19 1557)     Case discussed at length with Dr. Charles Eason who evaluated the patient at bedside. Will consult Dr. Adriana Bob office once diagnostics return. Consult Note:  Sukhjinder Jauregui PA-C spoke with  Dr. Julio Valle,   Discussed pt's hx, physical exam and available diagnostic and imaging results. Reviewed care plans. Agree with management and plan thus far. Will d/c home with Carafate to follow up with Dr. Adriana Bob office. Reviewed CT results and lab work with patient as well as recommendations by Dr. Rafael Parada. Pt with return of nausea/vomiting. Will provide add'l antiemetic and re-evaluate with po challenge. If unable to pass po challenge, will contact hospitalist for admission for intractable N/V/D. Pt passed po challenge and feeling much improved and ready for discharge home. DISCHARGE NOTE:  The care plan has been outline with the patient and/or family, who verbally conveyed understanding and agreement. Available results have been reviewed. Patient and/or family understand the follow up plan as outlined and discharge instructions.  Should their condition deterioration at any time after discharge the patient agrees to return, follow up sooner than outlined or seek medical assistance at the closest Emergency Room as soon as possible. Questions have been answered. Discharge instructions and educational information regarding the patient's diagnosis as well a list of reasons why the patient would want to seek immediate medical attention, should their condition change, were reviewed directly with the patient/family     Diagnosis     Clinical Impression:   1. Abdominal pain, epigastric    2. Intractable vomiting with nausea, unspecified vomiting type    3. History of Helicobacter pylori infection    4.  S/P bypass gastrojejunostomy

## 2019-12-10 NOTE — DISCHARGE INSTRUCTIONS
Rest, push fluids, clear liquid diet x 24 hours, then gradually advance to thick liquids and then bland solids. Follow up with Dr. Vikash Mata for recheck tomorrow. Return to the Emergency Dept for any worsening abdominal pain, nausea/vomiting/diarrhea, fever, decreased oral intake/urine output.

## 2019-12-10 NOTE — ED NOTES
Discharged by provider. Given prescriptions and written and verbal discharge instructions. Signature obtained. Home with family. Assisted to car via wheelchair.

## 2019-12-10 NOTE — ED NOTES
Assumed care of pt. Call bell in reach. Plan of care discussed. Monitor x 2 in place. Will continue to monitor.

## 2019-12-10 NOTE — ED NOTES
Bedside shift change report given to [id number] Desai (oncoming nurse) by Leonor Capone RN (offgoing nurse). Report included the following information SBAR, Kardex, ED Summary, Procedure Summary and Recent Results.

## 2019-12-28 DIAGNOSIS — E11.9 TYPE 2 DIABETES MELLITUS WITHOUT COMPLICATION, WITHOUT LONG-TERM CURRENT USE OF INSULIN (HCC): ICD-10-CM

## 2020-01-16 ENCOUNTER — TELEPHONE (OUTPATIENT)
Dept: FAMILY MEDICINE CLINIC | Age: 70
End: 2020-01-16

## 2020-01-16 NOTE — TELEPHONE ENCOUNTER
----- Message from Ramón Angelo sent at 1/16/2020 10:35 AM EST -----  Regarding: Dr. Norah Thornton: [phone]  Patient would like to know if she can be worked into the schedule for colonoscopy surgery. Patient states that she is bleeding inside. I attempted to call the back line with no answer.

## 2020-01-16 NOTE — TELEPHONE ENCOUNTER
Patient states she is having a colonoscopy and surgery due to a bleeding ulcer and needs a pre op advised patient to call back when she has a confirmed date and we would assist her in making her appointment.

## 2020-01-27 ENCOUNTER — OFFICE VISIT (OUTPATIENT)
Dept: FAMILY MEDICINE CLINIC | Age: 70
End: 2020-01-27

## 2020-01-27 VITALS
HEIGHT: 62 IN | WEIGHT: 155.4 LBS | TEMPERATURE: 96.3 F | RESPIRATION RATE: 16 BRPM | SYSTOLIC BLOOD PRESSURE: 116 MMHG | BODY MASS INDEX: 28.6 KG/M2 | HEART RATE: 82 BPM | DIASTOLIC BLOOD PRESSURE: 70 MMHG | OXYGEN SATURATION: 99 %

## 2020-01-27 DIAGNOSIS — R10.84 GENERALIZED ABDOMINAL PAIN: ICD-10-CM

## 2020-01-27 DIAGNOSIS — I25.10 CORONARY ARTERY DISEASE INVOLVING NATIVE CORONARY ARTERY OF NATIVE HEART WITHOUT ANGINA PECTORIS: ICD-10-CM

## 2020-01-27 DIAGNOSIS — Z51.81 ENCOUNTER FOR MEDICATION MONITORING: ICD-10-CM

## 2020-01-27 DIAGNOSIS — Z00.00 MEDICARE ANNUAL WELLNESS VISIT, SUBSEQUENT: Primary | ICD-10-CM

## 2020-01-27 DIAGNOSIS — F41.9 ANXIETY: ICD-10-CM

## 2020-01-27 DIAGNOSIS — K21.9 GASTROESOPHAGEAL REFLUX DISEASE WITHOUT ESOPHAGITIS: ICD-10-CM

## 2020-01-27 DIAGNOSIS — Z12.31 ENCOUNTER FOR SCREENING MAMMOGRAM FOR BREAST CANCER: ICD-10-CM

## 2020-01-27 DIAGNOSIS — E11.9 TYPE 2 DIABETES MELLITUS WITHOUT COMPLICATION, WITHOUT LONG-TERM CURRENT USE OF INSULIN (HCC): ICD-10-CM

## 2020-01-27 DIAGNOSIS — I73.9 PAD (PERIPHERAL ARTERY DISEASE) (HCC): ICD-10-CM

## 2020-01-27 DIAGNOSIS — R19.7 DIARRHEA, UNSPECIFIED TYPE: ICD-10-CM

## 2020-01-27 DIAGNOSIS — E78.2 MIXED HYPERLIPIDEMIA: ICD-10-CM

## 2020-01-27 DIAGNOSIS — M81.0 AGE-RELATED OSTEOPOROSIS WITHOUT CURRENT PATHOLOGICAL FRACTURE: ICD-10-CM

## 2020-01-27 DIAGNOSIS — E03.9 ACQUIRED HYPOTHYROIDISM: ICD-10-CM

## 2020-01-27 LAB
BILIRUB UR QL STRIP: NEGATIVE
GLUCOSE POC: 147 MG/DL
GLUCOSE UR-MCNC: NEGATIVE MG/DL
HBA1C MFR BLD HPLC: 6.9 %
KETONES P FAST UR STRIP-MCNC: NEGATIVE MG/DL
PH UR STRIP: 6 [PH] (ref 4.6–8)
PROT UR QL STRIP: NEGATIVE
SP GR UR STRIP: 1.02 (ref 1–1.03)
UA UROBILINOGEN AMB POC: NORMAL (ref 0.2–1)
URINALYSIS CLARITY POC: CLEAR
URINALYSIS COLOR POC: YELLOW
URINE BLOOD POC: NEGATIVE
URINE LEUKOCYTES POC: NORMAL
URINE NITRITES POC: NEGATIVE

## 2020-01-27 RX ORDER — METOPROLOL SUCCINATE 25 MG/1
TABLET, EXTENDED RELEASE ORAL
Qty: 90 TAB | Refills: 3 | Status: SHIPPED | OUTPATIENT
Start: 2020-01-27 | End: 2021-04-02 | Stop reason: SDUPTHER

## 2020-01-27 RX ORDER — FAMOTIDINE 40 MG/1
40 TABLET, FILM COATED ORAL 3 TIMES DAILY
COMMUNITY
Start: 2020-01-15 | End: 2020-09-29 | Stop reason: ALTCHOICE

## 2020-01-27 RX ORDER — LEVOTHYROXINE SODIUM 25 UG/1
25 TABLET ORAL
COMMUNITY
End: 2020-01-30

## 2020-01-27 RX ORDER — ALPRAZOLAM 1 MG/1
TABLET ORAL
Qty: 135 TAB | Refills: 1 | Status: SHIPPED | OUTPATIENT
Start: 2020-01-27 | End: 2020-02-03

## 2020-01-27 NOTE — PROGRESS NOTES
HISTORY OF PRESENT ILLNESS  Jen Lion is a 81 y.o. female. HPI   For routine follow up on chronic medical conditions. She has been having increased abd pain, with nausea and diarrhea which comes and goes. Seen by GI and stopped the metformin. She has still had sx and waiting to get the EGD and her colonoscopy done in the next few weeks. Says she has \"stomach bacteria\" and that she completed the abx to treat it. Treatment records from GI have been requested. Cardiovascular Review:  The patient has coronary artery disease, HF and status post coronary artery stenting. Diet and Lifestyle: generally follows a low fat low cholesterol diet, generally follows a low sodium diet, follows a diabetic diet regularly, exercises sporadically  Home BP Monitoring: is not measured at home. Pertinent ROS: taking medications as instructed, no medication side effects noted, no TIA's, no chest pain on exertion, no dyspnea on exertion, no swelling of ankles. DM type II follow up:  Compliant w/ meds, diabetic diet, and exercise. Has been watching her diet. Her BS have been in the 80-90s. Checks BS BID on most days and prn. Pt does not have BS log at visit today. No Rf needed for today. Denies any tingling sensation, polyuria and polydipsia. No blurred vision. No significant weight changes. Feeling well since last OV. Hypercholesterolemia follow up:  Compliant w/ meds, low fat, low cholesterol diet. Exercising some. No muscle nor abdominal pain, no skin discoloration. Patient fasting today. Depression Review:  Patient is seen for followup of depression and anxiety. Overall doing well. She denies depressed mood and insomnia. Sleeping well at night. Thyroid Review:  Patient is seen for followup of hypothyroidism. Thyroid ROS: denies fatigue, weight changes, heat/cold intolerance, bowel/skin changes or CVS symptoms.         Patient Active Problem List   Diagnosis Code    Anxiety F41.9    Depression F32.9    Hypovitaminosis D E55.9    GERD (gastroesophageal reflux disease) K21.9    Edema R60.9    Esophageal motor disorder K22.4    S/P bypass gastrojejunostomy Z98.0    SOB (shortness of breath) R06.02    CAD (coronary artery disease) I25.10    Angina pectoris (HCC) I20.9    S/P PTCA (percutaneous transluminal coronary angioplasty) Z98.61    Mixed hyperlipidemia E78.2    Diarrhea R19.7    Type 2 diabetes mellitus without complication ([facility]) V94.8    Osteopenia M85.80    Encounter for medication monitoring Z51.81    CVA (cerebral vascular accident) ([facility] [address] K85.8    Complicated migraine H95. 109    Numbness and tingling of right side of face R20.0, R20.2    Stenosis of both internal carotid arteries I65.23    Type 2 diabetes with nephropathy ([facility]) E11.21    Essential hypertension I10       Current Outpatient Medications   Medication Sig Dispense Refill    glucose blood VI test strips (CONTOUR NEXT TEST STRIPS) strip USE TO CHECK BLOOD SUGAR TWICE DAILY 100 Strip 11    levothyroxine (SYNTHROID) 25 mcg tablet TAKE 2 TABLETS BY MOUTH MONDAY THRU FRIDAY THEN TAKE 1 TABLET ON SATURDAY AND SUNDAY 144 Tab 1    promethazine (PHENERGAN) 25 mg tablet Take 1 Tab by mouth every six (6) hours as needed for Nausea for up to 12 doses. 12 Tab 0    ALPRAZolam (XANAX) 1 mg tablet take 1/2 tablet by mouth once daily if needed for anxiety then take 1 tablet by mouth at bedtime if needed for sleep 45 Tab 3    raNITIdine hcl 150 mg capsule Take 150 mg by mouth two (2) times a day. 1    fluticasone propionate (FLONASE) 50 mcg/actuation nasal spray 2 Sprays by Both Nostrils route daily as needed for Rhinitis. 1 Bottle 6    lisinopril (PRINIVIL, ZESTRIL) 2.5 mg tablet Take 1 Tab by mouth daily. 90 Tab 3    pantoprazole (PROTONIX) 40 mg tablet Take 1 Tab by mouth daily.  For acid reflux 90 Tab 1    metFORMIN ER (GLUCOPHAGE XR) 500 mg tablet take 1 tablet by mouth once a day with dinner 90 Tab 3    fenofibrate nanocrystallized (TRICOR) 145 mg tablet take 1 tablet by mouth once daily 90 Tab 3    JANUVIA 100 mg tablet take 1 tablet by mouth once daily for diabetes 90 Tab 3    pravastatin (PRAVACHOL) 40 mg tablet TAKE 1 TABLET BY MOUTH EVERY NIGHT 90 Tab 3    furosemide (LASIX) 20 mg tablet Take 1 Tab by mouth daily. 90 Tab 3    Blood-Glucose Meter (CONTOUR NEXT METER) misc Use to check blood sugar 2 times daily 1 Each 0    metoprolol succinate (TOPROL-XL) 25 mg XL tablet TAKE 1 TABLET EVERY DAY 90 Tab 3    multivit-minerals/folic acid (CENTRUM VITAMINTS PO) Take 1 tablet by mouth daily      cholecalciferol, vitamin D3, (VITAMIN D3) 2,000 unit tab Take 1 capsule by mouth daily      Aspirin, Buffered 81 mg tab Take 81 mg by mouth daily.          Allergies   Allergen Reactions    Demerol [Meperidine] Unknown (comments)     Pt unsure of reaction         Past Medical History:   Diagnosis Date    Anxiety     Arthritis     ASHD (arteriosclerotic heart disease)     CAD (coronary artery disease)     Chronic pain     Constipation     Depression     Diabetes ([address]     Diarrhea     Dysgeusia 11/21/2014    Esophageal motor disorder     Flatulence/gas pain/belching 11/21/2014    Gastroparesis     GERD (gastroesophageal reflux disease)     Hypercholesterolemia     Hypertension     Nausea & vomiting     Psychiatric disorder     anxiety    S/P bypass gastrojejunostomy 1/23/2012    Sleep apnea     no cpap, as stated 9/2019         Past Surgical History:   Procedure Laterality Date    ABDOMEN SURGERY PROC UNLISTED      s/p partial gastrectomy 2/2 gastroparesis    COLONOSCOPY N/A 5/26/2016    [facility]    EGD  7/13/2009    GASTROJEJUNOSTOMY      HC BLD CARPEL TUNNEL RELEASE      bilat    HX APPENDECTOMY      HX CHOLECYSTECTOMY      HX CORONARY STENT PLACEMENT  06/2013    3 stents    HX FEMORAL BYPASS Right 11/2017    HX GASTRIC BYPASS  6/02    HX HEART CATHETERIZATION  2017    stents remain open, no new stents placed    HX HYSTERECTOMY      HX LAPAROTOMY  8/02    2/2 intra abd abscess    HX ORTHOPAEDIC  1/08    laminectomy, with frannie and 4 screws    HX UROLOGICAL      bladder surgery-bladder tac    CT COLONOSCOPY FLX DX W/COLLJ SPEC WHEN PFRMD  5/03/2006    Dr. Tai Pisano  2/9/2012         CT EGD BALLOON DILATION ESOPHAGUS <30 MM DIAM  1/23/2012         CT EGD TRANSORAL BIOPSY SINGLE/MULTIPLE  7/11/2011         CT EGD TRANSORAL BIOPSY SINGLE/MULTIPLE  1/23/2012         CT REMOVAL OF HEEL SPUR  2001    bilat    UPPER GI ENDOSCOPY,BIOPSY  10/1/2019              Family History   Problem Relation Age of Onset    Heart Disease Mother     Hypertension Mother     Diabetes Mother     Diabetes Father     Heart Disease Father     Hypertension Father     Alcohol abuse Brother     Heart Disease Sister     Hypertension Sister     Diabetes Sister     No Known Problems Sister     Alcohol abuse Brother     Alcohol abuse Brother     Diabetes Brother        Social History     Tobacco Use    Smoking status: Never Smoker    Smokeless tobacco: Never Used   Substance Use Topics    Alcohol use: No     Alcohol/week: 0.0 standard drinks        Lab Results   Component Value Date/Time    WBC 6.3 12/09/2019 02:27 PM    HGB 11.9 12/09/2019 02:27 PM    HCT 37.7 12/09/2019 02:27 PM    PLATELET 630 10/51/0779 02:27 PM    MCV 92.9 12/09/2019 02:27 PM     Lab Results   Component Value Date/Time    Cholesterol, total 131 06/21/2019 12:50 PM    HDL Cholesterol 48 06/21/2019 12:50 PM    LDL, calculated 58 06/21/2019 12:50 PM    Triglyceride 125 06/21/2019 12:50 PM    CHOL/HDL Ratio 3.5 01/05/2017 09:18 PM     Lab Results   Component Value Date/Time    TSH 3.050 06/21/2019 12:50 PM    T4, Free 1.77 02/08/2019 12:18 PM    T4, Total 5.6 11/17/2015 08:54 AM      Lab Results   Component Value Date/Time    Sodium 143 12/09/2019 02:27 PM Potassium 4.2 12/09/2019 02:27 PM    Chloride 111 (H) 12/09/2019 02:27 PM    CO2 27 12/09/2019 02:27 PM    Anion gap 5 12/09/2019 02:27 PM    Glucose 109 (H) 12/09/2019 02:27 PM    BUN 14 12/09/2019 02:27 PM    Creatinine 1.07 (H) 12/09/2019 02:27 PM    BUN/Creatinine ratio 13 12/09/2019 02:27 PM    GFR est AA >60 12/09/2019 02:27 PM    GFR est non-AA 51 (L) 12/09/2019 02:27 PM    Calcium 9.4 12/09/2019 02:27 PM    Bilirubin, total 0.3 12/09/2019 02:27 PM    ALT (SGPT) 24 12/09/2019 02:27 PM    AST (SGOT) 22 12/09/2019 02:27 PM    Alk. phosphatase 85 12/09/2019 02:27 PM    Protein, total 7.1 12/09/2019 02:27 PM    Albumin 4.2 12/09/2019 02:27 PM    Globulin 2.9 12/09/2019 02:27 PM    A-G Ratio 1.4 12/09/2019 02:27 PM      Lab Results   Component Value Date/Time    Hemoglobin A1c 6.4 (H) 09/12/2017 09:35 AM    Hemoglobin A1c (POC) 6.6 09/13/2019 12:12 PM         Review of Systems   Constitutional: Negative for malaise/fatigue. HENT: Negative for congestion. Eyes: Negative for blurred vision. Respiratory: Negative for cough and shortness of breath. Cardiovascular: Negative for chest pain, palpitations and leg swelling. Gastrointestinal: Positive for abdominal pain, diarrhea and nausea. Negative for blood in stool, constipation, heartburn and melena. Genitourinary: Negative for dysuria, frequency and urgency. Musculoskeletal: Negative for back pain and joint pain. Neurological: Negative for dizziness, tingling and headaches. Endo/Heme/Allergies: Negative for environmental allergies. Psychiatric/Behavioral: Negative for depression. The patient does not have insomnia. Physical Exam   Constitutional: She appears well-developed and well-nourished.    /59 (BP 1 Location: Left arm, BP Patient Position: Sitting)   Pulse 67   Temp 96.2 °F (35.7 °C) (Oral)   Resp 16   Ht 5' 3.5\" (1.613 m)   Wt 151 lb 9.6 oz (68.8 kg)   SpO2 98%   BMI 26.43 kg/m²    HENT:   Right Ear: Tympanic membrane and ear canal normal.   Left Ear: Tympanic membrane and ear canal normal.   Nose: No mucosal edema or rhinorrhea. Mouth/Throat: Oropharynx is clear and moist and mucous membranes are normal.   Neck: Normal range of motion. Neck supple. Normal carotid pulses present. No thyromegaly present. Cardiovascular: Normal rate, regular rhythm and normal heart sounds. Exam reveals no gallop. Pulmonary/Chest: Effort normal and breath sounds normal. Right breast exhibits no inverted nipple, no mass, no nipple discharge, no skin change and no tenderness. Left breast exhibits no inverted nipple, no mass, no nipple discharge, no skin change and no tenderness. Abdominal: Soft. Normal appearance and bowel sounds are normal. She exhibits no mass. There is generalized tenderness. There is no rigidity and no guarding. Musculoskeletal: Normal range of motion. General: No edema. Lymphadenopathy:     She has no cervical adenopathy. Skin: Skin is warm and dry. Psychiatric: She has a normal mood and affect. Nursing note and vitals reviewed. ASSESSMENT and PLAN  Diagnoses and all orders for this visit:    1. Medicare annual wellness visit, subsequent  -     AMB POC URINALYSIS DIP STICK AUTO W/ MICRO    2. Type 2 diabetes mellitus without complication, without long-term current use of insulin (HCC)  a1c up to 6.9%. Will not add any further medication at this point until GI sx have improved. -     AMB POC HEMOGLOBIN A1C  -     AMB POC GLUCOSE, QUANTITATIVE, BLOOD  -     MICROALBUMIN, UR, RAND W/ MICROALB/CREAT RATIO    3. Mixed hyperlipidemia  -     LIPID PANEL    4. Coronary artery disease involving native coronary artery of native heart without angina pectoris  -    Refill metoprolol succinate (TOPROL-XL) 25 mg XL tablet; TAKE 1 TABLET EVERY DAY  Follow up with cardiology as planned for later in the spring    5. PAD (peripheral artery disease) ([facility] [address]    6.  Acquired hypothyroidism  -     TSH 3RD GENERATION    7. Gastroesophageal reflux disease without esophagitis    8. Generalized abdominal pain//  9. Diarrhea, unspecified type  Continue with eval as per GI. Stay off of the metformin for now although Gi sx are not improved off of it. 10. Anxiety  -     Refill ALPRAZolam (XANAX) 1 mg tablet; take 1/2 tablet by mouth once daily if needed for anxiety then take 1 tablet by mouth at bedtime if needed for sleep    11. Encounter for medication monitoring  -     METABOLIC PANEL, BASIC    12. Encounter for screening mammogram for breast cancer  -     [facility] MAMMO BI SCREENING INCL CAD; Future    13. Age-related osteoporosis without current pathological fracture  -     DEXA BONE DENSITY STUDY AXIAL; Future      Follow-up and Dispositions    · Return in about 3 months (around 4/27/2020). reviewed diet, exercise and weight control  cardiovascular risk and specific lipid/LDL goals reviewed  reviewed medications and side effects in detail  specific diabetic recommendations: low cholesterol diet, weight control and daily exercise discussed, home glucose monitoring emphasized, all medications, side effects and compliance discussed carefully, foot care discussed and Podiatry visits discussed, annual eye examinations at Ophthalmology discussed and glycohemoglobin and other lab monitoring discussed     I have discussed diagnosis listed in this note with pt and/or family. I have discussed treatment plans and options and the risk/benefit analysis of those options, including safe use of medications and possible medication side effects. Through the use of shared decision making we have agreed to the above plan. The patient has received an after-visit summary and questions were answered concerning future plans and follow up. Advise pt of any urgent changes then to proceed to the ER.

## 2020-01-27 NOTE — PROGRESS NOTES
This is a Subsequent Medicare Annual Wellness Exam (AWV) (Performed 12 months after IPPE or effective date of Medicare Part B enrollment)    I have reviewed the patient's medical history in detail and updated the computerized patient record. History     Patient Active Problem List   Diagnosis Code    Anxiety F41.9    Depression F32.9    Hypovitaminosis D E55.9    GERD (gastroesophageal reflux disease) K21.9    Edema R60.9    Esophageal motor disorder K22.4    S/P bypass gastrojejunostomy Z98.0    SOB (shortness of breath) R06.02    CAD (coronary artery disease) I25.10    Angina pectoris (HCC) I20.9    S/P PTCA (percutaneous transluminal coronary angioplasty) Z98.61    Mixed hyperlipidemia E78.2    Diarrhea R19.7    Type 2 diabetes mellitus without complication ([facility] [address] P80.8    Osteopenia M85.80    Encounter for medication monitoring Z51.81    CVA (cerebral vascular accident) ([facility] [address] R84.2    Complicated migraine V98. 109    Numbness and tingling of right side of face R20.0, R20.2    Stenosis of both internal carotid arteries I65.23    Type 2 diabetes with nephropathy (HCC) E11.21    Essential hypertension I10       Current Outpatient Medications   Medication Sig Dispense Refill    glucose blood VI test strips (CONTOUR NEXT TEST STRIPS) strip USE TO CHECK BLOOD SUGAR TWICE DAILY 100 Strip 11    levothyroxine (SYNTHROID) 25 mcg tablet TAKE 2 TABLETS BY MOUTH MONDAY THRU FRIDAY THEN TAKE 1 TABLET ON SATURDAY AND SUNDAY 144 Tab 1    promethazine (PHENERGAN) 25 mg tablet Take 1 Tab by mouth every six (6) hours as needed for Nausea for up to 12 doses. 12 Tab 0    ALPRAZolam (XANAX) 1 mg tablet take 1/2 tablet by mouth once daily if needed for anxiety then take 1 tablet by mouth at bedtime if needed for sleep 45 Tab 3    raNITIdine hcl 150 mg capsule Take 150 mg by mouth two (2) times a day.   1    fluticasone propionate (FLONASE) 50 mcg/actuation nasal spray 2 Sprays by Both Nostrils route daily as needed for Rhinitis. 1 Bottle 6    lisinopril (PRINIVIL, ZESTRIL) 2.5 mg tablet Take 1 Tab by mouth daily. 90 Tab 3    pantoprazole (PROTONIX) 40 mg tablet Take 1 Tab by mouth daily. For acid reflux 90 Tab 1    metFORMIN ER (GLUCOPHAGE XR) 500 mg tablet take 1 tablet by mouth once a day with dinner 90 Tab 3    fenofibrate nanocrystallized (TRICOR) 145 mg tablet take 1 tablet by mouth once daily 90 Tab 3    JANUVIA 100 mg tablet take 1 tablet by mouth once daily for diabetes 90 Tab 3    pravastatin (PRAVACHOL) 40 mg tablet TAKE 1 TABLET BY MOUTH EVERY NIGHT 90 Tab 3    furosemide (LASIX) 20 mg tablet Take 1 Tab by mouth daily. 90 Tab 3    Blood-Glucose Meter (CONTOUR NEXT METER) misc Use to check blood sugar 2 times daily 1 Each 0    metoprolol succinate (TOPROL-XL) 25 mg XL tablet TAKE 1 TABLET EVERY DAY 90 Tab 3    multivit-minerals/folic acid (CENTRUM VITAMINTS PO) Take 1 tablet by mouth daily      cholecalciferol, vitamin D3, (VITAMIN D3) 2,000 unit tab Take 1 capsule by mouth daily      Aspirin, Buffered 81 mg tab Take 81 mg by mouth daily.          Allergies   Allergen Reactions    Demerol [Meperidine] Unknown (comments)     Pt unsure of reaction       Past Medical History:   Diagnosis Date    Anxiety     Arthritis     ASHD (arteriosclerotic heart disease)     CAD (coronary artery disease)     Chronic pain     Constipation     Depression     Diabetes ([facility] [address]     Diarrhea     Dysgeusia 11/21/2014    Esophageal motor disorder     Flatulence/gas pain/belching 11/21/2014    Gastroparesis     GERD (gastroesophageal reflux disease)     Hypercholesterolemia     Hypertension     Nausea & vomiting     Psychiatric disorder     anxiety    S/P bypass gastrojejunostomy 1/23/2012    Sleep apnea     no cpap, as stated 9/2019       Past Surgical History:   Procedure Laterality Date    ABDOMEN SURGERY PROC UNLISTED      s/p partial gastrectomy 2/2 gastroparesis    COLONOSCOPY N/A 5/26/2016 [facility]COPY    EGD  7/13/2009    GASTROJEJUNOSTOMY      HC BLD CARPEL TUNNEL RELEASE      bilat    HX APPENDECTOMY      HX CHOLECYSTECTOMY      HX CORONARY STENT PLACEMENT  06/2013    3 stents    HX FEMORAL BYPASS Right 11/2017    HX GASTRIC BYPASS  6/02    HX HEART CATHETERIZATION  2017    stents remain open, no new stents placed    HX HYSTERECTOMY      HX LAPAROTOMY  8/02    2/2 intra abd abscess    HX ORTHOPAEDIC  1/08    laminectomy, with frannie and 4 screws    HX UROLOGICAL      bladder surgery-bladder tac    UT COLONOSCOPY FLX DX W/COLLJ SPEC WHEN PFRMD  5/03/2006    Dr. Juan Cardenas  2/9/2012         UT EGD BALLOON DILATION ESOPHAGUS <30 MM DIAM  1/23/2012         UT EGD TRANSORAL BIOPSY SINGLE/MULTIPLE  7/11/2011         UT EGD TRANSORAL BIOPSY SINGLE/MULTIPLE  1/23/2012         UT REMOVAL OF HEEL SPUR  2001    bilat    UPPER GI ENDOSCOPY,BIOPSY  10/1/2019            Family History   Problem Relation Age of Onset    Heart Disease Mother     Hypertension Mother     Diabetes Mother     Diabetes Father     Heart Disease Father     Hypertension Father     Alcohol abuse Brother     Heart Disease Sister     Hypertension Sister     Diabetes Sister     No Known Problems Sister     Alcohol abuse Brother     Alcohol abuse Brother     Diabetes Brother        Social History     Tobacco Use    Smoking status: Never Smoker    Smokeless tobacco: Never Used   Substance Use Topics    Alcohol use: No     Alcohol/week: 0.0 standard drinks         Depression Risk Factor Screening:     PHQ over the last two weeks 11/7/2017   Little interest or pleasure in doing things Not at all   Feeling down, depressed or hopeless Not at all   Total Score PHQ 2 0     Alcohol Risk Factor Screening: You do not drink alcohol or very rarely. Functional Ability and Level of Safety:   Hearing Loss  Hearing is good.     Activities of Daily Living  The home contains: no safety equipment. Patient does total self care    Fall RiskFall Risk Assessment, last 12 mths 11/7/2017   Able to walk? Yes   Fall in past 12 months? No     Functional Ability:   Does the patient exhibit a steady gait? yes    How long did it take the patient to get up and walk from a sitting position? seconds    Is the patient self reliant? (ie can do own laundry, meals, household chores)  yes   Does the patient handle his/her own medications? yes   Does the patient handle his/her own money? yes   Is the patients home safe (ie good lighting, handrails on stairs and bath, etc.)? yes   Did you notice or did patient express any hearing difficulties? no   Did you notice or did patient express any vision difficulties? no        Advance Care Planning:   Patient was offered the opportunity to discuss advance care planning:  yes    Does patient have an Advance Directive:  no   If no, did you provide information on [facility]? yes      Abuse Screen  Patient is not abused    Cognitive Screening   Evaluation of Cognitive Function:  Has your family/caregiver stated any concerns about your memory: no      Patient Care Team   Patient Care Team:  Fany Huertas MD as PCP - General    Assessment/Plan   Education and counseling provided:  Are appropriate based on today's review and evaluation  End-of-Life planning (with patient's consent)    ASSESSMENT and PLAN    Medicare Annual Wellness  Continue current treatment plan. Continue annual follow up. I have discussed diagnosis listed in this note with pt and/or family. I have discussed treatment plans and options and the risk/benefit analysis of those options, including safe use of medications and possible medication side effects. Through the use of shared decision making we have agreed to the above plan.  The patient has received an after-visit summary and questions were answered concerning future plans and follow up. Advise pt of any urgent changes then to proceed to the ER.

## 2020-01-27 NOTE — PROGRESS NOTES
Chief Complaint   Patient presents with   [facility] Annual Wellness Visit     Medicare     Patient states she stopped for about 2 months because \"I did not have anymore refills\". A1C 9/13/2019    Microalbumin 12/14/2018    Mammogram 2/7/2018    Colonoscopy 5/26/16 by Dr Viv Chavez- repeat in 5 years    Eye exam 5/7/2019 Dr Mckayla Alvares. 1. Have you been to the ER, urgent care clinic since your last visit? Hospitalized since your last visit? Yes 12/9/2019 [facility] abdominal pain    2. Have you seen or consulted any other health care providers outside of the [address] [place] [address] since your last visit? Include any pap smears or colon screening.  No

## 2020-01-28 LAB
ALBUMIN/CREAT UR: 16 MG/G CREAT (ref 0–29)
BUN SERPL-MCNC: 15 MG/DL (ref 8–27)
BUN/CREAT SERPL: 14 (ref 12–28)
CALCIUM SERPL-MCNC: 8.9 MG/DL (ref 8.7–10.3)
CHLORIDE SERPL-SCNC: 105 MMOL/L (ref 96–106)
CHOLEST SERPL-MCNC: 161 MG/DL (ref 100–199)
CO2 SERPL-SCNC: 21 MMOL/L (ref 20–29)
CREAT SERPL-MCNC: 1.04 MG/DL (ref 0.57–1)
CREAT UR-MCNC: 168.8 MG/DL
GLUCOSE SERPL-MCNC: 140 MG/DL (ref 65–99)
HDLC SERPL-MCNC: 57 MG/DL
INTERPRETATION, 910389: NORMAL
INTERPRETATION: NORMAL
LDLC SERPL CALC-MCNC: 77 MG/DL (ref 0–99)
Lab: NORMAL
MICROALBUMIN UR-MCNC: 26.3 UG/ML
PDF IMAGE, 910387: NORMAL
POTASSIUM SERPL-SCNC: 4.1 MMOL/L (ref 3.5–5.2)
SODIUM SERPL-SCNC: 144 MMOL/L (ref 134–144)
TRIGL SERPL-MCNC: 133 MG/DL (ref 0–149)
TSH SERPL DL<=0.005 MIU/L-ACNC: 2.9 UIU/ML (ref 0.45–4.5)
VLDLC SERPL CALC-MCNC: 27 MG/DL (ref 5–40)

## 2020-02-03 DIAGNOSIS — F41.9 ANXIETY: ICD-10-CM

## 2020-02-03 RX ORDER — ALPRAZOLAM 1 MG/1
TABLET ORAL
Qty: 45 TAB | Refills: 3 | OUTPATIENT
Start: 2020-02-03 | End: 2020-08-14

## 2020-02-11 ENCOUNTER — TELEPHONE (OUTPATIENT)
Dept: FAMILY MEDICINE CLINIC | Age: 70
End: 2020-02-11

## 2020-02-11 NOTE — TELEPHONE ENCOUNTER
----- Message from Micaela Tony sent at 2/11/2020  3:56 PM EST -----  Regarding: /Telephone  General Message/Vendor Calls    Caller's first and last name:  Quinten WrightOralia Lee     Reason for call:   Insurance     Callback required yes/no and why:  Yes, to let them know if [employer] is still accepting [facility]     Best contact number(s):  [phone]    Details to clarify the request:      Micaela Tony

## 2020-02-17 ENCOUNTER — TELEPHONE (OUTPATIENT)
Dept: FAMILY MEDICINE CLINIC | Age: 70
End: 2020-02-17

## 2020-02-17 NOTE — TELEPHONE ENCOUNTER
Pt scheduled 2/20/20 at 2:30 PM at [address] [place] [address]. Pt made aware of appt and verbalized understanding.

## 2020-02-17 NOTE — TELEPHONE ENCOUNTER
----- Message from Colin Navarrete sent at 2/17/2020 10:52 AM EST -----  Regarding: Dr. Poly Turcios telephone  General Message/Vendor Calls    Caller's first and last name:  Sebastián Casey    Reason for call:  Pt is checking up on the status of a bone density test and mammogram test that is suppose to be set up for her. She wanted to know if the appointment has been set up.     Callback required yes/no and why:  yes    Best contact number(s):  [phone]    Details to clarify the request:      Colin Navarrete

## 2020-02-18 ENCOUNTER — DOCUMENTATION ONLY (OUTPATIENT)
Dept: FAMILY MEDICINE CLINIC | Age: 70
End: 2020-02-18

## 2020-02-20 ENCOUNTER — HOSPITAL ENCOUNTER (OUTPATIENT)
Dept: BONE DENSITY | Age: 70
Discharge: HOME | End: 2020-02-20
Attending: FAMILY MEDICINE
Payer: MEDICARE

## 2020-02-20 ENCOUNTER — HOSPITAL ENCOUNTER (OUTPATIENT)
Dept: MAMMOGRAPHY | Age: 70
Discharge: HOME | End: 2020-02-20
Attending: FAMILY MEDICINE
Payer: MEDICARE

## 2020-02-20 DIAGNOSIS — Z12.31 ENCOUNTER FOR SCREENING MAMMOGRAM FOR BREAST CANCER: ICD-10-CM

## 2020-02-20 DIAGNOSIS — M81.0 AGE-RELATED OSTEOPOROSIS WITHOUT CURRENT PATHOLOGICAL FRACTURE: ICD-10-CM

## 2020-02-20 PROCEDURE — 77067 SCR MAMMO BI INCL CAD: CPT

## 2020-02-20 PROCEDURE — 77080 DXA BONE DENSITY AXIAL: CPT

## 2020-03-12 NOTE — ADDENDUM NOTE
Addended by: King Moore on: 9/12/2017 09:18 AM     Modules accepted: Orders pt with c/o hives on her arms and legs since yesterday.

## 2020-03-31 RX ORDER — PRAVASTATIN SODIUM 40 MG/1
TABLET ORAL
Qty: 90 TAB | Refills: 3 | Status: SHIPPED | OUTPATIENT
Start: 2020-03-31 | End: 2021-04-02 | Stop reason: SDUPTHER

## 2020-03-31 RX ORDER — SITAGLIPTIN 100 MG/1
TABLET, FILM COATED ORAL
Qty: 90 TAB | Refills: 3 | Status: SHIPPED | OUTPATIENT
Start: 2020-03-31 | End: 2021-04-02 | Stop reason: SDUPTHER

## 2020-04-03 DIAGNOSIS — E78.2 MIXED HYPERLIPIDEMIA: ICD-10-CM

## 2020-04-03 RX ORDER — FUROSEMIDE 20 MG/1
TABLET ORAL
Qty: 90 TAB | Refills: 3 | Status: SHIPPED | OUTPATIENT
Start: 2020-04-03 | End: 2020-11-13 | Stop reason: SDUPTHER

## 2020-04-03 RX ORDER — FENOFIBRATE 145 MG/1
TABLET, COATED ORAL
Qty: 90 TAB | Refills: 3 | Status: SHIPPED | OUTPATIENT
Start: 2020-04-03 | End: 2020-09-29 | Stop reason: ALTCHOICE

## 2020-04-14 ENCOUNTER — TELEPHONE (OUTPATIENT)
Dept: FAMILY MEDICINE CLINIC | Age: 70
End: 2020-04-14

## 2020-04-14 NOTE — TELEPHONE ENCOUNTER
Pt wants to know how to keep her hands out of her mouth   States she constantly has them in her mouth   She doesn't wear gloves because of having to use the bathroom and washing dishes    Best number to reach her is [phone]

## 2020-04-14 NOTE — TELEPHONE ENCOUNTER
Spoke with patient and c/o hands and skin around her nails are so dry to washing hands and keeps putting fingers in mouth to keep them moist. Informed patient she could get some Gold [employer] lotion and put around nails and hands if they are extremely dry. Patient verbalized understanding.

## 2020-05-01 ENCOUNTER — HOSPITAL ENCOUNTER (OUTPATIENT)
Dept: MRI IMAGING | Age: 70
Discharge: HOME | End: 2020-05-01
Attending: PHYSICAL MEDICINE & REHABILITATION
Payer: MEDICARE

## 2020-05-01 DIAGNOSIS — M54.31 RIGHT SIDED SCIATICA: ICD-10-CM

## 2020-05-01 DIAGNOSIS — M47.817 LUMBOSACRAL SPONDYLOSIS WITHOUT MYELOPATHY: ICD-10-CM

## 2020-05-01 PROCEDURE — 72148 MRI LUMBAR SPINE W/O DYE: CPT

## 2020-08-05 RX ORDER — LISINOPRIL 2.5 MG/1
TABLET ORAL
Qty: 90 TAB | Refills: 3 | Status: SHIPPED | OUTPATIENT
Start: 2020-08-05 | End: 2021-04-02 | Stop reason: SDUPTHER

## 2020-08-14 DIAGNOSIS — F41.9 ANXIETY: ICD-10-CM

## 2020-08-14 RX ORDER — ALPRAZOLAM 1 MG/1
TABLET ORAL
Qty: 45 TAB | Refills: 3 | Status: SHIPPED | OUTPATIENT
Start: 2020-08-23 | End: 2020-08-17

## 2020-08-31 ENCOUNTER — DOCUMENTATION ONLY (OUTPATIENT)
Dept: FAMILY MEDICINE CLINIC | Age: 70
End: 2020-08-31

## 2020-08-31 DIAGNOSIS — E11.9 TYPE 2 DIABETES MELLITUS WITHOUT COMPLICATION, WITHOUT LONG-TERM CURRENT USE OF INSULIN (HCC): ICD-10-CM

## 2020-08-31 RX ORDER — BLOOD-GLUCOSE METER
EACH MISCELLANEOUS
Qty: 1 EACH | Refills: 0 | Status: SHIPPED | OUTPATIENT
Start: 2020-08-31 | End: 2020-11-13

## 2020-08-31 NOTE — TELEPHONE ENCOUNTER
Patient wants a new sugar machine, hers broke     Send order to [facility]   Fax [phone]        Best number to reach her is [phone]

## 2020-09-29 ENCOUNTER — OFFICE VISIT (OUTPATIENT)
Dept: FAMILY MEDICINE CLINIC | Age: 70
End: 2020-09-29
Payer: MEDICARE

## 2020-09-29 VITALS
WEIGHT: 158 LBS | SYSTOLIC BLOOD PRESSURE: 110 MMHG | RESPIRATION RATE: 18 BRPM | HEART RATE: 80 BPM | OXYGEN SATURATION: 100 % | HEIGHT: 62 IN | BODY MASS INDEX: 29.08 KG/M2 | TEMPERATURE: 97.3 F | DIASTOLIC BLOOD PRESSURE: 63 MMHG

## 2020-09-29 DIAGNOSIS — I25.10 CORONARY ARTERY DISEASE INVOLVING NATIVE CORONARY ARTERY OF NATIVE HEART WITHOUT ANGINA PECTORIS: ICD-10-CM

## 2020-09-29 DIAGNOSIS — I73.9 PAD (PERIPHERAL ARTERY DISEASE) (HCC): ICD-10-CM

## 2020-09-29 DIAGNOSIS — E11.21 TYPE 2 DIABETES WITH NEPHROPATHY (HCC): Primary | ICD-10-CM

## 2020-09-29 DIAGNOSIS — E78.2 MIXED HYPERLIPIDEMIA: ICD-10-CM

## 2020-09-29 DIAGNOSIS — F41.9 ANXIETY: ICD-10-CM

## 2020-09-29 DIAGNOSIS — K21.9 GASTROESOPHAGEAL REFLUX DISEASE WITHOUT ESOPHAGITIS: ICD-10-CM

## 2020-09-29 DIAGNOSIS — E03.9 ACQUIRED HYPOTHYROIDISM: ICD-10-CM

## 2020-09-29 DIAGNOSIS — Z51.81 ENCOUNTER FOR MEDICATION MONITORING: ICD-10-CM

## 2020-09-29 LAB
GLUCOSE POC: 89 MG/DL
HBA1C MFR BLD HPLC: 7.1 %

## 2020-09-29 PROCEDURE — G8419 CALC BMI OUT NRM PARAM NOF/U: HCPCS | Performed by: FAMILY MEDICINE

## 2020-09-29 PROCEDURE — G8752 SYS BP LESS 140: HCPCS | Performed by: FAMILY MEDICINE

## 2020-09-29 PROCEDURE — 1101F PT FALLS ASSESS-DOCD LE1/YR: CPT | Performed by: FAMILY MEDICINE

## 2020-09-29 PROCEDURE — 1090F PRES/ABSN URINE INCON ASSESS: CPT | Performed by: FAMILY MEDICINE

## 2020-09-29 PROCEDURE — 3044F HG A1C LEVEL LT 7.0%: CPT | Performed by: FAMILY MEDICINE

## 2020-09-29 PROCEDURE — G8427 DOCREV CUR MEDS BY ELIG CLIN: HCPCS | Performed by: FAMILY MEDICINE

## 2020-09-29 PROCEDURE — G8536 NO DOC ELDER MAL SCRN: HCPCS | Performed by: FAMILY MEDICINE

## 2020-09-29 PROCEDURE — 2022F DILAT RTA XM EVC RTNOPTHY: CPT | Performed by: FAMILY MEDICINE

## 2020-09-29 PROCEDURE — G9899 SCRN MAM PERF RSLTS DOC: HCPCS | Performed by: FAMILY MEDICINE

## 2020-09-29 PROCEDURE — 83036 HEMOGLOBIN GLYCOSYLATED A1C: CPT | Performed by: FAMILY MEDICINE

## 2020-09-29 PROCEDURE — G8399 PT W/DXA RESULTS DOCUMENT: HCPCS | Performed by: FAMILY MEDICINE

## 2020-09-29 PROCEDURE — G8754 DIAS BP LESS 90: HCPCS | Performed by: FAMILY MEDICINE

## 2020-09-29 PROCEDURE — G9717 DOC PT DX DEP/BP F/U NT REQ: HCPCS | Performed by: FAMILY MEDICINE

## 2020-09-29 PROCEDURE — 82947 ASSAY GLUCOSE BLOOD QUANT: CPT | Performed by: FAMILY MEDICINE

## 2020-09-29 PROCEDURE — 99214 OFFICE O/P EST MOD 30 MIN: CPT | Performed by: FAMILY MEDICINE

## 2020-09-29 PROCEDURE — 3017F COLORECTAL CA SCREEN DOC REV: CPT | Performed by: FAMILY MEDICINE

## 2020-09-29 RX ORDER — LEVOTHYROXINE SODIUM 25 UG/1
25 TABLET ORAL
COMMUNITY
End: 2020-10-08 | Stop reason: SDUPTHER

## 2020-09-29 NOTE — PROGRESS NOTES
HISTORY OF PRESENT ILLNESS  Jamaica Cano is a 78 y.o. female. HPI   For routine follow up on chronic medical conditions. She has been having increased abd pain, with nausea and diarrhea which comes and goes. Seen by GI and stopped the metformin. She is still having intermittent abd pain but it did improved with stopping the metformin. She is schedule for EGD and colonoscopy for next month. Cardiovascular Review:  The patient has coronary artery disease, HF and status post coronary artery stenting. Diet and Lifestyle: generally follows a low fat low cholesterol diet, generally follows a low sodium diet, follows a diabetic diet regularly, exercises sporadically  Home BP Monitoring: is not measured at home. Pertinent ROS: taking medications as instructed, no medication side effects noted, no TIA's, no chest pain on exertion, no dyspnea on exertion, no swelling of ankles. DM type II follow up:  Compliant w/ meds, diabetic diet, and exercise. Tolerating Januvia. Has been watching her diet. Her BS have been in the 80-90s. Checks BS BID on most days and prn. Pt does not have BS log at visit today. No Rf needed for today. Denies any tingling sensation, polyuria and polydipsia. No blurred vision. No significant weight changes. Feeling well since last OV. Hypercholesterolemia follow up:  Compliant w/ meds, low fat, low cholesterol diet. Exercising some. No muscle nor abdominal pain, no skin discoloration. Patient fasting today. Depression Review:  Patient is seen for followup of depression and anxiety. Overall doing well. Taking xanax prn increased anxiety. Anxiety usually triggered by stress. She denies depressed mood and insomnia. Sleeping well at night. Thyroid Review:  Patient is seen for followup of hypothyroidism. Thyroid ROS: denies fatigue, weight changes, heat/cold intolerance, bowel/skin changes or CVS symptoms.     Patient Active Problem List   Diagnosis Code    Anxiety F41.9    Depression F32.9    Hypovitaminosis D E55.9    GERD (gastroesophageal reflux disease) K21.9    Edema R60.9    Esophageal motor disorder K22.4    S/P bypass gastrojejunostomy Z98.0    SOB (shortness of breath) R06.02    CAD (coronary artery disease) I25.10    Angina pectoris (HCC) I20.9    S/P PTCA (percutaneous transluminal coronary angioplasty) Z98.61    Mixed hyperlipidemia E78.2    Diarrhea R19.7    Type 2 diabetes mellitus without complication (HCC) Q99.6    Osteopenia M85.80    Encounter for medication monitoring Z51.81    CVA (cerebral vascular accident) ([facility] [address] W81.0    Complicated migraine A97. 109    Numbness and tingling of right side of face R20.0, R20.2    Stenosis of both internal carotid arteries I65.23    Type 2 diabetes with nephropathy (HCC) E11.21    Essential hypertension I10       Current Outpatient Medications   Medication Sig Dispense Refill    Blood-Glucose Meter (Contour Next Meter) misc Use to check blood sugar 2 times daily  Indications: E11.9 1 Each 0    ALPRAZolam (XANAX) 1 mg tablet TAKE 1/2 TABLET BY MOUTH EVERY DAY AS NEEDED FOR ANXIETY THEN TAKE 1 TABLET AT BEDTIME AS NEEDED FOR SLEEP 135 Tab 1    lisinopriL (PRINIVIL, ZESTRIL) 2.5 mg tablet TAKE 1 TABLET BY MOUTH ONCE DAILY 90 Tab 3    fenofibrate nanocrystallized (TRICOR) 145 mg tablet TAKE 1 TABLET BY MOUTH ONCE DAILY 90 Tab 3    furosemide (LASIX) 20 mg tablet TAKE 1 TABLET BY MOUTH ONCE DAILY 90 Tab 3    Januvia 100 mg tablet TAKE 1 TABLET BY MOUTH ONCE DAILY FOR DIABETES 90 Tab 3    pravastatin (PRAVACHOL) 40 mg tablet TAKE 1 TABLET BY MOUTH EVERY NIGHT 90 Tab 3    levothyroxine (SYNTHROID) 25 mcg tablet TAKE 2 TABLETS BY MOUTH MONDAY THRU FRIDAY THEN TAKE 1 TABLET ON SATURDAY AND SUNDAY 144 Tab 3    famotidine (PEPCID) 40 mg tablet Take 40 mg by mouth three (3) times daily.       metoprolol succinate (TOPROL-XL) 25 mg XL tablet TAKE 1 TABLET EVERY DAY 90 Tab 3    glucose blood VI test strips (CONTOUR NEXT TEST STRIPS) strip USE TO CHECK BLOOD SUGAR TWICE DAILY 100 Strip 11    fluticasone propionate (FLONASE) 50 mcg/actuation nasal spray 2 Sprays by Both Nostrils route daily as needed for Rhinitis. 1 Bottle 6    multivit-minerals/folic acid (CENTRUM VITAMINTS PO) Take 1 tablet by mouth daily      cholecalciferol, vitamin D3, (VITAMIN D3) 2,000 unit tab Take 1 capsule by mouth daily      Aspirin, Buffered 81 mg tab Take 81 mg by mouth daily.          Allergies   Allergen Reactions    Demerol [Meperidine] Unknown (comments)     Pt unsure of reaction       Past Medical History:   Diagnosis Date    Anxiety     Arthritis     ASHD (arteriosclerotic heart disease)     CAD (coronary artery disease)     Chronic pain     Constipation     Depression     Diabetes ([facility] [address]     Diarrhea     Dysgeusia 11/21/2014    Esophageal motor disorder     Flatulence/gas pain/belching 11/21/2014    Gastroparesis     GERD (gastroesophageal reflux disease)     Hypercholesterolemia     Hypertension     Nausea & vomiting     Psychiatric disorder     anxiety    S/P bypass gastrojejunostomy 1/23/2012    Sleep apnea     no cpap, as stated 9/2019       Past Surgical History:   Procedure Laterality Date    ABDOMEN SURGERY PROC UNLISTED      s/p partial gastrectomy 2/2 gastroparesis    COLONOSCOPY N/A 5/26/2016    COLONOSCOPY performed by Ten Nur MD at \[facility]"" ENDOSCOPY    EGD  7/13/2009    GASTROJEJUNOSTOMY      HC BLD CARPEL TUNNEL RELEASE      bilat    HX APPENDECTOMY      HX CHOLECYSTECTOMY      HX CORONARY STENT PLACEMENT  06/2013    3 stents    HX FEMORAL BYPASS Right 11/2017    HX GASTRIC BYPASS  6/02    HX HEART CATHETERIZATION  2017    stents remain open, no new stents placed    HX HYSTERECTOMY      HX LAPAROTOMY  8/02    2/2 intra abd abscess    HX OOPHORECTOMY      HX ORTHOPAEDIC  1/08    laminectomy, with frannie and 4 screws    HX UROLOGICAL      bladder surgery-bladder tac    ID COLONOSCOPY FLX DX W/COLLJ SPEC WHEN PFRMD  5/03/2006    Dr. Cotton Ax  2/9/2012         NM EGD BALLOON DILATION ESOPHAGUS <30 MM DIAM  1/23/2012         NM EGD TRANSORAL BIOPSY SINGLE/MULTIPLE  7/11/2011         NM EGD TRANSORAL BIOPSY SINGLE/MULTIPLE  1/23/2012         NM REMOVAL OF HEEL SPUR  2001    bilat    UPPER GI ENDOSCOPY,BIOPSY  10/1/2019            Family History   Problem Relation Age of Onset    Heart Disease Mother     Hypertension Mother     Diabetes Mother     Diabetes Father     Heart Disease Father     Hypertension Father     Alcohol abuse Brother     Heart Disease Sister     Hypertension Sister     Diabetes Sister     No Known Problems Sister     Alcohol abuse Brother     Alcohol abuse Brother     Diabetes Brother        Social History     Tobacco Use    Smoking status: Never Smoker    Smokeless tobacco: Never Used   Substance Use Topics    Alcohol use: No     Alcohol/week: 0.0 standard drinks        Lab Results   Component Value Date/Time    WBC 6.3 12/09/2019 02:27 PM    HGB 11.9 12/09/2019 02:27 PM    HCT 37.7 12/09/2019 02:27 PM    PLATELET 041 22/11/5318 02:27 PM    MCV 92.9 12/09/2019 02:27 PM     Lab Results   Component Value Date/Time    Cholesterol, total 161 01/27/2020 08:25 AM    HDL Cholesterol 57 01/27/2020 08:25 AM    LDL, calculated 77 01/27/2020 08:25 AM    Triglyceride 133 01/27/2020 08:25 AM    CHOL/HDL Ratio 3.5 01/05/2017 09:18 PM     Lab Results   Component Value Date/Time    TSH 2.900 01/27/2020 08:25 AM    T4, Free 1.77 02/08/2019 12:18 PM    T4, Total 5.6 11/17/2015 08:54 AM      Lab Results   Component Value Date/Time    Sodium 144 01/27/2020 08:25 AM    Potassium 4.1 01/27/2020 08:25 AM    Chloride 105 01/27/2020 08:25 AM    CO2 21 01/27/2020 08:25 AM    Anion gap 5 12/09/2019 02:27 PM    Glucose 140 (H) 01/27/2020 08:25 AM    BUN 15 01/27/2020 08:25 AM    Creatinine 1.04 (H) 01/27/2020 08:25 AM    BUN/Creatinine ratio 14 01/27/2020 08:25 AM    GFR est AA 64 01/27/2020 08:25 AM    GFR est non-AA 56 (L) 01/27/2020 08:25 AM    Calcium 8.9 01/27/2020 08:25 AM    Bilirubin, total 0.3 12/09/2019 02:27 PM    ALT (SGPT) 24 12/09/2019 02:27 PM    Alk. phosphatase 85 12/09/2019 02:27 PM    Protein, total 7.1 12/09/2019 02:27 PM    Albumin 4.2 12/09/2019 02:27 PM    Globulin 2.9 12/09/2019 02:27 PM    A-G Ratio 1.4 12/09/2019 02:27 PM      Lab Results   Component Value Date/Time    Hemoglobin A1c 6.4 (H) 09/12/2017 09:35 AM    Hemoglobin A1c (POC) 6.9 01/27/2020 08:25 AM         Review of Systems   Constitutional: Negative for malaise/fatigue. HENT: Negative for congestion. Eyes: Negative for blurred vision. Respiratory: Negative for cough and shortness of breath. Cardiovascular: Negative for chest pain, palpitations and leg swelling. Gastrointestinal: Negative for abdominal pain, constipation and heartburn. Genitourinary: Negative for dysuria, frequency and urgency. Musculoskeletal: Negative for back pain and joint pain. Neurological: Negative for dizziness, tingling and headaches. Endo/Heme/Allergies: Negative for environmental allergies. Psychiatric/Behavioral: Negative for depression. The patient does not have insomnia. Physical Exam  Vitals signs and nursing note reviewed. Constitutional:       Appearance: Normal appearance. She is well-developed. Comments: /63 (BP 1 Location: Left arm, BP Patient Position: Sitting)   Pulse 80   Temp 97.3 °F (36.3 °C) (Temporal)   Resp 18   Ht 5' 2\" (1.575 m)   Wt 158 lb (71.7 kg)   SpO2 100%   BMI 28.90 kg/m²    HENT:      Right Ear: Tympanic membrane and ear canal normal.      Left Ear: Tympanic membrane and ear canal normal.      Nose: No mucosal edema or rhinorrhea. Neck:      Musculoskeletal: Normal range of motion and neck supple. Thyroid: No thyromegaly. Cardiovascular:      Rate and Rhythm: Normal rate and regular rhythm.       Heart sounds: Normal heart sounds. No gallop. Pulmonary:      Effort: Pulmonary effort is normal.      Breath sounds: Normal breath sounds. Abdominal:      General: Bowel sounds are normal.      Palpations: Abdomen is soft. There is no mass. Tenderness: There is no abdominal tenderness. Musculoskeletal: Normal range of motion. General: No swelling. Lymphadenopathy:      Cervical: No cervical adenopathy. Skin:     General: Skin is warm and dry. Neurological:      General: No focal deficit present. Mental Status: She is alert and oriented to person, place, and time. Psychiatric:         Mood and Affect: Mood normal.         ASSESSMENT and PLAN  Diagnoses and all orders for this visit:    1. Type 2 diabetes with nephropathy (HCC)  a1c up a bit to 7.1%. Watch diet closely. -     AMB POC HEMOGLOBIN A1C  -     AMB POC GLUCOSE, QUANTITATIVE, BLOOD    2. Mixed hyperlipidemia  -     LIPID PANEL    3. Coronary artery disease involving native coronary artery of native heart without angina pectoris  Stable     4. PAD (peripheral artery disease) (HCC)  Stable     5. Acquired hypothyroidism  -     TSH 3RD GENERATION    6. Gastroesophageal reflux disease without esophagitis  As per GI    7. Anxiety  Stable     8. Encounter for medication monitoring  -     METABOLIC PANEL, COMPREHENSIVE  -     CBC W/O DIFF      Follow-up and Dispositions    · Return in about 3 months (around 12/29/2020).        reviewed diet, exercise and weight control  cardiovascular risk and specific lipid/LDL goals reviewed  reviewed medications and side effects in detail  specific diabetic recommendations: low cholesterol diet, weight control and daily exercise discussed, all medications, side effects and compliance discussed carefully, foot care discussed and Podiatry visits discussed, annual eye examinations at Ophthalmology discussed and glycohemoglobin and other lab monitoring discussed     I have discussed diagnosis listed in this note with pt and/or family. I have discussed treatment plans and options and the risk/benefit analysis of those options, including safe use of medications and possible medication side effects. Through the use of shared decision making we have agreed to the above plan. The patient has received an after-visit summary and questions were answered concerning future plans and follow up. Advise pt of any urgent changes then to proceed to the ER.

## 2020-09-29 NOTE — PROGRESS NOTES
Chief Complaint   Patient presents with    Cholesterol Problem     follow up    Diabetes     follow up    Hypertension     follow up       A1C 1/27/2020    Microalbumin 1/27/2020    Mammogram 2/20/2020    Bone density 2/20/2020    Colonoscopy 5/26/16 by Dr Luanne Birmingham- repeat in 5 years    Patient states she had an eye exam 2/2020 but can not remember name of MD.      1. Have you been to the ER, urgent care clinic since your last visit? Hospitalized since your last visit? Yes 12/9/2019 [facility] abdominal pain    2. Have you seen or consulted any other health care providers outside of the [address] [place] [address] since your last visit? Include any pap smears or colon screening.  No

## 2020-09-29 NOTE — LETTER
CONTROLLED SUBSTANCE MEDICATION AGREEMENT Patient Name: Abiel Martinez Patient YOB: 1950 I understand, that controlled substance medications may be used to help better manage my symptoms and to improve my ability to function at home, work and in social settings. However, I also understand that these medications do have risks, which have been discussed with me, including possible development of physical or psychological dependence. I understand that successful treatment requires mutual trust and honesty between me and my provider. I understand and agree that following this Medication Agreement is necessary in continuing my provider-patient relationship and the success of my treatment plan. Explanation from my Provider: Benefits and Goals of Controlled Substance Medications: There are two potential goals for your treatment: (1) decreased pain and suffering (2) improved daily life functions. There are many possible treatments for your chronic condition(s). Alternatives such as physical therapy, yoga, massage, home daily exercise, meditation, relaxation techniques, injections, chiropractic manipulations, surgery, cognitive therapy, hypnosis and many medications that are not habit-forming may be used. Use of controlled substance medications may be helpful, but they are unlikely to resolve all symptoms or restore all function. Explanation from my Provider: Risks of Controlled Substance Medications: 
Opioid pain medications: These medications can lead to problems such as addiction/dependence, sedation, lightheadedness/dizziness, memory issues, falls, constipation, nausea, or vomiting. They may also impair the ability to drive or operate machinery. Additionally, these medications may lower testosterone levels, leading to loss of bone strength, stamina and sex drive.   They may cause problems with breathing, sleep apnea and reduced coughing, which is especially dangerous for patients with lung disease. Overdose or dangerous interactions with alcohol and other medications may occur, leading to death. Hyperalgesia may develop, which means patients receiving opioids for the treatment of pain may become more sensitive to certain painful stimuli, and in some cases, experience pain from ordinarily non-painful stimuli. Women between the ages of 14-53 who could become pregnant should carefully weigh the risks and benefits of opioids with their physicians, as these medications increase the risk of pregnancy complications, including miscarriage,  delivery and stillbirth. It is also possible for babies to be born addicted to opioids. Opioid dependence withdrawal symptoms may include; feelings of uneasiness, increased pain, irritability, belly pain, diarrhea, sweats and goose-flesh. Benzodiazepines and non-benzodiazepine sleep medications: These medications can lead to problems such as addiction/dependence, sedation, fatigue, lightheadedness, dizziness, incoordination, falls, depression, hallucinations, and impaired judgment, memory and concentration. The ability to drive and operate machinery may also be affected. Abnormal sleep-related behaviors have been reported, including sleepwalking, driving, making telephone calls, eating, or having sex while not fully awake. These medications can suppress breathing and worsen sleep apnea, particularly when combined with alcohol or other sedating medications, potentially leading to death. Dependence withdrawal symptoms may include tremors, anxiety, hallucinations and seizures. Initials:_______ Stimulants:  Common adverse effects include addiction/dependence, increased blood 
pressure and heart rate, decreased appetite, nausea, involuntary weight loss, insomnia,  irritability, and headaches.   These risks may increase when these medications are combined with other stimulants, such as caffeine pills or energy drinks, certain weight loss supplements and oral decongestants. Dependence withdrawal symptoms may include depressed mood, loss of interest, suicidal thoughts, anxiety, fatigue, appetite changes and agitation. Testosterone replacement therapy:  Potential side effects include increased risk of stroke and heart attack, blood clots, increased blood pressure, increased cholesterol, enlarged prostate, sleep apnea, irritability/aggression and other mood disorders, and decreased fertility. I agree and understand that I and my prescriber have the following rights and responsibilities regarding my treatment plan:  
 
1. MY RIGHTS: 
To be informed of my treatment and medication plan. To be an active participant in my health and wellbeing. 2. MY RESPONSIBILITY AND UNDERSTANDING FOR USE OF MEDICATIONS ? I will take medications at the dose and frequency as directed. For my safety, I will not increase or change how I take my medications without the recommendation of my healthcare provider. ? I will actively participate in any program recommended by my provider which may improve function, including social, physical, psychological programs. ? I will not take my medications with alcohol or other drugs not prescribed to me. I understand that drinking alcohol with my medications increases the chances of side effects, including reduced breathing rate and could lead to personal injury when operating machinery. ? I understand that if I have a history of substance use disorders, including alcohol or other illicit drugs, that I may be at increased risk of addiction to my medications. ? I agree to notify my provider immediately if I should become pregnant so that my treatment plan can be adjusted.  
? I agree and understand that I shall only receive controlled substance medications from the prescriber that signed this agreement unless there is written agreement among other prescribers of controlled substances outlining the responsibility of the medications being prescribed. ? I understand that the if the controlled medication is not helping to achieve goals, the dosage may be tapered and no longer prescribed. 3. MY RESPONSIBILITY FOR COMMUNICATION / PRESCRIPTION RENEWALS 
? I agree that all controlled substance medications that I take will be prescribed only by my provider. If another healthcare provider prescribes me medication in an emergency, I will notify my provider within seventy-two (72) hours. ? I will arrange for refills at the prescribed interval ONLY during regular office hours. I will not ask for refills earlier than agreed, after-hours, on holidays or weekends. Refills may take up to 72 hours for processing and prescriptions to reach the pharmacy. ? I will inform my other health care providers that I am taking these medications and of the existence of this [id number]. In the event of an emergency, I will provide the same information to the emergency department prescribers. Initials:_______ ? I will keep my provider updated on the pharmacy I am using for controlled medication prescription filling. 4. MY RESPONSIBILITY FOR PROTECTING MEDICATIONS ? I will protect my prescriptions and medications. I understand that lost or misplaced prescriptions will not be replaced. ? I will keep medications only for my own use and will not share them with others. I will keep all medications away from children. ? I agree that if my medications are adjusted or discontinued, I will properly dispose of any remaining medications. I understand that I will be required to dispose of any remaining controlled medications as, directed by my prescriber, prior to being provided with any prescriptions for other controlled medications.   Medication drop box locations can be found at: [employer] 5. MY RESPONSIBILITY WITH ILLEGAL DRUGS ? I will not use illegal or street drugs or another person's prescription medications not prescribed to me.  
? If there are identified addiction type symptoms, then referral to a program may be provided by my provider and I agree to follow through with this recommendation. 6. MY RESPONSIBILITY FOR COOPERATION WITH INVESTIGATIONS ? I understand that my provider will comply with any applicable law and may discuss my use and/or possible misuse/abuse of controlled substances and alcohol, as appropriate, with any health care provider involved in my care, pharmacist, or legal authority. ? I authorize my provider and pharmacy to cooperate fully with law enforcement agencies (as permitted by law) in the investigation of any possible misuse, sale, or other diversion of my controlled substances. ? I agree to waive any applicable privilege or right of privacy or confidentiality with respect to these authorizations. 7. PROVIDERS RIGHT TO MONITOR FOR SAFETY: PRESCRIPTION MONITORING / DRUG TESTING 
? I consent to drug/toxicology screening and will submit to a drug screen upon my providers request to assure I am only taking the prescribed drugs for my safety monitoring. I understand that a drug screen is a laboratory test in which a sample of my urine, blood or saliva is checked to see what drugs I have been taking. This may entail an observed urine specimen, which means that a nurse or other health care provider may watch me provide urine, and I will cooperate if I am asked to provide an observed specimen. ? I understand that my provider will check a copy of my State Prescription Monitoring Program () Report in order to safely prescribe medications. ? Pill Counts: I consent to pill counts when requested.   I may be asked to bring all my prescribed controlled substance medications, in their original bottles, to all of my scheduled appointments. In addition, my provider may ask me to come to the practice at any time for a random pill count. Initials:_______ 8. TERMINATION OF THIS AGREEMENT For my safety, my prescriber has the right to stop prescribing controlled substance medications and may end this agreement. ? Conditions that may result in termination of this agreement: 
a. I do not show any improvement in pain, or my activity has not improved. b. I develop rapid tolerance or loss of improvement, as described in my treatment plan. 
c. I develop significant side effects from the medication. d. My behavior is not consistent with the responsibilities outlined above, thereby causing safety concerns to continue prescribing controlled substance medications. e. I fail to follow the terms of this agreement. f. Other:____________________________ UNDERSTANDING THIS MEDICATION AGREEMENT:   
I have read the above and have had all my questions answered. For chronic disease management, I know that my symptoms can be managed with many types of treatments. A chronic medication trial may be part of my treatment, but I must be an active participant in my care. Medication therapy is only one part of my symptom management plan. In some cases, there may be limited scientific evidence to support the chronic use of certain medications to improve symptoms and daily function. Furthermore, in certain circumstances, there may be scientific information that suggests that the use of chronic controlled substances may worsen my symptoms and increase my risk of unintentional death directly related to this medication therapy. I know that if my provider feels my risk from controlled medications is greater than my benefit, I will have my controlled substance medication(s) compassionately lowered or removed altogether. I further agree to allow this office to contact my HIPAA contact if there are concerns about my safety and use of the controlled medications. I have agreed to use the prescribed controlled substance medications to me as instructed by my provider and as stated in this Medication Agreement. My initial on each page and my signature below shows that I have read each page and I have had the opportunity to ask questions with answers provided by my provider. Patient Name (Printed): _____________________________________ Patient Signature:  ______________________   Date: _____________ Prescriber Name (Printed): ___________________________________ Prescriber Signature: _____________________  Date: _____________

## 2020-09-30 LAB
ALBUMIN SERPL-MCNC: 4.4 G/DL (ref 3.8–4.8)
ALBUMIN/GLOB SERPL: 2.4 {RATIO} (ref 1.2–2.2)
ALP SERPL-CCNC: 94 IU/L (ref 39–117)
ALT SERPL-CCNC: 14 IU/L (ref 0–32)
AST SERPL-CCNC: 18 IU/L (ref 0–40)
BILIRUB SERPL-MCNC: 0.3 MG/DL (ref 0–1.2)
BUN SERPL-MCNC: 12 MG/DL (ref 8–27)
BUN/CREAT SERPL: 14 (ref 12–28)
CALCIUM SERPL-MCNC: 9 MG/DL (ref 8.7–10.3)
CHLORIDE SERPL-SCNC: 103 MMOL/L (ref 96–106)
CHOLEST SERPL-MCNC: 135 MG/DL (ref 100–199)
CO2 SERPL-SCNC: 23 MMOL/L (ref 20–29)
CREAT SERPL-MCNC: 0.87 MG/DL (ref 0.57–1)
ERYTHROCYTE [DISTWIDTH] IN BLOOD BY AUTOMATED COUNT: 13 % (ref 11.7–15.4)
GLOBULIN SER CALC-MCNC: 1.8 G/DL (ref 1.5–4.5)
GLUCOSE SERPL-MCNC: 81 MG/DL (ref 65–99)
HCT VFR BLD AUTO: 35.4 % (ref 34–46.6)
HDLC SERPL-MCNC: 43 MG/DL
HGB BLD-MCNC: 11.7 G/DL (ref 11.1–15.9)
INTERPRETATION, 910389: NORMAL
LDLC SERPL CALC-MCNC: 64 MG/DL (ref 0–99)
MCH RBC QN AUTO: 30.1 PG (ref 26.6–33)
MCHC RBC AUTO-ENTMCNC: 33.1 G/DL (ref 31.5–35.7)
MCV RBC AUTO: 91 FL (ref 79–97)
PLATELET # BLD AUTO: 275 X10E3/UL (ref 150–450)
POTASSIUM SERPL-SCNC: 4.1 MMOL/L (ref 3.5–5.2)
PROT SERPL-MCNC: 6.2 G/DL (ref 6–8.5)
RBC # BLD AUTO: 3.89 X10E6/UL (ref 3.77–5.28)
SODIUM SERPL-SCNC: 142 MMOL/L (ref 134–144)
TRIGL SERPL-MCNC: 162 MG/DL (ref 0–149)
TSH SERPL DL<=0.005 MIU/L-ACNC: 0.89 UIU/ML (ref 0.45–4.5)
VLDLC SERPL CALC-MCNC: 28 MG/DL (ref 5–40)
WBC # BLD AUTO: 7 X10E3/UL (ref 3.4–10.8)

## 2020-10-08 ENCOUNTER — TELEPHONE (OUTPATIENT)
Dept: FAMILY MEDICINE CLINIC | Age: 70
End: 2020-10-08

## 2020-10-08 RX ORDER — LEVOTHYROXINE SODIUM 25 UG/1
TABLET ORAL
COMMUNITY
Start: 2020-10-08 | End: 2021-02-15 | Stop reason: DRUGHIGH

## 2020-10-09 NOTE — PERIOP NOTES
[facility]  Ambulatory Surgery Unit  Pre-operative Instructions for Endo Procedures    Procedure Date  Friday, October 16, 2020            Tentative Arrival Time 0945      1. On the day of your procedure, please report to the Ambulatory Surgery Unit Registration Desk and sign in at your designated time. The Ambulatory Surgery Unit is located in [facility] on the [facility] side of the [facility] across from the [address] [place] [address]. Please have all of your health insurance cards and a photo ID. 2. You must have someone with you to drive you home, as you should not drive a car for 24 hours following anesthesia. Please make arrangements for a responsible adult friend or family member to stay with you for at least the first 24 hours after your procedure. 3. Do not have anything to eat or drink (including water, gum, mints, coffee, juice) after 11:59 PM, Thursday. This may not apply to medications prescribed by your physician. (Please note below the special instructions with medications to take the morning of your procedure.)    4. If applicable, follow the clear liquid diet and bowel prep instructions provided by your physician's office. If you do not have this information, or have any questions, please contact your physician's office. 5. We recommend you do not drink any alcoholic beverages for 24 hours before and after your procedure. 6. Contact your surgeons office for instructions on the following medications: non-steroidal anti-inflammatory drugs (i.e. Advil, Aleve), vitamins, and supplements. (Some surgeons will want you to stop these medications prior to surgery and others may allow you to take them)   **If you are currently taking Plavix, Coumadin, Aspirin and/or other blood-thinning agents, contact your surgeon for instructions. ** Your surgeon will partner with the physician prescribing these medications to determine if it is safe to stop or if you need to continue taking. Please do not stop taking these medications without instructions from your surgeon. 7. In an effort to help prevent surgical site infection, we ask that you shower with an anti-bacterial soap (i.e. Dial or Safeguard) on the morning of your procedure. Do not apply any lotions, powders, or deodorants after showering. 8. Wear comfortable clothes. Wear glasses instead of contacts. Do not bring any jewelry or money (other than copays or fees as instructed). Do not wear make-up, particularly mascara, the morning of your procedure. Wear your hair loose or down, no ponytails, buns, america pins or clips. All body piercings must be removed. 9. You should understand that if you do not follow these instructions your procedure may be cancelled. If your physical condition changes (i.e. fever, cold or flu) please contact your surgeon as soon as possible. 10. It is important that you be on time. If a situation occurs where you may be late, or if you have any questions or problems, please call [phone]. 11. Your procedure time may be subject to change. You will receive a phone call the day prior to confirm your arrival time. Special Instructions: Take all medications and inhalers, as prescribed, on the morning of surgery with a sip of water EXCEPT: no diabetic medications day of surgery. I understand a pre-operative phone call will be made to verify my procedure time. In the event that I am not available, I give permission for a message to be left on my answering service and/or with another person?       yes    Preop instructions reviewed  Pt verbalized understanding.      ___________________      ___________________      ___________________  (Signature of Patient)          (Witness)                   (Date and Time)

## 2020-10-12 ENCOUNTER — HOSPITAL ENCOUNTER (OUTPATIENT)
Dept: PREADMISSION TESTING | Age: 70
Discharge: HOME | End: 2020-10-12
Payer: MEDICARE

## 2020-10-12 PROCEDURE — 87635 SARS-COV-2 COVID-19 AMP PRB: CPT

## 2020-10-13 LAB
HEALTH STATUS, XMCV2T: NORMAL
SARS-COV-2, COV2NT: NOT DETECTED
SOURCE, COVRS: NORMAL
SPECIMEN SOURCE, FCOV2M: NORMAL
SPECIMEN TYPE, XMCV1T: NORMAL

## 2020-10-15 ENCOUNTER — ANESTHESIA EVENT (OUTPATIENT)
Dept: SURGERY | Age: 70
End: 2020-10-15
Payer: MEDICARE

## 2020-10-16 ENCOUNTER — HOSPITAL ENCOUNTER (OUTPATIENT)
Age: 70
Setting detail: OUTPATIENT SURGERY
Discharge: HOME | End: 2020-10-16
Attending: SPECIALIST | Admitting: SPECIALIST
Payer: MEDICARE

## 2020-10-16 ENCOUNTER — ANESTHESIA (OUTPATIENT)
Dept: SURGERY | Age: 70
End: 2020-10-16
Payer: MEDICARE

## 2020-10-16 VITALS
OXYGEN SATURATION: 98 % | HEART RATE: 72 BPM | SYSTOLIC BLOOD PRESSURE: 131 MMHG | RESPIRATION RATE: 16 BRPM | HEIGHT: 62 IN | TEMPERATURE: 99 F | WEIGHT: 158 LBS | DIASTOLIC BLOOD PRESSURE: 69 MMHG | BODY MASS INDEX: 29.08 KG/M2

## 2020-10-16 LAB
GLUCOSE BLD STRIP.AUTO-MCNC: 129 MG/DL (ref 65–100)
GLUCOSE BLD STRIP.AUTO-MCNC: 139 MG/DL (ref 65–100)
SERVICE CMNT-IMP: ABNORMAL
SERVICE CMNT-IMP: ABNORMAL

## 2020-10-16 PROCEDURE — 76060000061 HC AMB SURG ANES 0.5 TO 1 HR: Performed by: SPECIALIST

## 2020-10-16 PROCEDURE — 74011250636 HC RX REV CODE- 250/636: Performed by: ANESTHESIOLOGY

## 2020-10-16 PROCEDURE — 76210000034 HC AMBSU PH I REC 0.5 TO 1 HR: Performed by: SPECIALIST

## 2020-10-16 PROCEDURE — 88305 TISSUE EXAM BY PATHOLOGIST: CPT

## 2020-10-16 PROCEDURE — 77030021593 HC FCPS BIOP ENDOSC BSC -A: Performed by: SPECIALIST

## 2020-10-16 PROCEDURE — 88342 IMHCHEM/IMCYTCHM 1ST ANTB: CPT

## 2020-10-16 PROCEDURE — 74011000250 HC RX REV CODE- 250: Performed by: REGISTERED NURSE

## 2020-10-16 PROCEDURE — 74011250636 HC RX REV CODE- 250/636: Performed by: REGISTERED NURSE

## 2020-10-16 PROCEDURE — 76030000000 HC AMB SURG OR TIME 0.5 TO 1: Performed by: SPECIALIST

## 2020-10-16 PROCEDURE — 2709999900 HC NON-CHARGEABLE SUPPLY: Performed by: SPECIALIST

## 2020-10-16 PROCEDURE — 77030021352 HC CBL LD SYS DISP COVD -B: Performed by: SPECIALIST

## 2020-10-16 PROCEDURE — 76210000046 HC AMBSU PH II REC FIRST 0.5 HR: Performed by: SPECIALIST

## 2020-10-16 PROCEDURE — 82962 GLUCOSE BLOOD TEST: CPT

## 2020-10-16 RX ORDER — PROPOFOL 10 MG/ML
INJECTION, EMULSION INTRAVENOUS AS NEEDED
Status: DISCONTINUED | OUTPATIENT
Start: 2020-10-16 | End: 2020-10-16 | Stop reason: HOSPADM

## 2020-10-16 RX ORDER — LIDOCAINE HYDROCHLORIDE 20 MG/ML
INJECTION, SOLUTION EPIDURAL; INFILTRATION; INTRACAUDAL; PERINEURAL AS NEEDED
Status: DISCONTINUED | OUTPATIENT
Start: 2020-10-16 | End: 2020-10-16 | Stop reason: HOSPADM

## 2020-10-16 RX ORDER — SODIUM CHLORIDE 0.9 % (FLUSH) 0.9 %
5-40 SYRINGE (ML) INJECTION EVERY 8 HOURS
Status: DISCONTINUED | OUTPATIENT
Start: 2020-10-16 | End: 2020-10-16 | Stop reason: HOSPADM

## 2020-10-16 RX ORDER — SODIUM CHLORIDE, SODIUM LACTATE, POTASSIUM CHLORIDE, CALCIUM CHLORIDE 600; 310; 30; 20 MG/100ML; MG/100ML; MG/100ML; MG/100ML
25 INJECTION, SOLUTION INTRAVENOUS CONTINUOUS
Status: DISCONTINUED | OUTPATIENT
Start: 2020-10-16 | End: 2020-10-16 | Stop reason: HOSPADM

## 2020-10-16 RX ORDER — ONDANSETRON 2 MG/ML
4 INJECTION INTRAMUSCULAR; INTRAVENOUS AS NEEDED
Status: DISCONTINUED | OUTPATIENT
Start: 2020-10-16 | End: 2020-10-16 | Stop reason: HOSPADM

## 2020-10-16 RX ORDER — SODIUM CHLORIDE 0.9 % (FLUSH) 0.9 %
5-40 SYRINGE (ML) INJECTION AS NEEDED
Status: DISCONTINUED | OUTPATIENT
Start: 2020-10-16 | End: 2020-10-16 | Stop reason: HOSPADM

## 2020-10-16 RX ORDER — FENTANYL CITRATE 50 UG/ML
25 INJECTION, SOLUTION INTRAMUSCULAR; INTRAVENOUS
Status: DISCONTINUED | OUTPATIENT
Start: 2020-10-16 | End: 2020-10-16 | Stop reason: HOSPADM

## 2020-10-16 RX ORDER — GLYCOPYRROLATE 0.2 MG/ML
INJECTION INTRAMUSCULAR; INTRAVENOUS AS NEEDED
Status: DISCONTINUED | OUTPATIENT
Start: 2020-10-16 | End: 2020-10-16 | Stop reason: HOSPADM

## 2020-10-16 RX ORDER — DIPHENHYDRAMINE HYDROCHLORIDE 50 MG/ML
12.5 INJECTION, SOLUTION INTRAMUSCULAR; INTRAVENOUS AS NEEDED
Status: DISCONTINUED | OUTPATIENT
Start: 2020-10-16 | End: 2020-10-16 | Stop reason: HOSPADM

## 2020-10-16 RX ORDER — LIDOCAINE HYDROCHLORIDE 10 MG/ML
0.1 INJECTION, SOLUTION EPIDURAL; INFILTRATION; INTRACAUDAL; PERINEURAL AS NEEDED
Status: DISCONTINUED | OUTPATIENT
Start: 2020-10-16 | End: 2020-10-16 | Stop reason: HOSPADM

## 2020-10-16 RX ADMIN — LIDOCAINE HYDROCHLORIDE 80 MG: 20 INJECTION, SOLUTION EPIDURAL; INFILTRATION; INTRACAUDAL; PERINEURAL at 11:06

## 2020-10-16 RX ADMIN — PROPOFOL 60 MG: 10 INJECTION, EMULSION INTRAVENOUS at 11:22

## 2020-10-16 RX ADMIN — GLYCOPYRROLATE 0.2 MG: 0.2 INJECTION, SOLUTION INTRAMUSCULAR; INTRAVENOUS at 11:06

## 2020-10-16 RX ADMIN — SODIUM CHLORIDE, SODIUM LACTATE, POTASSIUM CHLORIDE, AND CALCIUM CHLORIDE 25 ML/HR: 600; 310; 30; 20 INJECTION, SOLUTION INTRAVENOUS at 10:12

## 2020-10-16 RX ADMIN — PROPOFOL 90 MG: 10 INJECTION, EMULSION INTRAVENOUS at 11:06

## 2020-10-16 RX ADMIN — PROPOFOL 50 MG: 10 INJECTION, EMULSION INTRAVENOUS at 11:12

## 2020-10-16 NOTE — ANESTHESIA POSTPROCEDURE EVALUATION
Procedure(s):  COLONOSCOPY  ESOPHAGOGASTRODUODENOSCOPY (EGD)  ESOPHAGOGASTRODUODENAL (EGD) BIOPSY  COLON BIOPSY.     total IV anesthesia, MAC    Anesthesia Post Evaluation      Multimodal analgesia: multimodal analgesia not used between 6 hours prior to anesthesia start to PACU discharge  Patient location during evaluation: PACU  Patient participation: complete - patient participated  Level of consciousness: awake and alert  Pain score: 0  Airway patency: patent  Anesthetic complications: no  Cardiovascular status: acceptable  Respiratory status: acceptable  Hydration status: acceptable  Post anesthesia nausea and vomiting:  none  Final Post Anesthesia Temperature Assessment:  Normothermia (36.0-37.5 degrees C)      INITIAL Post-op Vital signs:   Vitals Value Taken Time   /69 10/16/2020 12:01 PM   Temp 37.2 °C (99 °F) 10/16/2020 12:01 PM   Pulse 72 10/16/2020 12:01 PM   Resp 16 10/16/2020 12:01 PM   SpO2 98 % 10/16/2020 12:01 PM

## 2020-10-16 NOTE — PERIOP NOTES
Pt. Alert. Denies pain or chill. Discharge instructions reviewed with caregiver and patient. Allowed and answered questions. Tolerating PO fluids. Both state ready for discharge.  1213 Discharged to car without incident

## 2020-10-16 NOTE — DISCHARGE INSTRUCTIONS
Jef Bolden  [id number]  1950              Procedure  Discharge Instructions:      Discomfort:  Redness at IV site- apply warm compress to area; if redness or soreness persist- contact your physician  There may be a slight amount of blood passed from the rectum  Gaseous discomfort- walking, belching will help relieve any discomfort  You may not operate a vehicle for 12 hours  You may not engage in an occupation involving machinery or appliances for rest of today  You may not drink alcoholic beverages for at least 12 hours  Avoid making any critical decisions for at least 24 hour  DIET:   You may resume your normal diet today. You should not overeat or \"feast\" today as your abdomen may become distended or uncomfortable. MEDICATIONS:   I reconciled this list from the list you gave us when you came today for the procedure. Please clarify with me, your primary care physician and the nurse who is discharging you if we have any discrepancies. Aspirin and or non-steroidal medication (Ibuprofen, Motrin, naproxen, etc.) is ok in limited quantities. ACTIVITY:  You may resume your normal daily activities it is recommended that you spend the remainder of the day resting -  avoid any strenuous activity. CALL M.D. ANY SIGN OF:  Increasing pain, nausea, vomiting  Abdominal distension (swelling)  New increased bleeding (oral or rectal)  Fever (chills)  Pain in chest area  Bloody discharge from nose or mouth  Shortness of breath          Follow-up Instructions:   Call Dr. Adam Muhammad for the results of  biopsy in approximately one week  Telephone #  [phone]  Follow up visit as previously scheduled. I will order a ct colonography at Isha Patino MD  11:42 AM  10/16/2020       Patient Education        Learning About Coronavirus (COVID-19)  Coronavirus (COVID-19): Overview  What is coronavirus ([id number])? The coronavirus disease (COVID-19) is caused by a virus.  It is an illness that was first found in December 2019. It has since spread worldwide. The virus can cause fever, cough, and trouble breathing. In severe cases, it can cause pneumonia and make it hard to breathe without help. It can cause death. This virus spreads person-to-person through droplets from coughing and sneezing. It can also spread when you are close to someone who is infected. And it can spread when you touch something that has the virus on it, such as a doorknob or a tabletop. Coronaviruses are a large group of viruses. They cause the common cold. They also cause more serious illnesses like Middle East respiratory syndrome (MERS) and severe acute respiratory syndrome (SARS). COVID-19 is caused by a novel coronavirus. That means it's a new type that has not been seen in people before. How is COVID-19 treated? Mild illness can be treated at home, but more serious illness needs to be treated in the hospital. Treatment may include medicines to reduce symptoms, plus breathing support such as oxygen therapy or a ventilator. Other treatments, such as antiviral medicines, may help people who have COVID-19. What can you do to protect yourself from COVID-19? The best way to protect yourself from getting sick is to:  · Avoid areas where there is an outbreak. · Avoid contact with people who may be infected. · Avoid crowds and try to stay at least 6 feet away from other people. · Wash your hands often, especially after you cough or sneeze. Use soap and water, and scrub for at least 20 seconds. If soap and water aren't available, use an alcohol-based hand . · Avoid touching your mouth, nose, and eyes. What can you do to avoid spreading the virus to others? To help avoid spreading the virus to others:  · Wash your hands often with soap or alcohol-based hand sanitizers. · Cover your mouth with a tissue when you cough or sneeze. Then throw the tissue in the trash. · Use a disinfectant to clean things that you touch often. These include doorknobs, remote controls, phones, and handles on your refrigerator and microwave. And don't forget countertops, tabletops, bathrooms, and computer keyboards. · Wear a cloth face cover if you have to go to public areas. If you know or suspect that you have COVID-19:  · Stay home. Don't go to school, work, or public areas. And don't use public transportation, ride-shares, or taxis unless you have no choice. · Leave your home only if you need to get medical care or testing. But call the doctor's office first so they know you're coming. And wear a face cover. · Limit contact with people in your home. If possible, stay in a separate bedroom and use a separate bathroom. · Wear a face cover whenever you're around other people. It can help stop the spread of the virus when you cough or sneeze. · Clean and disinfect your home every day. Use household  and disinfectant wipes or sprays. Take special care to clean things that you grab with your hands. · Self-isolate until it's safe to be around others again. ? If you have symptoms, it's safe when you haven't had a fever for 3 days and your symptoms have improved and it's been at least 10 days since your symptoms started. ? If you were exposed to the virus but don't have symptoms, it's safe to be around others 14 days after exposure. ? Talk to your doctor about whether you also need testing, especially if you have a weakened immune system. When to call for help  Call 911 anytime you think you may need emergency care. For example, call if:  · You have severe trouble breathing. (You can't talk at all.)  · You have constant chest pain or pressure. · You are severely dizzy or lightheaded. · You are confused or can't think clearly. · Your face and lips have a blue color. · You passed out (lost consciousness) or are very hard to wake up. Call your doctor now if you develop symptoms such as:  · Shortness of breath. · Fever. · Cough.   If you need to get care, call ahead to the doctor's office for instructions before you go. Make sure you wear a face cover to prevent exposing other people to the virus. Where can you get the latest information? The following health organizations are tracking and studying this virus. Their websites contain the most up-to-date information. [place] also learn what to do if you think you may have been exposed to the virus. · U.S. Centers for Disease Control and Prevention (CDC): The CDC provides updated news about the disease and travel advice. The website also tells you how to prevent the spread of infection. www.cdc.gov  · World Health Organization [facility]): WHO offers information about the virus outbreaks. WHO also has travel advice. www.who.int  Current as of: July 10, 2020               Content Version: 12.6  © 4524-0308 [employer]. Care instructions adapted under license by [employer] (which disclaims liability or warranty for this information). If you have questions about a medical condition or this instruction, always ask your healthcare professional. [address] any warranty or liability for your use of this information.

## 2020-10-16 NOTE — PROCEDURES
Esophagogastroduodenoscopy    Indications:  Epigastric pain  vomiting    Medications:  See anesthesia form    Assistants:  aydin De La Fuente RN      Post procedure diagnosis:  NEGATIVE POST-OPERATIVE EXAM FOR EGD, INCOMPLETE COLONOSCOPY TO 20CM    Description of Procedure:    Prior to the procedure its objectives, risks, consequences and alternatives were discussed with the patient who then elected to proceed. The Olympus video endoscope was inserted under direct vision into the mouth and then into the esophagus. The esophagus looked normal.    The z-line was located at 36 cm. A small gastric pouch was seen with the jejunal anastomosis at 38 cm. There  Were no gastric pouch abnormalities. The anastomosis looked normal.  Both limbs of the jejunum appeared normal.  I took biopsies of the jejunum, gastric pouch and the mid esophagus. Complications: There were no apparent complications and the patient tolerated the procedure well.         Implants:  none    Estimated Blood Loss:  none  Specimens Removed:  Jejunum  Stomach pouch  Mid esphagus  Impressions:  Negative post op exam      Signed By: Buffy Queen MD                        October 16, 2020     11:31 AM

## 2020-10-16 NOTE — ANESTHESIA PREPROCEDURE EVALUATION
Anesthetic History   No history of anesthetic complications            Review of Systems / Medical History  Patient summary reviewed, nursing notes reviewed and pertinent labs reviewed    Pulmonary        Sleep apnea: No treatment           Neuro/Psych         TIA and psychiatric history (anxiety)     Cardiovascular    Hypertension          CAD, cardiac stents (x 3 in 6/2013) and hyperlipidemia    Exercise tolerance: >4 METS  Comments:  ECHO: 55-60% EF, mild MR, TR, and AR    06/19 EKG: SR   GI/Hepatic/Renal     GERD          Comments: Diverticulosis  Gastroparesis s/p partial gastrectomy,   esophageal motor disorder  S/P bypass gastrojejunostomy (hx of gastric bypass)  Hx of diarrhea/constipation  melena Endo/Other    Diabetes  Hypothyroidism  Obesity and arthritis     Other Findings   Comments: Chronic pain, abdomen & back  Vit D defic           Physical Exam    Airway  Mallampati: III  TM Distance: 4 - 6 cm  Neck ROM: normal range of motion   Mouth opening: Normal     Cardiovascular    Rhythm: regular  Rate: normal         Dental    Dentition: Upper partial plate  Comments: removed   Pulmonary  Breath sounds clear to auscultation               Abdominal  GI exam deferred       Other Findings            Anesthetic Plan    ASA: 3  Anesthesia type: total IV anesthesia and general          Induction: Intravenous  Anesthetic plan and risks discussed with: Patient      preop glucose 139

## 2020-10-16 NOTE — PROCEDURES
Planned:  Colonoscopy  Performed:  Flexible sigmoidoscopy with biopsy    Indications: diarrhea    Pre-operative Diagnosis: see above    Assistant(s):  see chart Eulalia Jarvis RN  [address], tech    Medications:  See anesthesia form    Post-operative Diagnosis:   INCOMPLETE COLONOSCOPY TO 20CM            Procedure Details   Prior to the procedure its objectives, risks, consequences and alternatives were discussed with the patient who then elected to proceed. All questions were answered. Digital Rectal Exam:  was normal     The Olympus videocolonoscope was inserted in the rectum and advanced to 15 cm. there was looping. I switched to a peds colonoscope. I was able to go one turn more proximally with a slide by maneuver. I then encounered fixed looping. The colonoscope was slowly and carefully withdrawn as the mucosa was inspected. The mucosa looked normal.  No other abnormalities were noted. Retroflexion in the rectum was negative. I took rectosigmoid biopsies. Findings and  retroflexion exam were obtained. The preparation was adequate      Estimated Blood Loss:  none    Specimens:  Rectosigmoid colon    Findings:  Negative exam of mucosa to 20 cm  Incomplete colonoscopy     Complications:  none    Implants:  none    Repeat colonoscopy is recommended in:  Na (will screen her with ct colonography and possibly Cologuard).   Sherwin Momin MD  11:27 AM  10/16/2020

## 2020-10-16 NOTE — H&P
.  Pre-endoscopy H and P    The patient was seen and examined in the endoscopy suite. The airway was assessed and docuemented. The problem list, past medical history, and medications were reviewed. The diagnoses are:   Impressions: Diarrhea, overflow   Flatulence, eructation, and gas pain  Full incontinence of feces  Epigastric pain         Patient Active Problem List   Diagnosis Code    Anxiety F41.9    Depression F32.9    Hypovitaminosis D E55.9    GERD (gastroesophageal reflux disease) K21.9    Edema R60.9    Esophageal motor disorder K22.4    S/P bypass gastrojejunostomy Z98.0    SOB (shortness of breath) R06.02    CAD (coronary artery disease) I25.10    Angina pectoris (HCC) I20.9    S/P PTCA (percutaneous transluminal coronary angioplasty) Z98.61    Mixed hyperlipidemia E78.2    Diarrhea R19.7    Type 2 diabetes mellitus without complication (HCC) G41.7    Osteopenia M85.80    Encounter for medication monitoring Z51.81    CVA (cerebral vascular accident) ([facility] [facility] Q94.4    Complicated migraine D41. 109    Numbness and tingling of right side of face R20.0, R20.2    Stenosis of both internal carotid arteries I65.23    Type 2 diabetes with nephropathy ([facility]) E11.21    Essential hypertension I10     Social History     Socioeconomic History    Marital status:      Spouse name: Not on file    Number of children: Not on file    Years of education: Not on file    Highest education level: Not on file   Occupational History    Not on file   Social Needs    Financial resource strain: Not on file    Food insecurity     Worry: Not on file     Inability: Not on file    Transportation needs     Medical: Not on file     Non-medical: Not on file   Tobacco Use    Smoking status: Never Smoker    Smokeless tobacco: Never Used   Substance and Sexual Activity    Alcohol use: No     Alcohol/week: 0.0 standard drinks    Drug use: Never    Sexual activity: Not Currently   Lifestyle    Physical activity     Days per week: Not on file     Minutes per session: Not on file    Stress: Not on file   Relationships    Social connections     Talks on phone: Not on file     Gets together: Not on file     Attends [employer] service: Not on file     Active member of club or organization: Not on file     Attends meetings of clubs or organizations: Not on file     Relationship status: Not on file    Intimate partner violence     Fear of current or ex partner: Not on file     Emotionally abused: Not on file     Physically abused: Not on file     Forced sexual activity: Not on file   Other Topics Concern    Not on file   Social History Narrative    Not on file     Past Medical History:   Diagnosis Date    Anxiety     Anxiety     Arthritis     ASHD (arteriosclerotic heart disease)     CAD (coronary artery disease)     Chronic pain     Constipation     Depression     Diabetes ([facility] [address]     Diarrhea     Dysgeusia 11/21/2014    Flatulence/gas pain/belching 11/21/2014    Gastroparesis     GERD (gastroesophageal reflux disease)     Hypercholesterolemia     Hypertension     Nausea & vomiting     S/P bypass gastrojejunostomy 1/23/2012    Sleep apnea     no cpap, as stated 9/2019     The patient has a family history of na    No medications prior to admission. No medications prior to admission. The review of systems is:  negative for shortness of breath or chest pain      The heart, lungs, and mental status were satisfactory for the administration of anesthesia sedation and for the procedure. I discussed with the patient the objectives, risks, consequences and alternatives to the procedure. The patient was counseled at length about the risks of arti Covid-19 during their perioperative period and any recovery window from their procedure.   The patient was made aware that arti Covid-19  may worsen their prognosis for recovering from their procedure and lend to a higher morbidity and/or mortality risk. All material risks, benefits, and reasonable alternatives including postponing the procedure were discussed. The patient does  wish to proceed with the procedure at this time.         Cesilia Monae MD  10/16/2020  9:15 AM

## 2020-10-16 NOTE — H&P
Pre-endoscopy H and P    The patient was seen and examined in the [facility]. The airway was assessed and docuemented. The problem list, past medical history, and medications were reviewed. The symptoms are   She went to the ER 12/9/19 for terrible upper stomach pains (burning and cramping). She was balled up in a knot and crying. She was given morphine for the pain. She was vomiting. She had a normal CT. She was told she had an ulcer. Currently has epigastric pain, 3/10. Nothing makes it worse or better. Eats light. She has uncontrollable gas and fecal leakage. Nothing controls the gas. States her family can't stand her and she has to go in her bedroom. No recent abx. Diarrhea started 2 months ago. Fecal elastase normal 8/2/19. S/p gastric bypass. No hx of childbirth injury. EGD 10/1/19:  Impressions: Post op exam. Erosion on gastric side of anastomosis at suture line. thrush    Flex sig 5/26/16: Findings: Incomplete exam Diverticula Colon otherwise normal as far as scoped    BE 6/13/16: IMPRESSION: Sigmoid diverticulosis of. No other significant findings. Anal manometry 10/20/15:  POSTOPERATIVE DIAGNOSES1. Abnormal study. 2. Abnormal sensory finding, see below. 3. Balloon expulsion failed. 4. Failure of increase of voluntary squeeze. 5. Although push maneuver shows some decrease of residual pressure, thereis still a rectoanal pressure differential of 23.6 mmHg.          Patient Active Problem List   Diagnosis Code    Anxiety F41.9    Depression F32.9    Hypovitaminosis D E55.9    GERD (gastroesophageal reflux disease) K21.9    Edema R60.9    Esophageal motor disorder K22.4    S/P bypass gastrojejunostomy Z98.0    SOB (shortness of breath) R06.02    CAD (coronary artery disease) I25.10    Angina pectoris (HCC) I20.9    S/P PTCA (percutaneous transluminal coronary angioplasty) Z98.61    Mixed hyperlipidemia E78.2    Diarrhea R19.7    Type 2 diabetes mellitus without complication ([facility] [address] Z97.5    Osteopenia M85.80    Encounter for medication monitoring Z51.81    CVA (cerebral vascular accident) ([facility] [address] G35.3    Complicated migraine K54. 109    Numbness and tingling of right side of face R20.0, R20.2    Stenosis of both internal carotid arteries I65.23    Type 2 diabetes with nephropathy (HCC) E11.21    Essential hypertension I10     Social History     Socioeconomic History    Marital status:      Spouse name: Not on file    Number of children: Not on file    Years of education: Not on file    Highest education level: Not on file   Occupational History    Not on file   Social Needs    Financial resource strain: Not on file    Food insecurity     Worry: Not on file     Inability: Not on file    Transportation needs     Medical: Not on file     Non-medical: Not on file   Tobacco Use    Smoking status: Never Smoker    Smokeless tobacco: Never Used   Substance and Sexual Activity    Alcohol use: No     Alcohol/week: 0.0 standard drinks    Drug use: Never    Sexual activity: Not Currently   Lifestyle    Physical activity     Days per week: Not on file     Minutes per session: Not on file    Stress: Not on file   Relationships    Social connections     Talks on phone: Not on file     Gets together: Not on file     Attends [employer]ism service: Not on file     Active member of club or organization: Not on file     Attends meetings of clubs or organizations: Not on file     Relationship status: Not on file    Intimate partner violence     Fear of current or ex partner: Not on file     Emotionally abused: Not on file     Physically abused: Not on file     Forced sexual activity: Not on file   Other Topics Concern    Not on file   Social History Narrative    Not on file     Past Medical History:   Diagnosis Date    Anxiety     Anxiety     Arthritis     ASHD (arteriosclerotic heart disease)     CAD (coronary artery disease)     Chronic pain     Constipation     Depression     [facility]     Diarrhea     Dysgeusia 2014    Flatulence/gas pain/belching 2014    Gastroparesis     GERD (gastroesophageal reflux disease)     Hypercholesterolemia     Hypertension     Nausea & vomiting     S/P bypass gastrojejunostomy 2012    Sleep apnea     no cpap, as stated 2019     The patient has a family history of na    Prior to Admission Medications   Prescriptions Last Dose Informant Patient Reported? Taking? ALPRAZolam (XANAX) 1 mg tablet 10/15/2020 at Unknown time  No Yes   Sig: TAKE 1/2 TABLET BY MOUTH EVERY DAY AS NEEDED FOR ANXIETY THEN TAKE 1 TABLET AT BEDTIME AS NEEDED FOR SLEEP   Aspirin, Buffered 81 mg tab 10/14/2020  Yes Yes   Sig: Take 81 mg by mouth daily. Blood-Glucose Meter (Contour Next Meter) misc   No Yes   Sig: Use to check blood sugar 2 times daily  Indications: E11.9   Januvia 100 mg tablet 10/15/2020 at Unknown time  No Yes   Sig: TAKE 1 TABLET BY MOUTH ONCE DAILY FOR DIABETES   cholecalciferol, vitamin D3, (VITAMIN D3) 2,000 unit tab 10/14/2020  Yes Yes   Sig: Take 1 capsule by mouth daily   fluticasone propionate (FLONASE) 50 mcg/actuation nasal spray 2020 at Unknown time  No Yes   Si Sprays by Both Nostrils route daily as needed for Rhinitis. furosemide (LASIX) 20 mg tablet 10/14/2020  No Yes   Sig: TAKE 1 TABLET BY MOUTH ONCE DAILY   glucose blood VI test strips (CONTOUR NEXT TEST STRIPS) strip   No Yes   Sig: USE TO CHECK BLOOD SUGAR TWICE DAILY   levothyroxine (synthroid) 25 mcg tablet 10/15/2020 at Unknown time  Yes Yes   Sig: take one tablet  Monday thru Saturday and skip each .    lisinopriL (PRINIVIL, ZESTRIL) 2.5 mg tablet 10/15/2020 at Unknown time  No Yes   Sig: TAKE 1 TABLET BY MOUTH ONCE DAILY   metoprolol succinate (TOPROL-XL) 25 mg XL tablet 10/14/2020 at Unknown time  No Yes   Sig: TAKE 1 TABLET EVERY DAY   multivit-minerals/folic acid (CENTRUM VITAMINTS PO) 10/14/2020  Yes Yes   Sig: Take 1 tablet by mouth daily pravastatin (PRAVACHOL) 40 mg tablet 10/14/2020  No Yes   Sig: TAKE 1 TABLET BY MOUTH EVERY NIGHT      Facility-Administered Medications: None           The review of systems is:  negative for shortness of breath or chest pain      The heart, lungs, and mental status were satisfactory for the administration of anesthesia sedation and for the procedure. I discussed with the patient the objectives, risks, consequences and alternatives to the procedure. The patient was counseled at length about the risks of arti Covid-19 during their perioperative period and any recovery window from their procedure. The patient was made aware that arti Covid-19  may worsen their prognosis for recovering from their procedure and lend to a higher morbidity and/or mortality risk. All material risks, benefits, and reasonable alternatives including postponing the procedure were discussed. The patient does  wish to proceed with the procedure at this time.         Cesilia Monae MD  10/16/2020  10:41 AM

## 2020-10-16 NOTE — PERIOP NOTES
Shahrzad Mcqueen  1950  [id number]    Situation:  Verbal report given from: RN and CRNA  Procedure: Procedure(s):  COLONOSCOPY  ESOPHAGOGASTRODUODENOSCOPY (EGD)  ESOPHAGOGASTRODUODENAL (EGD) BIOPSY  COLON BIOPSY    Background:    Preoperative diagnosis: ABDOMINAL PAIN MULTIPLE SITES, BLOATING, GAS PAIN, CONSTIPATION, DIARRHEA, DYSPHAGIA, EPIGASTRIC PAIN, MELENA, REFLUX, LEFT LOWER QUADRANT ABDOMINAL PAIN    Postoperative diagnosis: NEGATIVE POST-OPERATIVE EXAM FOR EGD, INCOMPLETE COLONOSCOPY TO 20CM    :  Dr. Arvizu    Assistant(s): Jasmin Enter: Gerardo Reyes RN  Endoscopy Technician-2: Lidya Olvera    Specimens:   ID Type Source Tests Collected by Time Destination   1 : Johnny Puri MD 10/16/2020 1107 Pathology   2 : STOMACH BIOPSY  Preservative Stomach  Bertha Jenkins MD 10/16/2020 1107 Pathology   3 : MID ESOPHAGUS BIOPSY  Preservative Esophagus, Mid  Bertha Jenkins MD 10/16/2020 1109 Pathology   4 : RECTO-SIGMOID COLON BIOPSY  Preservative Colon, Recto-sigmoid  Bertha Jenkins MD 10/16/2020 1124 Pathology       Assessment:  Intra-procedure medications   Propofol      Anesthesia gave intra-procedure sedation and medications, see anesthesia flow sheet     Intravenous fluids: LR@ KVO     Vital signs stable. Pt given warm blanket for chill and states abdomen sore and feeling weak. Arm repositioned for IV fluids    Abdominal assessment: round and soft       Recommendation:    Permission to share finding with  yes    All side rails up, bed in low position, wheels locked. Nurse at bedside.

## 2020-10-20 ENCOUNTER — TELEPHONE (OUTPATIENT)
Dept: FAMILY MEDICINE CLINIC | Age: 70
End: 2020-10-20

## 2020-10-20 NOTE — TELEPHONE ENCOUNTER
----- Message from Leverette Schilder sent at 10/20/2020  9:42 AM EDT -----  Regarding: Dr. Dev Higuera first and last name: Pt   Reason for call: Blake Monae. Pre-Op   Callback required yes/no and why: yes  Best contact number(s): [phone]  Details to clarify the request: Pt. would like a call back to confirm if paperwork was received to schedule pre-op for surgery on 11/23 or if a pre-op appt. is needed due to last visit on 9/29. The pt. advised that if paperwork was received for surgery, does the pt. need to hand deliver the paperwork back to surgeon or will that information be faxed back.

## 2020-11-09 ENCOUNTER — HOSPITAL ENCOUNTER (OUTPATIENT)
Dept: GENERAL RADIOLOGY | Age: 70
Discharge: HOME | End: 2020-11-09
Attending: ORTHOPAEDIC SURGERY
Payer: MEDICARE

## 2020-11-09 ENCOUNTER — HOSPITAL ENCOUNTER (OUTPATIENT)
Dept: PREADMISSION TESTING | Age: 70
Discharge: HOME | End: 2020-11-09
Attending: ORTHOPAEDIC SURGERY
Payer: MEDICARE

## 2020-11-09 VITALS
TEMPERATURE: 98.4 F | BODY MASS INDEX: 29.41 KG/M2 | DIASTOLIC BLOOD PRESSURE: 49 MMHG | HEART RATE: 65 BPM | WEIGHT: 159.83 LBS | RESPIRATION RATE: 20 BRPM | HEIGHT: 62 IN | OXYGEN SATURATION: 99 % | SYSTOLIC BLOOD PRESSURE: 115 MMHG

## 2020-11-09 LAB
25(OH)D3 SERPL-MCNC: 36.3 NG/ML (ref 30–100)
ABO + RH BLD: NORMAL
ALBUMIN SERPL-MCNC: 4.1 G/DL (ref 3.5–5)
ALBUMIN/GLOB SERPL: 1.2 {RATIO} (ref 1.1–2.2)
ALP SERPL-CCNC: 117 U/L (ref 45–117)
ALT SERPL-CCNC: 25 U/L (ref 12–78)
ANION GAP SERPL CALC-SCNC: 4 MMOL/L (ref 5–15)
APPEARANCE UR: CLEAR
AST SERPL-CCNC: 17 U/L (ref 15–37)
ATRIAL RATE: 72 BPM
BACTERIA URNS QL MICRO: NEGATIVE /HPF
BILIRUB SERPL-MCNC: 0.3 MG/DL (ref 0.2–1)
BILIRUB UR QL: NEGATIVE
BLOOD GROUP ANTIBODIES SERPL: NORMAL
BUN SERPL-MCNC: 11 MG/DL (ref 6–20)
BUN/CREAT SERPL: 12 (ref 12–20)
CALCIUM SERPL-MCNC: 9.4 MG/DL (ref 8.5–10.1)
CALCULATED P AXIS, ECG09: 63 DEGREES
CALCULATED R AXIS, ECG10: 4 DEGREES
CALCULATED T AXIS, ECG11: 47 DEGREES
CHLORIDE SERPL-SCNC: 107 MMOL/L (ref 97–108)
CO2 SERPL-SCNC: 28 MMOL/L (ref 21–32)
COLOR UR: NORMAL
CREAT SERPL-MCNC: 0.92 MG/DL (ref 0.55–1.02)
DIAGNOSIS, 93000: NORMAL
EPITH CASTS URNS QL MICRO: NORMAL /LPF
ERYTHROCYTE [DISTWIDTH] IN BLOOD BY AUTOMATED COUNT: 12.7 % (ref 11.5–14.5)
EST. AVERAGE GLUCOSE BLD GHB EST-MCNC: 166 MG/DL
GLOBULIN SER CALC-MCNC: 3.3 G/DL (ref 2–4)
GLUCOSE SERPL-MCNC: 206 MG/DL (ref 65–100)
GLUCOSE UR STRIP.AUTO-MCNC: NEGATIVE MG/DL
HBA1C MFR BLD: 7.4 % (ref 4–5.6)
HCT VFR BLD AUTO: 38.9 % (ref 35–47)
HGB BLD-MCNC: 12.5 G/DL (ref 11.5–16)
HGB UR QL STRIP: NEGATIVE
HYALINE CASTS URNS QL MICRO: NORMAL /LPF (ref 0–5)
INR PPP: 1 (ref 0.9–1.1)
KETONES UR QL STRIP.AUTO: NEGATIVE MG/DL
LEUKOCYTE ESTERASE UR QL STRIP.AUTO: NEGATIVE
MCH RBC QN AUTO: 29.3 PG (ref 26–34)
MCHC RBC AUTO-ENTMCNC: 32.1 G/DL (ref 30–36.5)
MCV RBC AUTO: 91.3 FL (ref 80–99)
NITRITE UR QL STRIP.AUTO: NEGATIVE
NRBC # BLD: 0 K/UL (ref 0–0.01)
NRBC BLD-RTO: 0 PER 100 WBC
P-R INTERVAL, ECG05: 138 MS
PH UR STRIP: 5.5 [PH] (ref 5–8)
PLATELET # BLD AUTO: 250 K/UL (ref 150–400)
PMV BLD AUTO: 11.2 FL (ref 8.9–12.9)
POTASSIUM SERPL-SCNC: 4.3 MMOL/L (ref 3.5–5.1)
PREALB SERPL-MCNC: 21.6 MG/DL (ref 20–40)
PROT SERPL-MCNC: 7.4 G/DL (ref 6.4–8.2)
PROT UR STRIP-MCNC: NEGATIVE MG/DL
PROTHROMBIN TIME: 10.6 SEC (ref 9–11.1)
Q-T INTERVAL, ECG07: 402 MS
QRS DURATION, ECG06: 74 MS
QTC CALCULATION (BEZET), ECG08: 440 MS
RBC # BLD AUTO: 4.26 M/UL (ref 3.8–5.2)
RBC #/AREA URNS HPF: NORMAL /HPF (ref 0–5)
SODIUM SERPL-SCNC: 139 MMOL/L (ref 136–145)
SP GR UR REFRACTOMETRY: 1.01 (ref 1–1.03)
SPECIMEN EXP DATE BLD: NORMAL
UA: UC IF INDICATED,UAUC: NORMAL
UROBILINOGEN UR QL STRIP.AUTO: 1 EU/DL (ref 0.2–1)
VENTRICULAR RATE, ECG03: 72 BPM
WBC # BLD AUTO: 6.7 K/UL (ref 3.6–11)
WBC URNS QL MICRO: NORMAL /HPF (ref 0–4)

## 2020-11-09 PROCEDURE — 93005 ELECTROCARDIOGRAM TRACING: CPT

## 2020-11-09 PROCEDURE — 85027 COMPLETE CBC AUTOMATED: CPT

## 2020-11-09 PROCEDURE — 82306 VITAMIN D 25 HYDROXY: CPT

## 2020-11-09 PROCEDURE — 83036 HEMOGLOBIN GLYCOSYLATED A1C: CPT

## 2020-11-09 PROCEDURE — 84134 ASSAY OF PREALBUMIN: CPT

## 2020-11-09 PROCEDURE — 97161 PT EVAL LOW COMPLEX 20 MIN: CPT

## 2020-11-09 PROCEDURE — 36415 COLL VENOUS BLD VENIPUNCTURE: CPT

## 2020-11-09 PROCEDURE — 71046 X-RAY EXAM CHEST 2 VIEWS: CPT

## 2020-11-09 PROCEDURE — 97116 GAIT TRAINING THERAPY: CPT

## 2020-11-09 PROCEDURE — 80053 COMPREHEN METABOLIC PANEL: CPT

## 2020-11-09 PROCEDURE — 85610 PROTHROMBIN TIME: CPT

## 2020-11-09 PROCEDURE — 86900 BLOOD TYPING SEROLOGIC ABO: CPT

## 2020-11-09 PROCEDURE — 81001 URINALYSIS AUTO W/SCOPE: CPT

## 2020-11-09 RX ORDER — SODIUM CHLORIDE, SODIUM LACTATE, POTASSIUM CHLORIDE, CALCIUM CHLORIDE 600; 310; 30; 20 MG/100ML; MG/100ML; MG/100ML; MG/100ML
25 INJECTION, SOLUTION INTRAVENOUS CONTINUOUS
Status: CANCELLED | OUTPATIENT
Start: 2020-11-23

## 2020-11-09 RX ORDER — ACETAMINOPHEN 500 MG
1000 TABLET ORAL ONCE
Status: CANCELLED | OUTPATIENT
Start: 2020-11-23 | End: 2020-11-23

## 2020-11-09 RX ORDER — PREGABALIN 150 MG/1
150 CAPSULE ORAL ONCE
Status: CANCELLED | OUTPATIENT
Start: 2020-11-23 | End: 2020-11-23

## 2020-11-09 NOTE — PERIOP NOTES
[facility]  Joint/Spine Preoperative Instructions        Surgery Date Nov 23, 2020          Time of Arrival 1030  Contact# [phone]    1. On the day of your surgery, please report to the Surgical Services Registration Desk and sign in at your designated time. The Surgery Center is located to the right of the Emergency Room. 2. You must have someone with you to drive you home. You should not drive a car for 24 hours following surgery. Please make arrangements for a friend or family member to stay with you for the first 24 hours after your surgery. 3. No food after midnight 11/22/20  Medications morning of surgery should be taken with a sip of water. Please follow pre-surgery drink instructions that were given at your Pre Admission Testing appointment. PATmay call you not to do pre surgery drinks- depending on lab work    4. We recommend you do not drink any alcoholic beverages for 24 hours before and after your surgery. 5. Contact your surgeons office for instructions on the following medications: non-steroidal anti-inflammatory drugs (i.e. Advil, Aleve), vitamins, and supplements. (Some surgeons will want you to stop these medications prior to surgery and others may allow you to take them)  **If you are currently taking Plavix, Coumadin, Aspirin and/or other blood-thinning agents, contact your surgeon for instructions. ** Your surgeon will partner with the physician prescribing these medications to determine if it is safe to stop or if you need to continue taking. Please do not stop taking these medications without instructions from your surgeon    6. Wear comfortable clothes. Wear glasses instead of contacts. Do not bring any money or jewelry. Please bring picture ID, insurance card, and any prearranged co-payment or hospital payment. Do not wear make-up, particularly mascara the morning of your surgery.   Do not wear nail polish, particularly if you are having foot /hand surgery. Wear your hair loose or down, no ponytails, buns, america pins or clips. All body piercings must be removed. Please shower with antibacterial soap for three consecutive days before and on the morning of surgery, but do not apply any lotions, powders or deodorants after the shower on the day of surgery. Please use a fresh towels after each shower. Please sleep in clean clothes and change bed linens the night before surgery. Please do not shave for 48 hours prior to surgery. Shaving of the face is acceptable. 7. You should understand that if you do not follow these instructions your surgery may be cancelled. If your physical condition changes (I.e. fever, cold or flu) please contact your surgeon as soon as possible. 8. It is important that you be on time. If a situation occurs where you may be late, please call [phone] (OR Holding Area). 9. If you have any questions and or problems, please call [phone] (Pre-admission Testing). 10. Your surgery time may be subject to change. You will receive a phone call the evening prior if your time changes. 11.  If having outpatient surgery, you must have someone to drive you here, stay with you during the duration of your stay, and to drive you home at time of discharge. 12. The following link is for the educational video for patients and/or families. http://[facility].org/. com/locations/zrlpwyqri-togpfsg-noutjlm/[place]/[facility]/educational-materials  Follow surgeon instructions    Covid test on Nov 19 at 36 - [place] [facility] of [facility]  Self quarantine from maru day to day of surgery    Practice incentive spirometry twice a day- ten times each- bring Day of Surgery    Continue to review \"spine\" booklet         TAKE ALL MEDICATIONS THE DAY OF SURGERY EXCEPT:  Januvia, Lisinopril    I understand a pre-operative phone call will be made to verify my surgery time.   In the event that I am not available, I give permission for a message to be left on my answering service and/or with another person?  yes         ___________________      __________   _________    (Signature of Patient)             (Witness)                (Date and Time)

## 2020-11-09 NOTE — PERIOP NOTES

## 2020-11-09 NOTE — PERIOP NOTES
Hibiclens/Chlorhexidine    Preventing Infections Before and After  Your Surgery    IMPORTANT INSTRUCTIONS    Please read and follow these instructions carefully. If you are unable to comply with the below instructions your procedure will be cancelled. Every Night for Three (3) nights before your surgery:  1. Shower with an antibacterial soap, such as Dial, or the soap provided at your preassessment appointment. A shower is better than a bath for cleaning your skin. 2. If needed, ask someone to help you reach all areas of your body. Dont forget to clean your belly button with every shower. The night before your surgery: If you lose your Hibiclens/chlorhexidine please contact surgery center or you can purchase it at a local pharmacy  1. On the night before your surgery, shower with an antibacterial soap, such as Dial, or the soap provided at your preassessment appointment. 2. With one packet of Hibiclens/Chlorhexidine in hand, turn water off.  3. Apply Hibiclens antiseptic skin cleanser with a clean, freshly washed washcloth. ? Gently apply to your body from chin to toes (except the genital area) and especially the area(s) where your incision(s) will be. ? Leave Hibiclens/Chlorhexidine on your skin for at least 20 seconds. CAUTION: If needed, Hibiclens/chlorhexidine may be used to clean the folds of skin of the legs (such as in the area of the groin) and on your buttocks and hips. However, do not use Hibiclens/Chlorhexidine above the neck or in the genital area (your bottom) or put inside any area of your body. 4. Turn the water back on and rinse. 5. Dry gently with a clean, freshly washed towel. 6. After your shower, do not use any powder, deodorant, perfumes or lotion. 7. Use clean, freshly washed towels and washcloths every time you shower. 8. Wear clean, freshly washed pajamas to bed the night before surgery. 9. Sleep on clean, freshly washed sheets.   10. Do not allow pets to sleep in your bed with you. The Morning of your surgery:  1. Shower again thoroughly with an antibacterial soap, such as Dial or the soap provided at your preassessment appointment. If needed, ask someone for help to reach all areas of your body. Dont forget to clean your belly button! Rinse. 2. Dry gently with a clean, freshly washed towel. 3. After your shower, do not use any powder, deodorant, perfumes or lotion prior to surgery. 4. Put on clean, freshly washed clothing. Tips to help prevent infections after your surgery:  1. Protect your surgical wound from germs:  ? Hand washing is the most important thing you and your caregivers can do to prevent infections. ? Keep your bandage clean and dry! ? Do not touch your surgical wound. 2. Use clean, freshly washed towels and washcloths every time you shower; do not share bath linens with others. 3. Until your surgical wound is healed, wear clothing and sleep on bed linens each day that are clean and freshly washed. 4. Do not allow pets to sleep in your bed with you or touch your surgical wound. 5. Do not smoke  smoking delays wound healing. This may be a good time to stop smoking. 6. If you have diabetes, it is important for you to manage your blood sugar levels properly before your surgery as well as after your surgery. Poorly managed blood sugar levels slow down wound healing and prevent you from healing completely. If you lose your Hibiclens/chlorhexidine, please call the [facility], or it is available for purchase at your pharmacy.                ___________________      ___________________      ________________  (Signature of Patient)          (Witness)                   (Date and Time)

## 2020-11-09 NOTE — PERIOP NOTES
Incentive Spirometer        Using the incentive spirometer helps expand the small air sacs of your lungs, helps you breathe deeply, and helps improve your lung function. Use your incentive spirometer twice a day (10 breaths each time) prior to surgery. How to Use Your Incentive Spirometer:  1. Hold the incentive spirometer in an upright position. 2. Breathe out as usual.   3. Place the mouthpiece in your mouth and seal your lips tightly around it. 4. Take a deep breath. Breathe in slowly and as deeply as possible. Keep the blue flow rate guide between the arrows. 5. Hold your breath as long as possible. Then exhale slowly and allow the piston to fall to the bottom of the column. 6. Rest for a few seconds and repeat steps one through five at least 10 times. PAT Tidal Volume______1250____________  x_______2_________  Date___11/9/20____________________    Luciana Medici THE INCENTIVE SPIROMETER WITH YOU TO THE HOSPITAL ON THE DAY OF YOUR SURGERY. Opportunity given to ask and answer questions as well as to observe return demonstration.     Patient signature_____________________________    Witness____________________________

## 2020-11-09 NOTE — PROGRESS NOTES
[facility]  Physical Therapy Pre-surgery evaluation  [address], [address]    PHYSICAL THERAPY PRE SPINE SURGERY EVALUATION  Jian Allan (79 y.o. female)  Date: 11/9/2020    pat  Procedure(s) (LRB):  L2 THROUGH 4 LATERAL FUSION (ANES CHOICE) (N/A)     Precautions: Spinal         ASSESSMENT :  Based on the objective data described below, the patient presents with impaired tolerance of activity and pain due to end stage degenerative joint disease in the spinal level: lumbar spine. Patient will have two stage surgery. Discussed anticipated disposition to home with possible discharge within a 1 to 2 day time frame post-surgery. Patient in agreement. Anticipate patient will need acute PT and OT orders based on expected deficits post surgery. GOALS: (Goals have been discussed and agreed upon with patient.)  DISCHARGE GOALS: Time Frame: 1 DAY  1. Patient will demonstrate increased strength, range of motion, and pain control via a home exercise program in order to minimize functional deficits in preparation for their upcoming surgery. This will be achieved by using education, demonstration and through the use of an informational handout including a home exercise program.  REHABILITATION POTENTIAL FOR STATED GOALS: Good     RECOMMENDATIONS AND PLANNED INTERVENTIONS: (Benefits and precautions of physical therapy have been discussed with the patient.)  · Home Exercise Program  TREATMENT PLAN EFFECTIVE DATES: 11/9/2020 to 11/9/2020   FREQUENCY/DURATION: Patient to continue to perform home exercise program at least twice daily until surgery. SUBJECTIVE:   Patient stated I have had this pain for a year and it's not getting any better.     OBJECTIVE DATA SUMMARY:   HISTORY:      Past Medical History:   Diagnosis Date    Anxiety     Anxiety     Arthritis     ASHD (arteriosclerotic heart disease)     CAD (coronary artery disease)     Chronic pain     Constipation     Depression     Diabetes ([facility] [address]     TypeII    Diarrhea     Dysgeusia 11/21/2014    Flatulence/gas pain/belching 11/21/2014    Gastroparesis     GERD (gastroesophageal reflux disease)     Hypercholesterolemia     Hypertension     Nausea & vomiting     S/P bypass gastrojejunostomy 1/23/2012    Sleep apnea     no cpap, as stated 9/2019    Thyroid disease      Past Surgical History:   Procedure Laterality Date    ABDOMEN SURGERY PROC UNLISTED      s/p partial gastrectomy 2/2 gastroparesis    COLONOSCOPY N/A 5/26/2016    COLONOSCOPY [facility] Judith [facility]eralta [facility] ENDOSCOPY    COLONOSCOPY N/A 10/16/2020    COLONOSCOPY performed by Colin Rodriguez MD at [facility] AMBULATORY OR    EGD  7/13/2009    GASTROJEJUNOSTOMY      HC BLD CARPEL TUNNEL RELEASE      bilat    HX APPENDECTOMY      HX BLADDER SUSPENSION      HX CHOLECYSTECTOMY      HX CORONARY STENT PLACEMENT  06/2013    3 stents    HX FEMORAL BYPASS Right 11/2017    HX GASTRIC BYPASS  6/02    HX HEART CATHETERIZATION  2017    stents remain open, no new stents placed    HX HYSTERECTOMY      HX LAPAROTOMY  8/02    2/2 intra abd abscess    HX LUMBAR LAMINECTOMY  01/2008    rods and screws    HX OOPHORECTOMY      HX ORTHOPAEDIC Bilateral     carpal tunnel    HX ORTHOPAEDIC Bilateral     bone spurs    ME COLONOSCOPY FLX DX W/COLLJ SPEC WHEN PFRMD  5/03/2006    Dr. Rhodes Poster    ME COLONOSCOPY W/BIOPSY SINGLE/MULTIPLE  2/9/2012         ME EGD BALLOON DILATION ESOPHAGUS <30 MM DIAM  1/23/2012         ME EGD TRANSORAL BIOPSY SINGLE/MULTIPLE  7/11/2011         ME EGD TRANSORAL BIOPSY SINGLE/MULTIPLE  1/23/2012         ME REMOVAL OF HEEL SPUR  2001    bilat    SIGMOIDOSCOPY,BIOPSY  10/16/2020         UPPER GI ENDOSCOPY,BIOPSY  10/1/2019         UPPER GI ENDOSCOPY,BIOPSY  10/16/2020            Prior Level of Function/Home Situation: Patient drives and ambulates indep in community, but reports activity has been limited due to pain  Personal factors and/or comorbidities impacting plan of care: prior back surgery 2002, cardiac      Home Situation  Home Environment: Private residence  # Steps to Enter: (ramp)  Wheelchair Ramp: Yes  One/Two Story Residence: One story  Living Alone: No  Support Systems: Spouse/Significant Other/Partner  Patient Expects to be Discharged to[de-identified] Private residence  Current DME Used/Available at Home: Glucometer  Tub or Shower Type: Tub/Shower combination           EXAMINATION/PRESENTATION/DECISION MAKING:     ADLs (Current Functional Status): Bathing/Showering:   [x] Independent  [] Requires Assistance from Someone  [] Sponge Bath Only   Ambulation:  [x] Independent  [] Walk Indoors Only  [] Walk Outdoors  [] Use Assistive Device  [] Use Wheelchair Only     Dressing:  [x] Independent    Requires Assistance from Someone for:  [] Sock/Shoes  [] Pants  [] Everything   Household Activities:  [x] Routine house and yard work  [] Light Housework Only  [] None       Critical Behavior:                Strength:     Strength:  Within functional limits                       Tone & Sensation:   Tone: Normal              Sensation: Impaired                  Range Of Motion:     AROM: Within functional limits                            Coordination:   Coordination: Within functional limits    Functional Mobility:  Transfers:  Sit to Stand: Independent  Stand to Sit: Independent                       Balance:   Sitting: Intact  Standing: Intact  Ambulation/Gait Training:  Distance (ft): 100 Feet (ft)     Ambulation - Level of Assistance: Modified independent        Gait Abnormalities: Decreased step clearance, Trunk sway increased              Speed/Danita: Pace decreased (<100 feet/min)                         Functional Measure:  10 Meter walk test:  (Specify if any supplemental oxygen is used, the type, pre, during and post sats.)    Self-Selected Or Fast-Velocity: Self Selected Velocity  Trial 1 (Time to Walk 10 Meters): 17.8 Seconds  Trial 2 (Time to Walk 10 Meters): 15.2 Seconds  Trial 3 (Time to Walk 10 Meters): 15.5 Seconds  Average : 16.2 Seconds  Score (Meters/Second): 0.6 Meters/Second             Walking Speed (m/s)  Modifier Scale Age 52-63 Age 61-76 Age 66-77 Age 80-80    Male Female Male Female Male Female Male Female   CH   0% Impaired ? 1.39 ? 1.40 ? 1.36 ? 1.30 ? 1.33 ? 1.27 ? 1.21 ? 1.15   CI   1-19% Impaired 1.11-1.38 1.12-1.39 1.09-1.35 1.04-1.29 1.06-1.32 1.01-1.26 0.96-1.20 0.92-1.14   CJ   20-39% Impaired 0.83-1.10 0.84-1.11 0.82-1.08 0.78-1.03 0.80-1.05 0.76-1.00 0.72-0.95 0.69-0.91   CK   40-59% Impaired 0.56-0.82 0.57-0.83 0.54-0.81 0.52-0.77 0.53-0.79 0.51-0.75 0.48-0.71 0.46-0.68   CL   60-79% Impaired 0.28-0.55 0.28-0.56 0.27-0.53 0.26-0.51 0.27-0.52 0.25-0.50 0.24-0.49 0.23-0.45   CM   80-99% Impaired 0.01-0.28 < 0.01-0.28 < 0.01-0.27 < 0.01-0.26 0.01-0.27 0.01-0.24 0.01-0.23 0.01-0.22   CN   100% Impaired Cannot Perform   Minimal Detectable Change (MDC-90) = 0.1 m/s  Pablo MIRANDA \"Comfortable and maximum walking speed of adults aged 20-79 years: reference values and determinants. \" Age and Agin Volume 26(1):15-9. Madeline Knack, Mollinger L. \"Age- and gender-related test performance in community-dwelling elderly people: Six-Minute Walk Test, Villanueva Balance Scale, Timed Up & Go Test, and gait speeds. \" Physical Therapy: 2002 Volume 82(2):128-37. Margaux Quiroz DM, Maria Dolores LAMBERT, Jory Youngblood JD, Luther ENGLE. \"Assessing stability and change of four performance measures: a longitudinal study evaluating outcome following total hip and knee arthroplasty. \" [facility] Musculoskeletal Disorders: 2005 Volume 6(3). Blaine Del Rosario, PhD; Solo Morales, PhD. [place] Paper: \"Walking Speed: the Sixth Vital Sign\" Journal of Geriatric Physical Therapy: 2009 - Volume 32 - Issue 2 - p 25 .       Pain:  Pain Scale 1: Numeric (0 - 10)  Pain Intensity 1: 7  Pain Location 1: Back  Pain Orientation 1: Lower  Pain Description 1: Pressure       Radicular pain:   Patient reports low back pain radiating into right LE to level of knee, also has paresthesia anterior thigh    Activity Tolerance:   good  Patient []   does  [x]   does not demonstrate signs/symptoms of shortness of breath/dyspnea on exertion/respiratory distress. COMMUNICATION/EDUCATION:   The patient was educated on:  [x]         Early post operative mobility is imperative to achieve a patient's desired outcomes and to restore biological function. [x]         Post operative spinal precautions may/may not be applicable. These precautions are based on the patient's physician and the procedure(s) performed. [x]         Spinal precautions including:  ·   No bending forward, sideways, or backwards  ·   No twisting   ·   No lifting more than 5-10 pounds  ·   No sitting longer than 30-60 minutes at a time  ·   Bogdan brace when out of bed and mobilizing    The patients plan of care was discussed as follows:   [x]         The patient verbalized understanding of his/her plan in preparation for their upcoming surgery  []         The patient's  was present for this session  []        The patient reports that he/she does not have a  identified at this time  []         The  verbalized understanding of the education regarding the patient's upcoming surgery  [x]         Patient/family agree to work toward stated goals and plan of care. []         Patient understands intent and goals of therapy, but is neutral about his/her participation. []         Patient is unable to participate in goal setting and plan of care.       Thank you for this referral.  Diana Clements, PT    Time Calculation: 19 mins

## 2020-11-10 LAB
BACTERIA SPEC CULT: NORMAL
BACTERIA SPEC CULT: NORMAL
SERVICE CMNT-IMP: NORMAL

## 2020-11-10 NOTE — ADVANCED PRACTICE NURSE
PAT Nurse Practitioner   Pre-Operative Chart Review/Assessment:-ORTHOPEDIC/NEUROSURGICAL SPINE                Patient Name:  Carlene Shetty                                                         Age:   78 y.o.    :  1950     Today's Date:  11/10/2020     Date of PAT:   20      Date of Surgery:    Staged:  20 and 20     Procedure(s):    L2-4 lateral fusion 20 and L2-4 posterior decompression and fusion 20     Surgeon:   Kurt Armenta Clearance:  Dr. Rhonda Delvalle 20                   PLAN:      1)  Cardiac Clearance:  Not requested       2)  Diabetic Treatment Consult:  Not indicated-A1C 7.4      3)  Sleep Apnea evaluation:   KYLIE score of 2 in PAT-has prior dx of KYLIE but is not using CPAP. Denies witnessed apnea while sleeping.        4) Treatment for MRSA/Staph Aureus:  Negative       5) Additional Concerns:  T2DM, CAD w/ multiple stents, chronic pain, s/p gastrojejunostomy bypass , gastroparesis                Vital Signs:         Vitals:    20 1331   BP: (!) 115/49   Pulse: 65   Resp: 20   Temp: 98.4 °F (36.9 °C)   SpO2: 99%   Weight: 72.5 kg (159 lb 13.3 oz)   Height: 5' 2\" (1.575 m)            ____________________________________________  PAST MEDICAL HISTORY  Past Medical History:   Diagnosis Date    Anxiety     Anxiety     Arthritis     ASHD (arteriosclerotic heart disease)     CAD (coronary artery disease)     Chronic pain     Constipation     Depression     Diabetes ([facility] [address]     TypeII    Diarrhea     Dysgeusia 2014    Flatulence/gas pain/belching 2014    Gastroparesis     GERD (gastroesophageal reflux disease)     Hypercholesterolemia     Hypertension     Nausea & vomiting     S/P bypass gastrojejunostomy 2012    Sleep apnea     no cpap, as stated 2019    Thyroid disease       ____________________________________________  PAST SURGICAL HISTORY  Past Surgical History:   Procedure Laterality Date    ABDOMEN SURGERY PROC UNLISTED      s/p partial gastrectomy 2/2 gastroparesis    COLONOSCOPY N/A 5/26/2016    COLONOSCOPY performed by Gabriela Brenner [facility] at [facility] ENDOSCOPY    COLONOSCOPY N/A 10/16/2020    COLONOSCOPY performed by Shavonne King MD at [facility] AMBULATORY OR    EGD  7/13/2009    GASTROJEJUNOSTOMY      HC BLD CARPEL TUNNEL RELEASE      bilat    HX APPENDECTOMY      HX BLADDER SUSPENSION      HX CHOLECYSTECTOMY      HX CORONARY STENT PLACEMENT  06/2013    3 stents    HX FEMORAL BYPASS Right 11/2017    HX GASTRIC BYPASS  6/02    HX HEART CATHETERIZATION  2017    stents remain open, no new stents placed    HX HYSTERECTOMY      HX LAPAROTOMY  8/02    2/2 intra abd abscess    HX LUMBAR LAMINECTOMY  01/2008    rods and screws    HX OOPHORECTOMY      HX ORTHOPAEDIC Bilateral     carpal tunnel    HX ORTHOPAEDIC Bilateral     bone spurs    AL COLONOSCOPY FLX DX W/COLLJ SPEC WHEN PFRMD  5/03/2006    Dr. Leidy Ocampo    AL COLONOSCOPY W/BIOPSY SINGLE/MULTIPLE  2/9/2012         AL EGD BALLOON DILATION ESOPHAGUS <30 MM DIAM  1/23/2012         AL EGD TRANSORAL BIOPSY SINGLE/MULTIPLE  7/11/2011         AL EGD TRANSORAL BIOPSY SINGLE/MULTIPLE  1/23/2012         AL REMOVAL OF HEEL SPUR  2001    bilat    SIGMOIDOSCOPY,BIOPSY  10/16/2020         UPPER GI ENDOSCOPY,BIOPSY  10/1/2019         UPPER GI ENDOSCOPY,BIOPSY  10/16/2020           ____________________________________________  HOME MEDICATIONS    Current Outpatient Medications   Medication Sig    levothyroxine (synthroid) 25 mcg tablet 25 mcg. take two tablets  Monday thru Friday and 1 tab Saturday And Sunday.     Blood-Glucose Meter (Contour Next Meter) misc Use to check blood sugar 2 times daily  Indications: E11.9    ALPRAZolam (XANAX) 1 mg tablet TAKE 1/2 TABLET BY MOUTH EVERY DAY AS NEEDED FOR ANXIETY THEN TAKE 1 TABLET AT BEDTIME AS NEEDED FOR SLEEP    lisinopriL (PRINIVIL, ZESTRIL) 2.5 mg tablet TAKE 1 TABLET BY MOUTH ONCE DAILY    furosemide (LASIX) 20 mg tablet TAKE 1 TABLET BY MOUTH ONCE DAILY    Januvia 100 mg tablet TAKE 1 TABLET BY MOUTH ONCE DAILY FOR DIABETES    pravastatin (PRAVACHOL) 40 mg tablet TAKE 1 TABLET BY MOUTH EVERY NIGHT    metoprolol succinate (TOPROL-XL) 25 mg XL tablet TAKE 1 TABLET EVERY DAY    glucose blood VI test strips (CONTOUR NEXT TEST STRIPS) strip USE TO CHECK BLOOD SUGAR TWICE DAILY    fluticasone propionate (FLONASE) 50 mcg/actuation nasal spray 2 Sprays by Both Nostrils route daily as needed for Rhinitis.  multivit-minerals/folic acid (CENTRUM VITAMINTS PO) Take 1 tablet by mouth daily    cholecalciferol, vitamin D3, (VITAMIN D3) 2,000 unit tab Take 1 capsule by mouth daily    Aspirin, Buffered 81 mg tab Take 81 mg by mouth daily.      No current facility-administered medications for this encounter.       ____________________________________________  ALLERGIES  Allergies   Allergen Reactions    Demerol [Meperidine] Unknown (comments)     Pt unsure of reaction      ____________________________________________  SOCIAL HISTORY  Social History     Tobacco Use    Smoking status: Never Smoker    Smokeless tobacco: Never Used   Substance Use Topics    Alcohol use: No     Alcohol/week: 0.0 standard drinks      ____________________________________________        Labs:     Hospital Outpatient Visit on 11/09/2020   Component Date Value Ref Range Status    WBC 11/09/2020 6.7  3.6 - 11.0 K/uL Final    RBC 11/09/2020 4.26  3.80 - 5.20 M/uL Final    HGB 11/09/2020 12.5  11.5 - 16.0 g/dL Final    HCT 11/09/2020 38.9  35.0 - 47.0 % Final    MCV 11/09/2020 91.3  80.0 - 99.0 FL Final    MCH 11/09/2020 29.3  26.0 - 34.0 PG Final    MCHC 11/09/2020 32.1  30.0 - 36.5 g/dL Final    RDW 11/09/2020 12.7  11.5 - 14.5 % Final    PLATELET 90/34/7113 609  150 - 400 K/uL Final    MPV 11/09/2020 11.2  8.9 - 12.9 FL Final    NRBC 11/09/2020 0.0  0  WBC Final    ABSOLUTE NRBC 11/09/2020 0.00  0.00 - 0.01 K/uL Final    Special Requests: 11/09/2020 NO SPECIAL REQUESTS    Preliminary    Culture result: 11/09/2020 MRSA NOT PRESENT AT 20 HOURS    Preliminary    Ventricular Rate 11/09/2020 72  BPM Final    Atrial Rate 11/09/2020 72  BPM Final    P-R Interval 11/09/2020 138  ms Final    QRS Duration 11/09/2020 74  ms Final    Q-T Interval 11/09/2020 402  ms Final    QTC Calculation (Bezet) 11/09/2020 440  ms Final    Calculated P Axis 11/09/2020 63  degrees Final    Calculated R Axis 11/09/2020 4  degrees Final    Calculated T Axis 11/09/2020 47  degrees Final    Diagnosis 11/09/2020    Final                    Value:Normal sinus rhythm  Normal ECG  When compared with ECG of 14-MAY-2018 15:31,  No significant change was found  Confirmed by Shaista Agee, P.VLoc ([id number]) on 11/9/2020 11:25:23 PM      Hemoglobin A1c 11/09/2020 7.4* 4.0 - 5.6 % Final    Comment: NEW METHOD  PLEASE NOTE NEW REFERENCE RANGE  (NOTE)  HbA1C Interpretive Ranges  <5.7              Normal  5.7 - 6.4         Consider Prediabetes  >6.5              Consider Diabetes      Est. average glucose 11/09/2020 166  mg/dL Final    INR 11/09/2020 1.0  0.9 - 1.1   Final    A single therapeutic range for Vit K antagonists may not be optimal for all indications - see June, 2008 issue of Chest, American College of Chest Physicians Evidence-Based Clinical Practice Guidelines, 8th Edition.     Prothrombin time 11/09/2020 10.6  9.0 - 11.1 sec Final    Color 11/09/2020 YELLOW/STRAW    Final    Color Reference Range: Straw, Yellow or Dark Yellow    Appearance 11/09/2020 CLEAR  CLEAR   Final    Specific gravity 11/09/2020 1.013  1.003 - 1.030   Final    pH (UA) 11/09/2020 5.5  5.0 - 8.0   Final    Protein 11/09/2020 Negative  NEG mg/dL Final    Glucose 11/09/2020 Negative  NEG mg/dL Final    Ketone 11/09/2020 Negative  NEG mg/dL Final    Bilirubin 11/09/2020 Negative  NEG   Final    Blood 11/09/2020 Negative  NEG   Final    Urobilinogen 11/09/2020 1.0  0.2 - 1.0 EU/dL Final    Nitrites 11/09/2020 Negative  NEG   Final    Leukocyte Esterase 11/09/2020 Negative  NEG   Final    WBC 11/09/2020 0-4  0 - 4 /hpf Final    RBC 11/09/2020 0-5  0 - 5 /hpf Final    Epithelial cells 11/09/2020 FEW  FEW /lpf Final    Epithelial cell category consists of squamous cells and /or transitional urothelial cells. Renal tubular cells, if present, are separately identified as such.  Bacteria 11/09/2020 Negative  NEG /hpf Final    UA:UC IF INDICATED 11/09/2020 CULTURE NOT INDICATED BY UA RESULT  CNI   Final    Hyaline cast 11/09/2020 0-2  0 - 5 /lpf Final    Sodium 11/09/2020 139  136 - 145 mmol/L Final    Potassium 11/09/2020 4.3  3.5 - 5.1 mmol/L Final    Chloride 11/09/2020 107  97 - 108 mmol/L Final    CO2 11/09/2020 28  21 - 32 mmol/L Final    Anion gap 11/09/2020 4* 5 - 15 mmol/L Final    Glucose 11/09/2020 206* 65 - 100 mg/dL Final    BUN 11/09/2020 11  6 - 20 MG/DL Final    Creatinine 11/09/2020 0.92  0.55 - 1.02 MG/DL Final    BUN/Creatinine ratio 11/09/2020 12  12 - 20   Final    GFR est AA 11/09/2020 >60  >60 ml/min/1.73m2 Final    GFR est non-AA 11/09/2020 >60  >60 ml/min/1.73m2 Final    Estimated GFR is calculated using the IDMS-traceable Modification of Diet in Renal Disease (MDRD) Study equation, reported for both  Americans (GFRAA) and non- Americans (GFRNA), and normalized to 1.73m2 body surface area. The physician must decide which value applies to the patient.  Calcium 11/09/2020 9.4  8.5 - 10.1 MG/DL Final    Bilirubin, total 11/09/2020 0.3  0.2 - 1.0 MG/DL Final    ALT (SGPT) 11/09/2020 25  12 - 78 U/L Final    AST (SGOT) 11/09/2020 17  15 - 37 U/L Final    Alk.  phosphatase 11/09/2020 117  45 - 117 U/L Final    Protein, total 11/09/2020 7.4  6.4 - 8.2 g/dL Final    Albumin 11/09/2020 4.1  3.5 - 5.0 g/dL Final    Globulin 11/09/2020 3.3  2.0 - 4.0 g/dL Final    A-G Ratio 11/09/2020 1.2  1.1 - 2.2   Final    Vitamin D 25-Hydroxy 11/09/2020 36.3  30 - 100 ng/mL Final    Comment: (NOTE)  Deficiency               <20 ng/mL  Insufficiency          20-30 ng/mL  Sufficient             ng/mL  Possible toxicity       >100 ng/mL    The Method used is Siemens Advia Centaur currently standardized to a   Center of Disease Control and Prevention (CDC) certified reference   [address]. Samples containing fluorescein dye can produce falsely   elevated values when tested with the ADVIA Centaur Vitamin D Assay. It is recommended that results in the toxic range, >100 ng/mL, be   retested 72 hours post fluorescein exposure.  Crossmatch Expiration 11/09/2020 11/23/2020,2359   Final    ABO/Rh(D) 11/09/2020 B POSITIVE   Final    Antibody screen 11/09/2020 NEG   Final    Prealbumin 11/09/2020 21.6  20.0 - 40.0 mg/dL Final   Admission on 10/16/2020, Discharged on 10/16/2020   Component Date Value Ref Range Status    Glucose (POC) 10/16/2020 139* 65 - 100 mg/dL Final    Comment: (NOTE)  The Accu-Chek Inform II glucometer is not FDA cleared for critically   ill patient use. A study was performed validating the equivalence of   glucometer and clinical laboratory results on this patient   population. Despite the study, use of glucometers with capillary   specimens from critically ill patients, regardless of their location,   makes the test high complexity and requires the performing individual   to comply with CLIA requirements more stringent than those for waived   testing in the hospital setting. Critical thinking skills are   necessary to determine a potentially critically ill patients status   prior to using a glucometer.  Performed by 10/16/2020 Michi Fraser RN   Final    Glucose (POC) 10/16/2020 129* 65 - 100 mg/dL Final    Comment: (NOTE)  The Accu-Chek Inform II glucometer is not FDA cleared for critically   ill patient use. A study was performed validating the equivalence of   glucometer and clinical laboratory results on this patient   population.  Despite the study, use of glucometers with capillary   specimens from critically ill patients, regardless of their location,   makes the test high complexity and requires the performing individual   to comply with CLIA requirements more stringent than those for waived   testing in the hospital setting. Critical thinking skills are   necessary to determine a potentially critically ill patients status   prior to using a glucometer.  Performed by 10/16/2020 Von Phillips RN   Final   YOMI VALENTIN  JS Outpatient Visit on 10/12/2020   Component Date Value Ref Range Status    Specimen source 10/12/2020 Nasopharyngeal    Final    SARS-CoV-2 10/12/2020 Not Detected  Not Detected   Final    Comment: (NOTE)  This nucleic acid amplification test was developed and its  performance characteristics determined by [employer]. Nucleic acid amplification tests include PCR and TMA. This test has  not been FDA cleared or approved. This test has been authorized by  FDA under an Emergency Use Authorization (EUA). This test is only  authorized for the duration of time the declaration that  circumstances exist justifying the authorization of the emergency use  of in vitro diagnostic tests for detection of SARS-CoV-2 virus and/or  diagnosis of COVID-19 infection under section 564(b)(1) of the Act,  21 U. S.C. 303VVO-7(G) (1), unless the authorization is terminated or  revoked sooner. When diagnostic testing is negative, the possibility of a false  negative result should be considered in the context of a patient's  recent exposures and the presence of clinical signs and symptoms  consistent with COVID-19. An individual without symptoms of COVID-  19 and who is not shedding SARS-CoV-2 vi                           halle would expect to have a  negative (not detected) result in this assay.   Performed At: [address] [id number]  Moshe Leung MD [id number]      Specimen source 10/12/2020 Nasopharyngeal Final    Specimen type 10/12/2020 NP Swab    Final    Health status 10/12/2020 Symptomatic Testing    Final   Office Visit on 09/29/2020   Component Date Value Ref Range Status    Glucose 09/29/2020 81  65 - 99 mg/dL Final    BUN 09/29/2020 12  8 - 27 mg/dL Final    Creatinine 09/29/2020 0.87  0.57 - 1.00 mg/dL Final    GFR est non-AA 09/29/2020 69  >59 mL/min/1.73 Final    GFR est AA 09/29/2020 79  >59 mL/min/1.73 Final    BUN/Creatinine ratio 09/29/2020 14  12 - 28 Final    Sodium 09/29/2020 142  134 - 144 mmol/L Final    Potassium 09/29/2020 4.1  3.5 - 5.2 mmol/L Final    Chloride 09/29/2020 103  96 - 106 mmol/L Final    CO2 09/29/2020 23  20 - 29 mmol/L Final    Calcium 09/29/2020 9.0  8.7 - 10.3 mg/dL Final    Protein, total 09/29/2020 6.2  6.0 - 8.5 g/dL Final    Albumin 09/29/2020 4.4  3.8 - 4.8 g/dL Final    GLOBULIN, TOTAL 09/29/2020 1.8  1.5 - 4.5 g/dL Final    A-G Ratio 09/29/2020 2.4* 1.2 - 2.2 Final    Bilirubin, total 09/29/2020 0.3  0.0 - 1.2 mg/dL Final    Alk.  phosphatase 09/29/2020 94  39 - 117 IU/L Final    AST (SGOT) 09/29/2020 18  0 - 40 IU/L Final    ALT (SGPT) 09/29/2020 14  0 - 32 IU/L Final    Cholesterol, total 09/29/2020 135  100 - 199 mg/dL Final    Triglyceride 09/29/2020 162* 0 - 149 mg/dL Final    HDL Cholesterol 09/29/2020 43  >39 mg/dL Final    VLDL Cholesterol Jose G 09/29/2020 28  5 - 40 mg/dL Final    LDL Chol Calc (NIH) 09/29/2020 64  0 - 99 mg/dL Final    Hemoglobin A1c (POC) 09/29/2020 7.1  % Final    Glucose POC 09/29/2020 89  mg/dL Final    WBC 09/29/2020 7.0  3.4 - 10.8 x10E3/uL Final    RBC 09/29/2020 3.89  3.77 - 5.28 x10E6/uL Final    HGB 09/29/2020 11.7  11.1 - 15.9 g/dL Final    HCT 09/29/2020 35.4  34.0 - 46.6 % Final    MCV 09/29/2020 91  79 - 97 fL Final    MCH 09/29/2020 30.1  26.6 - 33.0 pg Final    MCHC 09/29/2020 33.1  31.5 - 35.7 g/dL Final    RDW 09/29/2020 13.0  11.7 - 15.4 % Final    PLATELET 30/52/6091 290  150 - 450 x10E3/uL Final    TSH 09/29/2020 0.888  0.450 - 4.500 uIU/mL Final    INTERPRETATION 09/29/2020 Note   Final    Supplemental report is available. Skin:   Denies open wounds, cuts, sores, rashes or other areas of concern in PAT assessment.         Sean Gonzalez, NP

## 2020-11-10 NOTE — PERIOP NOTES
Phone call made to patient regarding my instructions for pre surgery drinks- I requested she not drink these as instructed- she verbalized understanding

## 2020-11-12 DIAGNOSIS — E11.9 TYPE 2 DIABETES MELLITUS WITHOUT COMPLICATION, WITHOUT LONG-TERM CURRENT USE OF INSULIN (HCC): ICD-10-CM

## 2020-11-13 ENCOUNTER — OFFICE VISIT (OUTPATIENT)
Dept: FAMILY MEDICINE CLINIC | Age: 70
End: 2020-11-13
Payer: MEDICARE

## 2020-11-13 VITALS
BODY MASS INDEX: 29.08 KG/M2 | HEIGHT: 62 IN | DIASTOLIC BLOOD PRESSURE: 72 MMHG | OXYGEN SATURATION: 100 % | RESPIRATION RATE: 18 BRPM | WEIGHT: 158 LBS | SYSTOLIC BLOOD PRESSURE: 127 MMHG | HEART RATE: 67 BPM | TEMPERATURE: 97.5 F

## 2020-11-13 DIAGNOSIS — E03.9 ACQUIRED HYPOTHYROIDISM: ICD-10-CM

## 2020-11-13 DIAGNOSIS — E11.21 TYPE 2 DIABETES WITH NEPHROPATHY (HCC): ICD-10-CM

## 2020-11-13 DIAGNOSIS — F41.9 ANXIETY: ICD-10-CM

## 2020-11-13 DIAGNOSIS — I25.10 CORONARY ARTERY DISEASE INVOLVING NATIVE CORONARY ARTERY OF NATIVE HEART WITHOUT ANGINA PECTORIS: ICD-10-CM

## 2020-11-13 DIAGNOSIS — Z01.818 PRE-OP EXAM: Primary | ICD-10-CM

## 2020-11-13 DIAGNOSIS — Z01.810 ENCOUNTER FOR PRE-OPERATIVE CARDIOVASCULAR CLEARANCE: ICD-10-CM

## 2020-11-13 DIAGNOSIS — Z51.81 ENCOUNTER FOR MEDICATION MONITORING: ICD-10-CM

## 2020-11-13 DIAGNOSIS — K21.9 GASTROESOPHAGEAL REFLUX DISEASE WITHOUT ESOPHAGITIS: ICD-10-CM

## 2020-11-13 DIAGNOSIS — M48.062 SPINAL STENOSIS OF LUMBAR REGION WITH NEUROGENIC CLAUDICATION: ICD-10-CM

## 2020-11-13 DIAGNOSIS — M47.817 LUMBOSACRAL SPONDYLOSIS WITHOUT MYELOPATHY: ICD-10-CM

## 2020-11-13 DIAGNOSIS — E78.2 MIXED HYPERLIPIDEMIA: ICD-10-CM

## 2020-11-13 DIAGNOSIS — I73.9 PAD (PERIPHERAL ARTERY DISEASE) (HCC): ICD-10-CM

## 2020-11-13 PROCEDURE — G9717 DOC PT DX DEP/BP F/U NT REQ: HCPCS | Performed by: FAMILY MEDICINE

## 2020-11-13 PROCEDURE — G8752 SYS BP LESS 140: HCPCS | Performed by: FAMILY MEDICINE

## 2020-11-13 PROCEDURE — G8754 DIAS BP LESS 90: HCPCS | Performed by: FAMILY MEDICINE

## 2020-11-13 PROCEDURE — G8399 PT W/DXA RESULTS DOCUMENT: HCPCS | Performed by: FAMILY MEDICINE

## 2020-11-13 PROCEDURE — 3051F HG A1C>EQUAL 7.0%<8.0%: CPT | Performed by: FAMILY MEDICINE

## 2020-11-13 PROCEDURE — 1090F PRES/ABSN URINE INCON ASSESS: CPT | Performed by: FAMILY MEDICINE

## 2020-11-13 PROCEDURE — G8427 DOCREV CUR MEDS BY ELIG CLIN: HCPCS | Performed by: FAMILY MEDICINE

## 2020-11-13 PROCEDURE — 1101F PT FALLS ASSESS-DOCD LE1/YR: CPT | Performed by: FAMILY MEDICINE

## 2020-11-13 PROCEDURE — 99214 OFFICE O/P EST MOD 30 MIN: CPT | Performed by: FAMILY MEDICINE

## 2020-11-13 PROCEDURE — 2022F DILAT RTA XM EVC RTNOPTHY: CPT | Performed by: FAMILY MEDICINE

## 2020-11-13 PROCEDURE — G9899 SCRN MAM PERF RSLTS DOC: HCPCS | Performed by: FAMILY MEDICINE

## 2020-11-13 PROCEDURE — G8419 CALC BMI OUT NRM PARAM NOF/U: HCPCS | Performed by: FAMILY MEDICINE

## 2020-11-13 PROCEDURE — 3017F COLORECTAL CA SCREEN DOC REV: CPT | Performed by: FAMILY MEDICINE

## 2020-11-13 PROCEDURE — G8536 NO DOC ELDER MAL SCRN: HCPCS | Performed by: FAMILY MEDICINE

## 2020-11-13 RX ORDER — LANCETS
EACH MISCELLANEOUS
Qty: 1 PACKAGE | Refills: 11 | Status: SHIPPED | OUTPATIENT
Start: 2020-11-13 | End: 2021-12-13

## 2020-11-13 RX ORDER — BLOOD-GLUCOSE METER
EACH MISCELLANEOUS
Qty: 1 EACH | Refills: 0 | Status: SHIPPED | OUTPATIENT
Start: 2020-11-13 | End: 2022-04-05 | Stop reason: ALTCHOICE

## 2020-11-13 RX ORDER — FUROSEMIDE 20 MG/1
TABLET ORAL
Qty: 90 TAB | Refills: 3 | Status: SHIPPED | OUTPATIENT
Start: 2020-11-13 | End: 2021-04-02 | Stop reason: SDUPTHER

## 2020-11-13 NOTE — LETTER
CONTROLLED SUBSTANCE MEDICATION AGREEMENT Patient Name: Apolinar Rubin Patient YOB: 1950 I understand, that controlled substance medications may be used to help better manage my symptoms and to improve my ability to function at home, work and in social settings. However, I also understand that these medications do have risks, which have been discussed with me, including possible development of physical or psychological dependence. I understand that successful treatment requires mutual trust and honesty between me and my provider. I understand and agree that following this Medication Agreement is necessary in continuing my provider-patient relationship and the success of my treatment plan. Explanation from my Provider: Benefits and Goals of Controlled Substance Medications: There are two potential goals for your treatment: (1) decreased pain and suffering (2) improved daily life functions. There are many possible treatments for your chronic condition(s). Alternatives such as physical therapy, yoga, massage, home daily exercise, meditation, relaxation techniques, injections, chiropractic manipulations, surgery, cognitive therapy, hypnosis and many medications that are not habit-forming may be used. Use of controlled substance medications may be helpful, but they are unlikely to resolve all symptoms or restore all function. Explanation from my Provider: Risks of Controlled Substance Medications: 
 Opioid pain medications: These medications can lead to problems such as addiction/dependence, sedation, lightheadedness/dizziness, memory issues, falls, constipation, nausea, or vomiting. They may also impair the ability to drive or operate machinery. Additionally, these medications may lower testosterone levels, leading to loss of bone strength, stamina and sex drive.   They may cause problems with breathing, sleep apnea and reduced coughing, which is especially dangerous for patients with lung disease. Overdose or dangerous interactions with alcohol and other medications may occur, leading to death. Hyperalgesia may develop, which means patients receiving opioids for the treatment of pain may become more sensitive to certain painful stimuli, and in some cases, experience pain from ordinarily non-painful stimuli. Women between the ages of 14-53 who could become pregnant should carefully weigh the risks and benefits of opioids with their physicians, as these medications increase the risk of pregnancy complications, including miscarriage,  delivery and stillbirth. It is also possible for babies to be born addicted to opioids. Opioid dependence withdrawal symptoms may include; feelings of uneasiness, increased pain, irritability, belly pain, diarrhea, sweats and goose-flesh. Testosterone replacement therapy:  Potential side effects include increased risk of stroke and heart attack, blood clots, increased blood pressure, increased cholesterol, enlarged prostate, sleep apnea, irritability/aggression and other mood disorders, and decreased fertility. Ulysses Arvin (1950)             Page 1 of 4    Initials:_______ Benzodiazepines and non-benzodiazepine sleep medications: These medications can lead to problems such as addiction/dependence, sedation, fatigue, lightheadedness, dizziness, incoordination, falls, depression, hallucinations, and impaired judgment, memory and concentration. The ability to drive and operate machinery may also be affected. Abnormal sleep-related behaviors have been reported, including sleepwalking, driving, making telephone calls, eating, or having sex while not fully awake. These medications can suppress breathing and worsen sleep apnea, particularly when combined with alcohol or other sedating medications, potentially leading to death. Dependence withdrawal symptoms may include tremors, anxiety, hallucinations and seizures. Stimulants:  Common adverse effects include addiction/dependence, increased blood pressure and heart rate, decreased appetite, nausea, involuntary weight loss, insomnia,  irritability, and headaches. These risks may increase when these medications are combined with other stimulants, such as caffeine pills or energy drinks, certain weight loss supplements and oral decongestants. Dependence withdrawal symptoms may include depressed mood, loss of interest, suicidal thoughts, anxiety, fatigue, appetite changes and agitation. I agree and understand that I and my prescriber have the following rights and responsibilities regarding my treatment plan:  
1. MY RIGHTS: 
To be informed of my treatment and medication plan. To be an active participant in my health and wellbeing. 2. MY RESPONSIBILITY AND UNDERSTANDING FOR USE OF MEDICATIONS ? I will take medications at the dose and frequency as directed. For my safety, I will not increase or change how I take my medications without the recommendation of my healthcare provider. ? I will actively participate in any program recommended by my provider which may improve function, including social, physical, psychological programs. ? I will not take my medications with alcohol or other drugs not prescribed to me. I understand that drinking alcohol with my medications increases the chances of side effects, including reduced breathing rate and could lead to personal injury when operating machinery. ? I understand that if I have a history of substance use disorders, including alcohol or other illicit drugs, that I may be at increased risk of addiction to my medications. ? I agree to notify my provider immediately if I should become pregnant so that my treatment plan can be adjusted.  
? I agree and understand that I shall only receive controlled substance medications from the prescriber that signed this agreement unless there is written agreement among other prescribers of controlled substances outlining the responsibility of the medications being prescribed. ? I understand that the if the controlled medication is not helping to achieve goals, the dosage may be tapered and no longer prescribed. 3. MY RESPONSIBILITY FOR COMMUNICATION / PRESCRIPTION RENEWALS 
? I agree that all controlled substance medications that I take will be prescribed only by my provider. If another healthcare provider prescribes me medication in an emergency, I will notify my provider within seventy-two (72) hours. Shelbi Johnson (1950)             Page 2 of 4    Initials:_______ ? I will arrange for refills at the prescribed interval ONLY during regular office hours. I will not ask for refills earlier than agreed, after-hours, on holidays or weekends. Refills may take up to 72 hours for processing and prescriptions to reach the pharmacy. ? I will inform my other health care providers that I am taking these medications and of the existence of this [id number]. In the event of an emergency, I will provide the same information to the emergency department prescribers. ? I will keep my provider updated on the pharmacy I am using for controlled medication prescription filling. 4. MY RESPONSIBILITY FOR PROTECTING MEDICATIONS ? I will protect my prescriptions and medications. I understand that lost or misplaced prescriptions will not be replaced. ? I will keep medications only for my own use and will not share them with others. I will keep all medications away from children. ? I agree that if my medications are adjusted or discontinued, I will properly dispose of any remaining medications. I understand that I will be required to dispose of any remaining controlled medications as, directed by my prescriber, prior to being provided with any prescriptions for other controlled medications.   Medication drop box locations can be found at: [email or web address] 5. MY RESPONSIBILITY WITH ILLEGAL DRUGS ? I will not use illegal or street drugs or another person's prescription medications not prescribed to me.  
? If there are identified addiction type symptoms, then referral to a program may be provided by my provider and I agree to follow through with this recommendation. 6. MY RESPONSIBILITY FOR COOPERATION WITH INVESTIGATIONS ? I understand that my provider will comply with any applicable law and may discuss my use and/or possible misuse/abuse of controlled substances and alcohol, as appropriate, with any health care provider involved in my care, pharmacist, or legal authority. ? I authorize my provider and pharmacy to cooperate fully with law enforcement agencies (as permitted by law) in the investigation of any possible misuse, sale, or other diversion of my controlled substances. ? I agree to waive any applicable privilege or right of privacy or confidentiality with respect to these authorizations. 7. PROVIDERS RIGHT TO MONITOR FOR SAFETY: PRESCRIPTION MONITORING / DRUG TESTING 
? I consent to drug/toxicology screening and will submit to a drug screen upon my providers request to assure I am only taking the prescribed drugs for my safety monitoring. I understand that a drug screen is a laboratory test in which a sample of my urine, blood or saliva is checked to see what drugs I have been taking. This may entail an observed urine specimen, which means that a nurse or other health care provider may watch me provide urine, and I will cooperate if I am asked to provide an observed specimen. Lauryn Woods (1950)             Page 3 of 4    Initials:_______ ? I understand that my provider will check a copy of my State Prescription Monitoring Program () Report in order to safely prescribe medications. ? Pill Counts: I consent to pill counts when requested. I may be asked to bring all my prescribed controlled substance medications, in their original bottles, to all of my scheduled appointments. In addition, my provider may ask me to come to the practice at any time for a random pill count. 8. TERMINATION OF THIS AGREEMENT ? For my safety, my prescriber has the right to stop prescribing controlled substance medications and may end this agreement. ? Conditions that may result in termination of this agreement: 
a. I do not show any improvement in pain, or my activity has not improved. b. I develop rapid tolerance or loss of improvement, as described in my treatment plan. 
c. I develop significant side effects from the medication. d. My behavior is not consistent with the responsibilities outlined above, thereby causing safety concerns to continue prescribing controlled substance medications. e. I fail to follow the terms of this agreement. f. Other:____________________________ UNDERSTANDING THIS MEDICATION AGREEMENT:   
I have read the above and have had all my questions answered. For chronic disease management, I know that my symptoms can be managed with many types of treatments. A chronic medication trial may be part of my treatment, but I must be an active participant in my care. Medication therapy is only one part of my symptom management plan. In some cases, there may be limited scientific evidence to support the chronic use of certain medications to improve symptoms and daily function. Furthermore, in certain circumstances, there may be scientific information that suggests that the use of chronic controlled substances may worsen my symptoms and increase my risk of unintentional death directly related to this medication therapy.   I know that if my provider feels my risk from controlled medications is greater than my benefit, I will have my controlled substance medication(s) compassionately lowered or removed altogether. I further agree to allow this office to contact my HIPAA contact if there are concerns about my safety and use of the controlled medications. I have agreed to use the prescribed controlled substance medications to me as instructed by my provider and as stated in this Medication Agreement. My initial on each page and my signature below shows that I have read each page and I have had the opportunity to ask questions with answers provided by my provider. Patient Name (Printed): _____________________________________ Patient Signature:  ______________________   Date: _____________ Prescriber Name (Printed): ___________________________________ Prescriber Signature: _____________________  Date: _____________ La Nena Rides (1950)             Page 4 of 4

## 2020-11-13 NOTE — TELEPHONE ENCOUNTER
Last visit:9/29/20  Next visit:1/13/21  Previous refill 2/9/16(1 +11R)    Requested Prescriptions     Pending Prescriptions Disp Refills    lancets (Accu-Chek Softclix Lancets) misc 1 Package 11     Sig: Use to obtain blood sample for diabetic testing three times a day

## 2020-11-13 NOTE — PROGRESS NOTES
Chief Complaint   Patient presents with    Pre-op Exam     L2-L4 fusion and decompression by Dr. Roshni Nagy 11/24/2020       A1C 11/9/2020    Microalbumin 1/27/2020      Patient had pre op labs done 11/9/2020.        1. Have you been to the ER, urgent care clinic since your last visit? Hospitalized since your last visit? no    2. Have you seen or consulted any other health care providers outside of the [address] [place] [address] since your last visit? Include any pap smears or colon screening.  no

## 2020-11-13 NOTE — PROGRESS NOTES
HISTORY OF PRESENT ILLNESS  La Nena Chang is a 78 y.o. female. HPI   Pre-op for back surgery    L2-L4 fusion and decompression by Dr. Fabrizio Woods 11/24/2020       Cardiovascular Review:  The patient has coronary artery disease, HF and status post coronary artery stenting. Diet and Lifestyle: generally follows a low fat low cholesterol diet, generally follows a low sodium diet, follows a diabetic diet regularly, exercises sporadically  Home BP Monitoring: is not measured at home. Pertinent ROS: taking medications as instructed, no medication side effects noted, no TIA's, no chest pain on exertion, no dyspnea on exertion, no swelling of ankles. DM type II follow up:  Compliant w/ meds, diabetic diet, and exercise. Tolerating Januvia. Has been watching her diet. Her BS have been in the 80-90s. Checks BS BID on most days and prn. Pt does not have BS log at visit today. No Rf needed for today. Denies any tingling sensation, polyuria and polydipsia. No blurred vision. No significant weight changes. Feeling well since last OV. Hypercholesterolemia follow up:  Compliant w/ meds, low fat, low cholesterol diet. Exercising some. No muscle nor abdominal pain, no skin discoloration. Patient fasting today. Depression Review:  Patient is seen for followup of depression and anxiety. Overall doing well. Taking xanax prn increased anxiety. Anxiety usually triggered by stress. She denies depressed mood and insomnia. Sleeping well at night. Thyroid Review:  Patient is seen for followup of hypothyroidism. Thyroid ROS: denies fatigue, weight changes, heat/cold intolerance, bowel/skin changes or CVS symptoms.   Patient Active Problem List   Diagnosis Code    Anxiety F41.9    Depression F32.9    Hypovitaminosis D E55.9    GERD (gastroesophageal reflux disease) K21.9    Edema R60.9    Esophageal motor disorder K22.4    S/P bypass gastrojejunostomy Z98.0    SOB (shortness of breath) R06.02    CAD (coronary artery disease) I25.10    Angina pectoris (HCC) I20.9    S/P PTCA (percutaneous transluminal coronary angioplasty) Z98.61    Mixed hyperlipidemia E78.2    Diarrhea R19.7    Type 2 diabetes mellitus without complication (HCC) W02.1    Osteopenia M85.80    Encounter for medication monitoring Z51.81    CVA (cerebral vascular accident) ([facility] [address] B26.5    Complicated migraine Z81. 109    Numbness and tingling of right side of face R20.0, R20.2    Stenosis of both internal carotid arteries I65.23    Type 2 diabetes with nephropathy ([facility]) E11.21    Essential hypertension I10       Current Outpatient Medications   Medication Sig Dispense Refill    Accu-Chek Hannah Plus Meter misc USE TO CHECK BLOOD SUGAR 2  TIMES DAILY 1 Each 0    lancets (Accu-Chek Softclix Lancets) misc Use to obtain blood sample for diabetic testing three times a day 1 Package 11    furosemide (LASIX) 20 mg tablet TAKE 1 TABLET BY MOUTH ONCE DAILY 90 Tab 3    levothyroxine (synthroid) 25 mcg tablet take two tablets  Monday thru Friday and 1 tab Saturday And Sunday.  ALPRAZolam (XANAX) 1 mg tablet TAKE 1/2 TABLET BY MOUTH EVERY DAY AS NEEDED FOR ANXIETY THEN TAKE 1 TABLET AT BEDTIME AS NEEDED FOR SLEEP 135 Tab 1    lisinopriL (PRINIVIL, ZESTRIL) 2.5 mg tablet TAKE 1 TABLET BY MOUTH ONCE DAILY 90 Tab 3    Januvia 100 mg tablet TAKE 1 TABLET BY MOUTH ONCE DAILY FOR DIABETES 90 Tab 3    pravastatin (PRAVACHOL) 40 mg tablet TAKE 1 TABLET BY MOUTH EVERY NIGHT 90 Tab 3    metoprolol succinate (TOPROL-XL) 25 mg XL tablet TAKE 1 TABLET EVERY DAY 90 Tab 3    glucose blood VI test strips (CONTOUR NEXT TEST STRIPS) strip USE TO CHECK BLOOD SUGAR TWICE DAILY 100 Strip 11    fluticasone propionate (FLONASE) 50 mcg/actuation nasal spray 2 Sprays by Both Nostrils route daily as needed for Rhinitis.  1 Bottle 6    multivit-minerals/folic acid (CENTRUM VITAMINTS PO) Take 1 tablet by mouth daily      cholecalciferol, vitamin D3, (VITAMIN D3) 2,000 unit tab Take 1 capsule by mouth daily      Aspirin, Buffered 81 mg tab Take 81 mg by mouth daily.          Allergies   Allergen Reactions    Demerol [Meperidine] Unknown (comments)     Pt unsure of reaction       Past Medical History:   Diagnosis Date    Anxiety     Arthritis     ASHD (arteriosclerotic heart disease)     CAD (coronary artery disease)     Chronic pain     Depression     Diabetes (HCC)     TypeII    Diarrhea     Gastroparesis     GERD (gastroesophageal reflux disease)     Hypercholesterolemia     Hypertension     S/P bypass gastrojejunostomy 1/23/2012    Thyroid disease        Past Surgical History:   Procedure Laterality Date    ABDOMEN SURGERY PROC UNLISTED      s/p partial gastrectomy 2/2 gastroparesis    COLONOSCOPY N/A 5/26/2016    COLONOSCOPY performed by Neeta Saeed MD at [facility] ENDOSCOPY    COLONOSCOPY N/A 10/16/2020    COLONOSCOPY performed by Bertha Jenkins MD at [facility] AMBULATORY OR    EGD  7/13/2009    GASTROJEJUNOSTOMY      HX APPENDECTOMY      HX BLADDER SUSPENSION      HX CHOLECYSTECTOMY      HX CORONARY STENT PLACEMENT  06/2013    3 stents    HX GASTRIC BYPASS  6/02    HX HEART CATHETERIZATION  2017    stents remain open, no new stents placed    HX HYSTERECTOMY      HX LAPAROTOMY  8/02    2/2 intra abd abscess    HX LUMBAR LAMINECTOMY  01/2008    rods and screws    HX OOPHORECTOMY      HX ORTHOPAEDIC Bilateral     carpal tunnel    HX ORTHOPAEDIC Bilateral     bone spurs both feet    [place] COLONOSCOPY FLX DX W/COLLJ SPEC WHEN PFRMD  5/03/2006    Dr. Richmond Ellison  2/9/2012         VT EGD BALLOON DILATION ESOPHAGUS <30 MM DIAM  1/23/2012         VT EGD TRANSORAL BIOPSY SINGLE/MULTIPLE  7/11/2011         VT EGD TRANSORAL BIOPSY SINGLE/MULTIPLE  1/23/2012         UPPER GI ENDOSCOPY,BIOPSY  10/1/2019         UPPER GI ENDOSCOPY,BIOPSY  10/16/2020            Family History   Problem Relation Age of Onset    Heart Disease Mother     Hypertension Mother     Diabetes Father     Heart Disease Father     Hypertension Father     Alcohol abuse Brother     Heart Disease Sister     Hypertension Sister     Diabetes Sister     No Known Problems Sister     Alcohol abuse Brother     Alcohol abuse Brother     Diabetes Brother        Social History     Tobacco Use    Smoking status: Never Smoker    Smokeless tobacco: Never Used   Substance Use Topics    Alcohol use: No     Alcohol/week: 0.0 standard drinks     Lab Results   Component Value Date/Time    WBC 6.7 11/09/2020 01:14 PM    HGB 12.5 11/09/2020 01:14 PM    HCT 38.9 11/09/2020 01:14 PM    PLATELET 723 93/72/3955 01:14 PM    MCV 91.3 11/09/2020 01:14 PM     Lab Results   Component Value Date/Time    Cholesterol, total 135 09/29/2020 02:22 AM    HDL Cholesterol 43 09/29/2020 02:22 AM    LDL, calculated 77 01/27/2020 08:25 AM    LDL Chol Calc (NIH) 64 09/29/2020 02:22 AM    Triglyceride 162 (H) 09/29/2020 02:22 AM    CHOL/HDL Ratio 3.5 01/05/2017 09:18 PM     Lab Results   Component Value Date/Time    TSH 0.888 09/29/2020 02:22 AM    T4, Free 1.77 02/08/2019 12:18 PM    T4, Total 5.6 11/17/2015 08:54 AM      Lab Results   Component Value Date/Time    Sodium 139 11/09/2020 01:14 PM    Potassium 4.3 11/09/2020 01:14 PM    Chloride 107 11/09/2020 01:14 PM    CO2 28 11/09/2020 01:14 PM    Anion gap 4 (L) 11/09/2020 01:14 PM    Glucose 206 (H) 11/09/2020 01:14 PM    BUN 11 11/09/2020 01:14 PM    Creatinine 0.92 11/09/2020 01:14 PM    BUN/Creatinine ratio 12 11/09/2020 01:14 PM    GFR est AA >60 11/09/2020 01:14 PM    GFR est non-AA >60 11/09/2020 01:14 PM    Calcium 9.4 11/09/2020 01:14 PM    Bilirubin, total 0.3 11/09/2020 01:14 PM    ALT (SGPT) 25 11/09/2020 01:14 PM    Alk.  phosphatase 117 11/09/2020 01:14 PM    Protein, total 7.4 11/09/2020 01:14 PM    Albumin 4.1 11/09/2020 01:14 PM    Globulin 3.3 11/09/2020 01:14 PM    A-G Ratio 1.2 11/09/2020 01:14 PM      Lab Results Component Value Date/Time    Hemoglobin A1c 7.4 (H) 11/09/2020 01:14 PM    Hemoglobin A1c (POC) 7.1 09/29/2020 02:22 PM             Review of Systems   Constitutional: Negative for malaise/fatigue. HENT: Negative for congestion. Eyes: Negative for blurred vision. Respiratory: Negative for cough and shortness of breath. Cardiovascular: Negative for chest pain, palpitations and leg swelling. Gastrointestinal: Negative for abdominal pain, constipation and heartburn. Genitourinary: Negative for dysuria, frequency and urgency. Musculoskeletal: Positive for back pain. Neurological: Negative for dizziness, tingling and headaches. Endo/Heme/Allergies: Negative for environmental allergies. Psychiatric/Behavioral: Negative for depression. The patient does not have insomnia. Physical Exam  Vitals signs and nursing note reviewed. Constitutional:       Appearance: Normal appearance. She is well-developed. Comments: /72 (BP 1 Location: Left arm, BP Patient Position: Sitting)   Pulse 67   Temp 97.5 °F (36.4 °C) (Temporal)   Resp 18   Ht 5' 2\" (1.575 m)   Wt 158 lb (71.7 kg)   SpO2 100%   BMI 28.90 kg/m²    HENT:      Right Ear: Tympanic membrane and ear canal normal.      Left Ear: Tympanic membrane and ear canal normal.      Nose: No mucosal edema or rhinorrhea. Neck:      Musculoskeletal: Normal range of motion and neck supple. Thyroid: No thyromegaly. Vascular: No carotid bruit. Cardiovascular:      Rate and Rhythm: Normal rate and regular rhythm. Heart sounds: Normal heart sounds. No gallop. Pulmonary:      Effort: Pulmonary effort is normal.      Breath sounds: Normal breath sounds. Abdominal:      General: Bowel sounds are normal.      Palpations: Abdomen is soft. There is no mass. Tenderness: There is no abdominal tenderness. Musculoskeletal: Normal range of motion. Right lower leg: Edema (trace) present.       Left lower leg: Edema (trace) present. Lymphadenopathy:      Cervical: No cervical adenopathy. Skin:     General: Skin is warm and dry. Neurological:      General: No focal deficit present. Mental Status: She is alert and oriented to person, place, and time. Psychiatric:         Mood and Affect: Mood normal.         ASSESSMENT and PLAN  Diagnoses and all orders for this visit:    1. Pre-op exam//  2. Lumbosacral spondylosis without myelopathy  3. Spinal stenosis of lumbar region with neurogenic claudication  Medically cleared if cleared by cardiology also. 4. Encounter for pre-operative cardiovascular clearance  -     REFERRAL TO CARDIOLOGY    5. Type 2 diabetes with nephropathy (HCC)  Stable     6. Mixed hyperlipidemia  Stable     7. Coronary artery disease involving native coronary artery of native heart without angina pectoris  -     REFERRAL TO CARDIOLOGY    8. PAD (peripheral artery disease) (HCC)  Stable     9. Acquired hypothyroidism  Stable     10. Gastroesophageal reflux disease without esophagitis  Stable     11. Anxiety  Stable     12. Encounter for medication monitoring          reviewed diet, exercise and weight control  cardiovascular risk and specific lipid/LDL goals reviewed  reviewed medications and side effects in detail  specific diabetic recommendations: low cholesterol diet, weight control and daily exercise discussed, foot care discussed and Podiatry visits discussed, annual eye examinations at Ophthalmology discussed and glycohemoglobin and other lab monitoring discussed     I have discussed diagnosis listed in this note with pt and/or family. I have discussed treatment plans and options and the risk/benefit analysis of those options, including safe use of medications and possible medication side effects. Through the use of shared decision making we have agreed to the above plan. The patient has received an after-visit summary and questions were answered concerning future plans and follow up. Advise pt of any urgent changes then to proceed to the ER.

## 2020-11-13 NOTE — PROGRESS NOTES
Made patient an appointment with Dr. Zack Mcfarlane for cardiac clearance on 11/19/20 at 1 pm    [address]  [facility] [address] [place] [address]

## 2020-11-19 ENCOUNTER — HOSPITAL ENCOUNTER (OUTPATIENT)
Dept: PREADMISSION TESTING | Age: 70
Discharge: HOME | End: 2020-11-19
Payer: MEDICARE

## 2020-11-19 ENCOUNTER — OFFICE VISIT (OUTPATIENT)
Dept: CARDIOLOGY CLINIC | Age: 70
End: 2020-11-19
Payer: MEDICARE

## 2020-11-19 VITALS
SYSTOLIC BLOOD PRESSURE: 118 MMHG | OXYGEN SATURATION: 98 % | HEART RATE: 58 BPM | BODY MASS INDEX: 28.89 KG/M2 | DIASTOLIC BLOOD PRESSURE: 60 MMHG | WEIGHT: 157 LBS | RESPIRATION RATE: 18 BRPM | HEIGHT: 62 IN

## 2020-11-19 DIAGNOSIS — E78.2 MIXED HYPERLIPIDEMIA: ICD-10-CM

## 2020-11-19 DIAGNOSIS — I10 ESSENTIAL HYPERTENSION: ICD-10-CM

## 2020-11-19 DIAGNOSIS — Z01.818 PRE-OP EVALUATION: ICD-10-CM

## 2020-11-19 DIAGNOSIS — Z98.61 S/P PTCA (PERCUTANEOUS TRANSLUMINAL CORONARY ANGIOPLASTY): ICD-10-CM

## 2020-11-19 DIAGNOSIS — I25.10 CORONARY ARTERY DISEASE INVOLVING NATIVE CORONARY ARTERY OF NATIVE HEART WITHOUT ANGINA PECTORIS: Primary | ICD-10-CM

## 2020-11-19 PROCEDURE — 87635 SARS-COV-2 COVID-19 AMP PRB: CPT

## 2020-11-19 PROCEDURE — G9899 SCRN MAM PERF RSLTS DOC: HCPCS | Performed by: INTERNAL MEDICINE

## 2020-11-19 PROCEDURE — G9717 DOC PT DX DEP/BP F/U NT REQ: HCPCS | Performed by: INTERNAL MEDICINE

## 2020-11-19 PROCEDURE — 93000 ELECTROCARDIOGRAM COMPLETE: CPT | Performed by: INTERNAL MEDICINE

## 2020-11-19 PROCEDURE — G8419 CALC BMI OUT NRM PARAM NOF/U: HCPCS | Performed by: INTERNAL MEDICINE

## 2020-11-19 PROCEDURE — G8754 DIAS BP LESS 90: HCPCS | Performed by: INTERNAL MEDICINE

## 2020-11-19 PROCEDURE — G8399 PT W/DXA RESULTS DOCUMENT: HCPCS | Performed by: INTERNAL MEDICINE

## 2020-11-19 PROCEDURE — 1090F PRES/ABSN URINE INCON ASSESS: CPT | Performed by: INTERNAL MEDICINE

## 2020-11-19 PROCEDURE — 3017F COLORECTAL CA SCREEN DOC REV: CPT | Performed by: INTERNAL MEDICINE

## 2020-11-19 PROCEDURE — G8752 SYS BP LESS 140: HCPCS | Performed by: INTERNAL MEDICINE

## 2020-11-19 PROCEDURE — 1101F PT FALLS ASSESS-DOCD LE1/YR: CPT | Performed by: INTERNAL MEDICINE

## 2020-11-19 PROCEDURE — 99214 OFFICE O/P EST MOD 30 MIN: CPT | Performed by: INTERNAL MEDICINE

## 2020-11-19 PROCEDURE — G8536 NO DOC ELDER MAL SCRN: HCPCS | Performed by: INTERNAL MEDICINE

## 2020-11-19 PROCEDURE — G8427 DOCREV CUR MEDS BY ELIG CLIN: HCPCS | Performed by: INTERNAL MEDICINE

## 2020-11-19 NOTE — PROGRESS NOTES
1. Have you been to the ER, urgent care clinic since your last visit? Hospitalized since your last visit? No    2. Have you seen or consulted any other health care providers outside of the [address] [place] [address] since your last visit? Include any pap smears or colon screening. [id number] colonoscopy.

## 2020-11-19 NOTE — PROGRESS NOTES
Subjective/HPI:     Ms. Aline Davis is a 78 y.o. female is here for cardiac clearance to have a lumbar fusion 11/24/2020 with Dr Izabella Brown. She has a PMHx of CAD, DM2, HTN and HLD. She denies complaints of chest pains, dizziness, orthopnea, PND or edema. She denies shortness of breath or palpitations. She denies claudication symptoms.      PCP Provider  Armani Luke MD  Past Medical History:   Diagnosis Date    Anxiety     Arthritis     ASHD (arteriosclerotic heart disease)     Bilateral carotid artery stenosis     CAD (coronary artery disease)     Chronic pain     Depression     Diabetes (HCC)     TypeII    Diarrhea     Gastroparesis     GERD (gastroesophageal reflux disease)     History of cardioembolic cerebrovascular accident (CVA)     Hypercholesterolemia     Hypertension     S/P bypass gastrojejunostomy 1/23/2012    Thyroid disease       Past Surgical History:   Procedure Laterality Date    ABDOMEN SURGERY PROC UNLISTED      s/p partial gastrectomy 2/2 gastroparesis    COLONOSCOPY N/A 5/26/2016    COL[facility]ela [facility] ENDOSCOPY    COLONOSCOPY N/A 10/16/2020    COLONOSCOPY performed by Shavonne King MD at [facility] AMBULATORY OR    EGD  7/13/2009    GASTROJEJUNOSTOMY      HX APPENDECTOMY      HX BLADDER SUSPENSION      HX CHOLECYSTECTOMY      HX CORONARY STENT PLACEMENT  06/2013    3 stents    HX GASTRIC BYPASS  6/02    HX HEART CATHETERIZATION  2017    stents remain open, no new stents placed    HX HYSTERECTOMY      HX LAPAROTOMY  8/02    2/2 intra abd abscess    HX LUMBAR LAMINECTOMY  01/2008    rods and screws    HX OOPHORECTOMY      HX ORTHOPAEDIC Bilateral     carpal tunnel    HX ORTHOPAEDIC Bilateral     bone spurs both feet    MT COLONOSCOPY FLX DX W/COLLJ SPEC WHEN PFRMD  5/03/2006    Dr. David Clement W/BIOPSY SINGLE/MULTIPLE  2/9/2012         MT EGD BALLOON DILATION ESOPHAGUS <30 MM DIAM  1/23/2012         OH EGD TRANSORAL BIOPSY SINGLE/MULTIPLE  7/11/2011         [place] EGD TRANSORAL BIOPSY SINGLE/MULTIPLE  1/23/2012         UPPER GI ENDOSCOPY,BIOPSY  10/1/2019         UPPER GI ENDOSCOPY,BIOPSY  10/16/2020          Family History   Problem Relation Age of Onset    Heart Disease Mother     Hypertension Mother     Diabetes Father     Heart Disease Father     Hypertension Father     Alcohol abuse Brother     Heart Disease Sister     Hypertension Sister     Diabetes Sister     No Known Problems Sister     Alcohol abuse Brother     Alcohol abuse Brother     Diabetes Brother      Social History     Socioeconomic History    Marital status:      Spouse name: Not on file    Number of children: Not on file    Years of education: Not on file    Highest education level: Not on file   Occupational History    Not on file   Social Needs    Financial resource strain: Not on file    Food insecurity     Worry: Not on file     Inability: Not on file    Transportation needs     Medical: Not on file     Non-medical: Not on file   Tobacco Use    Smoking status: Never Smoker    Smokeless tobacco: Never Used   Substance and Sexual Activity    Alcohol use: No     Alcohol/week: 0.0 standard drinks    Drug use: Never    Sexual activity: Not Currently   Lifestyle    Physical activity     Days per week: Not on file     Minutes per session: Not on file    Stress: Not on file   Relationships    Social connections     Talks on phone: Not on file     Gets together: Not on file     Attends [employer] service: Not on file     Active member of club or organization: Not on file     Attends meetings of clubs or organizations: Not on file     Relationship status: Not on file    Intimate partner violence     Fear of current or ex partner: Not on file     Emotionally abused: Not on file     Physically abused: Not on file     Forced sexual activity: Not on file   Other Topics Concern    Not on file   Social History Narrative    Not on file       Allergies   Allergen Reactions    Demerol [Meperidine] Unknown (comments)     Pt unsure of reaction        Current Outpatient Medications   Medication Sig    Accu-Chek Hannah Plus Meter misc USE TO CHECK BLOOD SUGAR 2  TIMES DAILY    lancets (Accu-Chek Softclix Lancets) misc Use to obtain blood sample for diabetic testing three times a day    furosemide (LASIX) 20 mg tablet TAKE 1 TABLET BY MOUTH ONCE DAILY    levothyroxine (synthroid) 25 mcg tablet take two tablets  Monday thru Friday and 1 tab Saturday And Sunday.  ALPRAZolam (XANAX) 1 mg tablet TAKE 1/2 TABLET BY MOUTH EVERY DAY AS NEEDED FOR ANXIETY THEN TAKE 1 TABLET AT BEDTIME AS NEEDED FOR SLEEP    lisinopriL (PRINIVIL, ZESTRIL) 2.5 mg tablet TAKE 1 TABLET BY MOUTH ONCE DAILY    Januvia 100 mg tablet TAKE 1 TABLET BY MOUTH ONCE DAILY FOR DIABETES    pravastatin (PRAVACHOL) 40 mg tablet TAKE 1 TABLET BY MOUTH EVERY NIGHT    metoprolol succinate (TOPROL-XL) 25 mg XL tablet TAKE 1 TABLET EVERY DAY    glucose blood VI test strips (CONTOUR NEXT TEST STRIPS) strip USE TO CHECK BLOOD SUGAR TWICE DAILY    fluticasone propionate (FLONASE) 50 mcg/actuation nasal spray 2 Sprays by Both Nostrils route daily as needed for Rhinitis.  multivit-minerals/folic acid (CENTRUM VITAMINTS PO) Take 1 tablet by mouth daily    cholecalciferol, vitamin D3, (VITAMIN D3) 2,000 unit tab Take 1 capsule by mouth daily    Aspirin, Buffered 81 mg tab Take 81 mg by mouth daily. No current facility-administered medications for this visit. I have reviewed the problem list, allergy list, medical history, family, social history and medications. Review of Symptoms:    Review of Systems   Constitutional: Negative for chills, fever and weight loss. HENT: Negative for nosebleeds. Eyes: Negative for blurred vision and double vision. Respiratory: Negative for cough, shortness of breath and wheezing.     Cardiovascular: Negative for chest pain, palpitations, orthopnea, leg swelling and PND. Gastrointestinal: Negative for abdominal pain, blood in stool, diarrhea, nausea and vomiting. Musculoskeletal: Positive for back pain. Negative for joint pain. Skin: Negative for rash. Neurological: Negative for dizziness, tingling and loss of consciousness. Endo/Heme/Allergies: Does not bruise/bleed easily. Physical Exam:      General: Well developed, in no acute distress, cooperative and alert  HEENT: No carotid bruits, no JVD, trach is midline. Neck Supple, PEERL, EOM intact. Heart:  reg rate and rhythm; normal S1/S2; no murmurs, gallops or rubs. Respiratory: Clear bilaterally x 4, no wheezing or rales  Abdomen:   Soft, non-tender, no distention, no masses. + BS. Extremities:  Normal cap refill, no cyanosis, atraumatic. No edema. Neuro: A&Ox3, speech clear, gait stable. Skin: Skin color is normal. No rashes or lesions.  Non diaphoretic  Vascular: 2+ pulses symmetric in all extremities    Vitals:    11/19/20 1248   BP: 118/60   Pulse: (!) 58   Resp: 18   SpO2: 98%   Weight: 157 lb (71.2 kg)   Height: 5' 2\" (1.575 m)       Cardiographics    ECG: sinus bradycardia    Results for orders placed or performed during the hospital encounter of 11/09/20   EKG, 12 LEAD, INITIAL   Result Value Ref Range    Ventricular Rate 72 BPM    Atrial Rate 72 BPM    P-R Interval 138 ms    QRS Duration 74 ms    Q-T Interval 402 ms    QTC Calculation (Bezet) 440 ms    Calculated P Axis 63 degrees    Calculated R Axis 4 degrees    Calculated T Axis 47 degrees    Diagnosis       Normal sinus rhythm  Normal ECG  When compared with ECG of 14-MAY-2018 15:31,  No significant change was found  Confirmed by Deuce Sweeney P.VLoc ([id number]) on 11/9/2020 11:25:23 PM     Results for orders placed or performed in visit on 08/18/10   AMB POC EKG ROUTINE W/ 12 LEADS, INTER & REP    Impression    WNL       Cardiology Labs:  Lab Results   Component Value Date/Time    Cholesterol, total 135 09/29/2020 02:22 AM    HDL Cholesterol 43 09/29/2020 02:22 AM    LDL, calculated 77 01/27/2020 08:25 AM    LDL Chol Calc (NIH) 64 09/29/2020 02:22 AM    Triglyceride 162 (H) 09/29/2020 02:22 AM    CHOL/HDL Ratio 3.5 01/05/2017 09:18 PM       Lab Results   Component Value Date/Time    Sodium 139 11/09/2020 01:14 PM    Potassium 4.3 11/09/2020 01:14 PM    Chloride 107 11/09/2020 01:14 PM    CO2 28 11/09/2020 01:14 PM    Anion gap 4 (L) 11/09/2020 01:14 PM    Glucose 206 (H) 11/09/2020 01:14 PM    BUN 11 11/09/2020 01:14 PM    Creatinine 0.92 11/09/2020 01:14 PM    BUN/Creatinine ratio 12 11/09/2020 01:14 PM    GFR est AA >60 11/09/2020 01:14 PM    GFR est non-AA >60 11/09/2020 01:14 PM    Calcium 9.4 11/09/2020 01:14 PM    Bilirubin, total 0.3 11/09/2020 01:14 PM    Alk. phosphatase 117 11/09/2020 01:14 PM    Protein, total 7.4 11/09/2020 01:14 PM    Albumin 4.1 11/09/2020 01:14 PM    Globulin 3.3 11/09/2020 01:14 PM    A-G Ratio 1.2 11/09/2020 01:14 PM    ALT (SGPT) 25 11/09/2020 01:14 PM           Assessment:     Assessment:       ICD-10-CM ICD-9-CM    1. Coronary artery disease involving native coronary artery of native heart without angina pectoris  I25.10 414.01 AMB POC EKG ROUTINE W/ 12 LEADS, INTER & REP   2. Essential hypertension  I10 401.9    3. Mixed hyperlipidemia  E78.2 272.2         Plan:     1. Coronary artery disease involving native coronary artery of native heart without angina pectoris  Per cardiac cath in 3/2017 without evidence of new obstructive lesions. Denies anginal or anginal equivalent symptoms. EKG SB  Continue with present medical management and risk factor modification. BB, statin, ASA    2. Mixed hyperlipidemia  LDL 64 9/2020. Lipids and labs managed by PCP  Continue statin therapy and low fat, low cholesterol diet    3. Essential hypertension  BP controlled. Continue anti-hypertensive therapy and low sodium diet    4.   Cardiac clearance:   EKG without changes of significance. She is able to completed greater than 4 METS without symptoms. Cleared for procedure. Can hold ASA up to 5 days prior to surgery    F/U in 1 year. Deon Cruz NP       Patient seen and examined by me with nurse practitioner. I personally performed all components of the history, physical, and medical decision making and agree with the assessment and plan as noted.     Sheela Hughes MD

## 2020-11-21 ENCOUNTER — ANESTHESIA EVENT (OUTPATIENT)
Dept: SURGERY | Age: 70
DRG: 455 | End: 2020-11-21
Payer: MEDICARE

## 2020-11-21 NOTE — ANESTHESIA PREPROCEDURE EVALUATION
Anesthetic History   No history of anesthetic complications            Review of Systems / Medical History  Patient summary reviewed, nursing notes reviewed and pertinent labs reviewed    Pulmonary        Sleep apnea: No treatment           Neuro/Psych         TIA and psychiatric history (anxiety)    Comments: Lumbar spine stenosis with radiculopathy/Right sided sciatica/Lumbar DDD    Depression Cardiovascular    Hypertension  Valvular problems/murmurs: mitral insufficiency        CAD, cardiac stents (x 3 in 6/2013) and hyperlipidemia    Exercise tolerance: >4 METS  Comments: Bilateral Carotid Artery Stenosis    ECG (11/19/20): Sinus  Bradycardia     TTE (1/6/17): Left ventricle: Systolic function was normal. Ejection fraction was  estimated in the range of 55 % to 60 %. There were no regional wall motion  abnormalities. Mitral valve: There was mild regurgitation. Tricuspid valve: There was mild pulmonary hypertension. GI/Hepatic/Renal     GERD: poorly controlled          Comments: Diverticulosis  Gastroparesis s/p partial gastrectomy   Esophageal motor disorder  S/P bypass gastrojejunostomy (h/o gastric bypass)  H/O diarrhea/constipation  H/O melena Endo/Other    Diabetes: poorly controlled, type 2  Hypothyroidism  Obesity and arthritis     Other Findings            Physical Exam    Airway  Mallampati: III  TM Distance: 4 - 6 cm  Neck ROM: normal range of motion   Mouth opening: Normal     Cardiovascular    Rhythm: regular  Rate: normal         Dental    Dentition: Upper partial plate  Comments: Multiple missing teeth, none loose.    Pulmonary  Breath sounds clear to auscultation               Abdominal  GI exam deferred       Other Findings            Anesthetic Plan    ASA: 3  Anesthesia type: general    Monitoring Plan: BIS      Induction: Intravenous and RSI  Anesthetic plan and risks discussed with: Patient

## 2020-11-23 ENCOUNTER — APPOINTMENT (OUTPATIENT)
Dept: CT IMAGING | Age: 70
DRG: 455 | End: 2020-11-23
Attending: ORTHOPAEDIC SURGERY
Payer: MEDICARE

## 2020-11-23 ENCOUNTER — APPOINTMENT (OUTPATIENT)
Dept: GENERAL RADIOLOGY | Age: 70
DRG: 455 | End: 2020-11-23
Attending: ORTHOPAEDIC SURGERY
Payer: MEDICARE

## 2020-11-23 ENCOUNTER — HOSPITAL ENCOUNTER (INPATIENT)
Age: 70
LOS: 3 days | Discharge: HOME | DRG: 455 | End: 2020-11-26
Attending: ORTHOPAEDIC SURGERY | Admitting: ORTHOPAEDIC SURGERY
Payer: MEDICARE

## 2020-11-23 ENCOUNTER — ANESTHESIA (OUTPATIENT)
Dept: SURGERY | Age: 70
DRG: 455 | End: 2020-11-23
Payer: MEDICARE

## 2020-11-23 DIAGNOSIS — Z98.890 STATUS POST LUMBAR SPINE OPERATIVE PROCEDURE FOR DECOMPRESSION OF SPINAL CORD: Primary | ICD-10-CM

## 2020-11-23 PROBLEM — M48.061 SPINAL STENOSIS OF LUMBAR REGION AT MULTIPLE LEVELS: Status: ACTIVE | Noted: 2020-11-23

## 2020-11-23 LAB
GLUCOSE BLD STRIP.AUTO-MCNC: 129 MG/DL (ref 65–100)
GLUCOSE BLD STRIP.AUTO-MCNC: 149 MG/DL (ref 65–100)
GLUCOSE BLD STRIP.AUTO-MCNC: 342 MG/DL (ref 65–100)
SERVICE CMNT-IMP: ABNORMAL

## 2020-11-23 PROCEDURE — 77030018723 HC ELCTRD BLD COVD -A: Performed by: ORTHOPAEDIC SURGERY

## 2020-11-23 PROCEDURE — 77030005513 HC CATH URETH FOL11 MDII -B: Performed by: ORTHOPAEDIC SURGERY

## 2020-11-23 PROCEDURE — 74011250636 HC RX REV CODE- 250/636: Performed by: ORTHOPAEDIC SURGERY

## 2020-11-23 PROCEDURE — 77030040951 HC GRFT BN OSTEOAMP SELCT BTUS -I: Performed by: ORTHOPAEDIC SURGERY

## 2020-11-23 PROCEDURE — 77030014647 HC SEAL FBRN TISSL BAXT -D: Performed by: ORTHOPAEDIC SURGERY

## 2020-11-23 PROCEDURE — 77030020704 HC DISECT ENDOSC BLNT J&J -B: Performed by: ORTHOPAEDIC SURGERY

## 2020-11-23 PROCEDURE — 77030033138 HC SUT PGA STRATFX J&J -B: Performed by: ORTHOPAEDIC SURGERY

## 2020-11-23 PROCEDURE — 0ST20ZZ RESECTION OF LUMBAR VERTEBRAL DISC, OPEN APPROACH: ICD-10-PCS | Performed by: ORTHOPAEDIC SURGERY

## 2020-11-23 PROCEDURE — C1713 ANCHOR/SCREW BN/BN,TIS/BN: HCPCS | Performed by: ORTHOPAEDIC SURGERY

## 2020-11-23 PROCEDURE — 77030018836 HC SOL IRR NACL ICUM -A: Performed by: ORTHOPAEDIC SURGERY

## 2020-11-23 PROCEDURE — 77030041680 HC PNCL ELECSURG SMK EVAC CNMD -B: Performed by: ORTHOPAEDIC SURGERY

## 2020-11-23 PROCEDURE — 76210000016 HC OR PH I REC 1 TO 1.5 HR: Performed by: ORTHOPAEDIC SURGERY

## 2020-11-23 PROCEDURE — 77030029251 HC SPCR SPN GLMB -K1: Performed by: ORTHOPAEDIC SURGERY

## 2020-11-23 PROCEDURE — 74011000250 HC RX REV CODE- 250: Performed by: ORTHOPAEDIC SURGERY

## 2020-11-23 PROCEDURE — 74011250637 HC RX REV CODE- 250/637

## 2020-11-23 PROCEDURE — 77030040361 HC SLV COMPR DVT MDII -B: Performed by: ORTHOPAEDIC SURGERY

## 2020-11-23 PROCEDURE — 07DR3ZZ EXTRACTION OF ILIAC BONE MARROW, PERCUTANEOUS APPROACH: ICD-10-PCS | Performed by: ORTHOPAEDIC SURGERY

## 2020-11-23 PROCEDURE — 77030013079 HC BLNKT BAIR HGGR 3M -A: Performed by: ANESTHESIOLOGY

## 2020-11-23 PROCEDURE — 76000 FLUOROSCOPY <1 HR PHYS/QHP: CPT

## 2020-11-23 PROCEDURE — 72100 X-RAY EXAM L-S SPINE 2/3 VWS: CPT

## 2020-11-23 PROCEDURE — 77030008462 HC STPLR SKN PROX J&J -A: Performed by: ORTHOPAEDIC SURGERY

## 2020-11-23 PROCEDURE — 2709999900 HC NON-CHARGEABLE SUPPLY: Performed by: ORTHOPAEDIC SURGERY

## 2020-11-23 PROCEDURE — 77030029099 HC BN WAX SSPC -A: Performed by: ORTHOPAEDIC SURGERY

## 2020-11-23 PROCEDURE — 77030026438 HC STYL ET INTUB CARD -A: Performed by: ANESTHESIOLOGY

## 2020-11-23 PROCEDURE — 74011250636 HC RX REV CODE- 250/636: Performed by: NURSE ANESTHETIST, CERTIFIED REGISTERED

## 2020-11-23 PROCEDURE — 72131 CT LUMBAR SPINE W/O DYE: CPT

## 2020-11-23 PROCEDURE — 77030003029 HC SUT VCRL J&J -B: Performed by: ORTHOPAEDIC SURGERY

## 2020-11-23 PROCEDURE — 76060000034 HC ANESTHESIA 1.5 TO 2 HR: Performed by: ORTHOPAEDIC SURGERY

## 2020-11-23 PROCEDURE — 77030014007 HC SPNG HEMSTAT J&J -B: Performed by: ORTHOPAEDIC SURGERY

## 2020-11-23 PROCEDURE — 76010000162 HC OR TIME 1.5 TO 2 HR INTENSV-TIER 1: Performed by: ORTHOPAEDIC SURGERY

## 2020-11-23 PROCEDURE — 77030040356 HC CORD BPLR FRCP COVD -A: Performed by: ORTHOPAEDIC SURGERY

## 2020-11-23 PROCEDURE — 77030034479 HC ADH SKN CLSR PRINEO J&J -B: Performed by: ORTHOPAEDIC SURGERY

## 2020-11-23 PROCEDURE — 65270000029 HC RM PRIVATE

## 2020-11-23 PROCEDURE — 82962 GLUCOSE BLOOD TEST: CPT

## 2020-11-23 PROCEDURE — 77030018846 HC SOL IRR STRL H20 ICUM -A: Performed by: ORTHOPAEDIC SURGERY

## 2020-11-23 PROCEDURE — 77030003445 HC NDL BIOP BN BD -B: Performed by: ORTHOPAEDIC SURGERY

## 2020-11-23 PROCEDURE — 77030003028 HC SUT VCRL J&J -A: Performed by: ORTHOPAEDIC SURGERY

## 2020-11-23 PROCEDURE — 74011250637 HC RX REV CODE- 250/637: Performed by: ORTHOPAEDIC SURGERY

## 2020-11-23 PROCEDURE — 0SG10A0 FUSION OF 2 OR MORE LUMBAR VERTEBRAL JOINTS WITH INTERBODY FUSION DEVICE, ANTERIOR APPROACH, ANTERIOR COLUMN, OPEN APPROACH: ICD-10-PCS | Performed by: ORTHOPAEDIC SURGERY

## 2020-11-23 PROCEDURE — 74011000250 HC RX REV CODE- 250: Performed by: NURSE ANESTHETIST, CERTIFIED REGISTERED

## 2020-11-23 PROCEDURE — 74011250636 HC RX REV CODE- 250/636: Performed by: ANESTHESIOLOGY

## 2020-11-23 PROCEDURE — 77030008684 HC TU ET CUF COVD -B: Performed by: ANESTHESIOLOGY

## 2020-11-23 PROCEDURE — 77030019908 HC STETH ESOPH SIMS -A: Performed by: ANESTHESIOLOGY

## 2020-11-23 DEVICE — GRAFT BONE 10 CC: Type: IMPLANTABLE DEVICE | Site: SPINE LUMBAR | Status: FUNCTIONAL

## 2020-11-23 DEVICE — RISE-L SPACER 22 X 50MM, 7-14MM, 3-15&DEG;
Type: IMPLANTABLE DEVICE | Site: SPINE LUMBAR | Status: FUNCTIONAL
Brand: RISE-L

## 2020-11-23 DEVICE — PLYMOUTH THORACOLUMBAR PLATE, L4-L5, 21MM
Type: IMPLANTABLE DEVICE | Site: SPINE LUMBAR | Status: FUNCTIONAL
Brand: PLYMOUTH

## 2020-11-23 DEVICE — RISE-L SPACER 22 X 45MM, 7-14MM, 3-15&DEG;
Type: IMPLANTABLE DEVICE | Site: SPINE LUMBAR | Status: FUNCTIONAL
Brand: RISE-L

## 2020-11-23 DEVICE — 5.5MM VARIABLE ANGLE SCREW, 28MM
Type: IMPLANTABLE DEVICE | Site: SPINE LUMBAR | Status: FUNCTIONAL
Brand: TRUSS

## 2020-11-23 RX ORDER — DIAZEPAM 5 MG/1
5 TABLET ORAL
Status: DISCONTINUED | OUTPATIENT
Start: 2020-11-23 | End: 2020-11-26 | Stop reason: HOSPADM

## 2020-11-23 RX ORDER — GABAPENTIN 100 MG/1
100 CAPSULE ORAL 3 TIMES DAILY
Status: DISCONTINUED | OUTPATIENT
Start: 2020-11-23 | End: 2020-11-26 | Stop reason: HOSPADM

## 2020-11-23 RX ORDER — GABAPENTIN 100 MG/1
CAPSULE ORAL
Status: COMPLETED
Start: 2020-11-23 | End: 2020-11-23

## 2020-11-23 RX ORDER — SODIUM CHLORIDE 9 MG/ML
125 INJECTION, SOLUTION INTRAVENOUS CONTINUOUS
Status: DISPENSED | OUTPATIENT
Start: 2020-11-23 | End: 2020-11-24

## 2020-11-23 RX ORDER — ACETAMINOPHEN 500 MG
TABLET ORAL
Status: COMPLETED
Start: 2020-11-23 | End: 2020-11-23

## 2020-11-23 RX ORDER — SUCCINYLCHOLINE CHLORIDE 20 MG/ML
INJECTION INTRAMUSCULAR; INTRAVENOUS AS NEEDED
Status: DISCONTINUED | OUTPATIENT
Start: 2020-11-23 | End: 2020-11-23 | Stop reason: HOSPADM

## 2020-11-23 RX ORDER — SODIUM CHLORIDE 0.9 % (FLUSH) 0.9 %
5-40 SYRINGE (ML) INJECTION AS NEEDED
Status: DISCONTINUED | OUTPATIENT
Start: 2020-11-23 | End: 2020-11-23 | Stop reason: HOSPADM

## 2020-11-23 RX ORDER — OXYCODONE HYDROCHLORIDE 5 MG/1
5 TABLET ORAL
Status: DISCONTINUED | OUTPATIENT
Start: 2020-11-23 | End: 2020-11-26 | Stop reason: HOSPADM

## 2020-11-23 RX ORDER — SODIUM CHLORIDE, SODIUM LACTATE, POTASSIUM CHLORIDE, CALCIUM CHLORIDE 600; 310; 30; 20 MG/100ML; MG/100ML; MG/100ML; MG/100ML
25 INJECTION, SOLUTION INTRAVENOUS CONTINUOUS
Status: DISCONTINUED | OUTPATIENT
Start: 2020-11-23 | End: 2020-11-23 | Stop reason: HOSPADM

## 2020-11-23 RX ORDER — SODIUM CHLORIDE 0.9 % (FLUSH) 0.9 %
5-40 SYRINGE (ML) INJECTION EVERY 8 HOURS
Status: DISCONTINUED | OUTPATIENT
Start: 2020-11-23 | End: 2020-11-23 | Stop reason: HOSPADM

## 2020-11-23 RX ORDER — FENTANYL CITRATE 50 UG/ML
25 INJECTION, SOLUTION INTRAMUSCULAR; INTRAVENOUS
Status: DISCONTINUED | OUTPATIENT
Start: 2020-11-23 | End: 2020-11-23 | Stop reason: HOSPADM

## 2020-11-23 RX ORDER — ACETAMINOPHEN 500 MG
1000 TABLET ORAL ONCE
Status: COMPLETED | OUTPATIENT
Start: 2020-11-23 | End: 2020-11-23

## 2020-11-23 RX ORDER — PREGABALIN 75 MG/1
150 CAPSULE ORAL ONCE
Status: COMPLETED | OUTPATIENT
Start: 2020-11-23 | End: 2020-11-23

## 2020-11-23 RX ORDER — LIDOCAINE HYDROCHLORIDE 20 MG/ML
INJECTION, SOLUTION EPIDURAL; INFILTRATION; INTRACAUDAL; PERINEURAL AS NEEDED
Status: DISCONTINUED | OUTPATIENT
Start: 2020-11-23 | End: 2020-11-23 | Stop reason: HOSPADM

## 2020-11-23 RX ORDER — CEFAZOLIN SODIUM 1 G/3ML
INJECTION, POWDER, FOR SOLUTION INTRAMUSCULAR; INTRAVENOUS
Status: COMPLETED
Start: 2020-11-23 | End: 2020-11-24

## 2020-11-23 RX ORDER — DIAZEPAM 5 MG/1
TABLET ORAL
Status: COMPLETED
Start: 2020-11-23 | End: 2020-11-23

## 2020-11-23 RX ORDER — DIPHENHYDRAMINE HYDROCHLORIDE 50 MG/ML
12.5 INJECTION, SOLUTION INTRAMUSCULAR; INTRAVENOUS AS NEEDED
Status: DISCONTINUED | OUTPATIENT
Start: 2020-11-23 | End: 2020-11-23 | Stop reason: HOSPADM

## 2020-11-23 RX ORDER — DEXAMETHASONE SODIUM PHOSPHATE 4 MG/ML
INJECTION, SOLUTION INTRA-ARTICULAR; INTRALESIONAL; INTRAMUSCULAR; INTRAVENOUS; SOFT TISSUE AS NEEDED
Status: DISCONTINUED | OUTPATIENT
Start: 2020-11-23 | End: 2020-11-23 | Stop reason: HOSPADM

## 2020-11-23 RX ORDER — FENTANYL CITRATE 50 UG/ML
INJECTION, SOLUTION INTRAMUSCULAR; INTRAVENOUS AS NEEDED
Status: DISCONTINUED | OUTPATIENT
Start: 2020-11-23 | End: 2020-11-23 | Stop reason: HOSPADM

## 2020-11-23 RX ORDER — ACETAMINOPHEN 500 MG
1000 TABLET ORAL EVERY 6 HOURS
Status: DISCONTINUED | OUTPATIENT
Start: 2020-11-24 | End: 2020-11-26 | Stop reason: HOSPADM

## 2020-11-23 RX ORDER — MIDAZOLAM HYDROCHLORIDE 1 MG/ML
INJECTION, SOLUTION INTRAMUSCULAR; INTRAVENOUS AS NEEDED
Status: DISCONTINUED | OUTPATIENT
Start: 2020-11-23 | End: 2020-11-23 | Stop reason: HOSPADM

## 2020-11-23 RX ORDER — HYDROMORPHONE HYDROCHLORIDE 2 MG/ML
INJECTION, SOLUTION INTRAMUSCULAR; INTRAVENOUS; SUBCUTANEOUS AS NEEDED
Status: DISCONTINUED | OUTPATIENT
Start: 2020-11-23 | End: 2020-11-23 | Stop reason: HOSPADM

## 2020-11-23 RX ORDER — PHENYLEPHRINE HCL IN 0.9% NACL 0.4MG/10ML
SYRINGE (ML) INTRAVENOUS AS NEEDED
Status: DISCONTINUED | OUTPATIENT
Start: 2020-11-23 | End: 2020-11-23 | Stop reason: HOSPADM

## 2020-11-23 RX ORDER — LIDOCAINE HYDROCHLORIDE 10 MG/ML
0.1 INJECTION, SOLUTION EPIDURAL; INFILTRATION; INTRACAUDAL; PERINEURAL AS NEEDED
Status: DISCONTINUED | OUTPATIENT
Start: 2020-11-23 | End: 2020-11-23 | Stop reason: HOSPADM

## 2020-11-23 RX ORDER — PROPOFOL 10 MG/ML
INJECTION, EMULSION INTRAVENOUS AS NEEDED
Status: DISCONTINUED | OUTPATIENT
Start: 2020-11-23 | End: 2020-11-23 | Stop reason: HOSPADM

## 2020-11-23 RX ORDER — ONDANSETRON 2 MG/ML
INJECTION INTRAMUSCULAR; INTRAVENOUS AS NEEDED
Status: DISCONTINUED | OUTPATIENT
Start: 2020-11-23 | End: 2020-11-23 | Stop reason: HOSPADM

## 2020-11-23 RX ORDER — SODIUM CHLORIDE, SODIUM LACTATE, POTASSIUM CHLORIDE, CALCIUM CHLORIDE 600; 310; 30; 20 MG/100ML; MG/100ML; MG/100ML; MG/100ML
INJECTION, SOLUTION INTRAVENOUS
Status: DISCONTINUED | OUTPATIENT
Start: 2020-11-23 | End: 2020-11-23 | Stop reason: HOSPADM

## 2020-11-23 RX ORDER — HYDROMORPHONE HYDROCHLORIDE 1 MG/ML
0.2 INJECTION, SOLUTION INTRAMUSCULAR; INTRAVENOUS; SUBCUTANEOUS
Status: DISCONTINUED | OUTPATIENT
Start: 2020-11-23 | End: 2020-11-23 | Stop reason: HOSPADM

## 2020-11-23 RX ADMIN — Medication 80 MCG: at 13:23

## 2020-11-23 RX ADMIN — OXYCODONE HYDROCHLORIDE 5 MG: 5 TABLET ORAL at 18:44

## 2020-11-23 RX ADMIN — ACETAMINOPHEN 1000 MG: 500 TABLET ORAL at 23:00

## 2020-11-23 RX ADMIN — FENTANYL CITRATE 50 MCG: 50 INJECTION, SOLUTION INTRAMUSCULAR; INTRAVENOUS at 13:44

## 2020-11-23 RX ADMIN — SODIUM CHLORIDE, SODIUM LACTATE, POTASSIUM CHLORIDE, AND CALCIUM CHLORIDE 25 ML/HR: 600; 310; 30; 20 INJECTION, SOLUTION INTRAVENOUS at 10:49

## 2020-11-23 RX ADMIN — GABAPENTIN 100 MG: 100 CAPSULE ORAL at 22:04

## 2020-11-23 RX ADMIN — PREGABALIN 150 MG: 75 CAPSULE ORAL at 10:50

## 2020-11-23 RX ADMIN — WATER 2 G: 1 INJECTION INTRAMUSCULAR; INTRAVENOUS; SUBCUTANEOUS at 22:47

## 2020-11-23 RX ADMIN — FENTANYL CITRATE 50 MCG: 50 INJECTION, SOLUTION INTRAMUSCULAR; INTRAVENOUS at 12:55

## 2020-11-23 RX ADMIN — DIAZEPAM 5 MG: 5 TABLET ORAL at 22:03

## 2020-11-23 RX ADMIN — Medication 1000 MG: at 10:49

## 2020-11-23 RX ADMIN — Medication 10 ML: at 22:04

## 2020-11-23 RX ADMIN — SODIUM CHLORIDE, POTASSIUM CHLORIDE, SODIUM LACTATE AND CALCIUM CHLORIDE: 600; 310; 30; 20 INJECTION, SOLUTION INTRAVENOUS at 13:46

## 2020-11-23 RX ADMIN — SUCCINYLCHOLINE CHLORIDE 140 MG: 20 INJECTION, SOLUTION INTRAMUSCULAR; INTRAVENOUS at 12:58

## 2020-11-23 RX ADMIN — Medication 120 MCG: at 13:03

## 2020-11-23 RX ADMIN — ONDANSETRON HYDROCHLORIDE 4 MG: 2 INJECTION, SOLUTION INTRAMUSCULAR; INTRAVENOUS at 14:25

## 2020-11-23 RX ADMIN — SODIUM CHLORIDE 125 ML/HR: 900 INJECTION, SOLUTION INTRAVENOUS at 15:26

## 2020-11-23 RX ADMIN — MIDAZOLAM HYDROCHLORIDE 2 MG: 1 INJECTION, SOLUTION INTRAMUSCULAR; INTRAVENOUS at 12:46

## 2020-11-23 RX ADMIN — Medication 3 AMPULE: at 10:51

## 2020-11-23 RX ADMIN — PROPOFOL 160 MG: 10 INJECTION, EMULSION INTRAVENOUS at 12:58

## 2020-11-23 RX ADMIN — LIDOCAINE HYDROCHLORIDE 70 MG: 20 INJECTION, SOLUTION EPIDURAL; INFILTRATION; INTRACAUDAL; PERINEURAL at 12:55

## 2020-11-23 RX ADMIN — WATER 2 G: 1 INJECTION INTRAMUSCULAR; INTRAVENOUS; SUBCUTANEOUS at 13:00

## 2020-11-23 RX ADMIN — HYDROMORPHONE HYDROCHLORIDE 0.4 MG: 2 INJECTION, SOLUTION INTRAMUSCULAR; INTRAVENOUS; SUBCUTANEOUS at 14:01

## 2020-11-23 RX ADMIN — DEXAMETHASONE SODIUM PHOSPHATE 8 MG: 4 INJECTION, SOLUTION INTRAMUSCULAR; INTRAVENOUS at 13:06

## 2020-11-23 NOTE — PERIOP NOTES
1 room assignment pending  received post op update.     Meagan  81.  visited with patient in pacu, room assignment pending

## 2020-11-23 NOTE — PERIOP NOTES
1013 - PT'S COVID TEST RESULTED NEG. PT STATES HAS QUARANTINED SINCE TESTING. PT DENIES S/S OF COVID - NO FEVER, COLD, COUGH, SOB, N/V, DIARRHEA. ...-OP TCHING DONE - PT VERBALIZES UNDERSTANDING. STRETCHER IN LOWEST POSITION, CB IN PLACE AND SR UP X2. WAITING SURGERY.

## 2020-11-23 NOTE — ANESTHESIA POSTPROCEDURE EVALUATION
Procedure(s):  L2 THROUGH 4 LATERAL FUSION WITH BONE MARROW  ASPIRATION FROM ILIAC CREST. general    Anesthesia Post Evaluation        Patient location during evaluation: PACU  Note status: Adequate. Level of consciousness: responsive to verbal stimuli and sleepy but conscious  Pain management: satisfactory to patient  Airway patency: patent  Anesthetic complications: no  Cardiovascular status: acceptable  Respiratory status: acceptable  Hydration status: acceptable  Comments: +Post-Anesthesia Evaluation and Assessment    Patient: Beto Antonio MRN: [id number]  SSN: [id number]   YOB: 1950  Age: 78 y.o. Sex: female      Cardiovascular Function/Vital Signs    BP (!) 146/66   Pulse 73   Temp 36.8 °C (98.3 °F)   Resp 9   Ht 5' 2\" (1.575 m)   Wt 72.3 kg (159 lb 6.3 oz)   SpO2 100%   BMI 29.15 kg/m²     Patient is status post Procedure(s):  L2 THROUGH 4 LATERAL FUSION WITH BONE MARROW  ASPIRATION FROM ILIAC CREST. Nausea/Vomiting: Controlled. Postoperative hydration reviewed and adequate. Pain:  Pain Scale 1: Numeric (0 - 10) (11/23/20 1534)  Pain Intensity 1: 4 (11/23/20 1534)   Managed. Neurological Status:   Neuro (WDL): Exceptions to WDL (11/23/20 1013)   At baseline. Mental Status and Level of Consciousness: Arousable. Pulmonary Status:   O2 Device: Nasal cannula (11/23/20 1500)   Adequate oxygenation and airway patent. Complications related to anesthesia: None    Post-anesthesia assessment completed. No concerns. Signed By: Jose German MD    11/23/2020  Post anesthesia nausea and vomiting:  controlled      INITIAL Post-op Vital signs:   Vitals Value Taken Time   /57 11/23/2020  3:45 PM   Temp 36.8 °C (98.3 °F) 11/23/2020  3:34 PM   Pulse 65 11/23/2020  3:56 PM   Resp 9 11/23/2020  3:56 PM   SpO2 100 % 11/23/2020  3:56 PM   Vitals shown include unvalidated device data.

## 2020-11-23 NOTE — PROGRESS NOTES
Spiritual Care Assessment/Progress Note  [facility]      NAME: Abena Julien      MRN: [id number]  AGE: 78 y.o.  SEX: female  Restoration Affiliation: Catholic   Language: English     11/23/2020     Total Time (in minutes): 10     Spiritual Assessment begun in [facility] through conversation with:         [x]Patient        [] Family    [] Friend(s)        Reason for Consult: Request by patient     Spiritual beliefs: (Please include comment if needed)     [x] Identifies with a tita tradition:         [] Supported by a tita community:            [] Claims no spiritual orientation:           [] Seeking spiritual identity:                [] Adheres to an individual form of spirituality:           [] Not able to assess:                           Identified resources for coping:      [x] Prayer                               [] Music                  [] Guided Imagery     [] Family/friends                 [] Pet visits     [] Devotional reading                         [] Unknown     [] Other:                                              Interventions offered during this visit: (See comments for more details)    Patient Interventions: Affirmation of emotions/emotional suffering, Affirmation of tita, Initial/Spiritual assessment, patient floor, Prayer (actual), Prayer (assurance of)           Plan of Care:     [x] Support spiritual and/or cultural needs    [] Support AMD and/or advance care planning process      [] Support grieving process   [] Coordinate Rites and/or Rituals    [] Coordination with community clergy   [] No spiritual needs identified at this time   [] Detailed Plan of Care below (See Comments)  [] Make referral to Music Therapy  [] Make referral to Pet Therapy     [] Make referral to Addiction services  [] Make referral to [facility]  [] Make referral to Spiritual Care Partner  [] No future visits requested        [x] Follow up visits as needed     Comments: Provided support for this pt in ED [facility]. Facilitated life review to assess potential support needs or coping strategies. Pt shared of her tita and it's importance in this situation. Offered prayer at pt's request.  Assured pt of continued prayers and affirmed ongoing availability of support. Leola Melgoza MDiv.  Staff   Request  Support/Spiritual Care Services via [address] [place] [address]

## 2020-11-23 NOTE — PERIOP NOTES
Patient: Lauryn Woods MRN: [id number]  SSN: [id number]   YOB: 1950  Age: 78 y.o. Sex: female     Patient is status post Procedure(s):  L2 THROUGH 4 LATERAL FUSION WITH BONE MARROW  ASPIRATION FROM ILIAC CREST.     Surgeon(s) and Role:     Andres Livingston MD - Primary                    Peripheral IV 11/23/20 Right Antecubital (Active)            Airway - Endotracheal Tube 11/23/20 Oral (Active)   Line Shawn Lips 11/23/20 0000                   Dressing/Packing:  Wound Flank Left-Dressing/Treatment: Skin glue (11/23/20 1300)    Splint/Cast:  ]

## 2020-11-23 NOTE — H&P
Progress notes    Expand AllCollapse All    ASSESSMENT:  (M54.31) Right sided sciatica  (primary encounter diagnosis)  (M47.817) Lumbosacral spondylosis without myelopathy  (M48.061) Foraminal stenosis of lumbar region  (D42.430) Spinal stenosis, lumbar region, with neurogenic claudication  (Z98.1) S/P spinal fusion  (R20.1) Hypoesthesia         Patient Active Problem List   Diagnosis    CAD (coronary artery disease)    Anxiety    Angina, class III    Angina pectoris    CVA (cerebral vascular accident)    Depression    Esophageal motor disorder    GERD (gastroesophageal reflux disease)    Mixed hyperlipidemia    Numbness and tingling of right side of face    Osteopenia    S/P bypass gastrojejunostomy    S/P PTCA (percutaneous transluminal coronary angioplasty)    Stenosis of both internal carotid arteries    Type 2 diabetes with nephropathy    Essential hypertension         Impression: spinal stenosis at L2-L3 L3-L4; low back pain and RLE sciatica mild relief with PT      PLAN:  I reviewed the exam findings with Ms. Boggs today. She is no longer wearing her back brace. I advised her to continue with her exercises once PT has concluded. At this time, I prescribed 50 mg lyrica BID. Ms. Eugene Verma was scheduled for L2-L4 posterior/lateral decompression and fusion.      I have discussed the procedure in detail with the patient and mentioned complications, including but not limited to: death, permanent disability, heart attack, stroke, lung injury or infection, blindness, ileus, bladder or bowel problems, ureter injury, bleeding, nerve injury (including numbness, pain and weakness), paralysis (which may be permanent), failure to heal, failure to fuse bone together in fusion procedures, failure to relief symptoms, failure to relief pain, increased pain, need for further surgeries, failure or breakage or hardware, malpositioning of hardware, need to fuse or operate on additional levels determined either during or after surgery, destabilization of the spine (which may require fusion or later surgery), infections (which may or may not require additional surgery), dural tears (tears of the sac holding in nerves and spinal fluid), meningitis, voice changes, vocal cord injury, hoarseness, blood clots, pulmonary embolus, Elvi syndrome, recurrent disc herniation, diaphragm paralysis, and anesthetic complications. Comorbidities such as obesity, smoking, rheumatoid arthritis, chronic steroid use and diabetes increase these risks. The patient understands and wants to proceed.          The patient has been prescribed a LSO spinal orthosis pre-operatively for pain relief. The orthosis is medically necessary to reduce pain by restricting mobility of the trunk and to otherwise support weak spinal muscles and/or deformed spine. The patient will meet with our bracing coordinator to be fit for the brace. Follow up after surgery.     Treatment Plan:       Orders Placed This Encounter    BMI >=25 PATIENT INSTRUCTIONS & EDUCATION    pregabalin (LYRICA) 50 MG capsule         Follow-up: No follow-ups on file.         HISTORY OF PRESENT ILLNESS:  Xuan Trammell; [id number]   Age: 78 y.o. Sex: female   Pain score: Pain rating =   7 out of 10      Chief Complaint: Pain of the Lower Back        History of present illness:  Xuan Trammell is a 78 y.o. female with complaints of low back pain radiating into the right hip down the RLE. She denies any pain on the left side. It is described as achy and sharp and is there constantly. Xuan Trammell has tried PT and tylenol. The pain is rated 7 out of 10 on the VAS. She has osteoporosis.               Patient Active Problem List     Diagnosis Date Noted    Essential hypertension 01/03/2019    Anxiety 04/04/2018    Depression 04/04/2018    Angina, class III 03/09/2017    Numbness and tingling of right side of face 01/06/2017    Stenosis of both internal carotid arteries 01/06/2017    CVA (cerebral vascular accident) 01/05/2017    Osteopenia 12/30/2015    Type 2 diabetes with nephropathy 11/17/2015    Mixed hyperlipidemia 07/23/2013    S/P PTCA (percutaneous transluminal coronary angioplasty) 06/28/2013    Angina pectoris 06/18/2013    CAD (coronary artery disease) 06/11/2013    Esophageal motor disorder 01/23/2012    S/P bypass gastrojejunostomy 01/23/2012    GERD (gastroesophageal reflux disease) 03/18/2011               Family History   Problem Relation Age of Onset    Coronary artery disease Mother      Diabetes Father      No Known Problems Brother      No Known Problems Sister      No Known Problems Son      No Known Problems Daughter      Clotting disorder Neg Hx      Anesthesia problems Neg Hx           Social History           Tobacco Use    Smoking status: Never Smoker    Smokeless tobacco: Never Used   Substance Use Topics    Alcohol use:  No         Medical History        Past Medical History:   Diagnosis Date    Anxiety      Coronary artery disease      Depression      Diabetes mellitus      GERD (gastroesophageal reflux disease)      Hyperlipidemia      Hypertension      Inflammatory bowel disease      Osteoporosis              Surgical History         Past Surgical History:   Procedure Laterality Date    FOOT SURGERY        GASTRIC BYPASS        HAND SURGERY        SPINE SURGERY                   Current Outpatient Medications:     ALPRAZolam (XANAX) 1 MG tablet, Take 1 mg by mouth nightly as needed  , Disp: , Rfl:     aspirin 81 MG tablet, Take 81 mg by mouth daily, Disp: , Rfl:     famotidine (PEPCID) 40 MG tablet, Take 40 mg by mouth daily, Disp: , Rfl:     fenofibrate (TRICOR) 145 MG tablet, Take 145 mg by mouth daily  , Disp: , Rfl:     fluticasone (FLONASE) 50 MCG/ACT nasal spray, Administer 2 sprays into each nostril daily as needed, Disp: , Rfl:     furosemide (LASIX) 20 MG tablet, Take 20 mg by mouth daily  , Disp: , Rfl:     glipiZIDE (GLUCOTROL) 5 MG 24 hr tablet, Take 5 mg by mouth daily  , Disp: , Rfl:     Insulin Glargine 100 UNIT/ML solution pen-injector, Inject 5 Units under the skin nightly, Disp: , Rfl:     JANUVIA 100 MG tablet, Take 100 mg by mouth daily  , Disp: , Rfl:     lisinopril (PRINIVIL,ZESTRIL) 2.5 MG tablet, Take 2.5 mg by mouth daily  , Disp: , Rfl:     metoprolol succinate XL (TOPROL-XL) 25 MG 24 hr tablet, Take 25 mg by mouth daily  , Disp: , Rfl:     pravastatin (PRAVACHOL) 40 MG tablet, Take 40 mg by mouth daily  , Disp: , Rfl:     pregabalin (LYRICA) 50 MG capsule, Take 1 capsule (50 mg total) by mouth 2 (two) times a day, Disp: 60 capsule, Rfl: 5           Allergies   Allergen Reactions    Demerol Hcl [Meperidine]         Other reaction(s): Unknown (comments)  Pt unsure of reaction         ROS:   No new bowel or bladder incontinence. No fever. No saddle anesthesia.     OBJECTIVE:  Vitals:     09/11/20 1105   BP: (!) 134/95         Body mass index is 29.08 kg/m². , a BMI over 30 is considered obese and a BMI over 40 has been associated with a higher risk of surgical complications.     Constitutional: No acute distress. Well nourished. Respiratory:  No labored breathing. Cardiovascular:  No marked cyanosis. Skin:  No marked skin ulcers/lesions on bilateral upper or lower extremities. Psychiatric: Alert and oriented x3. Inspection: No gross deformity of bilateral upper or lower extremities. Musculoskeletal/Neurological:   Gait/balance:  - Normal. Able to walk on heels and toes. Thoracolumbar spine:  - No tenderness to palpation  - Full range of motion.   Right lower extremity:  - No tenderness to palpation   - Full range of motion  - No pain with internal/external rotation of the hip  - Strength:  - 5 out of 5 to hip flexors  - 5 out of 5 to quads  - 5 out of 5 to TA  - 5 out of 5 to EHL  - 5 out of 5 to Gastroc/Soleus  - Negative straight leg raise  Left lower extremity:  - No tenderness to palpation   - Full range of motion  - No pain with internal/external rotation of the hip  - Strength:  - 5 out of 5 to hip flexors  - 5 out of 5 to quads  - 5 out of 5 to TA  - 5 out of 5 to EHL  - 5 out of 5 to Gastroc/Soleus  - Negative straight leg raise  Sensation:  - Intact to light touch  Reflexes:  - +2 Patellar tendon   - +2 Achilles tendon            RESULTS REVIEWED:          No imaging obtained       I independently reviewed the EMG that is normal.         I have instructed the patient to continue to work on their physician supervised home exercise program.      This note has been transcribed electronically using voice recognition and a trained scribe. It is believed to be accurate, but may contain errors secondary to technological limitations and other factors.     I, Ottoniel Foley MD, personally, performed the services described in this documentation, as scribed in my presence, and it is both accurate and complete.   Scribed by: Chris Ruth

## 2020-11-23 NOTE — PERIOP NOTES
Handoff Report from Operating Room to PACU    Report received from [facility] [place] and [address]  regarding Calderon Son. Surgeon(s):  Hai Coronel MD  And Procedure(s) (LRB):  L2 THROUGH 4 LATERAL FUSION WITH BONE MARROW  ASPIRATION FROM ILIAC CREST (N/A)  confirmed   with allergies and dressings discussed. Anesthesia type, drugs, patient history, complications, estimated blood loss, vital signs, intake and output, and blood products received and reversal medications were reviewed.

## 2020-11-23 NOTE — BRIEF OP NOTE
Brief Postoperative Note    Patient: Zacarias Primrose  YOB: 1950  MRN: [id number]    Date of Procedure: 11/23/2020     Pre-Op Diagnosis: RIGHT LUMBAR RADICULITIS  SPINAL STENOSIS OF LUMBAR REGION WITH RADICULOPATHY  RIGHT SIDED SIATICA  DEGENERATION OF LUMBAR INTERVERTEBRAL [address]    Post-Op Diagnosis: Same as preoperative diagnosis. Procedure(s):  L2 THROUGH 4 LATERAL FUSION WITH BONE MARROW  ASPIRATION FROM ILIAC CREST    Surgeon(s):  Zeinab Paulson MD    Surgical Assistant: Physician Assistant: JARED Churchill    Anesthesia: General     Estimated Blood Loss (mL): Minimal    Complications: None    Specimens: * No specimens in log *     Implants:   Implant Name Type Inv.  Item Serial No.  Lot No. LRB No. Used Action   OSTEOAMP FIBERS 10CC   [id number] [facility] N/A N/A 1 Implanted   PLATE SPNL [id number] THORLUM L4-L5 LAT [place] - SN/A  PLATE SPNL [id number] THORLUM L4-L5 LAT STBL [place] N/A [employer] N/A N/A 2 Implanted   SCREW SPNL L28MM OD5.5MM THORACOLUMBARVARIABLE ANG [place] - SN/A  SCREW SPNL L28MM OD5.5MM THORACOLUMBARVARIABLE ANG [place] N/A [employer] N/A N/A 4 Implanted   SPACER SPNL [id number] 3-15DEG LAT LUM INTBDY FUS TI - SN/A  SPACER SPNL [id number] 3-15DEG LAT LUM INTBDY FUS TI N/A [employer] N/A N/A 1 Implanted   SPACER SPNL [id number] 3-15DEG LAT LUM INTBDY FUS TI - SN/A  SPACER SPNL [id number] 3-15DEG LAT LUM INTBDY FUS TI N/A [employer] N/A N/A 1 Implanted       Drains: * No LDAs found *    Findings: Stenosis    Electronically Signed by Yanira Tomlinson MD on 11/23/2020 at 2:26 PM

## 2020-11-24 ENCOUNTER — APPOINTMENT (OUTPATIENT)
Dept: GENERAL RADIOLOGY | Age: 70
DRG: 455 | End: 2020-11-24
Attending: ORTHOPAEDIC SURGERY
Payer: MEDICARE

## 2020-11-24 ENCOUNTER — ANESTHESIA (OUTPATIENT)
Dept: SURGERY | Age: 70
DRG: 455 | End: 2020-11-24
Payer: MEDICARE

## 2020-11-24 ENCOUNTER — ANESTHESIA EVENT (OUTPATIENT)
Dept: SURGERY | Age: 70
DRG: 455 | End: 2020-11-24
Payer: MEDICARE

## 2020-11-24 LAB
GLUCOSE BLD STRIP.AUTO-MCNC: 174 MG/DL (ref 65–100)
GLUCOSE BLD STRIP.AUTO-MCNC: 184 MG/DL (ref 65–100)
GLUCOSE BLD STRIP.AUTO-MCNC: 187 MG/DL (ref 65–100)
GLUCOSE BLD STRIP.AUTO-MCNC: 207 MG/DL (ref 65–100)
GLUCOSE BLD STRIP.AUTO-MCNC: 208 MG/DL (ref 65–100)
GLUCOSE BLD STRIP.AUTO-MCNC: 252 MG/DL (ref 65–100)
HGB BLD-MCNC: 10.9 G/DL (ref 11.5–16)
SERVICE CMNT-IMP: ABNORMAL

## 2020-11-24 PROCEDURE — 77030040950 HC GRFT BN OSTEOAMP SELCT BTUS -H: Performed by: ORTHOPAEDIC SURGERY

## 2020-11-24 PROCEDURE — 77030014647 HC SEAL FBRN TISSL BAXT -D: Performed by: ORTHOPAEDIC SURGERY

## 2020-11-24 PROCEDURE — 77030019908 HC STETH ESOPH SIMS -A: Performed by: ANESTHESIOLOGY

## 2020-11-24 PROCEDURE — 77030003445 HC NDL BIOP BN BD -B: Performed by: ORTHOPAEDIC SURGERY

## 2020-11-24 PROCEDURE — 97535 SELF CARE MNGMENT TRAINING: CPT

## 2020-11-24 PROCEDURE — 8E0W0CZ ROBOTIC ASSISTED PROCEDURE OF TRUNK REGION, OPEN APPROACH: ICD-10-PCS | Performed by: ORTHOPAEDIC SURGERY

## 2020-11-24 PROCEDURE — 77030018846 HC SOL IRR STRL H20 ICUM -A: Performed by: ORTHOPAEDIC SURGERY

## 2020-11-24 PROCEDURE — 76210000016 HC OR PH I REC 1 TO 1.5 HR: Performed by: ORTHOPAEDIC SURGERY

## 2020-11-24 PROCEDURE — 74011250636 HC RX REV CODE- 250/636: Performed by: ANESTHESIOLOGY

## 2020-11-24 PROCEDURE — 97161 PT EVAL LOW COMPLEX 20 MIN: CPT

## 2020-11-24 PROCEDURE — 36415 COLL VENOUS BLD VENIPUNCTURE: CPT

## 2020-11-24 PROCEDURE — 77030038692 HC WND DEB SYS IRMX -B: Performed by: ORTHOPAEDIC SURGERY

## 2020-11-24 PROCEDURE — 77030018836 HC SOL IRR NACL ICUM -A: Performed by: ORTHOPAEDIC SURGERY

## 2020-11-24 PROCEDURE — 77030022704 HC SUT VLOC COVD -B: Performed by: ORTHOPAEDIC SURGERY

## 2020-11-24 PROCEDURE — 97116 GAIT TRAINING THERAPY: CPT

## 2020-11-24 PROCEDURE — 72100 X-RAY EXAM L-S SPINE 2/3 VWS: CPT

## 2020-11-24 PROCEDURE — 97530 THERAPEUTIC ACTIVITIES: CPT

## 2020-11-24 PROCEDURE — C1713 ANCHOR/SCREW BN/BN,TIS/BN: HCPCS | Performed by: ORTHOPAEDIC SURGERY

## 2020-11-24 PROCEDURE — 97165 OT EVAL LOW COMPLEX 30 MIN: CPT

## 2020-11-24 PROCEDURE — 77030003666 HC NDL SPINAL BD -A: Performed by: ORTHOPAEDIC SURGERY

## 2020-11-24 PROCEDURE — 77030040179 HC DEV DRSG WND PICO S&N -C: Performed by: ORTHOPAEDIC SURGERY

## 2020-11-24 PROCEDURE — 77030003028 HC SUT VCRL J&J -A: Performed by: ORTHOPAEDIC SURGERY

## 2020-11-24 PROCEDURE — 77030020268 HC MISC GENERAL SUPPLY: Performed by: ORTHOPAEDIC SURGERY

## 2020-11-24 PROCEDURE — 77030013079 HC BLNKT BAIR HGGR 3M -A: Performed by: ANESTHESIOLOGY

## 2020-11-24 PROCEDURE — 77030040361 HC SLV COMPR DVT MDII -B: Performed by: ORTHOPAEDIC SURGERY

## 2020-11-24 PROCEDURE — 94760 N-INVAS EAR/PLS OXIMETRY 1: CPT

## 2020-11-24 PROCEDURE — 74011000250 HC RX REV CODE- 250: Performed by: ORTHOPAEDIC SURGERY

## 2020-11-24 PROCEDURE — 07DR3ZZ EXTRACTION OF ILIAC BONE MARROW, PERCUTANEOUS APPROACH: ICD-10-PCS | Performed by: ORTHOPAEDIC SURGERY

## 2020-11-24 PROCEDURE — 85018 HEMOGLOBIN: CPT

## 2020-11-24 PROCEDURE — 77030032806 HC CAP SPN LOK CREO GLBM -B: Performed by: ORTHOPAEDIC SURGERY

## 2020-11-24 PROCEDURE — 77030041680 HC PNCL ELECSURG SMK EVAC CNMD -B: Performed by: ORTHOPAEDIC SURGERY

## 2020-11-24 PROCEDURE — 76010000162 HC OR TIME 1.5 TO 2 HR INTENSV-TIER 1: Performed by: ORTHOPAEDIC SURGERY

## 2020-11-24 PROCEDURE — 77030018723 HC ELCTRD BLD COVD -A: Performed by: ORTHOPAEDIC SURGERY

## 2020-11-24 PROCEDURE — 77030008684 HC TU ET CUF COVD -B: Performed by: ANESTHESIOLOGY

## 2020-11-24 PROCEDURE — 74011250637 HC RX REV CODE- 250/637: Performed by: ORTHOPAEDIC SURGERY

## 2020-11-24 PROCEDURE — 74011250636 HC RX REV CODE- 250/636: Performed by: ORTHOPAEDIC SURGERY

## 2020-11-24 PROCEDURE — 74011000250 HC RX REV CODE- 250: Performed by: NURSE ANESTHETIST, CERTIFIED REGISTERED

## 2020-11-24 PROCEDURE — 65270000029 HC RM PRIVATE

## 2020-11-24 PROCEDURE — 76060000034 HC ANESTHESIA 1.5 TO 2 HR: Performed by: ORTHOPAEDIC SURGERY

## 2020-11-24 PROCEDURE — 76000 FLUOROSCOPY <1 HR PHYS/QHP: CPT

## 2020-11-24 PROCEDURE — 77030029099 HC BN WAX SSPC -A: Performed by: ORTHOPAEDIC SURGERY

## 2020-11-24 PROCEDURE — 77030008462 HC STPLR SKN PROX J&J -A: Performed by: ORTHOPAEDIC SURGERY

## 2020-11-24 PROCEDURE — 2709999900 HC NON-CHARGEABLE SUPPLY: Performed by: ORTHOPAEDIC SURGERY

## 2020-11-24 PROCEDURE — 74011250636 HC RX REV CODE- 250/636: Performed by: NURSE ANESTHETIST, CERTIFIED REGISTERED

## 2020-11-24 PROCEDURE — 82962 GLUCOSE BLOOD TEST: CPT

## 2020-11-24 PROCEDURE — 77030026438 HC STYL ET INTUB CARD -A: Performed by: ANESTHESIOLOGY

## 2020-11-24 PROCEDURE — 0SG10K1 FUSION OF 2 OR MORE LUMBAR VERTEBRAL JOINTS WITH NONAUTOLOGOUS TISSUE SUBSTITUTE, POSTERIOR APPROACH, POSTERIOR COLUMN, OPEN APPROACH: ICD-10-PCS | Performed by: ORTHOPAEDIC SURGERY

## 2020-11-24 PROCEDURE — 0QP004Z REMOVAL OF INTERNAL FIXATION DEVICE FROM LUMBAR VERTEBRA, OPEN APPROACH: ICD-10-PCS | Performed by: ORTHOPAEDIC SURGERY

## 2020-11-24 PROCEDURE — 77030040356 HC CORD BPLR FRCP COVD -A: Performed by: ORTHOPAEDIC SURGERY

## 2020-11-24 PROCEDURE — 2709999900 HC NON-CHARGEABLE SUPPLY

## 2020-11-24 DEVICE — CREO MIS 5.5MM CURVED ROD, TITANIUM ALLOY, 120MM LENGTH
Type: IMPLANTABLE DEVICE | Site: SPINE LUMBAR | Status: FUNCTIONAL
Brand: CREO

## 2020-11-24 DEVICE — CREO MIS MODULAR POLYAXIAL TULIP, 30MM REDUCTION
Type: IMPLANTABLE DEVICE | Site: SPINE LUMBAR | Status: FUNCTIONAL
Brand: CREO

## 2020-11-24 DEVICE — 7.5 X 50MM CANNULATED SCREW, MODULAR, CREO AMP
Type: IMPLANTABLE DEVICE | Site: SPINE LUMBAR | Status: FUNCTIONAL
Brand: CREO

## 2020-11-24 DEVICE — 6.5 X 45MM CANNULATED SCREW, MODULAR, CREO AMP
Type: IMPLANTABLE DEVICE | Site: SPINE LUMBAR | Status: FUNCTIONAL
Brand: CREO

## 2020-11-24 DEVICE — 7.5 X 40MM CANNULATED SCREW, MODULAR, CREO AMP
Type: IMPLANTABLE DEVICE | Site: SPINE LUMBAR | Status: FUNCTIONAL
Brand: CREO

## 2020-11-24 DEVICE — CREO MIS LOCKING CAP
Type: IMPLANTABLE DEVICE | Site: SPINE LUMBAR | Status: FUNCTIONAL
Brand: CREO

## 2020-11-24 DEVICE — GRAFT BONE 5 CC: Type: IMPLANTABLE DEVICE | Site: SPINE LUMBAR | Status: FUNCTIONAL

## 2020-11-24 RX ORDER — FENTANYL CITRATE 50 UG/ML
INJECTION, SOLUTION INTRAMUSCULAR; INTRAVENOUS AS NEEDED
Status: DISCONTINUED | OUTPATIENT
Start: 2020-11-24 | End: 2020-11-24 | Stop reason: HOSPADM

## 2020-11-24 RX ORDER — SODIUM CHLORIDE, SODIUM LACTATE, POTASSIUM CHLORIDE, CALCIUM CHLORIDE 600; 310; 30; 20 MG/100ML; MG/100ML; MG/100ML; MG/100ML
25 INJECTION, SOLUTION INTRAVENOUS CONTINUOUS
Status: DISPENSED | OUTPATIENT
Start: 2020-11-24 | End: 2020-11-25

## 2020-11-24 RX ORDER — SODIUM CHLORIDE 0.9 % (FLUSH) 0.9 %
5-40 SYRINGE (ML) INJECTION AS NEEDED
Status: DISCONTINUED | OUTPATIENT
Start: 2020-11-24 | End: 2020-11-24 | Stop reason: HOSPADM

## 2020-11-24 RX ORDER — ALPRAZOLAM 0.5 MG/1
0.5 TABLET ORAL
Status: DISCONTINUED | OUTPATIENT
Start: 2020-11-24 | End: 2020-11-26 | Stop reason: HOSPADM

## 2020-11-24 RX ORDER — DIPHENHYDRAMINE HYDROCHLORIDE 50 MG/ML
12.5 INJECTION, SOLUTION INTRAMUSCULAR; INTRAVENOUS AS NEEDED
Status: DISCONTINUED | OUTPATIENT
Start: 2020-11-24 | End: 2020-11-24 | Stop reason: HOSPADM

## 2020-11-24 RX ORDER — HYDROMORPHONE HYDROCHLORIDE 2 MG/ML
INJECTION, SOLUTION INTRAMUSCULAR; INTRAVENOUS; SUBCUTANEOUS AS NEEDED
Status: DISCONTINUED | OUTPATIENT
Start: 2020-11-24 | End: 2020-11-24 | Stop reason: HOSPADM

## 2020-11-24 RX ORDER — FAMOTIDINE 20 MG/1
20 TABLET, FILM COATED ORAL 2 TIMES DAILY
Status: DISCONTINUED | OUTPATIENT
Start: 2020-11-24 | End: 2020-11-26 | Stop reason: HOSPADM

## 2020-11-24 RX ORDER — FENTANYL CITRATE 50 UG/ML
25 INJECTION, SOLUTION INTRAMUSCULAR; INTRAVENOUS
Status: DISCONTINUED | OUTPATIENT
Start: 2020-11-24 | End: 2020-11-24 | Stop reason: HOSPADM

## 2020-11-24 RX ORDER — SODIUM CHLORIDE 0.9 % (FLUSH) 0.9 %
5-40 SYRINGE (ML) INJECTION EVERY 8 HOURS
Status: DISCONTINUED | OUTPATIENT
Start: 2020-11-24 | End: 2020-11-26 | Stop reason: HOSPADM

## 2020-11-24 RX ORDER — ONDANSETRON 2 MG/ML
INJECTION INTRAMUSCULAR; INTRAVENOUS AS NEEDED
Status: DISCONTINUED | OUTPATIENT
Start: 2020-11-24 | End: 2020-11-24 | Stop reason: HOSPADM

## 2020-11-24 RX ORDER — MIDAZOLAM HYDROCHLORIDE 1 MG/ML
0.5 INJECTION, SOLUTION INTRAMUSCULAR; INTRAVENOUS
Status: DISCONTINUED | OUTPATIENT
Start: 2020-11-24 | End: 2020-11-24 | Stop reason: HOSPADM

## 2020-11-24 RX ORDER — FACIAL-BODY WIPES
10 EACH TOPICAL DAILY PRN
Status: DISCONTINUED | OUTPATIENT
Start: 2020-11-25 | End: 2020-11-26 | Stop reason: HOSPADM

## 2020-11-24 RX ORDER — PROPOFOL 10 MG/ML
INJECTION, EMULSION INTRAVENOUS AS NEEDED
Status: DISCONTINUED | OUTPATIENT
Start: 2020-11-24 | End: 2020-11-24 | Stop reason: HOSPADM

## 2020-11-24 RX ORDER — PHENYLEPHRINE HCL IN 0.9% NACL 0.4MG/10ML
SYRINGE (ML) INTRAVENOUS AS NEEDED
Status: DISCONTINUED | OUTPATIENT
Start: 2020-11-24 | End: 2020-11-24 | Stop reason: HOSPADM

## 2020-11-24 RX ORDER — ROPIVACAINE HYDROCHLORIDE 5 MG/ML
INJECTION, SOLUTION EPIDURAL; INFILTRATION; PERINEURAL AS NEEDED
Status: DISCONTINUED | OUTPATIENT
Start: 2020-11-24 | End: 2020-11-24 | Stop reason: HOSPADM

## 2020-11-24 RX ORDER — ROCURONIUM BROMIDE 10 MG/ML
INJECTION, SOLUTION INTRAVENOUS AS NEEDED
Status: DISCONTINUED | OUTPATIENT
Start: 2020-11-24 | End: 2020-11-24 | Stop reason: HOSPADM

## 2020-11-24 RX ORDER — NALOXONE HYDROCHLORIDE 0.4 MG/ML
0.4 INJECTION, SOLUTION INTRAMUSCULAR; INTRAVENOUS; SUBCUTANEOUS AS NEEDED
Status: DISCONTINUED | OUTPATIENT
Start: 2020-11-24 | End: 2020-11-26 | Stop reason: HOSPADM

## 2020-11-24 RX ORDER — MORPHINE SULFATE 10 MG/ML
2 INJECTION, SOLUTION INTRAMUSCULAR; INTRAVENOUS
Status: DISCONTINUED | OUTPATIENT
Start: 2020-11-24 | End: 2020-11-24 | Stop reason: HOSPADM

## 2020-11-24 RX ORDER — OXYCODONE HYDROCHLORIDE 5 MG/1
10-15 TABLET ORAL
Status: DISCONTINUED | OUTPATIENT
Start: 2020-11-24 | End: 2020-11-26 | Stop reason: HOSPADM

## 2020-11-24 RX ORDER — POLYETHYLENE GLYCOL 3350 17 G/17G
17 POWDER, FOR SOLUTION ORAL DAILY
Status: DISCONTINUED | OUTPATIENT
Start: 2020-11-24 | End: 2020-11-26 | Stop reason: HOSPADM

## 2020-11-24 RX ORDER — MIDAZOLAM HYDROCHLORIDE 1 MG/ML
INJECTION, SOLUTION INTRAMUSCULAR; INTRAVENOUS AS NEEDED
Status: DISCONTINUED | OUTPATIENT
Start: 2020-11-24 | End: 2020-11-24 | Stop reason: HOSPADM

## 2020-11-24 RX ORDER — METOPROLOL SUCCINATE 25 MG/1
25 TABLET, EXTENDED RELEASE ORAL DAILY
Status: DISCONTINUED | OUTPATIENT
Start: 2020-11-24 | End: 2020-11-26 | Stop reason: HOSPADM

## 2020-11-24 RX ORDER — MELATONIN
2000 DAILY
Status: DISCONTINUED | OUTPATIENT
Start: 2020-11-24 | End: 2020-11-26 | Stop reason: HOSPADM

## 2020-11-24 RX ORDER — SODIUM CHLORIDE 0.9 % (FLUSH) 0.9 %
5-40 SYRINGE (ML) INJECTION EVERY 8 HOURS
Status: DISCONTINUED | OUTPATIENT
Start: 2020-11-24 | End: 2020-11-24 | Stop reason: HOSPADM

## 2020-11-24 RX ORDER — LIDOCAINE HYDROCHLORIDE 20 MG/ML
INJECTION, SOLUTION EPIDURAL; INFILTRATION; INTRACAUDAL; PERINEURAL AS NEEDED
Status: DISCONTINUED | OUTPATIENT
Start: 2020-11-24 | End: 2020-11-24 | Stop reason: HOSPADM

## 2020-11-24 RX ORDER — CEFAZOLIN SODIUM 1 G/3ML
INJECTION, POWDER, FOR SOLUTION INTRAMUSCULAR; INTRAVENOUS AS NEEDED
Status: DISCONTINUED | OUTPATIENT
Start: 2020-11-24 | End: 2020-11-24 | Stop reason: HOSPADM

## 2020-11-24 RX ORDER — HYDROXYZINE HYDROCHLORIDE 10 MG/1
10 TABLET, FILM COATED ORAL
Status: DISCONTINUED | OUTPATIENT
Start: 2020-11-24 | End: 2020-11-26 | Stop reason: HOSPADM

## 2020-11-24 RX ORDER — LIDOCAINE HYDROCHLORIDE AND EPINEPHRINE 10; 10 MG/ML; UG/ML
INJECTION, SOLUTION INFILTRATION; PERINEURAL AS NEEDED
Status: DISCONTINUED | OUTPATIENT
Start: 2020-11-24 | End: 2020-11-24 | Stop reason: HOSPADM

## 2020-11-24 RX ORDER — FENTANYL CITRATE 50 UG/ML
50 INJECTION, SOLUTION INTRAMUSCULAR; INTRAVENOUS AS NEEDED
Status: DISCONTINUED | OUTPATIENT
Start: 2020-11-24 | End: 2020-11-26 | Stop reason: HOSPADM

## 2020-11-24 RX ORDER — FLUTICASONE PROPIONATE 50 MCG
2 SPRAY, SUSPENSION (ML) NASAL
Status: DISCONTINUED | OUTPATIENT
Start: 2020-11-24 | End: 2020-11-26 | Stop reason: HOSPADM

## 2020-11-24 RX ORDER — LISINOPRIL 5 MG/1
2.5 TABLET ORAL DAILY
Status: DISCONTINUED | OUTPATIENT
Start: 2020-11-24 | End: 2020-11-26 | Stop reason: HOSPADM

## 2020-11-24 RX ORDER — ONDANSETRON 2 MG/ML
4 INJECTION INTRAMUSCULAR; INTRAVENOUS
Status: ACTIVE | OUTPATIENT
Start: 2020-11-24 | End: 2020-11-25

## 2020-11-24 RX ORDER — LIDOCAINE HYDROCHLORIDE 10 MG/ML
0.1 INJECTION, SOLUTION EPIDURAL; INFILTRATION; INTRACAUDAL; PERINEURAL AS NEEDED
Status: DISCONTINUED | OUTPATIENT
Start: 2020-11-24 | End: 2020-11-26 | Stop reason: HOSPADM

## 2020-11-24 RX ORDER — CYCLOBENZAPRINE HCL 10 MG
10 TABLET ORAL
Status: DISCONTINUED | OUTPATIENT
Start: 2020-11-24 | End: 2020-11-26 | Stop reason: HOSPADM

## 2020-11-24 RX ORDER — DEXAMETHASONE SODIUM PHOSPHATE 4 MG/ML
INJECTION, SOLUTION INTRA-ARTICULAR; INTRALESIONAL; INTRAMUSCULAR; INTRAVENOUS; SOFT TISSUE AS NEEDED
Status: DISCONTINUED | OUTPATIENT
Start: 2020-11-24 | End: 2020-11-24

## 2020-11-24 RX ORDER — LEVOTHYROXINE SODIUM 25 UG/1
25 TABLET ORAL
Status: DISCONTINUED | OUTPATIENT
Start: 2020-11-24 | End: 2020-11-26 | Stop reason: HOSPADM

## 2020-11-24 RX ORDER — PRAVASTATIN SODIUM 40 MG/1
40 TABLET ORAL
Status: DISCONTINUED | OUTPATIENT
Start: 2020-11-24 | End: 2020-11-26 | Stop reason: HOSPADM

## 2020-11-24 RX ORDER — HYDROMORPHONE HYDROCHLORIDE 1 MG/ML
1 INJECTION, SOLUTION INTRAMUSCULAR; INTRAVENOUS; SUBCUTANEOUS
Status: ACTIVE | OUTPATIENT
Start: 2020-11-24 | End: 2020-11-25

## 2020-11-24 RX ORDER — SODIUM CHLORIDE 0.9 % (FLUSH) 0.9 %
5-40 SYRINGE (ML) INJECTION AS NEEDED
Status: DISCONTINUED | OUTPATIENT
Start: 2020-11-24 | End: 2020-11-26 | Stop reason: HOSPADM

## 2020-11-24 RX ORDER — HYDROMORPHONE HYDROCHLORIDE 1 MG/ML
.2-.5 INJECTION, SOLUTION INTRAMUSCULAR; INTRAVENOUS; SUBCUTANEOUS
Status: DISCONTINUED | OUTPATIENT
Start: 2020-11-24 | End: 2020-11-24 | Stop reason: HOSPADM

## 2020-11-24 RX ORDER — SUCCINYLCHOLINE CHLORIDE 20 MG/ML
INJECTION INTRAMUSCULAR; INTRAVENOUS AS NEEDED
Status: DISCONTINUED | OUTPATIENT
Start: 2020-11-24 | End: 2020-11-24 | Stop reason: HOSPADM

## 2020-11-24 RX ORDER — AMOXICILLIN 250 MG
1 CAPSULE ORAL 2 TIMES DAILY
Status: DISCONTINUED | OUTPATIENT
Start: 2020-11-24 | End: 2020-11-26 | Stop reason: HOSPADM

## 2020-11-24 RX ADMIN — HYDROMORPHONE HYDROCHLORIDE 0.2 MG: 2 INJECTION, SOLUTION INTRAMUSCULAR; INTRAVENOUS; SUBCUTANEOUS at 10:39

## 2020-11-24 RX ADMIN — PROPOFOL 140 MG: 10 INJECTION, EMULSION INTRAVENOUS at 09:21

## 2020-11-24 RX ADMIN — ACETAMINOPHEN 1000 MG: 500 TABLET ORAL at 23:38

## 2020-11-24 RX ADMIN — Medication 80 MCG: at 09:24

## 2020-11-24 RX ADMIN — ONDANSETRON HYDROCHLORIDE 4 MG: 2 INJECTION, SOLUTION INTRAMUSCULAR; INTRAVENOUS at 10:45

## 2020-11-24 RX ADMIN — PROPOFOL 20 MG: 10 INJECTION, EMULSION INTRAVENOUS at 10:56

## 2020-11-24 RX ADMIN — OXYCODONE HYDROCHLORIDE 5 MG: 5 TABLET ORAL at 15:10

## 2020-11-24 RX ADMIN — ROCURONIUM BROMIDE 5 MG: 10 INJECTION INTRAVENOUS at 09:20

## 2020-11-24 RX ADMIN — ACETAMINOPHEN 1000 MG: 500 TABLET ORAL at 17:19

## 2020-11-24 RX ADMIN — FENTANYL CITRATE 25 MCG: 50 INJECTION, SOLUTION INTRAMUSCULAR; INTRAVENOUS at 11:57

## 2020-11-24 RX ADMIN — Medication 3 AMPULE: at 08:43

## 2020-11-24 RX ADMIN — SODIUM CHLORIDE, SODIUM LACTATE, POTASSIUM CHLORIDE, AND CALCIUM CHLORIDE 25 ML/HR: 600; 310; 30; 20 INJECTION, SOLUTION INTRAVENOUS at 08:42

## 2020-11-24 RX ADMIN — WATER 2 G: 1 INJECTION INTRAMUSCULAR; INTRAVENOUS; SUBCUTANEOUS at 07:00

## 2020-11-24 RX ADMIN — FENTANYL CITRATE 25 MCG: 50 INJECTION, SOLUTION INTRAMUSCULAR; INTRAVENOUS at 11:46

## 2020-11-24 RX ADMIN — DIAZEPAM 5 MG: 5 TABLET ORAL at 21:15

## 2020-11-24 RX ADMIN — FENTANYL CITRATE 25 MCG: 50 INJECTION, SOLUTION INTRAMUSCULAR; INTRAVENOUS at 10:10

## 2020-11-24 RX ADMIN — Medication 10 ML: at 07:05

## 2020-11-24 RX ADMIN — Medication 80 MCG: at 09:40

## 2020-11-24 RX ADMIN — CEFAZOLIN 2 G: 1 INJECTION, POWDER, FOR SOLUTION INTRAMUSCULAR; INTRAVENOUS; PARENTERAL at 09:40

## 2020-11-24 RX ADMIN — Medication 40 MCG: at 09:27

## 2020-11-24 RX ADMIN — LIDOCAINE HYDROCHLORIDE 70 MG: 20 INJECTION, SOLUTION EPIDURAL; INFILTRATION; INTRACAUDAL; PERINEURAL at 09:20

## 2020-11-24 RX ADMIN — Medication 10 ML: at 06:25

## 2020-11-24 RX ADMIN — GABAPENTIN 100 MG: 100 CAPSULE ORAL at 15:10

## 2020-11-24 RX ADMIN — FENTANYL CITRATE 25 MCG: 50 INJECTION, SOLUTION INTRAMUSCULAR; INTRAVENOUS at 10:20

## 2020-11-24 RX ADMIN — LEVOTHYROXINE SODIUM 25 MCG: 0.03 TABLET ORAL at 06:27

## 2020-11-24 RX ADMIN — SUCCINYLCHOLINE CHLORIDE 120 MG: 20 INJECTION, SOLUTION INTRAMUSCULAR; INTRAVENOUS at 09:21

## 2020-11-24 RX ADMIN — FENTANYL CITRATE 50 MCG: 50 INJECTION, SOLUTION INTRAMUSCULAR; INTRAVENOUS at 09:20

## 2020-11-24 RX ADMIN — HYDROMORPHONE HYDROCHLORIDE 0.4 MG: 2 INJECTION, SOLUTION INTRAMUSCULAR; INTRAVENOUS; SUBCUTANEOUS at 11:12

## 2020-11-24 RX ADMIN — PRAVASTATIN SODIUM 40 MG: 40 TABLET ORAL at 21:16

## 2020-11-24 RX ADMIN — GABAPENTIN 100 MG: 100 CAPSULE ORAL at 21:14

## 2020-11-24 RX ADMIN — Medication 10 ML: at 21:17

## 2020-11-24 RX ADMIN — ALPRAZOLAM 0.5 MG: 0.5 TABLET ORAL at 20:38

## 2020-11-24 RX ADMIN — OXYCODONE HYDROCHLORIDE 10 MG: 5 TABLET ORAL at 21:15

## 2020-11-24 RX ADMIN — SODIUM CHLORIDE 10 MCG/MIN: 900 INJECTION, SOLUTION INTRAVENOUS at 09:35

## 2020-11-24 RX ADMIN — DOCUSATE SODIUM 50MG AND SENNOSIDES 8.6MG 1 TABLET: 8.6; 5 TABLET, FILM COATED ORAL at 17:19

## 2020-11-24 RX ADMIN — Medication 10 ML: at 15:11

## 2020-11-24 RX ADMIN — OXYCODONE HYDROCHLORIDE 10 MG: 5 TABLET ORAL at 18:21

## 2020-11-24 RX ADMIN — MIDAZOLAM HYDROCHLORIDE 2 MG: 1 INJECTION, SOLUTION INTRAMUSCULAR; INTRAVENOUS at 09:14

## 2020-11-24 RX ADMIN — SODIUM CHLORIDE, SODIUM LACTATE, POTASSIUM CHLORIDE, AND CALCIUM CHLORIDE: 600; 310; 30; 20 INJECTION, SOLUTION INTRAVENOUS at 09:11

## 2020-11-24 RX ADMIN — FAMOTIDINE 20 MG: 20 TABLET, FILM COATED ORAL at 17:18

## 2020-11-24 NOTE — PERIOP NOTES
Patient: Marcella Gibson MRN: [id number]  SSN: [id number]   YOB: 1950  Age: 78 y.o. Sex: female     Patient is status post Procedure(s):  ROBOTIC ASSISTED L2-4 REVISION POSTERIOR DECOMPRESSION AND FUSION WITH BONE MARROW ASPIRATION FROM ILIAC CREST. Surgeon(s) and Role:     * Estrellita Piedra MD - Primary    Local/Dose/Irrigation:  50ml 0.5% ropivicaine  15ml lidocaine 1% with epi  450ml Irrisept used for irrigation from sterile field. Peripheral IV 11/23/20 Right Antecubital (Active)   Site Assessment Clean, dry, & intact 11/24/20 0000   Phlebitis Assessment 0 11/24/20 0000   Infiltration Assessment 0 11/24/20 0000   Dressing Status Clean, dry, & intact 11/24/20 0000   Dressing Type Tape;Transparent 11/24/20 0000   Hub Color/Line Status Pink; Infusing 11/24/20 0000            Airway - Endotracheal Tube 11/24/20 Oral (Active)   Line Shawn Lips 11/24/20 0000                   Dressing/Packing:  Wound Flank Left-Dressing/Treatment: Skin glue (11/24/20 0218)  Wound Back Right Incision sites x2-Dressing/Treatment: (staples, lester dressing) (11/24/20 0900)  Wound Back Left and right pin sites-Dressing/Treatment: Band-Aid/Adhesive bandage (11/24/20 0900)    Splint/Cast:  ]

## 2020-11-24 NOTE — ROUTINE PROCESS
sbar in note pt to holding area for 2nd part surgery   Spoke with dr. Sherie Sandoval in room    Consent    obtained pt has been npo except for meds. covid test done for first surgery  . glucose reading in holding area at 207 dr. Demi Jha informed no orders given. Also crna  Made aware that glucose reading was 207 and that pt type and screen has  yesterday. no   Orders for redraw given.

## 2020-11-24 NOTE — BRIEF OP NOTE
Brief Postoperative Note    Patient: Kim Felix  YOB: 1950  MRN: [id number]    Date of Procedure: 11/24/2020     Pre-Op Diagnosis: RIGHT LUMBAR RADICULITIS  SPINAL STENOSIS OF LUMBAR REGION WITH RADICULOPATHY  RIGHT SIDED SCIATICA  DEGENERATION OF LUMBAR INTERVERTEBRAL [address]    Post-Op Diagnosis: Same as preoperative diagnosis. Procedure(s):  ROBOTIC ASSISTED L2-4 REVISION POSTERIOR DECOMPRESSION AND FUSION WITH BONE MARROW ASPIRATION FROM ILIAC CREST    Surgeon(s):  Jane Castillo MD    Surgical Assistant: Physician Assistant: JARED Loomis    Anesthesia: General     Estimated Blood Loss (mL): Minimal    Complications: None    Specimens: * No specimens in log *     Implants:   Implant Name Type Inv.  Item Serial No.  Lot No. LRB No. Used Action   CAP SPNL POST FACET JT KEVIN FOR INTEGR GLORIA REDUC CREO MIS - SN/A  CAP SPNL POST FACET JT KEVIN FOR INTEGR GLORIA [place] CREO MIS N/A [employer] N/A N/A 4 Implanted   CREO MIS MOD POLYAX [place] 30MM - SN/A  CREO MIS MOD POLYAX [place] 30MM N/A [employer] N/A N/A 4 Implanted   OSTEOAMP FIBERS 5CC   [id number] [employer] N/A N/A 1 Implanted   SCREW SPNL L45MM DIA6.5MM THORLUM CORWIN ADV MOD PLATFRM CREO - SN/A  SCREW SPNL L45MM DIA6.5MM THORLUM CORWIN ADV MOD PLATFRM CREO N/A [employer] N/A N/A 1 Implanted   SCREW SPNL L50MM OD7.5MM [place] MOD CREO - SN/A  SCREW SPNL L50MM OD7.5MM [place]WIN MOD CREO N/A [employer] N/A N/A 1 Implanted   SCREW SPNL L40MM OD7.5MM [place] MOD CREO - SN/A  SCREW SPNL L40MM OD7.5MM CORWIN MOD CREO N/A [employer] N/A N/A 2 Implanted   GLORIA SPNL [id number] CVD TI CREO MIS - SN/A  GLORIA SPNL [id number] CVD TI CREO MIS N/A [employer] N/A N/A 1 Implanted       Drains: * No LDAs found *    Findings: Stenosis    Electronically Signed by Alexander Melissa MD on 11/24/2020 at 10:55 AM

## 2020-11-24 NOTE — PERIOP NOTES
TRANSFER - IN REPORT:    Verbal report received from Nigel Ross RN(name) on Juluis Valeri  being received from Ortho Room 3209(unit) for ordered procedure      Report consisted of patients Situation, Background, Assessment and   Recommendations(SBAR). Information from the following report(s) SBAR, Kardex, Intake/Output, MAR, Recent Results, Med Rec Status and Procedure Verification was reviewed with the receiving nurse. Opportunity for questions and clarification was provided. Assessment completed upon patients arrival to unit and care assumed.

## 2020-11-24 NOTE — PROGRESS NOTES
Problem: Mobility Impaired (Adult and Pediatric)  Goal: *Acute Goals and Plan of Care (Insert Text)  Description: FUNCTIONAL STATUS PRIOR TO ADMISSION: Patient was ambulatory in community without AD and driving. HOME SUPPORT PRIOR TO ADMISSION: Patient lives with  in ramp accessible one story home, did not require assist with ADLs. Physical Therapy Goals  Initiated 11/24/2020    1. Patient will move from supine to sit and sit to supine  in bed with independence within 4 days. 2. Patient will perform sit to stand with supervision/set-up within 4 days. 3. Patient will ambulate with supervision/set-up for 200 feet with the least restrictive device within 4 days. 4. Patient will verbalize and demonstrate understanding of spinal precautions (No bending, lifting greater than 5 lbs, or twisting; log-roll technique; frequent repositioning as instructed) within 4 days. 11/24/2020 1545 by Zachary Guillory, PT  Outcome: Progressing Towards Goal  PHYSICAL THERAPY TREATMENT  Patient: Chiqui Beasley (64 y.o. female)  Date: 11/24/2020  Diagnosis: Spinal stenosis of lumbar region at multiple levels [M48.061]   <principal problem not specified>  Procedure(s) (LRB):  ROBOTIC ASSISTED L2-4 REVISION POSTERIOR DECOMPRESSION AND FUSION WITH BONE MARROW ASPIRATION FROM ILIAC CREST (N/A) Day of Surgery  Precautions:   No bending, no lifting greater than 5 lbs, no twisting, log-roll technique, repositioning every 20-30 min except when sleeping, brace when OOB (if ordered)  Chart, physical therapy assessment, plan of care and goals were reviewed. ASSESSMENT  Patient continues with skilled PT services and is slowly progressing towards goals. Patient seen after second stage surgery, received in bed and agreeable to participate and  present in room. Patient reports increased pain levels and required min assist x 2 to come to EOB using log rolling technique.  .  Patient instructed in donning brace with min assist, then ambulated x 15 feet with RW and CGA using  very slow pace. Patient sat EOB, complained of nausea and requested BS check, RN notified. Patient noted to be orthostatic and returned to supine and left with call bell in reach. Patient expected to do well tomorrow and should be able to discharge home without further services, will need RW. Vitals:    11/24/20 1435 11/24/20 1440 11/24/20 1447 11/24/20 1450   BP: 125/77 121/74 (!) 97/59 (!) 140/63   BP 1 Location: Left arm  Left arm    BP Patient Position: Sitting Standing Sitting;Post activity    Pulse: 73  69 77   Resp: 18      Temp: 98 °F (36.7 °C)      SpO2: 100%  100% 100%   Weight:       Height:          Current Level of Function Impacting Discharge (mobility/balance): min assist for bed mobility, CGA to ambulate    Other factors to consider for discharge: two stage surgery         PLAN :  Patient continues to benefit from skilled intervention to address the above impairments. Continue treatment per established plan of care. to address goals. Recommendation for discharge: (in order for the patient to meet his/her long term goals)  No skilled physical therapy/ follow up rehabilitation needs identified at this time. This discharge recommendation:  A follow-up discussion with the attending provider and/or case management is planned    IF patient discharges home will need the following DME: rolling walker       SUBJECTIVE:   Patient stated I felt so much better this morning.     OBJECTIVE DATA SUMMARY:   Critical Behavior:  Neurologic State: Drowsy  Orientation Level: Oriented X4  Cognition: Follows commands       Spinal diagnosis intervention:  The patient stated 3/3 back precautions when prompted. Reviewed all 3 back precautions, log roll technique, and sitting for 30 minutes at a time. The patient required occasional cues to maintain back precautions during functional activity. Reviewed back brace application and wear schedule.  Brace donned with minimal assistance/contact guard assist      Functional Mobility Training:    Bed Mobility:  Log Rolling: Minimum assistance  Supine to Sit: Minimum assistance;Assist x2  Sit to Supine: Minimum assistance;Assist x2  Scooting: Stand-by assistance        Transfers:  Sit to Stand: Contact guard assistance  Stand to Sit: Contact guard assistance                             Balance:  Sitting: Intact  Standing: Impaired  Standing - Static: Constant support;Good  Standing - Dynamic : Constant support;Good  Ambulation/Gait Training:  Distance (ft): 15 Feet (ft)     Ambulation - Level of Assistance: Contact guard assistance        Gait Abnormalities: Decreased step clearance              Speed/Danita: Shuffled; Slow  Step Length: Left shortened;Right shortened                    Stairs: Activity Tolerance:   Fair    After treatment patient left in no apparent distress:   Supine in bed, Call bell within reach, and Side rails x 3    COMMUNICATION/COLLABORATION:   The patients plan of care was discussed with: Occupational therapist and Registered nurse.      Don Arceo, PT   Time Calculation: 34 mins

## 2020-11-24 NOTE — PROGRESS NOTES
Physical Therapy    PT eval completed, patient able to ambulate in room x 30 feet with RW and slow pace. VSS on RA and no complaints of pain in right LE. Will continue BID, full note to follow.     Zackary Duane

## 2020-11-24 NOTE — PROGRESS NOTES
Occupational Therapy    Acknowledge OT orders and completed chart review. Patient off the floor for second part of back surgery.    Will continue to follow for OT eval.    Thank you,    Annmarie Ruelas, OT

## 2020-11-24 NOTE — OP NOTES
[address]  OPERATIVE REPORT    Name:  Kurtis Gaitan  MR#:  [id number]  :  1950  ACCOUNT #:  [de-identified]  DATE OF SERVICE:  2020    PREOPERATIVE DIAGNOSES:  1. Lumbar spinal stenosis, L2-3 and L3-4.  2.  Lumbago. 3.  Sciatica. 4.  Previous lumbar fusion, L4-5. POSTOPERATIVE DIAGNOSES:  1. Lumbar spinal stenosis, L2-3 and L3-4.  2.  Lumbago. 3.  Sciatica. 4.  Previous lumbar fusion, L4-5. PROCEDURES PERFORMED:  1. L2 through L4 anterior fusion. 2.  L2 through L4 anterior instrumentation. 3.  L2 through L4 application of interbody biomechanical device. 4.  Use of his allograft bone for spine fusion. 5.  Bone marrow aspirate from the left anterior-superior iliac spine for spinal fusion augmentation. SURGEON:  Luther Ribera MD    FIRST ASSISTANT:  Coy Morgan. ANESTHESIA:  General.    COMPLICATIONS:  None. SPECIMENS REMOVED:  None. IMPLANTS:  Globus RISE-L interbody biomechanical device and the 1024 [place] plate. ESTIMATED BLOOD LOSS:  50 mL. DRAINS:  x1. INDICATIONS FOR THE PROCEDURE:  The patient is a 73-year-old lady with lumbar spinal stenosis, lumbago and sciatica. She has failed to improve with nonoperative treatment due to adjacent segment disease. At this point, we have given her warnings about possible complications including, but not limited to pain, scar, bleeding, infection, nonunion, damage to surrounding structures, death, paralysis, blindness, stroke. She understands and wants to proceed. NARRATIVE OF THE PROCEDURE:  After informed consent was obtained and the operative site was properly marked, the patient was moved back to the operating room and underwent general endotracheal anesthesia. She was positioned on the lateral decubitus with the left side up. All the bony extremities were well padded and the knees were gently bent with pillows. Fluoroscopy was used to donovan the level of the incision.   We then proceeded to prep and drape in the usual manner. A time-out was obtained verifying that this was the correct patient, the correct surgery, the correct site as well as that she had received IV Ancef within 30 minutes of the incision. I then proceeded to perform my standard anterior approach to the OLIF of the lumbar spine. I proceeded to split the abdominal musculature in layers and entered the retroperitoneal space. I was able to retract the peritoneal contents anteriorly, the psoas muscle posteriorly and find L2-3 and L3-4 disk spaces. Fluoroscopy was used to confirm the levels. We then proceeded to place a self-retaining retractor at L2-3 and performed a box laminectomy with a #15 blade. A Downs and a box shaver were used to complete the diskectomy and then a trial was used to determine the size for the implant. As the implant was packed with allograft bone, the Jamshidi needle was inserted in the left anterior-superior iliac spine through a separate fascial incision and 20 mL of bone marrow aspirate was obtained to augment the bone graft. This was used to augment the bone, and I then proceeded to as we then inserted the interbody biomechanical device in the proper location. Once in the proper position and distracted to its final height, I proceeded to use a funnel to backfill the implant packing it with allograft bone soaked in bone marrow aspirate. I then selected a completely separate independent plate, placed in position at L4-5, and placed screws at L4 and L5 to completely finalize the anterior instrumentation. Once the anterior fusion, anterior instrumentation, insertion of interbody biomechanical device and use of allograft bone with bone marrow aspirate were completed at both levels, I proceeded to remove the retractors, obtained final AP and lateral x-rays and saved them to PACS.   I proceeded then to close the abdominal musculature in layers with #1 Vicryl figure-of-eight interrupted sutures followed by irrigating the subcu, closed subcu with 2-0 Vicryl and the skin with a 3-0 running Monocryl and Dermabond. Sterile dressing was applied. The patient was then awakened and transferred to PACU in stable condition. POSTOPERATIVE PLAN:  The patient is going to remain here overnight. We are going to give her SCDs and LAURO hoses for DVT prophylaxis and Ancef for infection prophylaxis. We are also getting a CT major protocol today, so tomorrow we can proceed with the posterior part of the procedure after she ambulates and would have some information on her possible resolution of the radicular symptoms.       Lisa Keene MD      [id number]  D:  11/23/2020 16:58  T:  11/24/2020 3:20  JOB #:  [id number]  CC:  [employer] [facility]

## 2020-11-24 NOTE — PERIOP NOTES
06:50= spoke with Jarod Reyes, Primary Night Shift RN, and informed to have day shift RN give toprol prior to coming to OR.

## 2020-11-24 NOTE — DISCHARGE INSTRUCTIONS
[facility]    Discharge Instruction Sheet: LATERAL AND POSTERIOR SPINAL FUSION - staged    DR. Chantale Nazario    Pain control:   Typically, we will prescribe a narcotic usually 1-2 tabs every four hours is    sufficient for the pain. Most patients need this only for the first few weeks. You   should discontinue this as the pain decreases. You should not drive while taking any narcotic pain medications. Constipation  Pain medicines and anesthesia can be constipating-this can be prevented by gentle physical activity and drinking plenty of fluid. It should be treated with over-the-counter medications such as Miralax or suppositories, and/or Fleets enema. You should have a bowel movement at least every other day following surgery. Incision care     Keep this area clean and dry. Your dressing is designed to stay in place for 5-7 days. You will be sent home with one additional dressing to change at that time. Leave this new dressing in place until our follow up visit in the office in about 10-14 days. If staples are in place, they should be removed about 14-20 days after surgery. You may shower with this impermeable dressing in place. DO NOT take a tub bath or go swimming until cleared by your doctor. DO NOT apply lotions, oils, or creams to incision. To increase and promote healing:   Stop Smoking (or at least cut back on smoking).  Eat a well-balanced diet (high in protein and vitamin C)   If your appetite is poor, consider nutritional supplements like Ensure, Glucerna, or [place] Instant Breakfast.   If you are diabetic, controlling you blood sugars is very important to prevent infection and promote wound healing. Nutrition:   If you were on a supplement such as Ensure or Glucerna) while in the hospital, please continue using them with each meal for the next 30 days.    Eat a well-balanced diet - High in protein, high in vitamins and minerals, especially vitamin C and zinc.     Restrictions:   Remember your \"BLT's\"    1. Limited bending at waist    2. Lift no more than 10 pounds    3. No Twisting     If you were given a brace, wear it when out of bed. Warning signs : Please call your physician immediately at [phone] if you have   Bleeding from incision that is constant.  Change in mental status (unusual behavior or confusion)   If your incision develops redness or swelling   Change in wound drainage (increase in amount, color, or foul odor)   [place] over 101.5 degrees Fahrenheit    Headache that is not relieved with pain medication   Tenderness or redness in the calf of your leg    Emergency: CALL 911 if you have   Shortness of breath   Chest pain   Localized chest pain when coughing or taking a deep breath    Follow-up  Please call Dr. Vega Sites office for a follow up appointment in 2-3 weeks at [phone]. You can return to work when cleared by a physician. During normal business hours you may reach Dr. Aspen Ribera' team directly at [phone] if you have concerns or questions.     Aurelia Patel

## 2020-11-24 NOTE — ANESTHESIA PREPROCEDURE EVALUATION
Anesthetic History               Review of Systems / Medical History  Patient summary reviewed, nursing notes reviewed and pertinent labs reviewed    Pulmonary        Sleep apnea (no CPAP)  Shortness of breath         Neuro/Psych       CVA  Psychiatric history    Comments: Depression  Anxiety  RIGHT LUMBAR RADICULITIS   SPINAL STENOSIS OF LUMBAR REGION WITH RADICULOPATHY   RIGHT SIDED SCIATICA   DEGENERATION OF LUMBAR INTERVERTEBRAL [place] [facility]    Hypertension          CAD, PAD, cardiac stents (x 3 in 6/2013) and hyperlipidemia    Exercise tolerance: <4 METS  Comments: Bilateral carotid artery stenosis   S/P PTCA    Cardiovascular    Hypertension  Valvular problems/murmurs: mitral insufficiency        CAD, cardiac stents (x 3 in 6/2013) and hyperlipidemia    Exercise tolerance: >4 METS  Comments: Bilateral Carotid Artery Stenosis    ECG (11/19/20):  Hx Sinus  Bradycardia     TTE (1/6/17): Left ventricle: Systolic function was normal. Ejection fraction was  estimated in the range of 55 % to 60 %. There were no regional wall motion  abnormalities. Mitral valve: There was mild regurgitation. Tricuspid valve: There was mild pulmonary hypertension. GI/Hepatic/Renal     GERD          Comments: Diverticulosis  Hx of gastroparesis s/p partial gastrectomy, esophageal motor disorder  S/P bypass gastrojejunostomy (hx of gastric bypass)  Hx of diarrhea/constipation Endo/Other    Diabetes  Hypothyroidism  Obesity and arthritis  Pertinent negatives: Cancer:      Other Findings   Comments: Chronic pain  Vit D defic           Physical Exam    Airway  Mallampati: III    Neck ROM: normal range of motion   Mouth opening: Normal     Cardiovascular  Regular rate and rhythm,  S1 and S2 normal,  no murmur, click, rub, or gallop             Dental  No notable dental hx       Pulmonary  Breath sounds clear to auscultation               Abdominal  GI exam deferred       Other Findings            Anesthetic Plan    ASA: 3  Anesthesia type: general    Monitoring Plan: BIS      Induction: Intravenous  Anesthetic plan and risks discussed with: Patient

## 2020-11-24 NOTE — ANESTHESIA POSTPROCEDURE EVALUATION
Procedure(s):  ROBOTIC ASSISTED L2-4 REVISION POSTERIOR DECOMPRESSION AND FUSION WITH BONE MARROW ASPIRATION FROM ILIAC CREST. general    Anesthesia Post Evaluation        Patient location during evaluation: PACU  Note status: Adequate. Level of consciousness: responsive to verbal stimuli and sleepy but conscious  Pain management: satisfactory to patient  Airway patency: patent  Anesthetic complications: no  Cardiovascular status: acceptable  Respiratory status: acceptable  Hydration status: acceptable  Comments: +Post-Anesthesia Evaluation and Assessment    Patient: Athena Cheadle MRN: [id number]  SSN: [id number]   YOB: 1950  Age: 78 y.o. Sex: female      Cardiovascular Function/Vital Signs    BP (!) 118/53   Pulse 74   Temp 36.5 °C (97.7 °F)   Resp 8   Ht 5' 2\" (1.575 m)   Wt 72.3 kg (159 lb 6.3 oz)   SpO2 100%   BMI 29.15 kg/m²     Patient is status post Procedure(s):  ROBOTIC ASSISTED L2-4 REVISION POSTERIOR DECOMPRESSION AND FUSION WITH BONE MARROW ASPIRATION FROM ILIAC CREST. Nausea/Vomiting: Controlled. Postoperative hydration reviewed and adequate. Pain:  Pain Scale 1: Numeric (0 - 10) (11/24/20 1205)  Pain Intensity 1: 3 (11/24/20 1205)   Managed. Neurological Status:   Neuro (WDL): Within Defined Limits (11/24/20 0817)   At baseline. Mental Status and Level of Consciousness: Arousable. Pulmonary Status:   O2 Device: Nasal cannula (11/24/20 1205)   Adequate oxygenation and airway patent. Complications related to anesthesia: None    Post-anesthesia assessment completed. No concerns. Signed By: Padma Black MD    11/24/2020  Post anesthesia nausea and vomiting:  controlled      INITIAL Post-op Vital signs:   Vitals Value Taken Time   /54 11/24/2020 12:15 PM   Temp 36.5 °C (97.7 °F) 11/24/2020 11:46 AM   Pulse 75 11/24/2020 12:17 PM   Resp 12 11/24/2020 12:17 PM   SpO2 100 % 11/24/2020 12:17 PM   Vitals shown include unvalidated device data.

## 2020-11-24 NOTE — PERIOP NOTES
Handoff Report from Operating Room to PACU    Report received from [facility] and Priscilla Naqvi CRNA regarding Silvana Koenig. Surgeon(s):  Ghazala Hernandez MD  And Procedure(s) (LRB):  ROBOTIC ASSISTED L2-4 REVISION POSTERIOR DECOMPRESSION AND FUSION WITH BONE MARROW ASPIRATION FROM ILIAC CREST (N/A)  confirmed   with allergies, drains and dressings discussed. Anesthesia type, drugs, patient history, complications, estimated blood loss, vital signs, intake and output, and last pain medication were reviewed.

## 2020-11-24 NOTE — PROGRESS NOTES
Problem: Mobility Impaired (Adult and Pediatric)  Goal: *Acute Goals and Plan of Care (Insert Text)  Description: FUNCTIONAL STATUS PRIOR TO ADMISSION: Patient was ambulatory in community without AD and driving. HOME SUPPORT PRIOR TO ADMISSION: Patient lives with  in ramp accessible one story home, did not require assist with ADLs. Physical Therapy Goals  Initiated 11/24/2020    1. Patient will move from supine to sit and sit to supine  in bed with independence within 4 days. 2. Patient will perform sit to stand with supervision/set-up within 4 days. 3. Patient will ambulate with supervision/set-up for 200 feet with the least restrictive device within 4 days. 4. Patient will verbalize and demonstrate understanding of spinal precautions (No bending, lifting greater than 5 lbs, or twisting; log-roll technique; frequent repositioning as instructed) within 4 days. Outcome: Not Met   PHYSICAL THERAPY EVALUATION  Patient: [facility] (22 y.o. female)  Date: 11/24/2020  Primary Diagnosis: Spinal stenosis of lumbar region at multiple levels [M48.061]  Procedure(s) (LRB):  ROBOTIC ASSISTED L2-4 REVISION POSTERIOR DECOMPRESSION AND FUSION WITH BONE MARROW ASPIRATION FROM ILIAC CREST (N/A) Day of Surgery   Precautions: spinal      ASSESSMENT  Based on the objective data described below, the patient presents with expected post op pain, decreased tolerance of activity, and impaired ability to ambulate, transfer and perform bed mobility. Patient received in bed and agreeable to participate, will have second stage of surgery this morning. Patient educated on log rolling technique and required min assist to come to sit. Patient did not have her back brace, reports that her  will bring it later so unable to practice donning and doffing. Patient able to come to stand to RW and ambulated in room x approx 30 feet with slow pace, no pain in right LE.  VSS on RA.   Patient returned to supine and transport in room to take her to OR. Patient will likely be able to discharge home with no further services,but will need a rolling walker. Current Level of Function Impacting Discharge (mobility/balance): CGA to ambulate    Functional Outcome Measure: The patient scored 45/100 on the Barthel  outcome measure which is indicative of moderate functional impairment      Other factors to consider for discharge: two stage surgery     Patient will benefit from skilled therapy intervention to address the above noted impairments. PLAN :  Recommendations and Planned Interventions: bed mobility training, transfer training, gait training, therapeutic exercises, patient and family training/education and therapeutic activities      Frequency/Duration: Patient will be followed by physical therapy:  twice daily to address goals. Recommendation for discharge: (in order for the patient to meet his/her long term goals)  To be determined: after second surgery    This discharge recommendation:  A follow-up discussion with the attending provider and/or case management is planned    IF patient discharges home will need the following DME: rolling walker         SUBJECTIVE:   Patient stated I don't know why my blood sugar is so high.     OBJECTIVE DATA SUMMARY:   HISTORY:    Past Medical History:   Diagnosis Date    Anxiety     Arthritis     ASHD (arteriosclerotic heart disease)     Bilateral carotid artery stenosis     CAD (coronary artery disease)     Chronic pain     Depression     Diabetes (HCC)     TypeII    Diarrhea     Gastroparesis     GERD (gastroesophageal reflux disease)     History of cardioembolic cerebrovascular accident (CVA)     Hypercholesterolemia     Hypertension     S/P bypass gastrojejunostomy 1/23/2012    Thyroid disease      Past Surgical History:   Procedure Laterality Date    ABDOMEN SURGERY PROC UNLISTED      s/p partial gastrectomy 2/2 gastroparesis    COLONOSCOPY N/A 5/26/2016 COLONOSCOPY performed by Randall Forbes MD at \[facility]"" ENDOSCOPY    COLONOSCOPY N/A 10/16/2020    COLONOSCOPY performed by Marilyn Buitrago MD at \[facility]\"" AMBULATORY OR    EGD  7/13/2009    GASTROJEJUNOSTOMY      HX APPENDECTOMY      HX BLADDER SUSPENSION      HX CHOLECYSTECTOMY      HX CORONARY STENT PLACEMENT  06/2013    3 stents    HX GASTRIC BYPASS  6/02    HX HEART CATHETERIZATION  2017    stents remain open, no new stents placed    HX HYSTERECTOMY      HX LAPAROTOMY  8/02    2/2 intra abd abscess    HX LUMBAR LAMINECTOMY  01/2008    rods and screws    HX OOPHORECTOMY      HX ORTHOPAEDIC Bilateral     carpal tunnel    HX ORTHOPAEDIC Bilateral     bone spurs both feet    ME COLONOSCOPY FLX DX W/COLLJ SPEC WHEN PFRMD  5/03/2006    Dr. Heather Cannon  2/9/2012         ME EGD BALLOON DILATION ESOPHAGUS <30 MM DIAM  1/23/2012         ME EGD TRANSORAL BIOPSY SINGLE/MULTIPLE  7/11/2011         ME EGD TRANSORAL BIOPSY SINGLE/MULTIPLE  1/23/2012         UPPER GI ENDOSCOPY,BIOPSY  10/1/2019         UPPER GI ENDOSCOPY,BIOPSY  10/16/2020            Personal factors and/or comorbidities impacting plan of care: prior spinal surgery    Home Situation  Home Environment: Private residence  # Steps to Enter: (ramp)  Wheelchair Ramp: Yes  One/Two Story Residence: One story  Living Alone: No  Support Systems: Spouse/Significant Other/Partner  Patient Expects to be Discharged to[de-identified] Private residence  Current DME Used/Available at Home: Glucometer  Tub or Shower Type: Tub/Shower combination    EXAMINATION/PRESENTATION/DECISION MAKING:   Critical Behavior:  Neurologic State: Drowsy  Orientation Level: Oriented X4  Cognition: Follows commands     Hearing: Auditory  Auditory Impairment: None  Hearing Aids/Status: Does not own  Range Of Motion:  AROM: Within functional limits                       Strength:    Strength:  Within functional limits                    Tone & Sensation:   Tone: Normal                              Coordination:  Coordination: Within functional limits  Vision:      Functional Mobility:  Bed Mobility:  Rolling: Contact guard assistance  Supine to Sit: Minimum assistance  Sit to Supine: Minimum assistance  Scooting: Stand-by assistance  Transfers:  Sit to Stand: Contact guard assistance  Stand to Sit: Contact guard assistance                       Balance:   Sitting: Intact  Standing: Impaired  Standing - Static: Constant support;Good  Standing - Dynamic : Constant support;Good  Ambulation/Gait Training:  Distance (ft): 30 Feet (ft)     Ambulation - Level of Assistance: Contact guard assistance        Gait Abnormalities: Decreased step clearance              Speed/Danita: Pace decreased (<100 feet/min)                        Stairs: Therapeutic Exercises: Ankle pumps    Functional Measure:  Barthel Index:    Bathin  Bladder: 0  Bowels: 10  Groomin  Dressin  Feeding: 10  Mobility: 0  Stairs: 0  Toilet Use: 5  Transfer (Bed to Chair and Back): 10  Total: 45/100       The Barthel ADL Index: Guidelines  1. The index should be used as a record of what a patient does, not as a record of what a patient could do. 2. The main aim is to establish degree of independence from any help, physical or verbal, however minor and for whatever reason. 3. The need for supervision renders the patient not independent. 4. A patient's performance should be established using the best available evidence. Asking the patient, friends/relatives and nurses are the usual sources, but direct observation and common sense are also important. However direct testing is not needed. 5. Usually the patient's performance over the preceding 24-48 hours is important, but occasionally longer periods will be relevant. 6. Middle categories imply that the patient supplies over 50 per cent of the effort. 7. Use of aids to be independent is allowed. Edith Hernández., Barthel, D.W. (4894). Functional evaluation: the Barthel Index. [facility] (14)2. JACQUELIN Sheffield, Angel Botello, Breana Prieto. [place], [address] (1999). Measuring the change indisability after inpatient rehabilitation; comparison of the responsiveness of the Barthel Index and Functional [place] Measure. Journal of Neurology, Neurosurgery, and Psychiatry, 66(4), 461-421. ZAINA Law, SARAH Mendieta, & Lana Saleem M.A. (2004.) Assessment of post-stroke quality of life in cost-effectiveness studies: The usefulness of the Barthel Index and the EuroQoL-5D. Quality of Life Research, 15, 262-70          Physical Therapy Evaluation Charge Determination   History Examination Presentation Decision-Making   MEDIUM  Complexity : 1-2 comorbidities / personal factors will impact the outcome/ POC  LOW Complexity : 1-2 Standardized tests and measures addressing body structure, function, activity limitation and / or participation in recreation  LOW Complexity : Stable, uncomplicated  LOW Complexity : FOTO score of       Based on the above components, the patient evaluation is determined to be of the following complexity level: LOW     Pain Rating:      Activity Tolerance:   Fair    After treatment patient left in no apparent distress:   Supine in bed and Side rails x 3    COMMUNICATION/EDUCATION:   The patients plan of care was discussed with: Occupational therapist and Registered nurse. Fall prevention education was provided and the patient/caregiver indicated understanding. and Patient/family agree to work toward stated goals and plan of care.     Thank you for this referral.  Jaye Reid, PT   Time Calculation: 30 mins

## 2020-11-24 NOTE — PROGRESS NOTES
Problem: Self Care Deficits Care Plan (Adult)  Goal: *Acute Goals and Plan of Care (Insert Text)  Description: FUNCTIONAL STATUS PRIOR TO ADMISSION: Patient was independent and active without use of DME.     HOME SUPPORT: The patient lived with  but did not require assist.    Occupational Therapy Goals  Initiated 11/24/2020    1. Patient will perform lower body dressing with supervision/set-up using [employer], and [employer] PRN within 7 days. 2.  Patient will perform toileting with supervision/set-up using most appropriate DME within 7 days. 3.  Patient will grooming standing at sink at supervision/set-up within 7 days. 4.  Patient will don/doff back brace at supervision/set-up within 7 days. 5.  Patient will verbalize/demonstrate 3/3 back precautions during ADL tasks without cues within 7 days. Outcome: Progressing Towards Goal   OCCUPATIONAL THERAPY TREATMENT  Patient: Silvana Koenig (81 y.o. female)  Date: 11/24/2020  Diagnosis: Spinal stenosis of lumbar region at multiple levels [M48.061]   <principal problem not specified>  Procedure(s) (LRB):  ROBOTIC ASSISTED L2-4 REVISION POSTERIOR DECOMPRESSION AND FUSION WITH BONE MARROW ASPIRATION FROM ILIAC CREST (N/A) Day of Surgery  Precautions:  Fall, No bending, no lifting greater than 5 lbs, no twisting, log-roll technique, repositioning every 20-30 min except when sleeping,  brace when OOB   Chart, occupational therapy assessment, plan of care, and goals were reviewed. ASSESSMENT  Patient continues with skilled OT services and is progressing towards goals. Patient cleared for therapy following second aspect of two part back surgery this morning. Patient highly motivated however limited secondary to symptomatic orthostatic hypotension with brief activity. She was able to verbalize back precautions. Min cues for log rolling and donning back brace.    Patient with mild shaking while sitting EOB but highly motivated to participate. Min Ax2 for sit <> stand at RW level. With brief ambulation with CGA x2 for safety towards bathroom patient with decreased conversation and decreased pace. Walked around bed to other side of bed. BP decrease with reports of nausea. BP resolved with return to supine. SpO2 stable on RA through out session. Patient voiced disappointment with session as she was hopeful to do more. Vitals:    11/24/20 1435 11/24/20 1440 11/24/20 1447 11/24/20 1450   BP: 125/77 121/74 (!) 97/59 (!) 140/63   BP 1 Location: Left arm  Left arm    BP Patient Position: Sitting Standing Sitting;Post activity Supine post activity   Pulse: 73  69 77   Resp: 18      Temp: 98 °F (36.7 °C)      SpO2: 100%  100% 100%   Weight:       Height:         Anticipate good progression with skilled OT services in hospital with no additional OT needs at [facility] as she plans to return home with her supportive . Current Level of Function Impacting Discharge (ADLs): Brace mod A, LB care total A, self care transfer min A    Other factors to consider for discharge: Patient POD 0 from second part of back surgery         PLAN :  Patient continues to benefit from skilled intervention to address the above impairments. Continue treatment per established plan of care. to address goals. Recommend with staff: BSC next to bed for toileting- do not leave unattended vs bed pan, bed in chair position for meals    Recommend next OT session: progression to toileting, LB dressing ed    Recommendation for discharge: (in order for the patient to meet his/her long term goals)  No skilled occupational therapy/ follow up rehabilitation needs identified at this time.     This discharge recommendation:  Has been made in collaboration with the attending provider and/or case management    IF patient discharges home will need the following DME: TBD: AE: long handled bathing, AE: long handled dressing, shower chair, and walker: rolling     SUBJECTIVE: Patient stated Nancie Allred you coming back this evening?     OBJECTIVE DATA SUMMARY:   Cognitive/Behavioral Status:  Neurologic State: Drowsy  Orientation Level: Oriented X4  Cognition: Follows commands      Functional Mobility and Transfers for ADLs:  Bed Mobility:  Rolling: Minimum assistance  Supine to Sit: Minimum assistance;Assist x2  Sit to Supine: Minimum assistance;Assist x2  Scooting: Stand-by assistance    Transfers:  Sit to Stand: Contact guard assistance       Balance:  Sitting: Intact  Standing: Impaired  Standing - Static: Constant support;Good  Standing - Dynamic : Constant support;Good    ADL Intervention:     Upper Body Dressing Assistance  Orthotics(Brace): Moderate assistance    Lower Body Dressing Assistance  Socks: Total assistance (dependent)    The patient recalled and demonstrated 3/3 back precautions. Reviewed all 3 with patient. Bathing: Patient instructed and indicated understanding when bathing to not submerge wound in water, stand to shower or sponge bathe, cover wound with plastic and tape to ensure no water reaches bandage/wound without cues. Dressing brace: Patient instructed and demonstrated to don/doff velcro on brace at EOB with ability to complete with modA with additional time and cues. Dressing lower body: Patient instructed to don brace first and on the benefits to remain seated to don all clothing to increase independence with precautions and pain management. Standing: Patient instructed and indicated understanding to walk up to sink/counter top/surfaces, step into walker, square off while using objects, slide objects along surfaces, to increase adherence to back precautions and fall prevention. Patient instructed to increase amount of time standing in order to increase independence and tolerance with ADLs.      Patient instructed and indicated understanding the benefits of maintaining activity tolerance, functional mobility, and independence with self care tasks during acute stay  to ensure safe return home and to baseline. Encouraged patient to increase frequency and duration OOB, not sitting longer than 30 mins without marching/walking with staff, be out of bed for all meals, perform daily ADLs (as approved by RN/MD regarding bathing etc), and performing functional mobility to/from bathroom. Patient instruction and indicated understanding on body mechanics, ergonomics and gravitational force on the spine during different body positions to plan activities in prep for return home to complete instrumental ADLs and back to work safely.      Pain:  6/10 back pain    Activity Tolerance:   Fair- Poor    After treatment patient left in no apparent distress:   Call bell within reach and Caregiver / family present, and in bed supine with HOB elevated    COMMUNICATION/COLLABORATION:   The patients plan of care was discussed with: Physical therapist. And RN Collette Harper, OT  Time Calculation: 35 mins

## 2020-11-24 NOTE — PERIOP NOTES
TRANSFER - OUT REPORT:    Verbal report given to [facility] RN(name) on Corey Molina  being transferred to [facility](unit) for routine progression of care       Report consisted of patients Situation, Background, Assessment and   Recommendations(SBAR). Information from the following report(s) OR Summary, Intake/Output and MAR was reviewed with the receiving nurse. Opportunity for questions and clarification was provided.       Patient transported with:   Registered Nurse

## 2020-11-24 NOTE — OP NOTES
[address]  OPERATIVE REPORT    Name:  Amber Forbes  MR#:  [id number]  :  1950  ACCOUNT #:  [de-identified]  DATE OF SERVICE:  2020    PREOPERATIVE DIAGNOSES:  1. Spinal stenosis with claudication, both central and foraminal.  2.  Lumbago. 3.  Sciatica. 4.  Previous lumbar fusion, L4-L5. POSTOPERATIVE DIAGNOSES:  1. Spinal stenosis with claudication, both central and foraminal.  2.  Lumbago. 3.  Sciatica. 4.  Previous lumbar fusion, L4-L5. PROCEDURE PERFORMED:  1. L2 through L4 posterior fusion. 2.  L2 through L4 posterior instrumentation. 3.  Use of allograft bone for spine fusion. 4.  Bone marrow aspirate from the left posterior superior iliac spine for spinal fusion augmentation. 5.  Use of bone marrow aspirate for spinal fusion augmentation. 6.  Use of "[employer]" robotic system for placement of pedicle screws. SURGEON:  Karma Shafer MD    ASSISTANT:  JARED Oliveira    ANESTHESIA:  General.    COMPLICATIONS:  None. SPECIMENS REMOVED:  None. IMPLANTS:  Globus [employer] pedicle screw system. ESTIMATED BLOOD LOSS:  50 mL. DRAINS:  None. INDICATION FOR PROCEDURE:  The patient is a 21-year-old lady with lumbar spinal stenosis, lumbago, and sciatica. She has failed to improve with nonsurgical treatment due to adjacent segment disease. We have discussed the pros and cons of surgery and after undergoing the anterior approach yesterday, she returns today for the posterior approach. Her radicular symptoms have resolved. PROCEDURE:  After informed consent was obtained and the operative site was properly marked, the patient was moved back to the operating room and underwent general endotracheal anesthesia. She was positioned prone on the operating table using the Jake Jock table four-poster frame. Her arms were placed in a 90:90 position. The knees were gently bent with pillows. Fluoroscopy was used to donovan the level of the incision.   I then proceeded to obtain a time-out verifying that this was the correct patient, the correct surgery, the correct site as well as that she had received IV antibiotics, IV Ancef, within 30 minutes of the incision. I then proceeded to perform a stab incision on the left posterior superior iliac spine and aspirated 30 mL of bone marrow to augment the bone graft used in this case. I then proceeded to place a Globus robotic navigation marker on both right and left sides of the pelvis. With those markers in place, I proceeded to bring in fluoroscopy and register L2, L3, L4, and L5 on both AP and lateral views. Once they were registered, the Globus robotic system was brought into place and going through a right-sided approach, I was able to expose through a Norman approach L2 through L5. The previous hardware was found at L4-L5. I was able then to place pedicle screws at L2 and L3 with the following technique. The soft tissue dilator was used to create a path for the pedicle, high-speed drill was used to make the entry hole followed by using a tap, and placing the premeasured screw. This was done on both right at L2 and L3. Once both of those were done, I was able then to remove the locking caps at L4 and L5. Then, removed the segment of the cross connector on that side, followed by removing the screws and replacing them with screws of 1 diameter larger. Once all the screws were in place, the posterior elements were decorticated on the right from L2 through L4. Allograft bone soaked in bone marrow aspirate was placed from L2 through L4 for the posterior fusion and a frannie was placed from L2 through L5 for the posterior instrumentation and locked with the final locking device. Once that was completed, the wound was irrigated with Irrisept. The fascia was closed with #1 Vicryl, subcutaneous with 2-0 Vicryl, and skin with a 3-0 running Monocryl and Dermabond. Sterile dressing was applied.   The patient was then awakened and transferred to PACU in stable condition. POSTOPERATIVE PLAN:  The patient is going to remain here overnight. We are going to give her SCDs and LAURO hoskirt for DVT prophylaxis and Ancef for infection prophylaxis.         MD CARLITOS Wynn/[email or web address]  D:  11/24/2020 13:17  T:  11/24/2020 17:32  JOB #:  [id number]  CC:  [facility]

## 2020-11-24 NOTE — PROGRESS NOTES
Bedside and Verbal shift change report given to [facility] (oncoming nurse) by Bharath Dunaway (offgoing nurse). Report included the following information SBAR, Kardex, Intake/Output, MAR and Recent Results.

## 2020-11-25 LAB
GLUCOSE BLD STRIP.AUTO-MCNC: 195 MG/DL (ref 65–100)
GLUCOSE BLD STRIP.AUTO-MCNC: 198 MG/DL (ref 65–100)
GLUCOSE BLD STRIP.AUTO-MCNC: 228 MG/DL (ref 65–100)
GLUCOSE BLD STRIP.AUTO-MCNC: 311 MG/DL (ref 65–100)
SERVICE CMNT-IMP: ABNORMAL

## 2020-11-25 PROCEDURE — 74011250637 HC RX REV CODE- 250/637: Performed by: ORTHOPAEDIC SURGERY

## 2020-11-25 PROCEDURE — 97530 THERAPEUTIC ACTIVITIES: CPT

## 2020-11-25 PROCEDURE — 97116 GAIT TRAINING THERAPY: CPT

## 2020-11-25 PROCEDURE — 94760 N-INVAS EAR/PLS OXIMETRY 1: CPT

## 2020-11-25 PROCEDURE — 97535 SELF CARE MNGMENT TRAINING: CPT

## 2020-11-25 PROCEDURE — 82962 GLUCOSE BLOOD TEST: CPT

## 2020-11-25 PROCEDURE — 65270000029 HC RM PRIVATE

## 2020-11-25 PROCEDURE — 74011636637 HC RX REV CODE- 636/637: Performed by: ORTHOPAEDIC SURGERY

## 2020-11-25 RX ORDER — POLYETHYLENE GLYCOL 3350 17 G/17G
17 POWDER, FOR SOLUTION ORAL DAILY
Qty: 14 PACKET | Refills: 0 | Status: SHIPPED | OUTPATIENT
Start: 2020-11-26 | End: 2020-12-10

## 2020-11-25 RX ORDER — ACETAMINOPHEN 500 MG
1000 TABLET ORAL EVERY 6 HOURS
Qty: 112 TAB | Refills: 0 | Status: SHIPPED
Start: 2020-11-25 | End: 2020-12-09

## 2020-11-25 RX ORDER — INSULIN LISPRO 100 [IU]/ML
4 INJECTION, SOLUTION INTRAVENOUS; SUBCUTANEOUS ONCE
Status: COMPLETED | OUTPATIENT
Start: 2020-11-26 | End: 2020-11-25

## 2020-11-25 RX ORDER — OXYCODONE HYDROCHLORIDE 5 MG/1
5-10 TABLET ORAL
Qty: 60 TAB | Refills: 0 | Status: SHIPPED | OUTPATIENT
Start: 2020-11-25 | End: 2020-12-09

## 2020-11-25 RX ORDER — AMOXICILLIN 250 MG
1 CAPSULE ORAL 2 TIMES DAILY
Qty: 28 TAB | Refills: 0 | Status: SHIPPED
Start: 2020-11-25 | End: 2020-12-09

## 2020-11-25 RX ADMIN — Medication 2 TABLET: at 09:06

## 2020-11-25 RX ADMIN — GABAPENTIN 100 MG: 100 CAPSULE ORAL at 16:36

## 2020-11-25 RX ADMIN — GABAPENTIN 100 MG: 100 CAPSULE ORAL at 09:07

## 2020-11-25 RX ADMIN — PRAVASTATIN SODIUM 40 MG: 40 TABLET ORAL at 21:09

## 2020-11-25 RX ADMIN — OXYCODONE HYDROCHLORIDE 5 MG: 5 TABLET ORAL at 21:08

## 2020-11-25 RX ADMIN — FAMOTIDINE 20 MG: 20 TABLET, FILM COATED ORAL at 09:06

## 2020-11-25 RX ADMIN — DOCUSATE SODIUM 50MG AND SENNOSIDES 8.6MG 1 TABLET: 8.6; 5 TABLET, FILM COATED ORAL at 09:06

## 2020-11-25 RX ADMIN — Medication 10 ML: at 21:10

## 2020-11-25 RX ADMIN — ACETAMINOPHEN 1000 MG: 500 TABLET ORAL at 17:19

## 2020-11-25 RX ADMIN — ACETAMINOPHEN 1000 MG: 500 TABLET ORAL at 05:30

## 2020-11-25 RX ADMIN — FAMOTIDINE 20 MG: 20 TABLET, FILM COATED ORAL at 17:19

## 2020-11-25 RX ADMIN — OXYCODONE HYDROCHLORIDE 10 MG: 5 TABLET ORAL at 09:07

## 2020-11-25 RX ADMIN — Medication 10 ML: at 05:31

## 2020-11-25 RX ADMIN — DOCUSATE SODIUM 50MG AND SENNOSIDES 8.6MG 1 TABLET: 8.6; 5 TABLET, FILM COATED ORAL at 17:19

## 2020-11-25 RX ADMIN — GABAPENTIN 100 MG: 100 CAPSULE ORAL at 21:08

## 2020-11-25 RX ADMIN — METOPROLOL SUCCINATE 25 MG: 25 TABLET, EXTENDED RELEASE ORAL at 09:07

## 2020-11-25 RX ADMIN — ALPRAZOLAM 0.5 MG: 0.5 TABLET ORAL at 21:08

## 2020-11-25 RX ADMIN — LEVOTHYROXINE SODIUM 25 MCG: 0.03 TABLET ORAL at 05:31

## 2020-11-25 RX ADMIN — OXYCODONE HYDROCHLORIDE 10 MG: 5 TABLET ORAL at 16:41

## 2020-11-25 RX ADMIN — LISINOPRIL 2.5 MG: 5 TABLET ORAL at 09:07

## 2020-11-25 RX ADMIN — OXYCODONE HYDROCHLORIDE 10 MG: 5 TABLET ORAL at 12:46

## 2020-11-25 RX ADMIN — ACETAMINOPHEN 1000 MG: 500 TABLET ORAL at 12:47

## 2020-11-25 RX ADMIN — MULTIPLE VITAMINS W/ MINERALS TAB 1 TABLET: TAB at 09:06

## 2020-11-25 RX ADMIN — POLYETHYLENE GLYCOL 3350 17 G: 17 POWDER, FOR SOLUTION ORAL at 09:08

## 2020-11-25 RX ADMIN — INSULIN LISPRO 4 UNITS: 100 INJECTION, SOLUTION INTRAVENOUS; SUBCUTANEOUS at 23:11

## 2020-11-25 NOTE — PROGRESS NOTES
Bedside and Verbal shift change report given to [facility] (oncoming nurse) by Amparo Major (offgoing nurse). Report included the following information SBAR, Kardex, Intake/Output, MAR and Recent Results.

## 2020-11-25 NOTE — PROGRESS NOTES
Problem: Self Care Deficits Care Plan (Adult)  Goal: *Acute Goals and Plan of Care (Insert Text)  Description: FUNCTIONAL STATUS PRIOR TO ADMISSION: Patient was independent and active without use of DME.     HOME SUPPORT: The patient lived with  but did not require assist.    Occupational Therapy Goals  Initiated 11/24/2020    1. Patient will perform lower body dressing with supervision/set-up using Reacher, Stocking Aid, and Long [employer] PRN within 7 days. 2.  Patient will perform toileting with supervision/set-up using most appropriate DME within 7 days. 3.  Patient will grooming standing at sink at supervision/set-up within 7 days. 4.  Patient will don/doff back brace at supervision/set-up within 7 days. 5.  Patient will verbalize/demonstrate 3/3 back precautions during ADL tasks without cues within 7 days. Outcome: Progressing Towards Goal   OCCUPATIONAL THERAPY TREATMENT  Patient: Cecelia Larkin (42 y.o. female)  Date: 11/25/2020  Diagnosis: Spinal stenosis of lumbar region at multiple levels [M48.061]   <principal problem not specified>  Procedure(s) (LRB):  ROBOTIC ASSISTED L2-4 REVISION POSTERIOR DECOMPRESSION AND FUSION WITH BONE MARROW ASPIRATION FROM ILIAC CREST (N/A) 1 Day Post-Op  Precautions:    Chart, occupational therapy assessment, plan of care, and goals were reviewed. ASSESSMENT  Patient continues with skilled OT services and is progressing towards goals. Good participation despite up in chair since lunch PTA   Appears cog intact and supportive spouse present for tx. Family training to don/doff brace at S level. Current Level of Function Impacting Discharge (ADLs): S UE ADLs, cues for back precautions; mod A-min A LE ADLs, will benefit from LE AE use; Min A-CGA functional mobility    Other factors to consider for discharge: spouse can assist PRN         PLAN :  Patient continues to benefit from skilled intervention to address the above impairments.   Continue treatment per established plan of care. to address goals. Recommend with staff: ambulate to and from bathroom or BSC    Recommend next OT session: benefit of LE AE    Recommendation for discharge: (in order for the patient to meet his/her long term goals)  Occupational therapy at least 2 days/week in the home AND ensure assist and/or supervision for safety with Assist ADLs and IADLs PRN    This discharge recommendation:  Has been made in collaboration with the attending provider and/or case management    IF patient discharges home will need the following DME: AE: long handled bathing, AE: long handled dressing, bedside commode, and walker: rolling       SUBJECTIVE:   Patient stated We will get a bedside commode.     OBJECTIVE DATA SUMMARY:   Cognitive/Behavioral Status:  Neurologic State: Alert  Orientation Level: Oriented X4  Cognition: Appropriate decision making; Appropriate for age attention/concentration; Appropriate safety awareness; Follows commands; Impulsive  Perception: Appears intact  Perseveration: No perseveration noted  Safety/Judgement: Awareness of environment; Fall prevention;Good awareness of safety precautions    Functional Mobility and Transfers for ADLs:  Bed Mobility:  Rolling: Contact guard assistance;Minimum assistance(Lisa for UE support)  Supine to Sit: Contact guard assistance;Minimum assistance(Lisa for UE support)  Scooting: Contact guard assistance    Transfers:  Sit to Stand: Contact guard assistance;Minimum assistance;Assist x2  Functional Transfers  Toilet Transfer : Contact guard assistance;Minimum assistance; Additional time; Adaptive equipment(BSC; she plans to obtain for each toilet)       Balance:  Sitting: Intact  Standing: Impaired  Standing - Static: Good;Constant support  Standing - Dynamic : Good;Constant support    ADL Intervention:  Feeding  Feeding Assistance: Modified independent;Supervision    Grooming  Grooming Assistance: Supervision                   Lower Body Dressing Assistance  Dressing Assistance: Moderate assistance(plans for spouse to assist initially)    Toileting  Toileting Assistance: Minimum assistance    Cognitive Retraining  Attention to Task: Single task  Maintains Attention For (Time): Greater than 10 minutes  Following Commands: Follows one step commands/directions; Follows two step commands/directions  Safety/Judgement: Awareness of environment; Fall prevention;Good awareness of safety precautions        Pain:  6/10 with activity; 0/10 at rest; ice post tx; pain management trained with spinal surgery    Activity Tolerance:   Good and requires rest breaks    After treatment patient left in no apparent distress:   Patient positioned in R sidelying for pressure relief, Call bell within reach, Caregiver / family present, and Side rails x 3; ice applied; instructed to remove post 20 minutes    COMMUNICATION/COLLABORATION:   The patients plan of care was discussed with: Registered nurse and Case management.      Diana Kent OTR/L  Time Calculation: 51 mins

## 2020-11-25 NOTE — PROGRESS NOTES
LLOYD Plan:    Home w/ DME (RW) & f/u appt      >CM to place DME (RW) order needed for d/c   >CM to secure PCP f/u appt prior to d/c   >2nd IM letter needed at d/c   >Pt's  to transport at d/c    10:30 AM  CM spoke with pt by bedside phone to introduce role, complete assessment, & discuss LLOYD plan for d/c. CM confirmed pt's demographic information is up to date. Pt reported she lives in single story home w/ ramp entrance with her , Hernandez Crisostomo ([phone]). Pt reported she is independent with ADLs/ IADLs & drives. Pt reported no current use of or access to DME in the home; CM will place order to secure needed DME (RW) for d/c. Pt reported prior hx w/ utilizing [facility] but unable to recall agency name. Pt reported her  will provide transport for d/c. Pt reported no additional questions, needs, or concerns at this time. CM to follow pt & provide updates as they become available. Reason for Admission: Spinal stenosis    RUR Score: 10%    Plan for utilizing home health: prior hx; no need identified at this time    PCP: First and Last name: Kristi Good MD   Name of Practice: [facility]   Are you a current patient: Yes/No: Yes   Approximate date of last visit: 11/16/2020    Can you participate in a virtual visit with your PCP: no    Current Advanced Directive/Advance Care Plan: Full code & no AMD on file; pt declined support to complete while admitted & reported her  \"knows what she wants. \" CM informed pt she can discuss ACP with her PCP. Care Management Interventions  PCP Verified by CM: Yes  Last Visit to PCP: 11/16/20  Palliative Care Criteria Met (RRAT>21 & CHF Dx)?: No  Mode of Transport at Discharge:  Other (see comment)(Pt's  to transport @ d/c)  Transition of Care Consult (CM Consult): Discharge Planning  Discharge Durable Medical Equipment: Yes(RW)  Physical Therapy Consult: Yes  Occupational Therapy Consult: Yes  Speech Therapy Consult: No  Current Support Network: Lives with Spouse, Own Home(Pt lives in single story home w/ ramp entrance)  Confirm Follow Up Transport: Self(Pt's  available to transport )  Discharge Location  Discharge Placement: Home with outpatient services(w/ DME & f/u appt)      Initial Note:  KATHLEEN Intern: DEISY attempted to complete assessment & discuss LLOYD plan for d/c with pt by bedside phone but received no answer; CM will call back later today.     Rubia De Jesus MSW Intern

## 2020-11-25 NOTE — PROGRESS NOTES
Problem: Mobility Impaired (Adult and Pediatric)  Goal: *Acute Goals and Plan of Care (Insert Text)  Description: FUNCTIONAL STATUS PRIOR TO ADMISSION: Patient was ambulatory in community without AD and driving. HOME SUPPORT PRIOR TO ADMISSION: Patient lives with  in ramp accessible one story home, did not require assist with ADLs. Physical Therapy Goals  Initiated 11/24/2020    1. Patient will move from supine to sit and sit to supine  in bed with independence within 4 days. 2. Patient will perform sit to stand with supervision/set-up within 4 days. 3. Patient will ambulate with supervision/set-up for 200 feet with the least restrictive device within 4 days. 4. Patient will verbalize and demonstrate understanding of spinal precautions (No bending, lifting greater than 5 lbs, or twisting; log-roll technique; frequent repositioning as instructed) within 4 days. 11/25/2020 1251 by Sen Amor  Outcome: Progressing Towards Goal   PHYSICAL THERAPY TREATMENT  Patient: Ashutosh Fuentes (49 y.o. female)  Date: 11/25/2020  Diagnosis: Spinal stenosis of lumbar region at multiple levels [M48.061]   <principal problem not specified>  Procedure(s) (LRB):  ROBOTIC ASSISTED L2-4 REVISION POSTERIOR DECOMPRESSION AND FUSION WITH BONE MARROW ASPIRATION FROM ILIAC CREST (N/A) 1 Day Post-Op  Precautions:   No bending, no lifting greater than 5 lbs, no twisting, log-roll technique, repositioning every 20-30 min except when sleeping, brace when OOB (if ordered)  Chart, physical therapy assessment, plan of care and goals were reviewed. ASSESSMENT  Patient continues with skilled PT services and is progressing towards goals. Pt presents with decreased strength and increased pain with mobility. Pt performed bed mobility at [facility] with Lisa for UE support. Pt required cueing to log roll. Pt performed a sit to stand transfer at [facility]/Lisa with cueing for hand placement. Pt mobilized the bathroom at [facility].  Pt able to perform personal hygiene independently. Pt ambulated 400ft with RW at [facility]. Pt still presents with decreased step jarod, but improved from last session. Pt required additional time and cueing to relax shoulders for upright posture throughout ambulation. Pt performed car transfer at [facility] with cueing for sequencing. Pt instructed on icing for pain management. Pt with understanding. Pt is moving well with increased pain. Pt is cleared for discharge from physical therapy standpoint. Current Level of Function Impacting Discharge (mobility/balance): bed mobility at [facility], sit to stand transfer at [facility], ambulation at [facility]    Other factors to consider for discharge: pain         PLAN :  Patient continues to benefit from skilled intervention to address the above impairments. Continue treatment per established plan of care. to address goals. Recommendation for discharge: (in order for the patient to meet his/her long term goals)  No skilled physical therapy/ follow up rehabilitation needs identified at this time. This discharge recommendation:  Has been made in collaboration with the attending provider and/or case management    IF patient discharges home will need the following DME: rolling walker       SUBJECTIVE:   Patient stated my pain is worse than this morning.     OBJECTIVE DATA SUMMARY:   Critical Behavior:  Neurologic State: Alert  Orientation Level: Oriented X4  Cognition: Appropriate decision making, Appropriate for age attention/concentration, Appropriate safety awareness, Follows commands, Impulsive  Safety/Judgement: Awareness of environment, Fall prevention, Good awareness of safety precautions    Spinal diagnosis intervention:  The patient stated 3/3 back precautions when prompted. Reviewed all 3 back precautions, log roll technique, and sitting for 30 minutes at a time.     Functional Mobility Training:    Bed Mobility:  Log Rolling: Stand-by assistance  Supine to Sit: Stand-by assistance;Contact guard assistance;Minimum assistance(Lisa for UE support)     Scooting: Stand-by assistance;Contact guard assistance     Transfers:  Sit to Stand: Contact guard assistance;Minimum assistance  Stand to Sit: Contact guard assistance    Balance:  Sitting: Intact  Standing: Impaired  Standing - Static: Good;Constant support  Standing - Dynamic : Good;Constant support  Ambulation/Gait Training:  Distance (ft): 400 Feet (ft)  Assistive Device: Walker, rolling;Gait belt  Ambulation - Level of Assistance: Contact guard assistance     Gait Abnormalities: Decreased step clearance     Base of Support: Widened     Speed/Danita: Slow;Pace decreased (<100 feet/min)  Step Length: Left shortened;Right shortened     Pain Rating:  Pt reports 4/10 pain with mobility    Activity Tolerance:   Good    After treatment patient left in no apparent distress:   Sitting in chair, Call bell within reach, and Caregiver / family present    COMMUNICATION/COLLABORATION:   The patients plan of care was discussed with: Registered nurse.      LEO Aldana   Time Calculation: 23 mins

## 2020-11-25 NOTE — DISCHARGE SUMMARY
Spine Discharge Summary    Patient ID:  Kim Felix  [id number]  female  78 y.o.  1950    Admit date: 11/23/2020    Discharge date: 11/25/2020    Admitting Physician: Alexander Melissa MD     Consulting Physician(s):   Treatment Team: Attending Provider: Jane Castillo MD; Utilization Review: Reyna Frias RN; Utilization Review: Jasmyn Horner RN; Care Manager: Zack Randall RN    Date of Surgery:   11/23/2020 and 11/24/2020     Preoperative Diagnosis:  RIGHT LUMBAR RADICULITIS  SPINAL STENOSIS OF LUMBAR REGION WITH RADICULOPATHY  RIGHT SIDED SCIATICA  DEGENERATION OF LUMBAR INTERVERTEBRAL DISC    Postoperative Diagnosis:   RIGHT LUMBAR RADICULITIS  SPINAL STENOSIS OF LUMBAR REGION WITH RADICULOPATHY  RIGHT SIDED SCIATICA  DEGENERATION OF LUMBAR INTERVERTEBRAL [address]    Procedure(s):  11/23/2020: L2-4 LATERAL FUSION   11/24/2020: ROBOTIC ASSISTED L2-4 REVISION POSTERIOR DECOMPRESSION AND FUSION WITH BONE MARROW ASPIRATION FROM ILIAC CREST     Anesthesia Type:   General     Surgeon: Jane Castillo MD                            HPI:  Pt is a 78 y.o. female who has a history of RIGHT LUMBAR RADICULITIS  SPINAL STENOSIS OF LUMBAR REGION WITH RADICULOPATHY  RIGHT SIDED SCIATICA  DEGENERATION OF LUMBAR INTERVERTEBRAL [address]  with pain and limitations of activities of daily living who presents at this time for a L2-4 LATERAL FUSION and ROBOTIC ASSISTED L2-4 REVISION POSTERIOR DECOMPRESSION AND FUSION WITH BONE MARROW ASPIRATION FROM ILIAC CREST  following the failure of conservative management.     PMH:   Past Medical History:   Diagnosis Date    Anxiety     Arthritis     ASHD (arteriosclerotic heart disease)     Bilateral carotid artery stenosis     CAD (coronary artery disease)     Chronic pain     Depression     Diabetes (HCC)     TypeII    Diarrhea     Gastroparesis     GERD (gastroesophageal reflux disease)     History of cardioembolic cerebrovascular accident (CVA)     Hypercholesterolemia  Hypertension     S/P bypass gastrojejunostomy 2012    Thyroid disease        Body mass index is 29.15 kg/m². : A BMI > 30 is classified as obesity and > 40 is classified as morbid obesity. Medications upon admission :   Prior to Admission Medications   Prescriptions Last Dose Informant Patient Reported? Taking? ALPRAZolam (XANAX) 1 mg tablet 2020 at Unknown time  No Yes   Sig: TAKE 1/2 TABLET BY MOUTH EVERY DAY AS NEEDED FOR ANXIETY THEN TAKE 1 TABLET AT BEDTIME AS NEEDED FOR SLEEP   Accu-Chek Hannah Plus Meter misc 2020 at Unknown time  No Yes   Sig: USE TO CHECK BLOOD SUGAR 2  TIMES DAILY   Aspirin, Buffered 81 mg tab 2020  Yes No   Sig: Take 81 mg by mouth daily. Januvia 100 mg tablet 2020 at Unknown time  No Yes   Sig: TAKE 1 TABLET BY MOUTH ONCE DAILY FOR DIABETES   cholecalciferol, vitamin D3, (VITAMIN D3) 2,000 unit tab 2020 at Unknown time  Yes Yes   Sig: Take 1 capsule by mouth daily   fluticasone propionate (FLONASE) 50 mcg/actuation nasal spray Unknown at Unknown time  No No   Si Sprays by Both Nostrils route daily as needed for Rhinitis. furosemide (LASIX) 20 mg tablet 2020 at Unknown time  No Yes   Sig: TAKE 1 TABLET BY MOUTH ONCE DAILY   glucose blood VI test strips (CONTOUR NEXT TEST STRIPS) strip 2020 at Unknown time  No Yes   Sig: USE TO CHECK BLOOD SUGAR TWICE DAILY   lancets (Accu-Chek Softclix Lancets) misc 2020 at Unknown time  No Yes   Sig: Use to obtain blood sample for diabetic testing three times a day   levothyroxine (synthroid) 25 mcg tablet 2020  Yes No   Sig: take two tablets  Monday thru Friday and 1 tab Saturday And .    lisinopriL (PRINIVIL, ZESTRIL) 2.5 mg tablet 2020 at Unknown time  No Yes   Sig: TAKE 1 TABLET BY MOUTH ONCE DAILY   metoprolol succinate (TOPROL-XL) 25 mg XL tablet 2020 at 0930  No Yes   Sig: TAKE 1 TABLET EVERY DAY   multivit-minerals/folic acid (CENTRUM VITAMINTS PO) 11/20/2020  Yes No   Sig: Take 1 tablet by mouth daily   pravastatin (PRAVACHOL) 40 mg tablet 11/22/2020 at Unknown time  No Yes   Sig: TAKE 1 TABLET BY MOUTH EVERY NIGHT      Facility-Administered Medications: None        Allergies: Allergies   Allergen Reactions    Demerol [Meperidine] Unknown (comments)     Pt unsure of reaction        Hospital Course: The patient underwent surgery. Complications:  None; patient tolerated the procedure well. Was taken to the PACU in stable condition and then transferred to the ortho floor. Perioperative Antibiotics:  Ancef     Postoperative Pain Management:  Oxycodone & Tylenol     Postoperative transfusions:    Number of units banked PRBCs =   none     Post Op complications: none    Hemoglobin at discharge:    Lab Results   Component Value Date/Time    HGB 10.9 (L) 11/24/2020 03:11 AM    INR 1.0 11/09/2020 01:14 PM       Lateral prineo dressing remained clean, dry and intact. Posterior GAEL changed to optifoam on POD#1 for discharge. No significant erythema or swelling. Wound appears to be healing without any evidence of infection. Neurovascular exam found to be within normal limits. Physical Therapy started following surgery and participated in bed mobility, transfers and ambulation. Gait:  Gait  Base of Support: Widened  Speed/Danita: Slow, Pace decreased (<100 feet/min)  Step Length: Left shortened, Right shortened  Gait Abnormalities: Decreased step clearance  Ambulation - Level of Assistance: Contact guard assistance, Assist x2  Distance (ft): 80 Feet (ft)  Assistive Device: Walker, rolling, Gait belt, Brace/Splint                   Discharged to: Home.     Condition on Discharge:   stable    Discharge instructions:  - Take pain medications as prescribed  - Resume pre hospital diet      - Discharge activity: activity as tolerated  - Ambulate as tolerated  - Lumbar brace when oob  - Avoid bending, lifting and twisting  - Wound Care Keep wound clean and dry.  See discharge instruction sheet. -DISCHARGE MEDICATION LIST     Current Discharge Medication List      START taking these medications    Details   acetaminophen (TYLENOL) 500 mg tablet Take 2 Tabs by mouth every six (6) hours for 14 days. Qty: 112 Tab, Refills: 0      senna-docusate (PERICOLACE) 8.6-50 mg per tablet Take 1 Tab by mouth two (2) times a day for 14 days. Qty: 28 Tab, Refills: 0      polyethylene glycol (MIRALAX) 17 gram packet Take 1 Packet by mouth daily for 14 days. Qty: 14 Packet, Refills: 0      oxyCODONE IR (ROXICODONE) 5 mg immediate release tablet Take 1-2 Tabs by mouth every four (4) hours as needed for Pain for up to 14 days.  Max Daily Amount: 60 mg.  Qty: 60 Tab, Refills: 0    Associated Diagnoses: Status post lumbar spine operative procedure for decompression of spinal cord         CONTINUE these medications which have NOT CHANGED    Details   Accu-Chek Hannah Plus Meter misc USE TO CHECK BLOOD SUGAR 2  TIMES DAILY  Qty: 1 Each, Refills: 0    Associated Diagnoses: Type 2 diabetes mellitus without complication, without long-term current use of insulin (HCC)      lancets (Accu-Chek Softclix Lancets) misc Use to obtain blood sample for diabetic testing three times a day  Qty: 1 Package, Refills: 11    Comments: E11.9      furosemide (LASIX) 20 mg tablet TAKE 1 TABLET BY MOUTH ONCE DAILY  Qty: 90 Tab, Refills: 3      ALPRAZolam (XANAX) 1 mg tablet TAKE 1/2 TABLET BY MOUTH EVERY DAY AS NEEDED FOR ANXIETY THEN TAKE 1 TABLET AT BEDTIME AS NEEDED FOR SLEEP  Qty: 135 Tab, Refills: 1    Associated Diagnoses: Anxiety      lisinopriL (PRINIVIL, ZESTRIL) 2.5 mg tablet TAKE 1 TABLET BY MOUTH ONCE DAILY  Qty: 90 Tab, Refills: 3      Januvia 100 mg tablet TAKE 1 TABLET BY MOUTH ONCE DAILY FOR DIABETES  Qty: 90 Tab, Refills: 3      pravastatin (PRAVACHOL) 40 mg tablet TAKE 1 TABLET BY MOUTH EVERY NIGHT  Qty: 90 Tab, Refills: 3      metoprolol succinate (TOPROL-XL) 25 mg XL tablet TAKE 1 TABLET EVERY DAY  Qty: 90 Tab, Refills: 3      glucose blood VI test strips (CONTOUR NEXT TEST STRIPS) strip USE TO CHECK BLOOD SUGAR TWICE DAILY  Qty: 100 Strip, Refills: 11    Associated Diagnoses: Type 2 diabetes mellitus without complication, without long-term current use of insulin (HCC)      cholecalciferol, vitamin D3, (VITAMIN D3) 2,000 unit tab Take 1 capsule by mouth daily      levothyroxine (synthroid) 25 mcg tablet take two tablets  Monday thru Friday and 1 tab Saturday And Sunday. fluticasone propionate (FLONASE) 50 mcg/actuation nasal spray 2 Sprays by Both Nostrils route daily as needed for Rhinitis. Qty: 1 Bottle, Refills: 6    Associated Diagnoses: Environmental allergies      multivit-minerals/folic acid (CENTRUM VITAMINTS PO) Take 1 tablet by mouth daily      Aspirin, Buffered 81 mg tab Take 81 mg by mouth daily.           per medical continuation form      -Follow up in office in 2 weeks      Signed:  Christine Slaughter NP  Orthopaedic Nurse Practitioner    11/25/2020  2:44 PM

## 2020-11-25 NOTE — PROGRESS NOTES
Pt seen by JARED Nevarez earlier. Pt seen resting in chair. Drowsy, appropriate in conversation. Post-op pain well controlled with prn oxycodone. Denies BLE pain/numbness/tingling. Tolerating regular diet. Denies nausea. +flatus. Voiding without difficulty     VSS. Room air. Visit Vitals  /66   Pulse 83   Temp 98.3 °F (36.8 °C)   Resp 18   Ht 5' 2\" (1.575 m)   Wt 72.3 kg (159 lb 6.3 oz)   SpO2 100%   Breastfeeding No   BMI 29.15 kg/m²     Lateral prineo and posterior GAEL c/d/i - change GAEL to optifoam for discharge  LSO brace when oob    Plans to return home with 's support pending PT/OT clearance, and delivery of RW.      Sherrill Ross NP

## 2020-11-25 NOTE — PROGRESS NOTES
11;24AM:  Referral for DME rolling walker sent to [employer] via [employer]     Patient is Post op day 1 following Robotic assisted L2-L4 revision posterior docompression and fusion with Bone marrow aspiration from iliac crest    LLOYD    Home with spouse   DME of rolling walker has been ordered; patient also requested BSC or elevated toilet seat - CM explained to patient that health insurance does not pay for [facility] or elevated toilet seats and that either can be purchased at any [employer], [employer], [employer] will be provided by spouse    Preferred Rx is Kevin on OptiSynx    2nd IM Letter given, received, reviewed and signed by patient; signed copy placed on chart. Foflow up appointments will be scheduled by patient    CM will continue to follow for discharge needs and planning.     Gissel Orta RN  Care Manager  Ext [phone]

## 2020-11-25 NOTE — PROGRESS NOTES
Problem: Mobility Impaired (Adult and Pediatric)  Goal: *Acute Goals and Plan of Care (Insert Text)  Description: FUNCTIONAL STATUS PRIOR TO ADMISSION: Patient was ambulatory in community without AD and driving. HOME SUPPORT PRIOR TO ADMISSION: Patient lives with  in ramp accessible one story home, did not require assist with ADLs. Physical Therapy Goals  Initiated 11/24/2020    1. Patient will move from supine to sit and sit to supine  in bed with independence within 4 days. 2. Patient will perform sit to stand with supervision/set-up within 4 days. 3. Patient will ambulate with supervision/set-up for 200 feet with the least restrictive device within 4 days. 4. Patient will verbalize and demonstrate understanding of spinal precautions (No bending, lifting greater than 5 lbs, or twisting; log-roll technique; frequent repositioning as instructed) within 4 days. Outcome: Progressing Towards Goal   PHYSICAL THERAPY TREATMENT  Patient: Jef Bolden (60 y.o. female)  Date: 11/25/2020  Diagnosis: Spinal stenosis of lumbar region at multiple levels [M48.061]   <principal problem not specified>  Procedure(s) (LRB):  ROBOTIC ASSISTED L2-4 REVISION POSTERIOR DECOMPRESSION AND FUSION WITH BONE MARROW ASPIRATION FROM ILIAC CREST (N/A) 1 Day Post-Op  Precautions:   No bending, no lifting greater than 5 lbs, no twisting, log-roll technique, repositioning every 20-30 min except when sleeping, brace when OOB (if ordered)  Chart, physical therapy assessment, plan of care and goals were reviewed. ASSESSMENT  Patient continues with skilled PT services and is progressing towards goals. Pt presents with decreased strength and endurance. Pt performed bed mobility at [facility] with Lisa for UE support. Pt required cueing for sequencing to maintain back precautions. Pt performed sit to stand transfer at [facility] with cueing for hand placement. Pt with c/o being dizzy after standing. BP was taken and remained WNL.  Pt ambulated 80ft CGAx2 with RW. Pt required additional time and cueing to increase step jarod. Pt able to minimally able to correct due to pain and fatigue. Pt required cueing to relax shoulders to improve upright posture during ambulation. Pt able to minimally correct. Pt still needs to perform a car transfer to clear p.m session. Current Level of Function Impacting Discharge (mobility/balance): bed mobility at CGA/Lisa, sit to stand transfer at CGA/Lisa, ambulation at CGAx2    Other factors to consider for discharge: functioning below baseline         PLAN :  Patient continues to benefit from skilled intervention to address the above impairments. Continue treatment per established plan of care. to address goals. Recommendation for discharge: (in order for the patient to meet his/her long term goals)  No skilled physical therapy/ follow up rehabilitation needs identified at this time. This discharge recommendation:  Has been made in collaboration with the attending provider and/or case management    IF patient discharges home will need the following DME: rolling walker       SUBJECTIVE:   Patient stated I don't feel any pain when I haven't moved.     OBJECTIVE DATA SUMMARY:   Critical Behavior:  Neurologic State: Alert, Appropriate for age  Orientation Level: Appropriate for age, Oriented X4  Cognition: Appropriate decision making, Follows commands       Spinal diagnosis intervention:  The patient stated 3/3 back precautions when prompted. Reviewed all 3 back precautions, log roll technique, and sitting for 30 minutes at a time.     Functional Mobility Training:    Bed Mobility:  Log Rolling: Contact guard assistance;Minimum assistance(Lisa for UE support)  Supine to Sit: Contact guard assistance;Minimum assistance(Lisa for UE support)     Scooting: Contact guard assistance        Transfers:  Sit to Stand: Contact guard assistance;Minimum assistance;Assist x2  Stand to Sit: Contact guard assistance Balance:  Sitting: Intact  Standing: Impaired  Standing - Static: Good;Constant support  Standing - Dynamic : Good;Constant support  Ambulation/Gait Training:  Distance (ft): 80 Feet (ft)  Assistive Device: Walker, rolling;Gait belt;Brace/Splint  Ambulation - Level of Assistance: Contact guard assistance;Assist x2        Gait Abnormalities: Decreased step clearance        Base of Support: Widened     Speed/Danita: Slow;Pace decreased (<100 feet/min)  Step Length: Left shortened;Right shortened       Pain Rating:  Pt reports 0/10 p! In supine and 3/10 p! throughout treatment    Activity Tolerance:   Good, Fair and observed SOB with activity    After treatment patient left in no apparent distress:   Sitting in chair and Call bell within reach    COMMUNICATION/COLLABORATION:   The patients plan of care was discussed with: Registered nurse.      LEO Virk   Time Calculation: 23 mins

## 2020-11-25 NOTE — PROGRESS NOTES
Bedside shift change report given to Kenn Leigh (oncoming nurse) by Yousuf Huizar (offgoing nurse). Report included the following information SBAR, Kardex, ED Summary, Intake/Output, MAR, Recent Results and Cardiac Rhythm NSR. End of Shift Note    Bedside shift change report given to [place]  (oncoming nurse) by Deshaun Humphreys RN (offgoing nurse). Report included the following information SBAR, Kardex, OR Summary, Procedure Summary, Intake/Output, MAR and Cardiac Rhythm NSR    Shift worked:  [id number]     Shift summary and any significant changes:     Uneventful night     Concerns for physician to address:  PT/OT     [facility]          Activity:  Activity Level: Bed Rest  Number times ambulated in hallways past shift: 0  Number of times OOB to chair past shift: 0    Cardiac:   Cardiac Monitoring: No      Cardiac Rhythm: Normal sinus rhythm    Access:   Current line(s): PIV     Genitourinary:   Urinary status: voiding    Respiratory:   O2 Device: Room air  Chronic home O2 use?: N/A  Incentive spirometer at bedside: YES     GI:  Last Bowel Movement Date: 11/24/20  Current diet:  DIET DIABETIC CONSISTENT CARB Regular  Passing flatus: YES  Tolerating current diet: YES  % Diet Eaten: 100 %    Pain Management:   Patient states pain is manageable on current regimen: YES    Skin:  Jd Score: 20  Interventions: speciality bed, float heels, increase time out of bed, PT/OT consult and nutritional support     Patient Safety:  Fall Score:  Total Score: 3  Interventions: assistive device (walker, cane, etc), gripper socks, pt to call before getting OOB and stay with me (per policy)  High Fall Risk: Yes    Length of Stay:  Expected LOS: 2d 12h  Actual LOS: [address] [facility], RN

## 2020-11-25 NOTE — PROGRESS NOTES
ORTHO POST OP SPINE PROGRESS NOTE    2020  Admit Date: 2020  Admit Diagnosis: Spinal stenosis of lumbar region at multiple levels [M48.061]  Procedure: Procedure(s):  ROBOTIC ASSISTED L2-4 REVISION POSTERIOR DECOMPRESSION AND FUSION WITH BONE MARROW ASPIRATION FROM ILIAC CREST  Post Op day: 1 Day Post-Op    Subjective:     Kim Felix is a patient who has complaints Of pain in the low back. No right lower extremity pain. Continued left hip pain. Postop day 2 status post L2 through L4 lateral fusion. Postop day 1 status post posterior fusion with removal of previous hardware at L4-5 and posterior fixation unilaterally on the right L2 to through L5. Tolerating p.o. Dayan Pee Able to void. States she was nauseous yesterday. Review of Systems: Pertinent items are noted in HPI. Objective:     PT/OT:   Distance Ambulated:           Time Ambulated (min):        Ambulation Response: Activity Response: Tolerated well  Assistive Device:              Assistive Device: Fall prevention device    Vital Signs:    Blood pressure 130/60, pulse 97, temperature 98.6 °F (37 °C), resp. rate 18, height 5' 2\" (1.575 m), weight 72.3 kg (159 lb 6.3 oz), SpO2 99 %. Temp (24hrs), Av.2 °F (36.8 °C), Min:97.6 °F (36.4 °C), Max:99.3 °F (37.4 °C)      No intake/output data recorded.  1901 -  0700  In: 0488 [P.O.:1821;  I.V.:1700]  Out: 65 [Urine:1540]    LAB:    Recent Labs     20  0311   HGB 10.9*       Wound/Drain Assessment:  Drain:      Dressing:     Physical Exam:  Neurological: no deficit  Incision clean, dry, and intact and lester holding  5/5 strength bilateral lower extremities    Assessment:      Patient Active Problem List   Diagnosis Code    Anxiety F41.9    Depression F32.9    Hypovitaminosis D E55.9    GERD (gastroesophageal reflux disease) K21.9    Edema R60.9    Esophageal motor disorder K22.4    S/P bypass gastrojejunostomy Z98.0    SOB (shortness of breath) R06.02    CAD (coronary artery disease) I25.10    Angina pectoris (HCC) I20.9    S/P PTCA (percutaneous transluminal coronary angioplasty) Z98.61    Mixed hyperlipidemia E78.2    Diarrhea R19.7    Type 2 diabetes mellitus without complication (HCC) V51.3    Osteopenia M85.80    Encounter for medication monitoring Z51.81    CVA (cerebral vascular accident) ([facility] I30.4    Complicated migraine Z18. 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   Likely today pending progress    Patient request change lester dressing to normal bandage

## 2020-11-25 NOTE — PROGRESS NOTES
LLOYD: Home with follow up appointments (Pt requests to make own PCP follow up)   DME attempted to be arranged, patient declined rolling walker when called by [facility]    to transport home at time of discharge ( present in patient's room)    CM called [facility] to get ETA on rolling walker. Adan Reynosoing at [facility] reports that patient has declined the walker. Pt has no PT/OT needs per therapy notes. Pt wants to arrange own PCP follow up appointment.  to transport home. No other CM needs identified at this time. Pt ready to discharge from a CM standpoint. Care Management Interventions  PCP Verified by CM: Yes  Last Visit to PCP: 11/16/20  Palliative Care Criteria Met (RRAT>21 & CHF Dx)?: No  Mode of Transport at Discharge: Other (see comment)(Pt's  to transport @ d/c)  Transition of Care Consult (CM Consult): Discharge Planning  Discharge Durable Medical Equipment: Yes(RW)  Physical Therapy Consult: Yes  Occupational Therapy Consult: Yes  Speech Therapy Consult: No  Current Support Network: Lives with Spouse, Own Home(Pt lives in single story home w/ ramp entrance)  Confirm Follow Up Transport: Self(Pt's  available to transport )  Discharge Location  Discharge Placement: Home with outpatient services(w/ DME & f/u appt)    Dipesh Samuels, [address] - [address] [address]  [employer] ACP Facilitator  Zone Phone: [phone]

## 2020-11-26 VITALS
RESPIRATION RATE: 18 BRPM | DIASTOLIC BLOOD PRESSURE: 65 MMHG | SYSTOLIC BLOOD PRESSURE: 126 MMHG | OXYGEN SATURATION: 94 % | HEIGHT: 62 IN | HEART RATE: 106 BPM | BODY MASS INDEX: 29.33 KG/M2 | WEIGHT: 159.39 LBS | TEMPERATURE: 99.8 F

## 2020-11-26 PROCEDURE — 94760 N-INVAS EAR/PLS OXIMETRY 1: CPT

## 2020-11-26 PROCEDURE — 74011250637 HC RX REV CODE- 250/637: Performed by: ORTHOPAEDIC SURGERY

## 2020-11-26 RX ADMIN — LEVOTHYROXINE SODIUM 25 MCG: 0.03 TABLET ORAL at 06:22

## 2020-11-26 RX ADMIN — ACETAMINOPHEN 1000 MG: 500 TABLET ORAL at 06:21

## 2020-11-26 RX ADMIN — ACETAMINOPHEN 1000 MG: 500 TABLET ORAL at 01:19

## 2020-11-26 RX ADMIN — OXYCODONE HYDROCHLORIDE 5 MG: 5 TABLET ORAL at 01:19

## 2020-11-27 ENCOUNTER — PATIENT OUTREACH (OUTPATIENT)
Dept: CASE MANAGEMENT | Age: 70
End: 2020-11-27

## 2020-11-27 NOTE — PROGRESS NOTES
Patient was admitted to [facility] on 2020 and discharged on 2020 for spinal stenosis of lumbar region. Outreach made within 2 business days of discharge: Yes    Top Discharge Challenges to be reviewed by the provider   Additional needs identified to be addressed with provider no  medications- pericolace, miralax, tylenol, oxycodone  Discussed COVID-19 related testing which was available at this time. Test results were negative. Patient informed of results, if available? no   Method of communication with provider : none       Advance Care Planning:   Does patient have an Advance Directive:  not on file    Inpatient Readmission Risk score:   Was this a readmission? no   Patient stated reason for the admission:  Back surgery  Patients top risk factors for readmission: medical condition  Interventions to address risk factors: schedule and attend follow up appointments, wear lumbar brace when out of bed, take medications as prescribed, monitor pain and keep under control    Care Transition Nurse (CTN) contacted the family by telephone to perform post hospital discharge assessment. Verified name and  with family as identifiers. Provided introduction to self, and explanation of the CTN role. CTN reviewed discharge instructions, medical action plan and red flags with family who verbalized understanding. Family given an opportunity to ask questions and does not have any further questions or concerns at this time. The family agrees to contact the PCP office for questions related to their healthcare. Medication reconciliation was performed with family, who verbalizes understanding of administration of home medications. Advised obtaining a 90-day supply of all daily and as-needed medications.    Referral to Pharm D needed: no     Home Health/Outpatient orders at discharge: [address]: n/a  Date of initial visit: [facility] ordered at discharge: rolling walker  [address]: [place]  Durable Medical Equipment received: no patient refused walker    Covid Risk Education    Patient has following risk factors of: diabetes. Education provided regarding infection prevention, and signs and symptoms of COVID-19 and when to seek medical attention with family who verbalized understanding. Discussed exposure protocols and quarantine From CDC: Are you at higher risk for severe illness?  and given an opportunity for questions and concerns. The family agrees to contact the COVID-19 hotline [phone] or PCP office for questions related to COVID-19. For more information on steps you can take to protect yourself, see CDC's How to Protect Yourself     Patient/family/caregiver given information for [employer] and agrees to enroll n/a  Patient's preferred e-mail: n/a  Patient's preferred phone number: n/a    Discussed follow-up appointments. If no appointment was previously scheduled, appointment scheduling offered: [facility] follow up appointment(s):   Future Appointments   Date Time Provider Dillan Reyes   1/13/2021 10:00 AM Brent Higuera MD [facility] BS AMB   11/18/2021  1:30 PM Jameel Samano MD [facility] BS AMB     [facility] follow up appointment(s): ortho TBD  Plan for follow-up call in 7-10 days based on severity of symptoms and risk factors. CTN provided contact information for future needs. Goals Addressed                 This Visit's Progress     Prevent complications post hospitalization. 11/27/2020 Spouse reports daughter picking up discharge medications at this time, wearing lumbar brace when out of bed. Denies chest pain, SOB, fever, N/V/D. Spouse will schedule follow up appointment, control pain, and monitor S&S of infection and report at next week outreach.  RUSSELL

## 2020-11-30 RX ORDER — LANCETS
EACH MISCELLANEOUS
Qty: 300 STRIP | Refills: 3 | Status: SHIPPED | OUTPATIENT
Start: 2020-11-30 | End: 2021-06-11 | Stop reason: ALTCHOICE

## 2020-11-30 NOTE — TELEPHONE ENCOUNTER
----- Message from Jessica Camejo sent at 11/30/2020 10:26 AM EST -----  Regarding: Dr. Nini Chatterjee (if not patient): PT      Relationship of caller (if not patient): PT      Best contact number(s): [phone]      Name of medication and dosage if known: \"hypercheck\" test strips      Is patient out of this medication (yes/no): yes      Pharmacy name: [facility] listed in chart? (yes/no): yes  Pharmacy phone number: [phone]  Date of last visit: 11/13/20      Details to clarify the request: n/a      Jessica Camejo

## 2020-12-19 PROBLEM — Z01.818 PRE-OP EVALUATION: Status: RESOLVED | Noted: 2020-11-19 | Resolved: 2020-12-19

## 2020-12-30 ENCOUNTER — TRANSCRIBE ORDER (OUTPATIENT)
Dept: SCHEDULING | Age: 70
End: 2020-12-30

## 2020-12-30 ENCOUNTER — HOSPITAL ENCOUNTER (OUTPATIENT)
Dept: ULTRASOUND IMAGING | Age: 70
Discharge: HOME | End: 2020-12-30
Attending: ORTHOPAEDIC SURGERY
Payer: MEDICARE

## 2020-12-30 DIAGNOSIS — M79.89 SWELLING OF LIMB: Primary | ICD-10-CM

## 2020-12-30 DIAGNOSIS — M79.89 SWELLING OF LIMB: ICD-10-CM

## 2020-12-30 PROCEDURE — 93971 EXTREMITY STUDY: CPT

## 2021-01-24 DIAGNOSIS — F41.9 ANXIETY: ICD-10-CM

## 2021-01-25 RX ORDER — ALPRAZOLAM 1 MG/1
TABLET ORAL
Qty: 135 TAB | Refills: 0 | Status: SHIPPED | OUTPATIENT
Start: 2021-01-25 | End: 2021-03-19

## 2021-02-01 ENCOUNTER — TELEPHONE (OUTPATIENT)
Dept: FAMILY MEDICINE CLINIC | Age: 71
End: 2021-02-01

## 2021-02-01 NOTE — TELEPHONE ENCOUNTER
Patient states for the last 2 weeks, blood sugar in the monring before breakfast 260 , sometimes she retakes it in the evening and it is around 200, no symptoms.  No diet changes per patient

## 2021-02-02 ENCOUNTER — OFFICE VISIT (OUTPATIENT)
Dept: FAMILY MEDICINE CLINIC | Age: 71
End: 2021-02-02
Payer: MEDICARE

## 2021-02-02 VITALS
WEIGHT: 159.4 LBS | TEMPERATURE: 98 F | SYSTOLIC BLOOD PRESSURE: 118 MMHG | HEIGHT: 62 IN | RESPIRATION RATE: 16 BRPM | OXYGEN SATURATION: 98 % | DIASTOLIC BLOOD PRESSURE: 69 MMHG | HEART RATE: 91 BPM | BODY MASS INDEX: 29.33 KG/M2

## 2021-02-02 DIAGNOSIS — E03.9 ACQUIRED HYPOTHYROIDISM: ICD-10-CM

## 2021-02-02 DIAGNOSIS — E78.2 MIXED HYPERLIPIDEMIA: ICD-10-CM

## 2021-02-02 DIAGNOSIS — E11.21 TYPE 2 DIABETES WITH NEPHROPATHY (HCC): Primary | ICD-10-CM

## 2021-02-02 DIAGNOSIS — K21.9 GASTROESOPHAGEAL REFLUX DISEASE WITHOUT ESOPHAGITIS: ICD-10-CM

## 2021-02-02 DIAGNOSIS — I73.9 PAD (PERIPHERAL ARTERY DISEASE) (HCC): ICD-10-CM

## 2021-02-02 DIAGNOSIS — Z51.81 ENCOUNTER FOR MEDICATION MONITORING: ICD-10-CM

## 2021-02-02 DIAGNOSIS — I25.10 CORONARY ARTERY DISEASE INVOLVING NATIVE CORONARY ARTERY OF NATIVE HEART WITHOUT ANGINA PECTORIS: ICD-10-CM

## 2021-02-02 LAB
GLUCOSE POC: 289 MG/DL
HBA1C MFR BLD HPLC: 9.5 %

## 2021-02-02 PROCEDURE — 82947 ASSAY GLUCOSE BLOOD QUANT: CPT | Performed by: FAMILY MEDICINE

## 2021-02-02 PROCEDURE — G9717 DOC PT DX DEP/BP F/U NT REQ: HCPCS | Performed by: FAMILY MEDICINE

## 2021-02-02 PROCEDURE — 1090F PRES/ABSN URINE INCON ASSESS: CPT | Performed by: FAMILY MEDICINE

## 2021-02-02 PROCEDURE — G8419 CALC BMI OUT NRM PARAM NOF/U: HCPCS | Performed by: FAMILY MEDICINE

## 2021-02-02 PROCEDURE — 83036 HEMOGLOBIN GLYCOSYLATED A1C: CPT | Performed by: FAMILY MEDICINE

## 2021-02-02 PROCEDURE — 3046F HEMOGLOBIN A1C LEVEL >9.0%: CPT | Performed by: FAMILY MEDICINE

## 2021-02-02 PROCEDURE — G9899 SCRN MAM PERF RSLTS DOC: HCPCS | Performed by: FAMILY MEDICINE

## 2021-02-02 PROCEDURE — 1101F PT FALLS ASSESS-DOCD LE1/YR: CPT | Performed by: FAMILY MEDICINE

## 2021-02-02 PROCEDURE — G8427 DOCREV CUR MEDS BY ELIG CLIN: HCPCS | Performed by: FAMILY MEDICINE

## 2021-02-02 PROCEDURE — G8536 NO DOC ELDER MAL SCRN: HCPCS | Performed by: FAMILY MEDICINE

## 2021-02-02 PROCEDURE — 3017F COLORECTAL CA SCREEN DOC REV: CPT | Performed by: FAMILY MEDICINE

## 2021-02-02 PROCEDURE — 2022F DILAT RTA XM EVC RTNOPTHY: CPT | Performed by: FAMILY MEDICINE

## 2021-02-02 PROCEDURE — 99213 OFFICE O/P EST LOW 20 MIN: CPT | Performed by: FAMILY MEDICINE

## 2021-02-02 PROCEDURE — G8399 PT W/DXA RESULTS DOCUMENT: HCPCS | Performed by: FAMILY MEDICINE

## 2021-02-02 PROCEDURE — G8752 SYS BP LESS 140: HCPCS | Performed by: FAMILY MEDICINE

## 2021-02-02 PROCEDURE — G8754 DIAS BP LESS 90: HCPCS | Performed by: FAMILY MEDICINE

## 2021-02-02 RX ORDER — PREGABALIN 100 MG/1
100 CAPSULE ORAL 3 TIMES DAILY
COMMUNITY
End: 2021-03-02 | Stop reason: ALTCHOICE

## 2021-02-02 RX ORDER — ACETAMINOPHEN 500 MG
1000 TABLET ORAL
COMMUNITY
Start: 2020-12-21 | End: 2021-03-21

## 2021-02-02 RX ORDER — PREGABALIN 100 MG/1
CAPSULE ORAL 3 TIMES DAILY
COMMUNITY
End: 2021-02-02 | Stop reason: SDUPTHER

## 2021-02-02 RX ORDER — GLIMEPIRIDE 1 MG/1
1 TABLET ORAL
Qty: 30 TAB | Refills: 3 | Status: SHIPPED | OUTPATIENT
Start: 2021-02-02 | End: 2021-04-02 | Stop reason: SDUPTHER

## 2021-02-02 NOTE — PROGRESS NOTES
Chief Complaint   Patient presents with    Blood sugar problem     1. Have you been to the ER, urgent care clinic since your last visit? Hospitalized since your last visit? YES November back surgery    2. Have you seen or consulted any other health care providers outside of the [address] [place] [address] since your last visit? Include any pap smears or colon screening.  No    Patient states her blood sugars have been high: 177--200 in the am and pm, yesterday evening 400 around 5 pm after eating an orange , 177 this morning    Patient has not had levothyroxine or Lyrica for one month

## 2021-02-02 NOTE — PROGRESS NOTES
HISTORY OF PRESENT ILLNESS  Timoteo Moore is a 78 y.o. female. HPI   Follow up on chronic medical problems. Her BS have been in the 200 to 300s over the past several weeks. She has been monitoring he diet but still has been high. DM type II follow up:  Compliant w/ meds, diabetic diet, and exercise. Tolerating Januvia. Has been watching her diet. Her BS was 400 on last night after eating an orange. Checks BS BID on most days and prn. Pt does not have BS log at visit today. No Rf needed for today. Denies any tingling sensation. She has been drinking more water and has been urinating more often. No blurred vision. No significant weight changes. She is interested in [employer]. Cardiovascular Review:  The patient has coronary artery disease, HF and status post coronary artery stenting. Diet and Lifestyle: generally follows a low fat low cholesterol diet, generally follows a low sodium diet, follows a diabetic diet regularly, exercises sporadically  Home BP Monitoring: is not measured at home. Pertinent ROS: taking medications as instructed, no medication side effects noted, no TIA's, no chest pain on exertion, no dyspnea on exertion, no swelling of ankles. Hypercholesterolemia follow up:  Compliant w/ meds, low fat, low cholesterol diet. Exercising some. No muscle nor abdominal pain, no skin discoloration. Patient fasting today. Depression Review:  Patient is seen for followup of depression and anxiety. Overall doing well. Taking xanax prn increased anxiety. Anxiety usually triggered by stress. She denies depressed mood and insomnia. Sleeping well at night. Thyroid Review:  Patient is seen for followup of hypothyroidism. Thyroid ROS: denies fatigue, weight changes, heat/cold intolerance, bowel/skin changes or CVS symptoms.   Patient Active Problem List   Diagnosis Code    Anxiety F41.9    Depression F32.9    Hypovitaminosis D E55.9    GERD (gastroesophageal reflux disease) K21.9  Edema R60.9    Esophageal motor disorder K22.4    S/P bypass gastrojejunostomy Z98.0    SOB (shortness of breath) R06.02    CAD (coronary artery disease) I25.10    Angina pectoris (HCC) I20.9    S/P PTCA (percutaneous transluminal coronary angioplasty) Z98.61    Mixed hyperlipidemia E78.2    Diarrhea R19.7    Type 2 diabetes mellitus without complication ([facility]) V36.0    Osteopenia M85.80    Encounter for medication monitoring Z51.81    CVA (cerebral vascular accident) ([facility] [address] E33.7    Complicated migraine F48. 109    Numbness and tingling of right side of face R20.0, R20.2    Stenosis of both internal carotid arteries I65.23    Type 2 diabetes with nephropathy ([facility]) E11.21    Essential hypertension I10    Bilateral carotid artery stenosis I65.23    Spinal stenosis of lumbar region at multiple levels M48.061       Current Outpatient Medications   Medication Sig Dispense Refill    ALPRAZolam (XANAX) 1 mg tablet TAKE 1/2 TABLET BY MOUTH EVERY DAY AS NEEDED FOR ANXIETY THEN TAKE 1 TABLET BY MOUTH AT BEDTIME AS NEEDED FOR SLEEP 135 Tab 0    Blood Sugar Diagnostic, Drum (Accu-Chek Compact Plus Test) strp Test blood sugar twice a day, E11.9 300 Strip 3    Accu-Chek Hannah Plus Meter misc USE TO CHECK BLOOD SUGAR 2  TIMES DAILY 1 Each 0    lancets (Accu-Chek Softclix Lancets) misc Use to obtain blood sample for diabetic testing three times a day 1 Package 11    furosemide (LASIX) 20 mg tablet TAKE 1 TABLET BY MOUTH ONCE DAILY 90 Tab 3    levothyroxine (synthroid) 25 mcg tablet take two tablets  Monday thru Friday and 1 tab Saturday And Sunday.       lisinopriL (PRINIVIL, ZESTRIL) 2.5 mg tablet TAKE 1 TABLET BY MOUTH ONCE DAILY 90 Tab 3    Januvia 100 mg tablet TAKE 1 TABLET BY MOUTH ONCE DAILY FOR DIABETES 90 Tab 3    pravastatin (PRAVACHOL) 40 mg tablet TAKE 1 TABLET BY MOUTH EVERY NIGHT 90 Tab 3    metoprolol succinate (TOPROL-XL) 25 mg XL tablet TAKE 1 TABLET EVERY DAY 90 Tab 3    glucose blood VI test strips (CONTOUR NEXT TEST STRIPS) strip USE TO CHECK BLOOD SUGAR TWICE DAILY 100 Strip 11    fluticasone propionate (FLONASE) 50 mcg/actuation nasal spray 2 Sprays by Both Nostrils route daily as needed for Rhinitis. 1 Bottle 6    multivit-minerals/folic acid (CENTRUM VITAMINTS PO) Take 1 tablet by mouth daily      cholecalciferol, vitamin D3, (VITAMIN D3) 2,000 unit tab Take 1 capsule by mouth daily      Aspirin, Buffered 81 mg tab Take 81 mg by mouth daily.          Allergies   Allergen Reactions    Demerol [Meperidine] Unknown (comments)     Pt unsure of reaction         Past Medical History:   Diagnosis Date    Anxiety     Arthritis     ASHD (arteriosclerotic heart disease)     Bilateral carotid artery stenosis     CAD (coronary artery disease)     Chronic pain     Depression     Diabetes (HCC)     TypeII    Diarrhea     Gastroparesis     GERD (gastroesophageal reflux disease)     History of cardioembolic cerebrovascular accident (CVA)     Hypercholesterolemia     Hypertension     S/P bypass gastrojejunostomy 1/23/2012    Thyroid disease          Past Surgical History:   Procedure Laterality Date    ABDOMEN SURGERY PROC UNLISTED      s/p partial gastrectomy 2/2 gastroparesis    COLONOSCOPY N/A 5/26/2016    COLONOSCOPY performed by Sophie Cannon MD at [facility] ENDOSCOPY    COLONOSCOPY N/A 10/16/2020    COLONOSCOPY performed by Dimitri Mccracken MD at [facility] AMBULATORY OR    EGD  7/13/2009    GASTROJEJUNOSTOMY      HX APPENDECTOMY      HX BLADDER SUSPENSION      HX CHOLECYSTECTOMY      HX CORONARY STENT PLACEMENT  06/2013    3 stents    HX GASTRIC BYPASS  6/02    HX HEART CATHETERIZATION  2017    stents remain open, no new stents placed    HX HYSTERECTOMY      HX LAPAROTOMY  8/02    2/2 intra abd abscess    HX LUMBAR LAMINECTOMY  01/2008    rods and screws    HX OOPHORECTOMY      HX ORTHOPAEDIC Bilateral     carpal tunnel    HX ORTHOPAEDIC Bilateral     bone spurs both feet  MO COLONOSCOPY FLX DX W/COLLJ SPEC WHEN PFRMD  5/03/2006    Dr. Dana Morris    MO COLONOSCOPY W/BIOPSY SINGLE/MULTIPLE  2/9/2012         MO EGD BALLOON DILATION ESOPHAGUS <30 MM DIAM  1/23/2012         MO EGD TRANSORAL BIOPSY SINGLE/MULTIPLE  7/11/2011         MO EGD TRANSORAL BIOPSY SINGLE/MULTIPLE  1/23/2012         UPPER GI ENDOSCOPY,BIOPSY  10/1/2019         UPPER GI ENDOSCOPY,BIOPSY  10/16/2020              Family History   Problem Relation Age of Onset    Heart Disease Mother     Hypertension Mother     Diabetes Father     Heart Disease Father     Hypertension Father     Alcohol abuse Brother     Heart Disease Sister     Hypertension Sister     Diabetes Sister     No Known Problems Sister     Alcohol abuse Brother     Alcohol abuse Brother     Diabetes Brother        Social History     Tobacco Use    Smoking status: Never Smoker    Smokeless tobacco: Never Used   Substance Use Topics    Alcohol use: No     Alcohol/week: 0.0 standard drinks     Lab Results   Component Value Date/Time    WBC 6.7 11/09/2020 01:14 PM    HGB 10.9 (L) 11/24/2020 03:11 AM    HCT 38.9 11/09/2020 01:14 PM    PLATELET 960 33/93/4342 01:14 PM    MCV 91.3 11/09/2020 01:14 PM     Lab Results   Component Value Date/Time    Cholesterol, total 135 09/29/2020 02:22 AM    HDL Cholesterol 43 09/29/2020 02:22 AM    LDL, calculated 64 09/29/2020 02:22 AM    LDL, calculated 77 01/27/2020 08:25 AM    Triglyceride 162 (H) 09/29/2020 02:22 AM    CHOL/HDL Ratio 3.5 01/05/2017 09:18 PM     Lab Results   Component Value Date/Time    TSH 0.888 09/29/2020 02:22 AM    T4, Free 1.77 02/08/2019 12:18 PM    T4, Total 5.6 11/17/2015 08:54 AM      Lab Results   Component Value Date/Time    Sodium 139 11/09/2020 01:14 PM    Potassium 4.3 11/09/2020 01:14 PM    Chloride 107 11/09/2020 01:14 PM    CO2 28 11/09/2020 01:14 PM    Anion gap 4 (L) 11/09/2020 01:14 PM    Glucose 206 (H) 11/09/2020 01:14 PM    BUN 11 11/09/2020 01:14 PM    Creatinine 0.92 11/09/2020 01:14 PM    BUN/Creatinine ratio 12 11/09/2020 01:14 PM    GFR est AA >60 11/09/2020 01:14 PM    GFR est non-AA >60 11/09/2020 01:14 PM    Calcium 9.4 11/09/2020 01:14 PM    Bilirubin, total 0.3 11/09/2020 01:14 PM    ALT (SGPT) 25 11/09/2020 01:14 PM    Alk. phosphatase 117 11/09/2020 01:14 PM    Protein, total 7.4 11/09/2020 01:14 PM    Albumin 4.1 11/09/2020 01:14 PM    Globulin 3.3 11/09/2020 01:14 PM    A-G Ratio 1.2 11/09/2020 01:14 PM      Lab Results   Component Value Date/Time    Hemoglobin A1c 7.4 (H) 11/09/2020 01:14 PM    Hemoglobin A1c (POC) 7.1 09/29/2020 02:22 PM             Review of Systems   Constitutional: Negative for malaise/fatigue.   HENT: Negative for congestion.    Eyes: Negative for blurred vision.   Respiratory: Negative for cough and shortness of breath.    Cardiovascular: Negative for chest pain, palpitations and leg swelling.   Gastrointestinal: Negative for abdominal pain, constipation and heartburn.   Genitourinary: Negative for dysuria, frequency and urgency.   Musculoskeletal: Negative for joint pain.   Neurological: Negative for dizziness, tingling and headaches.   Endo/Heme/Allergies: Negative for environmental allergies.   Psychiatric/Behavioral: Negative for depression. The patient does not have insomnia.        Physical Exam  Vitals signs and nursing note reviewed.   Constitutional:       Appearance: Normal appearance. She is well-developed.      Comments: /69 (BP 1 Location: Left upper arm, BP Patient Position: Supine)   Pulse 91   Temp 98 °F (36.7 °C) (Temporal)   Resp 16   Ht 5' 2\" (1.575 m)   Wt 159 lb 6.4 oz (72.3 kg)   SpO2 98%   BMI 29.15 kg/m²      HENT:      Right Ear: Tympanic membrane and ear canal normal.      Left Ear: Tympanic membrane and ear canal normal.      Nose: No mucosal edema or rhinorrhea.   Neck:      Musculoskeletal: Normal range of motion and neck supple.      Thyroid: No thyromegaly.      Vascular: No carotid bruit.  Cardiovascular:      Rate and Rhythm: Normal rate and regular rhythm. Heart sounds: Normal heart sounds. No gallop. Pulmonary:      Effort: Pulmonary effort is normal.      Breath sounds: Normal breath sounds. Abdominal:      General: Bowel sounds are normal.      Palpations: Abdomen is soft. There is no mass. Tenderness: There is no abdominal tenderness. Musculoskeletal: Normal range of motion. Right lower leg: No edema. Left lower leg: No edema. Lymphadenopathy:      Cervical: No cervical adenopathy. Skin:     General: Skin is warm and dry. Neurological:      General: No focal deficit present. Mental Status: She is alert and oriented to person, place, and time. Psychiatric:         Mood and Affect: Mood normal.       ASSESSMENT and PLAN  Diagnoses and all orders for this visit:    1. Type 2 diabetes with nephropathy (HCC)  a1c up to 9.5%  -     AMB POC HEMOGLOBIN A1C  -     AMB POC GLUCOSE, QUANTITATIVE, BLOOD        -     glimepiride (AMARYL) 1 mg tablet; Take 1 Tab by mouth Daily (before breakfast). Refer to [facility] to start ozempic    2. Mixed hyperlipidemia  -     LIPID PANEL    3. Coronary artery disease involving native coronary artery of native heart without angina pectoris  Stable     4. PAD (peripheral artery disease) (HCC)  Stable     5. Acquired hypothyroidism  -     TSH 3RD GENERATION    6. Gastroesophageal reflux disease without esophagitis  Stable     7.  Encounter for medication monitoring  -     METABOLIC PANEL, COMPREHENSIVE        reviewed diet, exercise and weight control  cardiovascular risk and specific lipid/LDL goals reviewed  reviewed medications and side effects in detail  specific diabetic recommendations: low cholesterol diet, weight control and daily exercise discussed, home glucose monitoring emphasized, all medications, side effects and compliance discussed carefully, foot care discussed and Podiatry visits discussed, annual eye examinations at Ophthalmology discussed and glycohemoglobin and other lab monitoring discussed     I have discussed diagnosis listed in this note with pt and/or family. I have discussed treatment plans and options and the risk/benefit analysis of those options, including safe use of medications and possible medication side effects. Through the use of shared decision making we have agreed to the above plan. The patient has received an after-visit summary and questions were answered concerning future plans and follow up. Advise pt of any urgent changes then to proceed to the ER.

## 2021-02-03 LAB
ALBUMIN SERPL-MCNC: 4.3 G/DL (ref 3.8–4.8)
ALBUMIN/GLOB SERPL: 2.2 {RATIO} (ref 1.2–2.2)
ALP SERPL-CCNC: 131 IU/L (ref 39–117)
ALT SERPL-CCNC: 19 IU/L (ref 0–32)
AST SERPL-CCNC: 23 IU/L (ref 0–40)
BILIRUB SERPL-MCNC: 0.3 MG/DL (ref 0–1.2)
BUN SERPL-MCNC: 9 MG/DL (ref 8–27)
BUN/CREAT SERPL: 10 (ref 12–28)
CALCIUM SERPL-MCNC: 9.5 MG/DL (ref 8.7–10.3)
CHLORIDE SERPL-SCNC: 100 MMOL/L (ref 96–106)
CHOLEST SERPL-MCNC: 198 MG/DL (ref 100–199)
CO2 SERPL-SCNC: 25 MMOL/L (ref 20–29)
CREAT SERPL-MCNC: 0.9 MG/DL (ref 0.57–1)
GLOBULIN SER CALC-MCNC: 2 G/DL (ref 1.5–4.5)
GLUCOSE SERPL-MCNC: 284 MG/DL (ref 65–99)
HDLC SERPL-MCNC: 52 MG/DL
INTERPRETATION, 910389: NORMAL
LDLC SERPL CALC-MCNC: 101 MG/DL (ref 0–99)
POTASSIUM SERPL-SCNC: 4.6 MMOL/L (ref 3.5–5.2)
PROT SERPL-MCNC: 6.3 G/DL (ref 6–8.5)
SODIUM SERPL-SCNC: 141 MMOL/L (ref 134–144)
TRIGL SERPL-MCNC: 269 MG/DL (ref 0–149)
TSH SERPL DL<=0.005 MIU/L-ACNC: 7.48 UIU/ML (ref 0.45–4.5)
VLDLC SERPL CALC-MCNC: 45 MG/DL (ref 5–40)

## 2021-02-04 ENCOUNTER — TELEPHONE (OUTPATIENT)
Dept: FAMILY MEDICINE CLINIC | Age: 71
End: 2021-02-04

## 2021-02-10 ENCOUNTER — OFFICE VISIT (OUTPATIENT)
Dept: FAMILY MEDICINE CLINIC | Age: 71
End: 2021-02-10

## 2021-02-10 DIAGNOSIS — E03.9 ACQUIRED HYPOTHYROIDISM: ICD-10-CM

## 2021-02-10 DIAGNOSIS — E11.21 TYPE 2 DIABETES WITH NEPHROPATHY (HCC): Primary | ICD-10-CM

## 2021-02-10 RX ORDER — SEMAGLUTIDE 1.34 MG/ML
INJECTION, SOLUTION SUBCUTANEOUS
Qty: 1 BOX | Refills: 5 | Status: SHIPPED | OUTPATIENT
Start: 2021-02-10 | End: 2021-04-02 | Stop reason: DRUGHIGH

## 2021-02-10 NOTE — PROGRESS NOTES
Pt referred by Dr. Frankie Woods for ozempic initiation and teaching. Last A1c up to 9.5% from 7.4%  Currently on glimepiride and januvia. Before visit, Sent order to pharmacy to check price - is $9.99 so that's great. Reviewed medications. Doesn't want to bee on alprazolam anymore - is taking 1/2 tablet in the AM and 1 tablet at night currently  States did not get this in the hospital and did fine   In hospital took something that helped her sleep; ? Muscle relaxer - can't figure out   She seems concerned now about this as a family member had issues stopping it and she doesn't want to be \"hooked on a medication\"    Stopped levothyroxine after hospital for surgery in November - felt like she was on too many medications  States before stopping, she was taking one tablet daily and none on Sat / Sun vs. How it's noted on her med list (2 tabs Mon through Fr and 1 on Sat / Sun)    Reviewed ozempic with patient using a sample pen - feels like she won't have issues with injecting as she used to be on insulin and used pens. She is okay paying the $9.99 at the pharmacy but would like a mail order Rx to be sent to pharmacy for next fill for 3 months. Reviewed Ozempic in detail, how to inject, store, attach pen needle and dispose of pen needle, use on needle per injection , injection sites, rotation of sites, once weekly - pick day - what to do if miss day, potential side effects and what to do, holding pen in site after injecting to make sure medication is fully injected, expected effects, dose titration, amount of medication in pen, how the medication works, documenting doses given on ozempic box. Patient able to demonstrate on sample pen appropriate use. Reviewed patient's labs and saw that TSH elevated - advised pt to start back taking levothryoxine and discussed why this is an important medication. She will restart as she was taking before - will inform PCP so can follow up on this.     Consider stopping januvia if patient tolerates ozempic and blood sugars / A1c start to improve. Advised patient to discuss concerns regarding alprazolam with PCP next visit. Sees her on 3/3/21. Re refer as indicated / desired. Patient verbalized understanding of information presented. Answered all of the patient's questions. Jay Casanova, PHARMD, [facility]    CLINICAL PHARMACY CONSULT: MED RECONCILIATION/REVIEW ADDENDUM    For Pharmacy Admin Tracking Only    PHSO: PHSO Patient?: Yes  Total # of Interventions Recommended: Count: 2  - New Order #: 1 New Medication Order Reason(s): Needs Additional Medication Therapy  - Updated Order #: 1 Updated Order Reason(s):  Other  Total Interventions Accepted: 2  Time Spent (min): 60

## 2021-02-10 NOTE — Clinical Note
Did well with ozempic teaching, but wasn't taking levothyroxine before last TSH (saw elevated) - asked her to start back on it, but she says she was taking 25 mcg daily except none on Sa/Mccartney vs. How it was on the Rx (2 tabs daily except 1 tab on Sa/Mccartney) - told her to go back to what she was taking - she sees you on 3/3

## 2021-02-15 ENCOUNTER — TELEPHONE (OUTPATIENT)
Dept: FAMILY MEDICINE CLINIC | Age: 71
End: 2021-02-15

## 2021-02-15 RX ORDER — LEVOTHYROXINE SODIUM 25 UG/1
25 TABLET ORAL
COMMUNITY
Start: 2021-02-15 | End: 2021-02-15

## 2021-03-02 ENCOUNTER — OFFICE VISIT (OUTPATIENT)
Dept: FAMILY MEDICINE CLINIC | Age: 71
End: 2021-03-02
Payer: MEDICARE

## 2021-03-02 VITALS
TEMPERATURE: 97.7 F | SYSTOLIC BLOOD PRESSURE: 129 MMHG | BODY MASS INDEX: 30.07 KG/M2 | WEIGHT: 163.4 LBS | HEIGHT: 62 IN | OXYGEN SATURATION: 100 % | HEART RATE: 81 BPM | DIASTOLIC BLOOD PRESSURE: 69 MMHG | RESPIRATION RATE: 16 BRPM

## 2021-03-02 DIAGNOSIS — Z51.81 ENCOUNTER FOR MEDICATION MONITORING: ICD-10-CM

## 2021-03-02 DIAGNOSIS — E11.21 TYPE 2 DIABETES WITH NEPHROPATHY (HCC): Primary | ICD-10-CM

## 2021-03-02 LAB
BILIRUB UR QL STRIP: NEGATIVE
GLUCOSE POC: 116 MG/DL
GLUCOSE UR-MCNC: NEGATIVE MG/DL
KETONES P FAST UR STRIP-MCNC: NEGATIVE MG/DL
PH UR STRIP: 5.5 [PH] (ref 4.6–8)
PROT UR QL STRIP: NEGATIVE
SP GR UR STRIP: 1.01 (ref 1–1.03)
UA UROBILINOGEN AMB POC: NORMAL (ref 0.2–1)
URINALYSIS CLARITY POC: CLEAR
URINALYSIS COLOR POC: YELLOW
URINE BLOOD POC: NEGATIVE
URINE LEUKOCYTES POC: NEGATIVE
URINE NITRITES POC: NEGATIVE

## 2021-03-02 PROCEDURE — G8536 NO DOC ELDER MAL SCRN: HCPCS | Performed by: FAMILY MEDICINE

## 2021-03-02 PROCEDURE — 1090F PRES/ABSN URINE INCON ASSESS: CPT | Performed by: FAMILY MEDICINE

## 2021-03-02 PROCEDURE — 3046F HEMOGLOBIN A1C LEVEL >9.0%: CPT | Performed by: FAMILY MEDICINE

## 2021-03-02 PROCEDURE — G9717 DOC PT DX DEP/BP F/U NT REQ: HCPCS | Performed by: FAMILY MEDICINE

## 2021-03-02 PROCEDURE — G9899 SCRN MAM PERF RSLTS DOC: HCPCS | Performed by: FAMILY MEDICINE

## 2021-03-02 PROCEDURE — 99212 OFFICE O/P EST SF 10 MIN: CPT | Performed by: FAMILY MEDICINE

## 2021-03-02 PROCEDURE — 81001 URINALYSIS AUTO W/SCOPE: CPT | Performed by: FAMILY MEDICINE

## 2021-03-02 PROCEDURE — G8427 DOCREV CUR MEDS BY ELIG CLIN: HCPCS | Performed by: FAMILY MEDICINE

## 2021-03-02 PROCEDURE — 3017F COLORECTAL CA SCREEN DOC REV: CPT | Performed by: FAMILY MEDICINE

## 2021-03-02 PROCEDURE — G8752 SYS BP LESS 140: HCPCS | Performed by: FAMILY MEDICINE

## 2021-03-02 PROCEDURE — 1101F PT FALLS ASSESS-DOCD LE1/YR: CPT | Performed by: FAMILY MEDICINE

## 2021-03-02 PROCEDURE — G8399 PT W/DXA RESULTS DOCUMENT: HCPCS | Performed by: FAMILY MEDICINE

## 2021-03-02 PROCEDURE — G8754 DIAS BP LESS 90: HCPCS | Performed by: FAMILY MEDICINE

## 2021-03-02 PROCEDURE — G8419 CALC BMI OUT NRM PARAM NOF/U: HCPCS | Performed by: FAMILY MEDICINE

## 2021-03-02 PROCEDURE — 82947 ASSAY GLUCOSE BLOOD QUANT: CPT | Performed by: FAMILY MEDICINE

## 2021-03-02 PROCEDURE — 2022F DILAT RTA XM EVC RTNOPTHY: CPT | Performed by: FAMILY MEDICINE

## 2021-03-02 NOTE — PROGRESS NOTES
Chief Complaint   Patient presents with    Medication Evaluation     1 month follow up on Ozempic         A1C 2/21/2021    Microalbumin 1/27/2020    Mammogram 2/20/2020    Bone density 2/20/2020    Colonoscopy 10/16/2020 by Dr sOcar Thrasher- repeat in 5 years    Patient states she had an eye exam 2/2020 but can not remember name of MD.      1. Have you been to the ER, urgent care clinic since your last visit? Hospitalized since your last visit? No    2. Have you seen or consulted any other health care providers outside of the [address] [place] [address] since your last visit? Include any pap smears or colon screening.  No

## 2021-03-02 NOTE — PROGRESS NOTES
HISTORY OF PRESENT ILLNESS  Cary Escalona is a 78 y.o. female. HPI   Follow up on blood sugar. She has started on Ozempic and doing well. BS have been in the low 100s. She has not any any side effects related to the medication. DM type II follow up:  Compliant w/ meds, diabetic diet, and exercise. Has been watching her diet. Checks BS BID on most days and prn. Pt does not have BS log at visit today. No Rf needed for today. Denies any tingling sensation. She has been drinking more water and has been urinating more often. No blurred vision. No significant weight changes. Patient Active Problem List   Diagnosis Code    Anxiety F41.9    Depression F32.9    Hypovitaminosis D E55.9    GERD (gastroesophageal reflux disease) K21.9    Edema R60.9    Esophageal motor disorder K22.4    S/P bypass gastrojejunostomy Z98.0    SOB (shortness of breath) R06.02    CAD (coronary artery disease) I25.10    Angina pectoris (HCC) I20.9    S/P PTCA (percutaneous transluminal coronary angioplasty) Z98.61    Mixed hyperlipidemia E78.2    Diarrhea R19.7    Type 2 diabetes mellitus without complication (HCC) P78.4    Osteopenia M85.80    Encounter for medication monitoring Z51.81    CVA (cerebral vascular accident) ([facility] [address] U49.6    Complicated migraine S27. 109    Numbness and tingling of right side of face R20.0, R20.2    Stenosis of both internal carotid arteries I65.23    Type 2 diabetes with nephropathy (HCC) E11.21    Essential hypertension I10    Bilateral carotid artery stenosis I65.23    Spinal stenosis of lumbar region at multiple levels M48.061       Current Outpatient Medications   Medication Sig Dispense Refill    levothyroxine (SYNTHROID) 25 mcg tablet Take 1 Tab by mouth Daily (before breakfast). 90 Tab 1    semaglutide (Ozempic) 0.25 mg/0.2 mL (2 mg/1.5 mL) sub-q pen Inject 0.25 mg once weekly for four weeks, then increase to 0.5 mg once weekly thereafter.  1 Box 5    acetaminophen (TYLENOL) 500 mg tablet Take 1,000 mg by mouth every six (6) hours as needed.  glimepiride (AMARYL) 1 mg tablet Take 1 Tab by mouth Daily (before breakfast). 30 Tab 3    pregabalin (LYRICA) 100 mg capsule Take 100 mg by mouth three (3) times daily.  ALPRAZolam (XANAX) 1 mg tablet TAKE 1/2 TABLET BY MOUTH EVERY DAY AS NEEDED FOR ANXIETY THEN TAKE 1 TABLET BY MOUTH AT BEDTIME AS NEEDED FOR SLEEP 135 Tab 0    Blood Sugar Diagnostic, Drum (Accu-Chek Compact Plus Test) strp Test blood sugar twice a day, E11.9 300 Strip 3    Accu-Chek Hannah Plus Meter misc USE TO CHECK BLOOD SUGAR 2  TIMES DAILY 1 Each 0    lancets (Accu-Chek Softclix Lancets) misc Use to obtain blood sample for diabetic testing three times a day 1 Package 11    furosemide (LASIX) 20 mg tablet TAKE 1 TABLET BY MOUTH ONCE DAILY 90 Tab 3    lisinopriL (PRINIVIL, ZESTRIL) 2.5 mg tablet TAKE 1 TABLET BY MOUTH ONCE DAILY 90 Tab 3    Januvia 100 mg tablet TAKE 1 TABLET BY MOUTH ONCE DAILY FOR DIABETES 90 Tab 3    pravastatin (PRAVACHOL) 40 mg tablet TAKE 1 TABLET BY MOUTH EVERY NIGHT 90 Tab 3    metoprolol succinate (TOPROL-XL) 25 mg XL tablet TAKE 1 TABLET EVERY DAY 90 Tab 3    glucose blood VI test strips (CONTOUR NEXT TEST STRIPS) strip USE TO CHECK BLOOD SUGAR TWICE DAILY 100 Strip 11    fluticasone propionate (FLONASE) 50 mcg/actuation nasal spray 2 Sprays by Both Nostrils route daily as needed for Rhinitis. 1 Bottle 6    multivit-minerals/folic acid (CENTRUM VITAMINTS PO) Take 1 tablet by mouth daily      cholecalciferol, vitamin D3, (VITAMIN D3) 2,000 unit tab Take 2 capsule by mouth daily      Aspirin, Buffered 81 mg tab Take 81 mg by mouth daily.          Allergies   Allergen Reactions    Demerol [Meperidine] Unknown (comments)     Pt unsure of reaction       Past Medical History:   Diagnosis Date    Anxiety     Arthritis     ASHD (arteriosclerotic heart disease)     Bilateral carotid artery stenosis     CAD (coronary artery disease)    • Chronic pain    • Depression    • Diabetes (HCC)     TypeII   • Diarrhea    • Gastroparesis    • GERD (gastroesophageal reflux disease)    • History of cardioembolic cerebrovascular accident (CVA)    • Hypercholesterolemia    • Hypertension    • S/P bypass gastrojejunostomy 1/23/2012   • Thyroid disease        Past Surgical History:   Procedure Laterality Date   • COLONOSCOPY N/A 5/26/2016    COLONOSCOPY performed by Blade Stevenson MD at [facility] ENDOSCOPY   • COLONOSCOPY N/A 10/16/2020    COLONOSCOPY performed by Blade Stevenson MD at [facility] AMBULATORY OR   • EGD  7/13/2009   • HX APPENDECTOMY     • HX BLADDER SUSPENSION     • HX CHOLECYSTECTOMY     • HX CORONARY STENT PLACEMENT  06/2013    3 stents   • HX GASTRIC BYPASS  6/02   • HX HEART CATHETERIZATION  2017    stents remain open, no new stents placed   • HX HYSTERECTOMY     • HX LAPAROTOMY  8/02    2/2 intra abd abscess   • HX LUMBAR LAMINECTOMY  01/2008    rods and screws   • HX OOPHORECTOMY     • HX ORTHOPAEDIC Bilateral     carpal tunnel   • HX ORTHOPAEDIC Bilateral     bone spurs both feet   • OR ABDOMEN SURGERY PROC UNLISTED      s/p partial gastrectomy 2/2 gastroparesis   • OR COLONOSCOPY FLX DX W/COLLJ SPEC WHEN PFRMD  5/03/2006    Dr. Stevenson   • OR COLONOSCOPY W/BIOPSY SINGLE/MULTIPLE  2/9/2012        • OR EGD BALLOON DILATION ESOPHAGUS <30 MM DIAM  1/23/2012        • OR EGD TRANSORAL BIOPSY SINGLE/MULTIPLE  7/11/2011        • OR EGD TRANSORAL BIOPSY SINGLE/MULTIPLE  1/23/2012        • OR GASTROJEJUNOSTOMY     • UPPER GI ENDOSCOPY,BIOPSY  10/1/2019        • UPPER GI ENDOSCOPY,BIOPSY  10/16/2020            Family History   Problem Relation Age of Onset   • Heart Disease Mother    • Hypertension Mother    • Diabetes Father    • Heart Disease Father    • Hypertension Father    • Alcohol abuse Brother    • Heart Disease Sister    • Hypertension Sister    • Diabetes Sister    • No Known Problems Sister    • Alcohol abuse Brother    • Alcohol abuse  Brother     Diabetes Brother        Social History     Tobacco Use    Smoking status: Never Smoker    Smokeless tobacco: Never Used   Substance Use Topics    Alcohol use: No     Alcohol/week: 0.0 standard drinks        Lab Results   Component Value Date/Time    WBC 6.7 11/09/2020 01:14 PM    HGB 10.9 (L) 11/24/2020 03:11 AM    HCT 38.9 11/09/2020 01:14 PM    PLATELET 576 06/40/5793 01:14 PM    MCV 91.3 11/09/2020 01:14 PM     Lab Results   Component Value Date/Time    Cholesterol, total 198 02/02/2021 10:00 AM    HDL Cholesterol 52 02/02/2021 10:00 AM    LDL, calculated 101 (H) 02/02/2021 10:00 AM    LDL, calculated 77 01/27/2020 08:25 AM    Triglyceride 269 (H) 02/02/2021 10:00 AM    CHOL/HDL Ratio 3.5 01/05/2017 09:18 PM     Lab Results   Component Value Date/Time    TSH 7.480 (H) 02/02/2021 10:00 AM    T4, Free 1.77 02/08/2019 12:18 PM    T4, Total 5.6 11/17/2015 08:54 AM      Lab Results   Component Value Date/Time    Sodium 141 02/02/2021 10:00 AM    Potassium 4.6 02/02/2021 10:00 AM    Chloride 100 02/02/2021 10:00 AM    CO2 25 02/02/2021 10:00 AM    Anion gap 4 (L) 11/09/2020 01:14 PM    Glucose 284 (H) 02/02/2021 10:00 AM    BUN 9 02/02/2021 10:00 AM    Creatinine 0.90 02/02/2021 10:00 AM    BUN/Creatinine ratio 10 (L) 02/02/2021 10:00 AM    GFR est AA 76 02/02/2021 10:00 AM    GFR est non-AA 66 02/02/2021 10:00 AM    Calcium 9.5 02/02/2021 10:00 AM    Bilirubin, total 0.3 02/02/2021 10:00 AM    ALT (SGPT) 19 02/02/2021 10:00 AM    Alk. phosphatase 131 (H) 02/02/2021 10:00 AM    Protein, total 6.3 02/02/2021 10:00 AM    Albumin 4.3 02/02/2021 10:00 AM    Globulin 3.3 11/09/2020 01:14 PM    A-G Ratio 2.2 02/02/2021 10:00 AM      Lab Results   Component Value Date/Time    Hemoglobin A1c 7.4 (H) 11/09/2020 01:14 PM    Hemoglobin A1c (POC) 9.5 02/02/2021 10:00 AM         Review of Systems   Constitutional: Negative for malaise/fatigue. HENT: Negative for congestion. Eyes: Negative for blurred vision. Respiratory: Negative for cough and shortness of breath. Cardiovascular: Negative for chest pain, palpitations and leg swelling. Gastrointestinal: Negative for abdominal pain, constipation and heartburn. Genitourinary: Negative for dysuria, frequency and urgency. Musculoskeletal: Negative for back pain and joint pain. Neurological: Negative for dizziness, tingling and headaches. Endo/Heme/Allergies: Negative for environmental allergies. Psychiatric/Behavioral: Negative for depression. The patient does not have insomnia. Physical Exam  Vitals signs and nursing note reviewed. Constitutional:       Appearance: Normal appearance. She is well-developed. Comments: /69 (BP 1 Location: Left arm, BP Patient Position: Sitting)   Pulse 81   Temp 97.7 °F (36.5 °C) (Temporal)   Resp 16   Ht 5' 2\" (1.575 m)   Wt 163 lb 6.4 oz (74.1 kg)   SpO2 100%   BMI 29.89 kg/m²      Neck:      Thyroid: No thyromegaly. Cardiovascular:      Rate and Rhythm: Normal rate and regular rhythm. Heart sounds: Normal heart sounds. No gallop. Pulmonary:      Effort: Pulmonary effort is normal.      Breath sounds: Normal breath sounds. Abdominal:      General: Abdomen is flat. Bowel sounds are normal.      Tenderness: There is no abdominal tenderness. Musculoskeletal:      Right lower leg: Edema (mild) present. Left lower leg: Edema (mild) present. Neurological:      Mental Status: She is alert. ASSESSMENT and PLAN  Diagnoses and all orders for this visit:    1. Type 2 diabetes with nephropathy (HCC)  -     MICROALBUMIN, UR, RAND W/ MICROALB/CREAT RATIO  -     AMB POC URINALYSIS DIP STICK AUTO W/ MICRO  -     AMB POC GLUCOSE, QUANTITATIVE, BLOOD  Continue on current regimen. 2. Encounter for medication monitoring  -     METABOLIC PANEL, BASIC      Follow-up and Dispositions    · Return in about 2 months (around 5/2/2021).        current treatment plan is effective, no change in therapy  reviewed diet, exercise and weight control  reviewed medications and side effects in detail  specific diabetic recommendations: low cholesterol diet, weight control and daily exercise discussed, home glucose monitoring emphasized, all medications, side effects and compliance discussed carefully and glycohemoglobin and other lab monitoring discussed     I have discussed diagnosis listed in this note with pt and/or family. I have discussed treatment plans and options and the risk/benefit analysis of those options, including safe use of medications and possible medication side effects. Through the use of shared decision making we have agreed to the above plan. The patient has received an after-visit summary and questions were answered concerning future plans and follow up. Advise pt of any urgent changes then to proceed to the ER.

## 2021-03-03 LAB
ALBUMIN/CREAT UR: <8 MG/G CREAT (ref 0–29)
BUN SERPL-MCNC: 9 MG/DL (ref 8–27)
BUN/CREAT SERPL: 12 (ref 12–28)
CALCIUM SERPL-MCNC: 9.9 MG/DL (ref 8.7–10.3)
CHLORIDE SERPL-SCNC: 107 MMOL/L (ref 96–106)
CO2 SERPL-SCNC: 18 MMOL/L (ref 20–29)
CREAT SERPL-MCNC: 0.75 MG/DL (ref 0.57–1)
CREAT UR-MCNC: 37.8 MG/DL
GLUCOSE SERPL-MCNC: 111 MG/DL (ref 65–99)
MICROALBUMIN UR-MCNC: <3 UG/ML
POTASSIUM SERPL-SCNC: 4.8 MMOL/L (ref 3.5–5.2)
SODIUM SERPL-SCNC: 144 MMOL/L (ref 134–144)

## 2021-03-19 DIAGNOSIS — F41.9 ANXIETY: ICD-10-CM

## 2021-03-19 RX ORDER — ALPRAZOLAM 1 MG/1
TABLET ORAL
Qty: 135 TAB | Refills: 0 | Status: SHIPPED | OUTPATIENT
Start: 2021-03-19 | End: 2021-08-02 | Stop reason: SDUPTHER

## 2021-04-02 ENCOUNTER — OFFICE VISIT (OUTPATIENT)
Dept: FAMILY MEDICINE CLINIC | Age: 71
End: 2021-04-02
Payer: MEDICARE

## 2021-04-02 VITALS
DIASTOLIC BLOOD PRESSURE: 62 MMHG | HEIGHT: 62 IN | TEMPERATURE: 98 F | WEIGHT: 161.6 LBS | SYSTOLIC BLOOD PRESSURE: 110 MMHG | OXYGEN SATURATION: 99 % | HEART RATE: 89 BPM | BODY MASS INDEX: 29.74 KG/M2 | RESPIRATION RATE: 16 BRPM

## 2021-04-02 DIAGNOSIS — E78.2 MIXED HYPERLIPIDEMIA: ICD-10-CM

## 2021-04-02 DIAGNOSIS — Z91.09 ENVIRONMENTAL ALLERGIES: ICD-10-CM

## 2021-04-02 DIAGNOSIS — Z00.00 MEDICARE ANNUAL WELLNESS VISIT, SUBSEQUENT: Primary | ICD-10-CM

## 2021-04-02 DIAGNOSIS — E03.9 ACQUIRED HYPOTHYROIDISM: ICD-10-CM

## 2021-04-02 DIAGNOSIS — I73.9 PAD (PERIPHERAL ARTERY DISEASE) (HCC): ICD-10-CM

## 2021-04-02 DIAGNOSIS — E11.21 TYPE 2 DIABETES WITH NEPHROPATHY (HCC): ICD-10-CM

## 2021-04-02 DIAGNOSIS — Z51.81 ENCOUNTER FOR MEDICATION MONITORING: ICD-10-CM

## 2021-04-02 DIAGNOSIS — I25.10 CORONARY ARTERY DISEASE INVOLVING NATIVE CORONARY ARTERY OF NATIVE HEART WITHOUT ANGINA PECTORIS: ICD-10-CM

## 2021-04-02 LAB — GLUCOSE POC: 140 MG/DL

## 2021-04-02 PROCEDURE — G8752 SYS BP LESS 140: HCPCS | Performed by: FAMILY MEDICINE

## 2021-04-02 PROCEDURE — G8754 DIAS BP LESS 90: HCPCS | Performed by: FAMILY MEDICINE

## 2021-04-02 PROCEDURE — 3046F HEMOGLOBIN A1C LEVEL >9.0%: CPT | Performed by: FAMILY MEDICINE

## 2021-04-02 PROCEDURE — 3017F COLORECTAL CA SCREEN DOC REV: CPT | Performed by: FAMILY MEDICINE

## 2021-04-02 PROCEDURE — 2022F DILAT RTA XM EVC RTNOPTHY: CPT | Performed by: FAMILY MEDICINE

## 2021-04-02 PROCEDURE — G0439 PPPS, SUBSEQ VISIT: HCPCS | Performed by: FAMILY MEDICINE

## 2021-04-02 PROCEDURE — G9899 SCRN MAM PERF RSLTS DOC: HCPCS | Performed by: FAMILY MEDICINE

## 2021-04-02 PROCEDURE — 82947 ASSAY GLUCOSE BLOOD QUANT: CPT | Performed by: FAMILY MEDICINE

## 2021-04-02 PROCEDURE — G8536 NO DOC ELDER MAL SCRN: HCPCS | Performed by: FAMILY MEDICINE

## 2021-04-02 PROCEDURE — 1090F PRES/ABSN URINE INCON ASSESS: CPT | Performed by: FAMILY MEDICINE

## 2021-04-02 PROCEDURE — G8399 PT W/DXA RESULTS DOCUMENT: HCPCS | Performed by: FAMILY MEDICINE

## 2021-04-02 PROCEDURE — 99214 OFFICE O/P EST MOD 30 MIN: CPT | Performed by: FAMILY MEDICINE

## 2021-04-02 PROCEDURE — G8419 CALC BMI OUT NRM PARAM NOF/U: HCPCS | Performed by: FAMILY MEDICINE

## 2021-04-02 PROCEDURE — 1101F PT FALLS ASSESS-DOCD LE1/YR: CPT | Performed by: FAMILY MEDICINE

## 2021-04-02 PROCEDURE — G9717 DOC PT DX DEP/BP F/U NT REQ: HCPCS | Performed by: FAMILY MEDICINE

## 2021-04-02 PROCEDURE — G8427 DOCREV CUR MEDS BY ELIG CLIN: HCPCS | Performed by: FAMILY MEDICINE

## 2021-04-02 RX ORDER — SEMAGLUTIDE 1.34 MG/ML
0.5 INJECTION, SOLUTION SUBCUTANEOUS
COMMUNITY
End: 2021-04-02 | Stop reason: SDUPTHER

## 2021-04-02 RX ORDER — FLUTICASONE PROPIONATE 50 MCG
2 SPRAY, SUSPENSION (ML) NASAL
Qty: 3 BOTTLE | Refills: 3 | Status: SHIPPED | OUTPATIENT
Start: 2021-04-02 | End: 2022-04-05

## 2021-04-02 RX ORDER — LISINOPRIL 2.5 MG/1
2.5 TABLET ORAL DAILY
Qty: 90 TAB | Refills: 3 | Status: SHIPPED | OUTPATIENT
Start: 2021-04-02 | End: 2021-10-27

## 2021-04-02 RX ORDER — FUROSEMIDE 20 MG/1
TABLET ORAL
Qty: 90 TAB | Refills: 3 | Status: SHIPPED | OUTPATIENT
Start: 2021-04-02 | End: 2021-05-24

## 2021-04-02 RX ORDER — PRAVASTATIN SODIUM 40 MG/1
40 TABLET ORAL
Qty: 90 TAB | Refills: 3 | Status: SHIPPED | OUTPATIENT
Start: 2021-04-02 | End: 2021-05-24

## 2021-04-02 RX ORDER — METOPROLOL SUCCINATE 25 MG/1
TABLET, EXTENDED RELEASE ORAL
Qty: 90 TAB | Refills: 3 | Status: SHIPPED | OUTPATIENT
Start: 2021-04-02 | End: 2021-04-12

## 2021-04-02 RX ORDER — SEMAGLUTIDE 1.34 MG/ML
0.5 INJECTION, SOLUTION SUBCUTANEOUS
Qty: 3 BOX | Refills: 3 | Status: SHIPPED | OUTPATIENT
Start: 2021-04-02 | End: 2021-12-20

## 2021-04-02 RX ORDER — GLIMEPIRIDE 1 MG/1
1 TABLET ORAL
Qty: 90 TAB | Refills: 3 | Status: SHIPPED | OUTPATIENT
Start: 2021-04-02 | End: 2021-06-11 | Stop reason: ALTCHOICE

## 2021-04-02 NOTE — PROGRESS NOTES
This is a Subsequent Medicare Annual Wellness Exam (AWV) (Performed 12 months after IPPE or effective date of Medicare Part B enrollment)    I have reviewed the patient's medical history in detail and updated the computerized patient record. History     Patient Active Problem List   Diagnosis Code    Anxiety F41.9    Depression F32.9    Hypovitaminosis D E55.9    GERD (gastroesophageal reflux disease) K21.9    Edema R60.9    Esophageal motor disorder K22.4    S/P bypass gastrojejunostomy Z98.0    SOB (shortness of breath) R06.02    CAD (coronary artery disease) I25.10    Angina pectoris (HCC) I20.9    S/P PTCA (percutaneous transluminal coronary angioplasty) Z98.61    Mixed hyperlipidemia E78.2    Diarrhea R19.7    Type 2 diabetes mellitus without complication ([facility] [address] A26.6    Osteopenia M85.80    Encounter for medication monitoring Z51.81    CVA (cerebral vascular accident) ([facility] [address] R57.2    Complicated migraine X17. 109    Numbness and tingling of right side of face R20.0, R20.2    Stenosis of both internal carotid arteries I65.23    Type 2 diabetes with nephropathy (HCC) E11.21    Essential hypertension I10    Bilateral carotid artery stenosis I65.23    Spinal stenosis of lumbar region at multiple levels M48.061       Current Outpatient Medications   Medication Sig Dispense Refill    ALPRAZolam (XANAX) 1 mg tablet TAKE 1/2 TABLET BY MOUTH EVERY DAY AS NEEDED THEN TAKE 1 TABLET BY MOUTH AT BEDTIME AS NEEDED FOR ANXIETY OR SLEEP 135 Tab 0    levothyroxine (SYNTHROID) 25 mcg tablet Take 1 Tab by mouth Daily (before breakfast). 90 Tab 1    semaglutide (Ozempic) 0.25 mg/0.2 mL (2 mg/1.5 mL) sub-q pen Inject 0.25 mg once weekly for four weeks, then increase to 0.5 mg once weekly thereafter. 1 Box 5    glimepiride (AMARYL) 1 mg tablet Take 1 Tab by mouth Daily (before breakfast).  30 Tab 3    Blood Sugar Diagnostic, Drum (Accu-Chek Compact Plus Test) strp Test blood sugar twice a day, E11.9 300 Strip 3    Accu-Chek Hannah Plus Meter misc USE TO CHECK BLOOD SUGAR 2  TIMES DAILY 1 Each 0    lancets (Accu-Chek Softclix Lancets) misc Use to obtain blood sample for diabetic testing three times a day 1 Package 11    furosemide (LASIX) 20 mg tablet TAKE 1 TABLET BY MOUTH ONCE DAILY 90 Tab 3    lisinopriL (PRINIVIL, ZESTRIL) 2.5 mg tablet TAKE 1 TABLET BY MOUTH ONCE DAILY 90 Tab 3    Januvia 100 mg tablet TAKE 1 TABLET BY MOUTH ONCE DAILY FOR DIABETES 90 Tab 3    pravastatin (PRAVACHOL) 40 mg tablet TAKE 1 TABLET BY MOUTH EVERY NIGHT 90 Tab 3    metoprolol succinate (TOPROL-XL) 25 mg XL tablet TAKE 1 TABLET EVERY DAY 90 Tab 3    glucose blood VI test strips (CONTOUR NEXT TEST STRIPS) strip USE TO CHECK BLOOD SUGAR TWICE DAILY 100 Strip 11    fluticasone propionate (FLONASE) 50 mcg/actuation nasal spray 2 Sprays by Both Nostrils route daily as needed for Rhinitis. 1 Bottle 6    multivit-minerals/folic acid (CENTRUM VITAMINTS PO) Take 1 tablet by mouth daily      cholecalciferol, vitamin D3, (VITAMIN D3) 2,000 unit tab Take 2 capsule by mouth daily      Aspirin, Buffered 81 mg tab Take 81 mg by mouth daily.          Allergies   Allergen Reactions    Demerol [Meperidine] Unknown (comments)     Pt unsure of reaction       Past Medical History:   Diagnosis Date    Anxiety     Arthritis     ASHD (arteriosclerotic heart disease)     Bilateral carotid artery stenosis     CAD (coronary artery disease)     Chronic pain     Depression     Diabetes (HCC)     TypeII    Diarrhea     Gastroparesis     GERD (gastroesophageal reflux disease)     History of cardioembolic cerebrovascular accident (CVA)     Hypercholesterolemia     Hypertension     S/P bypass gastrojejunostomy 1/23/2012    Thyroid disease        Past Surgical History:   Procedure Laterality Date    COLONOSCOPY N/A 5/26/2016    COL[facility] performed by [facility] ENDOSCOPY    COLONOSCOPY N/A 10/16/2020    COLONOSCOPY performed by Tatiana Ortez MD at [facility] AMBULATORY OR    EGD  7/13/2009    HX APPENDECTOMY      HX BLADDER SUSPENSION      HX CHOLECYSTECTOMY      HX CORONARY STENT PLACEMENT  06/2013    3 stents    HX GASTRIC BYPASS  6/02    HX HEART CATHETERIZATION  2017    stents remain open, no new stents placed    HX HYSTERECTOMY      HX LAPAROTOMY  8/02    2/2 intra abd abscess    HX LUMBAR LAMINECTOMY  01/2008    rods and screws    HX OOPHORECTOMY      HX ORTHOPAEDIC Bilateral     carpal tunnel    HX ORTHOPAEDIC Bilateral     bone spurs both feet    TN ABDOMEN SURGERY PROC UNLISTED      s/p partial gastrectomy 2/2 gastroparesis    TN COLONOSCOPY FLX DX W/COLLJ SPEC WHEN PFRMD  5/03/2006    Dr. Mayes Or  2/9/2012         TN EGD BALLOON DILATION ESOPHAGUS <30 MM DIAM  1/23/2012         TN EGD TRANSORAL BIOPSY SINGLE/MULTIPLE  7/11/2011         TN EGD TRANSORAL BIOPSY SINGLE/MULTIPLE  1/23/2012         TN GASTROJEJUNOSTOMY      UPPER GI ENDOSCOPY,BIOPSY  10/1/2019         UPPER GI ENDOSCOPY,BIOPSY  10/16/2020            Family History   Problem Relation Age of Onset    Heart Disease Mother     Hypertension Mother     Diabetes Father     Heart Disease Father     Hypertension Father     Alcohol abuse Brother     Heart Disease Sister     Hypertension Sister     Diabetes Sister     No Known Problems Sister     Alcohol abuse Brother     Alcohol abuse Brother     Diabetes Brother        Social History     Tobacco Use    Smoking status: Never Smoker    Smokeless tobacco: Never Used   Substance Use Topics    Alcohol use: No     Alcohol/week: 0.0 standard drinks         Depression Risk Factor Screening:     PHQ over the last two weeks    Little interest or pleasure in doing things Not at all   Feeling down, depressed or hopeless Not at all   Total Score PHQ 2 0     Alcohol Risk Factor Screening: You do not drink alcohol or very rarely.     Functional Ability and Level of Safety:   Hearing Loss  Hearing is good. Activities of Daily Living  The home contains: has safety equipment. Patient does total self care    Fall RiskFall Risk Assessment, last 12 mths    Able to walk? Yes   Fall in past 12 months? No     Functional Ability:   Does the patient exhibit a steady gait? yes    How long did it take the patient to get up and walk from a sitting position? seconds    Is the patient self reliant? (ie can do own laundry, meals, household chores)  yes   Does the patient handle his/her own medications? yes   Does the patient handle his/her own money? yes   Is the patients home safe (ie good lighting, handrails on stairs and bath, etc.)? yes   Did you notice or did patient express any hearing difficulties? no   Did you notice or did patient express any vision difficulties? no        Advance Care Planning:   Patient was offered the opportunity to discuss advance care planning:  yes    Does patient have an Advance Directive:  no   If no, did you provide information on [facility]? yes      Abuse Screen  Patient is not abused    Cognitive Screening   Evaluation of Cognitive Function:  Has your family/caregiver stated any concerns about your memory: no      Patient Care Team   Patient Care Team:  Jean Carlos Horne MD as PCP - General    Assessment/Plan   Education and counseling provided:  Are appropriate based on today's review and evaluation  End-of-Life planning (with patient's consent)    ASSESSMENT and PLAN    Medicare Annual Wellness  Continue current treatment plan. Continue annual follow up. I have discussed diagnosis listed in this note with pt and/or family. I have discussed treatment plans and options and the risk/benefit analysis of those options, including safe use of medications and possible medication side effects. Through the use of shared decision making we have agreed to the above plan.  The patient has received an after-visit summary and questions were answered concerning future plans and follow up. Advise pt of any urgent changes then to proceed to the ER.

## 2021-04-02 NOTE — PROGRESS NOTES
HISTORY OF PRESENT ILLNESS  Jenny Huitron is a 78 y.o. female. HPI   Follow up on chronic medical problems. Doing the precautionary measures at home to reduce risks of exposure COVID19. Also wearing mask when she is going out. No known sick contacts or known exposure to [address]. DM type II follow up:  Compliant w/ meds, diabetic diet, and exercise. Tolerating Januvia and ozempic. Has been watching her diet. BS have been much better. Range in the low to mid 100s. Checks BS BID on most days and prn. Pt does not have BS log at visit today. Denies any tingling sensation. No polyuria or polydipsia. No blurred vision. No significant weight changes. Cardiovascular Review:  The patient has coronary artery disease, HF and status post coronary artery stenting. Diet and Lifestyle: generally follows a low fat low cholesterol diet, generally follows a low sodium diet, follows a diabetic diet regularly, exercises sporadically  Home BP Monitoring: is not measured at home. Pertinent ROS: taking medications as instructed, no medication side effects noted, no TIA's, no chest pain on exertion, no dyspnea on exertion, no swelling of ankles. Hypercholesterolemia follow up:  Compliant w/ meds, low fat, low cholesterol diet. Exercising some. No muscle nor abdominal pain, no skin discoloration. Patient fasting today. Depression Review:  Patient is seen for followup of depression and anxiety. Overall doing well. Taking xanax prn increased anxiety. Anxiety usually triggered by stress. She denies depressed mood and insomnia. Sleeping well at night. Thyroid Review:  Patient is seen for followup of hypothyroidism. Thyroid ROS: denies fatigue, weight changes, heat/cold intolerance, bowel/skin changes or CVS symptoms. HM:  Mammogram 2/20/2020 wants to wait for 2 year follow up.    Bone density 2/20/2020  Colonoscopy 10/16/2020 by Dr Ellie Stone- repeat in 5 years    Patient Active Problem List   Diagnosis Code  Anxiety F41.9    Depression F32.9    Hypovitaminosis D E55.9    GERD (gastroesophageal reflux disease) K21.9    Edema R60.9    Esophageal motor disorder K22.4    S/P bypass gastrojejunostomy Z98.0    SOB (shortness of breath) R06.02    CAD (coronary artery disease) I25.10    Angina pectoris (HCC) I20.9    S/P PTCA (percutaneous transluminal coronary angioplasty) Z98.61    Mixed hyperlipidemia E78.2    Diarrhea R19.7    Type 2 diabetes mellitus without complication (HCC) T01.5    Osteopenia M85.80    Encounter for medication monitoring Z51.81    CVA (cerebral vascular accident) ([facility] [address] B97.7    Complicated migraine L69. 109    Numbness and tingling of right side of face R20.0, R20.2    Stenosis of both internal carotid arteries I65.23    Type 2 diabetes with nephropathy (HCC) E11.21    Essential hypertension I10    Bilateral carotid artery stenosis I65.23    Spinal stenosis of lumbar region at multiple levels M48.061       Current Outpatient Medications   Medication Sig Dispense Refill    ALPRAZolam (XANAX) 1 mg tablet TAKE 1/2 TABLET BY MOUTH EVERY DAY AS NEEDED THEN TAKE 1 TABLET BY MOUTH AT BEDTIME AS NEEDED FOR ANXIETY OR SLEEP 135 Tab 0    levothyroxine (SYNTHROID) 25 mcg tablet Take 1 Tab by mouth Daily (before breakfast). 90 Tab 1    semaglutide (Ozempic) 0.25 mg/0.2 mL (2 mg/1.5 mL) sub-q pen Inject 0.25 mg once weekly for four weeks, then increase to 0.5 mg once weekly thereafter. 1 Box 5    glimepiride (AMARYL) 1 mg tablet Take 1 Tab by mouth Daily (before breakfast).  30 Tab 3    Blood Sugar Diagnostic, Drum (Accu-Chek Compact Plus Test) strp Test blood sugar twice a day, E11.9 300 Strip 3    Accu-Chek Hannah Plus Meter misc USE TO CHECK BLOOD SUGAR 2  TIMES DAILY 1 Each 0    lancets (Accu-Chek Softclix Lancets) misc Use to obtain blood sample for diabetic testing three times a day 1 Package 11    furosemide (LASIX) 20 mg tablet TAKE 1 TABLET BY MOUTH ONCE DAILY 90 Tab 3    lisinopriL (PRINIVIL, ZESTRIL) 2.5 mg tablet TAKE 1 TABLET BY MOUTH ONCE DAILY 90 Tab 3    Januvia 100 mg tablet TAKE 1 TABLET BY MOUTH ONCE DAILY FOR DIABETES 90 Tab 3    pravastatin (PRAVACHOL) 40 mg tablet TAKE 1 TABLET BY MOUTH EVERY NIGHT 90 Tab 3    metoprolol succinate (TOPROL-XL) 25 mg XL tablet TAKE 1 TABLET EVERY DAY 90 Tab 3    glucose blood VI test strips (CONTOUR NEXT TEST STRIPS) strip USE TO CHECK BLOOD SUGAR TWICE DAILY 100 Strip 11    fluticasone propionate (FLONASE) 50 mcg/actuation nasal spray 2 Sprays by Both Nostrils route daily as needed for Rhinitis. 1 Bottle 6    multivit-minerals/folic acid (CENTRUM VITAMINTS PO) Take 1 tablet by mouth daily      cholecalciferol, vitamin D3, (VITAMIN D3) 2,000 unit tab Take 2 capsule by mouth daily      Aspirin, Buffered 81 mg tab Take 81 mg by mouth daily.          Allergies   Allergen Reactions    Demerol [Meperidine] Unknown (comments)     Pt unsure of reaction       Past Medical History:   Diagnosis Date    Anxiety     Arthritis     ASHD (arteriosclerotic heart disease)     Bilateral carotid artery stenosis     CAD (coronary artery disease)     Chronic pain     Depression     Diabetes (HCC)     TypeII    Diarrhea     Gastroparesis     GERD (gastroesophageal reflux disease)     History of cardioembolic cerebrovascular accident (CVA)     Hypercholesterolemia     Hypertension     S/P bypass gastrojejunostomy 1/23/2012    Thyroid disease        Past Surgical History:   Procedure Laterality Date    COLONOSCOPY N/A 5/26/2016    COLONOSCOPY performed by Annette Washington MD at [facility] ENDOSCOPY    COLONOSCOPY N/A 10/16/2020    COLONOSCOPY performed by Danny Donohue MD at [facility] AMBULATORY OR    EGD  7/13/2009    HX APPENDECTOMY      HX BLADDER SUSPENSION      HX CHOLECYSTECTOMY      HX CORONARY STENT PLACEMENT  06/2013    3 stents    HX GASTRIC BYPASS  6/02    HX HEART CATHETERIZATION  2017    stents remain open, no new stents placed    HX HYSTERECTOMY      HX LAPAROTOMY  8/02    2/2 intra abd abscess    HX LUMBAR LAMINECTOMY  01/2008    rods and screws    HX OOPHORECTOMY      HX ORTHOPAEDIC Bilateral     carpal tunnel    HX ORTHOPAEDIC Bilateral     bone spurs both feet    [place] ABDOMEN SURGERY PROC UNLISTED      s/p partial gastrectomy 2/2 gastroparesis    [place] COLONOSCOPY FLX DX W/COLLJ SPEC WHEN PFRMD  5/03/2006    Dr. Lor Dempsey    [place] COLONOSCOPY W/BIOPSY SINGLE/MULTIPLE  2/9/2012         [place] EGD BALLOON DILATION ESOPHAGUS <30 MM DIAM  1/23/2012         [place] EGD TRANSORAL BIOPSY SINGLE/MULTIPLE  7/11/2011         [place] EGD TRANSORAL BIOPSY SINGLE/MULTIPLE  1/23/2012         [place] GASTROJEJUNOSTOMY      UPPER GI ENDOSCOPY,BIOPSY  10/1/2019         UPPER GI ENDOSCOPY,BIOPSY  10/16/2020            Family History   Problem Relation Age of Onset    Heart Disease Mother     Hypertension Mother     Diabetes Father     Heart Disease Father     Hypertension Father     Alcohol abuse Brother     Heart Disease Sister     Hypertension Sister     Diabetes Sister     No Known Problems Sister     Alcohol abuse Brother     Alcohol abuse Brother     Diabetes Brother        Social History     Tobacco Use    Smoking status: Never Smoker    Smokeless tobacco: Never Used   Substance Use Topics    Alcohol use: No     Alcohol/week: 0.0 standard drinks        Lab Results   Component Value Date/Time    WBC 6.7 11/09/2020 01:14 PM    HGB 10.9 (L) 11/24/2020 03:11 AM    HCT 38.9 11/09/2020 01:14 PM    PLATELET 835 71/77/0032 01:14 PM    MCV 91.3 11/09/2020 01:14 PM     Lab Results   Component Value Date/Time    Cholesterol, total 198 02/02/2021 10:00 AM    HDL Cholesterol 52 02/02/2021 10:00 AM    LDL, calculated 101 (H) 02/02/2021 10:00 AM    LDL, calculated 77 01/27/2020 08:25 AM    Triglyceride 269 (H) 02/02/2021 10:00 AM    CHOL/HDL Ratio 3.5 01/05/2017 09:18 PM     Lab Results   Component Value Date/Time    TSH 7.480 (H) 02/02/2021 10:00 AM    T4, Free 1.77 02/08/2019 12:18 PM    T4, Total 5.6 11/17/2015 08:54 AM      Lab Results   Component Value Date/Time    Sodium 144 03/02/2021 12:50 PM    Potassium 4.8 03/02/2021 12:50 PM    Chloride 107 (H) 03/02/2021 12:50 PM    CO2 18 (L) 03/02/2021 12:50 PM    Anion gap 4 (L) 11/09/2020 01:14 PM    Glucose 111 (H) 03/02/2021 12:50 PM    BUN 9 03/02/2021 12:50 PM    Creatinine 0.75 03/02/2021 12:50 PM    BUN/Creatinine ratio 12 03/02/2021 12:50 PM    GFR est AA 95 03/02/2021 12:50 PM    GFR est non-AA 82 03/02/2021 12:50 PM    Calcium 9.9 03/02/2021 12:50 PM    Bilirubin, total 0.3 02/02/2021 10:00 AM    ALT (SGPT) 19 02/02/2021 10:00 AM    Alk. phosphatase 131 (H) 02/02/2021 10:00 AM    Protein, total 6.3 02/02/2021 10:00 AM    Albumin 4.3 02/02/2021 10:00 AM    Globulin 3.3 11/09/2020 01:14 PM    A-G Ratio 2.2 02/02/2021 10:00 AM      Lab Results   Component Value Date/Time    Hemoglobin A1c 7.4 (H) 11/09/2020 01:14 PM    Hemoglobin A1c (POC) 9.5 02/02/2021 10:00 AM         Review of Systems   Constitutional: Negative for malaise/fatigue. HENT: Negative for congestion. Eyes: Negative for blurred vision. Respiratory: Negative for cough and shortness of breath. Cardiovascular: Negative for chest pain, palpitations and leg swelling. Gastrointestinal: Negative for abdominal pain, constipation and heartburn. Genitourinary: Negative for dysuria, frequency and urgency. Musculoskeletal: Negative for back pain and joint pain. Neurological: Negative for dizziness, tingling and headaches. Endo/Heme/Allergies: Negative for environmental allergies. Psychiatric/Behavioral: Negative for depression. The patient does not have insomnia. Physical Exam  Vitals signs and nursing note reviewed. Constitutional:       Appearance: Normal appearance. She is well-developed.       Comments: /62 (BP 1 Location: Left arm, BP Patient Position: Sitting)   Pulse 89   Temp 98 °F (36.7 °C) (Temporal)   Resp 16   Ht 5' 2\" (1.575 m)   Wt 161 lb 9.6 oz (73.3 kg)   SpO2 99%   BMI 29.56 kg/m²    HENT:      Right Ear: Tympanic membrane and ear canal normal.      Left Ear: Tympanic membrane and ear canal normal.      Nose: No mucosal edema or rhinorrhea. Neck:      Musculoskeletal: Normal range of motion and neck supple. Thyroid: No thyromegaly. Vascular: No carotid bruit. Cardiovascular:      Rate and Rhythm: Normal rate and regular rhythm. Pulses: Normal pulses. Heart sounds: Normal heart sounds. No gallop. Pulmonary:      Effort: Pulmonary effort is normal.      Breath sounds: Normal breath sounds. Chest:      Breasts:         Right: Normal.         Left: Normal.   Abdominal:      General: Bowel sounds are normal.      Palpations: Abdomen is soft. There is no mass. Tenderness: There is no abdominal tenderness. Genitourinary:     Rectum: Guaiac result negative. Musculoskeletal: Normal range of motion. Right lower leg: No edema. Left lower leg: No edema. Comments: Foot exam done . Normal sensation to PP, LT and vibration. Sensation intact to microfilament testing. Pulses intact. No swelling. No skin lesions or sores noted. No tinea present. Lymphadenopathy:      Cervical: No cervical adenopathy. Upper Body:      Right upper body: No supraclavicular, axillary or pectoral adenopathy. Left upper body: No supraclavicular, axillary or pectoral adenopathy. Skin:     General: Skin is warm and dry. Neurological:      General: No focal deficit present. Mental Status: She is alert and oriented to person, place, and time. Psychiatric:         Mood and Affect: Mood normal.         ASSESSMENT and PLAN  Diagnoses and all orders for this visit:    1. Medicare annual wellness visit, subsequent    2. Type 2 diabetes with nephropathy (HCC)  Mid morning . Continue to monitor. Work on diet and exercise.   Will check a1c with her next OV.    -     AMB POC GLUCOSE, QUANTITATIVE, BLOOD  -     semaglutide (Ozempic) 0.25 mg/0.2 mL (2 mg/1.5 mL) sub-q pen; 0.5 mg by SubCUTAneous route every seven (7) days.  -     glimepiride (AMARYL) 1 mg tablet; Take 1 Tab by mouth Daily (before breakfast). -     lisinopriL (PRINIVIL, ZESTRIL) 2.5 mg tablet; Take 1 Tab by mouth daily.  -     SITagliptin (Januvia) 100 mg tablet; Take 1 Tab by mouth daily. 3. Acquired hypothyroidism  -     TSH 3RD GENERATION; Future    4. Mixed hyperlipidemia  Continue to monitor. Work on diet and exercise. -     pravastatin (PRAVACHOL) 40 mg tablet; Take 1 Tab by mouth nightly. 5. Coronary artery disease involving native coronary artery of native heart without angina pectoris  Stable   -     furosemide (LASIX) 20 mg tablet; TAKE 1 TABLET BY MOUTH ONCE DAILY  -     metoprolol succinate (TOPROL-XL) 25 mg XL tablet; TAKE 1 TABLET EVERY DAY    6. PAD (peripheral artery disease) ([facility])  Stable     7. Encounter for medication monitoring  -     URINALYSIS W/ REFLEX CULTURE; Future  -     METABOLIC PANEL, BASIC; Future    8. Environmental allergies  -     fluticasone propionate (FLONASE) 50 mcg/actuation nasal spray; 2 Sprays by Both Nostrils route daily as needed for Rhinitis. Follow-up and Dispositions    · Return in about 2 months (around 6/2/2021). current treatment plan is effective, no change in therapy  reviewed diet, exercise and weight control  cardiovascular risk and specific lipid/LDL goals reviewed  reviewed medications and side effects in detail  specific diabetic recommendations: low cholesterol diet, weight control and daily exercise discussed, home glucose monitoring emphasized, all medications, side effects and compliance discussed carefully, foot care discussed and Podiatry visits discussed, annual eye examinations at Ophthalmology discussed and glycohemoglobin and other lab monitoring discussed     I have discussed diagnosis listed in this note with pt and/or family.  I have discussed treatment plans and options and the risk/benefit analysis of those options, including safe use of medications and possible medication side effects. Through the use of shared decision making we have agreed to the above plan. The patient has received an after-visit summary and questions were answered concerning future plans and follow up. Advise pt of any urgent changes then to proceed to the ER.

## 2021-04-02 NOTE — PATIENT INSTRUCTIONS
Medicare Wellness Visit, Female The best way to live healthy is to have a lifestyle where you eat a well-balanced diet, exercise regularly, limit alcohol use, and quit all forms of tobacco/nicotine, if applicable. Regular preventive services are another way to keep healthy. Preventive services (vaccines, screening tests, monitoring & exams) can help personalize your care plan, which helps you manage your own care. Screening tests can find health problems at the earliest stages, when they are easiest to treat. [facility] follows the current, evidence-based guidelines published by the [facility] ([facility]) when recommending preventive services for our patients. Because we follow these guidelines, sometimes recommendations change over time as research supports it. (For example, mammograms used to be recommended annually. Even though Medicare will still pay for an annual mammogram, the newer guidelines recommend a mammogram every two years for women of average risk). Of course, you and your doctor may decide to screen more often for some diseases, based on your risk and your co-morbidities (chronic disease you are already diagnosed with). Preventive services for you include: - Medicare offers their members a free annual wellness visit, which is time for you and your primary care provider to discuss and plan for your preventive service needs. Take advantage of this benefit every year! 
-All adults over the age of 72 should receive the recommended pneumonia vaccines. Current USPSTF guidelines recommend a series of two vaccines for the best pneumonia protection.  
-All adults should have a flu vaccine yearly and a tetanus vaccine every 10 years.  
-All adults age 48 and older should receive the shingles vaccines (series of two vaccines).      
-All adults age 38-68 who are overweight should have a diabetes screening test once every three years.  
-All adults born between [birth year] and 1963 should be screened once for Hepatitis C. 
-Other screening tests and preventive services for persons with diabetes include: an eye exam to screen for diabetic retinopathy, a kidney function test, a foot exam, and stricter control over your cholesterol.  
-Cardiovascular screening for adults with routine risk involves an electrocardiogram (ECG) at intervals determined by your doctor.  
-Colorectal cancer screenings should be done for adults age 54-65 with no increased risk factors for colorectal cancer. There are a number of acceptable methods of screening for this type of cancer. Each test has its own benefits and drawbacks. Discuss with your doctor what is most appropriate for you during your annual wellness visit. The different tests include: colonoscopy (considered the best screening method), a fecal occult blood test, a fecal DNA test, and sigmoidoscopy. 
 
-A bone mass density test is recommended when a woman turns 65 to screen for osteoporosis. This test is only recommended one time, as a screening. Some providers will use this same test as a disease monitoring tool if you already have osteoporosis. -Breast cancer screenings are recommended every other year for women of normal risk, age 54-69. 
-Cervical cancer screenings for women over age 72 are only recommended with certain risk factors. Here is a list of your current Health Maintenance items (your personalized list of preventive services) with a due date: 
Health Maintenance Due Topic Date Due  
 Diabetic Foot Care  01/27/2021

## 2021-04-02 NOTE — PROGRESS NOTES
Chief Complaint   Patient presents with    Annual Wellness Visit     Medicare Wellness         A1C 2/21/2021    Microalbumin 1/27/2020    Mammogram 2/20/2020    Bone density 2/20/2020    Colonoscopy 10/16/2020 by Dr Rafal Mendoza- repeat in 5 years          1. Have you been to the ER, urgent care clinic since your last visit? Hospitalized since your last visit? No    2. Have you seen or consulted any other health care providers outside of the [address] [place] [address] since your last visit? Include any pap smears or colon screening.  No

## 2021-04-03 LAB
ANION GAP SERPL CALC-SCNC: 5 MMOL/L (ref 5–15)
APPEARANCE UR: CLEAR
BACTERIA URNS QL MICRO: NEGATIVE /HPF
BILIRUB UR QL: NEGATIVE
BUN SERPL-MCNC: 13 MG/DL (ref 6–20)
BUN/CREAT SERPL: 16 (ref 12–20)
CALCIUM SERPL-MCNC: 9 MG/DL (ref 8.5–10.1)
CHLORIDE SERPL-SCNC: 108 MMOL/L (ref 97–108)
CO2 SERPL-SCNC: 27 MMOL/L (ref 21–32)
COLOR UR: NORMAL
CREAT SERPL-MCNC: 0.82 MG/DL (ref 0.55–1.02)
EPITH CASTS URNS QL MICRO: NORMAL /LPF
GLUCOSE SERPL-MCNC: 119 MG/DL (ref 65–100)
GLUCOSE UR STRIP.AUTO-MCNC: NEGATIVE MG/DL
HGB UR QL STRIP: NEGATIVE
HYALINE CASTS URNS QL MICRO: NORMAL /LPF (ref 0–5)
KETONES UR QL STRIP.AUTO: NEGATIVE MG/DL
LEUKOCYTE ESTERASE UR QL STRIP.AUTO: NEGATIVE
NITRITE UR QL STRIP.AUTO: NEGATIVE
PH UR STRIP: 5 [PH] (ref 5–8)
POTASSIUM SERPL-SCNC: 4.1 MMOL/L (ref 3.5–5.1)
PROT UR STRIP-MCNC: NEGATIVE MG/DL
RBC #/AREA URNS HPF: NORMAL /HPF (ref 0–5)
SODIUM SERPL-SCNC: 140 MMOL/L (ref 136–145)
SP GR UR REFRACTOMETRY: 1.01 (ref 1–1.03)
TSH SERPL DL<=0.05 MIU/L-ACNC: 2.83 UIU/ML (ref 0.36–3.74)
UA: UC IF INDICATED,UAUC: NORMAL
UROBILINOGEN UR QL STRIP.AUTO: 0.2 EU/DL (ref 0.2–1)
WBC URNS QL MICRO: NORMAL /HPF (ref 0–4)

## 2021-04-10 DIAGNOSIS — I25.10 CORONARY ARTERY DISEASE INVOLVING NATIVE CORONARY ARTERY OF NATIVE HEART WITHOUT ANGINA PECTORIS: ICD-10-CM

## 2021-04-12 ENCOUNTER — TELEPHONE (OUTPATIENT)
Dept: FAMILY MEDICINE CLINIC | Age: 71
End: 2021-04-12

## 2021-04-12 RX ORDER — METOPROLOL SUCCINATE 25 MG/1
TABLET, EXTENDED RELEASE ORAL
Qty: 90 TAB | Refills: 3 | Status: SHIPPED | OUTPATIENT
Start: 2021-04-12 | End: 2021-12-27

## 2021-04-12 NOTE — TELEPHONE ENCOUNTER
Patient is calling, because she was supposed to get a paper, for her eye doctor. She was here last week and did not receive it. Patient wants to see if it can be mailed. Please call @[phone].

## 2021-05-17 ENCOUNTER — TRANSCRIBE ORDER (OUTPATIENT)
Dept: SCHEDULING | Age: 71
End: 2021-05-17

## 2021-05-17 DIAGNOSIS — Z98.1 S/P LUMBAR SPINAL FUSION: Primary | ICD-10-CM

## 2021-05-17 DIAGNOSIS — M48.061 SPINAL STENOSIS OF LUMBAR REGION WITH RADICULOPATHY: ICD-10-CM

## 2021-05-17 DIAGNOSIS — M54.16 SPINAL STENOSIS OF LUMBAR REGION WITH RADICULOPATHY: ICD-10-CM

## 2021-05-19 ENCOUNTER — HOSPITAL ENCOUNTER (OUTPATIENT)
Dept: MRI IMAGING | Age: 71
Discharge: HOME | End: 2021-05-19
Attending: FAMILY MEDICINE
Payer: MEDICARE

## 2021-05-19 DIAGNOSIS — Z98.1 S/P LUMBAR SPINAL FUSION: ICD-10-CM

## 2021-05-19 DIAGNOSIS — M48.061 SPINAL STENOSIS OF LUMBAR REGION WITH RADICULOPATHY: ICD-10-CM

## 2021-05-19 DIAGNOSIS — M54.16 SPINAL STENOSIS OF LUMBAR REGION WITH RADICULOPATHY: ICD-10-CM

## 2021-05-19 PROCEDURE — 72148 MRI LUMBAR SPINE W/O DYE: CPT

## 2021-05-22 DIAGNOSIS — E78.2 MIXED HYPERLIPIDEMIA: ICD-10-CM

## 2021-05-22 DIAGNOSIS — I25.10 CORONARY ARTERY DISEASE INVOLVING NATIVE CORONARY ARTERY OF NATIVE HEART WITHOUT ANGINA PECTORIS: ICD-10-CM

## 2021-05-24 RX ORDER — FUROSEMIDE 20 MG/1
TABLET ORAL
Qty: 90 TABLET | Refills: 3 | Status: SHIPPED | OUTPATIENT
Start: 2021-05-24 | End: 2022-02-14

## 2021-05-24 RX ORDER — PRAVASTATIN SODIUM 40 MG/1
TABLET ORAL
Qty: 90 TABLET | Refills: 3 | Status: SHIPPED | OUTPATIENT
Start: 2021-05-24 | End: 2022-03-18

## 2021-06-11 ENCOUNTER — OFFICE VISIT (OUTPATIENT)
Dept: FAMILY MEDICINE CLINIC | Age: 71
End: 2021-06-11
Payer: MEDICARE

## 2021-06-11 VITALS
OXYGEN SATURATION: 99 % | WEIGHT: 156.2 LBS | DIASTOLIC BLOOD PRESSURE: 60 MMHG | SYSTOLIC BLOOD PRESSURE: 112 MMHG | BODY MASS INDEX: 28.74 KG/M2 | RESPIRATION RATE: 16 BRPM | TEMPERATURE: 98 F | HEART RATE: 87 BPM | HEIGHT: 62 IN

## 2021-06-11 DIAGNOSIS — I73.9 PAD (PERIPHERAL ARTERY DISEASE) (HCC): ICD-10-CM

## 2021-06-11 DIAGNOSIS — E03.9 ACQUIRED HYPOTHYROIDISM: ICD-10-CM

## 2021-06-11 DIAGNOSIS — E11.21 TYPE 2 DIABETES WITH NEPHROPATHY (HCC): Primary | ICD-10-CM

## 2021-06-11 DIAGNOSIS — E78.2 MIXED HYPERLIPIDEMIA: ICD-10-CM

## 2021-06-11 DIAGNOSIS — Z51.81 ENCOUNTER FOR MEDICATION MONITORING: ICD-10-CM

## 2021-06-11 DIAGNOSIS — I25.10 CORONARY ARTERY DISEASE INVOLVING NATIVE CORONARY ARTERY OF NATIVE HEART WITHOUT ANGINA PECTORIS: ICD-10-CM

## 2021-06-11 LAB
ALBUMIN SERPL-MCNC: 4.1 G/DL (ref 3.5–5)
ALBUMIN/GLOB SERPL: 1.7 {RATIO} (ref 1.1–2.2)
ALP SERPL-CCNC: 118 U/L (ref 45–117)
ALT SERPL-CCNC: 31 U/L (ref 12–78)
ANION GAP SERPL CALC-SCNC: 6 MMOL/L (ref 5–15)
AST SERPL-CCNC: 30 U/L (ref 15–37)
BILIRUB SERPL-MCNC: 0.3 MG/DL (ref 0.2–1)
BUN SERPL-MCNC: 12 MG/DL (ref 6–20)
BUN/CREAT SERPL: 13 (ref 12–20)
CALCIUM SERPL-MCNC: 9.2 MG/DL (ref 8.5–10.1)
CHLORIDE SERPL-SCNC: 106 MMOL/L (ref 97–108)
CHOLEST SERPL-MCNC: 135 MG/DL
CO2 SERPL-SCNC: 27 MMOL/L (ref 21–32)
CREAT SERPL-MCNC: 0.93 MG/DL (ref 0.55–1.02)
ERYTHROCYTE [DISTWIDTH] IN BLOOD BY AUTOMATED COUNT: 13.2 % (ref 11.5–14.5)
GLOBULIN SER CALC-MCNC: 2.4 G/DL (ref 2–4)
GLUCOSE POC: 189 MG/DL
GLUCOSE SERPL-MCNC: 177 MG/DL (ref 65–100)
HBA1C MFR BLD HPLC: 5.6 %
HCT VFR BLD AUTO: 38.5 % (ref 35–47)
HDLC SERPL-MCNC: 42 MG/DL
HDLC SERPL: 3.2 {RATIO} (ref 0–5)
HGB BLD-MCNC: 12 G/DL (ref 11.5–16)
LDLC SERPL CALC-MCNC: 57 MG/DL (ref 0–100)
MCH RBC QN AUTO: 29.9 PG (ref 26–34)
MCHC RBC AUTO-ENTMCNC: 31.2 G/DL (ref 30–36.5)
MCV RBC AUTO: 96 FL (ref 80–99)
NRBC # BLD: 0 K/UL (ref 0–0.01)
NRBC BLD-RTO: 0 PER 100 WBC
PLATELET # BLD AUTO: 259 K/UL (ref 150–400)
PMV BLD AUTO: 11.4 FL (ref 8.9–12.9)
POTASSIUM SERPL-SCNC: 4.3 MMOL/L (ref 3.5–5.1)
PROT SERPL-MCNC: 6.5 G/DL (ref 6.4–8.2)
RBC # BLD AUTO: 4.01 M/UL (ref 3.8–5.2)
SODIUM SERPL-SCNC: 139 MMOL/L (ref 136–145)
TRIGL SERPL-MCNC: 180 MG/DL (ref ?–150)
VLDLC SERPL CALC-MCNC: 36 MG/DL
WBC # BLD AUTO: 5.9 K/UL (ref 3.6–11)

## 2021-06-11 PROCEDURE — 1090F PRES/ABSN URINE INCON ASSESS: CPT | Performed by: FAMILY MEDICINE

## 2021-06-11 PROCEDURE — 1101F PT FALLS ASSESS-DOCD LE1/YR: CPT | Performed by: FAMILY MEDICINE

## 2021-06-11 PROCEDURE — G8754 DIAS BP LESS 90: HCPCS | Performed by: FAMILY MEDICINE

## 2021-06-11 PROCEDURE — 83036 HEMOGLOBIN GLYCOSYLATED A1C: CPT | Performed by: FAMILY MEDICINE

## 2021-06-11 PROCEDURE — G8419 CALC BMI OUT NRM PARAM NOF/U: HCPCS | Performed by: FAMILY MEDICINE

## 2021-06-11 PROCEDURE — G8752 SYS BP LESS 140: HCPCS | Performed by: FAMILY MEDICINE

## 2021-06-11 PROCEDURE — 3044F HG A1C LEVEL LT 7.0%: CPT | Performed by: FAMILY MEDICINE

## 2021-06-11 PROCEDURE — 3017F COLORECTAL CA SCREEN DOC REV: CPT | Performed by: FAMILY MEDICINE

## 2021-06-11 PROCEDURE — 99214 OFFICE O/P EST MOD 30 MIN: CPT | Performed by: FAMILY MEDICINE

## 2021-06-11 PROCEDURE — 2022F DILAT RTA XM EVC RTNOPTHY: CPT | Performed by: FAMILY MEDICINE

## 2021-06-11 PROCEDURE — G9899 SCRN MAM PERF RSLTS DOC: HCPCS | Performed by: FAMILY MEDICINE

## 2021-06-11 PROCEDURE — G9717 DOC PT DX DEP/BP F/U NT REQ: HCPCS | Performed by: FAMILY MEDICINE

## 2021-06-11 PROCEDURE — G8399 PT W/DXA RESULTS DOCUMENT: HCPCS | Performed by: FAMILY MEDICINE

## 2021-06-11 PROCEDURE — G8536 NO DOC ELDER MAL SCRN: HCPCS | Performed by: FAMILY MEDICINE

## 2021-06-11 PROCEDURE — G8427 DOCREV CUR MEDS BY ELIG CLIN: HCPCS | Performed by: FAMILY MEDICINE

## 2021-06-11 PROCEDURE — 82947 ASSAY GLUCOSE BLOOD QUANT: CPT | Performed by: FAMILY MEDICINE

## 2021-06-11 RX ORDER — GABAPENTIN 300 MG/1
300 CAPSULE ORAL 3 TIMES DAILY
COMMUNITY
Start: 2021-05-17 | End: 2021-06-16

## 2021-06-11 NOTE — PROGRESS NOTES
Chief Complaint   Patient presents with    Cholesterol Problem     follow up    Diabetes     follow up         Mammogram 2/20/2020    Bone density 2/20/2020    Colonoscopy 10/16/2020 by Dr Mattie Hutchison- repeat in 5 years          1. Have you been to the ER, urgent care clinic since your last visit? Hospitalized since your last visit? No    2. Have you seen or consulted any other health care providers outside of the [address] [place] [address] since your last visit? Include any pap smears or colon screening.  No

## 2021-06-11 NOTE — PROGRESS NOTES
HISTORY OF PRESENT ILLNESS  Freedom Thomas is a 73 y.o. female. HPI   Follow up on chronic medical problems. Doing the precautionary measures at home to reduce risks of exposure COVID19. Also wearing mask when she is going out. No known sick contacts or known exposure to [address]. DM type II follow up:  Compliant w/ meds, diabetic diet, and exercise. Tolerating Januvia and ozempic. Has been watching her diet. BS have been much better. Range in the low to mid 100s. Checks BS BID on most days and prn. Pt does not have BS log at visit today. Denies any tingling sensation. No polyuria or polydipsia. No blurred vision. No significant weight changes. Cardiovascular Review:  The patient has coronary artery disease, HF and status post coronary artery stenting. Diet and Lifestyle: generally follows a low fat low cholesterol diet, generally follows a low sodium diet, follows a diabetic diet regularly, exercises sporadically  Home BP Monitoring: is not measured at home. Pertinent ROS: taking medications as instructed, no medication side effects noted, no TIA's, no chest pain on exertion, no dyspnea on exertion, no swelling of ankles. Hypercholesterolemia follow up:  Compliant w/ meds, low fat, low cholesterol diet. Exercising some. No muscle nor abdominal pain, no skin discoloration. Patient fasting today. Depression Review:  Patient is seen for followup of depression and anxiety. Overall doing well. Taking xanax prn increased anxiety. Anxiety usually triggered by stress. She denies depressed mood and insomnia. Sleeping well at night. Thyroid Review:  Patient is seen for followup of hypothyroidism. Thyroid ROS: denies fatigue, weight changes, heat/cold intolerance, bowel/skin changes or CVS symptoms. HM:  Mammogram 2/20/2020 wants to wait for 2 year follow up.    Bone density 2/20/2020  Colonoscopy 10/16/2020 by Dr Antonio Ortega- repeat in 5 years    Patient Active Problem List   Diagnosis Code  Anxiety F41.9    Depression F32.9    Hypovitaminosis D E55.9    GERD (gastroesophageal reflux disease) K21.9    Edema R60.9    Esophageal motor disorder K22.4    S/P bypass gastrojejunostomy Z98.0    SOB (shortness of breath) R06.02    CAD (coronary artery disease) I25.10    Angina pectoris (HCC) I20.9    S/P PTCA (percutaneous transluminal coronary angioplasty) Z98.61    Mixed hyperlipidemia E78.2    Diarrhea R19.7    Type 2 diabetes mellitus without complication ([facility]) K01.0    Osteopenia M85.80    Encounter for medication monitoring Z51.81    CVA (cerebral vascular accident) ([facility] [address] M37.6    Complicated migraine O40. 109    Numbness and tingling of right side of face R20.0, R20.2    Stenosis of both internal carotid arteries I65.23    Type 2 diabetes with nephropathy ([facility]) E11.21    Essential hypertension I10    Bilateral carotid artery stenosis I65.23    Spinal stenosis of lumbar region at multiple levels M48.061       Current Outpatient Medications   Medication Sig Dispense Refill    pravastatin (PRAVACHOL) 40 mg tablet TAKE 1 TABLET BY MOUTH EVERY NIGHT 90 Tablet 3    furosemide (LASIX) 20 mg tablet TAKE 1 TABLET BY MOUTH EVERY DAY 90 Tablet 3    metoprolol succinate (TOPROL-XL) 25 mg XL tablet TAKE 1 TABLET BY MOUTH EVERY DAY 90 Tab 3    semaglutide (Ozempic) 0.25 mg/0.2 mL (2 mg/1.5 mL) sub-q pen 0.5 mg by SubCUTAneous route every seven (7) days. 3 Box 3    glimepiride (AMARYL) 1 mg tablet Take 1 Tab by mouth Daily (before breakfast). 90 Tab 3    lisinopriL (PRINIVIL, ZESTRIL) 2.5 mg tablet Take 1 Tab by mouth daily. 90 Tab 3    SITagliptin (Januvia) 100 mg tablet Take 1 Tab by mouth daily. 90 Tab 3    fluticasone propionate (FLONASE) 50 mcg/actuation nasal spray 2 Sprays by Both Nostrils route daily as needed for Rhinitis.  3 Bottle 3    ALPRAZolam (XANAX) 1 mg tablet TAKE 1/2 TABLET BY MOUTH EVERY DAY AS NEEDED THEN TAKE 1 TABLET BY MOUTH AT BEDTIME AS NEEDED FOR ANXIETY OR SLEEP 135 Tab 0    levothyroxine (SYNTHROID) 25 mcg tablet Take 1 Tab by mouth Daily (before breakfast). 90 Tab 1    Blood Sugar Diagnostic, Drum (Accu-Chek Compact Plus Test) strp Test blood sugar twice a day, E11.9 300 Strip 3    Accu-Chek Hannah Plus Meter misc USE TO CHECK BLOOD SUGAR 2  TIMES DAILY 1 Each 0    lancets (Accu-Chek Softclix Lancets) misc Use to obtain blood sample for diabetic testing three times a day 1 Package 11    glucose blood VI test strips (CONTOUR NEXT TEST STRIPS) strip USE TO CHECK BLOOD SUGAR TWICE DAILY 100 Strip 11    multivit-minerals/folic acid (CENTRUM VITAMINTS PO) Take 1 tablet by mouth daily      cholecalciferol, vitamin D3, (VITAMIN D3) 2,000 unit tab Take 2 capsule by mouth daily      Aspirin, Buffered 81 mg tab Take 81 mg by mouth daily.          Allergies   Allergen Reactions    Demerol [Meperidine] Unknown (comments)     Pt unsure of reaction         Past Medical History:   Diagnosis Date    Anxiety     Arthritis     ASHD (arteriosclerotic heart disease)     Bilateral carotid artery stenosis     CAD (coronary artery disease)     Chronic pain     Depression     Diabetes (HCC)     TypeII    Diarrhea     Gastroparesis     GERD (gastroesophageal reflux disease)     History of cardioembolic cerebrovascular accident (CVA)     Hypercholesterolemia     Hypertension     S/P bypass gastrojejunostomy 1/23/2012    Thyroid disease          Past Surgical History:   Procedure Laterality Date    COLONOSCOPY N/A 5/26/2016    COLON[facility] performed [facility]an [facility] at [facility] ENDOSCOPY    COLONOSCOPY N/A 10/16/2020    COLONOSCOPY performed by Tatiana Ortez MD at [facility] AMBULATORY OR    EGD  7/13/2009    HX APPENDECTOMY      HX BLADDER SUSPENSION      HX CHOLECYSTECTOMY      HX CORONARY STENT PLACEMENT  06/2013    3 stents    HX GASTRIC BYPASS  6/02    HX HEART CATHETERIZATION  2017    stents remain open, no new stents placed    HX HYSTERECTOMY      HX LAPAROTOMY 8/02    2/2 intra abd abscess    HX LUMBAR LAMINECTOMY  01/2008    rods and screws    HX OOPHORECTOMY      HX ORTHOPAEDIC Bilateral     carpal tunnel    HX ORTHOPAEDIC Bilateral     bone spurs both feet    [place] ABDOMEN SURGERY PROC UNLISTED      s/p partial gastrectomy 2/2 gastroparesis    [place] COLONOSCOPY FLX DX W/COLLJ SPEC WHEN PFRMD  5/03/2006    Dr. Micheline Cowden    [place] COLONOSCOPY W/BIOPSY SINGLE/MULTIPLE  2/9/2012         [place] EGD BALLOON DILATION ESOPHAGUS <30 MM DIAM  1/23/2012         [place] EGD TRANSORAL BIOPSY SINGLE/MULTIPLE  7/11/2011         [place] EGD TRANSORAL BIOPSY SINGLE/MULTIPLE  1/23/2012         [place] GASTROJEJUNOSTOMY      UPPER GI ENDOSCOPY,BIOPSY  10/1/2019         UPPER GI ENDOSCOPY,BIOPSY  10/16/2020              Family History   Problem Relation Age of Onset    Heart Disease Mother     Hypertension Mother     Diabetes Father     Heart Disease Father     Hypertension Father     Alcohol abuse Brother     Heart Disease Sister     Hypertension Sister     Diabetes Sister     No Known Problems Sister     Alcohol abuse Brother     Alcohol abuse Brother     Diabetes Brother        Social History     Tobacco Use    Smoking status: Never Smoker    Smokeless tobacco: Never Used   Substance Use Topics    Alcohol use: No     Alcohol/week: 0.0 standard drinks        Lab Results   Component Value Date/Time    WBC 6.7 11/09/2020 01:14 PM    HGB 10.9 (L) 11/24/2020 03:11 AM    HCT 38.9 11/09/2020 01:14 PM    PLATELET 494 71/30/8609 01:14 PM    MCV 91.3 11/09/2020 01:14 PM     Lab Results   Component Value Date/Time    Cholesterol, total 198 02/02/2021 10:00 AM    HDL Cholesterol 52 02/02/2021 10:00 AM    LDL, calculated 101 (H) 02/02/2021 10:00 AM    LDL, calculated 77 01/27/2020 08:25 AM    Triglyceride 269 (H) 02/02/2021 10:00 AM    CHOL/HDL Ratio 3.5 01/05/2017 09:18 PM     Lab Results   Component Value Date/Time    TSH 2.83 04/02/2021 11:44 AM    T4, Free 1.77 02/08/2019 12:18 PM    T4, Total 5.6 11/17/2015 08:54 AM      Lab Results   Component Value Date/Time    Sodium 140 04/02/2021 11:44 AM    Potassium 4.1 04/02/2021 11:44 AM    Chloride 108 04/02/2021 11:44 AM    CO2 27 04/02/2021 11:44 AM    Anion gap 5 04/02/2021 11:44 AM    Glucose 119 (H) 04/02/2021 11:44 AM    BUN 13 04/02/2021 11:44 AM    Creatinine 0.82 04/02/2021 11:44 AM    BUN/Creatinine ratio 16 04/02/2021 11:44 AM    GFR est AA >60 04/02/2021 11:44 AM    GFR est non-AA >60 04/02/2021 11:44 AM    Calcium 9.0 04/02/2021 11:44 AM    Bilirubin, total 0.3 02/02/2021 10:00 AM    ALT (SGPT) 19 02/02/2021 10:00 AM    Alk. phosphatase 131 (H) 02/02/2021 10:00 AM    Protein, total 6.3 02/02/2021 10:00 AM    Albumin 4.3 02/02/2021 10:00 AM    Globulin 3.3 11/09/2020 01:14 PM    A-G Ratio 2.2 02/02/2021 10:00 AM      Lab Results   Component Value Date/Time    Hemoglobin A1c 7.4 (H) 11/09/2020 01:14 PM    Hemoglobin A1c (POC) 9.5 02/02/2021 10:00 AM         Review of Systems   Constitutional: Negative for malaise/fatigue. HENT: Negative for congestion. Eyes: Negative for blurred vision. Respiratory: Negative for cough and shortness of breath. Cardiovascular: Negative for chest pain, palpitations and leg swelling. Gastrointestinal: Negative for abdominal pain, constipation and heartburn. Genitourinary: Negative for dysuria, frequency and urgency. Musculoskeletal: Negative for back pain and joint pain. Neurological: Negative for dizziness, tingling and headaches. Endo/Heme/Allergies: Negative for environmental allergies. Psychiatric/Behavioral: Negative for depression. The patient does not have insomnia. Physical Exam  Vitals and nursing note reviewed. Constitutional:       Appearance: Normal appearance. She is well-developed.       Comments: /60 (BP 1 Location: Left arm, BP Patient Position: Sitting)   Pulse 87   Temp 98 °F (36.7 °C) (Oral)   Resp 16   Ht 5' 2\" (1.575 m)   Wt 156 lb 3.2 oz (70.9 kg)   SpO2 99% BMI 28.57 kg/m²    HENT:      Right Ear: Tympanic membrane and ear canal normal.      Left Ear: Tympanic membrane and ear canal normal.      Nose: No mucosal edema. Neck:      Thyroid: No thyromegaly. Cardiovascular:      Rate and Rhythm: Normal rate and regular rhythm. Heart sounds: Normal heart sounds. No gallop. Pulmonary:      Effort: Pulmonary effort is normal.      Breath sounds: Normal breath sounds. Abdominal:      General: Bowel sounds are normal.      Palpations: Abdomen is soft. There is no mass. Tenderness: There is no abdominal tenderness. Musculoskeletal:         General: Normal range of motion. Cervical back: Normal range of motion and neck supple. Right lower leg: No edema. Left lower leg: No edema. Lymphadenopathy:      Cervical: No cervical adenopathy. Skin:     General: Skin is warm and dry. Neurological:      General: No focal deficit present. Mental Status: She is alert and oriented to person, place, and time. Psychiatric:         Mood and Affect: Mood normal.           ASSESSMENT and PLAN  Diagnoses and all orders for this visit:    1. Type 2 diabetes with nephropathy (HCC)  a1c 5.6%. Will stop glimepiride. Continue with Ozempic and Januvia. Eventually will change to jardiance for cardiovascular protection also. -     AMB POC HEMOGLOBIN A1C  -     AMB POC GLUCOSE, QUANTITATIVE, BLOOD    2. Mixed hyperlipidemia  -     LIPID PANEL; Future    3. Coronary artery disease involving native coronary artery of native heart without angina pectoris  Stable     4. Acquired hypothyroidism  -     TSH 3RD GENERATION; Future    5. PAD (peripheral artery disease) ([facility] [address]  Stable   Has follow up at the end of the month. 6. Encounter for medication monitoring  -     METABOLIC PANEL, COMPREHENSIVE; Future  -     CBC W/O DIFF; Future    Follow-up and Dispositions    · Return in about 4 months (around 10/11/2021).        reviewed diet, exercise and weight control  cardiovascular risk and specific lipid/LDL goals reviewed  reviewed medications and side effects in detail  specific diabetic recommendations: low cholesterol diet, weight control and daily exercise discussed, all medications, side effects and compliance discussed carefully, foot care discussed and Podiatry visits discussed, annual eye examinations at Ophthalmology discussed and glycohemoglobin and other lab monitoring discussed

## 2021-06-12 LAB — TSH SERPL DL<=0.005 MIU/L-ACNC: 2.73 UIU/ML (ref 0.45–4.5)

## 2021-06-15 ENCOUNTER — OFFICE VISIT (OUTPATIENT)
Dept: CARDIOLOGY CLINIC | Age: 71
End: 2021-06-15
Payer: MEDICARE

## 2021-06-15 VITALS
OXYGEN SATURATION: 99 % | WEIGHT: 154 LBS | DIASTOLIC BLOOD PRESSURE: 68 MMHG | HEIGHT: 62 IN | BODY MASS INDEX: 28.34 KG/M2 | HEART RATE: 88 BPM | RESPIRATION RATE: 18 BRPM | SYSTOLIC BLOOD PRESSURE: 116 MMHG

## 2021-06-15 DIAGNOSIS — M79.605 PAIN IN BOTH LOWER EXTREMITIES: ICD-10-CM

## 2021-06-15 DIAGNOSIS — E78.2 MIXED HYPERLIPIDEMIA: ICD-10-CM

## 2021-06-15 DIAGNOSIS — M79.604 PAIN IN BOTH LOWER EXTREMITIES: ICD-10-CM

## 2021-06-15 DIAGNOSIS — M48.061 SPINAL STENOSIS OF LUMBAR REGION AT MULTIPLE LEVELS: ICD-10-CM

## 2021-06-15 DIAGNOSIS — I10 ESSENTIAL HYPERTENSION: Primary | ICD-10-CM

## 2021-06-15 DIAGNOSIS — M79.89 LEG SWELLING: ICD-10-CM

## 2021-06-15 PROCEDURE — G8536 NO DOC ELDER MAL SCRN: HCPCS | Performed by: INTERNAL MEDICINE

## 2021-06-15 PROCEDURE — G8427 DOCREV CUR MEDS BY ELIG CLIN: HCPCS | Performed by: INTERNAL MEDICINE

## 2021-06-15 PROCEDURE — 1101F PT FALLS ASSESS-DOCD LE1/YR: CPT | Performed by: INTERNAL MEDICINE

## 2021-06-15 PROCEDURE — G9717 DOC PT DX DEP/BP F/U NT REQ: HCPCS | Performed by: INTERNAL MEDICINE

## 2021-06-15 PROCEDURE — 93000 ELECTROCARDIOGRAM COMPLETE: CPT | Performed by: INTERNAL MEDICINE

## 2021-06-15 PROCEDURE — G8754 DIAS BP LESS 90: HCPCS | Performed by: INTERNAL MEDICINE

## 2021-06-15 PROCEDURE — G8752 SYS BP LESS 140: HCPCS | Performed by: INTERNAL MEDICINE

## 2021-06-15 PROCEDURE — G9899 SCRN MAM PERF RSLTS DOC: HCPCS | Performed by: INTERNAL MEDICINE

## 2021-06-15 PROCEDURE — G8419 CALC BMI OUT NRM PARAM NOF/U: HCPCS | Performed by: INTERNAL MEDICINE

## 2021-06-15 PROCEDURE — 3017F COLORECTAL CA SCREEN DOC REV: CPT | Performed by: INTERNAL MEDICINE

## 2021-06-15 PROCEDURE — 99214 OFFICE O/P EST MOD 30 MIN: CPT | Performed by: INTERNAL MEDICINE

## 2021-06-15 PROCEDURE — G8399 PT W/DXA RESULTS DOCUMENT: HCPCS | Performed by: INTERNAL MEDICINE

## 2021-06-15 PROCEDURE — 1090F PRES/ABSN URINE INCON ASSESS: CPT | Performed by: INTERNAL MEDICINE

## 2021-06-15 NOTE — PROGRESS NOTES
Malissa Zhou is a 73 y.o. female  Chief Complaint   Patient presents with    Numbness     In Both Legs    Leg Swelling     In Bother Legs     There are no preventive care reminders to display for this patient. Visit Vitals  /68   Pulse 88   Resp 18   Ht 5' 2\" (1.575 m)   Wt 154 lb (69.9 kg)   SpO2 99%   BMI 28.17 kg/m²     1. Have you been to the ER, urgent care clinic since your last visit? Hospitalized since your last visit? No     2. Have you seen or consulted any other health care providers outside of the [address] [place] [address] since your last visit? Include any pap smears or colon screening.  Yes, Back doctor

## 2021-06-15 NOTE — PROGRESS NOTES
6/15/2021 11:10 AM      Subjective:     Jennifer Daley c/o b/l LE numbness, pain, occasional swelling. Has DM and also underwent back surgery for back pain. She denies chest pain, chest pressure/discomfort, dyspnea, palpitations, irregular heart beats, near-syncope, syncope, fatigue, orthopnea, paroxysmal nocturnal dyspnea, exertional chest pressure/discomfort, tachypnea. Visit Vitals  /68   Pulse 88   Resp 18   Ht 5' 2\" (1.575 m)   Wt 154 lb (69.9 kg)   SpO2 99%   BMI 28.17 kg/m²     Current Outpatient Medications   Medication Sig    gabapentin (NEURONTIN) 300 mg capsule Take 300 mg by mouth three (3) times daily.  pravastatin (PRAVACHOL) 40 mg tablet TAKE 1 TABLET BY MOUTH EVERY NIGHT    furosemide (LASIX) 20 mg tablet TAKE 1 TABLET BY MOUTH EVERY DAY    metoprolol succinate (TOPROL-XL) 25 mg XL tablet TAKE 1 TABLET BY MOUTH EVERY DAY    semaglutide (Ozempic) 0.25 mg/0.2 mL (2 mg/1.5 mL) sub-q pen 0.5 mg by SubCUTAneous route every seven (7) days.  lisinopriL (PRINIVIL, ZESTRIL) 2.5 mg tablet Take 1 Tab by mouth daily.  SITagliptin (Januvia) 100 mg tablet Take 1 Tab by mouth daily.  fluticasone propionate (FLONASE) 50 mcg/actuation nasal spray 2 Sprays by Both Nostrils route daily as needed for Rhinitis.  ALPRAZolam (XANAX) 1 mg tablet TAKE 1/2 TABLET BY MOUTH EVERY DAY AS NEEDED THEN TAKE 1 TABLET BY MOUTH AT BEDTIME AS NEEDED FOR ANXIETY OR SLEEP    levothyroxine (SYNTHROID) 25 mcg tablet Take 1 Tab by mouth Daily (before breakfast).     Accu-Chek Hannah Plus Meter misc USE TO CHECK BLOOD SUGAR 2  TIMES DAILY    lancets (Accu-Chek Softclix Lancets) misc Use to obtain blood sample for diabetic testing three times a day    glucose blood VI test strips (CONTOUR NEXT TEST STRIPS) strip USE TO CHECK BLOOD SUGAR TWICE DAILY    multivit-minerals/folic acid (CENTRUM VITAMINTS PO) Take 1 tablet by mouth daily    cholecalciferol, vitamin D3, (VITAMIN D3) 2,000 unit tab Take 2 capsule by mouth daily    Aspirin, Buffered 81 mg tab Take 81 mg by mouth daily. No current facility-administered medications for this visit.          Objective:      Visit Vitals  /68   Pulse 88   Resp 18   Ht 5' 2\" (1.575 m)   Wt 154 lb (69.9 kg)   SpO2 99%   BMI 28.17 kg/m²       Past Medical History:   Diagnosis Date    Anxiety     Arthritis     ASHD (arteriosclerotic heart disease)     Bilateral carotid artery stenosis     CAD (coronary artery disease)     Chronic pain     Depression     Diabetes (HCC)     TypeII    Diarrhea     Gastroparesis     GERD (gastroesophageal reflux disease)     History of cardioembolic cerebrovascular accident (CVA)     Hypercholesterolemia     Hypertension     S/P bypass gastrojejunostomy 1/23/2012    Thyroid disease       Past Surgical History:   Procedure Laterality Date    COLONOSCOPY N/A 5/26/2016    COL[facility]COPY performed by Billy Lloyd [facility] at [facility] ENDOSCOPY    COLONOSCOPY N/A 10/16/2020    COLONOSCOPY performed by Gloria Hayes MD at [facility] AMBULATORY OR    EGD  7/13/2009    HX APPENDECTOMY      HX BLADDER SUSPENSION      HX CHOLECYSTECTOMY      HX CORONARY STENT PLACEMENT  06/2013    3 stents    HX GASTRIC BYPASS  6/02    HX HEART CATHETERIZATION  2017    stents remain open, no new stents placed    HX HYSTERECTOMY      HX LAPAROTOMY  8/02    2/2 intra abd abscess    HX LUMBAR LAMINECTOMY  01/2008    rods and screws    HX OOPHORECTOMY      HX ORTHOPAEDIC Bilateral     carpal tunnel    HX ORTHOPAEDIC Bilateral     bone spurs both feet    [place] ABDOMEN SURGERY PROC UNLISTED      s/p partial gastrectomy 2/2 gastroparesis    [place] COLONOSCOPY FLX DX W/COLLJ SPEC WHEN PFRMD  5/03/2006    Dr. Rupal Olvera W/BIOPSY SINGLE/MULTIPLE  2/9/2012         [place] EGD BALLOON DILATION ESOPHAGUS <30 MM DIAM  1/23/2012         [place] EGD TRANSORAL BIOPSY SINGLE/MULTIPLE  7/11/2011         [place] EGD TRANSORAL BIOPSY SINGLE/MULTIPLE  1/23/2012  [place] GASTROJEJUNOSTOMY      UPPER GI ENDOSCOPY,BIOPSY  10/1/2019         UPPER GI ENDOSCOPY,BIOPSY  10/16/2020          Allergies   Allergen Reactions    Demerol [Meperidine] Unknown (comments)     Pt unsure of reaction      Family History   Problem Relation Age of Onset    Heart Disease Mother     Hypertension Mother     Diabetes Father     Heart Disease Father     Hypertension Father     Alcohol abuse Brother     Heart Disease Sister     Hypertension Sister     Diabetes Sister     No Known Problems Sister     Alcohol abuse Brother     Alcohol abuse Brother     Diabetes Brother       Social History     Socioeconomic History    Marital status:      Spouse name: Not on file    Number of children: Not on file    Years of education: Not on file    Highest education level: Not on file   Occupational History    Not on file   Tobacco Use    Smoking status: Never Smoker    Smokeless tobacco: Never Used   Vaping Use    Vaping Use: Never used   Substance and Sexual Activity    Alcohol use: No     Alcohol/week: 0.0 standard drinks    Drug use: Yes     Types: Prescription, OTC    Sexual activity: Not Currently   Other Topics Concern    Not on file   Social History Narrative    Not on file     Social Determinants of Health     Financial Resource Strain:     Difficulty of Paying Living Expenses:    Food Insecurity:     Worried About Running Out of Food in the Last Year:     Ran Out of Food in the Last Year:    Transportation Needs:     Lack of Transportation (Medical):      Lack of Transportation (Non-Medical):    Physical Activity:     Days of Exercise per Week:     Minutes of Exercise per Session:    Stress:     Feeling of Stress :    Social Connections:     Frequency of Communication with Friends and Family:     Frequency of Social Gatherings with Friends and Family:     Attends Hindu Services:     Active Member of Clubs or Organizations:     Attends Club or Organization Meetings:     Marital Status:    Intimate Partner Violence:     Fear of Current or Ex-Partner:     Emotionally Abused:     Physically Abused:     Sexually Abused:          Assessment:       ICD-10-CM ICD-9-CM    1. Essential hypertension  I10 401.9 AMB POC EKG ROUTINE W/ 12 LEADS, INTER & REP      AMB POC EKG ROUTINE W/ 12 LEADS, INTER & REP      ANKLE BRACHIAL INDEX      DUPLEX LOWER EXT VENOUS BILAT   2. Spinal stenosis of lumbar region at multiple levels  M48.061 724.02 ANKLE BRACHIAL INDEX      DUPLEX LOWER EXT VENOUS BILAT   3. Pain in both lower extremities  M79.604 729.5 ANKLE BRACHIAL INDEX    M79.605  DUPLEX LOWER EXT VENOUS BILAT   4. Leg swelling  M79.89 729.81 ANKLE BRACHIAL INDEX      DUPLEX LOWER EXT VENOUS BILAT       Plan:     Per cardiac cath in 3/2017 without evidence of new obstructive lesions. Denies anginal or anginal equivalent symptoms. EKG SB  Continue with present medical management and risk factor modification. BB, statin, ASA     Mixed hyperlipidemia  On statin. Last labs reviewed.      Essential hypertension  BP controlled. Continue anti-hypertensive therapy and low sodium diet     Leg pain and numbness: check SANDIE and venous reflux study. Other differential is previous back issues and DM.

## 2021-06-24 DIAGNOSIS — E11.21 TYPE 2 DIABETES WITH NEPHROPATHY (HCC): ICD-10-CM

## 2021-06-24 RX ORDER — SITAGLIPTIN 100 MG/1
TABLET, FILM COATED ORAL
Qty: 90 TABLET | Refills: 3 | Status: SHIPPED | OUTPATIENT
Start: 2021-06-24 | End: 2022-03-08

## 2021-06-26 DIAGNOSIS — F41.9 ANXIETY: ICD-10-CM

## 2021-06-28 ENCOUNTER — ANCILLARY PROCEDURE (OUTPATIENT)
Dept: CARDIOLOGY CLINIC | Age: 71
End: 2021-06-28
Payer: MEDICARE

## 2021-06-28 ENCOUNTER — TELEPHONE (OUTPATIENT)
Dept: FAMILY MEDICINE CLINIC | Age: 71
End: 2021-06-28

## 2021-06-28 DIAGNOSIS — M79.604 LEG PAIN, BILATERAL: ICD-10-CM

## 2021-06-28 DIAGNOSIS — M48.061 SPINAL STENOSIS OF LUMBAR REGION AT MULTIPLE LEVELS: ICD-10-CM

## 2021-06-28 DIAGNOSIS — M79.605 LEG PAIN, BILATERAL: ICD-10-CM

## 2021-06-28 DIAGNOSIS — I10 HYPERTENSION, ESSENTIAL: ICD-10-CM

## 2021-06-28 DIAGNOSIS — M79.89 LEG SWELLING: ICD-10-CM

## 2021-06-28 PROCEDURE — 93970 EXTREMITY STUDY: CPT | Performed by: INTERNAL MEDICINE

## 2021-06-28 RX ORDER — ALPRAZOLAM 1 MG/1
TABLET ORAL
Qty: 135 TABLET | OUTPATIENT
Start: 2021-06-28

## 2021-06-28 NOTE — TELEPHONE ENCOUNTER
Called patient LM to return call regarding refill for Xanax because it is too soon for a refill. Refill on Xanax is not due until the end of July.

## 2021-06-28 NOTE — TELEPHONE ENCOUNTER
----- Message from Herminia Candelario MD sent at 6/28/2021  9:04 AM EDT -----  She requested refill on the xanax. Not due til the end of July. Please call and see why she is requesting early refill. Last refill 4/26 for 90 days.

## 2021-06-30 LAB
LEFT GSV AT KNEE DIAM: 0.39 CM
LEFT GSV AT KNEE RFX: 0 S
LEFT GSV BK MID DIAM: 0.38 CM
LEFT GSV BK MID RFX: 2042 S
LEFT GSV JUNC DIAM: 0.67 CM
LEFT GSV JUNC RFX: 504 S
LEFT GSV THIGH PROX DIAM: 0.79 CM
LEFT GSV THIGH PROX RFX: 0 S
LEFT SSV PROX DIAM: 0.36 CM
LEFT SSV PROX RFX: 0 S
RIGHT CFV RFX: 1004 S
RIGHT GSV AK RFX: 0 S
RIGHT GSV AT KNEE DIAM: 0.39 CM
RIGHT GSV BK MID DIAM: 0.23 CM
RIGHT GSV BK MID RFX: 910 S
RIGHT GSV JUNC DIAM: 0.91 CM
RIGHT GSV JUNC RFX: 665 S
RIGHT GSV THIGH PROX DIAM: 0.86 CM
RIGHT GSV THIGH PROX RFX: 0 S
RIGHT SSV PROX DIAM: 0.41 CM
RIGHT SSV PROX RFX: 0 S

## 2021-07-01 ENCOUNTER — TELEPHONE (OUTPATIENT)
Dept: CARDIOLOGY CLINIC | Age: 71
End: 2021-07-01

## 2021-07-01 NOTE — TELEPHONE ENCOUNTER
Message  Received: Today  Lamont Mejia sent to Brie Álvarez LPN  Caller: Unspecified (Today, 12:38 PM)  Scheduled July 20         Noted, thanks.

## 2021-07-01 NOTE — TELEPHONE ENCOUNTER
----- Message from Marin Land MD sent at 6/30/2021  9:41 PM EDT -----  She needs vascular appt with me to discuss LE venous reflux study once SANDIE is done. Called pt,verified pt with two pt identifiers, advised pt her le venous doppler is abnormal and she still needs to have her SANDIE done tomorrow. Advised our  will call to get her scheduled for her f/u appt. Pt verbalized understanding.

## 2021-07-02 ENCOUNTER — ANCILLARY PROCEDURE (OUTPATIENT)
Dept: CARDIOLOGY CLINIC | Age: 71
End: 2021-07-02
Payer: MEDICARE

## 2021-07-02 VITALS — WEIGHT: 150 LBS | HEIGHT: 62 IN | BODY MASS INDEX: 27.6 KG/M2

## 2021-07-02 DIAGNOSIS — M79.605 LOWER EXTREMITY PAIN, BILATERAL: ICD-10-CM

## 2021-07-02 DIAGNOSIS — M79.89 SWELLING OF LOWER EXTREMITY: ICD-10-CM

## 2021-07-02 DIAGNOSIS — M48.061 SPINAL STENOSIS OF LUMBAR REGION AT MULTIPLE LEVELS: ICD-10-CM

## 2021-07-02 DIAGNOSIS — M79.604 LOWER EXTREMITY PAIN, BILATERAL: ICD-10-CM

## 2021-07-02 DIAGNOSIS — I10 PRIMARY HYPERTENSION: ICD-10-CM

## 2021-07-02 PROCEDURE — 93923 UPR/LXTR ART STDY 3+ LVLS: CPT | Performed by: INTERNAL MEDICINE

## 2021-07-03 LAB
IMMEDIATE ARM BP: 149 MMHG
IMMEDIATE LEFT ABI: 0.97
IMMEDIATE LEFT TIBIAL: 144 MMHG
IMMEDIATE RIGHT ABI: 0.98
IMMEDIATE RIGHT TIBIAL: 146 MMHG
LEFT ABI: 0.93
LEFT ARM BP: 150 MMHG
LEFT POSTERIOR TIBIAL: 141 MMHG
LEFT TBI: 0.66
LEFT TOE PRESSURE: 100 MMHG
RIGHT ABI: 0.95
RIGHT ARM BP: 151 MMHG
RIGHT POSTERIOR TIBIAL: 144 MMHG
RIGHT TBI: 0.7
RIGHT TOE PRESSURE: 106 MMHG

## 2021-07-16 RX ORDER — BLOOD SUGAR DIAGNOSTIC
STRIP MISCELLANEOUS 2 TIMES DAILY
Qty: 300 STRIP | Refills: 3 | Status: SHIPPED | OUTPATIENT
Start: 2021-07-16 | End: 2022-07-25

## 2021-07-16 NOTE — TELEPHONE ENCOUNTER
Patient wants to get the Accu check test strips called in. [facility] .   If any questions please give her a call @ [phone]

## 2021-07-20 ENCOUNTER — OFFICE VISIT (OUTPATIENT)
Dept: CARDIOLOGY CLINIC | Age: 71
End: 2021-07-20
Payer: MEDICARE

## 2021-07-20 VITALS
HEIGHT: 62 IN | OXYGEN SATURATION: 98 % | BODY MASS INDEX: 28.39 KG/M2 | WEIGHT: 154.3 LBS | SYSTOLIC BLOOD PRESSURE: 116 MMHG | RESPIRATION RATE: 16 BRPM | DIASTOLIC BLOOD PRESSURE: 84 MMHG | HEART RATE: 87 BPM

## 2021-07-20 DIAGNOSIS — I87.2 VENOUS INSUFFICIENCY OF BOTH LOWER EXTREMITIES: Primary | ICD-10-CM

## 2021-07-20 DIAGNOSIS — I10 HYPERTENSION, ESSENTIAL: ICD-10-CM

## 2021-07-20 DIAGNOSIS — M79.89 LEG SWELLING: ICD-10-CM

## 2021-07-20 DIAGNOSIS — E78.2 MIXED HYPERLIPIDEMIA: ICD-10-CM

## 2021-07-20 PROCEDURE — 3017F COLORECTAL CA SCREEN DOC REV: CPT | Performed by: INTERNAL MEDICINE

## 2021-07-20 PROCEDURE — G8752 SYS BP LESS 140: HCPCS | Performed by: INTERNAL MEDICINE

## 2021-07-20 PROCEDURE — G9899 SCRN MAM PERF RSLTS DOC: HCPCS | Performed by: INTERNAL MEDICINE

## 2021-07-20 PROCEDURE — G8754 DIAS BP LESS 90: HCPCS | Performed by: INTERNAL MEDICINE

## 2021-07-20 PROCEDURE — 99214 OFFICE O/P EST MOD 30 MIN: CPT | Performed by: INTERNAL MEDICINE

## 2021-07-20 PROCEDURE — G8399 PT W/DXA RESULTS DOCUMENT: HCPCS | Performed by: INTERNAL MEDICINE

## 2021-07-20 PROCEDURE — G8536 NO DOC ELDER MAL SCRN: HCPCS | Performed by: INTERNAL MEDICINE

## 2021-07-20 PROCEDURE — G8427 DOCREV CUR MEDS BY ELIG CLIN: HCPCS | Performed by: INTERNAL MEDICINE

## 2021-07-20 PROCEDURE — 1101F PT FALLS ASSESS-DOCD LE1/YR: CPT | Performed by: INTERNAL MEDICINE

## 2021-07-20 PROCEDURE — G8419 CALC BMI OUT NRM PARAM NOF/U: HCPCS | Performed by: INTERNAL MEDICINE

## 2021-07-20 PROCEDURE — 1090F PRES/ABSN URINE INCON ASSESS: CPT | Performed by: INTERNAL MEDICINE

## 2021-07-20 PROCEDURE — G9717 DOC PT DX DEP/BP F/U NT REQ: HCPCS | Performed by: INTERNAL MEDICINE

## 2021-07-20 RX ORDER — GABAPENTIN 300 MG/1
300 CAPSULE ORAL 2 TIMES DAILY
COMMUNITY
Start: 2021-07-08 | End: 2021-08-07

## 2021-07-20 NOTE — PROGRESS NOTES
Identified pt with two pt identifiers(name and ). Reviewed record in preparation for visit and have obtained necessary documentation. Chief Complaint   Patient presents with    Results     vascular test results      Vitals:    21 1411   BP: 116/84   Pulse: 87   Resp: 16   SpO2: 98%   Weight: 154 lb 4.8 oz (70 kg)   Height: 5' 2\" (1.575 m)   PainSc:   0 - No pain       Health Maintenance Review: Patient reminded of \"due or due soon\" health maintenance. I have asked the patient to contact his/her primary care provider (PCP) for follow-up on his/her health maintenance. Coordination of Care Questionnaire:  :   1) Have you been to an emergency room, urgent care, or hospitalized since your last visit? If yes, where when, and reason for visit? no       2. Have seen or consulted any other health care provider since your last visit? If yes, where when, and reason for visit? NO      Patient is accompanied by self I have received verbal consent from Sofiya Vazquez to discuss any/all medical information while they are present in the room.

## 2021-07-20 NOTE — PROGRESS NOTES
Rita Pain, FNP-BC    7/20/2021 2:25 PM      Subjective:     Adilson Avalos is a 73 y.o. female who is here to discuss vascular studies. She has a PMHx of CAD, DM2, HTN and HLD. Had venous duplex and SANDIE done for leg pain. Primarily complains of leg pain, heaviness, cramping and swelling. Visit Vitals  /84 (BP 1 Location: Left arm, BP Patient Position: Sitting)   Pulse 87   Resp 16   Ht 5' 2\" (1.575 m)   Wt 154 lb 4.8 oz (70 kg)   SpO2 98%   BMI 28.22 kg/m²       Objective:     1. Have you ever had vein stripping surgery NO    2. Have you ever had vein injections? NO     3. Have you ever had a blood clot? NO    4. Have you ever had phlebitis? NO                                                                     Does anyone in your family have (or used to have) varicose veins, spider veins, leg ulcers or swollen legs? Father  NO  Mother YES  Brother(s) NO  Sister(s) NO  Other NO           1. Do you experience any of the following in your legs? Aching/pain? YES  [] One leg [x] Both legs  Heaviness? YES  [] One leg [x] Both legs  Tiredness/fatigue? NO  [] One leg [] Both legs  Itching/burning? NO  [] One leg [] Both legs  Swollen ankles? YES  [] One leg [x] Both legs  Leg cramps? YES  [] One leg [x] Both legs  Restless legs? NO  [] One leg [] Both legs  Throbbing? NO  [] One leg [] Both legs  Other? 2.  Have your veins gotten worse in recent months? NO    3. Do you take any medication for pain (i.e., Advil, Motrin)  NO     4. Do you elevate your legs to relieve discomfort? YES    If yes, how long per day do you elevate and does it provide relief? 30 minutes with relief    5. Do you exercise? YES    If yes, what kind of exercise and how often? Riding an exercise bike    6. Do you wear prescription compression stockings? NO       7. Do you wear light support hose (i.e., [employer])?  YES              If yes, do they provide relief? NO      8. Do you have any problem walking? NO                    9.   What type of work do you do? Retired           How long do you stand (hours per day) at work? n/a At home? 10 hours        10. Have you ever had any test(s) done on your veins? NO          11. Were you diagnosed with saphenous vein reflux? NO      Review of Systems   Review of Systems   Constitutional: Negative for chills, fever and weight loss. HENT: Negative for nosebleeds. Eyes: Negative for blurred vision and double vision. Respiratory: Negative for cough, shortness of breath and wheezing. Cardiovascular: Negative for chest pain, palpitations, orthopnea, leg swelling and PND. Skin: Negative for rash. Neurological: Negative for dizziness and loss of consciousness. Physical Exam   General: Well developed, in no acute distress, cooperative and alert  Heart:  reg rate and rhythm; normal S1/S2; no murmurs, no gallops or rubs. Respiratory: Clear bilaterally x 4, no wheezing or rales  Extremities:  Normal cap refill, no cyanosis, atraumatic. No edema. Vascular  - Edema: Right: none Left: none  - Popliteal: Right +2 Left +2  - TP: Right +2 Left +2  - DP: Right +2 Left +2  Venous: varicosities present    PHYSICIAN TO COMPLETE BELOW THIS POINT    Date of Initial Physician Evaluation:_7/20/21_____  Check all that apply:  [x] Reviewed Venous history  [x] Physical examination of the affected leg(s)   [x] Duplex or Doppler Scan results reviewed. Duplex or Doppler Ultrasound of the venous system demonstrate:  [x] Absence of deep venous thrombosis  [x] Greater and/or lesser saphenous vein or  valvular incompetence/reflux that correlates with        patients symptoms  []   valvular incompetence/reflux that correlates with patients symptoms    [x] Graduated, elasticized compression stockings (20-30 mmHg), has been prescribed.   [x] Standing Photos taken of leg(s)  [] Front     [] Back     [x] Front and back   [x] Other causes of patients leg(s) symptoms have been ruled out           Assessment:       ICD-10-CM ICD-9-CM    1. Venous insufficiency of both lower extremities  I87.2 459.81    2. Hypertension, essential  I10 401.9    3. Mixed hyperlipidemia  E78.2 272.2        CEAP class:      []C1   []C2   [x]C3    []C4    []4a    []4b   []C5   []C6         Plan:     1. Venous insufficiency of both lower extremities  Venous duplex with bilateral GSV insufficiency with deep venous insufficiency of the right CFV  Mixed presentation -- maybe some component of spinal stenosis as well  SANDIE negative bilaterally  Will prescribe compression stockings, thigh high, 20-30 mmHg, to wear daily and off at night. F/u in 2 months to assess response  1.  - Instruction given on medication dosage  2.  - Instruction given on daily leg elevation   3.  - Instruction given for mild exercise  4.  - Instruction given for weight reduction    2. Hypertension, essential  BP controlled. Continue anti-hypertensive therapy and low sodium diet    3. Mixed hyperlipidemia  Continue statin therapy and low fat, low cholesterol diet    4. CAD  Stable; non-obstructive disease  Continue medical management      Aureliano Olszewski McGuiness, NP  7/20/2021  2:25 PM      Patient seen and examined by me with nurse practitioner in vascular clinic. I personally performed all components of the history, physical, and medical decision making and agree with the assessment and plan as noted. Normal SANDIE. Venous reflux study noted. Management plans d/w patient. Also has h/o chronic low back pain and spinal stenosis. BP controlled. On statin.      Rachell Merida MD

## 2021-08-02 DIAGNOSIS — F41.9 ANXIETY: ICD-10-CM

## 2021-08-02 RX ORDER — ALPRAZOLAM 1 MG/1
TABLET ORAL
Qty: 135 TABLET | Refills: 0 | Status: SHIPPED | OUTPATIENT
Start: 2021-08-02 | End: 2021-10-22 | Stop reason: ALTCHOICE

## 2021-08-02 NOTE — TELEPHONE ENCOUNTER
----- Message from [place] sent at 8/2/2021  9:37 AM EDT -----  Regarding: Dr. Sarah Shearer (if not patient): n/a       Relationship of caller (if not patient): n/a       Best contact number(s):[phone]      Name of medication and dosage if known:  ALPRAZolam (XANAX) 1 mg tablet       Is patient out of this medication (yes/no):  yes      Pharmacy name:  [address] [place] [address] listed in chart? (yes/no): yes  Pharmacy phone number: on file       Details to clarify the request:  L[address]

## 2021-09-08 ENCOUNTER — OFFICE VISIT (OUTPATIENT)
Dept: FAMILY MEDICINE CLINIC | Age: 71
End: 2021-09-08
Payer: MEDICARE

## 2021-09-08 VITALS
HEART RATE: 82 BPM | RESPIRATION RATE: 16 BRPM | OXYGEN SATURATION: 98 % | SYSTOLIC BLOOD PRESSURE: 116 MMHG | TEMPERATURE: 98.1 F | BODY MASS INDEX: 27.82 KG/M2 | HEIGHT: 62 IN | DIASTOLIC BLOOD PRESSURE: 70 MMHG | WEIGHT: 151.2 LBS
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
## 2021-09-08 DIAGNOSIS — T14.8XXA TRAUMATIC MYALGIA: ICD-10-CM

## 2021-09-08 DIAGNOSIS — M79.604 PAIN IN BOTH LOWER EXTREMITIES: ICD-10-CM

## 2021-09-08 DIAGNOSIS — S16.1XXA STRAIN OF NECK MUSCLE, INITIAL ENCOUNTER: ICD-10-CM

## 2021-09-08 DIAGNOSIS — S81.812A LACERATION WITHOUT FOREIGN BODY, LEFT LOWER LEG, INITIAL ENCOUNTER: ICD-10-CM

## 2021-09-08 DIAGNOSIS — S20.211A CONTUSION OF RIGHT CHEST WALL, INITIAL ENCOUNTER: ICD-10-CM

## 2021-09-08 DIAGNOSIS — Z23 ENCOUNTER FOR IMMUNIZATION: ICD-10-CM

## 2021-09-08 DIAGNOSIS — M79.605 PAIN IN BOTH LOWER EXTREMITIES: ICD-10-CM

## 2021-09-08 DIAGNOSIS — S30.1XXA CONTUSION OF ABDOMINAL WALL, INITIAL ENCOUNTER: ICD-10-CM

## 2021-09-08 DIAGNOSIS — M25.551 RIGHT HIP PAIN: ICD-10-CM

## 2021-09-08 DIAGNOSIS — S39.012A BACK STRAIN, INITIAL ENCOUNTER: ICD-10-CM

## 2021-09-08 PROCEDURE — G8419 CALC BMI OUT NRM PARAM NOF/U: HCPCS | Performed by: FAMILY MEDICINE

## 2021-09-08 PROCEDURE — 99214 OFFICE O/P EST MOD 30 MIN: CPT | Performed by: FAMILY MEDICINE

## 2021-09-08 PROCEDURE — 1090F PRES/ABSN URINE INCON ASSESS: CPT | Performed by: FAMILY MEDICINE

## 2021-09-08 PROCEDURE — 90471 IMMUNIZATION ADMIN: CPT | Performed by: FAMILY MEDICINE

## 2021-09-08 PROCEDURE — G8754 DIAS BP LESS 90: HCPCS | Performed by: FAMILY MEDICINE

## 2021-09-08 PROCEDURE — 1101F PT FALLS ASSESS-DOCD LE1/YR: CPT | Performed by: FAMILY MEDICINE

## 2021-09-08 PROCEDURE — G8399 PT W/DXA RESULTS DOCUMENT: HCPCS | Performed by: FAMILY MEDICINE

## 2021-09-08 PROCEDURE — G8536 NO DOC ELDER MAL SCRN: HCPCS | Performed by: FAMILY MEDICINE

## 2021-09-08 PROCEDURE — G9717 DOC PT DX DEP/BP F/U NT REQ: HCPCS | Performed by: FAMILY MEDICINE

## 2021-09-08 PROCEDURE — G8427 DOCREV CUR MEDS BY ELIG CLIN: HCPCS | Performed by: FAMILY MEDICINE

## 2021-09-08 PROCEDURE — 90714 TD VACC NO PRESV 7 YRS+ IM: CPT | Performed by: FAMILY MEDICINE

## 2021-09-08 PROCEDURE — G8752 SYS BP LESS 140: HCPCS | Performed by: FAMILY MEDICINE

## 2021-09-08 PROCEDURE — 3017F COLORECTAL CA SCREEN DOC REV: CPT | Performed by: FAMILY MEDICINE

## 2021-09-08 PROCEDURE — G9899 SCRN MAM PERF RSLTS DOC: HCPCS | Performed by: FAMILY MEDICINE
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
## 2021-09-14 ENCOUNTER — TELEPHONE (OUTPATIENT)
Dept: CARDIOLOGY CLINIC | Age: 71
End: 2021-09-14

## 2021-09-14 NOTE — TELEPHONE ENCOUNTER
Message  Received: Today  Delmy Perez sent to Kaleigh Merritt LPN  Caller: Unspecified (Today,  9:26 AM)  Scheduled 9/16       Noted, thanks.
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
## 2021-09-16 ENCOUNTER — OFFICE VISIT (OUTPATIENT)
Dept: CARDIOLOGY CLINIC | Age: 71
End: 2021-09-16
Payer: MEDICARE

## 2021-09-16 VITALS
SYSTOLIC BLOOD PRESSURE: 98 MMHG | HEART RATE: 87 BPM | DIASTOLIC BLOOD PRESSURE: 52 MMHG | RESPIRATION RATE: 18 BRPM | HEIGHT: 62 IN | OXYGEN SATURATION: 98 % | WEIGHT: 148.3 LBS | BODY MASS INDEX: 27.29 KG/M2

## 2021-09-16 DIAGNOSIS — I25.10 CORONARY ARTERY DISEASE INVOLVING NATIVE CORONARY ARTERY OF NATIVE HEART WITHOUT ANGINA PECTORIS: Primary | ICD-10-CM

## 2021-09-16 DIAGNOSIS — E78.2 MIXED HYPERLIPIDEMIA: ICD-10-CM

## 2021-09-16 DIAGNOSIS — I87.2 VENOUS INSUFFICIENCY OF BOTH LOWER EXTREMITIES: ICD-10-CM

## 2021-09-16 DIAGNOSIS — I10 HYPERTENSION, ESSENTIAL: ICD-10-CM

## 2021-09-16 PROCEDURE — 1090F PRES/ABSN URINE INCON ASSESS: CPT | Performed by: INTERNAL MEDICINE

## 2021-09-16 PROCEDURE — 99214 OFFICE O/P EST MOD 30 MIN: CPT | Performed by: INTERNAL MEDICINE

## 2021-09-16 PROCEDURE — 93000 ELECTROCARDIOGRAM COMPLETE: CPT | Performed by: INTERNAL MEDICINE

## 2021-09-16 PROCEDURE — 1101F PT FALLS ASSESS-DOCD LE1/YR: CPT | Performed by: INTERNAL MEDICINE

## 2021-09-16 PROCEDURE — G8752 SYS BP LESS 140: HCPCS | Performed by: INTERNAL MEDICINE

## 2021-09-16 PROCEDURE — G8419 CALC BMI OUT NRM PARAM NOF/U: HCPCS | Performed by: INTERNAL MEDICINE

## 2021-09-16 PROCEDURE — 3017F COLORECTAL CA SCREEN DOC REV: CPT | Performed by: INTERNAL MEDICINE

## 2021-09-16 PROCEDURE — G8427 DOCREV CUR MEDS BY ELIG CLIN: HCPCS | Performed by: INTERNAL MEDICINE

## 2021-09-16 PROCEDURE — G9717 DOC PT DX DEP/BP F/U NT REQ: HCPCS | Performed by: INTERNAL MEDICINE

## 2021-09-16 PROCEDURE — G8399 PT W/DXA RESULTS DOCUMENT: HCPCS | Performed by: INTERNAL MEDICINE

## 2021-09-16 PROCEDURE — G8536 NO DOC ELDER MAL SCRN: HCPCS | Performed by: INTERNAL MEDICINE

## 2021-09-16 PROCEDURE — G8754 DIAS BP LESS 90: HCPCS | Performed by: INTERNAL MEDICINE

## 2021-09-16 PROCEDURE — G9899 SCRN MAM PERF RSLTS DOC: HCPCS | Performed by: INTERNAL MEDICINE
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
## 2021-09-20 ENCOUNTER — TELEPHONE (OUTPATIENT)
Dept: CARDIOLOGY CLINIC | Age: 71
End: 2021-09-20

## 2021-09-20 ENCOUNTER — ANCILLARY PROCEDURE (OUTPATIENT)
Dept: CARDIOLOGY CLINIC | Age: 71
End: 2021-09-20
Payer: MEDICARE

## 2021-09-20 VITALS
HEIGHT: 62 IN | WEIGHT: 148 LBS | DIASTOLIC BLOOD PRESSURE: 52 MMHG | BODY MASS INDEX: 27.23 KG/M2 | SYSTOLIC BLOOD PRESSURE: 98 MMHG

## 2021-09-20 DIAGNOSIS — I25.10 CORONARY ARTERY DISEASE INVOLVING NATIVE CORONARY ARTERY OF NATIVE HEART WITHOUT ANGINA PECTORIS: ICD-10-CM

## 2021-09-20 DIAGNOSIS — E78.2 MIXED HYPERLIPIDEMIA: ICD-10-CM

## 2021-09-20 DIAGNOSIS — I10 HYPERTENSION, ESSENTIAL: ICD-10-CM

## 2021-09-20 DIAGNOSIS — I87.2 VENOUS INSUFFICIENCY OF BOTH LOWER EXTREMITIES: ICD-10-CM

## 2021-09-20 LAB
ECHO AO ASC DIAM: 3.23 CM
ECHO AO ROOT DIAM: 2.78 CM
ECHO AV PEAK GRADIENT: 7.29 MMHG
ECHO AV PEAK VELOCITY: 134.99 CM/S
ECHO EST RA PRESSURE: 3 MMHG
ECHO LA AREA 4C: 13.07 CM2
ECHO LA MAJOR AXIS: 2.68 CM
ECHO LA MINOR AXIS: 1.6 CM
ECHO LA VOL 2C: 24.34 ML (ref 22–52)
ECHO LA VOL 4C: 28.58 ML (ref 22–52)
ECHO LA VOL BP: 30.68 ML (ref 22–52)
ECHO LA VOL/BSA BIPLANE: 18.26 ML/M2 (ref 16–28)
ECHO LA VOLUME INDEX A2C: 14.49 ML/M2 (ref 16–28)
ECHO LA VOLUME INDEX A4C: 17.01 ML/M2 (ref 16–28)
ECHO LV E' LATERAL VELOCITY: 6.98 CM/S
ECHO LV INTERNAL DIMENSION DIASTOLIC: 3.8 CM (ref 3.9–5.3)
ECHO LV INTERNAL DIMENSION SYSTOLIC: 2.26 CM
ECHO LV ISOVOLUMETRIC RELAXATION TIME (IVRT): 79.92 MS
ECHO LV IVSD: 0.9 CM (ref 0.6–0.9)
ECHO LV MASS 2D: 92.6 G (ref 67–162)
ECHO LV MASS INDEX 2D: 55.1 G/M2 (ref 43–95)
ECHO LV POSTERIOR WALL DIASTOLIC: 0.79 CM (ref 0.6–0.9)
ECHO LVOT PEAK GRADIENT: 3.18 MMHG
ECHO LVOT PEAK VELOCITY: 89.18 CM/S
ECHO MV "A" WAVE DURATION: 142.72 MS
ECHO MV A VELOCITY: 72.54 CM/S
ECHO MV AREA PHT: 3.34 CM2
ECHO MV E DECELERATION TIME (DT): 227.2 MS
ECHO MV E VELOCITY: 51.85 CM/S
ECHO MV E/A RATIO: 0.71
ECHO MV E/E' LATERAL: 7.43
ECHO MV PRESSURE HALF TIME (PHT): 65.89 MS
ECHO PVEIN A DURATION: 110.37 MS
ECHO PVEIN A VELOCITY: 22.79 CM/S
ECHO RIGHT VENTRICULAR SYSTOLIC PRESSURE (RVSP): 23.61 MMHG
ECHO RV TAPSE: 1.66 CM (ref 1.5–2)
ECHO TV REGURGITANT MAX VELOCITY: 226.99 CM/S
ECHO TV REGURGITANT PEAK GRADIENT: 20.61 MMHG

## 2021-09-20 PROCEDURE — 93306 TTE W/DOPPLER COMPLETE: CPT | Performed by: INTERNAL MEDICINE

## 2021-09-20 NOTE — TELEPHONE ENCOUNTER
----- Message from Renuka Kelly MD sent at 9/20/2021  3:28 PM EDT -----  Inform her Echo is k        Called pt,verified pt with two pt identifiers, advised pt her echo looks okay. Pt verbalized understanding.

## 2021-09-22 ENCOUNTER — TRANSCRIBE ORDER (OUTPATIENT)
Dept: SCHEDULING | Age: 71
End: 2021-09-22

## 2021-09-22 DIAGNOSIS — R15.9 FULL INCONTINENCE OF FECES: ICD-10-CM

## 2021-09-22 DIAGNOSIS — R19.7 OVERFLOW DIARRHEA: Primary | ICD-10-CM

## 2021-09-22 DIAGNOSIS — R10.13 EPIGASTRIC PAIN: ICD-10-CM

## 2021-10-08 ENCOUNTER — OFFICE VISIT (OUTPATIENT)
Dept: FAMILY MEDICINE CLINIC | Age: 71
End: 2021-10-08
Payer: MEDICARE

## 2021-10-08 VITALS
DIASTOLIC BLOOD PRESSURE: 60 MMHG | OXYGEN SATURATION: 100 % | WEIGHT: 149.2 LBS | HEART RATE: 78 BPM | BODY MASS INDEX: 27.29 KG/M2 | SYSTOLIC BLOOD PRESSURE: 104 MMHG | RESPIRATION RATE: 16 BRPM | TEMPERATURE: 96.5 F

## 2021-10-08 DIAGNOSIS — T14.8XXA TRAUMATIC MYALGIA: ICD-10-CM

## 2021-10-08 DIAGNOSIS — S16.1XXD STRAIN OF NECK MUSCLE, SUBSEQUENT ENCOUNTER: ICD-10-CM
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
## 2021-10-08 DIAGNOSIS — M25.551 RIGHT HIP PAIN: ICD-10-CM

## 2021-10-08 DIAGNOSIS — S39.012D BACK STRAIN, SUBSEQUENT ENCOUNTER: ICD-10-CM

## 2021-10-08 PROCEDURE — G8419 CALC BMI OUT NRM PARAM NOF/U: HCPCS | Performed by: FAMILY MEDICINE

## 2021-10-08 PROCEDURE — G8752 SYS BP LESS 140: HCPCS | Performed by: FAMILY MEDICINE

## 2021-10-08 PROCEDURE — G9717 DOC PT DX DEP/BP F/U NT REQ: HCPCS | Performed by: FAMILY MEDICINE

## 2021-10-08 PROCEDURE — G8754 DIAS BP LESS 90: HCPCS | Performed by: FAMILY MEDICINE

## 2021-10-08 PROCEDURE — 1090F PRES/ABSN URINE INCON ASSESS: CPT | Performed by: FAMILY MEDICINE

## 2021-10-08 PROCEDURE — G8399 PT W/DXA RESULTS DOCUMENT: HCPCS | Performed by: FAMILY MEDICINE

## 2021-10-08 PROCEDURE — 3017F COLORECTAL CA SCREEN DOC REV: CPT | Performed by: FAMILY MEDICINE

## 2021-10-08 PROCEDURE — 99213 OFFICE O/P EST LOW 20 MIN: CPT | Performed by: FAMILY MEDICINE

## 2021-10-08 PROCEDURE — G8427 DOCREV CUR MEDS BY ELIG CLIN: HCPCS | Performed by: FAMILY MEDICINE

## 2021-10-08 PROCEDURE — G9899 SCRN MAM PERF RSLTS DOC: HCPCS | Performed by: FAMILY MEDICINE

## 2021-10-08 PROCEDURE — G8536 NO DOC ELDER MAL SCRN: HCPCS | Performed by: FAMILY MEDICINE

## 2021-10-08 PROCEDURE — 1101F PT FALLS ASSESS-DOCD LE1/YR: CPT | Performed by: FAMILY MEDICINE

## 2021-10-08 RX ORDER — ACETAMINOPHEN AND CODEINE PHOSPHATE 300; 30 MG/1; MG/1
TABLET ORAL
Status: ON HOLD | COMMUNITY
Start: 2021-09-30 | End: 2022-02-02

## 2021-10-08 RX ORDER — DIAZEPAM 5 MG/1
TABLET ORAL
COMMUNITY
Start: 2021-09-08 | End: 2021-10-22 | Stop reason: ALTCHOICE

## 2021-10-08 RX ORDER — ALPRAZOLAM 1 MG/1
TABLET ORAL
Qty: 135 TABLET | Refills: 0 | Status: CANCELLED | OUTPATIENT
Start: 2021-10-08

## 2021-10-08 RX ORDER — HYDROCODONE BITARTRATE AND ACETAMINOPHEN 5; 325 MG/1; MG/1
1 TABLET ORAL
COMMUNITY
Start: 2021-09-08 | End: 2021-10-22 | Stop reason: ALTCHOICE

## 2021-10-08 RX ORDER — GABAPENTIN 300 MG/1
300 CAPSULE ORAL
COMMUNITY
Start: 2021-09-11 | End: 2022-05-18 | Stop reason: ALTCHOICE
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
## 2021-10-22 ENCOUNTER — OFFICE VISIT (OUTPATIENT)
Dept: FAMILY MEDICINE CLINIC | Age: 71
End: 2021-10-22
Payer: MEDICARE

## 2021-10-22 VITALS
WEIGHT: 149.2 LBS | HEART RATE: 78 BPM | SYSTOLIC BLOOD PRESSURE: 112 MMHG | BODY MASS INDEX: 27.46 KG/M2 | DIASTOLIC BLOOD PRESSURE: 68 MMHG | OXYGEN SATURATION: 100 % | RESPIRATION RATE: 16 BRPM | HEIGHT: 62 IN | TEMPERATURE: 96.9 F

## 2021-10-22 DIAGNOSIS — E11.9 TYPE 2 DIABETES MELLITUS WITHOUT COMPLICATION, WITHOUT LONG-TERM CURRENT USE OF INSULIN (HCC): Primary | ICD-10-CM

## 2021-10-22 DIAGNOSIS — I73.9 PAD (PERIPHERAL ARTERY DISEASE) (HCC): ICD-10-CM

## 2021-10-22 DIAGNOSIS — R10.10 PAIN OF UPPER ABDOMEN: ICD-10-CM

## 2021-10-22 DIAGNOSIS — F51.01 PRIMARY INSOMNIA: ICD-10-CM

## 2021-10-22 DIAGNOSIS — I25.10 CORONARY ARTERY DISEASE INVOLVING NATIVE CORONARY ARTERY OF NATIVE HEART WITHOUT ANGINA PECTORIS: ICD-10-CM

## 2021-10-22 DIAGNOSIS — Z23 NEEDS FLU SHOT: ICD-10-CM

## 2021-10-22 DIAGNOSIS — E03.9 ACQUIRED HYPOTHYROIDISM: ICD-10-CM

## 2021-10-22 DIAGNOSIS — Z12.31 ENCOUNTER FOR SCREENING MAMMOGRAM FOR BREAST CANCER: ICD-10-CM

## 2021-10-22 DIAGNOSIS — Z51.81 ENCOUNTER FOR MEDICATION MONITORING: ICD-10-CM

## 2021-10-22 DIAGNOSIS — E78.2 MIXED HYPERLIPIDEMIA: ICD-10-CM

## 2021-10-22 LAB
ALBUMIN SERPL-MCNC: 3.5 G/DL (ref 3.5–5)
ALBUMIN/GLOB SERPL: 1.4 {RATIO} (ref 1.1–2.2)
ALP SERPL-CCNC: 99 U/L (ref 45–117)
ALT SERPL-CCNC: 18 U/L (ref 12–78)
ANION GAP SERPL CALC-SCNC: 8 MMOL/L (ref 5–15)
AST SERPL-CCNC: 20 U/L (ref 15–37)
BILIRUB SERPL-MCNC: 0.4 MG/DL (ref 0.2–1)
BUN SERPL-MCNC: 12 MG/DL (ref 6–20)
BUN/CREAT SERPL: 13 (ref 12–20)
CALCIUM SERPL-MCNC: 9.1 MG/DL (ref 8.5–10.1)
CHLORIDE SERPL-SCNC: 108 MMOL/L (ref 97–108)
CHOLEST SERPL-MCNC: 120 MG/DL
CO2 SERPL-SCNC: 26 MMOL/L (ref 21–32)
CREAT SERPL-MCNC: 0.95 MG/DL (ref 0.55–1.02)
ERYTHROCYTE [DISTWIDTH] IN BLOOD BY AUTOMATED COUNT: 12.4 % (ref 11.5–14.5)
GLOBULIN SER CALC-MCNC: 2.5 G/DL (ref 2–4)
GLUCOSE POC: 169 MG/DL
GLUCOSE SERPL-MCNC: 130 MG/DL (ref 65–100)
HBA1C MFR BLD HPLC: 5.8 %
HCT VFR BLD AUTO: 36.2 % (ref 35–47)
HDLC SERPL-MCNC: 39 MG/DL
HDLC SERPL: 3.1 {RATIO} (ref 0–5)
HGB BLD-MCNC: 11.4 G/DL (ref 11.5–16)
LDLC SERPL CALC-MCNC: 52.4 MG/DL (ref 0–100)
MCH RBC QN AUTO: 30.5 PG (ref 26–34)
MCHC RBC AUTO-ENTMCNC: 31.5 G/DL (ref 30–36.5)
MCV RBC AUTO: 96.8 FL (ref 80–99)
NRBC # BLD: 0 K/UL (ref 0–0.01)
NRBC BLD-RTO: 0 PER 100 WBC
PLATELET # BLD AUTO: 239 K/UL (ref 150–400)
PMV BLD AUTO: 11.8 FL (ref 8.9–12.9)
POTASSIUM SERPL-SCNC: 4 MMOL/L (ref 3.5–5.1)
PROT SERPL-MCNC: 6 G/DL (ref 6.4–8.2)
RBC # BLD AUTO: 3.74 M/UL (ref 3.8–5.2)
SODIUM SERPL-SCNC: 142 MMOL/L (ref 136–145)
TRIGL SERPL-MCNC: 143 MG/DL (ref ?–150)
VLDLC SERPL CALC-MCNC: 28.6 MG/DL
WBC # BLD AUTO: 5.5 K/UL (ref 3.6–11)

## 2021-10-22 PROCEDURE — G8752 SYS BP LESS 140: HCPCS | Performed by: FAMILY MEDICINE

## 2021-10-22 PROCEDURE — 2022F DILAT RTA XM EVC RTNOPTHY: CPT | Performed by: FAMILY MEDICINE

## 2021-10-22 PROCEDURE — 99214 OFFICE O/P EST MOD 30 MIN: CPT | Performed by: FAMILY MEDICINE

## 2021-10-22 PROCEDURE — 90694 VACC AIIV4 NO PRSRV 0.5ML IM: CPT | Performed by: FAMILY MEDICINE

## 2021-10-22 PROCEDURE — 3044F HG A1C LEVEL LT 7.0%: CPT | Performed by: FAMILY MEDICINE

## 2021-10-22 PROCEDURE — G8399 PT W/DXA RESULTS DOCUMENT: HCPCS | Performed by: FAMILY MEDICINE

## 2021-10-22 PROCEDURE — 1101F PT FALLS ASSESS-DOCD LE1/YR: CPT | Performed by: FAMILY MEDICINE

## 2021-10-22 PROCEDURE — G8419 CALC BMI OUT NRM PARAM NOF/U: HCPCS | Performed by: FAMILY MEDICINE

## 2021-10-22 PROCEDURE — G0008 ADMIN INFLUENZA VIRUS VAC: HCPCS | Performed by: FAMILY MEDICINE

## 2021-10-22 PROCEDURE — 82947 ASSAY GLUCOSE BLOOD QUANT: CPT | Performed by: FAMILY MEDICINE

## 2021-10-22 PROCEDURE — 1090F PRES/ABSN URINE INCON ASSESS: CPT | Performed by: FAMILY MEDICINE

## 2021-10-22 PROCEDURE — G8536 NO DOC ELDER MAL SCRN: HCPCS | Performed by: FAMILY MEDICINE

## 2021-10-22 PROCEDURE — G9717 DOC PT DX DEP/BP F/U NT REQ: HCPCS | Performed by: FAMILY MEDICINE

## 2021-10-22 PROCEDURE — G8754 DIAS BP LESS 90: HCPCS | Performed by: FAMILY MEDICINE

## 2021-10-22 PROCEDURE — 83036 HEMOGLOBIN GLYCOSYLATED A1C: CPT | Performed by: FAMILY MEDICINE

## 2021-10-22 PROCEDURE — G8427 DOCREV CUR MEDS BY ELIG CLIN: HCPCS | Performed by: FAMILY MEDICINE

## 2021-10-22 PROCEDURE — 3017F COLORECTAL CA SCREEN DOC REV: CPT | Performed by: FAMILY MEDICINE

## 2021-10-22 PROCEDURE — G9899 SCRN MAM PERF RSLTS DOC: HCPCS | Performed by: FAMILY MEDICINE

## 2021-10-22 RX ORDER — TRAZODONE HYDROCHLORIDE 50 MG/1
50 TABLET ORAL
Qty: 30 TABLET | Refills: 3 | Status: SHIPPED | OUTPATIENT
Start: 2021-10-22 | End: 2021-10-22 | Stop reason: SDUPTHER

## 2021-10-22 RX ORDER — TRAZODONE HYDROCHLORIDE 50 MG/1
25-50 TABLET ORAL
Qty: 30 TABLET | Refills: 3 | Status: ON HOLD | OUTPATIENT
Start: 2021-10-22 | End: 2022-02-02

## 2021-10-22 NOTE — PROGRESS NOTES
HISTORY OF PRESENT ILLNESS  David Rodriguez is a 73 y.o. female. HPI   Follow up on chronic medical problems. Doing the precautionary measures at home to reduce risks of exposure COVID19. Also wearing mask when she is going out. No known sick contacts or known exposure to [address]. DM type II follow up:  Compliant w/ meds, diabetic diet, and exercise. Tolerating Januvia and ozempic. Has been watching her diet. BS have been much better. Range in the low to mid 100s. Checks BS BID on most days and prn. Pt does not have BS log at visit today. Denies any tingling sensation. No polyuria or polydipsia. No blurred vision. No significant weight changes. Cardiovascular Review:  The patient has coronary artery disease, HF and status post coronary artery stenting. Diet and Lifestyle: generally follows a low fat low cholesterol diet, generally follows a low sodium diet, follows a diabetic diet regularly, exercises sporadically  Home BP Monitoring: is not measured at home. Pertinent ROS: taking medications as instructed, no medication side effects noted, no TIA's, no chest pain on exertion, no dyspnea on exertion, no swelling of ankles. Hypercholesterolemia follow up:  Compliant w/ meds, low fat, low cholesterol diet. Exercising some. No muscle nor abdominal pain, no skin discoloration. Patient fasting today. Depression Review:  Patient is seen for followup of depression and anxiety. Overall doing well. Taking xanax prn increased anxiety. Anxiety usually triggered by stress. She denies depressed mood and insomnia. Sleeping well at night. Thyroid Review:  Patient is seen for followup of hypothyroidism. Thyroid ROS: denies fatigue, weight changes, heat/cold intolerance, bowel/skin changes or CVS symptoms. HM:  Mammogram 2/20/2020 wants to wait for 2 year follow up.    Bone density 2/20/2020  Colonoscopy 10/16/2020 by Dr Aguirre Level- repeat in 5 years    Patient Active Problem List   Diagnosis Code  Anxiety F41.9    Depression F32. A    Hypovitaminosis D E55.9    GERD (gastroesophageal reflux disease) K21.9    Edema R60.9    Esophageal motor disorder K22.4    S/P bypass gastrojejunostomy Z98.0    SOB (shortness of breath) R06.02    CAD (coronary artery disease) I25.10    S/P PTCA (percutaneous transluminal coronary angioplasty) Z98.61    Mixed hyperlipidemia E78.2    Diarrhea R19.7    Type 2 diabetes mellitus without complication (HCC) P59.9    Osteopenia M85.80    Encounter for medication monitoring Z51.81    CVA (cerebral vascular accident) ([facility] [address] K32.9    Complicated migraine A21. 109    Numbness and tingling of right side of face R20.0, R20.2    Stenosis of both internal carotid arteries I65.23    Type 2 diabetes with nephropathy (HCC) E11.21    Hypertension, essential I10    Bilateral carotid artery stenosis I65.23    Spinal stenosis of lumbar region at multiple levels M48.061    Pain in both lower extremities M79.604, M79.605    Leg swelling M79.89    Venous insufficiency of both lower extremities I87.2       Current Outpatient Medications   Medication Sig Dispense Refill    acetaminophen-codeine (TYLENOL #3) 300-30 mg per tablet take 1 tablet by mouth every 4 to 6 hours if needed for pain      diazePAM (VALIUM) 5 mg tablet TAKE 1 TABLET BY MOUTH THREE TIMES DAILY AS NEEDED FOR MUSCLE SPASM      gabapentin (NEURONTIN) 300 mg capsule 300 mg daily as needed.  HYDROcodone-acetaminophen (NORCO) 5-325 mg per tablet Take 1 Tablet by mouth every six (6) hours as needed.  ALPRAZolam (XANAX) 1 mg tablet TAKE 1/2 TABLET BY MOUTH EVERY DAY AS NEEDED THEN TAKE 1 TABLET BY MOUTH AT BEDTIME AS NEEDED FOR ANXIETY OR SLEEP 135 Tablet 0    levothyroxine (SYNTHROID) 25 mcg tablet TAKE 1 TABLET BY MOUTH DAILY BEFORE BREAKFAST 90 Tablet 3    glucose blood VI test strips (Accu-Chek Hannah Plus test strp) strip by Does Not Apply route two (2) times a day.  300 Strip 3    Januvia 100 mg tablet TAKE 1 TABLET BY MOUTH EVERY DAY FOR DIABETES 90 Tablet 3    pravastatin (PRAVACHOL) 40 mg tablet TAKE 1 TABLET BY MOUTH EVERY NIGHT 90 Tablet 3    furosemide (LASIX) 20 mg tablet TAKE 1 TABLET BY MOUTH EVERY DAY 90 Tablet 3    metoprolol succinate (TOPROL-XL) 25 mg XL tablet TAKE 1 TABLET BY MOUTH EVERY DAY 90 Tab 3    semaglutide (Ozempic) 0.25 mg/0.2 mL (2 mg/1.5 mL) sub-q pen 0.5 mg by SubCUTAneous route every seven (7) days. 3 Box 3    lisinopriL (PRINIVIL, ZESTRIL) 2.5 mg tablet Take 1 Tab by mouth daily. 90 Tab 3    fluticasone propionate (FLONASE) 50 mcg/actuation nasal spray 2 Sprays by Both Nostrils route daily as needed for Rhinitis. (Patient not taking: Reported on 10/8/2021) 3 Bottle 3    Accu-Chek Hannah Plus Meter misc USE TO CHECK BLOOD SUGAR 2  TIMES DAILY 1 Each 0    lancets (Accu-Chek Softclix Lancets) misc Use to obtain blood sample for diabetic testing three times a day 1 Package 11    glucose blood VI test strips (CONTOUR NEXT TEST STRIPS) strip USE TO CHECK BLOOD SUGAR TWICE DAILY 100 Strip 11    multivit-minerals/folic acid (CENTRUM VITAMINTS PO) Take 1 tablet by mouth daily      cholecalciferol, vitamin D3, (VITAMIN D3) 2,000 unit tab Take 2 capsule by mouth daily      Aspirin, Buffered 81 mg tab Take 81 mg by mouth daily.          Allergies   Allergen Reactions    Demerol [Meperidine] Unknown (comments)     Pt unsure of reaction       Past Medical History:   Diagnosis Date    Anxiety     Arthritis     ASHD (arteriosclerotic heart disease)     Bilateral carotid artery stenosis     CAD (coronary artery disease)     Chronic pain     Depression     Diabetes (HCC)     TypeII    Diarrhea     Gastroparesis     GERD (gastroesophageal reflux disease)     History of cardioembolic cerebrovascular accident (CVA)     Hypercholesterolemia     Hypertension     S/P bypass gastrojejunostomy 1/23/2012    Stroke [facility])     TIA    Thyroid disease        Past Surgical History: Procedure Laterality Date    COLONOSCOPY N/A 5/26/2016    COLONOSCOPY performed by [facility]ressa Delaney [facility] at [facility] ENDOSCOPY    COLONOSCOPY N/A 10/16/2020    COLONOSCOPY performed by Charley Laura MD at [facility] AMBULATORY OR    EGD  7/13/2009    HX APPENDECTOMY      HX BLADDER SUSPENSION      HX CHOLECYSTECTOMY      HX CORONARY STENT PLACEMENT  06/2013    3 stents    HX GASTRIC BYPASS  6/02    HX HEART CATHETERIZATION  2017    stents remain open, no new stents placed    HX HYSTERECTOMY      HX LAPAROTOMY  8/02    2/2 intra abd abscess    HX LUMBAR LAMINECTOMY  01/2008    rods and screws    HX OOPHORECTOMY      HX ORTHOPAEDIC Bilateral     carpal tunnel    HX ORTHOPAEDIC Bilateral     bone spurs both feet    FL ABDOMEN SURGERY PROC UNLISTED      s/p partial gastrectomy 2/2 gastroparesis    FL COLONOSCOPY FLX DX W/COLLJ SPEC WHEN PFRMD  5/03/2006    Dr. Maurilio Riojas  2/9/2012         FL EGD BALLOON DILATION ESOPHAGUS <30 MM DIAM  1/23/2012         FL EGD TRANSORAL BIOPSY SINGLE/MULTIPLE  7/11/2011         FL EGD TRANSORAL BIOPSY SINGLE/MULTIPLE  1/23/2012         FL GASTROJEJUNOSTOMY      UPPER GI ENDOSCOPY,BIOPSY  10/1/2019         UPPER GI ENDOSCOPY,BIOPSY  10/16/2020            Family History   Problem Relation Age of Onset    Heart Disease Mother     Hypertension Mother     Diabetes Father     Heart Disease Father     Hypertension Father     Alcohol abuse Brother     Heart Disease Sister     Hypertension Sister     Diabetes Sister     No Known Problems Sister     Alcohol abuse Brother     Alcohol abuse Brother     Diabetes Brother        Social History     Tobacco Use    Smoking status: Never Smoker    Smokeless tobacco: Never Used   Substance Use Topics    Alcohol use: No     Alcohol/week: 0.0 standard drinks        Lab Results   Component Value Date/Time    WBC 5.9 06/11/2021 10:00 AM    HGB 12.0 06/11/2021 10:00 AM    HCT 38.5 06/11/2021 10:00 AM PLATELET 389 21/01/3574 10:00 AM    MCV 96.0 06/11/2021 10:00 AM     Lab Results   Component Value Date/Time    Cholesterol, total 135 06/11/2021 10:00 AM    HDL Cholesterol 42 06/11/2021 10:00 AM    LDL, calculated 57 06/11/2021 10:00 AM    Triglyceride 180 (H) 06/11/2021 10:00 AM    CHOL/HDL Ratio 3.2 06/11/2021 10:00 AM     Lab Results   Component Value Date/Time    TSH 2.730 06/11/2021 10:00 AM    T4, Free 1.77 02/08/2019 12:18 PM    T4, Total 5.6 11/17/2015 08:54 AM      Lab Results   Component Value Date/Time    Sodium 139 06/11/2021 10:00 AM    Potassium 4.3 06/11/2021 10:00 AM    Chloride 106 06/11/2021 10:00 AM    CO2 27 06/11/2021 10:00 AM    Anion gap 6 06/11/2021 10:00 AM    Glucose 177 (H) 06/11/2021 10:00 AM    BUN 12 06/11/2021 10:00 AM    Creatinine 0.93 06/11/2021 10:00 AM    BUN/Creatinine ratio 13 06/11/2021 10:00 AM    GFR est AA >60 06/11/2021 10:00 AM    GFR est non-AA 60 (L) 06/11/2021 10:00 AM    Calcium 9.2 06/11/2021 10:00 AM    Bilirubin, total 0.3 06/11/2021 10:00 AM    ALT (SGPT) 31 06/11/2021 10:00 AM    Alk. phosphatase 118 (H) 06/11/2021 10:00 AM    Protein, total 6.5 06/11/2021 10:00 AM    Albumin 4.1 06/11/2021 10:00 AM    Globulin 2.4 06/11/2021 10:00 AM    A-G Ratio 1.7 06/11/2021 10:00 AM      Lab Results   Component Value Date/Time    Hemoglobin A1c 7.4 (H) 11/09/2020 01:14 PM    Hemoglobin A1c (POC) 5.6 06/11/2021 10:00 AM         Review of Systems   Constitutional: Negative for malaise/fatigue. HENT: Negative for congestion. Eyes: Negative for blurred vision. Respiratory: Negative for cough and shortness of breath. Cardiovascular: Negative for chest pain, palpitations and leg swelling. Gastrointestinal: Positive for abdominal pain. Negative for constipation and heartburn. Still having upper abd pain. Seeing GI. Has CT abd scheduled for next week and then will follow back up after test.  Defer to follow up with GI.    Genitourinary: Negative for dysuria, frequency and urgency. Musculoskeletal: Negative for back pain and joint pain. Neurological: Negative for dizziness, tingling and headaches. Endo/Heme/Allergies: Negative for environmental allergies. Psychiatric/Behavioral: Negative for depression. The patient has insomnia (want to try another medication to help with sleep.  does not think that xanax is helping.  ). Physical Exam  Vitals and nursing note reviewed. Constitutional:       Appearance: Normal appearance. She is well-developed. Comments: /68 (BP 1 Location: Left arm, BP Patient Position: Sitting)   Pulse 78   Temp 96.9 °F (36.1 °C) (Oral)   Resp 16   Ht 5' 2\" (1.575 m)   Wt 149 lb 3.2 oz (67.7 kg)   SpO2 100%   BMI 27.29 kg/m²    HENT:      Right Ear: Tympanic membrane and ear canal normal.      Left Ear: Tympanic membrane and ear canal normal.      Nose: No mucosal edema. Neck:      Thyroid: No thyromegaly. Cardiovascular:      Rate and Rhythm: Normal rate and regular rhythm. Heart sounds: Normal heart sounds. No gallop. Pulmonary:      Effort: Pulmonary effort is normal.      Breath sounds: Normal breath sounds. Abdominal:      General: Bowel sounds are normal.      Palpations: Abdomen is soft. There is no mass. Tenderness: There is no abdominal tenderness. Musculoskeletal:         General: Normal range of motion. Cervical back: Normal range of motion and neck supple. Right lower leg: Edema (mild) present. Left lower leg: Edema (mild) present. Lymphadenopathy:      Cervical: No cervical adenopathy. Skin:     General: Skin is warm and dry. Neurological:      General: No focal deficit present. Mental Status: She is alert and oriented to person, place, and time. Psychiatric:         Mood and Affect: Mood normal.         ASSESSMENT and PLAN  Diagnoses and all orders for this visit:    1. Type 2 diabetes   a1c 5.8%  Continue to monitor. Work on diet and exercise.   -     AMB POC HEMOGLOBIN A1C  -     AMB POC GLUCOSE, QUANTITATIVE, BLOOD    2. Mixed hyperlipidemia  Continue to monitor. Work on diet and exercise.  -     LIPID PANEL; Future    3. Coronary artery disease involving native coronary artery of native heart without angina pectoris  Stable     4. Acquired hypothyroidism  -     TSH 3RD GENERATION; Future    5. PAD (peripheral artery disease) (HCC)  Stable     6. Primary insomnia  -     traZODone (DESYREL) 50 mg tablet; Take 1 Tablet by mouth nightly as needed for Sleep. The problem of recurrent insomnia is discussed. Avoidance of caffeine sources is strongly encouraged. Sleep hygiene issues are reviewed. Naps were discouraged. A regular exercise schedule at least 3 hours before bedtime would be beneficial to improving sleep quality. Watching TV, using laptops, tablets and smartphones in the evening was discouraged. Advised to keep the bedroom cool and dark. 7. Encounter for medication monitoring  -     METABOLIC PANEL, COMPREHENSIVE; Future  -     CBC W/O DIFF; Future    8. Needs flu shot  -     FLU (FLUAD QUAD INFLUENZA VACCINE,QUAD,ADJUVANTED)    9. Encounter for screening mammogram for breast cancer  -     [facility] 3D ADRIAN W MAMMO BI SCREENING INCL CAD; Future    10. Abdominal Pain  As per GI. Follow-up and Dispositions    · Return in about 4 months (around 2/22/2022). reviewed diet, exercise and weight control  cardiovascular risk and specific lipid/LDL goals reviewed  reviewed medications and side effects in detail  specific diabetic recommendations: low cholesterol diet, weight control and daily exercise discussed and glycohemoglobin and other lab monitoring discussed     I have discussed diagnosis listed in this note with pt and/or family. I have discussed treatment plans and options and the risk/benefit analysis of those options, including safe use of medications and possible medication side effects.    Through the use of shared decision making we have agreed to the above plan. The patient has received an after-visit summary and questions were answered concerning future plans and follow up. Advise pt of any urgent changes then to proceed to the ER.

## 2021-10-22 NOTE — LETTER
CONTROLLED SUBSTANCE MEDICATION AGREEMENT  Patient Name: Pacheco Romano  Patient YOB: 1950     I understand, that controlled substance medications may be used to help better manage my symptoms and to improve my ability to function at home, work and in social settings. However, I also understand that these medications do have risks, which have been discussed with me, including possible development of physical or psychological dependence. I understand that successful treatment requires mutual trust and honesty between me and my provider. I understand and agree that following this Medication Agreement is necessary in continuing my provider-patient relationship and the success of my treatment plan. Explanation from my Provider: Benefits and Goals of Controlled Substance Medications: There are two potential goals for your treatment: (1) decreased pain and suffering (2) improved daily life functions. There are many possible treatments for your chronic condition(s). Alternatives such as physical therapy, yoga, massage, home daily exercise, meditation, relaxation techniques, injections, chiropractic manipulations, surgery, cognitive therapy, hypnosis and many medications that are not habit-forming may be used. Use of controlled substance medications may be helpful, but they are unlikely to resolve all symptoms or restore all function. Explanation from my Provider: Risks of Controlled Substance Medications:   Opioid pain medications: These medications can lead to problems such as addiction/dependence, sedation, lightheadedness/dizziness, memory issues, falls, constipation, nausea, or vomiting. They may also impair the ability to drive or operate machinery. Additionally, these medications may lower testosterone levels, leading to loss of bone strength, stamina and sex drive.   They may cause problems with breathing, sleep apnea and reduced coughing, which is especially dangerous for patients with lung disease. Overdose or dangerous interactions with alcohol and other medications may occur, leading to death. Hyperalgesia may develop, which means patients receiving opioids for the treatment of pain may become more sensitive to certain painful stimuli, and in some cases, experience pain from ordinarily non-painful stimuli. Women between the ages of 14-53 who could become pregnant should carefully weigh the risks and benefits of opioids with their physicians, as these medications increase the risk of pregnancy complications, including miscarriage,  delivery and stillbirth. It is also possible for babies to be born addicted to opioids. Opioid dependence withdrawal symptoms may include; feelings of uneasiness, increased pain, irritability, belly pain, diarrhea, sweats and goose-flesh. Testosterone replacement therapy:  Potential side effects include increased risk of stroke and heart attack, blood clots, increased blood pressure, increased cholesterol, enlarged prostate, sleep apnea, irritability/aggression and other mood disorders, and decreased fertility. Timoteo Moore (1950)             Page 1 of 4    Initials:_______    Benzodiazepines and non-benzodiazepine sleep medications: These medications can lead to problems such as addiction/dependence, sedation, fatigue, lightheadedness, dizziness, incoordination, falls, depression, hallucinations, and impaired judgment, memory and concentration. The ability to drive and operate machinery may also be affected. Abnormal sleep-related behaviors have been reported, including sleepwalking, driving, making telephone calls, eating, or having sex while not fully awake. These medications can suppress breathing and worsen sleep apnea, particularly when combined with alcohol or other sedating medications, potentially leading to death. Dependence withdrawal symptoms may include tremors, anxiety, hallucinations and seizures.    Stimulants:  Common adverse effects include addiction/dependence, increased blood pressure and heart rate, decreased appetite, nausea, involuntary weight loss, insomnia,  irritability, and headaches. These risks may increase when these medications are combined with other stimulants, such as caffeine pills or energy drinks, certain weight loss supplements and oral decongestants. Dependence withdrawal symptoms may include depressed mood, loss of interest, suicidal thoughts, anxiety, fatigue, appetite changes and agitation. I agree and understand that I and my prescriber have the following rights and responsibilities regarding my treatment plan:   1. MY RIGHTS:  To be informed of my treatment and medication plan. To be an active participant in my health and wellbeing. 2. MY RESPONSIBILITY AND UNDERSTANDING FOR USE OF MEDICATIONS   I will take medications at the dose and frequency as directed. For my safety, I will not increase or change how I take my medications without the recommendation of my healthcare provider.  I will actively participate in any program recommended by my provider which may improve function, including social, physical, psychological programs.  I will not take my medications with alcohol or other drugs not prescribed to me. I understand that drinking alcohol with my medications increases the chances of side effects, including reduced breathing rate and could lead to personal injury when operating machinery.  I understand that if I have a history of substance use disorders, including alcohol or other illicit drugs, that I may be at increased risk of addiction to my medications.  I agree to notify my provider immediately if I should become pregnant so that my treatment plan can be adjusted.    I agree and understand that I shall only receive controlled substance medications from the prescriber that signed this agreement unless there is written agreement among other prescribers of controlled substances outlining the responsibility of the medications being prescribed.  I understand that the if the controlled medication is not helping to achieve goals, the dosage may be tapered and no longer prescribed. 3. MY RESPONSIBILITY FOR COMMUNICATION / PRESCRIPTION RENEWALS   I agree that all controlled substance medications that I take will be prescribed only by my provider. If another healthcare provider prescribes me medication in an emergency, I will notify my provider within seventy-two (72) hours. Aslhi Collins (1950)             Page 2 of 4    Initials:_______   I will arrange for refills at the prescribed interval ONLY during regular office hours. I will not ask for refills earlier than agreed, after-hours, on holidays or weekends. Refills may take up to 72 hours for processing and prescriptions to reach the pharmacy.  I will inform my other health care providers that I am taking these medications and of the existence of this [id number]. In the event of an emergency, I will provide the same information to the emergency department prescribers.  I will keep my provider updated on the pharmacy I am using for controlled medication prescription filling. 4. MY RESPONSIBILITY FOR PROTECTING MEDICATIONS   I will protect my prescriptions and medications. I understand that lost or misplaced prescriptions will not be replaced.  I will keep medications only for my own use and will not share them with others. I will keep all medications away from children.  I agree that if my medications are adjusted or discontinued, I will properly dispose of any remaining medications. I understand that I will be required to dispose of any remaining controlled medications as, directed by my prescriber, prior to being provided with any prescriptions for other controlled medications.   Medication drop box locations can be found at: [employer]  5. MY RESPONSIBILITY WITH ILLEGAL DRUGS    I will not use illegal or street drugs or another person's prescription medications not prescribed to me.  If there are identified addiction type symptoms, then referral to a program may be provided by my provider and I agree to follow through with this recommendation. 6. MY RESPONSIBILITY FOR COOPERATION WITH INVESTIGATIONS   I understand that my provider will comply with any applicable law and may discuss my use and/or possible misuse/abuse of controlled substances and alcohol, as appropriate, with any health care provider involved in my care, pharmacist, or legal authority.  I authorize my provider and pharmacy to cooperate fully with law enforcement agencies (as permitted by law) in the investigation of any possible misuse, sale, or other diversion of my controlled substances.  I agree to waive any applicable privilege or right of privacy or confidentiality with respect to these authorizations. 7. PROVIDERS RIGHT TO MONITOR FOR SAFETY: PRESCRIPTION MONITORING / DRUG TESTING   I consent to drug/toxicology screening and will submit to a drug screen upon my providers request to assure I am only taking the prescribed drugs for my safety monitoring. I understand that a drug screen is a laboratory test in which a sample of my urine, blood or saliva is checked to see what drugs I have been taking. This may entail an observed urine specimen, which means that a nurse or other health care provider may watch me provide urine, and I will cooperate if I am asked to provide an observed specimen. Dl Wing (1950)             Page 3 of 4    Initials:_______  [facility] I understand that my provider will check a copy of my State Prescription Monitoring Program () Report in order to safely prescribe medications.      Pill Counts: I consent to pill counts when requested. I may be asked to bring all my prescribed controlled substance medications, in their original bottles, to all of my scheduled appointments. In addition, my provider may ask me to come to the practice at any time for a random pill count. 8. TERMINATION OF THIS AGREEMENT   For my safety, my prescriber has the right to stop prescribing controlled substance medications and may end this agreement.  Conditions that may result in termination of this agreement:  a. I do not show any improvement in pain, or my activity has not improved. b. I develop rapid tolerance or loss of improvement, as described in my treatment plan.  c. I develop significant side effects from the medication. d. My behavior is not consistent with the responsibilities outlined above, thereby causing safety concerns to continue prescribing controlled substance medications. e. I fail to follow the terms of this agreement. f. Other:____________________________     UNDERSTANDING THIS MEDICATION AGREEMENT:    I have read the above and have had all my questions answered. For chronic disease management, I know that my symptoms can be managed with many types of treatments. A chronic medication trial may be part of my treatment, but I must be an active participant in my care. Medication therapy is only one part of my symptom management plan. In some cases, there may be limited scientific evidence to support the chronic use of certain medications to improve symptoms and daily function. Furthermore, in certain circumstances, there may be scientific information that suggests that the use of chronic controlled substances may worsen my symptoms and increase my risk of unintentional death directly related to this medication therapy. I know that if my provider feels my risk from controlled medications is greater than my benefit, I will have my controlled substance medication(s) compassionately lowered or removed altogether. I further agree to allow this office to contact my HIPAA contact if there are concerns about my safety and use of the controlled medications. I have agreed to use the prescribed controlled substance medications to me as instructed by my provider and as stated in this Medication Agreement. My initial on each page and my signature below shows that I have read each page and I have had the opportunity to ask questions with answers provided by my provider.       Patient Name (Printed): _____________________________________    Patient Signature:  ______________________   Date: _____________      Prescriber Name (Printed): ___________________________________    Prescriber Signature: _____________________  Date: _____________     Joanne Hong (1950)             Page 4 of 4

## 2021-10-22 NOTE — PROGRESS NOTES
Chief Complaint   Patient presents with    Cholesterol Problem     follow up    Diabetes     follow up           Mammogram 2/20/2020    Bone density 2/20/2020    Colonoscopy 10/16/2020 by Dr Tavo Sales- repeat in 5 years. Verbal order received by Dr. Leon Cruz dose flu vaccine IM. Pt received high dose flu vaccine IM in left deltoid without any difficulty. 1. Have you been to the ER, urgent care clinic since your last visit? Hospitalized since your last visit? No    2. Have you seen or consulted any other health care providers outside of the [address] [place] [address] since your last visit? Include any pap smears or colon screening.  No

## 2021-10-27 ENCOUNTER — TELEPHONE (OUTPATIENT)
Dept: FAMILY MEDICINE CLINIC | Age: 71
End: 2021-10-27

## 2021-10-27 DIAGNOSIS — E11.21 TYPE 2 DIABETES WITH NEPHROPATHY (HCC): ICD-10-CM

## 2021-10-27 RX ORDER — LISINOPRIL 2.5 MG/1
TABLET ORAL
Qty: 90 TABLET | Refills: 3 | Status: SHIPPED | OUTPATIENT
Start: 2021-10-27 | End: 2022-03-01

## 2021-10-28 ENCOUNTER — HOSPITAL ENCOUNTER (OUTPATIENT)
Dept: MRI IMAGING | Age: 71
Discharge: HOME | End: 2021-10-28
Attending: SPECIALIST
Payer: MEDICARE

## 2021-10-28 ENCOUNTER — APPOINTMENT (OUTPATIENT)
Dept: CT IMAGING | Age: 71
End: 2021-10-28
Attending: SPECIALIST
Payer: MEDICARE

## 2021-10-28 DIAGNOSIS — R15.9 FULL INCONTINENCE OF FECES: ICD-10-CM

## 2021-10-28 DIAGNOSIS — R10.13 EPIGASTRIC PAIN: ICD-10-CM

## 2021-10-28 DIAGNOSIS — R19.7 OVERFLOW DIARRHEA: ICD-10-CM

## 2021-10-28 PROCEDURE — 72195 MRI PELVIS W/O DYE: CPT

## 2021-10-28 PROCEDURE — 74011000250 HC RX REV CODE- 250: Performed by: SPECIALIST

## 2021-10-28 RX ADMIN — Medication 1000 ML: at 09:37

## 2021-10-29 ENCOUNTER — CLINICAL SUPPORT (OUTPATIENT)
Dept: FAMILY MEDICINE CLINIC | Age: 71
End: 2021-10-29

## 2021-10-29 ENCOUNTER — HOSPITAL ENCOUNTER (OUTPATIENT)
Dept: CT IMAGING | Age: 71
Discharge: HOME | End: 2021-10-29
Attending: SPECIALIST
Payer: MEDICARE

## 2021-10-29 DIAGNOSIS — R10.13 EPIGASTRIC PAIN: ICD-10-CM

## 2021-10-29 DIAGNOSIS — R15.9 FULL INCONTINENCE OF FECES: ICD-10-CM

## 2021-10-29 DIAGNOSIS — E03.9 ACQUIRED HYPOTHYROIDISM: Primary | ICD-10-CM

## 2021-10-29 DIAGNOSIS — R19.7 OVERFLOW DIARRHEA: ICD-10-CM

## 2021-10-29 PROCEDURE — 74177 CT ABD & PELVIS W/CONTRAST: CPT

## 2021-10-29 PROCEDURE — 74011000636 HC RX REV CODE- 636: Performed by: SPECIALIST

## 2021-10-29 PROCEDURE — 74011000250 HC RX REV CODE- 250: Performed by: SPECIALIST

## 2021-10-29 RX ORDER — BARIUM SULFATE 20 MG/ML
900 SUSPENSION ORAL
Status: COMPLETED | OUTPATIENT
Start: 2021-10-29 | End: 2021-10-29

## 2021-10-29 RX ADMIN — BARIUM SULFATE 900 ML: 21 SUSPENSION ORAL at 15:03

## 2021-10-29 RX ADMIN — IOPAMIDOL 100 ML: 755 INJECTION, SOLUTION INTRAVENOUS at 15:02

## 2021-10-30 DIAGNOSIS — F41.9 ANXIETY: ICD-10-CM

## 2021-11-01 RX ORDER — ALPRAZOLAM 1 MG/1
TABLET ORAL
Qty: 135 TABLET | Refills: 0 | Status: SHIPPED | OUTPATIENT
Start: 2021-11-01 | End: 2022-02-06 | Stop reason: ALTCHOICE

## 2021-11-05 ENCOUNTER — CLINICAL SUPPORT (OUTPATIENT)
Dept: FAMILY MEDICINE CLINIC | Age: 71
End: 2021-11-05

## 2021-11-05 DIAGNOSIS — E03.9 ACQUIRED HYPOTHYROIDISM: Primary | ICD-10-CM

## 2021-11-06 LAB
T4 FREE SERPL-MCNC: 1.09 NG/DL (ref 0.82–1.77)
TSH SERPL DL<=0.005 MIU/L-ACNC: 3.97 UIU/ML (ref 0.45–4.5)

## 2021-11-08 NOTE — PROGRESS NOTES
Please call and let her know that her tyroid level were normal.  Continue with current dose of her thyroid medication.

## 2021-11-09 ENCOUNTER — TELEPHONE (OUTPATIENT)
Dept: FAMILY MEDICINE CLINIC | Age: 71
End: 2021-11-09

## 2021-11-09 NOTE — TELEPHONE ENCOUNTER
----- Message from Manas Ga MD sent at 11/8/2021  6:24 PM EST -----  Please call and let her know that her tyroid level were normal.  Continue with current dose of her thyroid medication.

## 2021-11-09 NOTE — TELEPHONE ENCOUNTER
Called patient , per Dr. Mane Kunz, thyroid level was normal and continue current dose of thyroid medication. Patient verbalized understanding.

## 2021-12-06 DIAGNOSIS — F41.9 ANXIETY: ICD-10-CM

## 2021-12-06 RX ORDER — ALPRAZOLAM 1 MG/1
TABLET ORAL
Qty: 135 TABLET | Refills: 0 | OUTPATIENT
Start: 2021-12-06

## 2021-12-13 RX ORDER — LANCETS
EACH MISCELLANEOUS
Qty: 300 EACH | Refills: 3 | Status: SHIPPED | OUTPATIENT
Start: 2021-12-13 | End: 2022-04-05 | Stop reason: ALTCHOICE

## 2021-12-18 DIAGNOSIS — E11.21 TYPE 2 DIABETES WITH NEPHROPATHY (HCC): ICD-10-CM

## 2021-12-20 RX ORDER — SEMAGLUTIDE 1.34 MG/ML
INJECTION, SOLUTION SUBCUTANEOUS
Qty: 4.5 ML | Refills: 3 | Status: SHIPPED | OUTPATIENT
Start: 2021-12-20 | End: 2022-06-20 | Stop reason: SDUPTHER

## 2021-12-24 DIAGNOSIS — I25.10 CORONARY ARTERY DISEASE INVOLVING NATIVE CORONARY ARTERY OF NATIVE HEART WITHOUT ANGINA PECTORIS: ICD-10-CM

## 2021-12-27 RX ORDER — METOPROLOL SUCCINATE 25 MG/1
TABLET, EXTENDED RELEASE ORAL
Qty: 90 TABLET | Refills: 3 | Status: SHIPPED | OUTPATIENT
Start: 2021-12-27

## 2022-01-28 ENCOUNTER — HOSPITAL ENCOUNTER (OUTPATIENT)
Dept: PREADMISSION TESTING | Age: 72
Discharge: HOME | End: 2022-01-28
Payer: MEDICARE

## 2022-01-28 PROCEDURE — U0005 INFEC AGEN DETEC AMPLI PROBE: HCPCS

## 2022-01-29 LAB
SARS-COV-2, XPLCVT: NOT DETECTED
SOURCE, COVRS: NORMAL

## 2022-02-02 ENCOUNTER — HOSPITAL ENCOUNTER (OUTPATIENT)
Age: 72
Setting detail: OUTPATIENT SURGERY
Discharge: HOME | End: 2022-02-02
Attending: INTERNAL MEDICINE | Admitting: INTERNAL MEDICINE
Payer: MEDICARE

## 2022-02-02 ENCOUNTER — ANESTHESIA EVENT (OUTPATIENT)
Dept: ENDOSCOPY | Age: 72
End: 2022-02-02
Payer: MEDICARE

## 2022-02-02 ENCOUNTER — ANESTHESIA (OUTPATIENT)
Dept: ENDOSCOPY | Age: 72
End: 2022-02-02
Payer: MEDICARE

## 2022-02-02 VITALS
RESPIRATION RATE: 21 BRPM | DIASTOLIC BLOOD PRESSURE: 60 MMHG | SYSTOLIC BLOOD PRESSURE: 90 MMHG | HEIGHT: 62 IN | WEIGHT: 144.6 LBS | TEMPERATURE: 98 F | HEART RATE: 82 BPM | OXYGEN SATURATION: 99 % | BODY MASS INDEX: 26.61 KG/M2

## 2022-02-02 LAB
GLUCOSE BLD STRIP.AUTO-MCNC: 96 MG/DL (ref 65–117)
SERVICE CMNT-IMP: NORMAL

## 2022-02-02 PROCEDURE — 76040000007: Performed by: INTERNAL MEDICINE

## 2022-02-02 PROCEDURE — 74011250636 HC RX REV CODE- 250/636: Performed by: NURSE ANESTHETIST, CERTIFIED REGISTERED

## 2022-02-02 PROCEDURE — 74011000250 HC RX REV CODE- 250: Performed by: NURSE ANESTHETIST, CERTIFIED REGISTERED

## 2022-02-02 PROCEDURE — 82962 GLUCOSE BLOOD TEST: CPT

## 2022-02-02 PROCEDURE — 77030021593 HC FCPS BIOP ENDOSC BSC -A: Performed by: INTERNAL MEDICINE

## 2022-02-02 PROCEDURE — 77030019988 HC FCPS ENDOSC DISP BSC -B: Performed by: INTERNAL MEDICINE

## 2022-02-02 PROCEDURE — 2709999900 HC NON-CHARGEABLE SUPPLY: Performed by: INTERNAL MEDICINE

## 2022-02-02 PROCEDURE — 88305 TISSUE EXAM BY PATHOLOGIST: CPT

## 2022-02-02 PROCEDURE — 76060000032 HC ANESTHESIA 0.5 TO 1 HR: Performed by: INTERNAL MEDICINE

## 2022-02-02 RX ORDER — LIDOCAINE HYDROCHLORIDE 20 MG/ML
INJECTION, SOLUTION EPIDURAL; INFILTRATION; INTRACAUDAL; PERINEURAL AS NEEDED
Status: DISCONTINUED | OUTPATIENT
Start: 2022-02-02 | End: 2022-02-02 | Stop reason: HOSPADM

## 2022-02-02 RX ORDER — SODIUM CHLORIDE 0.9 % (FLUSH) 0.9 %
5-40 SYRINGE (ML) INJECTION EVERY 8 HOURS
Status: DISCONTINUED | OUTPATIENT
Start: 2022-02-02 | End: 2022-02-02 | Stop reason: HOSPADM

## 2022-02-02 RX ORDER — DEXTROMETHORPHAN/PSEUDOEPHED 2.5-7.5/.8
1.2 DROPS ORAL
Status: DISCONTINUED | OUTPATIENT
Start: 2022-02-02 | End: 2022-02-02 | Stop reason: HOSPADM

## 2022-02-02 RX ORDER — NALOXONE HYDROCHLORIDE 0.4 MG/ML
0.4 INJECTION, SOLUTION INTRAMUSCULAR; INTRAVENOUS; SUBCUTANEOUS
Status: DISCONTINUED | OUTPATIENT
Start: 2022-02-02 | End: 2022-02-02 | Stop reason: HOSPADM

## 2022-02-02 RX ORDER — SODIUM CHLORIDE 9 MG/ML
75 INJECTION, SOLUTION INTRAVENOUS CONTINUOUS
Status: DISCONTINUED | OUTPATIENT
Start: 2022-02-02 | End: 2022-02-02 | Stop reason: HOSPADM

## 2022-02-02 RX ORDER — EPINEPHRINE 0.1 MG/ML
1 INJECTION INTRACARDIAC; INTRAVENOUS
Status: DISCONTINUED | OUTPATIENT
Start: 2022-02-02 | End: 2022-02-02 | Stop reason: HOSPADM

## 2022-02-02 RX ORDER — ATROPINE SULFATE 0.1 MG/ML
0.5 INJECTION INTRAVENOUS
Status: DISCONTINUED | OUTPATIENT
Start: 2022-02-02 | End: 2022-02-02 | Stop reason: HOSPADM

## 2022-02-02 RX ORDER — MIDAZOLAM HYDROCHLORIDE 1 MG/ML
.25-5 INJECTION, SOLUTION INTRAMUSCULAR; INTRAVENOUS
Status: DISCONTINUED | OUTPATIENT
Start: 2022-02-02 | End: 2022-02-02 | Stop reason: HOSPADM

## 2022-02-02 RX ORDER — FLUMAZENIL 0.1 MG/ML
0.2 INJECTION INTRAVENOUS
Status: DISCONTINUED | OUTPATIENT
Start: 2022-02-02 | End: 2022-02-02 | Stop reason: HOSPADM

## 2022-02-02 RX ORDER — SODIUM CHLORIDE 0.9 % (FLUSH) 0.9 %
5-40 SYRINGE (ML) INJECTION AS NEEDED
Status: DISCONTINUED | OUTPATIENT
Start: 2022-02-02 | End: 2022-02-02 | Stop reason: HOSPADM

## 2022-02-02 RX ORDER — PROPOFOL 10 MG/ML
INJECTION, EMULSION INTRAVENOUS AS NEEDED
Status: DISCONTINUED | OUTPATIENT
Start: 2022-02-02 | End: 2022-02-02 | Stop reason: HOSPADM

## 2022-02-02 RX ADMIN — LIDOCAINE HYDROCHLORIDE 80 MG: 20 INJECTION, SOLUTION EPIDURAL; INFILTRATION; INTRACAUDAL; PERINEURAL at 07:40

## 2022-02-02 RX ADMIN — PROPOFOL 140 MG: 10 INJECTION, EMULSION INTRAVENOUS at 07:58

## 2022-02-02 RX ADMIN — PROPOFOL 130 MG: 10 INJECTION, EMULSION INTRAVENOUS at 07:45

## 2022-02-02 NOTE — PROGRESS NOTES
The risks and benefits of the bite block have been explained to patient. Patient verbalizes understanding. Upper partial at home, denies any issues with remaining teeth in mouth.

## 2022-02-02 NOTE — H&P
Pre-endoscopy H and P    The patient was seen and examined in the room/pre-op holding area. The airway was assessed and documented. The problem list, past medical history, and medications were reviewed. Patient Active Problem List   Diagnosis Code    Anxiety F41.9    Depression F32. A    Hypovitaminosis D E55.9    GERD (gastroesophageal reflux disease) K21.9    Edema R60.9    Esophageal motor disorder K22.4    S/P bypass gastrojejunostomy Z98.0    SOB (shortness of breath) R06.02    CAD (coronary artery disease) I25.10    S/P PTCA (percutaneous transluminal coronary angioplasty) Z98.61    Mixed hyperlipidemia E78.2    Diarrhea R19.7    Type 2 diabetes mellitus without complication (HCC) D18.4    Osteopenia M85.80    Encounter for medication monitoring Z51.81    CVA (cerebral vascular accident) ([facility] [address] G16.3    Complicated migraine H86. 109    Numbness and tingling of right side of face R20.0, R20.2    Stenosis of both internal carotid arteries I65.23    Type 2 diabetes with nephropathy (HCC) E11.21    Hypertension, essential I10    Bilateral carotid artery stenosis I65.23    Spinal stenosis of lumbar region at multiple levels M48.061    Pain in both lower extremities M79.604, M79.605    Leg swelling M79.89    Venous insufficiency of both lower extremities I87.2     Social History     Socioeconomic History    Marital status:      Spouse name: Not on file    Number of children: Not on file    Years of education: Not on file    Highest education level: Not on file   Occupational History    Not on file   Tobacco Use    Smoking status: Never Smoker    Smokeless tobacco: Never Used   Vaping Use    Vaping Use: Never used   Substance and Sexual Activity    Alcohol use: No     Alcohol/week: 0.0 standard drinks    Drug use: Yes     Types: Prescription, OTC    Sexual activity: Not Currently   Other Topics Concern    Not on file   Social History Narrative    Not on file     Social Determinants of Health     Financial Resource Strain:     Difficulty of Paying Living Expenses: Not on file   Food Insecurity:     Worried About Running Out of Food in the Last Year: Not on file    Kailey of Food in the Last Year: Not on file   Transportation Needs:     Lack of Transportation (Medical): Not on file    Lack of Transportation (Non-Medical): Not on file   Physical Activity:     Days of Exercise per Week: Not on file    Minutes of Exercise per Session: Not on file   Stress:     Feeling of Stress : Not on file   Social Connections:     Frequency of Communication with Friends and Family: Not on file    Frequency of Social Gatherings with Friends and Family: Not on file    Attends [employer] Services: Not on file    Active Member of [address] [place] [address] [employer] or Organizations: Not on file    Attends Club or Organization Meetings: Not on file    Marital Status: Not on file   Intimate Partner Violence:     Fear of Current or Ex-Partner: Not on file    Emotionally Abused: Not on file    Physically Abused: Not on file    Sexually Abused: Not on file   Housing Stability:     Unable to Pay for Housing in the Last Year: Not on file    Number of [place] in the Last Year: Not on file    Unstable Housing in the Last Year: Not on file     Past Medical History:   Diagnosis Date    Anxiety     Arthritis     ASHD (arteriosclerotic heart disease)     Bilateral carotid artery stenosis     CAD (coronary artery disease)     Chronic pain     Depression     [facility]     TypeII    Diarrhea     Gastroparesis     GERD (gastroesophageal reflux disease)     History of cardioembolic cerebrovascular accident (CVA)     Hypercholesterolemia     Hypertension     S/P bypass gastrojejunostomy 1/23/2012    Stroke [facility])     TIA    Thyroid disease          Prior to Admission Medications   Prescriptions Last Dose Informant Patient Reported? Taking?    ALPRAZolam (XANAX) 1 mg tablet 2/1/2022 at Unknown time  No Yes Sig: TAKE 1/2 TABLET BY MOUTH EVERY DAY AS NEEDED THEN TAKE 1 TABLET BY MOUTH AT BEDTIME AS NEEDED FOR ANXIETY OR SLEEP   Accu-Chek Hannah Plus Meter misc 2022 at Unknown time  No Yes   Sig: USE TO CHECK BLOOD SUGAR 2  TIMES DAILY   Aspirin, Buffered 81 mg tab 2022  Yes No   Sig: Take 81 mg by mouth daily. Januvia 100 mg tablet 2022  No No   Sig: TAKE 1 TABLET BY MOUTH EVERY DAY FOR DIABETES   Ozempic 0.25 mg or 0.5 mg/dose (2 mg/1.5 ml) subq pen 2022  No No   Sig: INJECT SUBCUTANEOUSLY 0.5MG ONCE WEEKLY   cholecalciferol, vitamin D3, (VITAMIN D3) 2,000 unit tab 2022 at Unknown time  Yes Yes   Sig: Take 2 capsule by mouth daily   fluticasone propionate (FLONASE) 50 mcg/actuation nasal spray 2022 at Unknown time  No Yes   Si Sprays by Both Nostrils route daily as needed for Rhinitis. furosemide (LASIX) 20 mg tablet 2022  No No   Sig: TAKE 1 TABLET BY MOUTH EVERY DAY   gabapentin (NEURONTIN) 300 mg capsule 2022  Yes No   Si mg daily as needed. glucose blood VI test strips (Accu-Chek Hannah Plus test strp) strip 2022 at Unknown time  No Yes   Sig: by Does Not Apply route two (2) times a day.    lancets (Accu-Chek Softclix Lancets) misc 2022 at Unknown time  No Yes   Sig: USE TO OBTAIN BLOOD SAMPLE  FOR DIABETIC TESTING 3  TIMES DAILY   levothyroxine (SYNTHROID) 25 mcg tablet 2022  No No   Sig: TAKE 1 TABLET BY MOUTH DAILY BEFORE BREAKFAST   lisinopriL (PRINIVIL, ZESTRIL) 2.5 mg tablet 2022  No No   Sig: TAKE 1 TABLET BY MOUTH ONCE DAILY   metoprolol succinate (TOPROL-XL) 25 mg XL tablet 2022 at 0800  No No   Sig: TAKE 1 TABLET BY MOUTH  DAILY   multivit-minerals/folic acid (CENTRUM VITAMINTS PO) 2022  Yes No   Sig: Take 1 tablet by mouth daily   pravastatin (PRAVACHOL) 40 mg tablet 2022 at Unknown time  No Yes   Sig: TAKE 1 TABLET BY MOUTH EVERY NIGHT      Facility-Administered Medications: None           The review of systems is: Negative  for shortness of breath or chest pain      The heart, lungs, and mental status were satisfactory for the administration of deep sedation and for the procedure. I discussed with the patient the objectives, risks, consequences and alternatives to the procedure.       Vera Doe MD  2/2/2022  7:33 AM

## 2022-02-02 NOTE — PROGRESS NOTES
0588:  Endoscope was pre-cleaned at the bedside immediately following procedure by Santino Pratt. 0800: Anesthesia reports 270 mg Propofol, 80 mg Lidocaine and 500 mL NS given during procedure. Received report from anesthesia staff on vital signs and status of patient. genitalia/R testicle

## 2022-02-02 NOTE — DISCHARGE INSTRUCTIONS
[place] Office: [phone]    Ton Rodriguez  [id number]  1950    EGD/COLONOSCOPY DISCHARGE INSTRUCTIONS  Discomfort:  Sore throat- throat lozenges or warm salt water gargle  redness at IV site- apply warm compress to area; if redness or soreness persist- contact your physician  Gaseous discomfort- walking, belching will help relieve any discomfort  You may not operate a vehicle for 12 hours  You may not engage in an occupation involving machinery or appliances for rest of today. You may not drink alcoholic beverages for at least 12 hours  Avoid making any critical decisions for at least 24 hour  DIET  You may resume your regular diet - however -  remember your colon is empty and a heavy meal will produce gas. Avoid these foods:  fried / greasy foods, excessive carbonated drinks or too much caffeine  MEDICATIONS   Regarding Aspirin or Nonsteroidal medications specifically, please see below. ACTIVITY  You may resume your normal daily activities. Spend the remainder of the day resting -  avoid any strenuous activity. CALL M.D. ANY SIGN OF   Increasing pain, nausea, vomiting  Abdominal distension (swelling)  New increased bleeding (oral or rectal)  Fever (chills)  Pain in chest area  Bloody discharge from nose or mouth  Shortness of breath    You may not take any Advil, Aspirin, Ibuprofen, Motrin, Aleve, or Goodys for 7 days, ONLY  Tylenol as needed for pain. Follow-up Instructions:   Call  Lit tSout MD for any questions or concerns  Results of procedure / biopsy in 7 days   Telephone # [phone]      Follow-up Information    None

## 2022-02-02 NOTE — ANESTHESIA POSTPROCEDURE EVALUATION
Procedure(s):  COLONOSCOPY  ESOPHAGOGASTRODUODENOSCOPY (EGD)  ESOPHAGOGASTRODUODENAL (EGD) BIOPSY  COLON BIOPSY. total IV anesthesia, MAC    Anesthesia Post Evaluation        Patient location during evaluation: PACU  Note status: Adequate. Level of consciousness: responsive to verbal stimuli and sleepy but conscious  Pain management: satisfactory to patient  Airway patency: patent  Anesthetic complications: no  Cardiovascular status: acceptable  Respiratory status: acceptable  Hydration status: acceptable  Comments: +Post-Anesthesia Evaluation and Assessment    Patient: Ambrocio Elizondo MRN: [id number]  SSN: [id number]   YOB: 1950  Age: 73 y.o. Sex: female      Cardiovascular Function/Vital Signs    BP (!) 95/48   Pulse 79   Temp 37.1 °C (98.7 °F)   Resp 14   Ht 5' 2\" (1.575 m)   Wt 65.6 kg (144 lb 9.6 oz)   SpO2 99%   Breastfeeding No   BMI 26.45 kg/m²     Patient is status post Procedure(s):  COLONOSCOPY  ESOPHAGOGASTRODUODENOSCOPY (EGD)  ESOPHAGOGASTRODUODENAL (EGD) BIOPSY  COLON BIOPSY. Nausea/Vomiting: Controlled. Postoperative hydration reviewed and adequate. Pain:  Pain Scale 1: Visual (02/02/22 0805)  Pain Intensity 1: 0 (02/02/22 0805)   Managed. Neurological Status: At baseline. Mental Status and Level of Consciousness: Arousable. Pulmonary Status:   O2 Device: None (Room air) (02/02/22 0805)   Adequate oxygenation and airway patent. Complications related to anesthesia: None    Post-anesthesia assessment completed. No concerns. Signed By: Arpita Obando MD    2/2/2022  Post anesthesia nausea and vomiting:  controlled      INITIAL Post-op Vital signs:   Vitals Value Taken Time   /48 02/02/22 0815   Temp     Pulse 85 02/02/22 0816   Resp 26 02/02/22 0816   SpO2 100 % 02/02/22 0816   Vitals shown include unvalidated device data.

## 2022-02-02 NOTE — ANESTHESIA PREPROCEDURE EVALUATION
Anesthetic History   No history of anesthetic complications            Review of Systems / Medical History  Patient summary reviewed, nursing notes reviewed and pertinent labs reviewed    Pulmonary        Sleep apnea: No treatment           Neuro/Psych         TIA, headaches and psychiatric history (anxiety)     Cardiovascular    Hypertension          CAD, cardiac stents (x 3 in 6/2013) and hyperlipidemia    Exercise tolerance: >4 METS  Comments:  ECHO: 55-60% EF, mild MR, TR, and AR    06/19 EKG: SR   GI/Hepatic/Renal     GERD          Comments: Diverticulosis  Gastroparesis s/p partial gastrectomy,   esophageal motor disorder  S/P bypass gastrojejunostomy (hx of gastric bypass)  Hx of diarrhea/constipation  melena Endo/Other    Diabetes  Hypothyroidism  Obesity and arthritis     Other Findings   Comments: Chronic pain, abdomen & back  Vit D defic           Physical Exam    Airway  Mallampati: III  TM Distance: 4 - 6 cm  Neck ROM: normal range of motion   Mouth opening: Normal     Cardiovascular    Rhythm: regular  Rate: normal         Dental    Dentition: Upper partial plate  Comments: removed   Pulmonary  Breath sounds clear to auscultation               Abdominal  GI exam deferred       Other Findings            Anesthetic Plan    ASA: 3  Anesthesia type: total IV anesthesia and MAC          Induction: Intravenous  Anesthetic plan and risks discussed with: Patient

## 2022-02-02 NOTE — ROUTINE PROCESS
Henry Rehmanceleste  1950  [id number]    Situation:  Verbal report received from: Alysha  Procedure: Procedure(s):  COLONOSCOPY  ESOPHAGOGASTRODUODENOSCOPY (EGD)  ESOPHAGOGASTRODUODENAL (EGD) BIOPSY  COLON BIOPSY    Background:    Preoperative diagnosis: Constipation, unspecified constipation type [K59.00]  Diarrhea, unspecified type [R19.7]  Abdominal pain, unspecified abdominal location [R10.9]  Dysphagia, unspecified type [R13.10]  Postoperative diagnosis: EGD - gastritis  COLON - diverticulosis, adhesions    :  Dr. Raven Ortiz  Assistant(s): Endoscopy Technician-1: Taran Haskins  Endoscopy RN-1: Jase Hsu, RN    Specimens:   ID Type Source Tests Collected by Time Destination   1 : Jejunal bx Preservative Jejunum  Alonzo Kim MD 2/2/2022 3666 Pathology   2 : Gastric bx Preservative Gastric  Alonzo Kim MD 2/2/2022 6679 Pathology   3 : Mid to Distal Esophagus bx Preservative   Alonzo Kim MD 2/2/2022 5974 Pathology   4 : Recto-Sigmoid Colon bx Preservative   Alonzo Kim MD 2/2/2022 0801 Pathology     H. Pylori  no    Assessment:  Intra-procedure medications     Anesthesia gave intra-procedure sedation and medications, see anesthesia flow sheet yes    Intravenous fluids: NS@ Nikki Fabian     Vital signs stable     Abdominal assessment: round and soft     Recommendation:  Discharge patient per MD order.   Return to floor  Family or Friend   Permission to share finding with family or friend yes

## 2022-02-02 NOTE — PROCEDURES
[place] Office: [phone]      Esophagogastroduodenoscopy Procedure Note      Gutierrez Ace  1950  [id number]    Indication:  Abdominal pain, epigastric, Diarrhea     : Bethel Rock MD    Referring Provider:  Virgen Cohen MD    Sedation:  MAC anesthesia Propofol    Procedure Details:  After detailed informed consent was obtained for the procedure, with all risks and benefits of procedure explained the patient was taken to the endoscopy suite and placed in the left lateral decubitus position. Following sequential administration of sedation as per above, the endoscope was inserted into the mouth and advanced under direct vision to proximal jejunum. A careful inspection was made as the gastroscope was withdrawn, including a retroflexed view of the proximal stomach; findings and interventions are described below. Findings:     Esophagus: The esophageal mucosa in the proximal, mid and distal esophagus is normal. Mucosal biopsies taken to r.o EoE. The squamo-columnar junction is at 35 cm where the Z-line was noted. No narrowing or stricturing is noted. Stomach:   She is s/p gastrojejunostomy. The gastric pouch is 3-4 cm in size. The mucosa has erythema in it. Mucosal biopsies taken. The gastrojejunal anastomosis is normal appearing and patent. 2 small bowel limbs are noted and appeared normal.      Jejunum  The mucosa is normal in appearance. The jejunal folds are normal.  Mucosal biopsies obtained. Therapies:  biopsy of esophagus  biopsy of stomach :pouch  biopsy of jejunal mucosa    Specimen: Specimens were collected as described and send to the laboratory. Complications:   None were encountered during the procedure. EBL:  None. Recommendations:     -Continue acid suppression. ,  -Await pathology. ,   -Follow symptoms.  -Follow up with PCP as planned. Thank you for entrusting me with this patient's care.  Please do not hesitate to contact me with any questions or if I can be of assistance with any of your other patients' GI needs. Lit Pugh MD  2/2/2022  7:45 AM

## 2022-02-02 NOTE — PROCEDURES
Colonoscopy Procedure Note    Dania Magana  1950  [id number]    Indications:  Please see below. Pre-operative Diagnosis: Constipation, unspecified constipation type [K59.00]  Diarrhea, unspecified type [R19.7]  Abdominal pain, unspecified abdominal location [R10.9]      Post-operative Diagnosis: diverticulosis, adhesions      : Drew Ramires MD    Referring Provider: Xavier Cardoso MD    Sedation:  MAC anesthesia Propofol        Procedure Details:    After detailed informed consent was obtained with all risks and benefits of procedure explained and preoperative exam completed, the patient was taken to the endoscopy suite and placed in the left lateral decubitus position. Upon sequential sedation as per above, a digital rectal exam was performed  and was normal.  The Olympus videocolonoscope  was inserted in the rectum and carefully advanced to the a distance of 25 cm. The quality of preparation was good. The colonoscope was slowly withdrawn with careful evaluation between folds. Retroflexion in the rectum was performed. Findings:   · The Olympus video high-definition colonoscope was advanced to 25 cm. There is severe diverticulosis noted in the rectosigmoid, with severe angulation seen at 20 cm, compatible with a fixed loop likely from adhesions/diverticulosis. · This does not allow scope to pass through (water immersion, abdominal pressure). Mild mucosal trauma noted. · I took mucosal biopsies and pulled the scope out    Therapies:  biopsy of colon sigmoid colon and rectum    Specimen/s:  - Specimens were collected as described above and sent to pathology. Complications: None were encountered during the procedure. EBL:  None. Recommendations:     -Await pathology.  -Follow up with primary care physician.  -Follow up in the office as scheduled. Lit Ramires MD  2/2/2022  7:58 AM

## 2022-02-06 DIAGNOSIS — F51.01 PRIMARY INSOMNIA: Primary | ICD-10-CM

## 2022-02-06 RX ORDER — ZOLPIDEM TARTRATE 5 MG/1
5 TABLET ORAL
Qty: 30 TABLET | Refills: 1 | Status: SHIPPED | OUTPATIENT
Start: 2022-02-06 | End: 2022-02-18 | Stop reason: SDUPTHER

## 2022-02-11 DIAGNOSIS — I25.10 CORONARY ARTERY DISEASE INVOLVING NATIVE CORONARY ARTERY OF NATIVE HEART WITHOUT ANGINA PECTORIS: ICD-10-CM

## 2022-02-14 RX ORDER — FUROSEMIDE 20 MG/1
TABLET ORAL
Qty: 30 TABLET | Refills: 0 | Status: SHIPPED | OUTPATIENT
Start: 2022-02-14 | End: 2022-03-21

## 2022-02-18 ENCOUNTER — OFFICE VISIT (OUTPATIENT)
Dept: FAMILY MEDICINE CLINIC | Age: 72
End: 2022-02-18
Payer: MEDICARE

## 2022-02-18 VITALS
DIASTOLIC BLOOD PRESSURE: 66 MMHG | RESPIRATION RATE: 18 BRPM | BODY MASS INDEX: 25.8 KG/M2 | HEART RATE: 94 BPM | WEIGHT: 140.2 LBS | TEMPERATURE: 98.1 F | HEIGHT: 62 IN | OXYGEN SATURATION: 99 % | SYSTOLIC BLOOD PRESSURE: 101 MMHG

## 2022-02-18 DIAGNOSIS — I25.10 CORONARY ARTERY DISEASE INVOLVING NATIVE CORONARY ARTERY OF NATIVE HEART WITHOUT ANGINA PECTORIS: ICD-10-CM

## 2022-02-18 DIAGNOSIS — F51.01 PRIMARY INSOMNIA: ICD-10-CM

## 2022-02-18 DIAGNOSIS — E11.21 TYPE 2 DIABETES WITH NEPHROPATHY (HCC): Primary | ICD-10-CM

## 2022-02-18 DIAGNOSIS — E78.2 MIXED HYPERLIPIDEMIA: ICD-10-CM

## 2022-02-18 DIAGNOSIS — F41.9 ANXIETY: ICD-10-CM

## 2022-02-18 DIAGNOSIS — I73.9 PAD (PERIPHERAL ARTERY DISEASE) (HCC): ICD-10-CM

## 2022-02-18 DIAGNOSIS — Z51.81 ENCOUNTER FOR MEDICATION MONITORING: ICD-10-CM

## 2022-02-18 DIAGNOSIS — E03.9 ACQUIRED HYPOTHYROIDISM: ICD-10-CM

## 2022-02-18 LAB
GLUCOSE POC: 164 MG/DL
HBA1C MFR BLD HPLC: 5.8 %

## 2022-02-18 PROCEDURE — G9717 DOC PT DX DEP/BP F/U NT REQ: HCPCS | Performed by: FAMILY MEDICINE

## 2022-02-18 PROCEDURE — 3017F COLORECTAL CA SCREEN DOC REV: CPT | Performed by: FAMILY MEDICINE

## 2022-02-18 PROCEDURE — 3044F HG A1C LEVEL LT 7.0%: CPT | Performed by: FAMILY MEDICINE

## 2022-02-18 PROCEDURE — 82947 ASSAY GLUCOSE BLOOD QUANT: CPT | Performed by: FAMILY MEDICINE

## 2022-02-18 PROCEDURE — 1101F PT FALLS ASSESS-DOCD LE1/YR: CPT | Performed by: FAMILY MEDICINE

## 2022-02-18 PROCEDURE — G8536 NO DOC ELDER MAL SCRN: HCPCS | Performed by: FAMILY MEDICINE

## 2022-02-18 PROCEDURE — 2022F DILAT RTA XM EVC RTNOPTHY: CPT | Performed by: FAMILY MEDICINE

## 2022-02-18 PROCEDURE — 83036 HEMOGLOBIN GLYCOSYLATED A1C: CPT | Performed by: FAMILY MEDICINE

## 2022-02-18 PROCEDURE — 1090F PRES/ABSN URINE INCON ASSESS: CPT | Performed by: FAMILY MEDICINE

## 2022-02-18 PROCEDURE — G8419 CALC BMI OUT NRM PARAM NOF/U: HCPCS | Performed by: FAMILY MEDICINE

## 2022-02-18 PROCEDURE — G8754 DIAS BP LESS 90: HCPCS | Performed by: FAMILY MEDICINE

## 2022-02-18 PROCEDURE — G8427 DOCREV CUR MEDS BY ELIG CLIN: HCPCS | Performed by: FAMILY MEDICINE

## 2022-02-18 PROCEDURE — G9899 SCRN MAM PERF RSLTS DOC: HCPCS | Performed by: FAMILY MEDICINE

## 2022-02-18 PROCEDURE — G8399 PT W/DXA RESULTS DOCUMENT: HCPCS | Performed by: FAMILY MEDICINE

## 2022-02-18 PROCEDURE — 99214 OFFICE O/P EST MOD 30 MIN: CPT | Performed by: FAMILY MEDICINE

## 2022-02-18 PROCEDURE — G8752 SYS BP LESS 140: HCPCS | Performed by: FAMILY MEDICINE

## 2022-02-18 RX ORDER — ZOLPIDEM TARTRATE 5 MG/1
5 TABLET ORAL
Qty: 90 TABLET | Refills: 0 | Status: SHIPPED | OUTPATIENT
Start: 2022-03-07 | End: 2022-06-20 | Stop reason: SDUPTHER

## 2022-02-18 NOTE — PROGRESS NOTES
Identified pt with two pt identifiers(name and ). Reviewed record in preparation for visit and have obtained necessary documentation. All patient medications has been reviewed. Chief Complaint   Patient presents with    Diabetes    Follow-up       3 most recent PHQ Screens 2022   PHQ Not Done -   Little interest or pleasure in doing things Not at all   Feeling down, depressed, irritable, or hopeless Not at all   Total Score PHQ 2 0     Abuse Screening Questionnaire 2022   Do you ever feel afraid of your partner? N   Are you in a relationship with someone who physically or mentally threatens you? N   Is it safe for you to go home? Y       Health Maintenance Due   Topic    Breast Cancer Screen Mammogram     MICROALBUMIN Q1      Health Maintenance Review: Patient reminded of \"due or due soon\" health maintenance. I have asked the patient to contact his/her primary care provider (PCP) for follow-up on his/her health maintenance. Vitals:    22 1152   BP: 101/66   Pulse: 94   Resp: 18   Temp: 98.1 °F (36.7 °C)   TempSrc: Oral   SpO2: 99%   Weight: 140 lb 3.2 oz (63.6 kg)   Height: 5' 2\" (1.575 m)       Wt Readings from Last 3 Encounters:   22 140 lb 3.2 oz (63.6 kg)   22 144 lb 9.6 oz (65.6 kg)   10/22/21 149 lb 3.2 oz (67.7 kg)     Temp Readings from Last 3 Encounters:   22 98.1 °F (36.7 °C) (Oral)   22 98 °F (36.7 °C)   10/22/21 96.9 °F (36.1 °C) (Oral)     BP Readings from Last 3 Encounters:   22 101/66   22 90/60   10/22/21 112/68     Pulse Readings from Last 3 Encounters:   22 94   22 82   10/22/21 78       Coordination of Care Questionnaire:   1) Have you been to an emergency room, urgent care, or hospitalized since your last visit? No    2. Have seen or consulted any other health care provider since your last visit?   No

## 2022-02-18 NOTE — PROGRESS NOTES
HISTORY OF PRESENT ILLNESS  Madhu Brenner is a 73 y.o. female. HPI   Follow up on chronic medical problems. Doing the precautionary measures at home to reduce risks of exposure COVID19. Also wearing mask when she is going out. No known sick contacts or known exposure to Gleen Flood. Had colonoscopy on last month which was normal.  Overall GI sx with stomach pain has improved. DM type II follow up:  Compliant w/ meds, diabetic diet, and exercise. Has been watching her diet. BS have been much better. Range in the low to mid 100s. Checks BS BID on most days and prn. Pt does not have BS log at visit today. Denies any tingling sensation. No polyuria or polydipsia. No blurred vision. No significant weight changes. Cardiovascular Review:  The patient has coronary artery disease, HF and status post coronary artery stenting. Diet and Lifestyle: generally follows a low fat low cholesterol diet, generally follows a low sodium diet, follows a diabetic diet regularly, exercises sporadically  Home BP Monitoring: is not measured at home. Pertinent ROS: taking medications as instructed, no medication side effects noted, no TIA's, no chest pain on exertion, no dyspnea on exertion, no swelling of ankles. Hypercholesterolemia follow up:  Compliant w/ meds, low fat, low cholesterol diet. Exercising some. No muscle nor abdominal pain, no skin discoloration. Patient fasting today. Depression Review:  Patient is seen for followup of depression and anxiety. Overall doing well. Taking xanax prn increased anxiety. Anxiety usually triggered by stress. She denies depressed mood and insomnia. Sleeping well at night with Ambien as needed. .    Thyroid Review:  Patient is seen for followup of hypothyroidism. Thyroid ROS: denies fatigue, weight is decreased likely d/t ozempic, heat/cold intolerance, bowel/skin changes or CVS symptoms. HM:  Mammogram 2/20/2020 wants to wait for 2 year follow up.    Bone density 2/20/2020  Colonoscopy 10/16/2020 by Dr Cecily Mckee- repeat in 5 years    Patient Active Problem List   Diagnosis Code    Anxiety F41.9    Depression F32. A    Hypovitaminosis D E55.9    GERD (gastroesophageal reflux disease) K21.9    Edema R60.9    Esophageal motor disorder K22.4    S/P bypass gastrojejunostomy Z98.0    SOB (shortness of breath) R06.02    CAD (coronary artery disease) I25.10    S/P PTCA (percutaneous transluminal coronary angioplasty) Z98.61    Mixed hyperlipidemia E78.2    Diarrhea R19.7    Type 2 diabetes mellitus without complication (HCC) O98.0    Osteopenia M85.80    Encounter for medication monitoring Z51.81    CVA [facility]cerebral vascular accident) ([facility] [address] A33.1    Complicated migraine N76. 109    Numbness and tingling of right side of face R20.0, R20.2    Stenosis of both internal carotid arteries I65.23    Type 2 diabetes with nephropathy (HCC) E11.21    Hypertension, essential I10    Bilateral carotid artery stenosis I65.23    Spinal stenosis of lumbar region at multiple levels M48.061    Pain in both lower extremities M79.604, M79.605    Leg swelling M79.89    Venous insufficiency of both lower extremities I87.2       Current Outpatient Medications   Medication Sig Dispense Refill    furosemide (LASIX) 20 mg tablet TAKE 1 TABLET BY MOUTH ONCE DAILY 30 Tablet 0    zolpidem (AMBIEN) 5 mg tablet Take 1 Tablet by mouth nightly as needed for Sleep. Max Daily Amount: 5 mg. 30 Tablet 1    metoprolol succinate (TOPROL-XL) 25 mg XL tablet TAKE 1 TABLET BY MOUTH  DAILY 90 Tablet 3    Ozempic 0.25 mg or 0.5 mg/dose (2 mg/1.5 ml) subq pen INJECT SUBCUTANEOUSLY 0.5MG ONCE WEEKLY 4.5 mL 3    lancets (Accu-Chek Softclix Lancets) misc USE TO OBTAIN BLOOD SAMPLE  FOR DIABETIC TESTING 3  TIMES DAILY 300 Each 3    lisinopriL (PRINIVIL, ZESTRIL) 2.5 mg tablet TAKE 1 TABLET BY MOUTH ONCE DAILY 90 Tablet 3    gabapentin (NEURONTIN) 300 mg capsule 300 mg daily as needed.       levothyroxine (SYNTHROID) 25 mcg tablet TAKE 1 TABLET BY MOUTH DAILY BEFORE BREAKFAST 90 Tablet 3    glucose blood VI test strips (Accu-Chek Hannah Plus test strp) strip by Does Not Apply route two (2) times a day. 300 Strip 3    Januvia 100 mg tablet TAKE 1 TABLET BY MOUTH EVERY DAY FOR DIABETES 90 Tablet 3    pravastatin (PRAVACHOL) 40 mg tablet TAKE 1 TABLET BY MOUTH EVERY NIGHT 90 Tablet 3    fluticasone propionate (FLONASE) 50 mcg/actuation nasal spray 2 Sprays by Both Nostrils route daily as needed for Rhinitis. 3 Bottle 3    Accu-Chek Hannah Plus Meter misc USE TO CHECK BLOOD SUGAR 2  TIMES DAILY 1 Each 0    multivit-minerals/folic acid (CENTRUM VITAMINTS PO) Take 1 tablet by mouth daily      cholecalciferol, vitamin D3, (VITAMIN D3) 2,000 unit tab Take 2 capsule by mouth daily      Aspirin, Buffered 81 mg tab Take 81 mg by mouth daily.          Allergies   Allergen Reactions    Demerol [Meperidine] Unknown (comments)     Pt unsure of reaction         Past Medical History:   Diagnosis Date    Anxiety     Arthritis     ASHD (arteriosclerotic heart disease)     Bilateral carotid artery stenosis     CAD (coronary artery disease)     Chronic pain     Depression     Diabetes (HCC)     TypeII    Diarrhea     Gastroparesis     GERD (gastroesophageal reflux disease)     History of cardioembolic cerebrovascular accident (CVA)     Hypercholesterolemia     Hypertension     S/P bypass gastrojejunostomy 1/23/2012    Stroke ([facility] [address]     TIA    Thyroid disease          Past Surgical History:   Procedure Laterality Date    COLONOSCOPY N/A 5/26/2016    COLONOSCOPY performed by Vero Castorena MD at [facility] ENDOSCOPY    COLONOSCOPY N/A 10/16/2020    COLONOSCOPY performed by Noah Ruelas MD at [facility] AMBULATORY OR    COLONOSCOPY N/A 2/2/2022    COLONOSCOPY performed by Jaylin Ling MD at [facility] ENDOSCOPY    EGD  7/13/2009    HX APPENDECTOMY      HX BLADDER SUSPENSION      HX CHOLECYSTECTOMY      HX CORONARY STENT PLACEMENT  06/2013    3 stents    HX GASTRIC BYPASS  6/02    HX HEART CATHETERIZATION  2017    stents remain open, no new stents placed    HX HYSTERECTOMY      HX LAPAROTOMY  8/02    2/2 intra abd abscess    HX LUMBAR LAMINECTOMY  01/2008    rods and screws    HX OOPHORECTOMY Left     HX ORTHOPAEDIC Bilateral     carpal tunnel    HX ORTHOPAEDIC Bilateral     bone spurs both feet    VT ABDOMEN SURGERY PROC UNLISTED      s/p partial gastrectomy 2/2 gastroparesis    VT COLONOSCOPY FLX DX W/COLLJ SPEC WHEN PFRMD  5/03/2006    Dr. Zoe Concepcion COLONOSCOPY W/BIOPSY SINGLE/MULTIPLE  2/9/2012         VT EGD BALLOON DILATION ESOPHAGUS <30 MM DIAM  1/23/2012         VT EGD TRANSORAL BIOPSY SINGLE/MULTIPLE  7/11/2011         VT EGD TRANSORAL BIOPSY SINGLE/MULTIPLE  1/23/2012         VT GASTROJEJUNOSTOMY      UPPER GI ENDOSCOPY,BIOPSY  10/1/2019         UPPER GI ENDOSCOPY,BIOPSY  10/16/2020              Family History   Problem Relation Age of Onset    Heart Disease Mother     Hypertension Mother     Diabetes Father     Heart Disease Father     Hypertension Father     Alcohol abuse Brother     Heart Disease Sister     Hypertension Sister     Diabetes Sister     No Known Problems Sister     Alcohol abuse Brother     Alcohol abuse Brother     Diabetes Brother        Social History     Tobacco Use    Smoking status: Never Smoker    Smokeless tobacco: Never Used   Substance Use Topics    Alcohol use: No     Alcohol/week: 0.0 standard drinks        Lab Results   Component Value Date/Time    WBC 5.5 10/22/2021 09:19 AM    HGB 11.4 (L) 10/22/2021 09:19 AM    HCT 36.2 10/22/2021 09:19 AM    PLATELET 821 49/86/0000 09:19 AM    MCV 96.8 10/22/2021 09:19 AM     Lab Results   Component Value Date/Time    Cholesterol, total 120 10/22/2021 09:19 AM    HDL Cholesterol 39 10/22/2021 09:19 AM    LDL, calculated 52.4 10/22/2021 09:19 AM    Triglyceride 143 10/22/2021 09:19 AM    CHOL/HDL Ratio 3.1 10/22/2021 09:19 AM     Lab Results   Component Value Date/Time    TSH 3.970 11/05/2021 12:00 AM    T4, Free 1.09 11/05/2021 12:00 AM    T4, Total 5.6 11/17/2015 08:54 AM      Lab Results   Component Value Date/Time    Sodium 142 10/22/2021 09:19 AM    Potassium 4.0 10/22/2021 09:19 AM    Chloride 108 10/22/2021 09:19 AM    CO2 26 10/22/2021 09:19 AM    Anion gap 8 10/22/2021 09:19 AM    Glucose 130 (H) 10/22/2021 09:19 AM    BUN 12 10/22/2021 09:19 AM    Creatinine 0.95 10/22/2021 09:19 AM    BUN/Creatinine ratio 13 10/22/2021 09:19 AM    GFR est AA >60 10/22/2021 09:19 AM    GFR est non-AA 58 (L) 10/22/2021 09:19 AM    Calcium 9.1 10/22/2021 09:19 AM    Bilirubin, total 0.4 10/22/2021 09:19 AM    ALT (SGPT) 18 10/22/2021 09:19 AM    Alk. phosphatase 99 10/22/2021 09:19 AM    Protein, total 6.0 (L) 10/22/2021 09:19 AM    Albumin 3.5 10/22/2021 09:19 AM    Globulin 2.5 10/22/2021 09:19 AM    A-G Ratio 1.4 10/22/2021 09:19 AM      Lab Results   Component Value Date/Time    Hemoglobin A1c 7.4 (H) 11/09/2020 01:14 PM    Hemoglobin A1c (POC) 5.8 10/22/2021 09:04 AM         Review of Systems   Constitutional: Negative for malaise/fatigue. HENT: Negative for congestion. Eyes: Negative for blurred vision. Respiratory: Negative for cough and shortness of breath. Cardiovascular: Negative for chest pain, palpitations and leg swelling. Gastrointestinal: Negative for abdominal pain, constipation and heartburn. Genitourinary: Negative for dysuria, frequency and urgency. Musculoskeletal: Negative for back pain and joint pain. Neurological: Negative for dizziness, tingling and headaches. Endo/Heme/Allergies: Negative for environmental allergies. Psychiatric/Behavioral: Negative for depression. The patient does not have insomnia. Physical Exam  Vitals and nursing note reviewed. Constitutional:       Appearance: Normal appearance. She is well-developed.       Comments: /66 (BP 1 Location: Left upper arm, BP Patient Position: Sitting, BP Cuff Size: Adult)   Pulse 94   Temp 98.1 °F (36.7 °C) (Oral)   Resp 18   Ht 5' 2\" (1.575 m)   Wt 140 lb 3.2 oz (63.6 kg)   SpO2 99%   BMI 25.64 kg/m²    HENT:      Right Ear: Tympanic membrane and ear canal normal.      Left Ear: Tympanic membrane and ear canal normal.      Nose: No mucosal edema. Neck:      Thyroid: No thyromegaly. Cardiovascular:      Rate and Rhythm: Normal rate and regular rhythm. Heart sounds: Normal heart sounds. No gallop. Pulmonary:      Effort: Pulmonary effort is normal.      Breath sounds: Normal breath sounds. Abdominal:      General: Bowel sounds are normal.      Palpations: Abdomen is soft. There is no mass. Tenderness: There is no abdominal tenderness. Musculoskeletal:         General: Normal range of motion. Cervical back: Normal range of motion and neck supple. Right lower leg: No edema. Left lower leg: No edema. Lymphadenopathy:      Cervical: No cervical adenopathy. Skin:     General: Skin is warm and dry. Neurological:      General: No focal deficit present. Mental Status: She is alert and oriented to person, place, and time. Psychiatric:         Mood and Affect: Mood normal.       ASSESSMENT and PLAN  Diagnoses and all orders for this visit:    1. Type 2 diabetes with nephropathy (HCC)  a1c stable at 5.8%. -     AMB POC HEMOGLOBIN A1C  -     AMB POC GLUCOSE, QUANTITATIVE, BLOOD  -     MICROALBUMIN, UR, RAND W/ MICROALB/CREAT RATIO; Future  -     URINALYSIS W/ REFLEX CULTURE; Future  Will stop [employer] [place] and continue with Ozempic. 2. Mixed hyperlipidemia  Stable at goal.      3. Acquired hypothyroidism  TSH stable     4. Coronary artery disease involving native coronary artery of native heart without angina pectoris  Stable     5. PAD (peripheral artery disease) ([facility])  Stable     6. Anxiety  Stable     7. Primary insomnia  -     Refill zolpidem (AMBIEN) 5 mg tablet;  Take 1 Tablet by mouth nightly as needed for Sleep. Max Daily Amount: 5 mg.    8. Encounter for medication monitoring      Follow-up and Dispositions    · Return in about 4 months (around 6/18/2022). reviewed diet, exercise and weight control  cardiovascular risk and specific lipid/LDL goals reviewed  reviewed medications and side effects in detail  specific diabetic recommendations: low cholesterol diet, weight control and daily exercise discussed, all medications, side effects and compliance discussed carefully, foot care discussed and Podiatry visits discussed, annual eye examinations at Ophthalmology discussed and glycohemoglobin and other lab monitoring discussed     I have discussed diagnosis listed in this note with pt and/or family. I have discussed treatment plans and options and the risk/benefit analysis of those options, including safe use of medications and possible medication side effects. Through the use of shared decision making we have agreed to the above plan. The patient has received an after-visit summary and questions were answered concerning future plans and follow up. Advise pt of any urgent changes then to proceed to the ER.

## 2022-02-19 LAB
APPEARANCE UR: CLEAR
BACTERIA URNS QL MICRO: NEGATIVE /HPF
BILIRUB UR QL: NEGATIVE
COLOR UR: ABNORMAL
CREAT UR-MCNC: 73.8 MG/DL
EPITH CASTS URNS QL MICRO: ABNORMAL /LPF
GLUCOSE UR STRIP.AUTO-MCNC: NEGATIVE MG/DL
HGB UR QL STRIP: NEGATIVE
HYALINE CASTS URNS QL MICRO: ABNORMAL /LPF (ref 0–5)
KETONES UR QL STRIP.AUTO: NEGATIVE MG/DL
LEUKOCYTE ESTERASE UR QL STRIP.AUTO: ABNORMAL
MICROALBUMIN UR-MCNC: 4.23 MG/DL
MICROALBUMIN/CREAT UR-RTO: 57 MG/G (ref 0–30)
NITRITE UR QL STRIP.AUTO: NEGATIVE
PH UR STRIP: 5.5 [PH] (ref 5–8)
PROT UR STRIP-MCNC: NEGATIVE MG/DL
RBC #/AREA URNS HPF: ABNORMAL /HPF (ref 0–5)
SP GR UR REFRACTOMETRY: 1.01 (ref 1–1.03)
UA: UC IF INDICATED,UAUC: ABNORMAL
UROBILINOGEN UR QL STRIP.AUTO: 0.2 EU/DL (ref 0.2–1)
WBC URNS QL MICRO: ABNORMAL /HPF (ref 0–4)

## 2022-02-20 LAB
BACTERIA SPEC CULT: NORMAL
SERVICE CMNT-IMP: NORMAL

## 2022-02-21 ENCOUNTER — TELEPHONE (OUTPATIENT)
Dept: FAMILY MEDICINE CLINIC | Age: 72
End: 2022-02-21

## 2022-02-21 DIAGNOSIS — M81.0 POSTMENOPAUSAL OSTEOPOROSIS: Primary | ICD-10-CM

## 2022-02-21 NOTE — TELEPHONE ENCOUNTER
----- Message from Donavan Montaño sent at 2/18/2022  2:06 PM EST -----  Subject: Referral Request    QUESTIONS   Reason for referral request? Pt called in to find out about her referral   for her bone density testing. Has the physician seen you for this condition before? Yes  Select a date? 2022-02-18  Select the Provider the patient wants to be referred to, if known (PCP or   Specialist)? [address]   Preferred Specialist (if applicable)? Do you already have an appointment scheduled? No  Additional Information for Provider?   ---------------------------------------------------------------------------  --------------  CALL BACK INFO  What is the best way for the office to contact you? OK to leave message on   voicemail  Preferred Call Back Phone Number?  [phone]

## 2022-02-21 NOTE — TELEPHONE ENCOUNTER
----- Message from Neto Hernandez sent at 2/18/2022  2:06 PM EST -----  Subject: Referral Request    QUESTIONS   Reason for referral request? Pt called in to find out about her referral   for her bone density testing. Has the physician seen you for this condition before? Yes  Select a date? 2022-02-18  Select the Provider the patient wants to be referred to, if known (PCP or   Specialist)? Tea Castellon   Preferred Specialist (if applicable)? Do you already have an appointment scheduled? No  Additional Information for Provider?   ---------------------------------------------------------------------------  --------------  CALL BACK INFO  What is the best way for the office to contact you? OK to leave message on   voicemail  Preferred Call Back Phone Number?  [phone]

## 2022-02-21 NOTE — PROGRESS NOTES
Please call pt and let her know that urine specimen had infection.   Load bactrim DS bid for THREE days

## 2022-02-22 DIAGNOSIS — N30.00 ACUTE CYSTITIS WITHOUT HEMATURIA: Primary | ICD-10-CM

## 2022-02-22 RX ORDER — SULFAMETHOXAZOLE AND TRIMETHOPRIM 800; 160 MG/1; MG/1
1 TABLET ORAL 2 TIMES DAILY
Qty: 6 TABLET | Refills: 0 | Status: SHIPPED | OUTPATIENT
Start: 2022-02-22 | End: 2022-02-25

## 2022-02-22 NOTE — TELEPHONE ENCOUNTER
Spoke with patient per Dr. Chambers urine showed infection and will be sending in an antibiotic. Patient verbalized understanding.

## 2022-02-28 DIAGNOSIS — E11.21 TYPE 2 DIABETES WITH NEPHROPATHY (HCC): ICD-10-CM

## 2022-03-01 RX ORDER — LISINOPRIL 2.5 MG/1
TABLET ORAL
Qty: 90 TABLET | Refills: 3 | Status: SHIPPED | OUTPATIENT
Start: 2022-03-01

## 2022-03-02 ENCOUNTER — HOSPITAL ENCOUNTER (EMERGENCY)
Age: 72
Discharge: HOME | End: 2022-03-02
Attending: STUDENT IN AN ORGANIZED HEALTH CARE EDUCATION/TRAINING PROGRAM
Payer: MEDICARE

## 2022-03-02 ENCOUNTER — APPOINTMENT (OUTPATIENT)
Dept: GENERAL RADIOLOGY | Age: 72
End: 2022-03-02
Attending: EMERGENCY MEDICINE
Payer: MEDICARE

## 2022-03-02 VITALS
RESPIRATION RATE: 16 BRPM | OXYGEN SATURATION: 99 % | HEIGHT: 62 IN | WEIGHT: 140 LBS | BODY MASS INDEX: 25.76 KG/M2 | TEMPERATURE: 98.3 F | HEART RATE: 74 BPM | DIASTOLIC BLOOD PRESSURE: 74 MMHG | SYSTOLIC BLOOD PRESSURE: 129 MMHG

## 2022-03-02 DIAGNOSIS — F41.1 ANXIETY STATE: ICD-10-CM

## 2022-03-02 DIAGNOSIS — E86.0 DEHYDRATION: ICD-10-CM

## 2022-03-02 DIAGNOSIS — K21.9 GASTROESOPHAGEAL REFLUX DISEASE, UNSPECIFIED WHETHER ESOPHAGITIS PRESENT: Primary | ICD-10-CM

## 2022-03-02 DIAGNOSIS — N17.9 AKI (ACUTE KIDNEY INJURY) (HCC): ICD-10-CM

## 2022-03-02 LAB
ALBUMIN SERPL-MCNC: 4.4 G/DL (ref 3.5–5)
ALBUMIN/GLOB SERPL: 1.4 {RATIO} (ref 1.1–2.2)
ALP SERPL-CCNC: 116 U/L (ref 45–117)
ALT SERPL-CCNC: 30 U/L (ref 12–78)
ANION GAP SERPL CALC-SCNC: 5 MMOL/L (ref 5–15)
AST SERPL-CCNC: 29 U/L (ref 15–37)
ATRIAL RATE: 86 BPM
BASOPHILS # BLD: 0.1 K/UL (ref 0–0.1)
BASOPHILS NFR BLD: 1 % (ref 0–1)
BILIRUB SERPL-MCNC: 0.4 MG/DL (ref 0.2–1)
BNP SERPL-MCNC: 46 PG/ML
BUN SERPL-MCNC: 16 MG/DL (ref 6–20)
BUN/CREAT SERPL: 13 (ref 12–20)
CALCIUM SERPL-MCNC: 9.4 MG/DL (ref 8.5–10.1)
CALCULATED P AXIS, ECG09: 70 DEGREES
CALCULATED R AXIS, ECG10: -2 DEGREES
CALCULATED T AXIS, ECG11: 55 DEGREES
CHLORIDE SERPL-SCNC: 100 MMOL/L (ref 97–108)
CO2 SERPL-SCNC: 29 MMOL/L (ref 21–32)
CREAT SERPL-MCNC: 1.21 MG/DL (ref 0.55–1.02)
DIAGNOSIS, 93000: NORMAL
DIFFERENTIAL METHOD BLD: NORMAL
EOSINOPHIL # BLD: 0.2 K/UL (ref 0–0.4)
EOSINOPHIL NFR BLD: 2 % (ref 0–7)
ERYTHROCYTE [DISTWIDTH] IN BLOOD BY AUTOMATED COUNT: 13.2 % (ref 11.5–14.5)
GLOBULIN SER CALC-MCNC: 3.1 G/DL (ref 2–4)
GLUCOSE SERPL-MCNC: 129 MG/DL (ref 65–100)
HCT VFR BLD AUTO: 40.9 % (ref 35–47)
HGB BLD-MCNC: 13.4 G/DL (ref 11.5–16)
IMM GRANULOCYTES # BLD AUTO: 0 K/UL (ref 0–0.04)
IMM GRANULOCYTES NFR BLD AUTO: 0 % (ref 0–0.5)
LYMPHOCYTES # BLD: 1.9 K/UL (ref 0.8–3.5)
LYMPHOCYTES NFR BLD: 18 % (ref 12–49)
MCH RBC QN AUTO: 29.5 PG (ref 26–34)
MCHC RBC AUTO-ENTMCNC: 32.8 G/DL (ref 30–36.5)
MCV RBC AUTO: 89.9 FL (ref 80–99)
MONOCYTES # BLD: 0.7 K/UL (ref 0–1)
MONOCYTES NFR BLD: 6 % (ref 5–13)
NEUTS SEG # BLD: 7.9 K/UL (ref 1.8–8)
NEUTS SEG NFR BLD: 73 % (ref 32–75)
NRBC # BLD: 0 K/UL (ref 0–0.01)
NRBC BLD-RTO: 0 PER 100 WBC
P-R INTERVAL, ECG05: 158 MS
PLATELET # BLD AUTO: 301 K/UL (ref 150–400)
PMV BLD AUTO: 10.4 FL (ref 8.9–12.9)
POTASSIUM SERPL-SCNC: 4.8 MMOL/L (ref 3.5–5.1)
PROT SERPL-MCNC: 7.5 G/DL (ref 6.4–8.2)
Q-T INTERVAL, ECG07: 366 MS
QRS DURATION, ECG06: 74 MS
QTC CALCULATION (BEZET), ECG08: 437 MS
RBC # BLD AUTO: 4.55 M/UL (ref 3.8–5.2)
SODIUM SERPL-SCNC: 134 MMOL/L (ref 136–145)
TROPONIN-HIGH SENSITIVITY: 11 NG/L (ref 0–51)
VENTRICULAR RATE, ECG03: 86 BPM
WBC # BLD AUTO: 10.7 K/UL (ref 3.6–11)

## 2022-03-02 PROCEDURE — C9113 INJ PANTOPRAZOLE SODIUM, VIA: HCPCS | Performed by: STUDENT IN AN ORGANIZED HEALTH CARE EDUCATION/TRAINING PROGRAM

## 2022-03-02 PROCEDURE — 80053 COMPREHEN METABOLIC PANEL: CPT

## 2022-03-02 PROCEDURE — 96374 THER/PROPH/DIAG INJ IV PUSH: CPT

## 2022-03-02 PROCEDURE — 84484 ASSAY OF TROPONIN QUANT: CPT

## 2022-03-02 PROCEDURE — 85025 COMPLETE CBC W/AUTO DIFF WBC: CPT

## 2022-03-02 PROCEDURE — 93005 ELECTROCARDIOGRAM TRACING: CPT

## 2022-03-02 PROCEDURE — 74011250636 HC RX REV CODE- 250/636: Performed by: STUDENT IN AN ORGANIZED HEALTH CARE EDUCATION/TRAINING PROGRAM

## 2022-03-02 PROCEDURE — 99285 EMERGENCY DEPT VISIT HI MDM: CPT

## 2022-03-02 PROCEDURE — 71045 X-RAY EXAM CHEST 1 VIEW: CPT

## 2022-03-02 PROCEDURE — 94761 N-INVAS EAR/PLS OXIMETRY MLT: CPT

## 2022-03-02 PROCEDURE — 74011000250 HC RX REV CODE- 250: Performed by: STUDENT IN AN ORGANIZED HEALTH CARE EDUCATION/TRAINING PROGRAM

## 2022-03-02 PROCEDURE — 83880 ASSAY OF NATRIURETIC PEPTIDE: CPT

## 2022-03-02 PROCEDURE — 75810000275 HC EMERGENCY DEPT VISIT NO LEVEL OF CARE

## 2022-03-02 PROCEDURE — 96361 HYDRATE IV INFUSION ADD-ON: CPT

## 2022-03-02 PROCEDURE — 36415 COLL VENOUS BLD VENIPUNCTURE: CPT

## 2022-03-02 PROCEDURE — 96375 TX/PRO/DX INJ NEW DRUG ADDON: CPT

## 2022-03-02 RX ORDER — ONDANSETRON 4 MG/1
4 TABLET, FILM COATED ORAL
Qty: 20 TABLET | Refills: 0 | Status: SHIPPED | OUTPATIENT
Start: 2022-03-02 | End: 2022-04-05 | Stop reason: ALTCHOICE

## 2022-03-02 RX ORDER — ONDANSETRON 2 MG/ML
4 INJECTION INTRAMUSCULAR; INTRAVENOUS
Status: COMPLETED | OUTPATIENT
Start: 2022-03-02 | End: 2022-03-02

## 2022-03-02 RX ORDER — PHENOL/SODIUM PHENOLATE
20 AEROSOL, SPRAY (ML) MUCOUS MEMBRANE DAILY
Qty: 20 TABLET | Refills: 0 | Status: SHIPPED | OUTPATIENT
Start: 2022-03-02 | End: 2022-08-09 | Stop reason: ALTCHOICE

## 2022-03-02 RX ORDER — LORAZEPAM 2 MG/ML
1 INJECTION INTRAMUSCULAR
Status: COMPLETED | OUTPATIENT
Start: 2022-03-02 | End: 2022-03-02

## 2022-03-02 RX ADMIN — SODIUM CHLORIDE 1000 ML: 9 INJECTION, SOLUTION INTRAVENOUS at 09:52

## 2022-03-02 RX ADMIN — ONDANSETRON HYDROCHLORIDE 4 MG: 2 INJECTION, SOLUTION INTRAMUSCULAR; INTRAVENOUS at 09:25

## 2022-03-02 RX ADMIN — SODIUM CHLORIDE 40 MG: 9 INJECTION, SOLUTION INTRAMUSCULAR; INTRAVENOUS; SUBCUTANEOUS at 09:25

## 2022-03-02 RX ADMIN — LORAZEPAM 1 MG: 2 INJECTION INTRAMUSCULAR; INTRAVENOUS at 09:48

## 2022-03-02 NOTE — ED PROVIDER NOTES
EMERGENCY DEPARTMENT HISTORY AND PHYSICAL EXAM      Date: 3/2/2022  Patient Name: Roman Major    History of Presenting Illness     Chief Complaint   Patient presents with    Shortness of Breath     worse yesterday; says she is supposed to have heart stents placed    Dizziness     when she stands up with nervous weak and nausea. onset symptoms since the weekend       History Provided By: Patient    HPI: Roman Major, 73 y.o. female with past medical history of GERD, CAD status post stents, type 2 diabetes, anxiety, CVA, presents to the ED with cc of shortness of breath, nausea, excessive belching, increased anxiety, and lightheadedness. Patient reports that symptoms began yesterday. States that she first noticed her symptoms yesterday when she went to take a bath. States that she became very nauseated when she first got up, and could not finish her bath as a result of this. States that the same thing happened today when she stands up. She reports she has been burping nonstop, and has been experiencing increased acid reflux. This then leads to sensations of shortness of breath, and lightheadedness. Patient denies actual syncope. She has no chest pain. Reports increased anxiety at this time. Patient is concerned that her symptoms could be related to her heart. She states that she used to be on medication for GERD, however, her GI doctor has stopped this medication, she has not been taking it for quite some time. PCP: Joshua Mejia MD    No current facility-administered medications on file prior to encounter. Current Outpatient Medications on File Prior to Encounter   Medication Sig Dispense Refill    lisinopriL (PRINIVIL, ZESTRIL) 2.5 mg tablet TAKE 1 TABLET BY MOUTH  DAILY 90 Tablet 3    [START ON 3/7/2022] zolpidem (AMBIEN) 5 mg tablet Take 1 Tablet by mouth nightly as needed for Sleep. Max Daily Amount: 5 mg.  90 Tablet 0    furosemide (LASIX) 20 mg tablet TAKE 1 TABLET BY MOUTH ONCE DAILY 30 Tablet 0    metoprolol succinate (TOPROL-XL) 25 mg XL tablet TAKE 1 TABLET BY MOUTH  DAILY 90 Tablet 3    Ozempic 0.25 mg or 0.5 mg/dose (2 mg/1.5 ml) subq pen INJECT SUBCUTANEOUSLY 0.5MG ONCE WEEKLY 4.5 mL 3    lancets (Accu-Chek Softclix Lancets) misc USE TO OBTAIN BLOOD SAMPLE  FOR DIABETIC TESTING 3  TIMES DAILY 300 Each 3    gabapentin (NEURONTIN) 300 mg capsule 300 mg daily as needed.  levothyroxine (SYNTHROID) 25 mcg tablet TAKE 1 TABLET BY MOUTH DAILY BEFORE BREAKFAST 90 Tablet 3    glucose blood VI test strips (Accu-Chek Hannah Plus test strp) strip by Does Not Apply route two (2) times a day. 300 Strip 3    Januvia 100 mg tablet TAKE 1 TABLET BY MOUTH EVERY DAY FOR DIABETES 90 Tablet 3    pravastatin (PRAVACHOL) 40 mg tablet TAKE 1 TABLET BY MOUTH EVERY NIGHT 90 Tablet 3    fluticasone propionate (FLONASE) 50 mcg/actuation nasal spray 2 Sprays by Both Nostrils route daily as needed for Rhinitis. 3 Bottle 3    Accu-Chek Hannah Plus Meter misc USE TO CHECK BLOOD SUGAR 2  TIMES DAILY 1 Each 0    multivit-minerals/folic acid (CENTRUM VITAMINTS PO) Take 1 tablet by mouth daily (Patient not taking: Reported on 2/18/2022)      cholecalciferol, vitamin D3, (VITAMIN D3) 2,000 unit tab Take 2 capsule by mouth daily      Aspirin, Buffered 81 mg tab Take 81 mg by mouth daily.          Past History     Past Medical History:  Past Medical History:   Diagnosis Date    Anxiety     Arthritis     ASHD (arteriosclerotic heart disease)     Bilateral carotid artery stenosis     CAD (coronary artery disease)     Chronic pain     Depression     Diabetes (HCC)     TypeII    Diarrhea     Gastroparesis     GERD (gastroesophageal reflux disease)     History of cardioembolic cerebrovascular accident (CVA)     Hypercholesterolemia     Hypertension     S/P bypass gastrojejunostomy 1/23/2012    Stroke [facility])     TIA    Thyroid disease        Past Surgical History:  Past Surgical History: Procedure Laterality Date    COLONOSCOPY N/A 5/26/2016    COLONOSCOPY performed by Aliza Suarez MD at [facility] ENDOSCOPY    COLONOSCOPY N/A 10/16/2020    COLONOSCOPY performed by Vignesh Gamboa MD at [facility] AMBULATORY OR    COLONOSCOPY N/A 2/2/2022    COLONOSCOPY performed by Juliana Solis MD at [facility] ENDOSCOPY    EGD  7/13/2009    HX APPENDECTOMY      HX BLADDER SUSPENSION      HX CHOLECYSTECTOMY      HX CORONARY STENT PLACEMENT  06/2013    3 stents    HX GASTRIC BYPASS  6/02    HX HEART CATHETERIZATION  2017    stents remain open, no new stents placed    HX HYSTERECTOMY      HX LAPAROTOMY  8/02    2/2 intra abd abscess    HX LUMBAR LAMINECTOMY  01/2008    rods and screws    HX OOPHORECTOMY Left     HX ORTHOPAEDIC Bilateral     carpal tunnel    HX ORTHOPAEDIC Bilateral     bone spurs both feet    NH ABDOMEN SURGERY PROC UNLISTED      s/p partial gastrectomy 2/2 gastroparesis    NH COLONOSCOPY FLX DX W/COLLJ SPEC WHEN PFRMD  5/03/2006    Dr. Dago Lee  2/9/2012         NH EGD BALLOON DILATION ESOPHAGUS <30 MM DIAM  1/23/2012         NH EGD TRANSORAL BIOPSY SINGLE/MULTIPLE  7/11/2011         NH EGD TRANSORAL BIOPSY SINGLE/MULTIPLE  1/23/2012         NH GASTROJEJUNOSTOMY      UPPER GI ENDOSCOPY,BIOPSY  10/1/2019         UPPER GI ENDOSCOPY,BIOPSY  10/16/2020            Family History:  Family History   Problem Relation Age of Onset    Heart Disease Mother     Hypertension Mother     Diabetes Father     Heart Disease Father     Hypertension Father     Alcohol abuse Brother     Heart Disease Sister     Hypertension Sister     Diabetes Sister     No Known Problems Sister     Alcohol abuse Brother     Alcohol abuse Brother     Diabetes Brother        Social History:  Social History     Tobacco Use    Smoking status: Never Smoker    Smokeless tobacco: Never Used   Vaping Use    Vaping Use: Never used   Substance Use Topics    Alcohol use:  No Alcohol/week: 0.0 standard drinks    Drug use: Yes     Types: Prescription, OTC       Allergies: Allergies   Allergen Reactions    Demerol [Meperidine] Unknown (comments)     Pt unsure of reaction         Review of Systems   Review of Systems   Constitutional: Negative for chills and fever. HENT: Negative for congestion and rhinorrhea. Eyes: Negative for visual disturbance. Respiratory: Positive for shortness of breath. Negative for chest tightness and wheezing. Cardiovascular: Negative for chest pain, palpitations and leg swelling. Gastrointestinal: Positive for nausea. Negative for abdominal pain, diarrhea and vomiting. Excessive belching  Acid reflux   Genitourinary: Negative for dysuria, flank pain and hematuria. Musculoskeletal: Negative for back pain and neck pain. Skin: Negative for rash. Allergic/Immunologic: Negative for immunocompromised state. Neurological: Positive for light-headedness. Negative for dizziness, seizures, syncope, speech difficulty, weakness, numbness and headaches. Hematological: Negative for adenopathy. Psychiatric/Behavioral: Negative for dysphoric mood and suicidal ideas. The patient is nervous/anxious. Physical Exam   Physical Exam  Vitals and nursing note reviewed. Constitutional:       General: She is not in acute distress. Appearance: She is not ill-appearing or toxic-appearing. Comments: Involuntary belching noted during my evaluation   HENT:      Head: Normocephalic and atraumatic. Nose: Nose normal.      Mouth/Throat:      Mouth: Mucous membranes are moist.   Eyes:      Extraocular Movements: Extraocular movements intact. Pupils: Pupils are equal, round, and reactive to light. Cardiovascular:      Rate and Rhythm: Normal rate and regular rhythm. Pulses: Normal pulses. Pulmonary:      Effort: Pulmonary effort is normal.      Breath sounds: Normal breath sounds. No stridor.  No decreased breath sounds, wheezing, rhonchi or rales. Abdominal:      General: Abdomen is flat. There is no distension. Tenderness: There is no abdominal tenderness. Musculoskeletal:         General: Normal range of motion. Cervical back: Normal range of motion and neck supple. Skin:     General: Skin is warm and dry. Neurological:      General: No focal deficit present. Mental Status: She is alert and oriented to person, place, and time. Psychiatric:         Mood and Affect: Mood is anxious. Judgment: Judgment normal.         Diagnostic Study Results     Labs -     Recent Results (from the past 24 hour(s))   EKG, 12 LEAD, INITIAL    Collection Time: 03/02/22  8:52 AM   Result Value Ref Range    Ventricular Rate 86 BPM    Atrial Rate 86 BPM    P-R Interval 158 ms    QRS Duration 74 ms    Q-T Interval 366 ms    QTC Calculation (Bezet) 437 ms    Calculated P Axis 70 degrees    Calculated R Axis -2 degrees    Calculated T Axis 55 degrees    Diagnosis       Normal sinus rhythm  Normal ECG  When compared with ECG of 09-NOV-2020 13:04,  No significant change was found     CBC WITH AUTOMATED DIFF    Collection Time: 03/02/22  9:02 AM   Result Value Ref Range    WBC 10.7 3.6 - 11.0 K/uL    RBC 4.55 3.80 - 5.20 M/uL    HGB 13.4 11.5 - 16.0 g/dL    HCT 40.9 35.0 - 47.0 %    MCV 89.9 80.0 - 99.0 FL    MCH 29.5 26.0 - 34.0 PG    MCHC 32.8 30.0 - 36.5 g/dL    RDW 13.2 11.5 - 14.5 %    PLATELET 009 921 - 834 K/uL    MPV 10.4 8.9 - 12.9 FL    NRBC 0.0 0  WBC    ABSOLUTE NRBC 0.00 0.00 - 0.01 K/uL    NEUTROPHILS 73 32 - 75 %    LYMPHOCYTES 18 12 - 49 %    MONOCYTES 6 5 - 13 %    EOSINOPHILS 2 0 - 7 %    BASOPHILS 1 0 - 1 %    IMMATURE GRANULOCYTES 0 0.0 - 0.5 %    ABS. NEUTROPHILS 7.9 1.8 - 8.0 K/UL    ABS. LYMPHOCYTES 1.9 0.8 - 3.5 K/UL    ABS. MONOCYTES 0.7 0.0 - 1.0 K/UL    ABS. EOSINOPHILS 0.2 0.0 - 0.4 K/UL    ABS. BASOPHILS 0.1 0.0 - 0.1 K/UL    ABS. IMM.  GRANS. 0.0 0.00 - 0.04 K/UL    DF AUTOMATED     METABOLIC PANEL, COMPREHENSIVE    Collection Time: 03/02/22  9:02 AM   Result Value Ref Range    Sodium 134 (L) 136 - 145 mmol/L    Potassium 4.8 3.5 - 5.1 mmol/L    Chloride 100 97 - 108 mmol/L    CO2 29 21 - 32 mmol/L    Anion gap 5 5 - 15 mmol/L    Glucose 129 (H) 65 - 100 mg/dL    BUN 16 6 - 20 MG/DL    Creatinine 1.21 (H) 0.55 - 1.02 MG/DL    BUN/Creatinine ratio 13 12 - 20      GFR est AA 53 (L) >60 ml/min/1.73m2    GFR est non-AA 44 (L) >60 ml/min/1.73m2    Calcium 9.4 8.5 - 10.1 MG/DL    Bilirubin, total 0.4 0.2 - 1.0 MG/DL    ALT (SGPT) 30 12 - 78 U/L    AST (SGOT) 29 15 - 37 U/L    Alk. phosphatase 116 45 - 117 U/L    Protein, total 7.5 6.4 - 8.2 g/dL    Albumin 4.4 3.5 - 5.0 g/dL    Globulin 3.1 2.0 - 4.0 g/dL    A-G Ratio 1.4 1.1 - 2.2     TROPONIN-HIGH SENSITIVITY    Collection Time: 03/02/22  9:02 AM   Result Value Ref Range    Troponin-High Sensitivity 11 0 - 51 ng/L   NT-PRO BNP    Collection Time: 03/02/22  9:02 AM   Result Value Ref Range    NT pro-BNP 46 <125 PG/ML     Radiologic Studies -   XR CHEST PORT   Final Result   No evidence of acute cardiopulmonary process. CT Results  (Last 48 hours)    None        CXR Results  (Last 48 hours)               03/02/22 0910  XR CHEST PORT Final result    Impression:  No evidence of acute cardiopulmonary process. Narrative:  INDICATION: Shortness of breath       COMPARISON: 11/9/2020       FINDINGS: AP portable imaging of the chest performed at 9:02 AM demonstrates a   stable cardiomediastinal silhouette. The lungs are clear bilaterally. No   significant osseous abnormalities are seen. Multiple surgical clips are seen in   the upper abdomen. Medical Decision Making   Tami DIETRICH MD am the first provider for this patient, and I am the attending of record for this patient encounter. I reviewed the vital signs, available nursing notes, past medical history, past surgical history, family history and social history.     Vital Signs-Reviewed the patient's vital signs. Patient Vitals for the past 24 hrs:   Temp Pulse Resp BP SpO2   03/02/22 0847 98.3 °F (36.8 °C) 89 20 138/85 100 %       EKG interpretation: (Preliminary)  Normal sinus rhythm, 86 bpm, no acute ST changes concerning for ischemia. QTc normal. EKG interpretation by Alyce Yoder MD    Records Reviewed: Nursing Notes and Old Medical Records    Provider Notes (Medical Decision Making):   DDx: GERD, anxiety, dehydration, pneumonia, ACS    We will check EKG, labs, and chest x-ray. Suspect patient's symptoms are not cardiac related. Rather, she is likely suffering from GERD and increased anxiety. We will treat her symptoms with Protonix, Zofran, Ativan, and IVF. Labs show mild hyponatremia of 134, likely related to dehydration. She has ALEIDA with creatinine 1.21, BUN of 16. Conferment suspicion that her lightheadedness is likely attributed to hypovolemia/dehydration. Remainder of her work-up is largely unremarkable. On my repeat exam, patient reports that she is feeling significantly better, with her presenting symptoms now resolving. Her results were reviewed with her as well as her  at bedside in detail. She understands to significantly increase her p.o. fluid intake to prevent worsening of her renal function. She will be provided with Rx for omeprazole along with Zofran. She felt comfortable with plan for discharge. Advised early PCP follow-up. Return precautions discussed. ED Course:   Initial assessment performed. The patient's presenting problems have been discussed, and they are in agreement with the care plan formulated and outlined with them. I have encouraged them to ask questions as they arise throughout their visit. Alyce Yoder MD      Disposition:  Discharge      DISCHARGE PLAN:  1.    Discharge Medication List as of 3/2/2022 10:59 AM      START taking these medications    Details   Omeprazole delayed release (PRILOSEC D/R) 20 mg tablet Take 1 Tablet by mouth daily. , Normal, Disp-20 Tablet, R-0      ondansetron hcl (Zofran) 4 mg tablet Take 1 Tablet by mouth every eight (8) hours as needed for Nausea., Normal, Disp-20 Tablet, R-0         CONTINUE these medications which have NOT CHANGED    Details   lisinopriL (PRINIVIL, ZESTRIL) 2.5 mg tablet TAKE 1 TABLET BY MOUTH  DAILY, Normal, Disp-90 Tablet, R-3Requesting 1 year supply      zolpidem (AMBIEN) 5 mg tablet Take 1 Tablet by mouth nightly as needed for Sleep.  Max Daily Amount: 5 mg., Normal, Disp-90 Tablet, R-0      furosemide (LASIX) 20 mg tablet TAKE 1 TABLET BY MOUTH ONCE DAILY, Normal, Disp-30 Tablet, R-0Requesting 1 year supply      metoprolol succinate (TOPROL-XL) 25 mg XL tablet TAKE 1 TABLET BY MOUTH  DAILY, Normal, Disp-90 Tablet, R-3Requesting 1 year supply      Ozempic 0.25 mg or 0.5 mg/dose (2 mg/1.5 ml) subq pen INJECT SUBCUTANEOUSLY 0.5MG ONCE WEEKLY, Normal, Disp-4.5 mL, R-3Requesting 1 year supply      lancets (Accu-Chek Softclix Lancets) misc USE TO OBTAIN BLOOD SAMPLE  FOR DIABETIC TESTING 3  TIMES DAILY, Normal, Disp-300 Each, R-3Requesting 1 year supply      gabapentin (NEURONTIN) 300 mg capsule 300 mg daily as needed., Historical Med      levothyroxine (SYNTHROID) 25 mcg tablet TAKE 1 TABLET BY MOUTH DAILY BEFORE BREAKFAST, Normal, Disp-90 Tablet, R-3      glucose blood VI test strips (Accu-Chek Hannah Plus test strp) strip by Does Not Apply route two (2) times a day., Normal, Disp-300 Strip, R-3      Januvia 100 mg tablet TAKE 1 TABLET BY MOUTH EVERY DAY FOR DIABETES, Normal, Disp-90 Tablet, R-3      pravastatin (PRAVACHOL) 40 mg tablet TAKE 1 TABLET BY MOUTH EVERY NIGHT, Normal, Disp-90 Tablet, R-3      fluticasone propionate (FLONASE) 50 mcg/actuation nasal spray 2 Sprays by Both Nostrils route daily as needed for Rhinitis., Normal, Disp-3 Bottle, R-3      Accu-Chek Hannah Plus Meter misc USE TO CHECK BLOOD SUGAR 2  TIMES DAILY, Normal, Disp-1 Each,R-0 multivit-minerals/folic acid (CENTRUM VITAMINTS PO) Take 1 tablet by mouth daily, Historical Med      cholecalciferol, vitamin D3, (VITAMIN D3) 2,000 unit tab Take 2 capsule by mouth daily, Historical Med      Aspirin, Buffered 81 mg tab Take 81 mg by mouth daily. , Historical Med           2. Follow-up Information     Follow up With Specialties Details Why Contact Info    Aundra Merlin, MD Family Medicine In 2 days  [address]  [phone]          3. Return to ED if worse     Diagnosis     Clinical Impression:   1. Gastroesophageal reflux disease, unspecified whether esophagitis present    2. ALEIDA (acute kidney injury) ([facility] [address]    3. Anxiety state    4. Dehydration        Attestations:    Ca Burroughs MD    Please note that this dictation was completed with [employer], the computer voice recognition software. Quite often unanticipated grammatical, syntax, homophones, and other interpretive errors are inadvertently transcribed by the computer software. Please disregard these errors. Please excuse any errors that have escaped final proofreading. Thank you.

## 2022-03-07 DIAGNOSIS — E11.21 TYPE 2 DIABETES WITH NEPHROPATHY (HCC): ICD-10-CM

## 2022-03-08 RX ORDER — SITAGLIPTIN 100 MG/1
TABLET, FILM COATED ORAL
Qty: 90 TABLET | Refills: 3 | Status: SHIPPED | OUTPATIENT
Start: 2022-03-08 | End: 2022-04-05 | Stop reason: ALTCHOICE

## 2022-03-17 DIAGNOSIS — E78.2 MIXED HYPERLIPIDEMIA: ICD-10-CM

## 2022-03-18 PROBLEM — M48.061 SPINAL STENOSIS OF LUMBAR REGION AT MULTIPLE LEVELS: Status: ACTIVE | Noted: 2020-11-23

## 2022-03-18 PROBLEM — M79.604 PAIN IN BOTH LOWER EXTREMITIES: Status: ACTIVE | Noted: 2021-06-15

## 2022-03-18 PROBLEM — E11.21 TYPE 2 DIABETES WITH NEPHROPATHY (HCC): Status: ACTIVE | Noted: 2018-12-14

## 2022-03-18 PROBLEM — M79.605 PAIN IN BOTH LOWER EXTREMITIES: Status: ACTIVE | Noted: 2021-06-15

## 2022-03-18 RX ORDER — PRAVASTATIN SODIUM 40 MG/1
TABLET ORAL
Qty: 90 TABLET | Refills: 3 | Status: SHIPPED | OUTPATIENT
Start: 2022-03-18 | End: 2022-07-25

## 2022-03-19 PROBLEM — I65.23 STENOSIS OF BOTH INTERNAL CAROTID ARTERIES: Status: ACTIVE | Noted: 2017-01-06

## 2022-03-19 PROBLEM — G43.109 COMPLICATED MIGRAINE: Status: ACTIVE | Noted: 2017-01-06

## 2022-03-19 PROBLEM — I10 HYPERTENSION, ESSENTIAL: Status: ACTIVE | Noted: 2019-01-03

## 2022-03-19 PROBLEM — R20.2 NUMBNESS AND TINGLING OF RIGHT SIDE OF FACE: Status: ACTIVE | Noted: 2017-01-06

## 2022-03-19 PROBLEM — R20.0 NUMBNESS AND TINGLING OF RIGHT SIDE OF FACE: Status: ACTIVE | Noted: 2017-01-06

## 2022-03-19 PROBLEM — I87.2 VENOUS INSUFFICIENCY OF BOTH LOWER EXTREMITIES: Status: ACTIVE | Noted: 2021-07-20

## 2022-03-19 PROBLEM — I63.9 CVA (CEREBRAL VASCULAR ACCIDENT) (HCC): Status: ACTIVE | Noted: 2017-01-05

## 2022-03-20 DIAGNOSIS — I25.10 CORONARY ARTERY DISEASE INVOLVING NATIVE CORONARY ARTERY OF NATIVE HEART WITHOUT ANGINA PECTORIS: ICD-10-CM

## 2022-03-20 PROBLEM — M79.89 LEG SWELLING: Status: ACTIVE | Noted: 2021-06-15

## 2022-03-21 RX ORDER — FUROSEMIDE 20 MG/1
TABLET ORAL
Qty: 30 TABLET | Refills: 11 | Status: SHIPPED | OUTPATIENT
Start: 2022-03-21

## 2022-03-24 ENCOUNTER — OFFICE VISIT (OUTPATIENT)
Dept: CARDIOLOGY CLINIC | Age: 72
End: 2022-03-24
Payer: MEDICARE

## 2022-03-24 ENCOUNTER — TELEPHONE (OUTPATIENT)
Dept: FAMILY MEDICINE CLINIC | Age: 72
End: 2022-03-24

## 2022-03-24 VITALS
SYSTOLIC BLOOD PRESSURE: 112 MMHG | BODY MASS INDEX: 25.69 KG/M2 | DIASTOLIC BLOOD PRESSURE: 70 MMHG | WEIGHT: 139.6 LBS | RESPIRATION RATE: 18 BRPM | HEART RATE: 88 BPM | OXYGEN SATURATION: 99 % | HEIGHT: 62 IN

## 2022-03-24 DIAGNOSIS — R06.09 DOE (DYSPNEA ON EXERTION): ICD-10-CM

## 2022-03-24 DIAGNOSIS — I87.2 VENOUS INSUFFICIENCY OF BOTH LOWER EXTREMITIES: ICD-10-CM

## 2022-03-24 DIAGNOSIS — I25.10 CORONARY ARTERY DISEASE INVOLVING NATIVE CORONARY ARTERY OF NATIVE HEART WITHOUT ANGINA PECTORIS: Primary | ICD-10-CM

## 2022-03-24 DIAGNOSIS — E78.2 MIXED HYPERLIPIDEMIA: ICD-10-CM

## 2022-03-24 DIAGNOSIS — Z98.61 S/P PTCA (PERCUTANEOUS TRANSLUMINAL CORONARY ANGIOPLASTY): ICD-10-CM

## 2022-03-24 PROCEDURE — G9899 SCRN MAM PERF RSLTS DOC: HCPCS | Performed by: INTERNAL MEDICINE

## 2022-03-24 PROCEDURE — G9717 DOC PT DX DEP/BP F/U NT REQ: HCPCS | Performed by: INTERNAL MEDICINE

## 2022-03-24 PROCEDURE — G8419 CALC BMI OUT NRM PARAM NOF/U: HCPCS | Performed by: INTERNAL MEDICINE

## 2022-03-24 PROCEDURE — G8399 PT W/DXA RESULTS DOCUMENT: HCPCS | Performed by: INTERNAL MEDICINE

## 2022-03-24 PROCEDURE — G8754 DIAS BP LESS 90: HCPCS | Performed by: INTERNAL MEDICINE

## 2022-03-24 PROCEDURE — G8536 NO DOC ELDER MAL SCRN: HCPCS | Performed by: INTERNAL MEDICINE

## 2022-03-24 PROCEDURE — 99214 OFFICE O/P EST MOD 30 MIN: CPT | Performed by: INTERNAL MEDICINE

## 2022-03-24 PROCEDURE — 93000 ELECTROCARDIOGRAM COMPLETE: CPT | Performed by: INTERNAL MEDICINE

## 2022-03-24 PROCEDURE — G8427 DOCREV CUR MEDS BY ELIG CLIN: HCPCS | Performed by: INTERNAL MEDICINE

## 2022-03-24 PROCEDURE — 1090F PRES/ABSN URINE INCON ASSESS: CPT | Performed by: INTERNAL MEDICINE

## 2022-03-24 PROCEDURE — G8752 SYS BP LESS 140: HCPCS | Performed by: INTERNAL MEDICINE

## 2022-03-24 PROCEDURE — 3017F COLORECTAL CA SCREEN DOC REV: CPT | Performed by: INTERNAL MEDICINE

## 2022-03-24 PROCEDURE — 1101F PT FALLS ASSESS-DOCD LE1/YR: CPT | Performed by: INTERNAL MEDICINE

## 2022-03-24 NOTE — TELEPHONE ENCOUNTER
Douglas booker from [facility]     Dr Alexandra Watson would like to speak to Dr Sven Calix number to reach her is [phone] and O [phone]

## 2022-03-24 NOTE — PROGRESS NOTES
Chief Complaint   Patient presents with    Coronary Artery Disease     6 Month follow up     Shortness of Breath     feels short winded upon exertion      1. Have you been to the ER, urgent care clinic since your last visit? Hospitalized since your last visit? No     2. Have you seen or consulted any other health care providers outside of the [address] [place] [address] since your last visit? Include any pap smears or colon screening.   No

## 2022-03-24 NOTE — PROGRESS NOTES
3/24/2022 1:38 PM      Subjective:     Tania Johnson is seen in office today for f/u visit. C/o WISEMAN for last month or so. She denies chest pain, chest pressure/discomfort, palpitations, irregular heart beats, near-syncope, syncope, fatigue, orthopnea, paroxysmal nocturnal dyspnea, exertional chest pressure/discomfort, claudication, lower extremity edema. Visit Vitals  /70 (BP 1 Location: Left arm, BP Patient Position: Sitting, BP Cuff Size: Small adult)   Pulse 88   Resp 18   Ht 5' 2\" (1.575 m)   Wt 139 lb 9.6 oz (63.3 kg)   SpO2 99%   BMI 25.53 kg/m²     Current Outpatient Medications   Medication Sig    furosemide (LASIX) 20 mg tablet TAKE 1 TABLET BY MOUTH ONCE DAILY    pravastatin (PRAVACHOL) 40 mg tablet TAKE 1 TABLET BY MOUTH AT  NIGHT    Januvia 100 mg tablet TAKE 1 TABLET BY MOUTH  DAILY    Omeprazole delayed release (PRILOSEC D/R) 20 mg tablet Take 1 Tablet by mouth daily.  ondansetron hcl (Zofran) 4 mg tablet Take 1 Tablet by mouth every eight (8) hours as needed for Nausea.  lisinopriL (PRINIVIL, ZESTRIL) 2.5 mg tablet TAKE 1 TABLET BY MOUTH  DAILY    zolpidem (AMBIEN) 5 mg tablet Take 1 Tablet by mouth nightly as needed for Sleep. Max Daily Amount: 5 mg.  metoprolol succinate (TOPROL-XL) 25 mg XL tablet TAKE 1 TABLET BY MOUTH  DAILY    Ozempic 0.25 mg or 0.5 mg/dose (2 mg/1.5 ml) subq pen INJECT SUBCUTANEOUSLY 0.5MG ONCE WEEKLY    lancets (Accu-Chek Softclix Lancets) misc USE TO OBTAIN BLOOD SAMPLE  FOR DIABETIC TESTING 3  TIMES DAILY    gabapentin (NEURONTIN) 300 mg capsule 300 mg daily as needed.  levothyroxine (SYNTHROID) 25 mcg tablet TAKE 1 TABLET BY MOUTH DAILY BEFORE BREAKFAST    glucose blood VI test strips (Accu-Chek Hannah Plus test strp) strip by Does Not Apply route two (2) times a day.  fluticasone propionate (FLONASE) 50 mcg/actuation nasal spray 2 Sprays by Both Nostrils route daily as needed for Rhinitis.     Accu-Chek Hannah Plus Meter misc USE TO CHECK BLOOD SUGAR 2  TIMES DAILY    multivit-minerals/folic acid (CENTRUM VITAMINTS PO) Take 1 tablet by mouth daily     cholecalciferol, vitamin D3, (VITAMIN D3) 2,000 unit tab Take 2 capsule by mouth daily    Aspirin, Buffered 81 mg tab Take 81 mg by mouth daily. No current facility-administered medications for this visit.          Objective:      Visit Vitals  /70 (BP 1 Location: Left arm, BP Patient Position: Sitting, BP Cuff Size: Small adult)   Pulse 88   Resp 18   Ht 5' 2\" (1.575 m)   Wt 139 lb 9.6 oz (63.3 kg)   SpO2 99%   BMI 25.53 kg/m²       Past Medical History:   Diagnosis Date    Anxiety     Arthritis     ASHD (arteriosclerotic heart disease)     Bilateral carotid artery stenosis     CAD (coronary artery disease)     Chronic pain     Depression     Diabetes (HCC)     TypeII    Diarrhea     Gastroparesis     GERD (gastroesophageal reflux disease)     History of cardioembolic cerebrovascular accident (CVA)     Hypercholesterolemia     Hypertension     S/P bypass gastrojejunostomy 1/23/2012    Stroke ([facility] [address]     TIA    Thyroid disease       Past Surgical History:   Procedure Laterality Date    COLONOSCOPY N/A 5/26/2016    COL[facility] Lopez [facility] ENDOSCOPY    COLONOSCOPY N/A 10/16/2020    COLONOSCOPY performed by Nicola Calderon MD at [facility] AMBULATORY OR    COLONOSCOPY N/A 2/2/2022    COLONOSCOPY performed by Shavonne Harris MD at [facility] ENDOSCOPY    EGD  7/13/2009    HX APPENDECTOMY      HX BLADDER SUSPENSION      HX CHOLECYSTECTOMY      HX CORONARY STENT PLACEMENT  06/2013    3 stents    HX GASTRIC BYPASS  6/02    HX HEART CATHETERIZATION  2017    stents remain open, no new stents placed    HX HYSTERECTOMY      HX LAPAROTOMY  8/02    2/2 intra abd abscess    HX LUMBAR LAMINECTOMY  01/2008    rods and screws    HX OOPHORECTOMY Left     HX ORTHOPAEDIC Bilateral     carpal tunnel    HX ORTHOPAEDIC Bilateral     bone spurs both feet    DC ABDOMEN SURGERY PROC UNLISTED      s/p partial gastrectomy 2/2 gastroparesis    DC COLONOSCOPY FLX DX W/COLLJ SPEC WHEN PFRMD  5/03/2006    Dr. Asher Hargrove  2/9/2012         DC EGD BALLOON DILATION ESOPHAGUS <30 MM DIAM  1/23/2012         DC EGD TRANSORAL BIOPSY SINGLE/MULTIPLE  7/11/2011         DC EGD TRANSORAL BIOPSY SINGLE/MULTIPLE  1/23/2012         DC GASTROJEJUNOSTOMY      UPPER GI ENDOSCOPY,BIOPSY  10/1/2019         UPPER GI ENDOSCOPY,BIOPSY  10/16/2020          Allergies   Allergen Reactions    Demerol [Meperidine] Unknown (comments)     Pt unsure of reaction      Family History   Problem Relation Age of Onset    Heart Disease Mother     Hypertension Mother     Diabetes Father     Heart Disease Father     Hypertension Father     Alcohol abuse Brother     Heart Disease Sister     Hypertension Sister     Diabetes Sister     No Known Problems Sister     Alcohol abuse Brother     Alcohol abuse Brother     Diabetes Brother       Social History     Socioeconomic History    Marital status:      Spouse name: Not on file    Number of children: Not on file    Years of education: Not on file    Highest education level: Not on file   Occupational History    Not on file   Tobacco Use    Smoking status: Never Smoker    Smokeless tobacco: Never Used   Vaping Use    Vaping Use: Never used   Substance and Sexual Activity    Alcohol use: No     Alcohol/week: 0.0 standard drinks    Drug use: Yes     Types: Prescription, OTC    Sexual activity: Not Currently   Other Topics Concern    Not on file   Social History Narrative    Not on file     Social Determinants of Health     Financial Resource Strain:     Difficulty of Paying Living Expenses: Not on file   Food Insecurity:     Worried About Running Out of Food in the Last Year: Not on file    Kailey of Food in the Last Year: Not on file   Transportation Needs:     Lack of Transportation (Medical): Not on file    Lack of Transportation (Non-Medical): Not on file   Physical Activity:     Days of Exercise per Week: Not on file    Minutes of Exercise per Session: Not on file   Stress:     Feeling of Stress : Not on file   Social Connections:     Frequency of Communication with Friends and Family: Not on file    Frequency of Social Gatherings with Friends and Family: Not on file    Attends Yazidism Services: Not on file    Active Member of [address] [place] [address] or Organizations: Not on file    Attends Club or Organization Meetings: Not on file    Marital Status: Not on file   Intimate Partner Violence:     Fear of Current or Ex-Partner: Not on file    Emotionally Abused: Not on file    Physically Abused: Not on file    Sexually Abused: Not on file   Housing Stability:     Unable to Pay for Housing in the Last Year: Not on file    Number of [place] in the Last Year: Not on file    Unstable Housing in the Last Year: Not on file       Assessment:       ICD-10-CM ICD-9-CM    1. Coronary artery disease involving native coronary artery of native heart without angina pectoris  I25.10 414.01 AMB POC EKG ROUTINE W/ 12 LEADS, INTER & REP      NUCLEAR CARDIAC STRESS TEST   2. Venous insufficiency of both lower extremities  I87.2 459.81 NUCLEAR CARDIAC STRESS TEST   3. S/P PTCA (percutaneous transluminal coronary angioplasty)  Z98.61 V45.82 NUCLEAR CARDIAC STRESS TEST   4. Mixed hyperlipidemia  E78.2 272.2 NUCLEAR CARDIAC STRESS TEST   5. WISEMAN (dyspnea on exertion)  R06.00 786.09 NUCLEAR CARDIAC STRESS TEST       Plan:     1. WISEMAN: previously no significant CAD noted on cath in 2017. Normal EF last year. Check exercise stress test. If normal and remains symptomatic, then consider pulmonary evaluation.      2. Venous insufficiency of both lower extremities  Has done well with conservative management. SANDIE negative bilaterally previously.      3.  Hypertension, essential  BP controlled.  Continue anti-hypertensive therapy and low sodium diet     4. Mixed hyperlipidemia  Continue statin therapy and low fat, low cholesterol diet.  Last FLP noted.

## 2022-03-25 NOTE — TELEPHONE ENCOUNTER
Discussed with Dr. Eric Varner, pt is c/o abd pain. She has had extensive GI work up. We agree that she would see GI back if she has abd increased pain. GYN eval has been negative.

## 2022-03-30 ENCOUNTER — TRANSCRIBE ORDER (OUTPATIENT)
Dept: SCHEDULING | Age: 72
End: 2022-03-30

## 2022-03-30 DIAGNOSIS — Z12.31 SCREENING MAMMOGRAM FOR HIGH-RISK PATIENT: Primary | ICD-10-CM

## 2022-04-01 ENCOUNTER — ANCILLARY PROCEDURE (OUTPATIENT)
Dept: CARDIOLOGY CLINIC | Age: 72
End: 2022-04-01
Payer: MEDICARE

## 2022-04-01 VITALS
SYSTOLIC BLOOD PRESSURE: 140 MMHG | DIASTOLIC BLOOD PRESSURE: 80 MMHG | HEIGHT: 62 IN | WEIGHT: 139 LBS | BODY MASS INDEX: 25.58 KG/M2

## 2022-04-01 DIAGNOSIS — E78.2 MIXED HYPERLIPIDEMIA: ICD-10-CM

## 2022-04-01 DIAGNOSIS — Z98.61 S/P PTCA (PERCUTANEOUS TRANSLUMINAL CORONARY ANGIOPLASTY): ICD-10-CM

## 2022-04-01 DIAGNOSIS — I25.10 CORONARY ARTERY DISEASE INVOLVING NATIVE CORONARY ARTERY OF NATIVE HEART WITHOUT ANGINA PECTORIS: ICD-10-CM

## 2022-04-01 DIAGNOSIS — I87.2 VENOUS INSUFFICIENCY OF BOTH LOWER EXTREMITIES: ICD-10-CM

## 2022-04-01 DIAGNOSIS — R06.09 DOE (DYSPNEA ON EXERTION): ICD-10-CM

## 2022-04-01 LAB
NUC STRESS EJECTION FRACTION: 85 %
STRESS ANGINA INDEX: 0
STRESS BASELINE DIAS BP: 80 MMHG
STRESS BASELINE HR: 92 BPM
STRESS BASELINE ST DEPRESSION: 0 MM
STRESS BASELINE SYS BP: 140 MMHG
STRESS ESTIMATED WORKLOAD: 4.3 METS
STRESS EXERCISE DUR MIN: 3 MIN
STRESS EXERCISE DUR SEC: 20 SEC
STRESS PEAK DIAS BP: 80 MMHG
STRESS PEAK SYS BP: 158 MMHG
STRESS PERCENT HR ACHIEVED: 91 %
STRESS POST PEAK HR: 137 BPM
STRESS RATE PRESSURE PRODUCT: NORMAL BPM*MMHG
STRESS SR DUKE TREADMILL SCORE: 3
STRESS ST DEPRESSION: 0 MM
STRESS TARGET HR: 151 BPM

## 2022-04-01 PROCEDURE — A9502 TC99M TETROFOSMIN: HCPCS | Performed by: INTERNAL MEDICINE

## 2022-04-01 PROCEDURE — 93015 CV STRESS TEST SUPVJ I&R: CPT | Performed by: INTERNAL MEDICINE

## 2022-04-01 PROCEDURE — 78452 HT MUSCLE IMAGE SPECT MULT: CPT | Performed by: INTERNAL MEDICINE

## 2022-04-04 ENCOUNTER — TELEPHONE (OUTPATIENT)
Dept: CARDIOLOGY CLINIC | Age: 72
End: 2022-04-04

## 2022-04-04 NOTE — TELEPHONE ENCOUNTER
Spoke with patient. Verified with two patient identifiers. Advised pt per Aury Buenrostro NP, stress test is normal.  Low concern for significant blockages in the heart arteries at this time. Patient verbalized understanding.

## 2022-04-04 NOTE — TELEPHONE ENCOUNTER
----- Message from Christina Donohue NP sent at 4/4/2022 11:15 AM EDT -----  Please call the patient and inform that stress test is normal.  Low concern for significant blockages in the heart arteries at this time.     Thanks,  [employer]

## 2022-04-05 ENCOUNTER — OFFICE VISIT (OUTPATIENT)
Dept: FAMILY MEDICINE CLINIC | Age: 72
End: 2022-04-05
Payer: MEDICARE

## 2022-04-05 VITALS
SYSTOLIC BLOOD PRESSURE: 129 MMHG | WEIGHT: 138.4 LBS | TEMPERATURE: 97.7 F | HEART RATE: 86 BPM | OXYGEN SATURATION: 99 % | BODY MASS INDEX: 25.47 KG/M2 | HEIGHT: 62 IN | RESPIRATION RATE: 14 BRPM | DIASTOLIC BLOOD PRESSURE: 69 MMHG

## 2022-04-05 DIAGNOSIS — Z91.09 ENVIRONMENTAL ALLERGIES: ICD-10-CM

## 2022-04-05 DIAGNOSIS — E78.2 MIXED HYPERLIPIDEMIA: ICD-10-CM

## 2022-04-05 DIAGNOSIS — I25.10 CORONARY ARTERY DISEASE INVOLVING NATIVE CORONARY ARTERY OF NATIVE HEART WITHOUT ANGINA PECTORIS: ICD-10-CM

## 2022-04-05 DIAGNOSIS — F41.9 ANXIETY: ICD-10-CM

## 2022-04-05 DIAGNOSIS — E11.21 TYPE 2 DIABETES WITH NEPHROPATHY (HCC): Primary | ICD-10-CM

## 2022-04-05 DIAGNOSIS — E03.9 ACQUIRED HYPOTHYROIDISM: ICD-10-CM

## 2022-04-05 DIAGNOSIS — I73.9 PAD (PERIPHERAL ARTERY DISEASE) (HCC): ICD-10-CM

## 2022-04-05 DIAGNOSIS — Z51.81 ENCOUNTER FOR MEDICATION MONITORING: ICD-10-CM

## 2022-04-05 LAB
ANION GAP SERPL CALC-SCNC: 8 MMOL/L (ref 5–15)
BUN SERPL-MCNC: 19 MG/DL (ref 6–20)
BUN/CREAT SERPL: 18 (ref 12–20)
CALCIUM SERPL-MCNC: 9.1 MG/DL (ref 8.5–10.1)
CHLORIDE SERPL-SCNC: 105 MMOL/L (ref 97–108)
CHOLEST SERPL-MCNC: 142 MG/DL
CO2 SERPL-SCNC: 23 MMOL/L (ref 21–32)
CREAT SERPL-MCNC: 1.05 MG/DL (ref 0.55–1.02)
GLUCOSE POC: 268 MG/DL
GLUCOSE SERPL-MCNC: 239 MG/DL (ref 65–100)
HDLC SERPL-MCNC: 60 MG/DL
HDLC SERPL: 2.4 {RATIO} (ref 0–5)
LDLC SERPL CALC-MCNC: 33.2 MG/DL (ref 0–100)
POTASSIUM SERPL-SCNC: 4.4 MMOL/L (ref 3.5–5.1)
SODIUM SERPL-SCNC: 136 MMOL/L (ref 136–145)
TRIGL SERPL-MCNC: 244 MG/DL (ref ?–150)
VLDLC SERPL CALC-MCNC: 48.8 MG/DL

## 2022-04-05 PROCEDURE — 1090F PRES/ABSN URINE INCON ASSESS: CPT | Performed by: FAMILY MEDICINE

## 2022-04-05 PROCEDURE — 2022F DILAT RTA XM EVC RTNOPTHY: CPT | Performed by: FAMILY MEDICINE

## 2022-04-05 PROCEDURE — 1101F PT FALLS ASSESS-DOCD LE1/YR: CPT | Performed by: FAMILY MEDICINE

## 2022-04-05 PROCEDURE — 3017F COLORECTAL CA SCREEN DOC REV: CPT | Performed by: FAMILY MEDICINE

## 2022-04-05 PROCEDURE — 99214 OFFICE O/P EST MOD 30 MIN: CPT | Performed by: FAMILY MEDICINE

## 2022-04-05 PROCEDURE — G8419 CALC BMI OUT NRM PARAM NOF/U: HCPCS | Performed by: FAMILY MEDICINE

## 2022-04-05 PROCEDURE — G9899 SCRN MAM PERF RSLTS DOC: HCPCS | Performed by: FAMILY MEDICINE

## 2022-04-05 PROCEDURE — G9717 DOC PT DX DEP/BP F/U NT REQ: HCPCS | Performed by: FAMILY MEDICINE

## 2022-04-05 PROCEDURE — G8427 DOCREV CUR MEDS BY ELIG CLIN: HCPCS | Performed by: FAMILY MEDICINE

## 2022-04-05 PROCEDURE — 3044F HG A1C LEVEL LT 7.0%: CPT | Performed by: FAMILY MEDICINE

## 2022-04-05 PROCEDURE — G8536 NO DOC ELDER MAL SCRN: HCPCS | Performed by: FAMILY MEDICINE

## 2022-04-05 PROCEDURE — G8399 PT W/DXA RESULTS DOCUMENT: HCPCS | Performed by: FAMILY MEDICINE

## 2022-04-05 PROCEDURE — G8754 DIAS BP LESS 90: HCPCS | Performed by: FAMILY MEDICINE

## 2022-04-05 PROCEDURE — G8752 SYS BP LESS 140: HCPCS | Performed by: FAMILY MEDICINE

## 2022-04-05 PROCEDURE — 82947 ASSAY GLUCOSE BLOOD QUANT: CPT | Performed by: FAMILY MEDICINE

## 2022-04-05 RX ORDER — FLUTICASONE PROPIONATE 50 MCG
SPRAY, SUSPENSION (ML) NASAL
Qty: 48 G | Refills: 3 | Status: SHIPPED | OUTPATIENT
Start: 2022-04-05

## 2022-04-05 NOTE — PROGRESS NOTES
Chief Complaint   Patient presents with   [facility] ED Follow-up       1. \"Have you been to the ER, urgent care clinic since your last visit? Hospitalized since your last visit? \" Yes, [facility] ED. 2. \"Have you seen or consulted any other health care providers outside of the [address] [place] [address] since your last visit? \" No     3. For patients aged 39-70: Has the patient had a colonoscopy / FIT/ Cologuard? Yes - Care Gap present. Most recent result on file      If the patient is female:    4. For patients aged 41-77: Has the patient had a mammogram within the past 2 years? No      5. For patients aged 21-65: Has the patient had a pap smear?  NA - based on age or sex      Health Maintenance Due   Topic Date Due    Breast Cancer Screen Mammogram  02/20/2022    Foot Exam Q1  04/02/2022    Medicare Yearly Exam  04/03/2022

## 2022-04-05 NOTE — PROGRESS NOTES
HISTORY OF PRESENT ILLNESS  Emely Bacon is a 73 y.o. female. HPI   Follow up for the ER for SOB, nausea and lightheadedness and anxiety  Told that she had an anxiety attack. Cardiac eval was negative. Told to follow up on her elevated creatinine level. Nausea has improved with taking the prilosec that was given. Overall anxiety has been controlled and stable. Not sure what triggered her sx at the time. Has also been seeing GYN for vaginal irritation and dryness. Started on premarin cream but too soon to have helped just  Yet. DM type II follow up:  Compliant w/ meds, diabetic diet, and exercise. Tolerating Januvia and ozempic. We discussed stopping the [place] and [place]. Has been watching her diet. BS have been much better. Range in the low to mid 100s. Checks BS BID on most days and prn. Pt does not have BS log at visit today. Denies any tingling sensation. No polyuria or polydipsia. No blurred vision. No significant weight changes. Cardiovascular Review:  The patient has coronary artery disease, HF and status post coronary artery stenting. Diet and Lifestyle: generally follows a low fat low cholesterol diet, generally follows a low sodium diet, follows a diabetic diet regularly, exercises sporadically  Home BP Monitoring: is not measured at home. Pertinent ROS: taking medications as instructed, no medication side effects noted, no TIA's, no chest pain on exertion, no dyspnea on exertion, no swelling of ankles. Hypercholesterolemia follow up:  Compliant w/ meds, low fat, low cholesterol diet. Exercising some. No muscle nor abdominal pain, no skin discoloration. Patient fasting today. Depression Review:  Patient is seen for followup of depression and anxiety. Overall doing well. Taking xanax prn increased anxiety. Anxiety usually triggered by stress. She denies depressed mood and insomnia. Sleeping well at night. Thyroid Review:  Patient is seen for followup of hypothyroidism. TSH in November was stable. Thyroid ROS: denies fatigue, weight changes, heat/cold intolerance, bowel/skin changes or CVS symptoms. HM:  Mammogram 2/20/2020 (has scheduled for this month). Bone density 2/20/2020  Colonoscopy 10/16/2020 by Dr Lisbeth Lunsford- repeat in 5 years  Patient Active Problem List   Diagnosis Code    Anxiety F41.9    Depression F32. A    Hypovitaminosis D E55.9    GERD (gastroesophageal reflux disease) K21.9    Edema R60.9    Esophageal motor disorder K22.4    S/P bypass gastrojejunostomy Z98.0    SOB (shortness of breath) R06.02    CAD (coronary artery disease) I25.10    S/P PTCA (percutaneous transluminal coronary angioplasty) Z98.61    Mixed hyperlipidemia E78.2    Diarrhea R19.7    Type 2 diabetes mellitus without complication (HCC) U54.4    Osteopenia M85.80    Encounter for medication monitoring Z51.81    CVA (cerebral vascular accident) ([facility] [address] X86.9    Complicated migraine S84. 109    Numbness and tingling of right side of face R20.0, R20.2    Stenosis of both internal carotid arteries I65.23    Type 2 diabetes with nephropathy (HCC) E11.21    Hypertension, essential I10    Bilateral carotid artery stenosis I65.23    Spinal stenosis of lumbar region at multiple levels M48.061    Pain in both lower extremities M79.604, M79.605    Leg swelling M79.89    Venous insufficiency of both lower extremities I87.2    WISEMAN (dyspnea on exertion) R06.00       Current Outpatient Medications   Medication Sig Dispense Refill    furosemide (LASIX) 20 mg tablet TAKE 1 TABLET BY MOUTH ONCE DAILY 30 Tablet 11    pravastatin (PRAVACHOL) 40 mg tablet TAKE 1 TABLET BY MOUTH AT  NIGHT 90 Tablet 3    Januvia 100 mg tablet TAKE 1 TABLET BY MOUTH  DAILY 90 Tablet 3    Omeprazole delayed release (PRILOSEC D/R) 20 mg tablet Take 1 Tablet by mouth daily. 20 Tablet 0    ondansetron hcl (Zofran) 4 mg tablet Take 1 Tablet by mouth every eight (8) hours as needed for Nausea.  20 Tablet 0    lisinopriL (PRINIVIL, ZESTRIL) 2.5 mg tablet TAKE 1 TABLET BY MOUTH  DAILY 90 Tablet 3    zolpidem (AMBIEN) 5 mg tablet Take 1 Tablet by mouth nightly as needed for Sleep. Max Daily Amount: 5 mg. 90 Tablet 0    metoprolol succinate (TOPROL-XL) 25 mg XL tablet TAKE 1 TABLET BY MOUTH  DAILY 90 Tablet 3    Ozempic 0.25 mg or 0.5 mg/dose (2 mg/1.5 ml) subq pen INJECT SUBCUTANEOUSLY 0.5MG ONCE WEEKLY 4.5 mL 3    lancets (Accu-Chek Softclix Lancets) misc USE TO OBTAIN BLOOD SAMPLE  FOR DIABETIC TESTING 3  TIMES DAILY 300 Each 3    gabapentin (NEURONTIN) 300 mg capsule 300 mg daily as needed.  levothyroxine (SYNTHROID) 25 mcg tablet TAKE 1 TABLET BY MOUTH DAILY BEFORE BREAKFAST 90 Tablet 3    glucose blood VI test strips (Accu-Chek Hannah Plus test strp) strip by Does Not Apply route two (2) times a day. 300 Strip 3    fluticasone propionate (FLONASE) 50 mcg/actuation nasal spray 2 Sprays by Both Nostrils route daily as needed for Rhinitis. 3 Bottle 3    Accu-Chek Hannah Plus Meter misc USE TO CHECK BLOOD SUGAR 2  TIMES DAILY 1 Each 0    multivit-minerals/folic acid (CENTRUM VITAMINTS PO) Take 1 tablet by mouth daily       cholecalciferol, vitamin D3, (VITAMIN D3) 2,000 unit tab Take 2 capsule by mouth daily      Aspirin, Buffered 81 mg tab Take 81 mg by mouth daily.          Allergies   Allergen Reactions    Demerol [Meperidine] Unknown (comments)     Pt unsure of reaction       Past Medical History:   Diagnosis Date    Anxiety     Arthritis     ASHD (arteriosclerotic heart disease)     Bilateral carotid artery stenosis     CAD (coronary artery disease)     Chronic pain     Depression     Diabetes (HCC)     TypeII    Diarrhea     Gastroparesis     GERD (gastroesophageal reflux disease)     History of cardioembolic cerebrovascular accident (CVA)     Hypercholesterolemia     Hypertension     S/P bypass gastrojejunostomy 1/23/2012    Stroke [facility])     TIA    Thyroid disease        Past Surgical History:   Procedure Laterality Date    COLONOSCOPY N/A 5/26/2016    COLONOSCOPY performed by Ghanshyam Meyer MD at [facility] ENDOSCOPY    COLONOSCOPY N/A 10/16/2020    COLONOSCOPY performed by Kulwinder West MD at [facility] AMBULATORY OR    COLONOSCOPY N/A 2/2/2022    COLONOSCOPY performed by Orquidea Brink MD at [facility] ENDOSCOPY    EGD  7/13/2009    HX APPENDECTOMY      HX BLADDER SUSPENSION      HX CHOLECYSTECTOMY      HX CORONARY STENT PLACEMENT  06/2013    3 stents    HX GASTRIC BYPASS  6/02    HX HEART CATHETERIZATION  2017    stents remain open, no new stents placed    HX HYSTERECTOMY      HX LAPAROTOMY  8/02    2/2 intra abd abscess    HX LUMBAR LAMINECTOMY  01/2008    rods and screws    HX OOPHORECTOMY Left     HX ORTHOPAEDIC Bilateral     carpal tunnel    HX ORTHOPAEDIC Bilateral     bone spurs both feet    IA ABDOMEN SURGERY PROC UNLISTED      s/p partial gastrectomy 2/2 gastroparesis    IA COLONOSCOPY FLX DX W/COLLJ SPEC WHEN PFRMD  5/03/2006    Dr. Crystal Givens  2/9/2012         IA EGD BALLOON DILATION ESOPHAGUS <30 MM DIAM  1/23/2012         IA EGD TRANSORAL BIOPSY SINGLE/MULTIPLE  7/11/2011         IA EGD TRANSORAL BIOPSY SINGLE/MULTIPLE  1/23/2012         IA GASTROJEJUNOSTOMY      UPPER GI ENDOSCOPY,BIOPSY  10/1/2019         UPPER GI ENDOSCOPY,BIOPSY  10/16/2020            Family History   Problem Relation Age of Onset    Heart Disease Mother     Hypertension Mother     Diabetes Father     Heart Disease Father     Hypertension Father     Alcohol abuse Brother     Heart Disease Sister     Hypertension Sister     Diabetes Sister     No Known Problems Sister     Alcohol abuse Brother     Alcohol abuse Brother     Diabetes Brother        Social History     Tobacco Use    Smoking status: Never Smoker    Smokeless tobacco: Never Used   Substance Use Topics    Alcohol use: No     Alcohol/week: 0.0 standard drinks       Lab Results   Component Value Date/Time    WBC 10.7 03/02/2022 09:02 AM    HGB 13.4 03/02/2022 09:02 AM    HCT 40.9 03/02/2022 09:02 AM    PLATELET 920 13/40/6601 09:02 AM    MCV 89.9 03/02/2022 09:02 AM     Lab Results   Component Value Date/Time    Cholesterol, total 120 10/22/2021 09:19 AM    HDL Cholesterol 39 10/22/2021 09:19 AM    LDL, calculated 52.4 10/22/2021 09:19 AM    Triglyceride 143 10/22/2021 09:19 AM    CHOL/HDL Ratio 3.1 10/22/2021 09:19 AM     Lab Results   Component Value Date/Time    TSH 3.970 11/05/2021 12:00 AM    T4, Free 1.09 11/05/2021 12:00 AM    T4, Total 5.6 11/17/2015 08:54 AM      Lab Results   Component Value Date/Time    Sodium 134 (L) 03/02/2022 09:02 AM    Potassium 4.8 03/02/2022 09:02 AM    Chloride 100 03/02/2022 09:02 AM    CO2 29 03/02/2022 09:02 AM    Anion gap 5 03/02/2022 09:02 AM    Glucose 129 (H) 03/02/2022 09:02 AM    BUN 16 03/02/2022 09:02 AM    Creatinine 1.21 (H) 03/02/2022 09:02 AM    BUN/Creatinine ratio 13 03/02/2022 09:02 AM    GFR est AA 53 (L) 03/02/2022 09:02 AM    GFR est non-AA 44 (L) 03/02/2022 09:02 AM    Calcium 9.4 03/02/2022 09:02 AM    Bilirubin, total 0.4 03/02/2022 09:02 AM    ALT (SGPT) 30 03/02/2022 09:02 AM    Alk. phosphatase 116 03/02/2022 09:02 AM    Protein, total 7.5 03/02/2022 09:02 AM    Albumin 4.4 03/02/2022 09:02 AM    Globulin 3.1 03/02/2022 09:02 AM    A-G Ratio 1.4 03/02/2022 09:02 AM      Lab Results   Component Value Date/Time    Hemoglobin A1c 7.4 (H) 11/09/2020 01:14 PM    Hemoglobin A1c (POC) 5.8 02/18/2022 11:48 AM         Review of Systems   Constitutional: Negative for malaise/fatigue. HENT: Negative for congestion. Eyes: Negative for blurred vision. Respiratory: Negative for cough and shortness of breath. Cardiovascular: Negative for chest pain, palpitations and leg swelling. Gastrointestinal: Negative for abdominal pain, constipation and heartburn. Genitourinary: Negative for dysuria, frequency and urgency.    Musculoskeletal: Negative for back pain and joint pain. Neurological: Negative for dizziness, tingling and headaches. Endo/Heme/Allergies: Negative for environmental allergies. Psychiatric/Behavioral: Negative for depression. The patient does not have insomnia. Physical Exam  Vitals and nursing note reviewed. Constitutional:       Appearance: Normal appearance. She is well-developed. Comments: /69 (BP 1 Location: Right arm, BP Patient Position: Sitting, BP Cuff Size: Adult)   Pulse 86   Temp 97.7 °F (36.5 °C) (Oral)   Resp 14   Ht 5' 2\" (1.575 m)   Wt 138 lb 6.4 oz (62.8 kg)   SpO2 99%   BMI 25.31 kg/m²    HENT:      Right Ear: Tympanic membrane and ear canal normal.      Left Ear: Tympanic membrane and ear canal normal.   Neck:      Thyroid: No thyromegaly. Cardiovascular:      Rate and Rhythm: Normal rate and regular rhythm. Heart sounds: Normal heart sounds. Pulmonary:      Effort: Pulmonary effort is normal.      Breath sounds: Normal breath sounds. Abdominal:      General: Bowel sounds are normal.      Palpations: Abdomen is soft. There is no mass. Tenderness: There is no abdominal tenderness. Musculoskeletal:         General: Normal range of motion. Cervical back: Normal range of motion and neck supple. Right lower leg: No edema. Left lower leg: No edema. Lymphadenopathy:      Cervical: No cervical adenopathy. Skin:     General: Skin is warm and dry. Neurological:      General: No focal deficit present. Mental Status: She is alert and oriented to person, place, and time. Psychiatric:         Mood and Affect: Mood normal.         ASSESSMENT and PLAN  Diagnoses and all orders for this visit:    1. Type 2 diabetes with nephropathy ([facility] [address]   but he has eaten this morning. Discussed diet with pt. Cut back on sugar, sweets and bread, ect. Increase exercise. -     AMB POC GLUCOSE, QUANTITATIVE, BLOOD    2. Mixed hyperlipidemia  Continue to monitor.   Work on diet and exercise.  -     LIPID PANEL; Future    3. Coronary artery disease involving native coronary artery of native heart without angina pectoris  Stable. Has cardiology follow up for next week. 4. PAD (peripheral artery disease) (HCC)  Stable     5. Acquired hypothyroidism  Stable TSH    6. Anxiety  Overall has been stable     7. Encounter for medication monitoring  -     METABOLIC PANEL, BASIC; Future      reviewed diet, exercise and weight control  cardiovascular risk and specific lipid/LDL goals reviewed  reviewed medications and side effects in detail    I have discussed diagnosis listed in this note with pt and/or family. I have discussed treatment plans and options and the risk/benefit analysis of those options, including safe use of medications and possible medication side effects. Through the use of shared decision making we have agreed to the above plan. The patient has received an after-visit summary and questions were answered concerning future plans and follow up. Advise pt of any urgent changes then to proceed to the ER.

## 2022-04-12 RX ORDER — LANCING DEVICE/LANCETS
KIT MISCELLANEOUS
Qty: 1 KIT | Refills: 0 | Status: SHIPPED | OUTPATIENT
Start: 2022-04-12 | End: 2022-08-08

## 2022-04-12 NOTE — TELEPHONE ENCOUNTER
Last visit:4/5/22  Next visit: 6/20/22  Previous refill per medication hx patient has not had lancing device previously    Requested Prescriptions     Pending Prescriptions Disp Refills    Lancing Device with Lancets (Accu-Chek FastClix Lancing Dev) kit 1 Kit 0     Sig: Use as directed to check blood sugars

## 2022-04-18 ENCOUNTER — HOSPITAL ENCOUNTER (OUTPATIENT)
Dept: MAMMOGRAPHY | Age: 72
Discharge: HOME | End: 2022-04-18
Attending: FAMILY MEDICINE
Payer: MEDICARE

## 2022-04-18 ENCOUNTER — HOSPITAL ENCOUNTER (OUTPATIENT)
Dept: BONE DENSITY | Age: 72
Discharge: HOME | End: 2022-04-18
Attending: FAMILY MEDICINE
Payer: MEDICARE

## 2022-04-18 DIAGNOSIS — Z12.31 SCREENING MAMMOGRAM FOR HIGH-RISK PATIENT: ICD-10-CM

## 2022-04-18 DIAGNOSIS — M81.0 POSTMENOPAUSAL OSTEOPOROSIS: ICD-10-CM

## 2022-04-18 PROCEDURE — 77080 DXA BONE DENSITY AXIAL: CPT

## 2022-04-18 PROCEDURE — 77067 SCR MAMMO BI INCL CAD: CPT

## 2022-04-25 RX ORDER — LEVOTHYROXINE SODIUM 25 UG/1
TABLET ORAL
Qty: 90 TABLET | Refills: 3 | Status: SHIPPED | OUTPATIENT
Start: 2022-04-25 | End: 2022-06-23

## 2022-05-12 ENCOUNTER — TELEPHONE (OUTPATIENT)
Dept: INFECTIOUS DISEASES | Age: 72
End: 2022-05-12

## 2022-05-12 NOTE — TELEPHONE ENCOUNTER
Pt presented at office pt stated she was feeling very shaky and anxious and wanted  to see her. I reminded pt that Malik Grimes don't see pt on Thursday. I offered pt to see another provider for tomorrow, pt accepted an appt with Dr. Sheila Marie at 4.00pm. Per Dr. Sindhu Hazel she advised that pt take an extra Xanax and see her on 05/18/2022 at 2:00 pm. Pt called to be informed and no answer. Left voicemail for pt to call back to the office at [phone]. Will call pt back to make sure she's informed.

## 2022-05-18 ENCOUNTER — OFFICE VISIT (OUTPATIENT)
Dept: FAMILY MEDICINE CLINIC | Age: 72
End: 2022-05-18
Payer: MEDICARE

## 2022-05-18 VITALS
WEIGHT: 136 LBS | HEIGHT: 62 IN | OXYGEN SATURATION: 100 % | HEART RATE: 82 BPM | RESPIRATION RATE: 16 BRPM | BODY MASS INDEX: 25.03 KG/M2 | TEMPERATURE: 97.4 F | DIASTOLIC BLOOD PRESSURE: 69 MMHG | SYSTOLIC BLOOD PRESSURE: 120 MMHG

## 2022-05-18 DIAGNOSIS — F41.1 GENERALIZED ANXIETY DISORDER: Primary | ICD-10-CM

## 2022-05-18 PROCEDURE — G8427 DOCREV CUR MEDS BY ELIG CLIN: HCPCS | Performed by: FAMILY MEDICINE

## 2022-05-18 PROCEDURE — G8754 DIAS BP LESS 90: HCPCS | Performed by: FAMILY MEDICINE

## 2022-05-18 PROCEDURE — G8420 CALC BMI NORM PARAMETERS: HCPCS | Performed by: FAMILY MEDICINE

## 2022-05-18 PROCEDURE — 1101F PT FALLS ASSESS-DOCD LE1/YR: CPT | Performed by: FAMILY MEDICINE

## 2022-05-18 PROCEDURE — G9717 DOC PT DX DEP/BP F/U NT REQ: HCPCS | Performed by: FAMILY MEDICINE

## 2022-05-18 PROCEDURE — G8399 PT W/DXA RESULTS DOCUMENT: HCPCS | Performed by: FAMILY MEDICINE

## 2022-05-18 PROCEDURE — G8536 NO DOC ELDER MAL SCRN: HCPCS | Performed by: FAMILY MEDICINE

## 2022-05-18 PROCEDURE — G8752 SYS BP LESS 140: HCPCS | Performed by: FAMILY MEDICINE

## 2022-05-18 PROCEDURE — 99212 OFFICE O/P EST SF 10 MIN: CPT | Performed by: FAMILY MEDICINE

## 2022-05-18 PROCEDURE — 3017F COLORECTAL CA SCREEN DOC REV: CPT | Performed by: FAMILY MEDICINE

## 2022-05-18 PROCEDURE — 1090F PRES/ABSN URINE INCON ASSESS: CPT | Performed by: FAMILY MEDICINE

## 2022-05-18 PROCEDURE — G9899 SCRN MAM PERF RSLTS DOC: HCPCS | Performed by: FAMILY MEDICINE

## 2022-05-18 RX ORDER — VENLAFAXINE HYDROCHLORIDE 37.5 MG/1
37.5 CAPSULE, EXTENDED RELEASE ORAL DAILY
Qty: 30 CAPSULE | Refills: 3 | Status: SHIPPED | OUTPATIENT
Start: 2022-05-18 | End: 2022-05-18 | Stop reason: ALTCHOICE

## 2022-05-18 RX ORDER — GABAPENTIN 100 MG/1
100 CAPSULE ORAL 3 TIMES DAILY
COMMUNITY
Start: 2022-04-10 | End: 2022-08-09 | Stop reason: ALTCHOICE

## 2022-05-18 RX ORDER — PAROXETINE 10 MG/1
10 TABLET, FILM COATED ORAL DAILY
Qty: 30 TABLET | Refills: 3 | Status: SHIPPED | OUTPATIENT
Start: 2022-05-18 | End: 2022-08-09 | Stop reason: SDDI

## 2022-05-18 NOTE — PROGRESS NOTES
Chief Complaint   Patient presents with    Anxiety     patient c/o feeling anxiuos for about 1 month and nothing in particular         Mammogram 4/18/2022    Bone density 4/18/2022    Colonoscopy 2/2/2022 by Dr Jamaal Bryant repeat in 5 years. 1. Have you been to the ER, urgent care clinic since your last visit? Hospitalized since your last visit? Yes, [facility] in [address] [place] [address] 1 month ago per patient for anxiety    2. Have you seen or consulted any other health care providers outside of the [address] [place] [address] since your last visit? Include any pap smears or colon screening.  No
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
## 2022-06-07 DIAGNOSIS — F51.01 PRIMARY INSOMNIA: ICD-10-CM

## 2022-06-07 RX ORDER — ZOLPIDEM TARTRATE 5 MG/1
TABLET ORAL
Qty: 90 TABLET | OUTPATIENT
Start: 2022-06-07

## 2022-06-20 ENCOUNTER — OFFICE VISIT (OUTPATIENT)
Dept: FAMILY MEDICINE CLINIC | Age: 72
End: 2022-06-20
Payer: MEDICARE

## 2022-06-20 VITALS
DIASTOLIC BLOOD PRESSURE: 74 MMHG | WEIGHT: 134.8 LBS | SYSTOLIC BLOOD PRESSURE: 122 MMHG | HEART RATE: 76 BPM | BODY MASS INDEX: 24.8 KG/M2 | OXYGEN SATURATION: 100 % | TEMPERATURE: 98.2 F | HEIGHT: 62 IN | RESPIRATION RATE: 20 BRPM

## 2022-06-20 DIAGNOSIS — I25.10 CORONARY ARTERY DISEASE INVOLVING NATIVE CORONARY ARTERY OF NATIVE HEART WITHOUT ANGINA PECTORIS: ICD-10-CM

## 2022-06-20 DIAGNOSIS — E11.21 TYPE 2 DIABETES WITH NEPHROPATHY (HCC): ICD-10-CM

## 2022-06-20 DIAGNOSIS — E03.9 ACQUIRED HYPOTHYROIDISM: ICD-10-CM

## 2022-06-20 DIAGNOSIS — Z51.81 ENCOUNTER FOR MEDICATION MONITORING: ICD-10-CM

## 2022-06-20 DIAGNOSIS — E78.2 MIXED HYPERLIPIDEMIA: ICD-10-CM

## 2022-06-20 DIAGNOSIS — Z00.00 MEDICARE ANNUAL WELLNESS VISIT, SUBSEQUENT: Primary | ICD-10-CM

## 2022-06-20 DIAGNOSIS — I73.9 PAD (PERIPHERAL ARTERY DISEASE) (HCC): ICD-10-CM

## 2022-06-20 DIAGNOSIS — F51.01 PRIMARY INSOMNIA: ICD-10-CM

## 2022-06-20 LAB
GLUCOSE POC: 85 MG/DL
HBA1C MFR BLD HPLC: 5.9 %

## 2022-06-20 PROCEDURE — G8399 PT W/DXA RESULTS DOCUMENT: HCPCS | Performed by: FAMILY MEDICINE

## 2022-06-20 PROCEDURE — G8752 SYS BP LESS 140: HCPCS | Performed by: FAMILY MEDICINE

## 2022-06-20 PROCEDURE — G9717 DOC PT DX DEP/BP F/U NT REQ: HCPCS | Performed by: FAMILY MEDICINE

## 2022-06-20 PROCEDURE — 1124F ACP DISCUSS-NO DSCNMKR DOCD: CPT | Performed by: FAMILY MEDICINE

## 2022-06-20 PROCEDURE — G0439 PPPS, SUBSEQ VISIT: HCPCS | Performed by: FAMILY MEDICINE

## 2022-06-20 PROCEDURE — 3044F HG A1C LEVEL LT 7.0%: CPT | Performed by: FAMILY MEDICINE

## 2022-06-20 PROCEDURE — G8420 CALC BMI NORM PARAMETERS: HCPCS | Performed by: FAMILY MEDICINE

## 2022-06-20 PROCEDURE — 3017F COLORECTAL CA SCREEN DOC REV: CPT | Performed by: FAMILY MEDICINE

## 2022-06-20 PROCEDURE — 1101F PT FALLS ASSESS-DOCD LE1/YR: CPT | Performed by: FAMILY MEDICINE

## 2022-06-20 PROCEDURE — 1090F PRES/ABSN URINE INCON ASSESS: CPT | Performed by: FAMILY MEDICINE

## 2022-06-20 PROCEDURE — G9899 SCRN MAM PERF RSLTS DOC: HCPCS | Performed by: FAMILY MEDICINE

## 2022-06-20 PROCEDURE — G8536 NO DOC ELDER MAL SCRN: HCPCS | Performed by: FAMILY MEDICINE

## 2022-06-20 PROCEDURE — 82947 ASSAY GLUCOSE BLOOD QUANT: CPT | Performed by: FAMILY MEDICINE

## 2022-06-20 PROCEDURE — G8754 DIAS BP LESS 90: HCPCS | Performed by: FAMILY MEDICINE

## 2022-06-20 PROCEDURE — 2022F DILAT RTA XM EVC RTNOPTHY: CPT | Performed by: FAMILY MEDICINE

## 2022-06-20 PROCEDURE — 99214 OFFICE O/P EST MOD 30 MIN: CPT | Performed by: FAMILY MEDICINE

## 2022-06-20 PROCEDURE — G8427 DOCREV CUR MEDS BY ELIG CLIN: HCPCS | Performed by: FAMILY MEDICINE

## 2022-06-20 PROCEDURE — 83036 HEMOGLOBIN GLYCOSYLATED A1C: CPT | Performed by: FAMILY MEDICINE

## 2022-06-20 RX ORDER — SEMAGLUTIDE 1.34 MG/ML
0.25 INJECTION, SOLUTION SUBCUTANEOUS
Qty: 4.5 ML | Refills: 3
Start: 2022-06-20 | End: 2022-11-02

## 2022-06-20 RX ORDER — ZOLPIDEM TARTRATE 5 MG/1
5 TABLET ORAL
Qty: 90 TABLET | Refills: 0 | Status: CANCELLED | OUTPATIENT
Start: 2022-06-20

## 2022-06-20 RX ORDER — ZOLPIDEM TARTRATE 5 MG/1
5-10 TABLET ORAL
Qty: 90 TABLET | Refills: 0 | Status: SHIPPED | OUTPATIENT
Start: 2022-06-20 | End: 2022-06-24 | Stop reason: SDUPTHER

## 2022-06-20 NOTE — PROGRESS NOTES
Chief Complaint   Patient presents with    Physical       1. \"Have you been to the ER, urgent care clinic since your last visit? Hospitalized since your last visit? \" No    2. \"Have you seen or consulted any other health care providers outside of the [address] [place] [address] since your last visit? \" No     3. For patients aged 39-70: Has the patient had a colonoscopy / FIT/ Cologuard? Yes - no Care Gap present      If the patient is female:    4. For patients aged 41-77: Has the patient had a mammogram within the past 2 years? Yes - no Care Gap present      5. For patients aged 21-65: Has the patient had a pap smear?  NA - based on age or sex    Health Maintenance Due   Topic Date Due    Foot Exam Q1  04/02/2022    Medicare Yearly Exam  04/03/2022

## 2022-06-20 NOTE — PROGRESS NOTES
This is a Subsequent Medicare Annual Wellness Exam (AWV) (Performed 12 months after IPPE or effective date of Medicare Part B enrollment)    I have reviewed the patient's medical history in detail and updated the computerized patient record. History     Patient Active Problem List   Diagnosis Code    Anxiety F41.9    Depression F32. A    Hypovitaminosis D E55.9    GERD (gastroesophageal reflux disease) K21.9    Edema R60.9    Esophageal motor disorder K22.4    S/P bypass gastrojejunostomy Z98.0    SOB (shortness of breath) R06.02    CAD (coronary artery disease) I25.10    S/P PTCA (percutaneous transluminal coronary angioplasty) Z98.61    Mixed hyperlipidemia E78.2    Diarrhea R19.7    Type 2 diabetes mellitus without complication (HCC) W42.2    Osteopenia M85.80    Encounter for medication monitoring Z51.81    CVA (cerebral vascular accident) ([facility] [address] N25.0    Complicated migraine Z40. 109    Numbness and tingling of right side of face R20.0, R20.2    Stenosis of both internal carotid arteries I65.23    Type 2 diabetes with nephropathy (HCC) E11.21    Hypertension, essential I10    Bilateral carotid artery stenosis I65.23    Spinal stenosis of lumbar region at multiple levels M48.061    Pain in both lower extremities M79.604, M79.605    Leg swelling M79.89    Venous insufficiency of both lower extremities I87.2    WISEMAN (dyspnea on exertion) R06.00       Current Outpatient Medications   Medication Sig Dispense Refill    gabapentin (NEURONTIN) 100 mg capsule Take 100 mg by mouth three (3) times daily.  PARoxetine (PAXIL) 10 mg tablet Take 1 Tablet by mouth daily.  Indications: repeated episodes of anxiety 30 Tablet 3    levothyroxine (SYNTHROID) 25 mcg tablet TAKE 1 TABLET BY MOUTH DAILY BEFORE BREAKFAST 90 Tablet 3    Lancing Device with Lancets (Accu-Chek FastClix Lancing Dev) kit Use as directed to check blood sugars 1 Kit 0    fluticasone propionate (FLONASE) 50 mcg/actuation nasal spray USE 2 SPRAYS BY EACH  NOSTRIL ROUTE DAILY AS  NEEDED FOR RHINITIS 48 g 3    furosemide (LASIX) 20 mg tablet TAKE 1 TABLET BY MOUTH ONCE DAILY 30 Tablet 11    pravastatin (PRAVACHOL) 40 mg tablet TAKE 1 TABLET BY MOUTH AT  NIGHT 90 Tablet 3    Omeprazole delayed release (PRILOSEC D/R) 20 mg tablet Take 1 Tablet by mouth daily. 20 Tablet 0    lisinopriL (PRINIVIL, ZESTRIL) 2.5 mg tablet TAKE 1 TABLET BY MOUTH  DAILY 90 Tablet 3    zolpidem (AMBIEN) 5 mg tablet Take 1 Tablet by mouth nightly as needed for Sleep. Max Daily Amount: 5 mg. 90 Tablet 0    metoprolol succinate (TOPROL-XL) 25 mg XL tablet TAKE 1 TABLET BY MOUTH  DAILY 90 Tablet 3    Ozempic 0.25 mg or 0.5 mg/dose (2 mg/1.5 ml) subq pen INJECT SUBCUTANEOUSLY 0.5MG ONCE WEEKLY 4.5 mL 3    glucose blood VI test strips (Accu-Chek Hannah Plus test strp) strip by Does Not Apply route two (2) times a day. 300 Strip 3    multivit-minerals/folic acid (CENTRUM VITAMINTS PO) Take 1 tablet by mouth daily       cholecalciferol, vitamin D3, (VITAMIN D3) 2,000 unit tab Take 2 capsule by mouth daily      Aspirin, Buffered 81 mg tab Take 81 mg by mouth daily.          Allergies   Allergen Reactions    Demerol [Meperidine] Unknown (comments)     Pt unsure of reaction       Past Medical History:   Diagnosis Date    Anxiety     Arthritis     ASHD (arteriosclerotic heart disease)     Bilateral carotid artery stenosis     CAD (coronary artery disease)     Chronic pain     Depression     Diabetes (HCC)     TypeII    Diarrhea     Gastroparesis     GERD (gastroesophageal reflux disease)     History of cardioembolic cerebrovascular accident (CVA)     Hypercholesterolemia     Hypertension     S/P bypass gastrojejunostomy 1/23/2012    Stroke [facility])     TIA    Thyroid disease        Past Surgical History:   Procedure Laterality Date    COLONOSCOPY N/A 5/26/2016    COLONOSCOPY performed [facility] Christina Bello MD at [facility] ENDOSCOPY    COLONOSCOPY N/A 10/16/2020    COLONOSCOPY performed by [facility]ie Vitale [facility] at [facility] AMBULATORY OR    COLONOSCOPY N/A 2/2/2022    COLONOSCOPY performed by Raquel Basilio MD at [facility] ENDOSCOPY    EGD  7/13/2009    HX APPENDECTOMY      HX BLADDER SUSPENSION      HX CHOLECYSTECTOMY      HX CORONARY STENT PLACEMENT  06/2013    3 stents    HX GASTRIC BYPASS  6/02    HX HEART CATHETERIZATION  2017    stents remain open, no new stents placed    HX HYSTERECTOMY      HX LAPAROTOMY  8/02    2/2 intra abd abscess    HX LUMBAR LAMINECTOMY  01/2008    rods and screws    HX OOPHORECTOMY Left     HX ORTHOPAEDIC Bilateral     carpal tunnel    HX ORTHOPAEDIC Bilateral     bone spurs both feet    HI ABDOMEN SURGERY PROC UNLISTED      s/p partial gastrectomy 2/2 gastroparesis    HI COLONOSCOPY FLX DX W/COLLJ SPEC WHEN PFRMD  5/03/2006    Dr. Nicola Jimenes  2/9/2012         HI EGD BALLOON DILATION ESOPHAGUS <30 MM DIAM  1/23/2012         HI EGD TRANSORAL BIOPSY SINGLE/MULTIPLE  7/11/2011         HI EGD TRANSORAL BIOPSY SINGLE/MULTIPLE  1/23/2012         HI GASTROJEJUNOSTOMY      UPPER GI ENDOSCOPY,BIOPSY  10/1/2019         UPPER GI ENDOSCOPY,BIOPSY  10/16/2020            Family History   Problem Relation Age of Onset    Heart Disease Mother     Hypertension Mother     Diabetes Father     Heart Disease Father     Hypertension Father     Alcohol abuse Brother     Heart Disease Sister     Hypertension Sister     Diabetes Sister     No Known Problems Sister     Alcohol abuse Brother     Alcohol abuse Brother     Diabetes Brother        Social History     Tobacco Use    Smoking status: Never Smoker    Smokeless tobacco: Never Used   Substance Use Topics    Alcohol use: No     Alcohol/week: 0.0 standard drinks         Depression Risk Factor Screening:     PHQ over the last two weeks    Little interest or pleasure in doing things Not at all   Feeling down, depressed or hopeless Not at all Total Score PHQ 2 0     Alcohol Risk Factor Screening: You do not drink alcohol or very rarely. Functional Ability and Level of Safety:   Hearing Loss  Hearing is good. Activities of Daily Living  The home contains: discussed safety equipment. Patient does total self care    Fall RiskFall Risk Assessment, last 12 mths    Able to walk? Yes   Fall in past 12 months? No     Functional Ability:   Does the patient exhibit a steady gait? yes    How long did it take the patient to get up and walk from a sitting position? seconds    Is the patient self reliant? (ie can do own laundry, meals, household chores)  yes   Does the patient handle his/her own medications? yes   Does the patient handle his/her own money? yes   Is the patients home safe (ie good lighting, handrails on stairs and bath, etc.)? yes   Did you notice or did patient express any hearing difficulties? no   Did you notice or did patient express any vision difficulties? no        Advance Care Planning:   Patient was offered the opportunity to discuss advance care planning:  yes    Does patient have an Advance Directive:  no   If no, did you provide information on [facility]ctions? yes      Abuse Screen  Patient is not abused    Cognitive Screening   Evaluation of Cognitive Function:  Has your family/caregiver stated any concerns about your memory: no      Patient Care Team   Patient Care Team:  Adrienne Quinn MD as PCP - General    Assessment/Plan   Education and counseling provided:  Are appropriate based on today's review and evaluation  End-of-Life planning (with patient's consent)    ASSESSMENT and PLAN    Medicare Annual Wellness  Continue current treatment plan. Continue annual follow up. I have discussed diagnosis listed in this note with pt and/or family. I have discussed treatment plans and options and the risk/benefit analysis of those options, including safe use of medications and possible medication side effects. Through the use of shared decision making we have agreed to the above plan. The patient has received an after-visit summary and questions were answered concerning future plans and follow up. Advise pt of any urgent changes then to proceed to the ER.

## 2022-06-20 NOTE — PROGRESS NOTES
HISTORY OF PRESENT ILLNESS  Sadie De Leon is a 81 y.o. female. HPI   Follow up on chronic medical problems. DM type II follow up:  Compliant w/ meds, diabetic diet, and exercise. Tolerating ozempic. Has decreased appetite and weight loss with taking the ozempic. We discussed lowering her dose. Has been watching her diet. BS have been much better. Range in the low to mid 100s. Checks BS BID on most days and prn. Pt does not have BS log at visit today. Denies any tingling sensation. No polyuria or polydipsia. No blurred vision. No significant weight changes. Cardiovascular Review:  The patient has coronary artery disease, HF and status post coronary artery stenting. Diet and Lifestyle: generally follows a low fat low cholesterol diet, generally follows a low sodium diet, follows a diabetic diet regularly, exercises sporadically  Home BP Monitoring: is not measured at home. Pertinent ROS: taking medications as instructed, no medication side effects noted, no TIA's, no chest pain on exertion, no dyspnea on exertion, no swelling of ankles. Hypercholesterolemia follow up:  Compliant w/ meds, low fat, low cholesterol diet. Exercising some. No muscle nor abdominal pain, no skin discoloration. Patient fasting today. Depression Review:  Patient is seen for followup of depression and anxiety. Overall doing well. Taking Ambien to help with her sleep. Anxiety usually triggered by stress. She denies depressed mood and insomnia. Thyroid Review:  Patient is seen for followup of hypothyroidism. TSH in November was stable. Thyroid ROS: denies fatigue, weight changes, heat/cold intolerance, bowel/skin changes or CVS symptoms. HM:  Mammogram 2/20/2020 (has scheduled for this month). Bone density 2/20/2020  Colonoscopy 2/2/22 - repeat in 5 years  Patient Active Problem List   Diagnosis Code    Anxiety F41.9    Depression F32. A    Hypovitaminosis D E55.9    GERD (gastroesophageal reflux disease) K21.9    Edema R60.9    Esophageal motor disorder K22.4    S/P bypass gastrojejunostomy Z98.0    SOB (shortness of breath) R06.02    CAD (coronary artery disease) I25.10    S/P PTCA (percutaneous transluminal coronary angioplasty) Z98.61    Mixed hyperlipidemia E78.2    Diarrhea R19.7    Type 2 diabetes mellitus without complication (HCC) C82.0    Osteopenia M85.80    Encounter for medication monitoring Z51.81    CVA (cerebral vascular accident) ([facility] [address] H91.0    Complicated migraine Y31. 109    Numbness and tingling of right side of face R20.0, R20.2    Stenosis of both internal carotid arteries I65.23    Type 2 diabetes with nephropathy (HCC) E11.21    Hypertension, essential I10    Bilateral carotid artery stenosis I65.23    Spinal stenosis of lumbar region at multiple levels M48.061    Pain in both lower extremities M79.604, M79.605    Leg swelling M79.89    Venous insufficiency of both lower extremities I87.2    WISEMAN (dyspnea on exertion) R06.00       Current Outpatient Medications   Medication Sig Dispense Refill    gabapentin (NEURONTIN) 100 mg capsule Take 100 mg by mouth three (3) times daily.  PARoxetine (PAXIL) 10 mg tablet Take 1 Tablet by mouth daily. Indications: repeated episodes of anxiety 30 Tablet 3    levothyroxine (SYNTHROID) 25 mcg tablet TAKE 1 TABLET BY MOUTH DAILY BEFORE BREAKFAST 90 Tablet 3    Lancing Device with Lancets (Accu-Chek FastClix Lancing Dev) kit Use as directed to check blood sugars 1 Kit 0    fluticasone propionate (FLONASE) 50 mcg/actuation nasal spray USE 2 SPRAYS BY EACH  NOSTRIL ROUTE DAILY AS  NEEDED FOR RHINITIS 48 g 3    furosemide (LASIX) 20 mg tablet TAKE 1 TABLET BY MOUTH ONCE DAILY 30 Tablet 11    pravastatin (PRAVACHOL) 40 mg tablet TAKE 1 TABLET BY MOUTH AT  NIGHT 90 Tablet 3    Omeprazole delayed release (PRILOSEC D/R) 20 mg tablet Take 1 Tablet by mouth daily.  20 Tablet 0    lisinopriL (PRINIVIL, ZESTRIL) 2.5 mg tablet TAKE 1 TABLET BY MOUTH  DAILY 90 Tablet 3    zolpidem (AMBIEN) 5 mg tablet Take 1 Tablet by mouth nightly as needed for Sleep. Max Daily Amount: 5 mg. 90 Tablet 0    metoprolol succinate (TOPROL-XL) 25 mg XL tablet TAKE 1 TABLET BY MOUTH  DAILY 90 Tablet 3    Ozempic 0.25 mg or 0.5 mg/dose (2 mg/1.5 ml) subq pen INJECT SUBCUTANEOUSLY 0.5MG ONCE WEEKLY 4.5 mL 3    glucose blood VI test strips (Accu-Chek Hannah Plus test strp) strip by Does Not Apply route two (2) times a day. 300 Strip 3    multivit-minerals/folic acid (CENTRUM VITAMINTS PO) Take 1 tablet by mouth daily       cholecalciferol, vitamin D3, (VITAMIN D3) 2,000 unit tab Take 2 capsule by mouth daily      Aspirin, Buffered 81 mg tab Take 81 mg by mouth daily.          Allergies   Allergen Reactions    Demerol [Meperidine] Unknown (comments)     Pt unsure of reaction         Past Medical History:   Diagnosis Date    Anxiety     Arthritis     ASHD (arteriosclerotic heart disease)     Bilateral carotid artery stenosis     CAD (coronary artery disease)     Chronic pain     Depression     Diabetes (HCC)     TypeII    Diarrhea     Gastroparesis     GERD (gastroesophageal reflux disease)     History of cardioembolic cerebrovascular accident (CVA)     Hypercholesterolemia     Hypertension     S/P bypass gastrojejunostomy 1/23/2012    Stroke ([facility] [address]     TIA    Thyroid disease          Past Surgical History:   Procedure Laterality Date    COLONOSCOPY N/A 5/26/2016    COLONOSCOPY performed by Fausto Marie MD at [facility] ENDOSCOPY    COLONOSCOPY N/A 10/16/2020    COLONOSCOPY performed by Lucy Dorsey MD at [facility] AMBULATORY OR    COLONOSCOPY N/A 2/2/2022    COLONOSCOPY performed by Ronni Roman MD at [facility] ENDOSCOPY    EGD  7/13/2009    HX APPENDECTOMY      HX BLADDER SUSPENSION      HX CHOLECYSTECTOMY      HX CORONARY STENT PLACEMENT  06/2013    3 stents    HX GASTRIC BYPASS  6/02    HX HEART CATHETERIZATION  2017    stents remain open, no new stents placed    HX HYSTERECTOMY      HX LAPAROTOMY  8/02    2/2 intra abd abscess    HX LUMBAR LAMINECTOMY  01/2008    rods and screws    HX OOPHORECTOMY Left     HX ORTHOPAEDIC Bilateral     carpal tunnel    HX ORTHOPAEDIC Bilateral     bone spurs both feet    HI ABDOMEN SURGERY PROC UNLISTED      s/p partial gastrectomy 2/2 gastroparesis    HI COLONOSCOPY FLX DX W/COLLJ SPEC WHEN PFRMD  5/03/2006    Dr. Deshaun Cunningham    HI COLONOSCOPY W/BIOPSY SINGLE/MULTIPLE  2/9/2012         HI EGD BALLOON DILATION ESOPHAGUS <30 MM DIAM  1/23/2012         HI EGD TRANSORAL BIOPSY SINGLE/MULTIPLE  7/11/2011         HI EGD TRANSORAL BIOPSY SINGLE/MULTIPLE  1/23/2012         HI GASTROJEJUNOSTOMY      UPPER GI ENDOSCOPY,BIOPSY  10/1/2019         UPPER GI ENDOSCOPY,BIOPSY  10/16/2020              Family History   Problem Relation Age of Onset    Heart Disease Mother     Hypertension Mother     Diabetes Father     Heart Disease Father     Hypertension Father     Alcohol abuse Brother     Heart Disease Sister     Hypertension Sister     Diabetes Sister     No Known Problems Sister     Alcohol abuse Brother     Alcohol abuse Brother     Diabetes Brother        Social History     Tobacco Use    Smoking status: Never Smoker    Smokeless tobacco: Never Used   Substance Use Topics    Alcohol use: No     Alcohol/week: 0.0 standard drinks       Lab Results   Component Value Date/Time    WBC 10.7 03/02/2022 09:02 AM    HGB 13.4 03/02/2022 09:02 AM    HCT 40.9 03/02/2022 09:02 AM    PLATELET 781 10/71/7380 09:02 AM    MCV 89.9 03/02/2022 09:02 AM     Lab Results   Component Value Date/Time    Cholesterol, total 142 04/05/2022 08:37 AM    HDL Cholesterol 60 04/05/2022 08:37 AM    LDL, calculated 33.2 04/05/2022 08:37 AM    Triglyceride 244 (H) 04/05/2022 08:37 AM    CHOL/HDL Ratio 2.4 04/05/2022 08:37 AM     Lab Results   Component Value Date/Time    TSH 3.970 11/05/2021 12:00 AM    T4, Free 1.09 11/05/2021 12:00 AM    T4, Total 5.6 11/17/2015 08:54 AM      Lab Results   Component Value Date/Time    Sodium 136 04/05/2022 08:37 AM    Potassium 4.4 04/05/2022 08:37 AM    Chloride 105 04/05/2022 08:37 AM    CO2 23 04/05/2022 08:37 AM    Anion gap 8 04/05/2022 08:37 AM    Glucose 239 (H) 04/05/2022 08:37 AM    BUN 19 04/05/2022 08:37 AM    Creatinine 1.05 (H) 04/05/2022 08:37 AM    BUN/Creatinine ratio 18 04/05/2022 08:37 AM    GFR est AA >60 04/05/2022 08:37 AM    GFR est non-AA 52 (L) 04/05/2022 08:37 AM    Calcium 9.1 04/05/2022 08:37 AM    Bilirubin, total 0.4 03/02/2022 09:02 AM    ALT (SGPT) 30 03/02/2022 09:02 AM    Alk. phosphatase 116 03/02/2022 09:02 AM    Protein, total 7.5 03/02/2022 09:02 AM    Albumin 4.4 03/02/2022 09:02 AM    Globulin 3.1 03/02/2022 09:02 AM    A-G Ratio 1.4 03/02/2022 09:02 AM      Lab Results   Component Value Date/Time    Hemoglobin A1c 7.4 (H) 11/09/2020 01:14 PM    Hemoglobin A1c (POC) 5.8 02/18/2022 11:48 AM         Review of Systems   Constitutional: Negative for malaise/fatigue. HENT: Negative for congestion. Eyes: Negative for blurred vision. Respiratory: Negative for cough and shortness of breath. Cardiovascular: Negative for chest pain, palpitations and leg swelling. Gastrointestinal: Negative for abdominal pain, constipation and heartburn. Genitourinary: Negative for dysuria, frequency and urgency. Musculoskeletal: Negative for back pain and joint pain. Neurological: Negative for dizziness, tingling and headaches. Endo/Heme/Allergies: Negative for environmental allergies. Psychiatric/Behavioral: Negative for depression. The patient does not have insomnia. Physical Exam  Vitals and nursing note reviewed. Constitutional:       Appearance: Normal appearance. She is well-developed.       Comments: /74 (BP 1 Location: Right upper arm, BP Patient Position: Sitting, BP Cuff Size: Adult)   Pulse 76   Temp 98.2 °F (36.8 °C) (Oral)   Resp 20   Ht 5' 2\" (1.575 m) Wt 134 lb 12.8 oz (61.1 kg)   SpO2 100%   BMI 24.66 kg/m²    HENT:      Right Ear: Tympanic membrane and ear canal normal.      Left Ear: Tympanic membrane and ear canal normal.   Neck:      Thyroid: No thyromegaly. Cardiovascular:      Rate and Rhythm: Normal rate and regular rhythm. Heart sounds: Normal heart sounds. Pulmonary:      Effort: Pulmonary effort is normal.      Breath sounds: Normal breath sounds. Abdominal:      General: Bowel sounds are normal.      Palpations: Abdomen is soft. There is no mass. Tenderness: There is no abdominal tenderness. Musculoskeletal:         General: Normal range of motion. Cervical back: Normal range of motion and neck supple. Right lower leg: No edema. Left lower leg: No edema. Lymphadenopathy:      Cervical: No cervical adenopathy. Skin:     General: Skin is warm and dry. Neurological:      General: No focal deficit present. Mental Status: She is alert and oriented to person, place, and time. Psychiatric:         Mood and Affect: Mood normal.       Wt Readings from Last 3 Encounters:   06/20/22 134 lb 12.8 oz (61.1 kg)   05/18/22 136 lb (61.7 kg)   04/05/22 138 lb 6.4 oz (62.8 kg)       ASSESSMENT and PLAN  Diagnoses and all orders for this visit:    1. Medicare annual wellness visit, subsequent    2. Type 2 diabetes with nephropathy (HCC)  a1c 5.9%. -     AMB POC HEMOGLOBIN A1C  -     AMB POC GLUCOSE, QUANTITATIVE, BLOOD  -     Reduce semaglutide (Ozempic) 0.25 mg or 0.5 mg/dose (2 mg/1.5 ml) subq pen; 0.25 mg by SubCUTAneous route every seven (7) days. 3. Mixed hyperlipidemia  Stable at goal.  LDL cholesterol is 33.    4. Coronary artery disease involving native coronary artery of native heart without angina pectoris  Stable     5. PAD (peripheral artery disease) (HCC)  Stable     6. Acquired hypothyroidism  Stable     7. Primary insomnia  -     Refill zolpidem (AMBIEN) 5 mg tablet;  Take 1-2 Tablets by mouth nightly as needed for Sleep. Max Daily Amount: 10 mg.    8. Encounter for medication monitoring      Follow-up and Dispositions    · Return in about 4 months (around 10/20/2022). reviewed diet, exercise and weight control  cardiovascular risk and specific lipid/LDL goals reviewed  reviewed medications and side effects in detail  specific diabetic recommendations: low cholesterol diet, weight control and daily exercise discussed, all medications, side effects and compliance discussed carefully, foot care discussed and Podiatry visits discussed, annual eye examinations at Ophthalmology discussed and glycohemoglobin and other lab monitoring discussed      I have discussed diagnosis listed in this note with pt and/or family. I have discussed treatment plans and options and the risk/benefit analysis of those options, including safe use of medications and possible medication side effects. Through the use of shared decision making we have agreed to the above plan. The patient has received an after-visit summary and questions were answered concerning future plans and follow up. Advise pt of any urgent changes then to proceed to the ER.

## 2022-06-23 RX ORDER — LEVOTHYROXINE SODIUM 25 UG/1
TABLET ORAL
Qty: 90 TABLET | Refills: 3 | Status: SHIPPED | OUTPATIENT
Start: 2022-06-23

## 2022-06-24 DIAGNOSIS — F51.01 PRIMARY INSOMNIA: ICD-10-CM

## 2022-06-24 RX ORDER — ZOLPIDEM TARTRATE 5 MG/1
5-10 TABLET ORAL
Qty: 90 TABLET | Refills: 0 | Status: SHIPPED | OUTPATIENT
Start: 2022-06-24 | End: 2022-08-08

## 2022-06-24 NOTE — TELEPHONE ENCOUNTER
Last visit:6/20/22  Next visit:10/21/22  Previous refill 6/20/22(90+0R)    Requested Prescriptions     Pending Prescriptions Disp Refills    zolpidem (AMBIEN) 5 mg tablet 90 Tablet 0     Sig: Take 1-2 Tablets by mouth nightly as needed for Sleep. Max Daily Amount: 10 mg. Please review  before prescribing new script for this medication.      Thanks,  [address]     Intervention Detail: New Rx: 1, reason: Patient Preference   Time Spent (min): 5

## 2022-07-24 DIAGNOSIS — E78.2 MIXED HYPERLIPIDEMIA: ICD-10-CM

## 2022-07-25 RX ORDER — BLOOD SUGAR DIAGNOSTIC
STRIP MISCELLANEOUS
Qty: 200 STRIP | Refills: 3 | Status: SHIPPED | OUTPATIENT
Start: 2022-07-25 | End: 2022-10-06 | Stop reason: SDUPTHER

## 2022-07-25 RX ORDER — PRAVASTATIN SODIUM 40 MG/1
TABLET ORAL
Qty: 90 TABLET | Refills: 3 | Status: SHIPPED | OUTPATIENT
Start: 2022-07-25 | End: 2022-10-21 | Stop reason: ALTCHOICE

## 2022-07-29 ENCOUNTER — TELEPHONE (OUTPATIENT)
Dept: FAMILY MEDICINE CLINIC | Age: 72
End: 2022-07-29

## 2022-07-29 RX ORDER — NITROFURANTOIN 25; 75 MG/1; MG/1
100 CAPSULE ORAL 2 TIMES DAILY
Qty: 10 CAPSULE | Refills: 0 | Status: SHIPPED | OUTPATIENT
Start: 2022-07-29 | End: 2022-08-03

## 2022-07-29 NOTE — TELEPHONE ENCOUNTER
Vaginal Discharge. Her Insurance company sent a home health nurse out and did a urine and she believes she had an infection. They did put her on an antibiotic , but she is not sure if that is working. Would like to speak to you about this. Mr Priti Barrett  [phone] Mr. Boggs's Cell   Ms.  [phone]

## 2022-08-02 ENCOUNTER — TRANSCRIBE ORDER (OUTPATIENT)
Dept: SCHEDULING | Age: 72
End: 2022-08-02

## 2022-08-02 DIAGNOSIS — M16.11 PRIMARY OSTEOARTHRITIS OF RIGHT HIP: Primary | ICD-10-CM

## 2022-08-02 DIAGNOSIS — M25.551 RIGHT HIP PAIN: ICD-10-CM

## 2022-08-05 DIAGNOSIS — F51.01 PRIMARY INSOMNIA: ICD-10-CM

## 2022-08-08 RX ORDER — ZOLPIDEM TARTRATE 5 MG/1
TABLET ORAL
Qty: 60 TABLET | Refills: 1 | Status: ON HOLD | OUTPATIENT
Start: 2022-08-08 | End: 2022-09-06

## 2022-08-08 RX ORDER — LANCING DEVICE/LANCETS
KIT MISCELLANEOUS
Qty: 1 KIT | Refills: 0 | Status: SHIPPED | OUTPATIENT
Start: 2022-08-08

## 2022-08-09 ENCOUNTER — OFFICE VISIT (OUTPATIENT)
Dept: FAMILY MEDICINE CLINIC | Age: 72
End: 2022-08-09
Payer: MEDICARE

## 2022-08-09 VITALS
WEIGHT: 137.2 LBS | HEIGHT: 62 IN | OXYGEN SATURATION: 100 % | DIASTOLIC BLOOD PRESSURE: 62 MMHG | BODY MASS INDEX: 25.25 KG/M2 | SYSTOLIC BLOOD PRESSURE: 117 MMHG | HEART RATE: 79 BPM | TEMPERATURE: 98.1 F

## 2022-08-09 DIAGNOSIS — E03.9 ACQUIRED HYPOTHYROIDISM: ICD-10-CM

## 2022-08-09 DIAGNOSIS — E78.2 MIXED HYPERLIPIDEMIA: ICD-10-CM

## 2022-08-09 DIAGNOSIS — K21.9 GASTROESOPHAGEAL REFLUX DISEASE WITHOUT ESOPHAGITIS: ICD-10-CM

## 2022-08-09 DIAGNOSIS — Z51.81 ENCOUNTER FOR MEDICATION MONITORING: ICD-10-CM

## 2022-08-09 DIAGNOSIS — M79.604 LOW BACK PAIN RADIATING TO RIGHT LEG: Primary | ICD-10-CM

## 2022-08-09 DIAGNOSIS — R10.2 VAGINAL PAIN: ICD-10-CM

## 2022-08-09 DIAGNOSIS — R30.9 URINARY PAIN: ICD-10-CM

## 2022-08-09 DIAGNOSIS — E11.21 TYPE 2 DIABETES WITH NEPHROPATHY (HCC): ICD-10-CM

## 2022-08-09 DIAGNOSIS — M16.11 PRIMARY OSTEOARTHRITIS OF RIGHT HIP: ICD-10-CM

## 2022-08-09 DIAGNOSIS — M54.50 LOW BACK PAIN RADIATING TO RIGHT LEG: Primary | ICD-10-CM

## 2022-08-09 DIAGNOSIS — I25.10 CORONARY ARTERY DISEASE INVOLVING NATIVE CORONARY ARTERY OF NATIVE HEART WITHOUT ANGINA PECTORIS: ICD-10-CM

## 2022-08-09 DIAGNOSIS — R82.90 NONSPECIFIC FINDINGS ON EXAMINATION OF URINE: ICD-10-CM

## 2022-08-09 PROCEDURE — 1090F PRES/ABSN URINE INCON ASSESS: CPT | Performed by: FAMILY MEDICINE

## 2022-08-09 PROCEDURE — G8399 PT W/DXA RESULTS DOCUMENT: HCPCS | Performed by: FAMILY MEDICINE

## 2022-08-09 PROCEDURE — 3044F HG A1C LEVEL LT 7.0%: CPT | Performed by: FAMILY MEDICINE

## 2022-08-09 PROCEDURE — 1123F ACP DISCUSS/DSCN MKR DOCD: CPT | Performed by: FAMILY MEDICINE

## 2022-08-09 PROCEDURE — G8536 NO DOC ELDER MAL SCRN: HCPCS | Performed by: FAMILY MEDICINE

## 2022-08-09 PROCEDURE — 3017F COLORECTAL CA SCREEN DOC REV: CPT | Performed by: FAMILY MEDICINE

## 2022-08-09 PROCEDURE — 2022F DILAT RTA XM EVC RTNOPTHY: CPT | Performed by: FAMILY MEDICINE

## 2022-08-09 PROCEDURE — G9899 SCRN MAM PERF RSLTS DOC: HCPCS | Performed by: FAMILY MEDICINE

## 2022-08-09 PROCEDURE — G8417 CALC BMI ABV UP PARAM F/U: HCPCS | Performed by: FAMILY MEDICINE

## 2022-08-09 PROCEDURE — 99214 OFFICE O/P EST MOD 30 MIN: CPT | Performed by: FAMILY MEDICINE

## 2022-08-09 PROCEDURE — G8754 DIAS BP LESS 90: HCPCS | Performed by: FAMILY MEDICINE

## 2022-08-09 PROCEDURE — G8427 DOCREV CUR MEDS BY ELIG CLIN: HCPCS | Performed by: FAMILY MEDICINE

## 2022-08-09 PROCEDURE — G8752 SYS BP LESS 140: HCPCS | Performed by: FAMILY MEDICINE

## 2022-08-09 PROCEDURE — G9717 DOC PT DX DEP/BP F/U NT REQ: HCPCS | Performed by: FAMILY MEDICINE

## 2022-08-09 PROCEDURE — 1101F PT FALLS ASSESS-DOCD LE1/YR: CPT | Performed by: FAMILY MEDICINE

## 2022-08-09 RX ORDER — PANTOPRAZOLE SODIUM 40 MG/1
40 TABLET, DELAYED RELEASE ORAL DAILY
COMMUNITY
Start: 2022-08-09 | End: 2022-10-21 | Stop reason: ALTCHOICE

## 2022-08-09 RX ORDER — MULTIVIT-MIN/FA/LYCOPEN/LUTEIN .4-300-25
1 TABLET ORAL DAILY
COMMUNITY
Start: 2022-08-09

## 2022-08-09 RX ORDER — BIOTIN 5 MG
1 CAPSULE ORAL DAILY
COMMUNITY
Start: 2022-08-09

## 2022-08-09 NOTE — PROGRESS NOTES
Chief Complaint   Patient presents with    Back Pain     Doctor said that she has bad kidneys     Leg Pain     Might need hip surgery - MRI scheduled for Saturday           Vitals:    08/09/22 1537   BP: 117/62   Pulse: 79   Temp: 98.1 °F (36.7 °C)   TempSrc: Oral   SpO2: 100%   Weight: 137 lb 3.2 oz (62.2 kg)   Height: 5' 2\" (1.575 m)   PainSc:   0 - No pain       Current Outpatient Medications on File Prior to Visit   Medication Sig Dispense Refill    zolpidem (AMBIEN) 5 mg tablet TAKE 1 TO 2 TABLETS BY  MOUTH AT NIGHT AS NEEDED  FOR SLEEP . MAXIMUM DAILY  DOSE: 10 MG 60 Tablet 1    Lancing Device with Lancets (Accu-Chek Soft Dev Lancets) kit USE AS DIRECTED TO CHECK  BLOOD SUGAR 1 Kit 0    pravastatin (PRAVACHOL) 40 mg tablet TAKE 1 TABLET BY MOUTH EVERY NIGHT 90 Tablet 3    Accu-Chek Hannah Plus test strp strip USE TWICE DAILY 200 Strip 3    levothyroxine (SYNTHROID) 25 mcg tablet TAKE 1 TABLET BY MOUTH  DAILY BEFORE BREAKFAST 90 Tablet 3    semaglutide (Ozempic) 0.25 mg or 0.5 mg/dose (2 mg/1.5 ml) subq pen 0.25 mg by SubCUTAneous route every seven (7) days. 4.5 mL 3    fluticasone propionate (FLONASE) 50 mcg/actuation nasal spray USE 2 SPRAYS BY EACH  NOSTRIL ROUTE DAILY AS  NEEDED FOR RHINITIS 48 g 3    furosemide (LASIX) 20 mg tablet TAKE 1 TABLET BY MOUTH ONCE DAILY 30 Tablet 11    lisinopriL (PRINIVIL, ZESTRIL) 2.5 mg tablet TAKE 1 TABLET BY MOUTH  DAILY 90 Tablet 3    metoprolol succinate (TOPROL-XL) 25 mg XL tablet TAKE 1 TABLET BY MOUTH  DAILY 90 Tablet 3    multivit-minerals/folic acid (CENTRUM VITAMINTS PO) Take 1 tablet by mouth daily       cholecalciferol, vitamin D3, 50 mcg (2,000 unit) tab Take 2 capsule by mouth daily      gabapentin (NEURONTIN) 100 mg capsule Take 100 mg by mouth three (3) times daily. (Patient not taking: Reported on 8/9/2022)      PARoxetine (PAXIL) 10 mg tablet Take 1 Tablet by mouth daily.  Indications: repeated episodes of anxiety (Patient not taking: Reported on 8/9/2022) 30 Tablet 3    Omeprazole delayed release (PRILOSEC D/R) 20 mg tablet Take 1 Tablet by mouth daily. (Patient not taking: Reported on 8/9/2022) 20 Tablet 0    Aspirin, Buffered 81 mg tab Take 81 mg by mouth daily. (Patient not taking: Reported on 8/9/2022)       No current facility-administered medications on file prior to visit. There are no preventive care reminders to display for this patient. 1. \"Have you been to the ER, urgent care clinic since your last visit? Hospitalized since your last visit? \" No    2. \"Have you seen or consulted any other health care providers outside of the [address] [place] [address] since your last visit? \" No     3. For patients aged 39-70: Has the patient had a colonoscopy / FIT/ Cologuard? Yes - no Care Gap present      If the patient is female:    4. For patients aged 41-77: Has the patient had a mammogram within the past 2 years? Yes - no Care Gap present      5. For patients aged 21-65: Has the patient had a pap smear?  NA - based on age or sex

## 2022-08-09 NOTE — PROGRESS NOTES
HISTORY OF PRESENT ILLNESS  Christopher Pitts is a 81 y.o. female. HPI   Follow up on chronic medical problems. C/o right hip pain for the past several weeks. Pain comes and goes but worse with walking. Seems to start in the lower back and radiates to the hip and groin. Has seen ortho and was told pain is coming from her hip. She has MRI of the hip scheduled for Friday. Has no swelling. Stiffness after sitting. Pain is 5-6/10. Has no popping or giving away of the hip. Also c/o having \"pain in the bladder\". Hurts when she urinates but also has pain even without urinating. Also feels like the pain is in the vagina. Seen by GYN earlier this year and did have pelvic US and eval per GYN. DM type II follow up:  Compliant w/ meds, diabetic diet, and exercise. Tolerating ozempic. Has decreased appetite and weight loss with taking the ozempic. We discussed lowering her dose. Has been watching her diet. BS have been much better. Range in the low to mid 100s. Checks BS BID on most days and prn. Pt does not have BS log at visit today. Denies any tingling sensation. No polyuria or polydipsia. No blurred vision. No significant weight changes. Cardiovascular Review:  The patient has coronary artery disease, HF and status post coronary artery stenting. Diet and Lifestyle: generally follows a low fat low cholesterol diet, generally follows a low sodium diet, follows a diabetic diet regularly, exercises sporadically  Home BP Monitoring: is not measured at home. Pertinent ROS: taking medications as instructed, no medication side effects noted, no TIA's, no chest pain on exertion, no dyspnea on exertion, no swelling of ankles. Hypercholesterolemia follow up:  Compliant w/ meds, low fat, low cholesterol diet. Exercising some. No muscle nor abdominal pain, no skin discoloration. Patient fasting today. Depression Review:  Patient is seen for followup of depression and anxiety. Overall doing well. Taking Ambien to help with her sleep. Anxiety usually triggered by stress. She denies depressed mood and insomnia. Thyroid Review:  Patient is seen for followup of hypothyroidism. TSH in November was stable. Thyroid ROS: denies fatigue, weight changes, heat/cold intolerance, bowel/skin changes or CVS symptoms. C/o hair thinning over the past few months. HM:  Mammogram 04/18/2022. Bone density 2/20/2020  Colonoscopy 2/2/22 - repeat in 5 years  Patient Active Problem List   Diagnosis Code    Anxiety F41.9    Depression F32. A    Hypovitaminosis D E55.9    GERD (gastroesophageal reflux disease) K21.9    Edema R60.9    Esophageal motor disorder K22.4    S/P bypass gastrojejunostomy Z98.0    SOB (shortness of breath) R06.02    CAD (coronary artery disease) I25.10    S/P PTCA (percutaneous transluminal coronary angioplasty) Z98.61    Mixed hyperlipidemia E78.2    Diarrhea R19.7    Type 2 diabetes mellitus without complication (HCC) I95.7    Osteopenia M85.80    Encounter for medication monitoring Z51.81    CVA (cerebral vascular accident) ([facility] [address] H17.1    Complicated migraine K37.448    Numbness and tingling of right side of face R20.0, R20.2    Stenosis of both internal carotid arteries I65.23    Type 2 diabetes with nephropathy (HCC) E11.21    Hypertension, essential I10    Bilateral carotid artery stenosis I65.23    Spinal stenosis of lumbar region at multiple levels M48.061    Pain in both lower extremities M79.604, M79.605    Leg swelling M79.89    Venous insufficiency of both lower extremities I87.2    WISEMAN (dyspnea on exertion) R06.00       Current Outpatient Medications   Medication Sig Dispense Refill    zolpidem (AMBIEN) 5 mg tablet TAKE 1 TO 2 TABLETS BY  MOUTH AT NIGHT AS NEEDED  FOR SLEEP .  MAXIMUM DAILY  DOSE: 10 MG 60 Tablet 1    Lancing Device with Lancets (Accu-Chek Soft Dev Lancets) kit USE AS DIRECTED TO CHECK  BLOOD SUGAR 1 Kit 0    pravastatin (PRAVACHOL) 40 mg tablet TAKE 1 TABLET BY MOUTH EVERY NIGHT 90 Tablet 3    Accu-Chek Hannah Plus test strp strip USE TWICE DAILY 200 Strip 3    levothyroxine (SYNTHROID) 25 mcg tablet TAKE 1 TABLET BY MOUTH  DAILY BEFORE BREAKFAST 90 Tablet 3    semaglutide (Ozempic) 0.25 mg or 0.5 mg/dose (2 mg/1.5 ml) subq pen 0.25 mg by SubCUTAneous route every seven (7) days. 4.5 mL 3    fluticasone propionate (FLONASE) 50 mcg/actuation nasal spray USE 2 SPRAYS BY EACH  NOSTRIL ROUTE DAILY AS  NEEDED FOR RHINITIS 48 g 3    furosemide (LASIX) 20 mg tablet TAKE 1 TABLET BY MOUTH ONCE DAILY 30 Tablet 11    lisinopriL (PRINIVIL, ZESTRIL) 2.5 mg tablet TAKE 1 TABLET BY MOUTH  DAILY 90 Tablet 3    metoprolol succinate (TOPROL-XL) 25 mg XL tablet TAKE 1 TABLET BY MOUTH  DAILY 90 Tablet 3    multivit-minerals/folic acid (CENTRUM VITAMINTS PO) Take 1 tablet by mouth daily       cholecalciferol, vitamin D3, 50 mcg (2,000 unit) tab Take 2 capsule by mouth daily      gabapentin (NEURONTIN) 100 mg capsule Take 100 mg by mouth three (3) times daily. (Patient not taking: Reported on 8/9/2022)      PARoxetine (PAXIL) 10 mg tablet Take 1 Tablet by mouth daily. Indications: repeated episodes of anxiety (Patient not taking: Reported on 8/9/2022) 30 Tablet 3    Omeprazole delayed release (PRILOSEC D/R) 20 mg tablet Take 1 Tablet by mouth daily. (Patient not taking: Reported on 8/9/2022) 20 Tablet 0    Aspirin, Buffered 81 mg tab Take 81 mg by mouth daily.  (Patient not taking: Reported on 8/9/2022)         Allergies   Allergen Reactions    Demerol [Meperidine] Unknown (comments)     Pt unsure of reaction         Past Medical History:   Diagnosis Date    Anxiety     Arthritis     ASHD (arteriosclerotic heart disease)     Bilateral carotid artery stenosis     CAD (coronary artery disease)     Chronic pain     Depression     Diabetes ([facility] [address]     TypeII    Diarrhea     Gastroparesis     GERD (gastroesophageal reflux disease)     History of cardioembolic cerebrovascular accident (CVA) Hypercholesterolemia     Hypertension     S/P bypass gastrojejunostomy 1/23/2012    Stroke [facility])     TIA    Thyroid disease          Past Surgical History:   Procedure Laterality Date    COLONOSCOPY N/A 5/26/2016    COLONOSCOPY performed by Yolette Mendez MD at hospitals ENDOSCOPY    COLONOSCOPY N/A 10/16/2020    COLONOSCOPY performed by Obdulia Hernandez MD at hospitals AMBULATORY OR    COLONOSCOPY N/A 2/2/2022    COLONOSCOPY performed by Jose Boyd MD at hospitals ENDOSCOPY    EGD  7/13/2009    HX APPENDECTOMY      HX BLADDER SUSPENSION      HX CHOLECYSTECTOMY      HX CORONARY STENT PLACEMENT  06/2013    3 stents    HX GASTRIC BYPASS  6/02    HX HEART CATHETERIZATION  2017    stents remain open, no new stents placed    HX HYSTERECTOMY      HX LAPAROTOMY  8/02    2/2 intra abd abscess    HX LUMBAR LAMINECTOMY  01/2008    rods and screws    HX OOPHORECTOMY Left     HX ORTHOPAEDIC Bilateral     carpal tunnel    HX ORTHOPAEDIC Bilateral     bone spurs both feet    NJ ABDOMEN SURGERY PROC UNLISTED      s/p partial gastrectomy 2/2 gastroparesis    NJ COLONOSCOPY FLX DX W/COLLJ SPEC WHEN PFRMD  5/03/2006    Dr. Adryan Long  2/9/2012         NJ EGD BALLOON DILATION ESOPHAGUS <30 MM DIAM  1/23/2012         NJ EGD TRANSORAL BIOPSY SINGLE/MULTIPLE  7/11/2011         NJ EGD TRANSORAL BIOPSY SINGLE/MULTIPLE  1/23/2012         NJ GASTROJEJUNOSTOMY      UPPER GI ENDOSCOPY,BIOPSY  10/1/2019         UPPER GI ENDOSCOPY,BIOPSY  10/16/2020              Family History   Problem Relation Age of Onset    Heart Disease Mother     Hypertension Mother     Diabetes Father     Heart Disease Father     Hypertension Father     Alcohol abuse Brother     Heart Disease Sister     Hypertension Sister     Diabetes Sister     No Known Problems Sister     Alcohol abuse Brother     Alcohol abuse Brother     Diabetes Brother        Social History     Tobacco Use    Smoking status: Never    Smokeless tobacco: Never   Substance Use Topics    Alcohol use: No     Alcohol/week: 0.0 standard drinks       Lab Results   Component Value Date/Time    WBC 10.7 03/02/2022 09:02 AM    HGB 13.4 03/02/2022 09:02 AM    HCT 40.9 03/02/2022 09:02 AM    PLATELET 016 62/76/2829 09:02 AM    MCV 89.9 03/02/2022 09:02 AM     Lab Results   Component Value Date/Time    Cholesterol, total 142 04/05/2022 08:37 AM    HDL Cholesterol 60 04/05/2022 08:37 AM    LDL, calculated 33.2 04/05/2022 08:37 AM    Triglyceride 244 (H) 04/05/2022 08:37 AM    CHOL/HDL Ratio 2.4 04/05/2022 08:37 AM     Lab Results   Component Value Date/Time    TSH 3.970 11/05/2021 12:00 AM    T4, Free 1.09 11/05/2021 12:00 AM    T4, Total 5.6 11/17/2015 08:54 AM      Lab Results   Component Value Date/Time    Sodium 136 04/05/2022 08:37 AM    Potassium 4.4 04/05/2022 08:37 AM    Chloride 105 04/05/2022 08:37 AM    CO2 23 04/05/2022 08:37 AM    Anion gap 8 04/05/2022 08:37 AM    Glucose 239 (H) 04/05/2022 08:37 AM    BUN 19 04/05/2022 08:37 AM    Creatinine 1.05 (H) 04/05/2022 08:37 AM    BUN/Creatinine ratio 18 04/05/2022 08:37 AM    GFR est AA >60 04/05/2022 08:37 AM    GFR est non-AA 52 (L) 04/05/2022 08:37 AM    Calcium 9.1 04/05/2022 08:37 AM    Bilirubin, total 0.4 03/02/2022 09:02 AM    ALT (SGPT) 30 03/02/2022 09:02 AM    Alk. phosphatase 116 03/02/2022 09:02 AM    Protein, total 7.5 03/02/2022 09:02 AM    Albumin 4.4 03/02/2022 09:02 AM    Globulin 3.1 03/02/2022 09:02 AM    A-G Ratio 1.4 03/02/2022 09:02 AM      Lab Results   Component Value Date/Time    Hemoglobin A1c 7.4 (H) 11/09/2020 01:14 PM    Hemoglobin A1c (POC) 5.9 06/20/2022 10:26 AM         Review of Systems   Constitutional:  Negative for malaise/fatigue. HENT:  Negative for congestion. Eyes:  Negative for blurred vision. Respiratory:  Negative for cough and shortness of breath. Cardiovascular:  Negative for chest pain, palpitations and leg swelling.    Gastrointestinal:  Negative for abdominal pain, constipation and heartburn. Genitourinary:  Negative for dysuria, frequency and urgency. Musculoskeletal:  Positive for back pain and joint pain. Neurological:  Negative for dizziness, tingling and headaches. Endo/Heme/Allergies:  Negative for environmental allergies. Psychiatric/Behavioral:  Negative for depression. The patient does not have insomnia. Physical Exam  Vitals and nursing note reviewed. Constitutional:       Appearance: Normal appearance. She is well-developed. Comments: /62 (BP 1 Location: Left upper arm, BP Patient Position: Sitting, BP Cuff Size: Adult)   Pulse 79   Temp 98.1 °F (36.7 °C) (Oral)   Ht 5' 2\" (1.575 m)   Wt 137 lb 3.2 oz (62.2 kg)   SpO2 100%   BMI 25.09 kg/m²      HENT:      Right Ear: Tympanic membrane and ear canal normal.      Left Ear: Tympanic membrane and ear canal normal.   Neck:      Thyroid: No thyromegaly. Cardiovascular:      Rate and Rhythm: Normal rate and regular rhythm. Heart sounds: Normal heart sounds. Pulmonary:      Effort: Pulmonary effort is normal.      Breath sounds: Normal breath sounds. Abdominal:      General: Bowel sounds are normal.      Palpations: Abdomen is soft. There is no mass. Tenderness: There is no abdominal tenderness. Musculoskeletal:         General: Normal range of motion. Cervical back: Normal range of motion and neck supple. Lumbar back: Tenderness and bony tenderness present. Normal range of motion. Right hip: Tenderness present. No bony tenderness. Normal range of motion. Normal strength. Right lower leg: No edema. Left lower leg: No edema. Lymphadenopathy:      Cervical: No cervical adenopathy. Skin:     General: Skin is warm and dry. Neurological:      General: No focal deficit present. Mental Status: She is alert and oriented to person, place, and time.    Psychiatric:         Mood and Affect: Mood normal.       ASSESSMENT and PLAN  Diagnoses and all orders for this visit: 1. Low back pain radiating to right leg//  2. Primary osteoarthritis of right hip  Follow up as per ortho. The patient is advised to apply heat intermittently (avoid sleeping on heating pad). Consider PT. Tylenol for pain as needed. 3. Urinary pain//Vaginal pain  Follow up with GYN  4. Nonspecific findings on examination of urine  -     URINALYSIS W/MICROSCOPIC; Future  -     CULTURE, URINE; Future    5. Type 2 diabetes with nephropathy (HCC)  Stable a1c at 5.9%. Continue to monitor. Work on diet and exercise. 6. Mixed hyperlipidemia  Continue to monitor. Work on diet and exercise. 7. Coronary artery disease involving native coronary artery of native heart without angina pectoris  Stable     8. Acquired hypothyroidism  -     TSH 3RD GENERATION; Future    9. Gastroesophageal reflux disease without esophagitis  Stable on protonix    10. Encounter for medication monitoring  -     METABOLIC PANEL, BASIC; Future  -     CBC W/O DIFF; Future        reviewed diet, exercise and weight control  cardiovascular risk and specific lipid/LDL goals reviewed  reviewed medications and side effects in detail  specific diabetic recommendations: low cholesterol diet, weight control and daily exercise discussed and glycohemoglobin and other lab monitoring discussed     I have discussed diagnosis listed in this note with pt and/or family. I have discussed treatment plans and options and the risk/benefit analysis of those options, including safe use of medications and possible medication side effects. Through the use of shared decision making we have agreed to the above plan. The patient has received an after-visit summary and questions were answered concerning future plans and follow up. Advise pt of any urgent changes then to proceed to the ER.

## 2022-08-10 LAB
ANION GAP SERPL CALC-SCNC: 5 MMOL/L (ref 5–15)
APPEARANCE UR: CLEAR
BACTERIA URNS QL MICRO: ABNORMAL /HPF
BILIRUB UR QL: NEGATIVE
BUN SERPL-MCNC: 11 MG/DL (ref 6–20)
BUN/CREAT SERPL: 14 (ref 12–20)
CALCIUM SERPL-MCNC: 9.2 MG/DL (ref 8.5–10.1)
CHLORIDE SERPL-SCNC: 108 MMOL/L (ref 97–108)
CO2 SERPL-SCNC: 27 MMOL/L (ref 21–32)
COLOR UR: ABNORMAL
CREAT SERPL-MCNC: 0.8 MG/DL (ref 0.55–1.02)
EPITH CASTS URNS QL MICRO: ABNORMAL /LPF
ERYTHROCYTE [DISTWIDTH] IN BLOOD BY AUTOMATED COUNT: 13 % (ref 11.5–14.5)
GLUCOSE SERPL-MCNC: 95 MG/DL (ref 65–100)
GLUCOSE UR STRIP.AUTO-MCNC: NEGATIVE MG/DL
HCT VFR BLD AUTO: 37.7 % (ref 35–47)
HGB BLD-MCNC: 12 G/DL (ref 11.5–16)
HGB UR QL STRIP: NEGATIVE
KETONES UR QL STRIP.AUTO: NEGATIVE MG/DL
LEUKOCYTE ESTERASE UR QL STRIP.AUTO: ABNORMAL
MCH RBC QN AUTO: 30.8 PG (ref 26–34)
MCHC RBC AUTO-ENTMCNC: 31.8 G/DL (ref 30–36.5)
MCV RBC AUTO: 96.7 FL (ref 80–99)
NITRITE UR QL STRIP.AUTO: NEGATIVE
NRBC # BLD: 0 K/UL (ref 0–0.01)
NRBC BLD-RTO: 0 PER 100 WBC
PH UR STRIP: 5.5 [PH] (ref 5–8)
PLATELET # BLD AUTO: 268 K/UL (ref 150–400)
PMV BLD AUTO: 11 FL (ref 8.9–12.9)
POTASSIUM SERPL-SCNC: 4.7 MMOL/L (ref 3.5–5.1)
PROT UR STRIP-MCNC: NEGATIVE MG/DL
RBC # BLD AUTO: 3.9 M/UL (ref 3.8–5.2)
RBC #/AREA URNS HPF: ABNORMAL /HPF (ref 0–5)
SODIUM SERPL-SCNC: 140 MMOL/L (ref 136–145)
SP GR UR REFRACTOMETRY: 1.01 (ref 1–1.03)
TSH SERPL DL<=0.005 MIU/L-ACNC: 1.34 UIU/ML (ref 0.45–4.5)
UROBILINOGEN UR QL STRIP.AUTO: 0.2 EU/DL (ref 0.2–1)
WBC # BLD AUTO: 7.3 K/UL (ref 3.6–11)
WBC URNS QL MICRO: ABNORMAL /HPF (ref 0–4)

## 2022-08-11 LAB
BACTERIA SPEC CULT: NORMAL
CC UR VC: NORMAL
SERVICE CMNT-IMP: NORMAL

## 2022-08-13 ENCOUNTER — HOSPITAL ENCOUNTER (OUTPATIENT)
Dept: MRI IMAGING | Age: 72
Discharge: HOME | End: 2022-08-13
Attending: FAMILY MEDICINE
Payer: MEDICARE

## 2022-08-13 DIAGNOSIS — M25.551 RIGHT HIP PAIN: ICD-10-CM

## 2022-08-13 DIAGNOSIS — M16.11 PRIMARY OSTEOARTHRITIS OF RIGHT HIP: ICD-10-CM

## 2022-08-13 PROCEDURE — 73721 MRI JNT OF LWR EXTRE W/O DYE: CPT

## 2022-08-18 RX ORDER — BLOOD-GLUCOSE METER
EACH MISCELLANEOUS
Qty: 1 EACH | Refills: 0 | Status: SHIPPED | OUTPATIENT
Start: 2022-08-18

## 2022-08-18 NOTE — TELEPHONE ENCOUNTER
Last visit:8/09/22  Next visit:10/21/22  Previous refill 11/13/20(different meter)    Requested Prescriptions     Pending Prescriptions Disp Refills    Blood-Glucose Meter (Accu-Chek Guide Glucose Meter) misc 1 Each 0     Sig: Use as directed to check blood sugars. For [facility] in place:   Recommendation Provided To:    Intervention Detail: New Rx: 1, reason: Patient Preference  Gap Closed?:   Intervention Accepted By:   Time Spent (min): 5

## 2022-08-25 ENCOUNTER — TRANSCRIBE ORDER (OUTPATIENT)
Dept: SCHEDULING | Age: 72
End: 2022-08-25

## 2022-08-25 DIAGNOSIS — K57.90 DIVERTICULOSIS: ICD-10-CM

## 2022-08-25 DIAGNOSIS — R10.32 LEFT LOWER QUADRANT ABDOMINAL PAIN: ICD-10-CM

## 2022-08-25 DIAGNOSIS — K59.00 COLONIC CONSTIPATION: Primary | ICD-10-CM

## 2022-08-25 DIAGNOSIS — M62.89 PELVIC FLOOR DYSFUNCTION: ICD-10-CM

## 2022-08-25 DIAGNOSIS — R19.7 DIARRHEA: ICD-10-CM

## 2022-08-29 ENCOUNTER — HOSPITAL ENCOUNTER (OUTPATIENT)
Age: 72
Setting detail: OUTPATIENT SURGERY
Discharge: HOME | End: 2022-08-29
Attending: INTERNAL MEDICINE | Admitting: INTERNAL MEDICINE

## 2022-09-01 ENCOUNTER — HOSPITAL ENCOUNTER (OUTPATIENT)
Dept: GENERAL RADIOLOGY | Age: 72
Discharge: HOME | End: 2022-09-01
Attending: INTERNAL MEDICINE
Payer: MEDICARE

## 2022-09-01 DIAGNOSIS — R19.7 DIARRHEA: ICD-10-CM

## 2022-09-01 DIAGNOSIS — M62.89 PELVIC FLOOR DYSFUNCTION: ICD-10-CM

## 2022-09-01 DIAGNOSIS — R10.32 LEFT LOWER QUADRANT ABDOMINAL PAIN: ICD-10-CM

## 2022-09-01 DIAGNOSIS — K59.00 COLONIC CONSTIPATION: ICD-10-CM

## 2022-09-01 DIAGNOSIS — K57.90 DIVERTICULOSIS: ICD-10-CM

## 2022-09-01 PROCEDURE — 74270 X-RAY XM COLON 1CNTRST STD: CPT

## 2022-09-01 RX ORDER — BLOOD-GLUCOSE METER
EACH MISCELLANEOUS
Qty: 1 EACH | Refills: 0 | Status: SHIPPED | OUTPATIENT
Start: 2022-09-01

## 2022-09-01 NOTE — TELEPHONE ENCOUNTER
For [facility] in place:   Recommendation Provided To:    Intervention Detail: New Rx: 1, reason: Patient Preference  Gap Closed?:   Intervention Accepted By:   Time Spent (min): 5

## 2022-09-06 ENCOUNTER — HOSPITAL ENCOUNTER (OUTPATIENT)
Age: 72
Setting detail: OUTPATIENT SURGERY
Discharge: HOME | End: 2022-09-06
Attending: INTERNAL MEDICINE | Admitting: INTERNAL MEDICINE
Payer: MEDICARE

## 2022-09-06 VITALS
SYSTOLIC BLOOD PRESSURE: 142 MMHG | RESPIRATION RATE: 16 BRPM | BODY MASS INDEX: 25.09 KG/M2 | OXYGEN SATURATION: 100 % | HEIGHT: 62 IN | DIASTOLIC BLOOD PRESSURE: 62 MMHG | HEART RATE: 66 BPM

## 2022-09-06 PROCEDURE — 77030040810 HC CATH BLLN ANORECT EXPULSN MUIS -B: Performed by: INTERNAL MEDICINE

## 2022-09-06 PROCEDURE — 2709999900 HC NON-CHARGEABLE SUPPLY: Performed by: INTERNAL MEDICINE

## 2022-09-06 PROCEDURE — 76040000019: Performed by: INTERNAL MEDICINE

## 2022-09-06 NOTE — DISCHARGE INSTRUCTIONS
Chey Daniel  [id number]  1950      MANOMETRY DISCHARGE INSTRUCTION    You may resume your regular diet as tolerated. You may resume your normal daily activities. Call your Physician if you have any complications or questions. [employer] Activation    Thank you for requesting access to [employer]. Please follow the instructions below to securely access and download your online medical record. [employer] allows you to send messages to your doctor, view your test results, renew your prescriptions, schedule appointments, and more. How Do I Sign Up? In your internet browser, go to [email or web address]  Click on the First Time User? Click Here link in the Sign In box. You will be redirect to the New Member Sign Up page. Enter your [employer] Access Code exactly as it appears below. You will not need to use this code after youve completed the sign-up process. If you do not sign up before the expiration date, you must request a new code. [employer] Access Code: [email or web address]  Expires: 2022  9:42 AM (This is the date your [employer] access code will )    Enter the last four digits of your Social Security Number (xxxx) and Date of Birth (mm/dd/yyyy) as indicated and click Submit. You will be taken to the next sign-up page. Create a [facility] ID. This will be your [employer] login ID and cannot be changed, so think of one that is secure and easy to remember. Create a [employer] password. You can change your password at any time. Enter your Password Reset Question and Answer. This can be used at a later time if you forget your password. Enter your e-mail address. You will receive e-mail notification when new information is available in [address] Click Sign Up. You can now view and download portions of your medical record. Click the [employer] link to download a portable copy of your medical information.     Additional Information    If you have questions, please visit the Frequently Asked Questions section of the [employer] website at [email or web address] Remember, [employer] is NOT to be used for urgent needs. For medical emergencies, dial 911.

## 2022-09-06 NOTE — PROGRESS NOTES
Rectal exam done by Graham Ocasio LPN. Anal manometry catheter inserted into rectum. Manometry procedure complete. Catheter inserted into rectum. Balloon filled with 40cc of luke warm H2O, and pt escorted to bathroom for 5 min expulsion test.  Pt was not able to expel balloon. Balloon deflated and catheter removed. Pt tolerated procedures well.

## 2022-09-07 ENCOUNTER — TELEPHONE (OUTPATIENT)
Dept: FAMILY MEDICINE CLINIC | Age: 72
End: 2022-09-07

## 2022-09-07 NOTE — TELEPHONE ENCOUNTER
Patient called stating she's got an infection in her vagina for the 3rd time. She requested a call back and can be reached at [phone].

## 2022-09-09 DIAGNOSIS — E11.9 TYPE 2 DIABETES MELLITUS WITHOUT COMPLICATION, WITHOUT LONG-TERM CURRENT USE OF INSULIN (HCC): ICD-10-CM

## 2022-09-09 RX ORDER — BLOOD-GLUCOSE METER
EACH MISCELLANEOUS
Qty: 100 EACH | Refills: 3 | Status: SHIPPED | OUTPATIENT
Start: 2022-09-09

## 2022-09-09 NOTE — TELEPHONE ENCOUNTER
Last visit: 8/9/22  Next visit:10/21/22  Previous refill 7/25/22        For Chat[facility] Br[facility] in place:   Recommendation Provided To:    Intervention Detail: Discontinued Rx: 1, reason: Duplicate Therapy  Gap Closed?:   Intervention Accepted By:   Time Spent (min): 5

## 2022-09-16 DIAGNOSIS — F51.01 PRIMARY INSOMNIA: ICD-10-CM

## 2022-09-16 RX ORDER — ZOLPIDEM TARTRATE 5 MG/1
TABLET ORAL
Qty: 60 TABLET | OUTPATIENT
Start: 2022-09-16

## 2022-09-27 DIAGNOSIS — F51.01 PRIMARY INSOMNIA: ICD-10-CM

## 2022-09-28 RX ORDER — ZOLPIDEM TARTRATE 5 MG/1
TABLET ORAL
Qty: 60 TABLET | Refills: 1 | Status: SHIPPED | OUTPATIENT
Start: 2022-09-29 | End: 2022-10-03

## 2022-09-28 NOTE — PROCEDURES
Female Anorectal Manometry Procedure Note    Procedure Date: 9/6/22    Referring Provider: Dr. Lea Montemayor    Operation/Procedure: Anorectal Manometry/Rectal Sensation/Tone    Indication: Constipation, diarrhea     Description of the Operation/Procedure:A 12-sensor high resolution solid state manometry probe with a 4cm long balloon was placed through the anus into the rectum. When correctly positioned, the pressure sensors were located 1cm apart from the anal margin and the balloon at 7-11 cm. After correct placement, the probe was taped to the perineum. After a run in period of 5 minutes, the patient was asked to perform anal squeeze maneuvers twice, and bearing down maneuvers. Thereafter, intermittent rectal balloon distention was performed to assess rectoanal reflexes, rectal sensation, and rectal compliance by distending the balloon in a step wise manner. After this, the probe was removed. We then placed a 50cc water filled balloon in the rectum and the patient was asked to expel this device in privacy on a commode. Findings:  (Normal mean and 95% Confidence Interval shown in parenthesis)    Anal Sphincter Pressures:  Maximum resting pressure   64 mmHg (65, 56-74)   Maximum squeeze pressure   67 mmHg (143, 124-162)   Duration of squeeze   10 seconds   Intrarectal pressure with party balloon   26 mmHg (62, 51-73)   Straining maneuver anal residual pressure   24 mmHg (40, 32-48)   Defecation index   1 (1.8, 1.4-2.2)   Sphincter length   3 cm (3.6, 3.4-3.8)   Dyssynergic defecation? And Type   Yes; type IV       Findings consistent with Type IV dyssynergic defecation; poor rectal effort with absent relaxation of the anal sphincter      Rectoanal Reflexes:   The rectoanal inhibitory reflex was normal         Rectal Sensation: The patient reported  First sensation   60 cc (04,41-05)   Desire to defecate   120 cc (103, )   Urgency   160 cc (173, 150-195)       Balloon Expulsion Test:  Able to expel a 50cc balloon in 1 minute? No        Impression:     1) Abnormal expulsion with poor propulsion and dyssynergia. Specifically, there is evidence of poor rectal effort with absent relaxation of the anal sphincter. Anal sphincter does not markedly augment with either push or squeeze maneuver. There is also evidence of rectal hyposensitivity. Recommendation:    1) Recommend pelvic Floor PT with biofeedback therapy. Consider evaluation of neuropathy/spine imaging and potential evaluation for sacral nerve neuromodulation as indicated. Back pain

## 2022-09-30 RX ORDER — LANCETS
EACH MISCELLANEOUS
Qty: 300 EACH | Refills: 3 | Status: SHIPPED | OUTPATIENT
Start: 2022-09-30

## 2022-10-03 DIAGNOSIS — F51.01 PRIMARY INSOMNIA: ICD-10-CM

## 2022-10-03 RX ORDER — ZOLPIDEM TARTRATE 5 MG/1
TABLET ORAL
Qty: 60 TABLET | Refills: 1 | Status: SHIPPED | OUTPATIENT
Start: 2022-10-03

## 2022-10-06 RX ORDER — BLOOD SUGAR DIAGNOSTIC
STRIP MISCELLANEOUS
Qty: 200 STRIP | Refills: 3 | Status: SHIPPED | OUTPATIENT
Start: 2022-10-06

## 2022-10-06 NOTE — TELEPHONE ENCOUNTER
Last visit 8/09/22  Next visit:10/21/22  Previous refill 7/25/22(200+3R)    Requested Prescriptions     Pending Prescriptions Disp Refills    glucose blood VI test strips (Accu-Chek Hannah Plus test strp) strip 200 Strip 3     For Pharmacy Admin Tracking Only    CPA in place:   Recommendation Provided To:    Intervention Detail: New Rx: 1, reason: Patient Preference  Gap Closed?:   Intervention Accepted By:   Time Spent (min): 5

## 2022-10-21 ENCOUNTER — OFFICE VISIT (OUTPATIENT)
Dept: FAMILY MEDICINE CLINIC | Age: 72
End: 2022-10-21
Payer: MEDICARE

## 2022-10-21 VITALS
HEIGHT: 62 IN | TEMPERATURE: 98.1 F | HEART RATE: 78 BPM | SYSTOLIC BLOOD PRESSURE: 134 MMHG | DIASTOLIC BLOOD PRESSURE: 75 MMHG | OXYGEN SATURATION: 99 % | BODY MASS INDEX: 25.98 KG/M2 | RESPIRATION RATE: 12 BRPM | WEIGHT: 141.2 LBS

## 2022-10-21 DIAGNOSIS — E03.9 ACQUIRED HYPOTHYROIDISM: ICD-10-CM

## 2022-10-21 DIAGNOSIS — F51.01 PRIMARY INSOMNIA: ICD-10-CM

## 2022-10-21 DIAGNOSIS — Z23 NEEDS FLU SHOT: ICD-10-CM

## 2022-10-21 DIAGNOSIS — E11.9 TYPE 2 DIABETES MELLITUS WITHOUT COMPLICATION, WITHOUT LONG-TERM CURRENT USE OF INSULIN (HCC): Primary | ICD-10-CM

## 2022-10-21 DIAGNOSIS — E78.2 MIXED HYPERLIPIDEMIA: ICD-10-CM

## 2022-10-21 DIAGNOSIS — R10.2 PELVIC PAIN: ICD-10-CM

## 2022-10-21 DIAGNOSIS — R82.90 NONSPECIFIC FINDINGS ON EXAMINATION OF URINE: ICD-10-CM

## 2022-10-21 DIAGNOSIS — R30.0 BURNING WITH URINATION: ICD-10-CM

## 2022-10-21 DIAGNOSIS — Z51.81 ENCOUNTER FOR MEDICATION MONITORING: ICD-10-CM

## 2022-10-21 DIAGNOSIS — I25.10 CORONARY ARTERY DISEASE INVOLVING NATIVE CORONARY ARTERY OF NATIVE HEART WITHOUT ANGINA PECTORIS: ICD-10-CM

## 2022-10-21 PROCEDURE — G8427 DOCREV CUR MEDS BY ELIG CLIN: HCPCS | Performed by: FAMILY MEDICINE

## 2022-10-21 PROCEDURE — G8417 CALC BMI ABV UP PARAM F/U: HCPCS | Performed by: FAMILY MEDICINE

## 2022-10-21 PROCEDURE — 2022F DILAT RTA XM EVC RTNOPTHY: CPT | Performed by: FAMILY MEDICINE

## 2022-10-21 PROCEDURE — 1101F PT FALLS ASSESS-DOCD LE1/YR: CPT | Performed by: FAMILY MEDICINE

## 2022-10-21 PROCEDURE — 90694 VACC AIIV4 NO PRSRV 0.5ML IM: CPT | Performed by: FAMILY MEDICINE

## 2022-10-21 PROCEDURE — 3017F COLORECTAL CA SCREEN DOC REV: CPT | Performed by: FAMILY MEDICINE

## 2022-10-21 PROCEDURE — G8536 NO DOC ELDER MAL SCRN: HCPCS | Performed by: FAMILY MEDICINE

## 2022-10-21 PROCEDURE — 83036 HEMOGLOBIN GLYCOSYLATED A1C: CPT | Performed by: FAMILY MEDICINE

## 2022-10-21 PROCEDURE — 82947 ASSAY GLUCOSE BLOOD QUANT: CPT | Performed by: FAMILY MEDICINE

## 2022-10-21 PROCEDURE — G8752 SYS BP LESS 140: HCPCS | Performed by: FAMILY MEDICINE

## 2022-10-21 PROCEDURE — G0008 ADMIN INFLUENZA VIRUS VAC: HCPCS | Performed by: FAMILY MEDICINE

## 2022-10-21 PROCEDURE — 1123F ACP DISCUSS/DSCN MKR DOCD: CPT | Performed by: FAMILY MEDICINE

## 2022-10-21 PROCEDURE — 3044F HG A1C LEVEL LT 7.0%: CPT | Performed by: FAMILY MEDICINE

## 2022-10-21 PROCEDURE — G9717 DOC PT DX DEP/BP F/U NT REQ: HCPCS | Performed by: FAMILY MEDICINE

## 2022-10-21 PROCEDURE — G8399 PT W/DXA RESULTS DOCUMENT: HCPCS | Performed by: FAMILY MEDICINE

## 2022-10-21 PROCEDURE — 1090F PRES/ABSN URINE INCON ASSESS: CPT | Performed by: FAMILY MEDICINE

## 2022-10-21 PROCEDURE — G9899 SCRN MAM PERF RSLTS DOC: HCPCS | Performed by: FAMILY MEDICINE

## 2022-10-21 PROCEDURE — G8754 DIAS BP LESS 90: HCPCS | Performed by: FAMILY MEDICINE

## 2022-10-21 PROCEDURE — 99214 OFFICE O/P EST MOD 30 MIN: CPT | Performed by: FAMILY MEDICINE

## 2022-10-21 RX ORDER — PHENAZOPYRIDINE HYDROCHLORIDE 100 MG/1
100 TABLET, FILM COATED ORAL
Qty: 9 TABLET | Refills: 0 | Status: SHIPPED | OUTPATIENT
Start: 2022-10-21 | End: 2022-10-24

## 2022-10-21 RX ORDER — PHENAZOPYRIDINE HYDROCHLORIDE 100 MG/1
100 TABLET, FILM COATED ORAL
Qty: 9 TABLET | Refills: 0 | Status: SHIPPED | OUTPATIENT
Start: 2022-10-21 | End: 2022-10-21 | Stop reason: SDUPTHER

## 2022-10-21 NOTE — PROGRESS NOTES
HISTORY OF PRESENT ILLNESS  Saintclair Bourgeois is a 81 y.o. female. HPI  Follow up on chronic medical problems. Still c/o  pain, burning and pressure with passing her urine. She has been seeing gyn but no etiology of her sx has been found. She has appt for follow up on Tuesday. She is having discomfort in the vagina and pain with sitting. Feels the pressure in her vagina also and feels like her bowels are pushing thru her vagina. No vagina discharge or itching noted. Advised if worsening sx prior to her follow up on Tuesday to proceed to the ER. DM type II follow up:  Compliant w/ meds, diabetic diet, and exercise. Tolerating ozempic. Weight has stabilized. BS have been much better. Range in the low to mid 100s. Checks BS BID on most days and prn. Pt does not have BS log at visit today. Denies any tingling sensation. No polyuria or polydipsia. No blurred vision. No significant weight changes. Cardiovascular Review:  The patient has coronary artery disease, HF and status post coronary artery stenting. Diet and Lifestyle: generally follows a low fat low cholesterol diet, generally follows a low sodium diet, follows a diabetic diet regularly, exercises sporadically  Home BP Monitoring: is not measured at home. Pertinent ROS: taking medications as instructed, no medication side effects noted, no TIA's, no chest pain on exertion, no dyspnea on exertion, no swelling of ankles. Hypercholesterolemia follow up:  Compliant w/ meds, low fat, low cholesterol diet. Exercising some. No muscle nor abdominal pain, no skin discoloration. Patient fasting today. Depression Review:  Patient is seen for followup of depression and anxiety. Overall doing well. Taking Ambien to help with her sleep. Anxiety usually triggered by stress. She denies depressed mood and insomnia. Thyroid Review:  Patient is seen for followup of hypothyroidism. TSH in August was stable.     Thyroid ROS: denies fatigue, weight changes, heat/cold intolerance, bowel/skin changes or CVS symptoms. HM:  Mammogram 4/18/22   Colonoscopy 2/2/22 - repeat in 5 years    Patient Active Problem List   Diagnosis Code    Anxiety F41.9    Depression F32. A    Hypovitaminosis D E55.9    GERD (gastroesophageal reflux disease) K21.9    Edema R60.9    Esophageal motor disorder K22.4    S/P bypass gastrojejunostomy Z98.0    SOB (shortness of breath) R06.02    CAD (coronary artery disease) I25.10    S/P PTCA (percutaneous transluminal coronary angioplasty) Z98.61    Mixed hyperlipidemia E78.2    Diarrhea R19.7    Type 2 diabetes mellitus without complication (HCC) T80.3    Osteopenia M85.80    Encounter for medication monitoring Z51.81    CVA (cerebral vascular accident) ([facility] [address] X02.8    Complicated migraine X05.283    Numbness and tingling of right side of face R20.0, R20.2    Stenosis of both internal carotid arteries I65.23    Type 2 diabetes with nephropathy (HCC) E11.21    Hypertension, essential I10    Bilateral carotid artery stenosis I65.23    Spinal stenosis of lumbar region at multiple levels M48.061    Pain in both lower extremities M79.604, M79.605    Leg swelling M79.89    Venous insufficiency of both lower extremities I87.2    WISEMAN (dyspnea on exertion) R06.09       Current Outpatient Medications   Medication Sig Dispense Refill    glucose blood VI test strips (Accu-Chek Hannah Plus test strp) strip USE TWICE DAILY 200 Strip 3    Blood-Glucose Meter (Accu-Chek Guide Glucose Meter) misc Use as directed to test blood sugars 100 Each 3    zolpidem (AMBIEN) 5 mg tablet TAKE 1 TO 2 TABLETS BY  MOUTH AT NIGHT AS NEEDED  FOR SLEEP 60 Tablet 1    lancets (Accu-Chek Softclix Lancets) misc USE TO OBTAIN BLOOD SAMPLE  FOR DIABETIC TESTING 3  TIMES DAILY 300 Each 3    Blood-Glucose Meter (Accu-Chek Hannah Plus Meter) misc Use as directed by provider to test blood sugars 1 Each 0    Blood-Glucose Meter (Accu-Chek Guide Glucose Meter) misc Use as directed to check blood sugars. 1 Each 0    pantoprazole (PROTONIX) 40 mg tablet Take 1 Tablet by mouth in the morning. calcium carbonate-vitamin D3 600 mg-12.5 mcg (500 unit) cap Take 1 Tablet by mouth daily. multivitamin-FA-lycopen-lutein (Centrum Silver) 0.4 mg-300 mcg- 250 mcg tab tablet Take 1 Tablet by mouth in the morning. Lancing Device with Lancets (Accu-Chek Soft Dev Lancets) kit USE AS DIRECTED TO CHECK  BLOOD SUGAR 1 Kit 0    pravastatin (PRAVACHOL) 40 mg tablet TAKE 1 TABLET BY MOUTH EVERY NIGHT 90 Tablet 3    levothyroxine (SYNTHROID) 25 mcg tablet TAKE 1 TABLET BY MOUTH  DAILY BEFORE BREAKFAST 90 Tablet 3    semaglutide (Ozempic) 0.25 mg or 0.5 mg/dose (2 mg/1.5 ml) subq pen 0.25 mg by SubCUTAneous route every seven (7) days. 4.5 mL 3    fluticasone propionate (FLONASE) 50 mcg/actuation nasal spray USE 2 SPRAYS BY EACH  NOSTRIL ROUTE DAILY AS  NEEDED FOR RHINITIS 48 g 3    furosemide (LASIX) 20 mg tablet TAKE 1 TABLET BY MOUTH ONCE DAILY 30 Tablet 11    lisinopriL (PRINIVIL, ZESTRIL) 2.5 mg tablet TAKE 1 TABLET BY MOUTH  DAILY 90 Tablet 3    metoprolol succinate (TOPROL-XL) 25 mg XL tablet TAKE 1 TABLET BY MOUTH  DAILY 90 Tablet 3    cholecalciferol, vitamin D3, 50 mcg (2,000 unit) tab Take 2 capsule by mouth daily      Aspirin, Buffered 81 mg tab Take 81 mg by mouth daily.          Allergies   Allergen Reactions    Demerol [Meperidine] Unknown (comments)     Pt unsure of reaction         Past Medical History:   Diagnosis Date    Anxiety     Arthritis     ASHD (arteriosclerotic heart disease)     Bilateral carotid artery stenosis     CAD (coronary artery disease)     Chronic pain     Depression     Diabetes (HCC)     TypeII    Diarrhea     Gastroparesis     GERD (gastroesophageal reflux disease)     History of cardioembolic cerebrovascular accident (CVA)     Hypercholesterolemia     Hypertension     S/P bypass gastrojejunostomy 1/23/2012    Stroke [facility])     TIA    Thyroid disease          Past Surgical History:   Procedure Laterality Date    COLONOSCOPY N/A 5/26/2016    COLONOSCOPY performed by Jammie Desai MD at [facility] ENDOSCOPY    COLONOSCOPY N/A 10/16/2020    COLONOSCOPY performed by Home Junior MD at [facility] AMBULATORY OR    COLONOSCOPY N/A 2/2/2022    COLONOSCOPY performed by Bryan Ralph MD at [facility] ENDOSCOPY    EGD  7/13/2009    HX APPENDECTOMY      HX BLADDER SUSPENSION      HX CHOLECYSTECTOMY      HX CORONARY STENT PLACEMENT  06/2013    3 stents    HX GASTRIC BYPASS  6/02    HX HEART CATHETERIZATION  2017    stents remain open, no new stents placed    HX HYSTERECTOMY      HX LAPAROTOMY  8/02    2/2 intra abd abscess    HX LUMBAR LAMINECTOMY  01/2008    rods and screws    HX OOPHORECTOMY Left     HX ORTHOPAEDIC Bilateral     carpal tunnel    HX ORTHOPAEDIC Bilateral     bone spurs both feet    NJ ABDOMEN SURGERY PROC UNLISTED      s/p partial gastrectomy 2/2 gastroparesis    NJ COLONOSCOPY FLX DX W/COLLJ SPEC WHEN PFRMD  5/03/2006    Dr. Shelby Ryan  2/9/2012         NJ EGD BALLOON DILATION ESOPHAGUS <30 MM DIAM  1/23/2012         NJ EGD TRANSORAL BIOPSY SINGLE/MULTIPLE  7/11/2011         NJ EGD TRANSORAL BIOPSY SINGLE/MULTIPLE  1/23/2012         NJ GASTROJEJUNOSTOMY      UPPER GI ENDOSCOPY,BIOPSY  10/1/2019         UPPER GI ENDOSCOPY,BIOPSY  10/16/2020              Family History   Problem Relation Age of Onset    Heart Disease Mother     Hypertension Mother     Diabetes Father     Heart Disease Father     Hypertension Father     Alcohol abuse Brother     Heart Disease Sister     Hypertension Sister     Diabetes Sister     No Known Problems Sister     Alcohol abuse Brother     Alcohol abuse Brother     Diabetes Brother        Social History     Tobacco Use    Smoking status: Never    Smokeless tobacco: Never   Substance Use Topics    Alcohol use: No     Alcohol/week: 0.0 standard drinks        Lab Results   Component Value Date/Time    WBC 7.3 08/09/2022 04:26 PM    HGB 12.0 08/09/2022 04:26 PM    HCT 37.7 08/09/2022 04:26 PM    PLATELET 979 56/44/0196 04:26 PM    MCV 96.7 08/09/2022 04:26 PM     Lab Results   Component Value Date/Time    Cholesterol, total 142 04/05/2022 08:37 AM    HDL Cholesterol 60 04/05/2022 08:37 AM    LDL, calculated 33.2 04/05/2022 08:37 AM    Triglyceride 244 (H) 04/05/2022 08:37 AM    CHOL/HDL Ratio 2.4 04/05/2022 08:37 AM     Lab Results   Component Value Date/Time    TSH 1.340 08/09/2022 12:00 AM    T4, Free 1.09 11/05/2021 12:00 AM    T4, Total 5.6 11/17/2015 08:54 AM      Lab Results   Component Value Date/Time    Sodium 140 08/09/2022 04:27 PM    Potassium 4.7 08/09/2022 04:27 PM    Chloride 108 08/09/2022 04:27 PM    CO2 27 08/09/2022 04:27 PM    Anion gap 5 08/09/2022 04:27 PM    Glucose 95 08/09/2022 04:27 PM    BUN 11 08/09/2022 04:27 PM    Creatinine 0.80 08/09/2022 04:27 PM    BUN/Creatinine ratio 14 08/09/2022 04:27 PM    GFR est AA >60 08/09/2022 04:27 PM    GFR est non-AA >60 08/09/2022 04:27 PM    Calcium 9.2 08/09/2022 04:27 PM    Bilirubin, total 0.4 03/02/2022 09:02 AM    ALT (SGPT) 30 03/02/2022 09:02 AM    Alk. phosphatase 116 03/02/2022 09:02 AM    Protein, total 7.5 03/02/2022 09:02 AM    Albumin 4.4 03/02/2022 09:02 AM    Globulin 3.1 03/02/2022 09:02 AM    A-G Ratio 1.4 03/02/2022 09:02 AM      Lab Results   Component Value Date/Time    Hemoglobin A1c 7.4 (H) 11/09/2020 01:14 PM    Hemoglobin A1c (POC) 5.9 06/20/2022 10:26 AM         Review of Systems   Constitutional:  Negative for malaise/fatigue. HENT:  Negative for congestion. Eyes:  Negative for blurred vision. Respiratory:  Negative for cough and shortness of breath. Cardiovascular:  Negative for chest pain, palpitations and leg swelling. Gastrointestinal:  Negative for abdominal pain, constipation and heartburn. Genitourinary:  Negative for dysuria, frequency and urgency. Musculoskeletal:  Negative for back pain and joint pain.    Neurological: Negative for dizziness, tingling and headaches. Endo/Heme/Allergies:  Negative for environmental allergies. Psychiatric/Behavioral:  Negative for depression. The patient does not have insomnia. Physical Exam  Vitals and nursing note reviewed. Constitutional:       Appearance: Normal appearance. She is well-developed. Comments: /75   Pulse 78   Temp 98.1 °F (36.7 °C)   Resp 12   Ht 5' 2\" (1.575 m)   Wt 141 lb 3.2 oz (64 kg)   SpO2 99%   BMI 25.83 kg/m²    HENT:      Right Ear: Tympanic membrane and ear canal normal.      Left Ear: Tympanic membrane and ear canal normal.   Neck:      Thyroid: No thyromegaly. Cardiovascular:      Rate and Rhythm: Normal rate and regular rhythm. Heart sounds: Normal heart sounds. Pulmonary:      Effort: Pulmonary effort is normal.      Breath sounds: Normal breath sounds. Abdominal:      General: Abdomen is flat. Bowel sounds are normal. There is no distension. Palpations: Abdomen is soft. There is no mass. Tenderness: There is abdominal tenderness (mild) in the suprapubic area. There is no right CVA tenderness, left CVA tenderness, guarding or rebound. Hernia: No hernia is present. Musculoskeletal:         General: Normal range of motion. Cervical back: Normal range of motion and neck supple. Right lower leg: No edema. Left lower leg: No edema. Lymphadenopathy:      Cervical: No cervical adenopathy. Skin:     General: Skin is warm and dry. Neurological:      General: No focal deficit present. Mental Status: She is alert and oriented to person, place, and time. Psychiatric:         Mood and Affect: Mood normal.     ASSESSMENT and PLAN  Diagnoses and all orders for this visit:    1. Type 2 diabetes mellitus without complication, without long-term current use of insulin (HCC)  -     AMB POC HEMOGLOBIN A1C  -     AMB POC GLUCOSE, QUANTITATIVE, BLOOD    2.  Coronary artery disease involving native coronary artery of native heart without angina pectoris  Stable     3. Acquired hypothyroidism  Stable TSH    4. Mixed hyperlipidemia  -     LIPID PANEL; Future    5. Primary insomnia  Stable on Ambien      6. Encounter for medication monitoring  -     METABOLIC PANEL, COMPREHENSIVE; Future    7. Pelvic pain  8. Burning with urination  -     URINALYSIS W/MICROSCOPIC; Future  -     phenazopyridine (PYRIDIUM) 100 mg tablet; Take 1 Tablet by mouth three (3) times daily (after meals) for 3 days. For urinary pain. Follow up with GYN as planned. 9. Nonspecific findings on examination of urine  -     CULTURE, URINE; Future    10. Needs flu shot  -     INFLUENZA, FLUAD, (AGE 65 Y+), IM, PF, 0.5 ML      Follow-up and Dispositions    Return in about 1 month (around 11/21/2022). reviewed diet, exercise and weight control  cardiovascular risk and specific lipid/LDL goals reviewed  reviewed medications and side effects in detail  specific diabetic recommendations: low cholesterol diet, weight control and daily exercise discussed, all medications, side effects and compliance discussed carefully, foot care discussed and Podiatry visits discussed, annual eye examinations at Ophthalmology discussed, and glycohemoglobin and other lab monitoring discussed    I have discussed diagnosis listed in this note with pt and/or family. I have discussed treatment plans and options and the risk/benefit analysis of those options, including safe use of medications and possible medication side effects. Through the use of shared decision making we have agreed to the above plan. The patient has received an after-visit summary and questions were answered concerning future plans and follow up. Advise pt of any urgent changes then to proceed to the ER.

## 2022-10-21 NOTE — PATIENT INSTRUCTIONS
Vaccine Information Statement    Influenza (Flu) Vaccine (Inactivated or Recombinant): What You Need to Know    Many vaccine information statements are available in Khmer and other languages. See www.immunize.org/vis. Hojas de información sobre vacunas están disponibles en español y en muchos otros idiomas. Visite www.immunize.org/vis. 1. Why get vaccinated? Influenza vaccine can prevent influenza (flu). Flu is a contagious disease that spreads around the [facility] every year, usually between October and May. Anyone can get the flu, but it is more dangerous for some people. Infants and young children, people 72 years and older, pregnant people, and people with certain health conditions or a weakened immune system are at greatest risk of flu complications. Pneumonia, bronchitis, sinus infections, and ear infections are examples of flu-related complications. If you have a medical condition, such as heart disease, cancer, or diabetes, flu can make it worse. Flu can cause fever and chills, sore throat, muscle aches, fatigue, cough, headache, and runny or stuffy nose. Some people may have vomiting and diarrhea, though this is more common in children than adults. In an average year, thousands of people in the [facility] die from flu, and many more are hospitalized. Flu vaccine prevents millions of illnesses and flu-related visits to the doctor each year. 2. Influenza vaccines     CDC recommends everyone 6 months and older get vaccinated every flu season. Children 6 months through 6years of age may need 2 doses during a single flu season. Everyone else needs only 1 dose each flu season. It takes about 2 weeks for protection to develop after vaccination. There are many flu viruses, and they are always changing. Each year a new flu vaccine is made to protect against the influenza viruses believed to be likely to cause disease in the upcoming flu season.  Even when the vaccine doesnt exactly match these viruses, it may still provide some protection. Influenza vaccine does not cause flu. Influenza vaccine may be given at the same time as other vaccines. 3. Talk with your health care provider    Tell your vaccination provider if the person getting the vaccine:  Has had an allergic reaction after a previous dose of influenza vaccine, or has any severe, life-threatening allergies   Has ever had Guillain-Barré Syndrome (also called GBS)    In some cases, your health care provider may decide to postpone influenza vaccination until a future visit. Influenza vaccine can be administered at any time during pregnancy. People who are or will be pregnant during influenza season should receive inactivated influenza vaccine. People with minor illnesses, such as a cold, may be vaccinated. People who are moderately or severely ill should usually wait until they recover before getting influenza vaccine. Your health care provider can give you more information. 4. Risks of a vaccine reaction    Soreness, redness, and swelling where the shot is given, fever, muscle aches, and headache can happen after influenza vaccination. There may be a very small increased risk of Guillain-Barré Syndrome (GBS) after inactivated influenza vaccine (the flu shot). Faisal Sinclair children who get the flu shot along with pneumococcal vaccine (PCV13) and/or DTaP vaccine at the same time might be slightly more likely to have a seizure caused by fever. Tell your health care provider if a child who is getting flu vaccine has ever had a seizure. People sometimes faint after medical procedures, including vaccination. Tell your provider if you feel dizzy or have vision changes or ringing in the ears. As with any medicine, there is a very remote chance of a vaccine causing a severe allergic reaction, other serious injury, or death. 5. What if there is a serious problem?     An allergic reaction could occur after the vaccinated person leaves the clinic. If you see signs of a severe allergic reaction (hives, swelling of the face and throat, difficulty breathing, a fast heartbeat, dizziness, or weakness), call 9-1-1 and get the person to the nearest hospital.    For other signs that concern you, call your health care provider. Adverse reactions should be reported to the Vaccine Adverse Event Reporting System (VAERS). Your health care provider will usually file this report, or you can do it yourself. Visit the VAERS website at www.vaers. [facility].gov or call 1-591.363.3687. VAERS is only for reporting reactions, and VAERS staff members do not give medical advice. 6. The National Vaccine Injury Compensation Program    The [facility] Vaccine Injury Compensation Program (VICP) is a federal program that was created to compensate people who may have been injured by certain vaccines. Claims regarding alleged injury or death due to vaccination have a time limit for filing, which may be as short as two years. Visit the VICP website at www.[facility].gov/vaccinecompensation or call 3-503.197.1391 to learn about the program and about filing a claim. 7. How can I learn more? Ask your health care provider. Call your local or state health department. Visit the website of the Food and Drug Administration (FDA) for vaccine package inserts and additional information at www.fda.gov/vaccines-blood-biologics/vaccines. Contact the Centers for Disease Control and Prevention (CDC): Call 0-646.627.9324 (3-477-NSJ-INFO) or  Visit CDCs influenza website at www.cdc.gov/flu. Vaccine Information Statement   Inactivated Influenza Vaccine   8/6/2021  42 IVON Braun [id number]   Department of Health and Human Services  Centers for Disease Control and Prevention    Office Use Only

## 2022-10-21 NOTE — PROGRESS NOTES
Patient identified by 2 identifiers. Chief Complaint   Patient presents with    Follow-up    Diabetes     1. Have you been to the ER, urgent care clinic since your last visit? Hospitalized since your last visit? No    2. Have you seen or consulted any other health care providers outside of the [address] [place] [address] since your last visit? Include any pap smears or colon screening. No    Verbal Order with Readback given by Ladonna Seth MD for Influenza. Given in right Deltoid without difficulty.

## 2022-10-22 LAB
ALBUMIN SERPL-MCNC: 4.1 G/DL (ref 3.5–5)
ALBUMIN/GLOB SERPL: 1.6 {RATIO} (ref 1.1–2.2)
ALP SERPL-CCNC: 109 U/L (ref 45–117)
ALT SERPL-CCNC: 44 U/L (ref 12–78)
ANION GAP SERPL CALC-SCNC: 8 MMOL/L (ref 5–15)
APPEARANCE UR: CLEAR
AST SERPL-CCNC: 44 U/L (ref 15–37)
BACTERIA URNS QL MICRO: NEGATIVE /HPF
BILIRUB SERPL-MCNC: 0.3 MG/DL (ref 0.2–1)
BILIRUB UR QL: NEGATIVE
BUN SERPL-MCNC: 16 MG/DL (ref 6–20)
BUN/CREAT SERPL: 13 (ref 12–20)
CALCIUM SERPL-MCNC: 9 MG/DL (ref 8.5–10.1)
CHLORIDE SERPL-SCNC: 109 MMOL/L (ref 97–108)
CHOLEST SERPL-MCNC: 143 MG/DL
CO2 SERPL-SCNC: 23 MMOL/L (ref 21–32)
COLOR UR: ABNORMAL
CREAT SERPL-MCNC: 1.21 MG/DL (ref 0.55–1.02)
EPITH CASTS URNS QL MICRO: ABNORMAL /LPF
GLOBULIN SER CALC-MCNC: 2.5 G/DL (ref 2–4)
GLUCOSE SERPL-MCNC: 82 MG/DL (ref 65–100)
GLUCOSE UR STRIP.AUTO-MCNC: NEGATIVE MG/DL
HDLC SERPL-MCNC: 49 MG/DL
HDLC SERPL: 2.9 {RATIO} (ref 0–5)
HGB UR QL STRIP: NEGATIVE
HYALINE CASTS URNS QL MICRO: ABNORMAL /LPF (ref 0–5)
KETONES UR QL STRIP.AUTO: NEGATIVE MG/DL
LDLC SERPL CALC-MCNC: 63.2 MG/DL (ref 0–100)
LEUKOCYTE ESTERASE UR QL STRIP.AUTO: ABNORMAL
NITRITE UR QL STRIP.AUTO: NEGATIVE
PH UR STRIP: 6 [PH] (ref 5–8)
POTASSIUM SERPL-SCNC: 5.2 MMOL/L (ref 3.5–5.1)
PROT SERPL-MCNC: 6.6 G/DL (ref 6.4–8.2)
PROT UR STRIP-MCNC: NEGATIVE MG/DL
RBC #/AREA URNS HPF: ABNORMAL /HPF (ref 0–5)
SODIUM SERPL-SCNC: 140 MMOL/L (ref 136–145)
SP GR UR REFRACTOMETRY: 1.01 (ref 1–1.03)
TRIGL SERPL-MCNC: 154 MG/DL (ref ?–150)
UROBILINOGEN UR QL STRIP.AUTO: 0.2 EU/DL (ref 0.2–1)
VLDLC SERPL CALC-MCNC: 30.8 MG/DL
WBC URNS QL MICRO: ABNORMAL /HPF (ref 0–4)

## 2022-10-23 LAB
BACTERIA SPEC CULT: NORMAL
SERVICE CMNT-IMP: NORMAL

## 2022-10-24 LAB
GLUCOSE POC: 106 MG/DL
HBA1C MFR BLD HPLC: 5.6 %

## 2022-11-01 DIAGNOSIS — E11.21 TYPE 2 DIABETES WITH NEPHROPATHY (HCC): ICD-10-CM

## 2022-11-02 RX ORDER — SEMAGLUTIDE 1.34 MG/ML
INJECTION, SOLUTION SUBCUTANEOUS
Qty: 4.5 ML | Refills: 3 | Status: SHIPPED | OUTPATIENT
Start: 2022-11-02

## 2022-11-10 DIAGNOSIS — I25.10 CORONARY ARTERY DISEASE INVOLVING NATIVE CORONARY ARTERY OF NATIVE HEART WITHOUT ANGINA PECTORIS: ICD-10-CM

## 2022-11-11 RX ORDER — METOPROLOL SUCCINATE 25 MG/1
TABLET, EXTENDED RELEASE ORAL
Qty: 90 TABLET | Refills: 3 | Status: SHIPPED | OUTPATIENT
Start: 2022-11-11

## 2022-11-18 ENCOUNTER — OFFICE VISIT (OUTPATIENT)
Dept: FAMILY MEDICINE CLINIC | Age: 72
End: 2022-11-18
Payer: MEDICARE

## 2022-11-18 VITALS
TEMPERATURE: 98.9 F | HEIGHT: 62 IN | HEART RATE: 97 BPM | DIASTOLIC BLOOD PRESSURE: 69 MMHG | BODY MASS INDEX: 25.43 KG/M2 | WEIGHT: 138.2 LBS | SYSTOLIC BLOOD PRESSURE: 132 MMHG | RESPIRATION RATE: 12 BRPM | OXYGEN SATURATION: 98 %

## 2022-11-18 DIAGNOSIS — R10.2 PELVIC PAIN: ICD-10-CM

## 2022-11-18 DIAGNOSIS — M62.89 PELVIC FLOOR DYSFUNCTION IN FEMALE: Primary | ICD-10-CM

## 2022-11-18 DIAGNOSIS — G89.29 CHRONIC MIDLINE LOW BACK PAIN WITHOUT SCIATICA: ICD-10-CM

## 2022-11-18 DIAGNOSIS — M54.50 CHRONIC MIDLINE LOW BACK PAIN WITHOUT SCIATICA: ICD-10-CM

## 2022-11-18 PROCEDURE — G9899 SCRN MAM PERF RSLTS DOC: HCPCS | Performed by: FAMILY MEDICINE

## 2022-11-18 PROCEDURE — G8399 PT W/DXA RESULTS DOCUMENT: HCPCS | Performed by: FAMILY MEDICINE

## 2022-11-18 PROCEDURE — G8417 CALC BMI ABV UP PARAM F/U: HCPCS | Performed by: FAMILY MEDICINE

## 2022-11-18 PROCEDURE — 3078F DIAST BP <80 MM HG: CPT | Performed by: FAMILY MEDICINE

## 2022-11-18 PROCEDURE — G8536 NO DOC ELDER MAL SCRN: HCPCS | Performed by: FAMILY MEDICINE

## 2022-11-18 PROCEDURE — G8754 DIAS BP LESS 90: HCPCS | Performed by: FAMILY MEDICINE

## 2022-11-18 PROCEDURE — 1090F PRES/ABSN URINE INCON ASSESS: CPT | Performed by: FAMILY MEDICINE

## 2022-11-18 PROCEDURE — 3017F COLORECTAL CA SCREEN DOC REV: CPT | Performed by: FAMILY MEDICINE

## 2022-11-18 PROCEDURE — 3074F SYST BP LT 130 MM HG: CPT | Performed by: FAMILY MEDICINE

## 2022-11-18 PROCEDURE — G8427 DOCREV CUR MEDS BY ELIG CLIN: HCPCS | Performed by: FAMILY MEDICINE

## 2022-11-18 PROCEDURE — G9717 DOC PT DX DEP/BP F/U NT REQ: HCPCS | Performed by: FAMILY MEDICINE

## 2022-11-18 PROCEDURE — 1123F ACP DISCUSS/DSCN MKR DOCD: CPT | Performed by: FAMILY MEDICINE

## 2022-11-18 PROCEDURE — 99213 OFFICE O/P EST LOW 20 MIN: CPT | Performed by: FAMILY MEDICINE

## 2022-11-18 PROCEDURE — G8752 SYS BP LESS 140: HCPCS | Performed by: FAMILY MEDICINE

## 2022-11-18 PROCEDURE — 1101F PT FALLS ASSESS-DOCD LE1/YR: CPT | Performed by: FAMILY MEDICINE

## 2022-11-18 RX ORDER — CYCLOBENZAPRINE HCL 10 MG
10 TABLET ORAL
Qty: 30 TABLET | Refills: 0 | Status: SHIPPED | OUTPATIENT
Start: 2022-11-18

## 2022-11-18 NOTE — PROGRESS NOTES
Patient identified by 2 identifiers. Chief Complaint   Patient presents with    Follow-up     1. Have you been to the ER, urgent care clinic since your last visit? Hospitalized since your last visit? No    2. Have you seen or consulted any other health care providers outside of the [address] [place] [address] since your last visit? Include any pap smears or colon screening.  No

## 2022-11-22 RX ORDER — BLOOD SUGAR DIAGNOSTIC
STRIP MISCELLANEOUS
Qty: 200 STRIP | Refills: 3 | Status: SHIPPED | OUTPATIENT
Start: 2022-11-22

## 2022-11-22 NOTE — TELEPHONE ENCOUNTER
Last Visit: 11/18/22 with MD Shania Hardin  Next Appointment: 1/6/23 with MD Yoo    Requested Prescriptions     Pending Prescriptions Disp Refills    glucose blood VI test strips (Accu-Chek Guide test strips) strip 200 Strip 3     Sig: Check glucose twice daily         For Pharmacy [facility] in place:   Recommendation Provided To:    Intervention Detail: New Rx: 1, reason: Patient Preference  Gap Closed?:   Intervention Accepted By:   Time Spent (min): 5

## 2022-11-30 ENCOUNTER — TRANSCRIBE ORDER (OUTPATIENT)
Dept: SCHEDULING | Age: 72
End: 2022-11-30

## 2022-11-30 DIAGNOSIS — R10.2 PELVIC PAIN IN FEMALE: Primary | ICD-10-CM

## 2022-12-08 ENCOUNTER — HOSPITAL ENCOUNTER (OUTPATIENT)
Dept: MRI IMAGING | Age: 72
End: 2022-12-08
Attending: OBSTETRICS & GYNECOLOGY
Payer: MEDICARE

## 2022-12-08 DIAGNOSIS — R10.2 PELVIC PAIN IN FEMALE: ICD-10-CM

## 2022-12-08 PROCEDURE — A9576 INJ PROHANCE MULTIPACK: HCPCS | Performed by: OBSTETRICS & GYNECOLOGY

## 2022-12-08 PROCEDURE — 74011250636 HC RX REV CODE- 250/636: Performed by: OBSTETRICS & GYNECOLOGY

## 2022-12-08 PROCEDURE — 72197 MRI PELVIS W/O & W/DYE: CPT

## 2022-12-08 RX ADMIN — GADOTERIDOL 12 ML: 279.3 INJECTION, SOLUTION INTRAVENOUS at 09:26

## 2022-12-12 DIAGNOSIS — F51.01 PRIMARY INSOMNIA: ICD-10-CM

## 2022-12-12 RX ORDER — ZOLPIDEM TARTRATE 5 MG/1
TABLET ORAL
Qty: 60 TABLET | OUTPATIENT
Start: 2022-12-12

## 2022-12-15 ENCOUNTER — TELEPHONE (OUTPATIENT)
Dept: FAMILY MEDICINE CLINIC | Age: 72
End: 2022-12-15

## 2022-12-15 NOTE — TELEPHONE ENCOUNTER
Message received from [address] [place] [address] asking to verify the number of times per day the patient is checking their glucose. They stated their notes indicate 3x/day but the Rx sent said 2x/day. Please advise them at phone #[phone], fax #[phone]. For [address] [facility] in place:   Recommendation Provided To:    Intervention Detail: New Rx: 1, reason: Patient Preference  Gap Closed?:   Intervention Accepted By:   Time Spent (min): 5

## 2022-12-20 DIAGNOSIS — F51.01 PRIMARY INSOMNIA: ICD-10-CM

## 2022-12-20 RX ORDER — ZOLPIDEM TARTRATE 5 MG/1
TABLET ORAL
Qty: 60 TABLET | Refills: 1 | Status: SHIPPED | OUTPATIENT
Start: 2022-12-20

## 2023-01-09 DIAGNOSIS — E11.21 TYPE 2 DIABETES WITH NEPHROPATHY (HCC): ICD-10-CM

## 2023-01-10 RX ORDER — LISINOPRIL 2.5 MG/1
TABLET ORAL
Qty: 90 TABLET | Refills: 3 | Status: SHIPPED | OUTPATIENT
Start: 2023-01-10

## 2023-01-24 ENCOUNTER — OFFICE VISIT (OUTPATIENT)
Dept: FAMILY MEDICINE CLINIC | Age: 73
End: 2023-01-24
Payer: MEDICARE

## 2023-01-24 VITALS
DIASTOLIC BLOOD PRESSURE: 66 MMHG | WEIGHT: 145.6 LBS | HEART RATE: 81 BPM | HEIGHT: 62 IN | OXYGEN SATURATION: 100 % | SYSTOLIC BLOOD PRESSURE: 113 MMHG | RESPIRATION RATE: 12 BRPM | TEMPERATURE: 98.3 F | BODY MASS INDEX: 26.79 KG/M2

## 2023-01-24 DIAGNOSIS — E03.9 ACQUIRED HYPOTHYROIDISM: ICD-10-CM

## 2023-01-24 DIAGNOSIS — E78.2 MIXED HYPERLIPIDEMIA: ICD-10-CM

## 2023-01-24 DIAGNOSIS — M62.89 PELVIC FLOOR DYSFUNCTION IN FEMALE: ICD-10-CM

## 2023-01-24 DIAGNOSIS — I25.10 CORONARY ARTERY DISEASE INVOLVING NATIVE CORONARY ARTERY OF NATIVE HEART WITHOUT ANGINA PECTORIS: ICD-10-CM

## 2023-01-24 DIAGNOSIS — I73.9 PAD (PERIPHERAL ARTERY DISEASE) (HCC): ICD-10-CM

## 2023-01-24 DIAGNOSIS — R10.30 LOWER ABDOMINAL PAIN: ICD-10-CM

## 2023-01-24 DIAGNOSIS — Z12.31 ENCOUNTER FOR SCREENING MAMMOGRAM FOR BREAST CANCER: ICD-10-CM

## 2023-01-24 DIAGNOSIS — E11.9 TYPE 2 DIABETES MELLITUS WITHOUT COMPLICATION, WITHOUT LONG-TERM CURRENT USE OF INSULIN (HCC): Primary | ICD-10-CM

## 2023-01-24 DIAGNOSIS — F51.01 PRIMARY INSOMNIA: ICD-10-CM

## 2023-01-24 DIAGNOSIS — Z51.81 ENCOUNTER FOR MEDICATION MONITORING: ICD-10-CM

## 2023-01-24 PROBLEM — E11.21 TYPE 2 DIABETES WITH NEPHROPATHY (HCC): Status: RESOLVED | Noted: 2018-12-14 | Resolved: 2023-01-24

## 2023-01-24 LAB
GLUCOSE POC: 106 MG/DL
HBA1C MFR BLD HPLC: 5.7 %

## 2023-01-24 PROCEDURE — 82947 ASSAY GLUCOSE BLOOD QUANT: CPT | Performed by: FAMILY MEDICINE

## 2023-01-24 PROCEDURE — 83036 HEMOGLOBIN GLYCOSYLATED A1C: CPT | Performed by: FAMILY MEDICINE

## 2023-01-24 PROCEDURE — 3017F COLORECTAL CA SCREEN DOC REV: CPT | Performed by: FAMILY MEDICINE

## 2023-01-24 PROCEDURE — 3044F HG A1C LEVEL LT 7.0%: CPT | Performed by: FAMILY MEDICINE

## 2023-01-24 PROCEDURE — 99214 OFFICE O/P EST MOD 30 MIN: CPT | Performed by: FAMILY MEDICINE

## 2023-01-24 PROCEDURE — 3078F DIAST BP <80 MM HG: CPT | Performed by: FAMILY MEDICINE

## 2023-01-24 PROCEDURE — 1090F PRES/ABSN URINE INCON ASSESS: CPT | Performed by: FAMILY MEDICINE

## 2023-01-24 PROCEDURE — G8427 DOCREV CUR MEDS BY ELIG CLIN: HCPCS | Performed by: FAMILY MEDICINE

## 2023-01-24 PROCEDURE — G8399 PT W/DXA RESULTS DOCUMENT: HCPCS | Performed by: FAMILY MEDICINE

## 2023-01-24 PROCEDURE — 2022F DILAT RTA XM EVC RTNOPTHY: CPT | Performed by: FAMILY MEDICINE

## 2023-01-24 PROCEDURE — G9717 DOC PT DX DEP/BP F/U NT REQ: HCPCS | Performed by: FAMILY MEDICINE

## 2023-01-24 PROCEDURE — 1101F PT FALLS ASSESS-DOCD LE1/YR: CPT | Performed by: FAMILY MEDICINE

## 2023-01-24 PROCEDURE — G8536 NO DOC ELDER MAL SCRN: HCPCS | Performed by: FAMILY MEDICINE

## 2023-01-24 PROCEDURE — G8417 CALC BMI ABV UP PARAM F/U: HCPCS | Performed by: FAMILY MEDICINE

## 2023-01-24 PROCEDURE — 1123F ACP DISCUSS/DSCN MKR DOCD: CPT | Performed by: FAMILY MEDICINE

## 2023-01-24 PROCEDURE — G9899 SCRN MAM PERF RSLTS DOC: HCPCS | Performed by: FAMILY MEDICINE

## 2023-01-24 PROCEDURE — 3074F SYST BP LT 130 MM HG: CPT | Performed by: FAMILY MEDICINE

## 2023-01-24 RX ORDER — SEMAGLUTIDE 1.34 MG/ML
INJECTION, SOLUTION SUBCUTANEOUS
Qty: 4.5 ML | Refills: 3
Start: 2023-01-24

## 2023-01-24 RX ORDER — ZOLPIDEM TARTRATE 5 MG/1
TABLET ORAL
Qty: 60 TABLET | Refills: 1 | Status: SHIPPED | OUTPATIENT
Start: 2023-01-24

## 2023-01-24 RX ORDER — ESTRADIOL 0.1 MG/G
CREAM VAGINAL
COMMUNITY

## 2023-01-24 NOTE — PROGRESS NOTES
Patient identified by 2 identifiers. Chief Complaint   Patient presents with    Follow-up    Diabetes     1. Have you been to the ER, urgent care clinic since your last visit? Hospitalized since your last visit? No    2. Have you seen or consulted any other health care providers outside of the [address] [place] [address] since your last visit? Include any pap smears or colon screening.  No

## 2023-01-24 NOTE — PROGRESS NOTES
HISTORY OF PRESENT ILLNESS  David Rodriguez is a 81 y.o. female. HPI  Follow up on chronic medical problems. Still c/o  pain, burning and pressure in the lower abd and pelvis. She is seeing urology and gyn. Started on myrbetriq and estadiol cream.  Overall pain is some better. Still has lower abd pain and loose stools. She was told by GI to add fiber but this seem to make her bowels more loose. We discussed question of whether Ozempic is contributor to her abd pain. Will discuss with Rody to see what other medication is covered for her . DM type II follow up:  Compliant w/ meds, diabetic diet, and exercise. Tolerating ozempic. She is on the 0.25 mg dose weekly. Weight has stabilized. BS have been much better. Range in the low to mid 100s. Checks BS BID on most days and prn. Pt does not have BS log at visit today. Denies any tingling sensation. No polyuria or polydipsia. No blurred vision. No significant weight changes. Cardiovascular Review:  The patient has coronary artery disease, HF and status post coronary artery stenting. Diet and Lifestyle: generally follows a low fat low cholesterol diet, generally follows a low sodium diet, follows a diabetic diet regularly, exercises sporadically  Home BP Monitoring: is not measured at home. Pertinent ROS: taking medications as instructed, no medication side effects noted, no TIA's, no chest pain on exertion, no dyspnea on exertion, no swelling of ankles. Hypercholesterolemia follow up:  Compliant w/ meds, low fat, low cholesterol diet. Exercising some. No muscle nor abdominal pain, no skin discoloration. Patient fasting today. Depression Review:  Patient is seen for followup of depression and anxiety. Overall doing well. Taking Ambien to help with her sleep. Anxiety usually triggered by stress. She denies depressed mood and insomnia. Thyroid Review:  Patient is seen for followup of hypothyroidism. TSH in August was stable. Thyroid ROS: denies fatigue, weight changes, heat/cold intolerance, bowel/skin changes or CVS symptoms. HM:  Mammogram 4/18/22   Colonoscopy 2/2/22 - repeat in 5 years    Patient Active Problem List   Diagnosis Code    Anxiety F41.9    Depression F32. A    Hypovitaminosis D E55.9    GERD (gastroesophageal reflux disease) K21.9    Edema R60.9    Esophageal motor disorder K22.4    S/P bypass gastrojejunostomy Z98.0    SOB (shortness of breath) R06.02    CAD (coronary artery disease) I25.10    S/P PTCA (percutaneous transluminal coronary angioplasty) Z98.61    Mixed hyperlipidemia E78.2    Diarrhea R19.7    Type 2 diabetes mellitus without complication (HCC) H35.0    Osteopenia M85.80    Encounter for medication monitoring Z51.81    CVA (cerebral vascular accident) ([facility] [address] D39.2    Complicated migraine G33.314    Numbness and tingling of right side of face R20.0, R20.2    Stenosis of both internal carotid arteries I65.23    Type 2 diabetes with nephropathy (HCC) E11.21    Hypertension, essential I10    Bilateral carotid artery stenosis I65.23    Spinal stenosis of lumbar region at multiple levels M48.061    Pain in both lower extremities M79.604, M79.605    Leg swelling M79.89    Venous insufficiency of both lower extremities I87.2    WISEMAN (dyspnea on exertion) R06.09       Current Outpatient Medications   Medication Sig Dispense Refill    lisinopriL (PRINIVIL, ZESTRIL) 2.5 mg tablet TAKE 1 TABLET BY MOUTH  DAILY 90 Tablet 3    zolpidem (AMBIEN) 5 mg tablet TAKE 1 TO 2 TABLETS BY  MOUTH AT NIGHT AS NEEDED  FOR SLEEP 60 Tablet 1    glucose blood VI test strips (Accu-Chek Guide test strips) strip Check glucose twice daily 200 Strip 3    cyclobenzaprine (FLEXERIL) 10 mg tablet Take 1 Tablet by mouth three (3) times daily as needed for Muscle Spasm(s).  30 Tablet 0    metoprolol succinate (TOPROL-XL) 25 mg XL tablet TAKE 1 TABLET BY MOUTH  DAILY 90 Tablet 3    Ozempic 0.25 mg or 0.5 mg/dose (2 mg/1.5 ml) subq pen INJECT SUBCUTANEOUSLY 0.5  MG ONCE WEEKLY 4.5 mL 3    glucose blood VI test strips (Accu-Chek Hannah Plus test strp) strip USE TWICE DAILY 200 Strip 3    lancets (Accu-Chek Softclix Lancets) misc USE TO OBTAIN BLOOD SAMPLE  FOR DIABETIC TESTING 3  TIMES DAILY 300 Each 3    Blood-Glucose Meter (Accu-Chek Guide Glucose Meter) misc Use as directed to test blood sugars 100 Each 3    Blood-Glucose Meter (Accu-Chek Hannah Plus Meter) misc Use as directed by provider to test blood sugars 1 Each 0    Blood-Glucose Meter (Accu-Chek Guide Glucose Meter) misc Use as directed to check blood sugars. 1 Each 0    calcium carbonate-vitamin D3 600 mg-12.5 mcg (500 unit) cap Take 1 Tablet by mouth daily. multivitamin-FA-lycopen-lutein (Centrum Silver) 0.4 mg-300 mcg- 250 mcg tab tablet Take 1 Tablet by mouth in the morning. Lancing Device with Lancets (Accu-Chek Soft Dev Lancets) kit USE AS DIRECTED TO CHECK  BLOOD SUGAR 1 Kit 0    levothyroxine (SYNTHROID) 25 mcg tablet TAKE 1 TABLET BY MOUTH  DAILY BEFORE BREAKFAST 90 Tablet 3    fluticasone propionate (FLONASE) 50 mcg/actuation nasal spray USE 2 SPRAYS BY EACH  NOSTRIL ROUTE DAILY AS  NEEDED FOR RHINITIS 48 g 3    furosemide (LASIX) 20 mg tablet TAKE 1 TABLET BY MOUTH ONCE DAILY 30 Tablet 11    cholecalciferol, vitamin D3, 50 mcg (2,000 unit) tab Take 2 capsule by mouth daily      Aspirin, Buffered 81 mg tab Take 81 mg by mouth daily.          Allergies   Allergen Reactions    Demerol [Meperidine] Unknown (comments)     Pt unsure of reaction         Past Medical History:   Diagnosis Date    Anxiety     Arthritis     ASHD (arteriosclerotic heart disease)     Bilateral carotid artery stenosis     CAD (coronary artery disease)     Chronic pain     Depression     Diabetes (HCC)     TypeII    Diarrhea     Gastroparesis     GERD (gastroesophageal reflux disease)     History of cardioembolic cerebrovascular accident (CVA)     Hypercholesterolemia     Hypertension     S/P bypass gastrojejunostomy 1/23/2012    Stroke ([address]     TIA    Thyroid disease          Past Surgical History:   Procedure Laterality Date    COLONOSCOPY N/A 5/26/2016    COLONOSCOPY performed by Teressa Delaney MD at [facility] ENDOSCOPY    COLONOSCOPY N/A 10/16/2020    COLONOSCOPY performed by Charley Laura MD at [facility] AMBULATORY OR    COLONOSCOPY N/A 2/2/2022    COLONOSCOPY performed by Shila Maradiaga MD at [facility] ENDOSCOPY    EGD  7/13/2009    HX APPENDECTOMY      HX BLADDER SUSPENSION      HX CHOLECYSTECTOMY      HX CORONARY STENT PLACEMENT  06/2013    3 stents    HX GASTRIC BYPASS  6/02    HX HEART CATHETERIZATION  2017    stents remain open, no new stents placed    HX HYSTERECTOMY      HX LAPAROTOMY  8/02    2/2 intra abd abscess    HX LUMBAR LAMINECTOMY  01/2008    rods and screws    HX OOPHORECTOMY Left     HX ORTHOPAEDIC Bilateral     carpal tunnel    HX ORTHOPAEDIC Bilateral     bone spurs both feet    CA ABDOMEN SURGERY PROC UNLISTED      s/p partial gastrectomy 2/2 gastroparesis    CA COLONOSCOPY FLX DX W/COLLJ SPEC WHEN PFRMD  5/03/2006    Dr. Kyel Bradley  2/9/2012         CA EGD BALLOON DILATION ESOPHAGUS <30 MM DIAM  1/23/2012         CA EGD TRANSORAL BIOPSY SINGLE/MULTIPLE  7/11/2011         CA EGD TRANSORAL BIOPSY SINGLE/MULTIPLE  1/23/2012         CA GASTROJEJUNOSTOMY      UPPER GI ENDOSCOPY,BIOPSY  10/1/2019         UPPER GI ENDOSCOPY,BIOPSY  10/16/2020              Family History   Problem Relation Age of Onset    Heart Disease Mother     Hypertension Mother     Diabetes Father     Heart Disease Father     Hypertension Father     Alcohol abuse Brother     Heart Disease Sister     Hypertension Sister     Diabetes Sister     No Known Problems Sister     Alcohol abuse Brother     Alcohol abuse Brother     Diabetes Brother        Social History     Tobacco Use    Smoking status: Never    Smokeless tobacco: Never   Substance Use Topics    Alcohol use: No     Alcohol/week: 0.0 standard drinks        Lab Results   Component Value Date/Time    WBC 7.3 08/09/2022 04:26 PM    HGB 12.0 08/09/2022 04:26 PM    HCT 37.7 08/09/2022 04:26 PM    PLATELET 412 20/74/5094 04:26 PM    MCV 96.7 08/09/2022 04:26 PM     Lab Results   Component Value Date/Time    Cholesterol, total 143 10/21/2022 12:36 PM    HDL Cholesterol 49 10/21/2022 12:36 PM    LDL, calculated 63.2 10/21/2022 12:36 PM    Triglyceride 154 (H) 10/21/2022 12:36 PM    CHOL/HDL Ratio 2.9 10/21/2022 12:36 PM     Lab Results   Component Value Date/Time    TSH 1.340 08/09/2022 12:00 AM    T4, Free 1.09 11/05/2021 12:00 AM    T4, Total 5.6 11/17/2015 08:54 AM      Lab Results   Component Value Date/Time    Sodium 140 10/21/2022 12:36 PM    Potassium 5.2 (H) 10/21/2022 12:36 PM    Chloride 109 (H) 10/21/2022 12:36 PM    CO2 23 10/21/2022 12:36 PM    Anion gap 8 10/21/2022 12:36 PM    Glucose 82 10/21/2022 12:36 PM    BUN 16 10/21/2022 12:36 PM    Creatinine 1.21 (H) 10/21/2022 12:36 PM    BUN/Creatinine ratio 13 10/21/2022 12:36 PM    GFR est AA >60 08/09/2022 04:27 PM    GFR est non-AA >60 08/09/2022 04:27 PM    Calcium 9.0 10/21/2022 12:36 PM    Bilirubin, total 0.3 10/21/2022 12:36 PM    ALT (SGPT) 44 10/21/2022 12:36 PM    Alk. phosphatase 109 10/21/2022 12:36 PM    Protein, total 6.6 10/21/2022 12:36 PM    Albumin 4.1 10/21/2022 12:36 PM    Globulin 2.5 10/21/2022 12:36 PM    A-G Ratio 1.6 10/21/2022 12:36 PM      Lab Results   Component Value Date/Time    Hemoglobin A1c 7.4 (H) 11/09/2020 01:14 PM    Hemoglobin A1c (POC) 5.6 10/21/2022 11:00 AM         Review of Systems   Constitutional:  Negative for malaise/fatigue. HENT:  Negative for congestion. Eyes:  Negative for blurred vision. Respiratory:  Negative for cough and shortness of breath. Cardiovascular:  Negative for chest pain, palpitations and leg swelling. Gastrointestinal:  Negative for blood in stool, constipation, heartburn, melena, nausea and vomiting. Genitourinary:  Negative for dysuria, frequency and urgency. Musculoskeletal:  Negative for back pain and joint pain. Neurological:  Negative for dizziness, tingling and headaches. Endo/Heme/Allergies:  Negative for environmental allergies. Psychiatric/Behavioral:  Negative for depression. The patient does not have insomnia. Physical Exam  Vitals and nursing note reviewed. Constitutional:       Appearance: Normal appearance. She is well-developed. Comments: /66   Pulse 81   Temp 98.3 °F (36.8 °C)   Resp 12   Ht 5' 2\" (1.575 m)   Wt 145 lb 9.6 oz (66 kg)   SpO2 100%   BMI 26.63 kg/m²      HENT:      Right Ear: Tympanic membrane and ear canal normal.      Left Ear: Tympanic membrane and ear canal normal.   Neck:      Thyroid: No thyromegaly. Cardiovascular:      Rate and Rhythm: Normal rate and regular rhythm. Heart sounds: Normal heart sounds. Pulmonary:      Effort: Pulmonary effort is normal.      Breath sounds: Normal breath sounds. Abdominal:      General: Abdomen is flat. Bowel sounds are normal. There is no distension. Palpations: Abdomen is soft. There is no mass. Tenderness: There is abdominal tenderness (mild) in the suprapubic area. There is no right CVA tenderness, left CVA tenderness, guarding or rebound. Hernia: No hernia is present. Musculoskeletal:         General: Normal range of motion. Cervical back: Normal range of motion and neck supple. Right lower leg: No edema. Left lower leg: No edema. Lymphadenopathy:      Cervical: No cervical adenopathy. Skin:     General: Skin is warm and dry. Neurological:      General: No focal deficit present. Mental Status: She is alert and oriented to person, place, and time. Psychiatric:         Mood and Affect: Mood normal.     ASSESSMENT and PLAN  Diagnoses and all orders for this visit:    1.  Type 2 diabetes mellitus without complication, without long-term current use of insulin (HCC)  A1c stable at 5.7%. Continue to monitor. Work on diet and exercise. -     AMB POC HEMOGLOBIN A1C  -     AMB POC GLUCOSE, QUANTITATIVE, BLOOD  -     semaglutide (Ozempic) 0.25 mg or 0.5 mg/dose (2 mg/1.5 ml) subq pen; INJECT SUBCUTANEOUSLY 0.25  MG ONCE WEEKLY    2. Mixed hyperlipidemia  Stable LDL at gaol. 3. Acquired hypothyroidism  Stable TSH    4. Coronary artery disease involving native coronary artery of native heart without angina pectoris  -     REFERRAL TO CARDIOLOGY    5. PAD (peripheral artery disease) (HCC)  Stable     6. Primary insomnia  -     refill zolpidem (AMBIEN) 5 mg tablet; TAKE 1 TO 2 TABLETS BY  MOUTH AT NIGHT AS NEEDED  FOR SLEEP    7. Pelvic floor dysfunction in female  8. Lower abdominal pain  As per GI and gyn. Has appt follow up with GI next month. Consider changing ozempic to see if this help her sx. 9. Encounter for medication monitoring    10. Encounter for screening mammogram for breast cancer  -     [facility] MAMMO BI SCREENING INCL CAD; Future      Follow-up and Dispositions    Return in about 3 months (around 4/24/2023). reviewed diet, exercise and weight control  cardiovascular risk and specific lipid/LDL goals reviewed  reviewed medications and side effects in detail  specific diabetic recommendations: low cholesterol diet, weight control and daily exercise discussed, all medications, side effects and compliance discussed carefully, foot care discussed and Podiatry visits discussed, annual eye examinations at Ophthalmology discussed, and glycohemoglobin and other lab monitoring discussed      I have discussed diagnosis listed in this note with pt and/or family. I have discussed treatment plans and options and the risk/benefit analysis of those options, including safe use of medications and possible medication side effects. Through the use of shared decision making we have agreed to the above plan.  The patient has received an after-visit summary and questions were answered concerning future plans and follow up. Advise pt of any urgent changes then to proceed to the ER.

## 2023-01-24 NOTE — LETTER
CONTROLLED SUBSTANCE MEDICATION AGREEMENT  Patient Name: Brissa Mcallister  Patient YOB: 1950     I understand, that controlled substance medications may be used to help better manage my symptoms and to improve my ability to function at home, work and in social settings. However, I also understand that these medications do have risks, which have been discussed with me, including possible development of physical or psychological dependence. I understand that successful treatment requires mutual trust and honesty between me and my provider. I understand and agree that following this Medication Agreement is necessary in continuing my provider-patient relationship and the success of my treatment plan. Explanation from my Provider: Benefits and Goals of Controlled Substance Medications: There are two potential goals for your treatment: (1) decreased pain and suffering (2) improved daily life functions. There are many possible treatments for your chronic condition(s). Alternatives such as physical therapy, yoga, massage, home daily exercise, meditation, relaxation techniques, injections, chiropractic manipulations, surgery, cognitive therapy, hypnosis and many medications that are not habit-forming may be used. Use of controlled substance medications may be helpful, but they are unlikely to resolve all symptoms or restore all function. Explanation from my Provider: Risks of Controlled Substance Medications:   Opioid pain medications: These medications can lead to problems such as addiction/dependence, sedation, lightheadedness/dizziness, memory issues, falls, constipation, nausea, or vomiting. They may also impair the ability to drive or operate machinery. Additionally, these medications may lower testosterone levels, leading to loss of bone strength, stamina and sex drive.   They may cause problems with breathing, sleep apnea and reduced coughing, which is especially dangerous for patients with lung disease. Overdose or dangerous interactions with alcohol and other medications may occur, leading to death. Hyperalgesia may develop, which means patients receiving opioids for the treatment of pain may become more sensitive to certain painful stimuli, and in some cases, experience pain from ordinarily non-painful stimuli. Women between the ages of 14-53 who could become pregnant should carefully weigh the risks and benefits of opioids with their physicians, as these medications increase the risk of pregnancy complications, including miscarriage,  delivery and stillbirth. It is also possible for babies to be born addicted to opioids. Opioid dependence withdrawal symptoms may include; feelings of uneasiness, increased pain, irritability, belly pain, diarrhea, sweats and goose-flesh. Testosterone replacement therapy:  Potential side effects include increased risk of stroke and heart attack, blood clots, increased blood pressure, increased cholesterol, enlarged prostate, sleep apnea, irritability/aggression and other mood disorders, and decreased fertility. Nathan Eason (1950)             Page 1 of 4    Initials:_______    Benzodiazepines and non-benzodiazepine sleep medications: These medications can lead to problems such as addiction/dependence, sedation, fatigue, lightheadedness, dizziness, incoordination, falls, depression, hallucinations, and impaired judgment, memory and concentration. The ability to drive and operate machinery may also be affected. Abnormal sleep-related behaviors have been reported, including sleepwalking, driving, making telephone calls, eating, or having sex while not fully awake. These medications can suppress breathing and worsen sleep apnea, particularly when combined with alcohol or other sedating medications, potentially leading to death. Dependence withdrawal symptoms may include tremors, anxiety, hallucinations and seizures.    Stimulants:  Common adverse effects include addiction/dependence, increased blood pressure and heart rate, decreased appetite, nausea, involuntary weight loss, insomnia,  irritability, and headaches. These risks may increase when these medications are combined with other stimulants, such as caffeine pills or energy drinks, certain weight loss supplements and oral decongestants. Dependence withdrawal symptoms may include depressed mood, loss of interest, suicidal thoughts, anxiety, fatigue, appetite changes and agitation. I agree and understand that I and my prescriber have the following rights and responsibilities regarding my treatment plan:   1. MY RIGHTS:  To be informed of my treatment and medication plan. To be an active participant in my health and wellbeing. 2. MY RESPONSIBILITY AND UNDERSTANDING FOR USE OF MEDICATIONS   I will take medications at the dose and frequency as directed. For my safety, I will not increase or change how I take my medications without the recommendation of my healthcare provider.  I will actively participate in any program recommended by my provider which may improve function, including social, physical, psychological programs.  I will not take my medications with alcohol or other drugs not prescribed to me. I understand that drinking alcohol with my medications increases the chances of side effects, including reduced breathing rate and could lead to personal injury when operating machinery.  I understand that if I have a history of substance use disorders, including alcohol or other illicit drugs, that I may be at increased risk of addiction to my medications.  I agree to notify my provider immediately if I should become pregnant so that my treatment plan can be adjusted.    I agree and understand that I shall only receive controlled substance medications from the prescriber that signed this agreement unless there is written agreement among other prescribers of controlled substances outlining the responsibility of the medications being prescribed.  I understand that the if the controlled medication is not helping to achieve goals, the dosage may be tapered and no longer prescribed. 3. MY RESPONSIBILITY FOR COMMUNICATION / PRESCRIPTION RENEWALS   I agree that all controlled substance medications that I take will be prescribed only by my provider. If another healthcare provider prescribes me medication in an emergency, I will notify my provider within seventy-two (72) hours. Timoteo Moore (1950)             Page 2 of 4    Initials:_______   I will arrange for refills at the prescribed interval ONLY during regular office hours. I will not ask for refills earlier than agreed, after-hours, on holidays or weekends. Refills may take up to 72 hours for processing and prescriptions to reach the pharmacy.  I will inform my other health care providers that I am taking these medications and of the existence of this [id number]. In the event of an emergency, I will provide the same information to the emergency department prescribers.  I will keep my provider updated on the pharmacy I am using for controlled medication prescription filling. 4. MY RESPONSIBILITY FOR PROTECTING MEDICATIONS   I will protect my prescriptions and medications. I understand that lost or misplaced prescriptions will not be replaced.  I will keep medications only for my own use and will not share them with others. I will keep all medications away from children.  I agree that if my medications are adjusted or discontinued, I will properly dispose of any remaining medications. I understand that I will be required to dispose of any remaining controlled medications as, directed by my prescriber, prior to being provided with any prescriptions for other controlled medications.   Medication drop box locations can be found at: [email or web address]  5. MY RESPONSIBILITY WITH ILLEGAL DRUGS    I will not use illegal or street drugs or another person's prescription medications not prescribed to me.  If there are identified addiction type symptoms, then referral to a program may be provided by my provider and I agree to follow through with this recommendation. 6. MY RESPONSIBILITY FOR COOPERATION WITH INVESTIGATIONS   I understand that my provider will comply with any applicable law and may discuss my use and/or possible misuse/abuse of controlled substances and alcohol, as appropriate, with any health care provider involved in my care, pharmacist, or legal authority.  I authorize my provider and pharmacy to cooperate fully with law enforcement agencies (as permitted by law) in the investigation of any possible misuse, sale, or other diversion of my controlled substances.  I agree to waive any applicable privilege or right of privacy or confidentiality with respect to these authorizations. 7. PROVIDERS RIGHT TO MONITOR FOR SAFETY: PRESCRIPTION MONITORING / DRUG TESTING   I consent to drug/toxicology screening and will submit to a drug screen upon my providers request to assure I am only taking the prescribed drugs for my safety monitoring. I understand that a drug screen is a laboratory test in which a sample of my urine, blood or saliva is checked to see what drugs I have been taking. This may entail an observed urine specimen, which means that a nurse or other health care provider may watch me provide urine, and I will cooperate if I am asked to provide an observed specimen. Joseph Adkins (1950)             Page 3 of 4    Initials:_______  Carlos Interiano I understand that my provider will check a copy of my State Prescription Monitoring Program () Report in order to safely prescribe medications.      Pill Counts: I consent to pill counts when requested. I may be asked to bring all my prescribed controlled substance medications, in their original bottles, to all of my scheduled appointments. In addition, my provider may ask me to come to the practice at any time for a random pill count. 8. TERMINATION OF THIS AGREEMENT   For my safety, my prescriber has the right to stop prescribing controlled substance medications and may end this agreement.  Conditions that may result in termination of this agreement:  a. I do not show any improvement in pain, or my activity has not improved. b. I develop rapid tolerance or loss of improvement, as described in my treatment plan.  c. I develop significant side effects from the medication. d. My behavior is not consistent with the responsibilities outlined above, thereby causing safety concerns to continue prescribing controlled substance medications. e. I fail to follow the terms of this agreement. f. Other:____________________________     UNDERSTANDING THIS MEDICATION AGREEMENT:    I have read the above and have had all my questions answered. For chronic disease management, I know that my symptoms can be managed with many types of treatments. A chronic medication trial may be part of my treatment, but I must be an active participant in my care. Medication therapy is only one part of my symptom management plan. In some cases, there may be limited scientific evidence to support the chronic use of certain medications to improve symptoms and daily function. Furthermore, in certain circumstances, there may be scientific information that suggests that the use of chronic controlled substances may worsen my symptoms and increase my risk of unintentional death directly related to this medication therapy. I know that if my provider feels my risk from controlled medications is greater than my benefit, I will have my controlled substance medication(s) compassionately lowered or removed altogether. I further agree to allow this office to contact my HIPAA contact if there are concerns about my safety and use of the controlled medications. I have agreed to use the prescribed controlled substance medications to me as instructed by my provider and as stated in this Medication Agreement. My initial on each page and my signature below shows that I have read each page and I have had the opportunity to ask questions with answers provided by my provider.       Patient Name (Printed): _____________________________________    Patient Signature:  ______________________   Date: _____________      Prescriber Name (Printed): ___________________________________    Prescriber Signature: _____________________  Date: _____________     Ole Oliva (1950)             Page 4 of 4

## 2023-02-03 DIAGNOSIS — I25.10 CORONARY ARTERY DISEASE INVOLVING NATIVE CORONARY ARTERY OF NATIVE HEART WITHOUT ANGINA PECTORIS: ICD-10-CM

## 2023-02-04 RX ORDER — FUROSEMIDE 20 MG/1
TABLET ORAL
Qty: 90 TABLET | Refills: 3 | Status: SHIPPED | OUTPATIENT
Start: 2023-02-04

## 2023-02-27 ENCOUNTER — TELEPHONE (OUTPATIENT)
Dept: FAMILY MEDICINE CLINIC | Age: 73
End: 2023-02-27

## 2023-02-27 NOTE — TELEPHONE ENCOUNTER
Patient wants to know if she still needs to take the semaglutide (Ozempic) 0.25 mg or 0.5 mg/dose (2 mg/1.5 ml) subq pen. She said the nurse came by checked her A1C and it was 5.7.   Please give her a call @ [phone]

## 2023-03-26 DIAGNOSIS — F51.01 PRIMARY INSOMNIA: ICD-10-CM

## 2023-03-27 ENCOUNTER — TRANSCRIBE ORDER (OUTPATIENT)
Dept: SCHEDULING | Age: 73
End: 2023-03-27

## 2023-03-27 DIAGNOSIS — Z12.31 ENCOUNTER FOR SCREENING MAMMOGRAM FOR MALIGNANT NEOPLASM OF BREAST: Primary | ICD-10-CM

## 2023-03-27 RX ORDER — ZOLPIDEM TARTRATE 5 MG/1
TABLET ORAL
Qty: 60 TABLET | OUTPATIENT
Start: 2023-03-27

## 2023-03-29 DIAGNOSIS — F51.01 PRIMARY INSOMNIA: ICD-10-CM

## 2023-03-29 RX ORDER — ZOLPIDEM TARTRATE 5 MG/1
TABLET ORAL
Qty: 60 TABLET | OUTPATIENT
Start: 2023-03-29

## 2023-03-30 RX ORDER — ZOLPIDEM TARTRATE 5 MG/1
TABLET ORAL
Qty: 60 TABLET | Refills: 0 | Status: SHIPPED | OUTPATIENT
Start: 2023-04-04

## 2023-04-22 DIAGNOSIS — Z12.31 ENCOUNTER FOR SCREENING MAMMOGRAM FOR BREAST CANCER: Primary | ICD-10-CM

## 2023-04-23 DIAGNOSIS — Z12.31 ENCOUNTER FOR SCREENING MAMMOGRAM FOR MALIGNANT NEOPLASM OF BREAST: Primary | ICD-10-CM

## 2023-04-25 RX ORDER — LEVOTHYROXINE SODIUM 25 UG/1
TABLET ORAL
Qty: 90 TABLET | Refills: 3 | Status: SHIPPED | OUTPATIENT
Start: 2023-04-25

## 2023-05-02 DIAGNOSIS — F51.01 PRIMARY INSOMNIA: ICD-10-CM

## 2023-05-02 RX ORDER — ZOLPIDEM TARTRATE 5 MG/1
TABLET ORAL
Qty: 60 TABLET | Refills: 1 | Status: SHIPPED | OUTPATIENT
Start: 2023-05-02

## 2023-05-05 ENCOUNTER — OFFICE VISIT (OUTPATIENT)
Dept: FAMILY MEDICINE CLINIC | Age: 73
End: 2023-05-05

## 2023-05-05 VITALS
HEART RATE: 85 BPM | TEMPERATURE: 97.1 F | OXYGEN SATURATION: 98 % | RESPIRATION RATE: 12 BRPM | BODY MASS INDEX: 27.2 KG/M2 | DIASTOLIC BLOOD PRESSURE: 68 MMHG | HEIGHT: 62 IN | SYSTOLIC BLOOD PRESSURE: 130 MMHG | WEIGHT: 147.8 LBS

## 2023-05-05 DIAGNOSIS — Z91.09 ENVIRONMENTAL ALLERGIES: ICD-10-CM

## 2023-05-05 DIAGNOSIS — E11.9 TYPE 2 DIABETES MELLITUS WITHOUT COMPLICATION, WITHOUT LONG-TERM CURRENT USE OF INSULIN (HCC): Primary | ICD-10-CM

## 2023-05-05 DIAGNOSIS — F51.01 PRIMARY INSOMNIA: ICD-10-CM

## 2023-05-05 DIAGNOSIS — I25.10 CORONARY ARTERY DISEASE INVOLVING NATIVE CORONARY ARTERY OF NATIVE HEART WITHOUT ANGINA PECTORIS: ICD-10-CM

## 2023-05-05 DIAGNOSIS — Z51.81 ENCOUNTER FOR MEDICATION MONITORING: ICD-10-CM

## 2023-05-05 DIAGNOSIS — E78.2 MIXED HYPERLIPIDEMIA: ICD-10-CM

## 2023-05-05 DIAGNOSIS — E03.9 ACQUIRED HYPOTHYROIDISM: ICD-10-CM

## 2023-05-05 DIAGNOSIS — I73.9 PAD (PERIPHERAL ARTERY DISEASE) (HCC): ICD-10-CM

## 2023-05-05 RX ORDER — FLUTICASONE PROPIONATE 50 MCG
SPRAY, SUSPENSION (ML) NASAL
Qty: 48 G | Refills: 3 | Status: SHIPPED | OUTPATIENT
Start: 2023-05-05

## 2023-05-05 RX ORDER — ZOLPIDEM TARTRATE 5 MG/1
TABLET ORAL
Qty: 60 TABLET | Refills: 1 | Status: CANCELLED | COMMUNITY
Start: 2023-05-05

## 2023-05-08 ENCOUNTER — CLINICAL DOCUMENTATION (OUTPATIENT)
Age: 73
End: 2023-05-08

## 2023-05-08 NOTE — PROGRESS NOTES
Received fax from [address] In regards to diabetic supplies. This is at least the third time for faxing office note for this reason. PCP advised as well.

## 2023-05-10 ENCOUNTER — HOSPITAL ENCOUNTER (OUTPATIENT)
Facility: HOSPITAL | Age: 73
Discharge: HOME | End: 2023-05-13
Payer: MEDICARE

## 2023-05-10 DIAGNOSIS — Z12.31 ENCOUNTER FOR SCREENING MAMMOGRAM FOR MALIGNANT NEOPLASM OF BREAST: ICD-10-CM

## 2023-05-10 PROCEDURE — 77063 BREAST TOMOSYNTHESIS BI: CPT

## 2023-05-17 ENCOUNTER — TELEPHONE (OUTPATIENT)
Age: 73
End: 2023-05-17

## 2023-05-17 NOTE — TELEPHONE ENCOUNTER
[facility] [address] [facility] states that they need a updated RX blood glucose monitor she can be reached @ [phone]

## 2023-05-18 NOTE — TELEPHONE ENCOUNTER
Kavitha with [employer] states that they need a updated RX blood glucose monitor she can be reached @ [phone]  -------------------------------------------------------------------------------------    Need a new script for glucose monitor that reads 2 times Daily and not 3 times Daily to match the notes in the pt chart. They will be faxing another form over to sign and fax back.

## 2023-05-24 ENCOUNTER — HOSPITAL ENCOUNTER (OUTPATIENT)
Facility: HOSPITAL | Age: 73
Discharge: HOME | End: 2023-05-27
Payer: MEDICARE

## 2023-05-24 DIAGNOSIS — R19.7 DIARRHEA, UNSPECIFIED TYPE: ICD-10-CM

## 2023-05-24 DIAGNOSIS — R13.14 DYSPHAGIA, PHARYNGOESOPHAGEAL: ICD-10-CM

## 2023-05-24 DIAGNOSIS — R10.814 LEFT LOWER QUADRANT ABDOMINAL TENDERNESS WITHOUT REBOUND TENDERNESS: ICD-10-CM

## 2023-05-24 PROCEDURE — 74176 CT ABD & PELVIS W/O CONTRAST: CPT

## 2023-05-24 PROCEDURE — 74220 X-RAY XM ESOPHAGUS 1CNTRST: CPT

## 2023-06-17 DIAGNOSIS — F51.01 PRIMARY INSOMNIA: Primary | ICD-10-CM

## 2023-06-19 ENCOUNTER — TELEPHONE (OUTPATIENT)
Age: 73
End: 2023-06-19

## 2023-06-19 NOTE — TELEPHONE ENCOUNTER
Last appointment: 5/5/23  Next appointment: 7/5/23  Previous refill encounter(s): 5/2/23 #60 with 1 refill    Requested Prescriptions     Pending Prescriptions Disp Refills    zolpidem (AMBIEN) 5 MG tablet [Pharmacy Med Name: Zolpidem Tartrate 5 MG Oral Tablet] 60 tablet 1     Sig: TAKE 1 TO 2 TABLETS BY MOUTH AT  NIGHT AS NEEDED FOR SLEEP         For Pharmacy Admin Tracking Only    Program: Medication Refill  CPA in place:    Recommendation Provided To:    Intervention Detail: New Rx: 1, reason: Patient Preference  Intervention Accepted By:   Soto Dupont Closed?:    Time Spent (min): 5

## 2023-06-20 RX ORDER — ZOLPIDEM TARTRATE 5 MG/1
TABLET ORAL
Qty: 60 TABLET | Refills: 1 | OUTPATIENT
Start: 2023-06-20 | End: 2023-08-19

## 2023-06-29 DIAGNOSIS — F51.01 PRIMARY INSOMNIA: Primary | ICD-10-CM

## 2023-06-30 RX ORDER — ZOLPIDEM TARTRATE 5 MG/1
TABLET ORAL
Qty: 60 TABLET | Refills: 1 | Status: SHIPPED | OUTPATIENT
Start: 2023-06-30 | End: 2023-08-29

## 2023-07-05 ENCOUNTER — OFFICE VISIT (OUTPATIENT)
Age: 73
End: 2023-07-05
Payer: MEDICARE

## 2023-07-05 VITALS
SYSTOLIC BLOOD PRESSURE: 136 MMHG | OXYGEN SATURATION: 100 % | RESPIRATION RATE: 18 BRPM | BODY MASS INDEX: 26.39 KG/M2 | HEART RATE: 71 BPM | WEIGHT: 143.4 LBS | DIASTOLIC BLOOD PRESSURE: 70 MMHG | HEIGHT: 62 IN | TEMPERATURE: 97.8 F

## 2023-07-05 DIAGNOSIS — Z00.00 MEDICARE ANNUAL WELLNESS VISIT, SUBSEQUENT: Primary | ICD-10-CM

## 2023-07-05 DIAGNOSIS — I25.10 ATHEROSCLEROSIS OF NATIVE CORONARY ARTERY OF NATIVE HEART WITHOUT ANGINA PECTORIS: ICD-10-CM

## 2023-07-05 DIAGNOSIS — R10.2 PELVIC PAIN: ICD-10-CM

## 2023-07-05 DIAGNOSIS — E03.9 ACQUIRED HYPOTHYROIDISM: ICD-10-CM

## 2023-07-05 DIAGNOSIS — I87.2 VENOUS INSUFFICIENCY OF BOTH LOWER EXTREMITIES: ICD-10-CM

## 2023-07-05 DIAGNOSIS — F51.01 PRIMARY INSOMNIA: ICD-10-CM

## 2023-07-05 DIAGNOSIS — Z11.59 NEED FOR HEPATITIS C SCREENING TEST: ICD-10-CM

## 2023-07-05 DIAGNOSIS — I73.9 PERIPHERAL VASCULAR DISEASE, UNSPECIFIED (HCC): ICD-10-CM

## 2023-07-05 DIAGNOSIS — Z79.899 ENCOUNTER FOR LONG-TERM (CURRENT) USE OF MEDICATIONS: ICD-10-CM

## 2023-07-05 DIAGNOSIS — F41.1 GENERALIZED ANXIETY DISORDER: ICD-10-CM

## 2023-07-05 DIAGNOSIS — R10.30 LOWER ABDOMINAL PAIN: ICD-10-CM

## 2023-07-05 DIAGNOSIS — E78.2 MIXED HYPERLIPIDEMIA: ICD-10-CM

## 2023-07-05 DIAGNOSIS — E11.9 TYPE 2 DIABETES MELLITUS WITHOUT COMPLICATION, WITHOUT LONG-TERM CURRENT USE OF INSULIN (HCC): ICD-10-CM

## 2023-07-05 LAB
ERYTHROCYTE [DISTWIDTH] IN BLOOD BY AUTOMATED COUNT: 12.3 % (ref 11.5–14.5)
GLUCOSE, POC: 75 MG/DL
HBA1C MFR BLD: 5.5 %
HCT VFR BLD AUTO: 41.2 % (ref 35–47)
HGB BLD-MCNC: 13 G/DL (ref 11.5–16)
MCH RBC QN AUTO: 29.7 PG (ref 26–34)
MCHC RBC AUTO-ENTMCNC: 31.6 G/DL (ref 30–36.5)
MCV RBC AUTO: 94.3 FL (ref 80–99)
NRBC # BLD: 0 K/UL (ref 0–0.01)
NRBC BLD-RTO: 0 PER 100 WBC
PLATELET # BLD AUTO: 285 K/UL (ref 150–400)
PMV BLD AUTO: 10.8 FL (ref 8.9–12.9)
RBC # BLD AUTO: 4.37 M/UL (ref 3.8–5.2)
WBC # BLD AUTO: 7.1 K/UL (ref 3.6–11)

## 2023-07-05 PROCEDURE — 3046F HEMOGLOBIN A1C LEVEL >9.0%: CPT | Performed by: FAMILY MEDICINE

## 2023-07-05 PROCEDURE — 3078F DIAST BP <80 MM HG: CPT | Performed by: FAMILY MEDICINE

## 2023-07-05 PROCEDURE — 99214 OFFICE O/P EST MOD 30 MIN: CPT | Performed by: FAMILY MEDICINE

## 2023-07-05 PROCEDURE — G0439 PPPS, SUBSEQ VISIT: HCPCS | Performed by: FAMILY MEDICINE

## 2023-07-05 PROCEDURE — PBSHW AMB POC HEMOGLOBIN A1C: Performed by: FAMILY MEDICINE

## 2023-07-05 PROCEDURE — G8427 DOCREV CUR MEDS BY ELIG CLIN: HCPCS | Performed by: FAMILY MEDICINE

## 2023-07-05 PROCEDURE — 3017F COLORECTAL CA SCREEN DOC REV: CPT | Performed by: FAMILY MEDICINE

## 2023-07-05 PROCEDURE — 3075F SYST BP GE 130 - 139MM HG: CPT | Performed by: FAMILY MEDICINE

## 2023-07-05 PROCEDURE — 1090F PRES/ABSN URINE INCON ASSESS: CPT | Performed by: FAMILY MEDICINE

## 2023-07-05 PROCEDURE — G8399 PT W/DXA RESULTS DOCUMENT: HCPCS | Performed by: FAMILY MEDICINE

## 2023-07-05 PROCEDURE — 1123F ACP DISCUSS/DSCN MKR DOCD: CPT | Performed by: FAMILY MEDICINE

## 2023-07-05 PROCEDURE — 82962 GLUCOSE BLOOD TEST: CPT | Performed by: FAMILY MEDICINE

## 2023-07-05 PROCEDURE — 1036F TOBACCO NON-USER: CPT | Performed by: FAMILY MEDICINE

## 2023-07-05 PROCEDURE — 83036 HEMOGLOBIN GLYCOSYLATED A1C: CPT | Performed by: FAMILY MEDICINE

## 2023-07-05 PROCEDURE — 2022F DILAT RTA XM EVC RTNOPTHY: CPT | Performed by: FAMILY MEDICINE

## 2023-07-05 PROCEDURE — PBSHW AMB POC GLUCOSE BLOOD, BY GLUCOSE MONITORING DEVICE: Performed by: FAMILY MEDICINE

## 2023-07-05 PROCEDURE — G8419 CALC BMI OUT NRM PARAM NOF/U: HCPCS | Performed by: FAMILY MEDICINE

## 2023-07-05 RX ORDER — ZOLPIDEM TARTRATE 5 MG/1
TABLET ORAL
Qty: 60 TABLET | Refills: 1 | Status: CANCELLED | OUTPATIENT
Start: 2023-07-05

## 2023-07-05 RX ORDER — LEVOTHYROXINE SODIUM 0.03 MG/1
TABLET ORAL
Qty: 30 TABLET | COMMUNITY
Start: 2023-07-05

## 2023-07-05 ASSESSMENT — PATIENT HEALTH QUESTIONNAIRE - PHQ9
SUM OF ALL RESPONSES TO PHQ QUESTIONS 1-9: 0
1. LITTLE INTEREST OR PLEASURE IN DOING THINGS: 0
4. FEELING TIRED OR HAVING LITTLE ENERGY: 0
SUM OF ALL RESPONSES TO PHQ QUESTIONS 1-9: 0
3. TROUBLE FALLING OR STAYING ASLEEP: 0
8. MOVING OR SPEAKING SO SLOWLY THAT OTHER PEOPLE COULD HAVE NOTICED. OR THE OPPOSITE, BEING SO FIGETY OR RESTLESS THAT YOU HAVE BEEN MOVING AROUND A LOT MORE THAN USUAL: 0
SUM OF ALL RESPONSES TO PHQ QUESTIONS 1-9: 0
SUM OF ALL RESPONSES TO PHQ QUESTIONS 1-9: 0
2. FEELING DOWN, DEPRESSED OR HOPELESS: 0
5. POOR APPETITE OR OVEREATING: 0
9. THOUGHTS THAT YOU WOULD BE BETTER OFF DEAD, OR OF HURTING YOURSELF: 0
7. TROUBLE CONCENTRATING ON THINGS, SUCH AS READING THE NEWSPAPER OR WATCHING TELEVISION: 0
SUM OF ALL RESPONSES TO PHQ9 QUESTIONS 1 & 2: 0
6. FEELING BAD ABOUT YOURSELF - OR THAT YOU ARE A FAILURE OR HAVE LET YOURSELF OR YOUR FAMILY DOWN: 0
10. IF YOU CHECKED OFF ANY PROBLEMS, HOW DIFFICULT HAVE THESE PROBLEMS MADE IT FOR YOU TO DO YOUR WORK, TAKE CARE OF THINGS AT HOME, OR GET ALONG WITH OTHER PEOPLE: 0

## 2023-07-05 ASSESSMENT — LIFESTYLE VARIABLES
HOW OFTEN DO YOU HAVE A DRINK CONTAINING ALCOHOL: NEVER
HOW MANY STANDARD DRINKS CONTAINING ALCOHOL DO YOU HAVE ON A TYPICAL DAY: PATIENT DOES NOT DRINK

## 2023-07-05 ASSESSMENT — ENCOUNTER SYMPTOMS
CHEST TIGHTNESS: 0
SHORTNESS OF BREATH: 0
ABDOMINAL PAIN: 0
COUGH: 0
CONSTIPATION: 0
BLOOD IN STOOL: 0
RHINORRHEA: 0

## 2023-07-05 NOTE — PROGRESS NOTES
Medicare Annual Wellness Visit    Ana María Mcintosh is here for Medicare AWV    Assessment & Plan   Medicare annual wellness visit, subsequent  Type 2 diabetes mellitus without complication, without long-term current use of insulin (HCC)  -     AMB POC GLUCOSE BLOOD, BY GLUCOSE MONITORING DEVICE  -     AMB POC HEMOGLOBIN A1C  -     Microalbumin / Creatinine Urine Ratio; Future  Mixed hyperlipidemia  -     Lipid Panel; Future  Atherosclerosis of native coronary artery of native heart without angina pectoris  Acquired hypothyroidism  -     TSH; Future  Peripheral vascular disease, unspecified ([address])  Venous insufficiency of both lower extremities  Generalized anxiety disorder  Primary insomnia  Lower abdominal pain  Pelvic pain  Encounter for long-term (current) use of medications  -     Urinalysis with Microscopic; Future  -     CBC; Future  -     Comprehensive Metabolic Panel; Future  Need for hepatitis C screening test  -     Hepatitis C Antibody; Future    Recommendations for Preventive Services Due: see orders and patient instructions/AVS.  Recommended screening schedule for the next 5-10 years is provided to the patient in written form: see Patient Instructions/AVS.     Return in about 5 months (around 12/5/2023). Subjective   Patient's complete Health Risk Assessment and screening values have been reviewed and are found in Flowsheets. The following problems were reviewed today and where indicated follow up appointments were made and/or referrals ordered. Positive Risk Factor Screenings with Interventions:       Cognitive:    Words recalled: 3 Words Recalled   Clock Drawing Test (CDT): (!) Abnormal   Total Score: 3   Total Score Interpretation: Normal Mini-Cog      Interventions:  See AVS for additional education material                 Safety:  Do you have either shower bars, grab bars, non-slip mats or non-slip surfaces in your shower or bathtub?: (!) No  Interventions:  See AVS for additional education

## 2023-07-05 NOTE — PATIENT INSTRUCTIONS
is recommended every 6 months. Try to get at least 150 minutes of exercise per week or 10,000 steps per day on a pedometer . Order or download the FREE \"Exercise & Physical Activity: Your Everyday Guide\" from [employer]. Call 7-427.420.5707 or search [employer] online. You need 2019-2525 mg of calcium and 0817-8451 IU of vitamin D per day. It is possible to meet your calcium requirement with diet alone, but a vitamin D supplement is usually necessary to meet this goal.  When exposed to the sun, use a sunscreen that protects against both UVA and UVB radiation with an SPF of 30 or greater. Reapply every 2 to 3 hours or after sweating, drying off with a towel, or swimming. Always wear a seat belt when traveling in a car. Always wear a helmet when riding a bicycle or motorcycle.

## 2023-07-05 NOTE — PROGRESS NOTES
Chief Complaint   Patient presents with    Medicare AWV       1. \"Have you been to the ER, urgent care clinic since your last visit? Hospitalized since your last visit? \" No    2. \"Have you seen or consulted any other health care providers outside of the [address] [place] [address] since your last visit? \" No     3. For patients aged 43-73: Has the patient had a colonoscopy / FIT/ Cologuard? Yes      If the patient is female:    4. For patients aged 43-66: Has the patient had a mammogram within the past 2 years? Yes      5. For patients aged 21-65: Has the patient had a pap smear?  No     Health Maintenance Due   Topic Date Due    Hepatitis C screen  Never done    Shingles vaccine (1 of 2) Never done    COVID-19 Vaccine (5 - Booster for Moderna series) 07/16/2022    Diabetic Alb to Cr ratio (uACR) test  02/18/2023    Diabetic foot exam  06/20/2023    Annual Wellness Visit (AWV)  06/21/2023    A1C test (Diabetic or Prediabetic)  07/24/2023

## 2023-07-05 NOTE — PROGRESS NOTES
Subjective:      Patient ID: Neeraj López is a 72 y.o. female. HPI   Follow up on chronic medical problems. DM type II follow up:   Compliant w/ meds, diabetic diet, and exercise. Tolerating ozempic. She is on the 0.25 mg dose weekly. Weight has stabilized. BS have been much better. Range in the low to mid 100s. Checks BS  BID on most days and prn. No low BS. Pt does not have BS log at visit today. Denies any tingling sensation. No polyuria or polydipsia. No blurred vision. No significant weight changes. Cardiovascular Review:   The patient has coronary artery disease, HF and status post coronary artery stenting. Diet and Lifestyle: generally follows a low fat low cholesterol diet, generally follows a low sodium diet, follows a diabetic diet regularly, exercises sporadically   Home BP Monitoring: is not measured at home. Pertinent ROS: taking medications as instructed, no medication side effects noted, no TIA's, no chest pain on exertion, no dyspnea on exertion,  no swelling of ankles. Hypercholesterolemia follow up:   Compliant w/ meds, low fat, low cholesterol diet. Exercising some. No muscle nor abdominal pain, no skin discoloration. Patient fasting today. Depression Review:   Patient is seen for followup of depression and anxiety. Overall doing well. Taking Ambien to help with her sleep. Anxiety usually triggered by stress. She denies depressed mood and insomnia. Thyroid Review:   Patient is seen for followup of hypothyroidism. TSH in August was stable. Thyroid ROS: denies fatigue, weight changes, heat/cold intolerance, bowel/skin changes or CVS symptoms. HM:   Mammogram 5/10/23    Colonoscopy 2/2/22 - repeat in 5 years     Pain and  pressure in the lower abd and pelvis has gotten better. Some days worse than others. She is seeing urology and gyn.   Started on myrbetriq and estadiol cream but she has not been using the estrogen cream.  Overall pain is

## 2023-07-06 LAB
ALBUMIN SERPL-MCNC: 4.4 G/DL (ref 3.5–5)
ALBUMIN/GLOB SERPL: 1.8 (ref 1.1–2.2)
ALP SERPL-CCNC: 114 U/L (ref 45–117)
ALT SERPL-CCNC: 17 U/L (ref 12–78)
ANION GAP SERPL CALC-SCNC: 4 MMOL/L (ref 5–15)
APPEARANCE UR: CLEAR
AST SERPL-CCNC: 22 U/L (ref 15–37)
BACTERIA URNS QL MICRO: ABNORMAL /HPF
BILIRUB SERPL-MCNC: 0.4 MG/DL (ref 0.2–1)
BILIRUB UR QL: NEGATIVE
BUN SERPL-MCNC: 15 MG/DL (ref 6–20)
BUN/CREAT SERPL: 15 (ref 12–20)
CALCIUM SERPL-MCNC: 9.7 MG/DL (ref 8.5–10.1)
CHLORIDE SERPL-SCNC: 106 MMOL/L (ref 97–108)
CHOLEST SERPL-MCNC: 138 MG/DL
CO2 SERPL-SCNC: 28 MMOL/L (ref 21–32)
COLOR UR: ABNORMAL
CREAT SERPL-MCNC: 1.03 MG/DL (ref 0.55–1.02)
CREAT UR-MCNC: 27.3 MG/DL
EPITH CASTS URNS QL MICRO: ABNORMAL /LPF
GLOBULIN SER CALC-MCNC: 2.5 G/DL (ref 2–4)
GLUCOSE SERPL-MCNC: 106 MG/DL (ref 65–100)
GLUCOSE UR STRIP.AUTO-MCNC: NEGATIVE MG/DL
HCV AB SERPL QL IA: NONREACTIVE
HDLC SERPL-MCNC: 56 MG/DL
HDLC SERPL: 2.5 (ref 0–5)
HGB UR QL STRIP: NEGATIVE
KETONES UR QL STRIP.AUTO: NEGATIVE MG/DL
LDLC SERPL CALC-MCNC: 58 MG/DL (ref 0–100)
LEUKOCYTE ESTERASE UR QL STRIP.AUTO: ABNORMAL
MICROALBUMIN UR-MCNC: 0.56 MG/DL
MICROALBUMIN/CREAT UR-RTO: 21 MG/G (ref 0–30)
NITRITE UR QL STRIP.AUTO: NEGATIVE
PH UR STRIP: 6.5 (ref 5–8)
POTASSIUM SERPL-SCNC: 4.8 MMOL/L (ref 3.5–5.1)
PROT SERPL-MCNC: 6.9 G/DL (ref 6.4–8.2)
PROT UR STRIP-MCNC: NEGATIVE MG/DL
RBC #/AREA URNS HPF: ABNORMAL /HPF (ref 0–5)
SODIUM SERPL-SCNC: 138 MMOL/L (ref 136–145)
SP GR UR REFRACTOMETRY: 1.01 (ref 1–1.03)
TRIGL SERPL-MCNC: 120 MG/DL
TSH SERPL DL<=0.005 MIU/L-ACNC: 2.56 UIU/ML (ref 0.45–4.5)
UROBILINOGEN UR QL STRIP.AUTO: 0.2 EU/DL (ref 0.2–1)
VLDLC SERPL CALC-MCNC: 24 MG/DL
WBC URNS QL MICRO: ABNORMAL /HPF (ref 0–4)

## 2023-07-10 ENCOUNTER — TELEPHONE (OUTPATIENT)
Dept: PHARMACY | Facility: CLINIC | Age: 73
End: 2023-07-10

## 2023-07-10 NOTE — TELEPHONE ENCOUNTER
[facility] CLINICAL PHARMACY: ADHERENCE REVIEW  Identified care gap per [employer]: fills at [facility]: Diabetes adherence    Patient also appears to be prescribed: ACE/ARB and Statin    ASSESSMENT  DIABETES ADHERENCE    Insurance Records claims through  06.26.23  (Prior Year [address] = not reported; YTD [address] = FIRST FILL; Potential Fail Date: 07.20.23): [facility] 2/1.5ML last filled on 02.24.23 for 84 day supply. Next refill due: 05.19.23    Prescribed sig:  INJECT SUBCUTANEOUSLY 0.25  MG ONCE WEEKLY    Per Insurer Portal: last filled on 02.24.23 for 84 day supply. Lab Results   Component Value Date    LABA1C 7.4 (H) 11/09/2020       PLAN    Per insurer report, LIS-1 - may be able to receive up to a 100-day supply for the same cost as a 30-day supply      The following are interventions that have been identified:   Patient overdue refilling OZEMPIC INJ 2/1.5ML and active on home medication list.     Attempting to reach patient to review. Left message asking for return call. Letter sent to patient.       Last Visit: 07.05.23  Next Visit: 12.05.23    [facility], [address]   [phone]     For Pharmacy Admin Tracking Only    Program: [place] in place:  No  Gap Closed?: No   Time Spent (min): 15

## 2023-07-14 ENCOUNTER — TELEPHONE (OUTPATIENT)
Age: 73
End: 2023-07-14

## 2023-07-26 ENCOUNTER — TELEPHONE (OUTPATIENT)
Age: 73
End: 2023-07-26

## 2023-08-01 DIAGNOSIS — F51.01 PRIMARY INSOMNIA: ICD-10-CM

## 2023-08-03 RX ORDER — ZOLPIDEM TARTRATE 5 MG/1
TABLET ORAL
Qty: 60 TABLET | Refills: 1 | OUTPATIENT
Start: 2023-08-03 | End: 2023-10-02

## 2023-08-03 NOTE — TELEPHONE ENCOUNTER
Last appointment: 7/5/23  Next appointment: 12/5/23  Previous refill encounter(s): 6/30/23 #60 with 1 refill    Requested Prescriptions     Pending Prescriptions Disp Refills    zolpidem (AMBIEN) 5 MG tablet [Pharmacy Med Name: Zolpidem Tartrate 5 MG Oral Tablet] 60 tablet 1     Sig: TAKE 1 TO 2 TABLETS BY MOUTH AT  NIGHT AS NEEDED FOR SLEEP         For Pharmacy Admin Tracking Only    Program: Medication Refill  CPA in place:    Recommendation Provided To:    Intervention Detail: New Rx: 1, reason: Patient Preference  Intervention Accepted By:   Claire Jimenez Closed?:    Time Spent (min): 5

## 2023-08-04 RX ORDER — METOPROLOL SUCCINATE 25 MG/1
TABLET, EXTENDED RELEASE ORAL
Qty: 100 TABLET | Refills: 3 | Status: SHIPPED | OUTPATIENT
Start: 2023-08-04

## 2023-08-04 NOTE — TELEPHONE ENCOUNTER
Pharmacy is requesting a quantity of 100 with refills to provide a year's supply    Last appointment: 7/5/23  Next appointment: 12/5/23  Previous refill encounter(s): 11/11/22 #90 with 3 refills    Requested Prescriptions     Pending Prescriptions Disp Refills    metoprolol succinate (TOPROL XL) 25 MG extended release tablet [Pharmacy Med Name: Metoprolol Succinate ER 25 MG Oral Tablet Extended Release 24 Hour] 100 tablet 3     Sig: TAKE 1 TABLET BY MOUTH ONCE  DAILY         For Pharmacy Admin Tracking Only    Program: Medication Refill  CPA in place:    Recommendation Provided To:    Intervention Detail: New Rx: 1, reason: Patient Preference  Intervention Accepted By:   Phu Nicolas Closed?:    Time Spent (min): 5

## 2023-08-07 ENCOUNTER — TELEPHONE (OUTPATIENT)
Age: 73
End: 2023-08-07

## 2023-08-07 NOTE — TELEPHONE ENCOUNTER
Pt reports zolpidem is not working for her - it makes her walk in her sleep     She would like something else       Best number to reach her is [phone]

## 2023-08-09 ENCOUNTER — OFFICE VISIT (OUTPATIENT)
Age: 73
End: 2023-08-09
Payer: MEDICARE

## 2023-08-09 VITALS
TEMPERATURE: 98 F | HEART RATE: 77 BPM | DIASTOLIC BLOOD PRESSURE: 66 MMHG | SYSTOLIC BLOOD PRESSURE: 130 MMHG | OXYGEN SATURATION: 100 % | WEIGHT: 140 LBS | BODY MASS INDEX: 25.76 KG/M2 | RESPIRATION RATE: 20 BRPM | HEIGHT: 62 IN

## 2023-08-09 DIAGNOSIS — F43.23 ADJUSTMENT DISORDER WITH MIXED ANXIETY AND DEPRESSED MOOD: ICD-10-CM

## 2023-08-09 DIAGNOSIS — F51.01 PRIMARY INSOMNIA: Primary | ICD-10-CM

## 2023-08-09 PROCEDURE — 99213 OFFICE O/P EST LOW 20 MIN: CPT | Performed by: FAMILY MEDICINE

## 2023-08-09 RX ORDER — MIRTAZAPINE 15 MG/1
15 TABLET, FILM COATED ORAL NIGHTLY
Qty: 30 TABLET | Refills: 5 | Status: SHIPPED | OUTPATIENT
Start: 2023-08-09

## 2023-08-09 RX ORDER — TRAZODONE HYDROCHLORIDE 50 MG/1
50 TABLET ORAL NIGHTLY
Qty: 30 TABLET | Refills: 5 | Status: SHIPPED | OUTPATIENT
Start: 2023-08-09 | End: 2023-08-09 | Stop reason: ALTCHOICE

## 2023-08-09 SDOH — ECONOMIC STABILITY: HOUSING INSECURITY
IN THE LAST 12 MONTHS, WAS THERE A TIME WHEN YOU DID NOT HAVE A STEADY PLACE TO SLEEP OR SLEPT IN A SHELTER (INCLUDING NOW)?: NO

## 2023-08-09 SDOH — ECONOMIC STABILITY: FOOD INSECURITY: WITHIN THE PAST 12 MONTHS, THE FOOD YOU BOUGHT JUST DIDN'T LAST AND YOU DIDN'T HAVE MONEY TO GET MORE.: NEVER TRUE

## 2023-08-09 SDOH — ECONOMIC STABILITY: INCOME INSECURITY: HOW HARD IS IT FOR YOU TO PAY FOR THE VERY BASICS LIKE FOOD, HOUSING, MEDICAL CARE, AND HEATING?: NOT HARD AT ALL

## 2023-08-09 SDOH — ECONOMIC STABILITY: FOOD INSECURITY: WITHIN THE PAST 12 MONTHS, YOU WORRIED THAT YOUR FOOD WOULD RUN OUT BEFORE YOU GOT MONEY TO BUY MORE.: NEVER TRUE

## 2023-08-09 ASSESSMENT — PATIENT HEALTH QUESTIONNAIRE - PHQ9
7. TROUBLE CONCENTRATING ON THINGS, SUCH AS READING THE NEWSPAPER OR WATCHING TELEVISION: 0
2. FEELING DOWN, DEPRESSED OR HOPELESS: 0
1. LITTLE INTEREST OR PLEASURE IN DOING THINGS: 0
SUM OF ALL RESPONSES TO PHQ QUESTIONS 1-9: 0
SUM OF ALL RESPONSES TO PHQ QUESTIONS 1-9: 0
10. IF YOU CHECKED OFF ANY PROBLEMS, HOW DIFFICULT HAVE THESE PROBLEMS MADE IT FOR YOU TO DO YOUR WORK, TAKE CARE OF THINGS AT HOME, OR GET ALONG WITH OTHER PEOPLE: 0
5. POOR APPETITE OR OVEREATING: 0
8. MOVING OR SPEAKING SO SLOWLY THAT OTHER PEOPLE COULD HAVE NOTICED. OR THE OPPOSITE, BEING SO FIGETY OR RESTLESS THAT YOU HAVE BEEN MOVING AROUND A LOT MORE THAN USUAL: 0
9. THOUGHTS THAT YOU WOULD BE BETTER OFF DEAD, OR OF HURTING YOURSELF: 0
4. FEELING TIRED OR HAVING LITTLE ENERGY: 0
3. TROUBLE FALLING OR STAYING ASLEEP: 0
SUM OF ALL RESPONSES TO PHQ QUESTIONS 1-9: 0
SUM OF ALL RESPONSES TO PHQ QUESTIONS 1-9: 0
SUM OF ALL RESPONSES TO PHQ9 QUESTIONS 1 & 2: 0
6. FEELING BAD ABOUT YOURSELF - OR THAT YOU ARE A FAILURE OR HAVE LET YOURSELF OR YOUR FAMILY DOWN: 0

## 2023-08-09 ASSESSMENT — ENCOUNTER SYMPTOMS
COUGH: 0
RHINORRHEA: 0
BLOOD IN STOOL: 0
SHORTNESS OF BREATH: 0
CHEST TIGHTNESS: 0
CONSTIPATION: 0
ABDOMINAL PAIN: 0

## 2023-08-09 NOTE — PROGRESS NOTES
Subjective:      Patient ID: Anna Echols is a 72 y.o. female. HPI  C/o not sleeping as well with taking [place] and her  is now telling her that she is sleeping walking at night. Pt has no memory of her sleep walking . She wants to stop the Ambien. Depression Review:   Patient is seen for followup of depression and anxiety. She thinks that she is feeling more depressed on some days related to the chronic lower abd pain that she has. Has good days and not so good days. No SI/HI. Her appetite has also been less and she has lost a few pounds since her last visit. Anxiety usually triggered by stress. She denies depressed mood and insomnia.       Past Medical History:   Diagnosis Date    Anxiety     Arthritis     ASHD (arteriosclerotic heart disease)     Bilateral carotid artery stenosis     CAD (coronary artery disease)     Chronic pain     Depression     Diabetes ([facility])     TypeII    Diarrhea     Gastroparesis     GERD (gastroesophageal reflux disease)     History of cardioembolic cerebrovascular accident (CVA)     Hypercholesterolemia     Hypertension     S/P bypass gastrojejunostomy 1/23/2012    Stroke [facility])     TIA    Thyroid disease      Past Surgical History:   Procedure Laterality Date    APPENDECTOMY      BLADDER SUSPENSION      CARDIAC CATHETERIZATION  2017    stents remain open, no new stents placed    CHOLECYSTECTOMY      COLONOSCOPY N/A 2/2/2022    COLONOSCOPY performed by Soila Medina MD at [facility] ENDOSCOPY    COLONOSCOPY N/A 10/16/2020    COLONOSCOPY performed by Sina Jenkins MD at [facility] AMBULATORY OR    COLONOSCOPY N/A 5/26/2016    COLONOSCOPY performed by iSna Jenkins MD at [facility] ENDOSCOPY    COLONOSCOPY FLX DX W/COLLJ SPEC WHEN PFRMD  5/03/2006    Dr. Martha Berry    COLONOSCOPY W/BIOPSY SINGLE/MULTIPLE  2/9/2012         CORONARY ANGIOPLASTY WITH STENT PLACEMENT  06/2013    3 stents    EGD BALLOON DILATION ESOPHAGUS <30 MM DIAM  1/23/2012         EGD TRANSORAL BIOPSY SINGLE/MULTIPLE

## 2023-08-09 NOTE — PROGRESS NOTES
Chief Complaint   Patient presents with    Medication Check       1. \"Have you been to the ER, urgent care clinic since your last visit? Hospitalized since your last visit? \" No    2. \"Have you seen or consulted any other health care providers outside of the [address] [place] [address] since your last visit? \" YES- [facility]     3. For patients aged 43-73: Has the patient had a colonoscopy / FIT/ Cologuard? Yes      If the patient is female:    4. For patients aged 43-66: Has the patient had a mammogram within the past 2 years?  Yes        Health Maintenance Due   Topic Date Due    Diabetic foot exam  06/20/2023    Flu vaccine (1) 08/01/2023

## 2023-08-16 ENCOUNTER — TELEPHONE (OUTPATIENT)
Age: 73
End: 2023-08-16

## 2023-08-16 NOTE — TELEPHONE ENCOUNTER
[employer] is asking for documentation to verify why patient is testing blood sugar BID. They need this info faxed to them with OV date and signed. Please advise        For Pharmacy Admin Tracking Only    Program: Medication Refill  CPA in place:    Recommendation Provided To:    Intervention Detail: New Rx: 1, reason: Patient Preference  Intervention Accepted By:   Ky Castellanos Closed?:    Time Spent (min): 5

## 2023-08-16 NOTE — TELEPHONE ENCOUNTER
[employer] is asking for documentation to verify why patient is testing blood sugar BID. They need this info faxed to them with OV date and signed. Please advise        For Pharmacy Admin Tracking Only    Program: Medication Refill  CPA in place:    Recommendation Provided To: Intervention Detail: New Rx: 1, reason: Patient Preference  Intervention Accepted By:   Kay Monge Closed?:    Time Spent (min): 5    ----------------------------------------------------------------------------------------------    Aric Reeder and I have faxed these office notes a few times. I have even called th phone number on the form and it says [employer] not [employer]. If they call you again can you see if they have an alternate number and fax number please. Thank you.   Meliton Larson

## 2023-08-17 ENCOUNTER — TELEPHONE (OUTPATIENT)
Age: 73
End: 2023-08-17

## 2023-08-22 ENCOUNTER — TELEPHONE (OUTPATIENT)
Age: 73
End: 2023-08-22

## 2023-08-22 NOTE — TELEPHONE ENCOUNTER
[employer] need pt office notes from 7/5/23 to be addended to say she test 2 times daily and not prn and is non insulin dependent. I will fax when done.

## 2023-08-23 NOTE — TELEPHONE ENCOUNTER
Spoke to [employer] again today and they stated Medicare wants the note to say why she is testing 2 times daily as needed.   [phone]

## 2023-08-30 ENCOUNTER — OFFICE VISIT (OUTPATIENT)
Age: 73
End: 2023-08-30
Payer: MEDICARE

## 2023-08-30 VITALS
HEART RATE: 83 BPM | BODY MASS INDEX: 26.13 KG/M2 | DIASTOLIC BLOOD PRESSURE: 62 MMHG | SYSTOLIC BLOOD PRESSURE: 128 MMHG | RESPIRATION RATE: 18 BRPM | WEIGHT: 142 LBS | TEMPERATURE: 98.3 F | HEIGHT: 62 IN | OXYGEN SATURATION: 100 %

## 2023-08-30 DIAGNOSIS — F51.01 PRIMARY INSOMNIA: Primary | ICD-10-CM

## 2023-08-30 DIAGNOSIS — F43.23 ADJUSTMENT DISORDER WITH MIXED ANXIETY AND DEPRESSED MOOD: ICD-10-CM

## 2023-08-30 PROCEDURE — G8427 DOCREV CUR MEDS BY ELIG CLIN: HCPCS | Performed by: FAMILY MEDICINE

## 2023-08-30 PROCEDURE — 1123F ACP DISCUSS/DSCN MKR DOCD: CPT | Performed by: FAMILY MEDICINE

## 2023-08-30 PROCEDURE — G8399 PT W/DXA RESULTS DOCUMENT: HCPCS | Performed by: FAMILY MEDICINE

## 2023-08-30 PROCEDURE — 99212 OFFICE O/P EST SF 10 MIN: CPT | Performed by: FAMILY MEDICINE

## 2023-08-30 PROCEDURE — 3078F DIAST BP <80 MM HG: CPT | Performed by: FAMILY MEDICINE

## 2023-08-30 PROCEDURE — 1036F TOBACCO NON-USER: CPT | Performed by: FAMILY MEDICINE

## 2023-08-30 PROCEDURE — G8419 CALC BMI OUT NRM PARAM NOF/U: HCPCS | Performed by: FAMILY MEDICINE

## 2023-08-30 PROCEDURE — 3017F COLORECTAL CA SCREEN DOC REV: CPT | Performed by: FAMILY MEDICINE

## 2023-08-30 PROCEDURE — 1090F PRES/ABSN URINE INCON ASSESS: CPT | Performed by: FAMILY MEDICINE

## 2023-08-30 PROCEDURE — 3074F SYST BP LT 130 MM HG: CPT | Performed by: FAMILY MEDICINE

## 2023-08-30 RX ORDER — MULTIVIT WITH MINERALS/LUTEIN
1 TABLET ORAL DAILY
Qty: 30 TABLET | Refills: 3 | COMMUNITY
Start: 2023-08-30

## 2023-08-30 RX ORDER — MELOXICAM 15 MG/1
15 TABLET ORAL DAILY
COMMUNITY

## 2023-08-30 RX ORDER — ZOLPIDEM TARTRATE 5 MG/1
5 TABLET ORAL NIGHTLY PRN
Qty: 90 TABLET | Refills: 0 | Status: SHIPPED | OUTPATIENT
Start: 2023-08-30 | End: 2023-11-28

## 2023-08-30 RX ORDER — MULTIVIT-MIN/IRON/FOLIC ACID/K 18-600-40
2000 CAPSULE ORAL DAILY
Qty: 30 CAPSULE | Refills: 0
Start: 2023-08-30

## 2023-08-30 SDOH — ECONOMIC STABILITY: FOOD INSECURITY: WITHIN THE PAST 12 MONTHS, THE FOOD YOU BOUGHT JUST DIDN'T LAST AND YOU DIDN'T HAVE MONEY TO GET MORE.: NEVER TRUE

## 2023-08-30 SDOH — ECONOMIC STABILITY: FOOD INSECURITY: WITHIN THE PAST 12 MONTHS, YOU WORRIED THAT YOUR FOOD WOULD RUN OUT BEFORE YOU GOT MONEY TO BUY MORE.: NEVER TRUE

## 2023-08-30 SDOH — ECONOMIC STABILITY: INCOME INSECURITY: HOW HARD IS IT FOR YOU TO PAY FOR THE VERY BASICS LIKE FOOD, HOUSING, MEDICAL CARE, AND HEATING?: NOT HARD AT ALL

## 2023-08-30 ASSESSMENT — ENCOUNTER SYMPTOMS
CONSTIPATION: 0
BLOOD IN STOOL: 0
CHEST TIGHTNESS: 0
RHINORRHEA: 0
SHORTNESS OF BREATH: 0
ABDOMINAL PAIN: 0
COUGH: 0

## 2023-08-30 ASSESSMENT — PATIENT HEALTH QUESTIONNAIRE - PHQ9
3. TROUBLE FALLING OR STAYING ASLEEP: 0
2. FEELING DOWN, DEPRESSED OR HOPELESS: 0
7. TROUBLE CONCENTRATING ON THINGS, SUCH AS READING THE NEWSPAPER OR WATCHING TELEVISION: 0
10. IF YOU CHECKED OFF ANY PROBLEMS, HOW DIFFICULT HAVE THESE PROBLEMS MADE IT FOR YOU TO DO YOUR WORK, TAKE CARE OF THINGS AT HOME, OR GET ALONG WITH OTHER PEOPLE: 0
8. MOVING OR SPEAKING SO SLOWLY THAT OTHER PEOPLE COULD HAVE NOTICED. OR THE OPPOSITE, BEING SO FIGETY OR RESTLESS THAT YOU HAVE BEEN MOVING AROUND A LOT MORE THAN USUAL: 0
SUM OF ALL RESPONSES TO PHQ9 QUESTIONS 1 & 2: 0
5. POOR APPETITE OR OVEREATING: 0
SUM OF ALL RESPONSES TO PHQ QUESTIONS 1-9: 0
1. LITTLE INTEREST OR PLEASURE IN DOING THINGS: 0
4. FEELING TIRED OR HAVING LITTLE ENERGY: 0
SUM OF ALL RESPONSES TO PHQ QUESTIONS 1-9: 0
6. FEELING BAD ABOUT YOURSELF - OR THAT YOU ARE A FAILURE OR HAVE LET YOURSELF OR YOUR FAMILY DOWN: 0
9. THOUGHTS THAT YOU WOULD BE BETTER OFF DEAD, OR OF HURTING YOURSELF: 0
SUM OF ALL RESPONSES TO PHQ QUESTIONS 1-9: 0
SUM OF ALL RESPONSES TO PHQ QUESTIONS 1-9: 0

## 2023-08-30 NOTE — PROGRESS NOTES
Chief Complaint   Patient presents with    Follow-up       1. \"Have you been to the ER, urgent care clinic since your last visit? Hospitalized since your last visit? \"   no    2. \"Have you seen or consulted any other health care providers outside of the [address] [place] [address] since your last visit? \" No     3. For patients aged 43-73: Has the patient had a colonoscopy / FIT/ Cologuard? Yes      If the patient is female:    4. For patients aged 43-66: Has the patient had a mammogram within the past 2 years? No      5. For patients aged 21-65: Has the patient had a pap smear?  NA     Health Maintenance Due   Topic Date Due    Diabetic foot exam  06/20/2023    Flu vaccine (1) 08/01/2023

## 2023-08-30 NOTE — PROGRESS NOTES
Subjective:      Patient ID: Cyndee Alegria is a 72 y.o. female. HPI  C/o not sleeping as well with taking [place] and her  is now telling her that she is sleeping walking at night. Pt has no memory of her sleep walking . She wanted to stop the Ambien. Switch to mirtazapine but this did not help with sleep so she went back to taking the Ambien at the lower dose. Her  has not reported that she has bee sleep walking since she has started back on the [place]. Sleeping well and feeling rested when she gets up. Has not been taking it every night. Depression Review:   Patient is seen for followup of depression and anxiety. She thinks that she is not feeling depressed as she was on last month. Has good days and not so good days still at times but overall her mood has been better. No SI/HI. Anxiety usually triggered by stress. She thinks that she is feeling better since she has bee going back to [employer]. This help help her feel more motivated.          Past Medical History:   Diagnosis Date    Anxiety     Arthritis     ASHD (arteriosclerotic heart disease)     Bilateral carotid artery stenosis     CAD (coronary artery disease)     Chronic pain     Depression     Diabetes ([address] [facility])     TypeII    Diarrhea     Gastroparesis     GERD (gastroesophageal reflux disease)     History of cardioembolic cerebrovascular accident (CVA)     Hypercholesterolemia     Hypertension     S/P bypass gastrojejunostomy 1/23/2012    Stroke [facility])     TIA    Thyroid disease      Past Surgical History:   Procedure Laterality Date    APPENDECTOMY      BLADDER SUSPENSION      CARDIAC CATHETERIZATION  2017    stents remain open, no new stents placed    CHOLECYSTECTOMY      COLONOSCOPY N/A 2/2/2022    COLONOSCOPY performed by Tavia Donald MD at [facility] ENDOSCOPY    COLONOSCOPY N/A 10/16/2020    COLONOSCOPY performed by Anny Kwok MD at [facility] AMBULATORY OR    COLONOSCOPY N/A 5/26/2016    COLONOSCOPY performed by Anny Kwok MD at

## 2023-09-07 ENCOUNTER — TELEPHONE (OUTPATIENT)
Age: 73
End: 2023-09-07

## 2023-09-07 NOTE — TELEPHONE ENCOUNTER
Spoke to [employer] at [facility] to let them know that per Dr. La Nena Durán she is not addending her note because the note is accurate the way the pt tests her BS. She also stated she see that the office notes have been faxed to them and the order form. Per [employer] she will talk to her supervisor and call our office back by the end of the day to see what they can do for the pt diabetic supplies.

## 2023-09-07 NOTE — TELEPHONE ENCOUNTER
Fax received from [employer] regarding Medicare documentation. Fax to [phone] or call [phone]. For Pharmacy Admin Tracking Only    Program: Medication Refill  CPA in place:    Recommendation Provided To:    Intervention Detail: New Rx: 1, reason: Cost/Formulary Change  Intervention Accepted By:   Kasandra Jameson Closed?:    Time Spent (min): 5

## 2023-09-14 NOTE — PROGRESS NOTES
HISTORY OF PRESENT ILLNESS  Shelby Lanes is a 81 y.o. female. HPI  Follow up on pelvic pain. Still c/o pain, burning and pressure in the lower abd and pelvis. She has been seeing gyn and has also had eval by colorectal specialist.  No etiology for her sx has been found. GYN has referred her for PT but she has not yet started it. She is frustrated and hurting. She is having discomfort in the vagina and pain with sitting. Feels the pressure in her vagina also and feels like her bowels are pushing thru her vagina. No vagina discharge or itching noted. Also has pain that radiates from the lower back to the pelvis. Patient Active Problem List   Diagnosis Code    Anxiety F41.9    Depression F32. A    Hypovitaminosis D E55.9    GERD (gastroesophageal reflux disease) K21.9    Edema R60.9    Esophageal motor disorder K22.4    S/P bypass gastrojejunostomy Z98.0    SOB (shortness of breath) R06.02    CAD (coronary artery disease) I25.10    S/P PTCA (percutaneous transluminal coronary angioplasty) Z98.61    Mixed hyperlipidemia E78.2    Diarrhea R19.7    Type 2 diabetes mellitus without complication (HCC) E80.5    Osteopenia M85.80    Encounter for medication monitoring Z51.81    CVA (cerebral vascular accident) ([facility] [address] I08.6    Complicated migraine T82.174    Numbness and tingling of right side of face R20.0, R20.2    Stenosis of both internal carotid arteries I65.23    Type 2 diabetes with nephropathy (HCC) E11.21    Hypertension, essential I10    Bilateral carotid artery stenosis I65.23    Spinal stenosis of lumbar region at multiple levels M48.061    Pain in both lower extremities M79.604, M79.605    Leg swelling M79.89    Venous insufficiency of both lower extremities I87.2    WISEMAN (dyspnea on exertion) R06.09       Current Outpatient Medications   Medication Sig Dispense Refill    cyclobenzaprine (FLEXERIL) 10 mg tablet Take 1 Tablet by mouth three (3) times daily as needed for Muscle Spasm(s).  30 Tablet 0 Talked with pt while in chemo chair. State she is not getting treated b/c of the lingering sinus issues. States Dr. Dela Cruz saw her virtually and was initially going to treat but called back to tell her she needs to be seen by PCP for clearance prior to next tx. Pt expressed frustration b/c she hasn't had tx in 3 wks, her PCP left and the new PCP has no openings until October. Asked if she would go to urgent care, states she would rather not she wants consistent care, which I understand. States she is going to her local pharmacy to ask what OTC sinus med she can take, told her to stop at [facility] on first floor and ask them. States she will call us back if anything changes, otherwise she will f/u in one week.   Oncology Navigation   Intake  Date of Diagnosis: 23  Cancer Type: Transplant; Myeloma  Internal / External Referral: Internal  Date of Referral: 05/10/23  Initial Nurse Navigator Contact: 05/15/23  Referral to Initial Contact Timeline (days): 5  Date Worked: 23  Reason if booked > 7 days after scheduling: Additional tests/procedures; Patient request     Treatment  Current Status: Active       Medical Oncologist: Dr. FRIZT Dela Cruz  Chemotherapy: Initiated  Chemotherapy Regimen: Velcade (Durvalumab possible pending FISH)  Oral Therapy: Initiated                       Acuity  Systemic Treatment - predicted or initiated: Chemotherapy Regimen with Multiple drugs (+1)  ECO  Comorbidities in Medical History: 2   Needed: 0  Support: 0  Verbalizes Financial Concerns: 1  Transportation: 0  Psychological Factors (+1 each): Emotional during conversation  Verbalizes the need for more education: 1  Navigation Acuity: 3     Follow Up  No follow-ups on file.        metoprolol succinate (TOPROL-XL) 25 mg XL tablet TAKE 1 TABLET BY MOUTH  DAILY 90 Tablet 3    Ozempic 0.25 mg or 0.5 mg/dose (2 mg/1.5 ml) subq pen INJECT SUBCUTANEOUSLY 0.5  MG ONCE WEEKLY 4.5 mL 3    glucose blood VI test strips (Accu-Chek Hannah Plus test strp) strip USE TWICE DAILY 200 Strip 3    zolpidem (AMBIEN) 5 mg tablet TAKE 1 TO 2 TABLETS BY  MOUTH AT NIGHT AS NEEDED  FOR SLEEP 60 Tablet 1    lancets (Accu-Chek Softclix Lancets) misc USE TO OBTAIN BLOOD SAMPLE  FOR DIABETIC TESTING 3  TIMES DAILY 300 Each 3    Blood-Glucose Meter (Accu-Chek Guide Glucose Meter) misc Use as directed to test blood sugars 100 Each 3    Blood-Glucose Meter (Accu-Chek Hannah Plus Meter) misc Use as directed by provider to test blood sugars 1 Each 0    Blood-Glucose Meter (Accu-Chek Guide Glucose Meter) misc Use as directed to check blood sugars. 1 Each 0    calcium carbonate-vitamin D3 600 mg-12.5 mcg (500 unit) cap Take 1 Tablet by mouth daily. multivitamin-FA-lycopen-lutein (Centrum Silver) 0.4 mg-300 mcg- 250 mcg tab tablet Take 1 Tablet by mouth in the morning. Lancing Device with Lancets (Accu-Chek Soft Dev Lancets) kit USE AS DIRECTED TO CHECK  BLOOD SUGAR 1 Kit 0    levothyroxine (SYNTHROID) 25 mcg tablet TAKE 1 TABLET BY MOUTH  DAILY BEFORE BREAKFAST 90 Tablet 3    fluticasone propionate (FLONASE) 50 mcg/actuation nasal spray USE 2 SPRAYS BY EACH  NOSTRIL ROUTE DAILY AS  NEEDED FOR RHINITIS 48 g 3    furosemide (LASIX) 20 mg tablet TAKE 1 TABLET BY MOUTH ONCE DAILY 30 Tablet 11    lisinopriL (PRINIVIL, ZESTRIL) 2.5 mg tablet TAKE 1 TABLET BY MOUTH  DAILY 90 Tablet 3    cholecalciferol, vitamin D3, 50 mcg (2,000 unit) tab Take 2 capsule by mouth daily      Aspirin, Buffered 81 mg tab Take 81 mg by mouth daily.          Allergies   Allergen Reactions    Demerol [Meperidine] Unknown (comments)     Pt unsure of reaction         Past Medical History:   Diagnosis Date    Anxiety     Arthritis     ASHD (arteriosclerotic heart disease)     Bilateral carotid artery stenosis     CAD (coronary artery disease)     Chronic pain     Depression     Diabetes (HCC)     TypeII    Diarrhea     Gastroparesis     GERD (gastroesophageal reflux disease)     History of cardioembolic cerebrovascular accident (CVA)     Hypercholesterolemia     Hypertension     S/P bypass gastrojejunostomy 1/23/2012    Stroke ([facility] [address]     TIA    Thyroid disease          Past Surgical History:   Procedure Laterality Date    COLONOSCOPY N/A 5/26/2016    COLONOSCOPY performed by Consuela Dakin, MD at [facility] ENDOSCOPY    COLONOSCOPY N/A 10/16/2020    COLONOSCOPY performed by Bladimir Hernandez MD at [facility] AMBULATORY OR    COLONOSCOPY N/A 2/2/2022    COLONOSCOPY performed by Sumeet Snyder MD at [facility] ENDOSCOPY    EGD  7/13/2009    HX APPENDECTOMY      HX BLADDER SUSPENSION      HX CHOLECYSTECTOMY      HX CORONARY STENT PLACEMENT  06/2013    3 stents    HX GASTRIC BYPASS  6/02    HX HEART CATHETERIZATION  2017    stents remain open, no new stents placed    HX HYSTERECTOMY      HX LAPAROTOMY  8/02    2/2 intra abd abscess    HX LUMBAR LAMINECTOMY  01/2008    rods and screws    HX OOPHORECTOMY Left     HX ORTHOPAEDIC Bilateral     carpal tunnel    HX ORTHOPAEDIC Bilateral     bone spurs both feet    DE ABDOMEN SURGERY PROC UNLISTED      s/p partial gastrectomy 2/2 gastroparesis    DE COLONOSCOPY FLX DX W/COLLJ SPEC WHEN PFRMD  5/03/2006    Dr. Manuel Funk COLONOSCOPY W/BIOPSY SINGLE/MULTIPLE  2/9/2012         DE EGD BALLOON DILATION ESOPHAGUS <30 MM DIAM  1/23/2012         DE EGD TRANSORAL BIOPSY SINGLE/MULTIPLE  7/11/2011         DE EGD TRANSORAL BIOPSY SINGLE/MULTIPLE  1/23/2012         DE GASTROJEJUNOSTOMY      UPPER GI ENDOSCOPY,BIOPSY  10/1/2019         UPPER GI ENDOSCOPY,BIOPSY  10/16/2020              Family History   Problem Relation Age of Onset    Heart Disease Mother     Hypertension Mother     Diabetes Father     Heart Disease Father     Hypertension Father Alcohol abuse Brother     Heart Disease Sister     Hypertension Sister     Diabetes Sister     No Known Problems Sister     Alcohol abuse Brother     Alcohol abuse Brother     Diabetes Brother        Social History     Tobacco Use    Smoking status: Never    Smokeless tobacco: Never   Substance Use Topics    Alcohol use: No     Alcohol/week: 0.0 standard drinks        Lab Results   Component Value Date/Time    WBC 7.3 08/09/2022 04:26 PM    HGB 12.0 08/09/2022 04:26 PM    HCT 37.7 08/09/2022 04:26 PM    PLATELET 749 85/88/4131 04:26 PM    MCV 96.7 08/09/2022 04:26 PM     Lab Results   Component Value Date/Time    Cholesterol, total 143 10/21/2022 12:36 PM    HDL Cholesterol 49 10/21/2022 12:36 PM    LDL, calculated 63.2 10/21/2022 12:36 PM    Triglyceride 154 (H) 10/21/2022 12:36 PM    CHOL/HDL Ratio 2.9 10/21/2022 12:36 PM     Lab Results   Component Value Date/Time    TSH 1.340 08/09/2022 12:00 AM    T4, Free 1.09 11/05/2021 12:00 AM    T4, Total 5.6 11/17/2015 08:54 AM      Lab Results   Component Value Date/Time    Sodium 140 10/21/2022 12:36 PM    Potassium 5.2 (H) 10/21/2022 12:36 PM    Chloride 109 (H) 10/21/2022 12:36 PM    CO2 23 10/21/2022 12:36 PM    Anion gap 8 10/21/2022 12:36 PM    Glucose 82 10/21/2022 12:36 PM    BUN 16 10/21/2022 12:36 PM    Creatinine 1.21 (H) 10/21/2022 12:36 PM    BUN/Creatinine ratio 13 10/21/2022 12:36 PM    GFR est AA >60 08/09/2022 04:27 PM    GFR est non-AA >60 08/09/2022 04:27 PM    Calcium 9.0 10/21/2022 12:36 PM    Bilirubin, total 0.3 10/21/2022 12:36 PM    ALT (SGPT) 44 10/21/2022 12:36 PM    Alk.  phosphatase 109 10/21/2022 12:36 PM    Protein, total 6.6 10/21/2022 12:36 PM    Albumin 4.1 10/21/2022 12:36 PM    Globulin 2.5 10/21/2022 12:36 PM    A-G Ratio 1.6 10/21/2022 12:36 PM      Lab Results   Component Value Date/Time    Hemoglobin A1c 7.4 (H) 11/09/2020 01:14 PM    Hemoglobin A1c (POC) 5.6 10/21/2022 11:00 AM         Review of Systems   Constitutional: Negative for malaise/fatigue. HENT:  Negative for congestion. Eyes:  Negative for blurred vision. Respiratory:  Negative for cough and shortness of breath. Cardiovascular:  Negative for chest pain, palpitations and leg swelling. Gastrointestinal:  Negative for abdominal pain, constipation and heartburn. Genitourinary:  Negative for dysuria, frequency and urgency. Musculoskeletal:  Negative for back pain and joint pain. Neurological:  Negative for dizziness, tingling and headaches. Endo/Heme/Allergies:  Negative for environmental allergies. Psychiatric/Behavioral:  Negative for depression. The patient does not have insomnia. Physical Exam  Vitals and nursing note reviewed. Constitutional:       Appearance: Normal appearance. She is well-developed. Comments: /75   Pulse 78   Temp 98.1 °F (36.7 °C)   Resp 12   Ht 5' 2\" (1.575 m)   Wt 141 lb 3.2 oz (64 kg)   SpO2 99%   BMI 25.83 kg/m²    HENT:      Right Ear: Tympanic membrane normal.   Neck:      Thyroid: No thyromegaly. Cardiovascular:      Rate and Rhythm: Normal rate and regular rhythm. Heart sounds: Normal heart sounds. Pulmonary:      Effort: Pulmonary effort is normal.      Breath sounds: Normal breath sounds. Abdominal:      General: Abdomen is flat. Bowel sounds are normal. There is no distension. Palpations: Abdomen is soft. There is no mass. Tenderness: There is abdominal tenderness (mild) in the suprapubic area. There is no right CVA tenderness, left CVA tenderness, guarding or rebound. Hernia: No hernia is present. Musculoskeletal:         General: Normal range of motion. Cervical back: Normal range of motion and neck supple. Lumbar back: Tenderness and bony tenderness present. No spasms. Right lower leg: No edema. Left lower leg: No edema. Lymphadenopathy:      Cervical: No cervical adenopathy. Skin:     General: Skin is warm and dry.    Neurological: Mental Status: She is alert. Sensory: No sensory deficit. Coordination: Coordination normal.      Gait: Gait normal.      Deep Tendon Reflexes: Reflexes are normal and symmetric. Comments: Negative SLR   Psychiatric:         Mood and Affect: Mood normal.       ASSESSMENT and PLAN  Diagnoses and all orders for this visit:    1. Pelvic floor dysfunction in female  2. Pelvic pain  -     REFERRAL TO UROLOGY  Continue with follow up for PT. 3. Chronic low back pain   Other orders  -     cyclobenzaprine (FLEXERIL) 10 mg tablet; Take 1 Tablet by mouth three (3) times daily as needed for Muscle Spasm(s). Pt also to use heat to the area 3-4 times a day over the next several days until sx are improved. Follow-up and Dispositions    Return in about 7 weeks (around 1/6/2023). reviewed medications and side effects in detail    I have discussed diagnosis listed in this note with pt and/or family. I have discussed treatment plans and options and the risk/benefit analysis of those options, including safe use of medications and possible medication side effects. Through the use of shared decision making we have agreed to the above plan. The patient has received an after-visit summary and questions were answered concerning future plans and follow up. Advise pt of any urgent changes then to proceed to the ER. 97

## 2023-10-02 RX ORDER — LISINOPRIL 2.5 MG/1
TABLET ORAL
Qty: 100 TABLET | Refills: 3 | Status: SHIPPED | OUTPATIENT
Start: 2023-10-02

## 2023-10-02 NOTE — TELEPHONE ENCOUNTER
Pharmacy is requesting a 100 d/s with refills enough to last a year. Last appointment: 8/30/23  Next appointment: 12/5/23    Requested Prescriptions     Pending Prescriptions Disp Refills    lisinopril (PRINIVIL;ZESTRIL) 2.5 MG tablet [Pharmacy Med Name: Lisinopril 2.5 MG Oral Tablet] 100 tablet 3     Sig: TAKE 1 TABLET BY MOUTH DAILY         For Pharmacy Admin Tracking Only    Program: Medication Refill  CPA in place:    Recommendation Provided To:    Intervention Detail: New Rx: 1, reason: Patient Preference  Intervention Accepted By:   Juan A Godfrey Closed?:    Time Spent (min): 5

## 2023-10-07 DIAGNOSIS — F51.01 PRIMARY INSOMNIA: ICD-10-CM

## 2023-10-09 RX ORDER — ZOLPIDEM TARTRATE 5 MG/1
TABLET ORAL
Qty: 90 TABLET | Refills: 1 | OUTPATIENT
Start: 2023-10-09 | End: 2024-04-06

## 2023-10-09 NOTE — TELEPHONE ENCOUNTER
Last appointment: 8/30/23  Next appointment: 12/5/23  Previous refill encounter(s): 8/30/23 #90    Requested Prescriptions     Pending Prescriptions Disp Refills    zolpidem (AMBIEN) 5 MG tablet [Pharmacy Med Name: Zolpidem Tartrate 5 MG Oral Tablet] 90 tablet 1     Sig: TAKE 1 TABLET BY MOUTH NIGHTLY  AS NEEDED FOR SLEEP . MAX DAILY  AMOUNT: 1 TABLET         For Pharmacy Admin Tracking Only    Program: Medication Refill  CPA in place:    Recommendation Provided To:    Intervention Detail: New Rx: 1, reason: Patient Preference  Intervention Accepted By:   Mancel Seats Closed?:    Time Spent (min): 5

## 2023-11-08 DIAGNOSIS — F51.01 PRIMARY INSOMNIA: ICD-10-CM

## 2023-11-09 RX ORDER — ZOLPIDEM TARTRATE 5 MG/1
TABLET ORAL
Qty: 90 TABLET | Refills: 1 | OUTPATIENT
Start: 2023-11-09 | End: 2024-05-07

## 2023-11-09 NOTE — TELEPHONE ENCOUNTER
Last appointment: 8/30/23  Next appointment: 12/5/23  Previous refill encounter(s): 8/30/23 #90    Requested Prescriptions     Pending Prescriptions Disp Refills    zolpidem (AMBIEN) 5 MG tablet [Pharmacy Med Name: Zolpidem Tartrate 5 MG Oral Tablet] 90 tablet 1     Sig: TAKE 1 TABLET BY MOUTH NIGHTLY  AS NEEDED FOR SLEEP . MAX DAILY  AMOUNT: 1 TABLET         For Pharmacy Admin Tracking Only    Program: Medication Refill  CPA in place:    Recommendation Provided To:    Intervention Detail: New Rx: 1, reason: Patient Preference  Intervention Accepted By:   Janett Sender Closed?:    Time Spent (min): 5

## 2023-11-13 RX ORDER — PRAVASTATIN SODIUM 40 MG
TABLET ORAL
Qty: 100 TABLET | Refills: 3 | Status: SHIPPED | OUTPATIENT
Start: 2023-11-13

## 2023-11-13 RX ORDER — FUROSEMIDE 20 MG/1
TABLET ORAL
Qty: 100 TABLET | Refills: 3 | Status: SHIPPED | OUTPATIENT
Start: 2023-11-13

## 2023-11-13 NOTE — TELEPHONE ENCOUNTER
Pharmacy is requesting a 100 d/s with refills enough to last a year. Last appointment: 8/30/23  Next appointment: 12/5/23    Requested Prescriptions     Pending Prescriptions Disp Refills    pravastatin (PRAVACHOL) 40 MG tablet [Pharmacy Med Name: Pravastatin Sodium 40 MG Oral Tablet] 100 tablet 3     Sig: TAKE 1 TABLET BY MOUTH AT NIGHT         For Pharmacy Admin Tracking Only    Program: Medication Refill  CPA in place:    Recommendation Provided To:    Intervention Detail: New Rx: 1, reason: Patient Preference  Intervention Accepted By:   Guanakito Pineda Closed?:    Time Spent (min): 5 Patient and/or family announced that they are leaving. They were advised to stay, advised to return if worse./Physician notified/Patient's chart was referred to charge nurse/supervisor for disposition of prescription and/or discharge instructions.

## 2023-11-13 NOTE — TELEPHONE ENCOUNTER
Last appointment: 8/30/23  Next appointment: 12/5/23    Requested Prescriptions     Pending Prescriptions Disp Refills    furosemide (LASIX) 20 MG tablet [Pharmacy Med Name: Furosemide 20 MG Oral Tablet] 100 tablet 3     Sig: TAKE 1 TABLET BY MOUTH ONCE  DAILY         For Pharmacy Admin Tracking Only    Program: Medication Refill  CPA in place:    Recommendation Provided To:    Intervention Detail: New Rx: 1, reason: Patient Preference  Intervention Accepted By:   Michael Henriquez Closed?:    Time Spent (min): 5

## 2023-11-16 DIAGNOSIS — F51.01 PRIMARY INSOMNIA: ICD-10-CM

## 2023-11-16 NOTE — TELEPHONE ENCOUNTER
Last appointment: 8/30/23  Next appointment: 12/5/23  Previous refill encounter(s): 8/30/23 #90    Requested Prescriptions     Pending Prescriptions Disp Refills    zolpidem (AMBIEN) 5 MG tablet [Pharmacy Med Name: Zolpidem Tartrate 5 MG Oral Tablet] 90 tablet 1     Sig: TAKE 1 TABLET BY MOUTH NIGHTLY  AS NEEDED FOR SLEEP . MAX DAILY  AMOUNT: 1 TABLET         For Pharmacy Admin Tracking Only    Program: Medication Refill  CPA in place:    Recommendation Provided To:    Intervention Detail: New Rx: 1, reason: Patient Preference  Intervention Accepted By:   Claire Jimenez Closed?:    Time Spent (min): 5

## 2023-11-20 RX ORDER — ZOLPIDEM TARTRATE 5 MG/1
TABLET ORAL
Qty: 90 TABLET | Refills: 1 | OUTPATIENT
Start: 2023-11-20 | End: 2024-05-18

## 2023-11-21 ENCOUNTER — TELEPHONE (OUTPATIENT)
Age: 73
End: 2023-11-21

## 2023-11-21 NOTE — TELEPHONE ENCOUNTER
----- Message from Karin Berry MD sent at 11/20/2023  7:20 PM EST -----  Please call and let pt know that her Ambien is not due for refill until 11?29. It was last filled on 8/31 for 90 days. ------------------------------------------------------------------------------------------------------    Per -Jm the pt was called to let dulceo the refill is due next week on 11/29, she stated ok. Normal

## 2023-11-28 ENCOUNTER — CLINICAL DOCUMENTATION (OUTPATIENT)
Age: 73
End: 2023-11-28

## 2023-12-01 DIAGNOSIS — F51.01 PRIMARY INSOMNIA: ICD-10-CM

## 2023-12-01 RX ORDER — ZOLPIDEM TARTRATE 5 MG/1
TABLET ORAL
Qty: 90 TABLET | Refills: 0 | Status: SHIPPED | OUTPATIENT
Start: 2023-12-01 | End: 2024-02-29

## 2023-12-05 ENCOUNTER — OFFICE VISIT (OUTPATIENT)
Age: 73
End: 2023-12-05
Payer: MEDICARE

## 2023-12-05 VITALS
RESPIRATION RATE: 18 BRPM | OXYGEN SATURATION: 100 % | TEMPERATURE: 97.7 F | WEIGHT: 142 LBS | DIASTOLIC BLOOD PRESSURE: 62 MMHG | HEART RATE: 90 BPM | HEIGHT: 62 IN | SYSTOLIC BLOOD PRESSURE: 110 MMHG | BODY MASS INDEX: 26.13 KG/M2

## 2023-12-05 DIAGNOSIS — I87.2 VENOUS INSUFFICIENCY OF BOTH LOWER EXTREMITIES: ICD-10-CM

## 2023-12-05 DIAGNOSIS — F51.01 PRIMARY INSOMNIA: ICD-10-CM

## 2023-12-05 DIAGNOSIS — I73.9 PERIPHERAL VASCULAR DISEASE, UNSPECIFIED (HCC): ICD-10-CM

## 2023-12-05 DIAGNOSIS — Z79.899 ENCOUNTER FOR LONG-TERM (CURRENT) USE OF MEDICATIONS: ICD-10-CM

## 2023-12-05 DIAGNOSIS — E11.9 TYPE 2 DIABETES MELLITUS WITHOUT COMPLICATION, WITHOUT LONG-TERM CURRENT USE OF INSULIN (HCC): Primary | ICD-10-CM

## 2023-12-05 DIAGNOSIS — I25.10 ATHEROSCLEROSIS OF NATIVE CORONARY ARTERY OF NATIVE HEART WITHOUT ANGINA PECTORIS: ICD-10-CM

## 2023-12-05 DIAGNOSIS — E03.9 ACQUIRED HYPOTHYROIDISM: ICD-10-CM

## 2023-12-05 DIAGNOSIS — E78.2 MIXED HYPERLIPIDEMIA: ICD-10-CM

## 2023-12-05 DIAGNOSIS — F41.1 GENERALIZED ANXIETY DISORDER: ICD-10-CM

## 2023-12-05 DIAGNOSIS — R10.30 LOWER ABDOMINAL PAIN: ICD-10-CM

## 2023-12-05 LAB
GLUCOSE, POC: 113 MG/DL
HBA1C MFR BLD: 5.8 %

## 2023-12-05 PROCEDURE — PBSHW AMB POC HEMOGLOBIN A1C: Performed by: FAMILY MEDICINE

## 2023-12-05 PROCEDURE — G8484 FLU IMMUNIZE NO ADMIN: HCPCS | Performed by: FAMILY MEDICINE

## 2023-12-05 PROCEDURE — G8427 DOCREV CUR MEDS BY ELIG CLIN: HCPCS | Performed by: FAMILY MEDICINE

## 2023-12-05 PROCEDURE — 2022F DILAT RTA XM EVC RTNOPTHY: CPT | Performed by: FAMILY MEDICINE

## 2023-12-05 PROCEDURE — 83036 HEMOGLOBIN GLYCOSYLATED A1C: CPT | Performed by: FAMILY MEDICINE

## 2023-12-05 PROCEDURE — 3017F COLORECTAL CA SCREEN DOC REV: CPT | Performed by: FAMILY MEDICINE

## 2023-12-05 PROCEDURE — 3074F SYST BP LT 130 MM HG: CPT | Performed by: FAMILY MEDICINE

## 2023-12-05 PROCEDURE — 1123F ACP DISCUSS/DSCN MKR DOCD: CPT | Performed by: FAMILY MEDICINE

## 2023-12-05 PROCEDURE — 99214 OFFICE O/P EST MOD 30 MIN: CPT | Performed by: FAMILY MEDICINE

## 2023-12-05 PROCEDURE — 1036F TOBACCO NON-USER: CPT | Performed by: FAMILY MEDICINE

## 2023-12-05 PROCEDURE — 1090F PRES/ABSN URINE INCON ASSESS: CPT | Performed by: FAMILY MEDICINE

## 2023-12-05 PROCEDURE — G8399 PT W/DXA RESULTS DOCUMENT: HCPCS | Performed by: FAMILY MEDICINE

## 2023-12-05 PROCEDURE — G8419 CALC BMI OUT NRM PARAM NOF/U: HCPCS | Performed by: FAMILY MEDICINE

## 2023-12-05 PROCEDURE — 3046F HEMOGLOBIN A1C LEVEL >9.0%: CPT | Performed by: FAMILY MEDICINE

## 2023-12-05 PROCEDURE — 82962 GLUCOSE BLOOD TEST: CPT | Performed by: FAMILY MEDICINE

## 2023-12-05 PROCEDURE — 3078F DIAST BP <80 MM HG: CPT | Performed by: FAMILY MEDICINE

## 2023-12-05 PROCEDURE — PBSHW AMB POC GLUCOSE BLOOD, BY GLUCOSE MONITORING DEVICE: Performed by: FAMILY MEDICINE

## 2023-12-05 RX ORDER — ZOLPIDEM TARTRATE 5 MG/1
TABLET ORAL
Qty: 90 TABLET | Refills: 0 | Status: CANCELLED | OUTPATIENT
Start: 2023-12-05

## 2023-12-05 SDOH — ECONOMIC STABILITY: FOOD INSECURITY: WITHIN THE PAST 12 MONTHS, THE FOOD YOU BOUGHT JUST DIDN'T LAST AND YOU DIDN'T HAVE MONEY TO GET MORE.: NEVER TRUE

## 2023-12-05 SDOH — ECONOMIC STABILITY: FOOD INSECURITY: WITHIN THE PAST 12 MONTHS, YOU WORRIED THAT YOUR FOOD WOULD RUN OUT BEFORE YOU GOT MONEY TO BUY MORE.: NEVER TRUE

## 2023-12-05 SDOH — ECONOMIC STABILITY: INCOME INSECURITY: HOW HARD IS IT FOR YOU TO PAY FOR THE VERY BASICS LIKE FOOD, HOUSING, MEDICAL CARE, AND HEATING?: NOT HARD AT ALL

## 2023-12-05 ASSESSMENT — PATIENT HEALTH QUESTIONNAIRE - PHQ9
SUM OF ALL RESPONSES TO PHQ QUESTIONS 1-9: 0
SUM OF ALL RESPONSES TO PHQ QUESTIONS 1-9: 0
SUM OF ALL RESPONSES TO PHQ9 QUESTIONS 1 & 2: 0
6. FEELING BAD ABOUT YOURSELF - OR THAT YOU ARE A FAILURE OR HAVE LET YOURSELF OR YOUR FAMILY DOWN: 0
SUM OF ALL RESPONSES TO PHQ QUESTIONS 1-9: 0
2. FEELING DOWN, DEPRESSED OR HOPELESS: 0
1. LITTLE INTEREST OR PLEASURE IN DOING THINGS: 0
8. MOVING OR SPEAKING SO SLOWLY THAT OTHER PEOPLE COULD HAVE NOTICED. OR THE OPPOSITE, BEING SO FIGETY OR RESTLESS THAT YOU HAVE BEEN MOVING AROUND A LOT MORE THAN USUAL: 0
4. FEELING TIRED OR HAVING LITTLE ENERGY: 0
3. TROUBLE FALLING OR STAYING ASLEEP: 0
7. TROUBLE CONCENTRATING ON THINGS, SUCH AS READING THE NEWSPAPER OR WATCHING TELEVISION: 0
SUM OF ALL RESPONSES TO PHQ QUESTIONS 1-9: 0
9. THOUGHTS THAT YOU WOULD BE BETTER OFF DEAD, OR OF HURTING YOURSELF: 0
5. POOR APPETITE OR OVEREATING: 0
10. IF YOU CHECKED OFF ANY PROBLEMS, HOW DIFFICULT HAVE THESE PROBLEMS MADE IT FOR YOU TO DO YOUR WORK, TAKE CARE OF THINGS AT HOME, OR GET ALONG WITH OTHER PEOPLE: 0

## 2023-12-05 ASSESSMENT — ENCOUNTER SYMPTOMS
RHINORRHEA: 0
COUGH: 0
CONSTIPATION: 0
CHEST TIGHTNESS: 0
BLOOD IN STOOL: 0
SHORTNESS OF BREATH: 0
ABDOMINAL PAIN: 0

## 2023-12-29 ENCOUNTER — HOSPITAL ENCOUNTER (OUTPATIENT)
Facility: HOSPITAL | Age: 73
End: 2023-12-29
Payer: MEDICARE

## 2023-12-29 DIAGNOSIS — M54.6 THORACIC SPINE PAIN: ICD-10-CM

## 2023-12-29 DIAGNOSIS — M54.31 RIGHT SIDED SCIATICA: ICD-10-CM

## 2023-12-29 DIAGNOSIS — M79.18 ACUTE BUTTOCK PAIN: ICD-10-CM

## 2023-12-29 DIAGNOSIS — M16.11 PRIMARY OSTEOARTHRITIS OF RIGHT HIP: ICD-10-CM

## 2023-12-29 DIAGNOSIS — M25.551 GREATER TROCHANTERIC PAIN SYNDROME OF RIGHT LOWER EXTREMITY: ICD-10-CM

## 2023-12-29 DIAGNOSIS — M51.36 DISCOGENIC LOW BACK PAIN: ICD-10-CM

## 2023-12-29 DIAGNOSIS — M47.817 LUMBOSACRAL SPONDYLOSIS WITHOUT MYELOPATHY: ICD-10-CM

## 2023-12-29 DIAGNOSIS — Z98.1 S/P SPINAL FUSION: ICD-10-CM

## 2023-12-29 DIAGNOSIS — M54.2 NECK PAIN: ICD-10-CM

## 2023-12-29 PROCEDURE — 72148 MRI LUMBAR SPINE W/O DYE: CPT

## 2024-01-22 DIAGNOSIS — E03.9 ACQUIRED HYPOTHYROIDISM: ICD-10-CM

## 2024-01-22 DIAGNOSIS — F51.01 PRIMARY INSOMNIA: ICD-10-CM

## 2024-01-22 DIAGNOSIS — E11.9 TYPE 2 DIABETES MELLITUS WITHOUT COMPLICATION, WITHOUT LONG-TERM CURRENT USE OF INSULIN (HCC): Primary | ICD-10-CM

## 2024-01-22 RX ORDER — SEMAGLUTIDE 0.68 MG/ML
0.25 INJECTION, SOLUTION SUBCUTANEOUS
Qty: 9 ML | Refills: 1 | Status: SHIPPED | OUTPATIENT
Start: 2024-01-22

## 2024-01-22 RX ORDER — METOPROLOL SUCCINATE 25 MG/1
25 TABLET, EXTENDED RELEASE ORAL DAILY
Qty: 90 TABLET | Refills: 3 | Status: SHIPPED | OUTPATIENT
Start: 2024-01-22

## 2024-01-22 RX ORDER — PRAVASTATIN SODIUM 40 MG
40 TABLET ORAL NIGHTLY
Qty: 90 TABLET | Refills: 3 | Status: SHIPPED | OUTPATIENT
Start: 2024-01-22

## 2024-01-22 RX ORDER — ASPIRIN 81 MG/1
81 TABLET ORAL DAILY
Qty: 90 TABLET | Refills: 3 | Status: SHIPPED | OUTPATIENT
Start: 2024-01-22

## 2024-01-22 RX ORDER — FUROSEMIDE 20 MG/1
20 TABLET ORAL DAILY
Qty: 90 TABLET | Refills: 3 | Status: SHIPPED | OUTPATIENT
Start: 2024-01-22

## 2024-01-22 RX ORDER — BLOOD SUGAR DIAGNOSTIC
STRIP MISCELLANEOUS
Qty: 100 EACH | Refills: 3 | Status: SHIPPED | OUTPATIENT
Start: 2024-01-22

## 2024-01-22 RX ORDER — MELOXICAM 15 MG/1
15 TABLET ORAL DAILY
Qty: 90 TABLET | Refills: 1 | Status: SHIPPED | OUTPATIENT
Start: 2024-01-22

## 2024-01-22 RX ORDER — MULTIVIT-MIN/IRON/FOLIC ACID/K 18-600-40
2000 CAPSULE ORAL DAILY
Qty: 90 CAPSULE | Refills: 3 | Status: SHIPPED | OUTPATIENT
Start: 2024-01-22

## 2024-01-22 RX ORDER — LEVOTHYROXINE SODIUM 0.03 MG/1
TABLET ORAL
Qty: 90 TABLET | Refills: 3 | Status: SHIPPED | OUTPATIENT
Start: 2024-01-22

## 2024-01-22 RX ORDER — ZOLPIDEM TARTRATE 5 MG/1
TABLET ORAL
Qty: 90 TABLET | Refills: 1 | OUTPATIENT
Start: 2024-01-22 | End: 2024-07-20

## 2024-01-22 RX ORDER — LANCETS
EACH MISCELLANEOUS
Qty: 100 EACH | Refills: 3 | Status: SHIPPED | OUTPATIENT
Start: 2024-01-22

## 2024-01-22 RX ORDER — LISINOPRIL 2.5 MG/1
2.5 TABLET ORAL DAILY
Qty: 90 TABLET | Refills: 3 | Status: SHIPPED | OUTPATIENT
Start: 2024-01-22

## 2024-01-22 RX ORDER — BLOOD-GLUCOSE METER
EACH MISCELLANEOUS
Qty: 1 KIT | Refills: 0 | Status: SHIPPED | OUTPATIENT
Start: 2024-01-22

## 2024-01-22 NOTE — TELEPHONE ENCOUNTER
[facility] is requesting a 90 day supply    Last appointment: 12/5/23  Next appointment: 4/5/24    Requested Prescriptions     Pending Prescriptions Disp Refills    Cholecalciferol (VITAMIN D) 50 MCG (2000 UT) CAPS capsule 90 capsule 3     Sig: Take 2,000 Units by mouth daily    aspirin (ASPIRIN 81) 81 MG EC tablet 90 tablet 3     Sig: Take 1 tablet by mouth daily    metoprolol succinate (TOPROL XL) 25 MG extended release tablet 90 tablet 3     Sig: Take 1 tablet by mouth daily    furosemide (LASIX) 20 MG tablet 90 tablet 3     Sig: Take 1 tablet by mouth daily    lisinopril (PRINIVIL;ZESTRIL) 2.5 MG tablet 90 tablet 3     Sig: Take 1 tablet by mouth daily    zolpidem (AMBIEN) 5 MG tablet 90 tablet 1     Sig: TAKE 1 TABLET BY MOUTH NIGHTLY  AS NEEDED FOR SLEEP . MAX DAILY  AMOUNT: 1 TABLET    Semaglutide,0.25 or 0.5MG/DOS, (OZEMPIC, 0.25 OR 0.5 MG/DOSE,) 2 MG/3ML SOPN 9 mL 1     Sig: Inject 0.25 mg into the skin every 7 days    blood glucose test strips (ACCU-CHEK GUIDE) strip 100 each 3     Sig: Check glucose once daily as directed  Dx E11.9    Accu-Chek Softclix Lancets MISC 100 each 3     Sig: Check glucose once daily as directed  Dx E11.9    levothyroxine (SYNTHROID) 25 MCG tablet 90 tablet 3     Sig: TAKE 1 TABLET BY MOUTH  DAILY BEFORE BREAKFAST    pravastatin (PRAVACHOL) 40 MG tablet 90 tablet 3     Sig: Take 1 tablet by mouth nightly    meloxicam (MOBIC) 15 MG tablet 90 tablet 1     Sig: Take 1 tablet by mouth daily    Blood Glucose Monitoring Suppl (ACCU-CHEK GUIDE) w/Device KIT 1 kit 0     Sig: Check glucose once daily as directed  Dx E11.9         For Pharmacy Admin Tracking Only    Program: Medication Refill  CPA in place:    Recommendation Provided To:   Intervention Detail: New Rx: 13, reason: Patient Preference  Intervention Accepted By:   Gap Closed?:    Time Spent (min): 5

## 2024-01-26 DIAGNOSIS — F51.01 PRIMARY INSOMNIA: ICD-10-CM

## 2024-01-26 RX ORDER — ZOLPIDEM TARTRATE 5 MG/1
TABLET ORAL
Qty: 90 TABLET | Refills: 0 | OUTPATIENT
Start: 2024-01-26 | End: 2024-04-26

## 2024-01-26 NOTE — TELEPHONE ENCOUNTER
Mariana ( [email or web address] ) with [facility] is requesting a RX   zolpidem (AMBIEN) 5 MG tablet  she can be reached @ [phone]

## 2024-03-15 DIAGNOSIS — F51.01 PRIMARY INSOMNIA: Primary | ICD-10-CM

## 2024-03-15 RX ORDER — ZOLPIDEM TARTRATE 5 MG/1
5 TABLET ORAL NIGHTLY PRN
Qty: 90 TABLET | Refills: 0 | Status: SHIPPED | OUTPATIENT
Start: 2024-03-15 | End: 2024-06-13

## 2024-03-15 NOTE — TELEPHONE ENCOUNTER
Last appointment: 12/5/23  Next appointment: 4/5/24  Previous refill encounter(s): 12/1/23 #90    Requested Prescriptions     Pending Prescriptions Disp Refills    zolpidem (AMBIEN) 5 MG tablet 90 tablet 0     Sig: Take 1 tablet by mouth nightly as needed for Sleep for up to 90 days. Max Daily Amount: 5 mg         For Pharmacy Admin Tracking Only    Program: Medication Refill  CPA in place:    Recommendation Provided To:   Intervention Detail: New Rx: 1, reason: Patient Preference  Intervention Accepted By:   Gap Closed?:    Time Spent (min): 5

## 2024-04-05 ENCOUNTER — OFFICE VISIT (OUTPATIENT)
Age: 74
End: 2024-04-05
Payer: MEDICARE

## 2024-04-05 VITALS
TEMPERATURE: 98.5 F | HEART RATE: 87 BPM | BODY MASS INDEX: 27.09 KG/M2 | SYSTOLIC BLOOD PRESSURE: 124 MMHG | DIASTOLIC BLOOD PRESSURE: 68 MMHG | WEIGHT: 147.2 LBS | RESPIRATION RATE: 16 BRPM | OXYGEN SATURATION: 100 % | HEIGHT: 62 IN

## 2024-04-05 DIAGNOSIS — I25.10 ATHEROSCLEROSIS OF NATIVE CORONARY ARTERY OF NATIVE HEART WITHOUT ANGINA PECTORIS: ICD-10-CM

## 2024-04-05 DIAGNOSIS — Z12.31 ENCOUNTER FOR SCREENING MAMMOGRAM FOR BREAST CANCER: ICD-10-CM

## 2024-04-05 DIAGNOSIS — F41.1 GENERALIZED ANXIETY DISORDER: ICD-10-CM

## 2024-04-05 DIAGNOSIS — I73.9 PERIPHERAL VASCULAR DISEASE, UNSPECIFIED (HCC): ICD-10-CM

## 2024-04-05 DIAGNOSIS — R10.30 LOWER ABDOMINAL PAIN: ICD-10-CM

## 2024-04-05 DIAGNOSIS — Z91.09 ENVIRONMENTAL ALLERGIES: ICD-10-CM

## 2024-04-05 DIAGNOSIS — E11.9 TYPE 2 DIABETES MELLITUS WITHOUT COMPLICATION, WITHOUT LONG-TERM CURRENT USE OF INSULIN (HCC): Primary | ICD-10-CM

## 2024-04-05 DIAGNOSIS — F43.23 ADJUSTMENT DISORDER WITH MIXED ANXIETY AND DEPRESSED MOOD: ICD-10-CM

## 2024-04-05 DIAGNOSIS — E78.2 MIXED HYPERLIPIDEMIA: ICD-10-CM

## 2024-04-05 DIAGNOSIS — Z79.899 ENCOUNTER FOR LONG-TERM (CURRENT) USE OF MEDICATIONS: ICD-10-CM

## 2024-04-05 DIAGNOSIS — E03.9 ACQUIRED HYPOTHYROIDISM: ICD-10-CM

## 2024-04-05 DIAGNOSIS — R51.9 SINUS HEADACHE: ICD-10-CM

## 2024-04-05 LAB
ALBUMIN SERPL-MCNC: 3.7 G/DL (ref 3.5–5)
ALBUMIN/GLOB SERPL: 1.5 (ref 1.1–2.2)
ALP SERPL-CCNC: 98 U/L (ref 45–117)
ALT SERPL-CCNC: 24 U/L (ref 12–78)
ANION GAP SERPL CALC-SCNC: 4 MMOL/L (ref 5–15)
AST SERPL-CCNC: 19 U/L (ref 15–37)
BILIRUB SERPL-MCNC: 0.2 MG/DL (ref 0.2–1)
BUN SERPL-MCNC: 12 MG/DL (ref 6–20)
BUN/CREAT SERPL: 13 (ref 12–20)
CALCIUM SERPL-MCNC: 9.1 MG/DL (ref 8.5–10.1)
CHLORIDE SERPL-SCNC: 108 MMOL/L (ref 97–108)
CHOLEST SERPL-MCNC: 157 MG/DL
CO2 SERPL-SCNC: 28 MMOL/L (ref 21–32)
CREAT SERPL-MCNC: 0.95 MG/DL (ref 0.55–1.02)
ERYTHROCYTE [DISTWIDTH] IN BLOOD BY AUTOMATED COUNT: 12.7 % (ref 11.5–14.5)
GLOBULIN SER CALC-MCNC: 2.4 G/DL (ref 2–4)
GLUCOSE SERPL-MCNC: 119 MG/DL (ref 65–100)
GLUCOSE, POC: 192 MG/DL
HBA1C MFR BLD: 6.4 %
HCT VFR BLD AUTO: 35.5 % (ref 35–47)
HDLC SERPL-MCNC: 69 MG/DL
HDLC SERPL: 2.3 (ref 0–5)
HGB BLD-MCNC: 11.4 G/DL (ref 11.5–16)
LDLC SERPL CALC-MCNC: 68.8 MG/DL (ref 0–100)
MCH RBC QN AUTO: 31.2 PG (ref 26–34)
MCHC RBC AUTO-ENTMCNC: 32.1 G/DL (ref 30–36.5)
MCV RBC AUTO: 97.3 FL (ref 80–99)
NRBC # BLD: 0 K/UL (ref 0–0.01)
NRBC BLD-RTO: 0 PER 100 WBC
PLATELET # BLD AUTO: 283 K/UL (ref 150–400)
PMV BLD AUTO: 11 FL (ref 8.9–12.9)
POTASSIUM SERPL-SCNC: 4.4 MMOL/L (ref 3.5–5.1)
PROT SERPL-MCNC: 6.1 G/DL (ref 6.4–8.2)
RBC # BLD AUTO: 3.65 M/UL (ref 3.8–5.2)
SODIUM SERPL-SCNC: 140 MMOL/L (ref 136–145)
T4 FREE SERPL-MCNC: 1 NG/DL (ref 0.8–1.5)
TRIGL SERPL-MCNC: 96 MG/DL
TSH SERPL DL<=0.05 MIU/L-ACNC: 2.47 UIU/ML (ref 0.36–3.74)
VLDLC SERPL CALC-MCNC: 19.2 MG/DL
WBC # BLD AUTO: 8.4 K/UL (ref 3.6–11)

## 2024-04-05 PROCEDURE — 99214 OFFICE O/P EST MOD 30 MIN: CPT | Performed by: FAMILY MEDICINE

## 2024-04-05 PROCEDURE — 2022F DILAT RTA XM EVC RTNOPTHY: CPT | Performed by: FAMILY MEDICINE

## 2024-04-05 PROCEDURE — G8427 DOCREV CUR MEDS BY ELIG CLIN: HCPCS | Performed by: FAMILY MEDICINE

## 2024-04-05 PROCEDURE — PBSHW AMB POC HEMOGLOBIN A1C: Performed by: FAMILY MEDICINE

## 2024-04-05 PROCEDURE — 1036F TOBACCO NON-USER: CPT | Performed by: FAMILY MEDICINE

## 2024-04-05 PROCEDURE — PBSHW AMB POC GLUCOSE BLOOD, BY GLUCOSE MONITORING DEVICE: Performed by: FAMILY MEDICINE

## 2024-04-05 PROCEDURE — 83036 HEMOGLOBIN GLYCOSYLATED A1C: CPT | Performed by: FAMILY MEDICINE

## 2024-04-05 PROCEDURE — 3046F HEMOGLOBIN A1C LEVEL >9.0%: CPT | Performed by: FAMILY MEDICINE

## 2024-04-05 PROCEDURE — 1123F ACP DISCUSS/DSCN MKR DOCD: CPT | Performed by: FAMILY MEDICINE

## 2024-04-05 PROCEDURE — 3017F COLORECTAL CA SCREEN DOC REV: CPT | Performed by: FAMILY MEDICINE

## 2024-04-05 PROCEDURE — 82962 GLUCOSE BLOOD TEST: CPT | Performed by: FAMILY MEDICINE

## 2024-04-05 PROCEDURE — 3078F DIAST BP <80 MM HG: CPT | Performed by: FAMILY MEDICINE

## 2024-04-05 PROCEDURE — 3074F SYST BP LT 130 MM HG: CPT | Performed by: FAMILY MEDICINE

## 2024-04-05 PROCEDURE — G8419 CALC BMI OUT NRM PARAM NOF/U: HCPCS | Performed by: FAMILY MEDICINE

## 2024-04-05 PROCEDURE — 1090F PRES/ABSN URINE INCON ASSESS: CPT | Performed by: FAMILY MEDICINE

## 2024-04-05 PROCEDURE — G8399 PT W/DXA RESULTS DOCUMENT: HCPCS | Performed by: FAMILY MEDICINE

## 2024-04-05 RX ORDER — LORATADINE 10 MG/1
10 TABLET ORAL DAILY
Qty: 30 TABLET | Refills: 1
Start: 2024-04-05

## 2024-04-05 RX ORDER — FEXOFENADINE HCL 180 MG/1
180 TABLET ORAL DAILY
Qty: 30 TABLET | Refills: 0 | Status: SHIPPED | OUTPATIENT
Start: 2024-04-05 | End: 2024-05-05

## 2024-04-05 ASSESSMENT — PATIENT HEALTH QUESTIONNAIRE - PHQ9
SUM OF ALL RESPONSES TO PHQ QUESTIONS 1-9: 0
9. THOUGHTS THAT YOU WOULD BE BETTER OFF DEAD, OR OF HURTING YOURSELF: NOT AT ALL
1. LITTLE INTEREST OR PLEASURE IN DOING THINGS: NOT AT ALL
8. MOVING OR SPEAKING SO SLOWLY THAT OTHER PEOPLE COULD HAVE NOTICED. OR THE OPPOSITE, BEING SO FIGETY OR RESTLESS THAT YOU HAVE BEEN MOVING AROUND A LOT MORE THAN USUAL: NOT AT ALL
6. FEELING BAD ABOUT YOURSELF - OR THAT YOU ARE A FAILURE OR HAVE LET YOURSELF OR YOUR FAMILY DOWN: NOT AT ALL
5. POOR APPETITE OR OVEREATING: NOT AT ALL
3. TROUBLE FALLING OR STAYING ASLEEP: NOT AT ALL
SUM OF ALL RESPONSES TO PHQ QUESTIONS 1-9: 0
SUM OF ALL RESPONSES TO PHQ QUESTIONS 1-9: 0
2. FEELING DOWN, DEPRESSED OR HOPELESS: NOT AT ALL
4. FEELING TIRED OR HAVING LITTLE ENERGY: NOT AT ALL
7. TROUBLE CONCENTRATING ON THINGS, SUCH AS READING THE NEWSPAPER OR WATCHING TELEVISION: NOT AT ALL
SUM OF ALL RESPONSES TO PHQ QUESTIONS 1-9: 0
SUM OF ALL RESPONSES TO PHQ9 QUESTIONS 1 & 2: 0

## 2024-04-05 ASSESSMENT — ENCOUNTER SYMPTOMS
SHORTNESS OF BREATH: 0
CHEST TIGHTNESS: 0
SINUS PAIN: 1
COUGH: 0
BLOOD IN STOOL: 0
RHINORRHEA: 0
ABDOMINAL PAIN: 1
CONSTIPATION: 0

## 2024-04-05 NOTE — PROGRESS NOTES
Chief Complaint   Patient presents with    Follow-up     4 month    Headache       \"Have you been to the ER, urgent care clinic since your last visit?  Hospitalized since your last visit?\"    NO    “Have you seen or consulted any other health care providers outside of [facility] since your last visit?”    NO             Vitals:    24 1041   BP: 124/68   Pulse: 87   Resp: 16   Temp: 98.5 °F (36.9 °C)   SpO2: 100%       Health Maintenance Due   Topic Date Due    Respiratory Syncytial Virus (RSV) Pregnant or age 60 yrs+ (1 - 1-dose 60+ series) Never done    Diabetic foot exam  2023    Annual Wellness Visit (Medicare Advantage)  2024    Diabetic retinal exam  2024        The patient, Savita Chávez, identity was verified by name and .   
    Encounter for long-term (current) use of medications  -     Comprehensive Metabolic Panel; Future  -     CBC; Future  -     CBC  -     Comprehensive Metabolic Panel    Encounter for screening mammogram for breast cancer  -     [facility] DIGITAL SCREEN BILATERAL; Future      Return in about 1 month (around 5/5/2024).  reviewed diet, exercise and weight control  cardiovascular risk and specific lipid/LDL goals reviewed  reviewed medications and side effects in detail  specific diabetic recommendations: low cholesterol diet, weight control and daily exercise discussed, all medications, side effects and compliance discussed carefully, foot care discussed and Podiatry visits discussed, annual eye examinations at Ophthalmology discussed, and glycohemoglobin and other lab monitoring discussed      I have discussed diagnosis listed in this note with pt and/or family. I have discussed treatment plans and options and the risk/benefit analysis of those options, including safe use of medications and possible medication side effects.   Through the use of shared decision making we have agreed to the above plan. The patient has received an after-visit summary and questions were answered concerning future plans and follow up.  Advise pt of any urgent changes then to proceed to the ER.            Gabriela Garg MD

## 2024-04-11 ENCOUNTER — TELEPHONE (OUTPATIENT)
Age: 74
End: 2024-04-11

## 2024-04-11 NOTE — TELEPHONE ENCOUNTER
[employer] is looking for chart notes with diagnosis and treatment information in order for patient to receive their diabetic supplies. They are requesting this information be faxed to them at [phone]. Their phone number is [phone].

## 2024-04-11 NOTE — TELEPHONE ENCOUNTER
[employer] is looking for chart notes with diagnosis and treatment information in order for patient to receive their diabetic supplies. They are requesting this information be faxed to them at [phone]. Their phone number is [phone].   ----------------------------------------------------------------------------------------------------------------------    I have faxed office notes to [employer] to [phone].

## 2024-04-12 ENCOUNTER — TELEPHONE (OUTPATIENT)
Age: 74
End: 2024-04-12

## 2024-04-16 ENCOUNTER — TELEPHONE (OUTPATIENT)
Age: 74
End: 2024-04-16

## 2024-04-16 DIAGNOSIS — M81.0 AGE-RELATED OSTEOPOROSIS WITHOUT CURRENT PATHOLOGICAL FRACTURE: Primary | ICD-10-CM

## 2024-04-16 NOTE — TELEPHONE ENCOUNTER
Patient wants to get a order to get a Bone Density and a Mammogram done @ [facility].   Please give her a call @ [phone]

## 2024-04-17 NOTE — TELEPHONE ENCOUNTER
Patient wants to get a order to get a Bone Density and a Mammogram done @ [facility].   Please give her a call @ [phone]     -------------------------------------------------------------------------    Pt has an order for the mammogram from 4/5/24.  She needs an order for the Bone Density exam.

## 2024-04-18 NOTE — TELEPHONE ENCOUNTER
Pt called and made aware that her bone density test was ordered.  Pt state she has the number to call and schedule.

## 2024-04-30 ENCOUNTER — TRANSCRIBE ORDERS (OUTPATIENT)
Facility: HOSPITAL | Age: 74
End: 2024-04-30

## 2024-04-30 DIAGNOSIS — N81.6 RECTOCELE: Primary | ICD-10-CM

## 2024-05-06 ENCOUNTER — TRANSCRIBE ORDERS (OUTPATIENT)
Facility: HOSPITAL | Age: 74
End: 2024-05-06

## 2024-05-06 DIAGNOSIS — N81.6 RECTOCELE: Primary | ICD-10-CM

## 2024-05-07 ENCOUNTER — OFFICE VISIT (OUTPATIENT)
Age: 74
End: 2024-05-07
Payer: MEDICARE

## 2024-05-07 VITALS
OXYGEN SATURATION: 99 % | HEART RATE: 77 BPM | BODY MASS INDEX: 28.01 KG/M2 | HEIGHT: 62 IN | SYSTOLIC BLOOD PRESSURE: 115 MMHG | TEMPERATURE: 98.7 F | WEIGHT: 152.2 LBS | DIASTOLIC BLOOD PRESSURE: 62 MMHG | RESPIRATION RATE: 16 BRPM

## 2024-05-07 DIAGNOSIS — R10.30 LOWER ABDOMINAL PAIN: ICD-10-CM

## 2024-05-07 DIAGNOSIS — E78.2 MIXED HYPERLIPIDEMIA: ICD-10-CM

## 2024-05-07 DIAGNOSIS — F41.1 GENERALIZED ANXIETY DISORDER: ICD-10-CM

## 2024-05-07 DIAGNOSIS — I73.9 PERIPHERAL VASCULAR DISEASE, UNSPECIFIED (HCC): ICD-10-CM

## 2024-05-07 DIAGNOSIS — I25.10 ATHEROSCLEROSIS OF NATIVE CORONARY ARTERY OF NATIVE HEART WITHOUT ANGINA PECTORIS: ICD-10-CM

## 2024-05-07 DIAGNOSIS — E03.9 ACQUIRED HYPOTHYROIDISM: ICD-10-CM

## 2024-05-07 DIAGNOSIS — E11.9 TYPE 2 DIABETES MELLITUS WITHOUT COMPLICATION, WITHOUT LONG-TERM CURRENT USE OF INSULIN (HCC): Primary | ICD-10-CM

## 2024-05-07 DIAGNOSIS — Z79.899 ENCOUNTER FOR LONG-TERM (CURRENT) USE OF MEDICATIONS: ICD-10-CM

## 2024-05-07 PROBLEM — E11.22 TYPE 2 DIABETES MELLITUS WITH CHRONIC KIDNEY DISEASE (HCC): Status: ACTIVE | Noted: 2024-05-07

## 2024-05-07 LAB — GLUCOSE, POC: 150 MG/DL

## 2024-05-07 PROCEDURE — G8399 PT W/DXA RESULTS DOCUMENT: HCPCS | Performed by: FAMILY MEDICINE

## 2024-05-07 PROCEDURE — 3078F DIAST BP <80 MM HG: CPT | Performed by: FAMILY MEDICINE

## 2024-05-07 PROCEDURE — 82962 GLUCOSE BLOOD TEST: CPT | Performed by: FAMILY MEDICINE

## 2024-05-07 PROCEDURE — 99214 OFFICE O/P EST MOD 30 MIN: CPT | Performed by: FAMILY MEDICINE

## 2024-05-07 PROCEDURE — 1090F PRES/ABSN URINE INCON ASSESS: CPT | Performed by: FAMILY MEDICINE

## 2024-05-07 PROCEDURE — 3074F SYST BP LT 130 MM HG: CPT | Performed by: FAMILY MEDICINE

## 2024-05-07 PROCEDURE — 2022F DILAT RTA XM EVC RTNOPTHY: CPT | Performed by: FAMILY MEDICINE

## 2024-05-07 PROCEDURE — G8427 DOCREV CUR MEDS BY ELIG CLIN: HCPCS | Performed by: FAMILY MEDICINE

## 2024-05-07 PROCEDURE — 3046F HEMOGLOBIN A1C LEVEL >9.0%: CPT | Performed by: FAMILY MEDICINE

## 2024-05-07 PROCEDURE — 1036F TOBACCO NON-USER: CPT | Performed by: FAMILY MEDICINE

## 2024-05-07 PROCEDURE — 1123F ACP DISCUSS/DSCN MKR DOCD: CPT | Performed by: FAMILY MEDICINE

## 2024-05-07 PROCEDURE — 3017F COLORECTAL CA SCREEN DOC REV: CPT | Performed by: FAMILY MEDICINE

## 2024-05-07 PROCEDURE — G8419 CALC BMI OUT NRM PARAM NOF/U: HCPCS | Performed by: FAMILY MEDICINE

## 2024-05-07 PROCEDURE — PBSHW AMB POC GLUCOSE BLOOD, BY GLUCOSE MONITORING DEVICE: Performed by: FAMILY MEDICINE

## 2024-05-07 ASSESSMENT — PATIENT HEALTH QUESTIONNAIRE - PHQ9
SUM OF ALL RESPONSES TO PHQ QUESTIONS 1-9: 0
4. FEELING TIRED OR HAVING LITTLE ENERGY: NOT AT ALL
9. THOUGHTS THAT YOU WOULD BE BETTER OFF DEAD, OR OF HURTING YOURSELF: NOT AT ALL
1. LITTLE INTEREST OR PLEASURE IN DOING THINGS: NOT AT ALL
5. POOR APPETITE OR OVEREATING: NOT AT ALL
SUM OF ALL RESPONSES TO PHQ QUESTIONS 1-9: 0
3. TROUBLE FALLING OR STAYING ASLEEP: NOT AT ALL
7. TROUBLE CONCENTRATING ON THINGS, SUCH AS READING THE NEWSPAPER OR WATCHING TELEVISION: NOT AT ALL
SUM OF ALL RESPONSES TO PHQ QUESTIONS 1-9: 0
SUM OF ALL RESPONSES TO PHQ9 QUESTIONS 1 & 2: 0
SUM OF ALL RESPONSES TO PHQ QUESTIONS 1-9: 0
6. FEELING BAD ABOUT YOURSELF - OR THAT YOU ARE A FAILURE OR HAVE LET YOURSELF OR YOUR FAMILY DOWN: NOT AT ALL
8. MOVING OR SPEAKING SO SLOWLY THAT OTHER PEOPLE COULD HAVE NOTICED. OR THE OPPOSITE, BEING SO FIGETY OR RESTLESS THAT YOU HAVE BEEN MOVING AROUND A LOT MORE THAN USUAL: NOT AT ALL

## 2024-05-07 ASSESSMENT — ENCOUNTER SYMPTOMS
BLOOD IN STOOL: 0
COUGH: 0
SINUS PAIN: 0
SHORTNESS OF BREATH: 0
CONSTIPATION: 0
CHEST TIGHTNESS: 0
RHINORRHEA: 0
ABDOMINAL PAIN: 1

## 2024-05-07 NOTE — PROGRESS NOTES
Chief Complaint   Patient presents with    Follow-up     1 month       \"Have you been to the ER, urgent care clinic since your last visit?  Hospitalized since your last visit?\"    NO    “Have you seen or consulted any other health care providers outside of [facility] since your last visit?”    Yes, GI doctor.             Vitals:    24 1228   BP: 115/62   Pulse: 77   Resp: 16   Temp: 98.7 °F (37.1 °C)   SpO2: 99%       Health Maintenance Due   Topic Date Due    Diabetic foot exam  2023    Annual Wellness Visit (Medicare Advantage)  2024    Diabetic retinal exam  2024    Breast cancer screen  05/10/2024        The patient, Savita Chávez, identity was verified by name and .   
Status   11/09/2020 7.4 (H) 4.0 - 5.6 % Final     Comment:     NEW METHOD  PLEASE NOTE NEW REFERENCE RANGE  (NOTE)  HbA1C Interpretive Ranges  <5.7              Normal  5.7 - 6.4         Consider Prediabetes  >6.5              Consider Diabetes       Hemoglobin A1C, POC   Date Value Ref Range Status   04/05/2024 6.4 % Final     Lab Results   Component Value Date    TSH 2.560 07/05/2023    COV7TBB 2.47 04/05/2024     Review of Systems   Constitutional:  Negative for activity change.   HENT:  Negative for congestion, rhinorrhea and sinus pain.    Respiratory:  Negative for cough, chest tightness and shortness of breath.    Cardiovascular:  Negative for chest pain, palpitations and leg swelling.   Gastrointestinal:  Positive for abdominal pain. Negative for blood in stool and constipation.   Endocrine: Negative for polyuria.   Genitourinary:  Negative for difficulty urinating, frequency, hematuria and urgency.   Musculoskeletal:  Negative for arthralgias, joint swelling and myalgias.   Skin:  Negative for rash.   Allergic/Immunologic: Negative for environmental allergies.   Neurological:  Negative for dizziness, light-headedness and headaches.   Psychiatric/Behavioral:  Negative for dysphoric mood and sleep disturbance. The patient is not nervous/anxious.        Objective:   Physical Exam  Constitutional:       Appearance: Normal appearance.      Comments: /62 (Site: Left Upper Arm, Position: Sitting, Cuff Size: Large Adult)   Pulse 77   Temp 98.7 °F (37.1 °C) (Oral)   Resp 16   Ht 1.575 m (5' 2\")   Wt 69 kg (152 lb 3.2 oz)   SpO2 99%   BMI 27.84 kg/m²    HENT:      Right Ear: Tympanic membrane normal.      Left Ear: Tympanic membrane normal.      Nose: No rhinorrhea.      Mouth/Throat:      Mouth: Mucous membranes are moist.   Cardiovascular:      Rate and Rhythm: Normal rate and regular rhythm.   Pulmonary:      Effort: Pulmonary effort is normal.      Breath sounds: Normal breath sounds.   Abdominal:

## 2024-05-15 ENCOUNTER — HOSPITAL ENCOUNTER (OUTPATIENT)
Facility: HOSPITAL | Age: 74
Discharge: HOME | End: 2024-05-18
Attending: FAMILY MEDICINE
Payer: MEDICARE

## 2024-05-15 VITALS — BODY MASS INDEX: 27.97 KG/M2 | WEIGHT: 152 LBS | HEIGHT: 62 IN

## 2024-05-15 DIAGNOSIS — M81.0 AGE-RELATED OSTEOPOROSIS WITHOUT CURRENT PATHOLOGICAL FRACTURE: ICD-10-CM

## 2024-05-15 DIAGNOSIS — Z12.31 ENCOUNTER FOR SCREENING MAMMOGRAM FOR BREAST CANCER: ICD-10-CM

## 2024-05-15 PROCEDURE — 77080 DXA BONE DENSITY AXIAL: CPT

## 2024-05-15 PROCEDURE — 77063 BREAST TOMOSYNTHESIS BI: CPT

## 2024-05-21 DIAGNOSIS — F51.01 PRIMARY INSOMNIA: ICD-10-CM

## 2024-05-21 RX ORDER — ZOLPIDEM TARTRATE 5 MG/1
5 TABLET ORAL NIGHTLY PRN
Qty: 90 TABLET | Refills: 0 | OUTPATIENT
Start: 2024-05-21 | End: 2024-08-19

## 2024-05-29 DIAGNOSIS — F51.01 PRIMARY INSOMNIA: ICD-10-CM

## 2024-05-30 RX ORDER — ZOLPIDEM TARTRATE 5 MG/1
5 TABLET ORAL NIGHTLY PRN
Qty: 90 TABLET | Refills: 0 | OUTPATIENT
Start: 2024-05-30 | End: 2024-08-28

## 2024-05-30 NOTE — TELEPHONE ENCOUNTER
Last appointment: 5/7/24  Next appointment: 7/9/24  Previous refill encounter(s): 3/15/24 #90    Requested Prescriptions     Pending Prescriptions Disp Refills    zolpidem (AMBIEN) 5 MG tablet [Pharmacy Med Name: ZOLPIDEM TARTRATE 5 MG Tablet] 90 tablet 0     Sig: Take 1 tablet by mouth nightly as needed for Sleep for up to 90 days. Max Daily Amount: 5 mg         For Pharmacy Admin Tracking Only    Program: Medication Refill  CPA in place:    Recommendation Provided To:   Intervention Detail: New Rx: 1, reason: Patient Preference  Intervention Accepted By:   Gap Closed?:    Time Spent (min): 5

## 2024-05-31 DIAGNOSIS — F51.01 PRIMARY INSOMNIA: ICD-10-CM

## 2024-05-31 RX ORDER — ZOLPIDEM TARTRATE 5 MG/1
5 TABLET ORAL NIGHTLY PRN
Qty: 90 TABLET | Refills: 0 | OUTPATIENT
Start: 2024-05-31 | End: 2024-08-29

## 2024-05-31 RX ORDER — BLOOD-GLUCOSE METER
EACH MISCELLANEOUS
Qty: 1 KIT | Refills: 3 | Status: SHIPPED | OUTPATIENT
Start: 2024-05-31

## 2024-05-31 NOTE — TELEPHONE ENCOUNTER
Last appointment: 5/7/24  Next appointment: 7/9/24  Previous refill encounter(s): 3/15/24 Ambien #90, 1/22/24 meter #1    Requested Prescriptions     Pending Prescriptions Disp Refills    Blood Glucose Monitoring Suppl (ACCU-CHEK GUIDE) w/Device KIT [Pharmacy Med Name: ACCU-CHEK GUIDE w/Device  Kit] 1 kit 3     Sig: USE AS DIRECTED    zolpidem (AMBIEN) 5 MG tablet [Pharmacy Med Name: ZOLPIDEM TARTRATE 5 MG Tablet] 90 tablet 0     Sig: Take 1 tablet by mouth nightly as needed for Sleep for up to 90 days. Max Daily Amount: 5 mg         For Pharmacy Admin Tracking Only    Program: Medication Refill  CPA in place:    Recommendation Provided To:   Intervention Detail: New Rx: 2, reason: Patient Preference  Intervention Accepted By:   Gap Closed?:    Time Spent (min): 5

## 2024-06-06 DIAGNOSIS — F51.01 PRIMARY INSOMNIA: ICD-10-CM

## 2024-06-07 RX ORDER — ZOLPIDEM TARTRATE 5 MG/1
5 TABLET ORAL NIGHTLY PRN
Qty: 90 TABLET | Refills: 0 | Status: SHIPPED | OUTPATIENT
Start: 2024-06-10 | End: 2024-09-08

## 2024-07-09 ENCOUNTER — OFFICE VISIT (OUTPATIENT)
Age: 74
End: 2024-07-09
Payer: MEDICARE

## 2024-07-09 ENCOUNTER — ANCILLARY PROCEDURE (OUTPATIENT)
Age: 74
End: 2024-07-09
Payer: MEDICARE

## 2024-07-09 VITALS
OXYGEN SATURATION: 98 % | SYSTOLIC BLOOD PRESSURE: 139 MMHG | HEIGHT: 62 IN | RESPIRATION RATE: 16 BRPM | TEMPERATURE: 98.1 F | HEART RATE: 84 BPM | DIASTOLIC BLOOD PRESSURE: 67 MMHG | WEIGHT: 158.6 LBS | BODY MASS INDEX: 29.19 KG/M2

## 2024-07-09 DIAGNOSIS — E03.9 ACQUIRED HYPOTHYROIDISM: ICD-10-CM

## 2024-07-09 DIAGNOSIS — Z79.899 ENCOUNTER FOR LONG-TERM (CURRENT) USE OF MEDICATIONS: ICD-10-CM

## 2024-07-09 DIAGNOSIS — M25.551 RIGHT HIP PAIN: ICD-10-CM

## 2024-07-09 DIAGNOSIS — R10.2 CHRONIC PELVIC PAIN IN FEMALE: ICD-10-CM

## 2024-07-09 DIAGNOSIS — G89.29 CHRONIC PELVIC PAIN IN FEMALE: ICD-10-CM

## 2024-07-09 DIAGNOSIS — Z00.00 MEDICARE ANNUAL WELLNESS VISIT, SUBSEQUENT: Primary | ICD-10-CM

## 2024-07-09 DIAGNOSIS — E11.9 TYPE 2 DIABETES MELLITUS WITHOUT COMPLICATION, WITHOUT LONG-TERM CURRENT USE OF INSULIN (HCC): ICD-10-CM

## 2024-07-09 DIAGNOSIS — F41.8 MIXED ANXIETY AND DEPRESSIVE DISORDER: ICD-10-CM

## 2024-07-09 DIAGNOSIS — I25.10 ATHEROSCLEROSIS OF NATIVE CORONARY ARTERY OF NATIVE HEART WITHOUT ANGINA PECTORIS: ICD-10-CM

## 2024-07-09 DIAGNOSIS — I73.9 PERIPHERAL VASCULAR DISEASE, UNSPECIFIED (HCC): ICD-10-CM

## 2024-07-09 DIAGNOSIS — E78.2 MIXED HYPERLIPIDEMIA: ICD-10-CM

## 2024-07-09 PROBLEM — E11.22 TYPE 2 DIABETES MELLITUS WITH CHRONIC KIDNEY DISEASE (HCC): Status: RESOLVED | Noted: 2024-05-07 | Resolved: 2024-07-09

## 2024-07-09 LAB
APPEARANCE UR: CLEAR
BACTERIA URNS QL MICRO: NEGATIVE /HPF
BILIRUB UR QL: NEGATIVE
COLOR UR: ABNORMAL
CREAT UR-MCNC: 38.2 MG/DL
EPITH CASTS URNS QL MICRO: ABNORMAL /LPF
GLUCOSE UR STRIP.AUTO-MCNC: NEGATIVE MG/DL
GLUCOSE, POC: 186 MG/DL
HBA1C MFR BLD: 7.2 %
HGB UR QL STRIP: NEGATIVE
HYALINE CASTS URNS QL MICRO: ABNORMAL /LPF (ref 0–5)
KETONES UR QL STRIP.AUTO: NEGATIVE MG/DL
LEUKOCYTE ESTERASE UR QL STRIP.AUTO: ABNORMAL
MICROALBUMIN UR-MCNC: 0.7 MG/DL
MICROALBUMIN/CREAT UR-RTO: 18 MG/G (ref 0–30)
NITRITE UR QL STRIP.AUTO: NEGATIVE
PH UR STRIP: 5.5 (ref 5–8)
PROT UR STRIP-MCNC: NEGATIVE MG/DL
RBC #/AREA URNS HPF: ABNORMAL /HPF (ref 0–5)
SP GR UR REFRACTOMETRY: 1.01 (ref 1–1.03)
SPECIMEN HOLD: NORMAL
UROBILINOGEN UR QL STRIP.AUTO: 0.2 EU/DL (ref 0.2–1)
WBC URNS QL MICRO: ABNORMAL /HPF (ref 0–4)

## 2024-07-09 PROCEDURE — 99214 OFFICE O/P EST MOD 30 MIN: CPT | Performed by: FAMILY MEDICINE

## 2024-07-09 PROCEDURE — 73502 X-RAY EXAM HIP UNI 2-3 VIEWS: CPT

## 2024-07-09 PROCEDURE — 83036 HEMOGLOBIN GLYCOSYLATED A1C: CPT | Performed by: FAMILY MEDICINE

## 2024-07-09 PROCEDURE — 82962 GLUCOSE BLOOD TEST: CPT | Performed by: FAMILY MEDICINE

## 2024-07-09 RX ORDER — PREDNISONE 10 MG/1
10 TABLET ORAL 2 TIMES DAILY
Qty: 10 TABLET | Refills: 0 | Status: SHIPPED | OUTPATIENT
Start: 2024-07-09 | End: 2024-07-14

## 2024-07-09 ASSESSMENT — PATIENT HEALTH QUESTIONNAIRE - PHQ9
5. POOR APPETITE OR OVEREATING: SEVERAL DAYS
7. TROUBLE CONCENTRATING ON THINGS, SUCH AS READING THE NEWSPAPER OR WATCHING TELEVISION: NOT AT ALL
9. THOUGHTS THAT YOU WOULD BE BETTER OFF DEAD, OR OF HURTING YOURSELF: NOT AT ALL
SUM OF ALL RESPONSES TO PHQ QUESTIONS 1-9: 4
8. MOVING OR SPEAKING SO SLOWLY THAT OTHER PEOPLE COULD HAVE NOTICED. OR THE OPPOSITE, BEING SO FIGETY OR RESTLESS THAT YOU HAVE BEEN MOVING AROUND A LOT MORE THAN USUAL: NOT AT ALL
4. FEELING TIRED OR HAVING LITTLE ENERGY: SEVERAL DAYS
SUM OF ALL RESPONSES TO PHQ9 QUESTIONS 1 & 2: 1
10. IF YOU CHECKED OFF ANY PROBLEMS, HOW DIFFICULT HAVE THESE PROBLEMS MADE IT FOR YOU TO DO YOUR WORK, TAKE CARE OF THINGS AT HOME, OR GET ALONG WITH OTHER PEOPLE: NOT DIFFICULT AT ALL
6. FEELING BAD ABOUT YOURSELF - OR THAT YOU ARE A FAILURE OR HAVE LET YOURSELF OR YOUR FAMILY DOWN: SEVERAL DAYS
SUM OF ALL RESPONSES TO PHQ QUESTIONS 1-9: 4
3. TROUBLE FALLING OR STAYING ASLEEP: NOT AT ALL
SUM OF ALL RESPONSES TO PHQ QUESTIONS 1-9: 4
SUM OF ALL RESPONSES TO PHQ QUESTIONS 1-9: 4
1. LITTLE INTEREST OR PLEASURE IN DOING THINGS: NOT AT ALL
2. FEELING DOWN, DEPRESSED OR HOPELESS: SEVERAL DAYS

## 2024-07-09 ASSESSMENT — ENCOUNTER SYMPTOMS
RHINORRHEA: 0
CHEST TIGHTNESS: 0
SINUS PAIN: 0
SHORTNESS OF BREATH: 0
ABDOMINAL PAIN: 1
BLOOD IN STOOL: 0
COUGH: 0
CONSTIPATION: 0

## 2024-07-09 NOTE — PROGRESS NOTES
Chief Complaint   Patient presents with    Medicare AWV    Follow-up     Patient is here today for 2 month follow-up.       \"Have you been to the ER, urgent care clinic since your last visit?  Hospitalized since your last visit?\"    NO    “Have you seen or consulted any other health care providers outside of [facility] since your last visit?”    NO             Vitals:    24 1020   BP: 139/67   Pulse: 84   Resp: 16   Temp: 98.1 °F (36.7 °C)   SpO2: 98%       Health Maintenance Due   Topic Date Due    Shingles vaccine (1 of 2) Never done    Diabetic foot exam  2023    Annual Wellness Visit (Medicare Advantage)  2024    Diabetic retinal exam  2024    Diabetic Alb to Cr ratio (uACR) test  2024        The patient, Savita Chávez, identity was verified by name and .   
Subjective:      Patient ID: Savita Chávez is a 74 y.o. female.    HPI  Follow up on chronic medical problems.    Still having pain in the abd and pelvic area.  She has seen GI.  Getting some tests done to eval.  Pain and  pressure in the lower abd and pelvis and radiating to the right hip now.  Some days worse than others.  Since stopping the ozemepic the pain has gotten some better but still has daily lower pelvic/abd pain. Has MRI pelvis for this week as per GI.      DM type II follow up:  Compliant w/ meds, diabetic diet, and exercise.  She is tolerating taking januvia.  BS have been stable.  Range in the low to mid 100s.  Checks BS  BID on most days and prn.  No low BS.  Pt does not have BS log at visit today.  Denies any tingling sensation.  No polyuria or polydipsia. No blurred vision.        Cardiovascular Review:  The patient has coronary artery disease, HF and status post coronary artery stenting.  Diet and Lifestyle: generally follows a low fat low cholesterol diet, generally follows a low sodium diet, follows a diabetic diet regularly, exercises sporadically   Home BP Monitoring: is not measured at home.   Pertinent ROS: taking medications as instructed, no medication side effects noted, no TIA's, no chest pain on exertion, no dyspnea on exertion,  stable mild swelling of ankles.    Hypercholesterolemia follow up:   Compliant w/ meds, low fat, low cholesterol diet.  Exercising some.  No muscle nor abdominal pain, no skin discoloration.  Patient fasting today.   Depression Review:   Patient is seen for followup of depression and anxiety.  Overall doing well.  Taking only Ambien to help with her sleep.     Anxiety usually triggered by stress.     She denies depressed mood and insomnia.     Thyroid Review:   Patient is seen for followup of hypothyroidism.  TSH in April was stable.     Thyroid ROS: denies fatigue, weight changes, heat/cold intolerance, bowel/skin changes or CVS symptoms.   HM:   Mammogram 
additional education material          ADL's:   Patient reports needing help with:  Select all that apply: (!) Transportation  Interventions:  See AVS for additional education material    Advanced Directives:  Do you have a Living Will?: (!) No    Intervention:  has NO advanced directive - information provided             Objective   Vitals:    07/09/24 1020   BP: 139/67   Site: Left Upper Arm   Position: Sitting   Cuff Size: Large Adult   Pulse: 84   Resp: 16   Temp: 98.1 °F (36.7 °C)   TempSrc: Oral   SpO2: 98%   Weight: 71.9 kg (158 lb 9.6 oz)   Height: 1.575 m (5' 2\")      Body mass index is 29.01 kg/m².                 Allergies   Allergen Reactions    Meperidine Hcl     Meperidine Rash     Other reaction(s): Unknown (comments)  Pt unsure of reaction     Prior to Visit Medications    Medication Sig Taking? Authorizing Provider   SITagliptin (JANUVIA) 50 MG tablet Take 2 tablets by mouth daily For your diabetes Yes Gabriela Garg MD   predniSONE (DELTASONE) 10 MG tablet Take 1 tablet by mouth 2 times daily for 5 days Yes Gabriela Garg MD   zolpidem (AMBIEN) 5 MG tablet Take 1 tablet by mouth nightly as needed for Sleep for up to 90 days. Max Daily Amount: 5 mg Yes Gabriela Garg MD   Blood Glucose Monitoring Suppl (ACCU-CHEK GUIDE) w/Device KIT USE AS DIRECTED Yes Gabriela Garg MD   loratadine (CLARITIN) 10 MG tablet Take 1 tablet by mouth daily Yes Gabriela Garg MD   Cholecalciferol (VITAMIN D) 50 MCG (2000 UT) CAPS capsule Take 2,000 Units by mouth daily Yes Gabriela Garg MD   aspirin (ASPIRIN 81) 81 MG EC tablet Take 1 tablet by mouth daily Yes Gabriela Garg MD   metoprolol succinate (TOPROL XL) 25 MG extended release tablet Take 1 tablet by mouth daily Yes Gabriela Garg MD   furosemide (LASIX) 20 MG tablet Take 1 tablet by mouth daily Yes Gabriela Garg MD   lisinopril (PRINIVIL;ZESTRIL) 2.5 MG tablet Take 1

## 2024-07-09 NOTE — PATIENT INSTRUCTIONS
pain reliever, such as acetaminophen (Tylenol).   When should you call for help?   Call 911 if you have symptoms of a heart attack. These may include:    Chest pain or pressure, or a strange feeling in the chest.     Sweating.     Shortness of breath.     Pain, pressure, or a strange feeling in the back, neck, jaw, or upper belly or in one or both shoulders or arms.     Lightheadedness or sudden weakness.     A fast or irregular heartbeat.   After you call 911, the  may tell you to chew 1 adult-strength or 2 to 4 low-dose aspirin. Wait for an ambulance. Do not try to drive yourself.  Watch closely for changes in your health, and be sure to contact your doctor if you have any problems.  Where can you learn more?  Go to https://www.[employer].com.net/patientEd and enter F075 to learn more about \"A Healthy Heart: Care Instructions.\"  Current as of: June 24, 2023  Content Version: 14.1  © 0317-9226 [employer].   Care instructions adapted under license by [employer]. If you have questions about a medical condition or this instruction, always ask your healthcare professional. [employer] disclaims any warranty or liability for your use of this information.      Personalized Preventive Plan for Savita Chávez - 7/9/2024  Medicare offers a range of preventive health benefits. Some of the tests and screenings are paid in full while other may be subject to a deductible, co-insurance, and/or copay.    Some of these benefits include a comprehensive review of your medical history including lifestyle, illnesses that may run in your family, and various assessments and screenings as appropriate.    After reviewing your medical record and screening and assessments performed today your provider may have ordered immunizations, labs, imaging, and/or referrals for you.  A list of these orders (if applicable) as well as your Preventive Care list are included within your After Visit Summary for your

## 2024-07-11 ENCOUNTER — HOSPITAL ENCOUNTER (OUTPATIENT)
Facility: HOSPITAL | Age: 74
Discharge: HOME | End: 2024-07-11
Attending: STUDENT IN AN ORGANIZED HEALTH CARE EDUCATION/TRAINING PROGRAM
Payer: MEDICARE

## 2024-07-11 ENCOUNTER — TELEPHONE (OUTPATIENT)
Age: 74
End: 2024-07-11

## 2024-07-11 DIAGNOSIS — N81.6 RECTOCELE: ICD-10-CM

## 2024-07-11 PROCEDURE — 72195 MRI PELVIS W/O DYE: CPT

## 2024-07-11 NOTE — TELEPHONE ENCOUNTER
----- Message from Gabriela Garg MD sent at 7/9/2024  6:12 PM EDT -----   I think this was the pt who hd seen the MD in [place] for her eye exam.

## 2024-07-16 NOTE — TELEPHONE ENCOUNTER
Per pharmacy, the Januvia 50mg 2/day dose is not covered. Insurance requires use of the higher strength. Please sign if appropriate.    Requested Prescriptions     Pending Prescriptions Disp Refills    SITagliptin (JANUVIA) 100 MG tablet [Pharmacy Med Name: JANUVIA 100MG TAB] 90 tablet 3     Sig: Take 1 tablet by mouth daily         For Pharmacy Admin Tracking Only    Program: Medication Refill  CPA in place:    Recommendation Provided To:   Intervention Detail: New Rx: 1, reason: Cost/Formulary Change  Intervention Accepted By:   Gap Closed?:    Time Spent (min): 5

## 2024-08-14 ENCOUNTER — OFFICE VISIT (OUTPATIENT)
Age: 74
End: 2024-08-14
Payer: MEDICARE

## 2024-08-14 VITALS
TEMPERATURE: 97.4 F | BODY MASS INDEX: 28.89 KG/M2 | RESPIRATION RATE: 16 BRPM | DIASTOLIC BLOOD PRESSURE: 64 MMHG | WEIGHT: 157 LBS | SYSTOLIC BLOOD PRESSURE: 125 MMHG | HEART RATE: 80 BPM | OXYGEN SATURATION: 100 % | HEIGHT: 62 IN

## 2024-08-14 DIAGNOSIS — B00.89 HERPETIC WHITLOW: ICD-10-CM

## 2024-08-14 DIAGNOSIS — I73.9 PERIPHERAL VASCULAR DISEASE, UNSPECIFIED (HCC): ICD-10-CM

## 2024-08-14 DIAGNOSIS — E78.2 MIXED HYPERLIPIDEMIA: ICD-10-CM

## 2024-08-14 DIAGNOSIS — R10.2 CHRONIC PELVIC PAIN IN FEMALE: ICD-10-CM

## 2024-08-14 DIAGNOSIS — G89.29 CHRONIC ABDOMINAL PAIN: ICD-10-CM

## 2024-08-14 DIAGNOSIS — Z86.73 HISTORY OF STROKE: ICD-10-CM

## 2024-08-14 DIAGNOSIS — F51.01 PRIMARY INSOMNIA: ICD-10-CM

## 2024-08-14 DIAGNOSIS — E11.9 TYPE 2 DIABETES MELLITUS WITHOUT COMPLICATION, WITHOUT LONG-TERM CURRENT USE OF INSULIN (HCC): Primary | ICD-10-CM

## 2024-08-14 DIAGNOSIS — Z79.899 ENCOUNTER FOR LONG-TERM (CURRENT) USE OF MEDICATIONS: ICD-10-CM

## 2024-08-14 DIAGNOSIS — I25.10 ATHEROSCLEROSIS OF NATIVE CORONARY ARTERY OF NATIVE HEART WITHOUT ANGINA PECTORIS: ICD-10-CM

## 2024-08-14 DIAGNOSIS — F41.8 MIXED ANXIETY AND DEPRESSIVE DISORDER: ICD-10-CM

## 2024-08-14 DIAGNOSIS — E03.9 ACQUIRED HYPOTHYROIDISM: ICD-10-CM

## 2024-08-14 DIAGNOSIS — G89.29 CHRONIC PELVIC PAIN IN FEMALE: ICD-10-CM

## 2024-08-14 DIAGNOSIS — R10.9 CHRONIC ABDOMINAL PAIN: ICD-10-CM

## 2024-08-14 LAB — GLUCOSE, POC: 133 MG/DL

## 2024-08-14 PROCEDURE — 3017F COLORECTAL CA SCREEN DOC REV: CPT | Performed by: FAMILY MEDICINE

## 2024-08-14 PROCEDURE — 3074F SYST BP LT 130 MM HG: CPT | Performed by: FAMILY MEDICINE

## 2024-08-14 PROCEDURE — 3078F DIAST BP <80 MM HG: CPT | Performed by: FAMILY MEDICINE

## 2024-08-14 PROCEDURE — 2022F DILAT RTA XM EVC RTNOPTHY: CPT | Performed by: FAMILY MEDICINE

## 2024-08-14 PROCEDURE — 99214 OFFICE O/P EST MOD 30 MIN: CPT | Performed by: FAMILY MEDICINE

## 2024-08-14 PROCEDURE — PBSHW AMB POC GLUCOSE BLOOD, BY GLUCOSE MONITORING DEVICE: Performed by: FAMILY MEDICINE

## 2024-08-14 PROCEDURE — G8399 PT W/DXA RESULTS DOCUMENT: HCPCS | Performed by: FAMILY MEDICINE

## 2024-08-14 PROCEDURE — G8427 DOCREV CUR MEDS BY ELIG CLIN: HCPCS | Performed by: FAMILY MEDICINE

## 2024-08-14 PROCEDURE — 82962 GLUCOSE BLOOD TEST: CPT | Performed by: FAMILY MEDICINE

## 2024-08-14 PROCEDURE — 1036F TOBACCO NON-USER: CPT | Performed by: FAMILY MEDICINE

## 2024-08-14 PROCEDURE — 3046F HEMOGLOBIN A1C LEVEL >9.0%: CPT | Performed by: FAMILY MEDICINE

## 2024-08-14 PROCEDURE — G8419 CALC BMI OUT NRM PARAM NOF/U: HCPCS | Performed by: FAMILY MEDICINE

## 2024-08-14 PROCEDURE — 1090F PRES/ABSN URINE INCON ASSESS: CPT | Performed by: FAMILY MEDICINE

## 2024-08-14 PROCEDURE — 1123F ACP DISCUSS/DSCN MKR DOCD: CPT | Performed by: FAMILY MEDICINE

## 2024-08-14 RX ORDER — VALACYCLOVIR HYDROCHLORIDE 500 MG/1
500 TABLET, FILM COATED ORAL 2 TIMES DAILY
Qty: 6 TABLET | Refills: 1 | Status: SHIPPED | OUTPATIENT
Start: 2024-08-14 | End: 2024-08-17

## 2024-08-14 ASSESSMENT — PATIENT HEALTH QUESTIONNAIRE - PHQ9
SUM OF ALL RESPONSES TO PHQ QUESTIONS 1-9: 0
1. LITTLE INTEREST OR PLEASURE IN DOING THINGS: NOT AT ALL
SUM OF ALL RESPONSES TO PHQ9 QUESTIONS 1 & 2: 0
SUM OF ALL RESPONSES TO PHQ QUESTIONS 1-9: 0
2. FEELING DOWN, DEPRESSED OR HOPELESS: NOT AT ALL

## 2024-08-14 ASSESSMENT — ENCOUNTER SYMPTOMS
ABDOMINAL PAIN: 1
CHEST TIGHTNESS: 0
RHINORRHEA: 0
SINUS PAIN: 0
SHORTNESS OF BREATH: 0
BLOOD IN STOOL: 0
COUGH: 0
CONSTIPATION: 0

## 2024-08-14 NOTE — PROGRESS NOTES
Subjective:      Patient ID: Savita Chávez is a 74 y.o. female.    HPI  Follow up on chronic medical problems.  Still having pain in the abd and pelvic area.  She has seen GI.  Pain and  pressure in the lower abd and pelvis.  Some days worse than others.  Since stopping the ozemepic the pain has gotten some better but still has daily lower pelvic/abd pain. Had MRI abd/pelvis on last month but she has not gotten her results for her GI doctor.  We briefly reviewed her results for or this week as per GI.  MRI results:  IMPRESSION:  1. Moderate inferior and small anterior rectocele.  2. Complete vaginocele.  3. Small cystocele.  4. Near-complete defecation during Valsalva, with little additional excreted gel  during defecation phase.    DM type II follow up:  Compliant w/ meds, diabetic diet, and exercise.  She is tolerating taking januvia.  BS have been stable.  Range in the low to mid 100s.  Checks BS  BID on most days and prn.  No low BS.  Pt does not have BS log at visit today.  Denies any tingling sensation.  No polyuria or polydipsia. No blurred vision.        Cardiovascular Review:  The patient has coronary artery disease, HF and status post coronary artery stenting.  Diet and Lifestyle: generally follows a low fat low cholesterol diet, generally follows a low sodium diet, follows a diabetic diet regularly, exercises sporadically   Home BP Monitoring: is not measured at home.   Pertinent ROS: taking medications as instructed, no medication side effects noted, no TIA's, no chest pain on exertion, no dyspnea on exertion,  stable mild swelling of ankles.    Hypercholesterolemia follow up:   Compliant w/ meds, low fat, low cholesterol diet.  Exercising some.  No muscle nor abdominal pain, no skin discoloration.  Patient fasting today.   Depression Review:   Patient is seen for followup of depression and anxiety.  Overall doing well.  Taking only Ambien to help with her sleep.     Anxiety usually triggered by stress.

## 2024-08-14 NOTE — PROGRESS NOTES
Chief Complaint   Patient presents with    Follow-up     1 mo check       \"Have you been to the ER, urgent care clinic since your last visit?  Hospitalized since your last visit?\"    NO    “Have you seen or consulted any other health care providers outside of [facility] since your last visit?”    NO            Click Here for Release of Records Request       There were no vitals filed for this visit.   Health Maintenance Due   Topic Date Due    Diabetic foot exam  2023    COVID-19 Vaccine (2023- season) 2024    Flu vaccine (1) 2024        The patient, Savita Chávez, identity was verified by name and .

## 2024-08-15 LAB
ANION GAP SERPL CALC-SCNC: 3 MMOL/L (ref 5–15)
BUN SERPL-MCNC: 11 MG/DL (ref 6–20)
BUN/CREAT SERPL: 12 (ref 12–20)
CALCIUM SERPL-MCNC: 9.5 MG/DL (ref 8.5–10.1)
CHLORIDE SERPL-SCNC: 107 MMOL/L (ref 97–108)
CO2 SERPL-SCNC: 30 MMOL/L (ref 21–32)
CREAT SERPL-MCNC: 0.93 MG/DL (ref 0.55–1.02)
ERYTHROCYTE [DISTWIDTH] IN BLOOD BY AUTOMATED COUNT: 13.2 % (ref 11.5–14.5)
GLUCOSE SERPL-MCNC: 121 MG/DL (ref 65–100)
HCT VFR BLD AUTO: 35 % (ref 35–47)
HGB BLD-MCNC: 11.1 G/DL (ref 11.5–16)
MCH RBC QN AUTO: 29.9 PG (ref 26–34)
MCHC RBC AUTO-ENTMCNC: 31.7 G/DL (ref 30–36.5)
MCV RBC AUTO: 94.3 FL (ref 80–99)
NRBC # BLD: 0 K/UL (ref 0–0.01)
NRBC BLD-RTO: 0 PER 100 WBC
PLATELET # BLD AUTO: 261 K/UL (ref 150–400)
PMV BLD AUTO: 10.7 FL (ref 8.9–12.9)
POTASSIUM SERPL-SCNC: 4.7 MMOL/L (ref 3.5–5.1)
RBC # BLD AUTO: 3.71 M/UL (ref 3.8–5.2)
SODIUM SERPL-SCNC: 140 MMOL/L (ref 136–145)
WBC # BLD AUTO: 7.1 K/UL (ref 3.6–11)

## 2024-08-16 DIAGNOSIS — F51.01 PRIMARY INSOMNIA: ICD-10-CM

## 2024-08-16 RX ORDER — ZOLPIDEM TARTRATE 5 MG/1
TABLET ORAL
Qty: 90 TABLET | OUTPATIENT
Start: 2024-08-16

## 2024-08-16 NOTE — TELEPHONE ENCOUNTER
Ambien was sent on 6/10/24 for #90 - too soon    For Pharmacy Admin Tracking Only    Program: Medication Refill  CPA in place:    Recommendation Provided To:   Intervention Detail: Discontinued Rx: 1, reason: Duplicate Therapy  Intervention Accepted By:   Gap Closed?:    Time Spent (min): 5

## 2024-08-28 DIAGNOSIS — F51.01 PRIMARY INSOMNIA: ICD-10-CM

## 2024-08-29 RX ORDER — ZOLPIDEM TARTRATE 5 MG/1
TABLET ORAL
Qty: 90 TABLET | Refills: 0 | OUTPATIENT
Start: 2024-08-29 | End: 2024-11-27

## 2024-08-29 RX ORDER — BLOOD-GLUCOSE METER
EACH MISCELLANEOUS
Qty: 1 KIT | Refills: 3 | Status: SHIPPED | OUTPATIENT
Start: 2024-08-29

## 2024-08-29 RX ORDER — MELOXICAM 15 MG/1
15 TABLET ORAL DAILY PRN
Qty: 90 TABLET | Refills: 1 | Status: SHIPPED | OUTPATIENT
Start: 2024-08-29

## 2024-08-29 NOTE — TELEPHONE ENCOUNTER
I show Mobic was d/c on 5/7/24. Please sign if appropriate.    Last appointment: 8/14/24  Next appointment: 10/14/24  Previous refill encounter(s): 6/10/24 Ambien #90, 1/22/24 Mobic #90 with 1 refill, 5/31/24 Guide #1 with 3 refills    Requested Prescriptions     Pending Prescriptions Disp Refills    Blood Glucose Monitoring Suppl (ACCU-CHEK GUIDE) w/Device KIT [Pharmacy Med Name: ACCU-CHEK GUIDE w/Device  Kit] 1 kit 3     Sig: USE AS DIRECTED    meloxicam (MOBIC) 15 MG tablet [Pharmacy Med Name: MELOXICAM 15 MG Tablet] 90 tablet 1     Sig: TAKE 1 TABLET EVERY DAY    zolpidem (AMBIEN) 5 MG tablet [Pharmacy Med Name: ZOLPIDEM TARTRATE 5 MG Tablet] 90 tablet 0     Sig: TAKE 1 TABLET EVERY NIGHT AS NEEDED FOR SLEEP (MAX DAILY AMOUNT: 5MG)         For Pharmacy Admin Tracking Only    Program: Medication Refill  CPA in place:    Recommendation Provided To:   Intervention Detail: New Rx: 3, reason: Patient Preference  Intervention Accepted By:   Gap Closed?:    Time Spent (min): 5

## 2024-09-04 DIAGNOSIS — F51.01 PRIMARY INSOMNIA: ICD-10-CM

## 2024-09-04 RX ORDER — ZOLPIDEM TARTRATE 5 MG/1
5 TABLET ORAL NIGHTLY PRN
Qty: 90 TABLET | Refills: 0 | Status: SHIPPED | OUTPATIENT
Start: 2024-09-06 | End: 2024-12-05

## 2024-09-04 NOTE — TELEPHONE ENCOUNTER
Last appointment: 8/14/24  Next appointment: 10/14/24  Previous refill encounter(s): 6/10/24 #90    Requested Prescriptions     Pending Prescriptions Disp Refills    zolpidem (AMBIEN) 5 MG tablet 90 tablet 0     Sig: Take 1 tablet by mouth nightly as needed for Sleep for up to 90 days. Max Daily Amount: 5 mg         For Pharmacy Admin Tracking Only    Program: Medication Refill  CPA in place:    Recommendation Provided To:   Intervention Detail: New Rx: 1, reason: Patient Preference  Intervention Accepted By:   Gap Closed?:    Time Spent (min): 5

## 2024-10-08 RX ORDER — OMEPRAZOLE 40 MG/1
40 CAPSULE, DELAYED RELEASE ORAL
OUTPATIENT
Start: 2024-10-08

## 2024-10-08 NOTE — TELEPHONE ENCOUNTER
This med is listed as historical. Please input missing prescribing info and sign if appropriate.    Last appointment: 8/14/24  Next appointment: 10/14/24    Requested Prescriptions     Pending Prescriptions Disp Refills    omeprazole (PRILOSEC) 40 MG delayed release capsule       Sig: Take 1 capsule by mouth         For Pharmacy Admin Tracking Only    Program: Medication Refill  CPA in place:    Recommendation Provided To:   Intervention Detail: New Rx: 1, reason: Patient Preference  Intervention Accepted By:   Gap Closed?:    Time Spent (min): 5

## 2024-10-14 ENCOUNTER — TELEPHONE (OUTPATIENT)
Age: 74
End: 2024-10-14

## 2024-10-14 ENCOUNTER — OFFICE VISIT (OUTPATIENT)
Age: 74
End: 2024-10-14
Payer: MEDICARE

## 2024-10-14 VITALS
DIASTOLIC BLOOD PRESSURE: 68 MMHG | HEART RATE: 66 BPM | TEMPERATURE: 98.9 F | BODY MASS INDEX: 30.03 KG/M2 | WEIGHT: 163.2 LBS | RESPIRATION RATE: 18 BRPM | OXYGEN SATURATION: 99 % | HEIGHT: 62 IN | SYSTOLIC BLOOD PRESSURE: 156 MMHG

## 2024-10-14 DIAGNOSIS — F51.01 PRIMARY INSOMNIA: ICD-10-CM

## 2024-10-14 DIAGNOSIS — R10.2 CHRONIC PELVIC PAIN IN FEMALE: ICD-10-CM

## 2024-10-14 DIAGNOSIS — Z86.73 HISTORY OF STROKE: ICD-10-CM

## 2024-10-14 DIAGNOSIS — I25.10 ATHEROSCLEROSIS OF NATIVE CORONARY ARTERY OF NATIVE HEART WITHOUT ANGINA PECTORIS: ICD-10-CM

## 2024-10-14 DIAGNOSIS — Z79.899 ENCOUNTER FOR LONG-TERM (CURRENT) USE OF MEDICATIONS: ICD-10-CM

## 2024-10-14 DIAGNOSIS — E11.9 TYPE 2 DIABETES MELLITUS WITHOUT COMPLICATION, WITHOUT LONG-TERM CURRENT USE OF INSULIN (HCC): Primary | ICD-10-CM

## 2024-10-14 DIAGNOSIS — F41.8 MIXED ANXIETY AND DEPRESSIVE DISORDER: ICD-10-CM

## 2024-10-14 DIAGNOSIS — M25.551 RIGHT HIP PAIN: ICD-10-CM

## 2024-10-14 DIAGNOSIS — G89.29 CHRONIC PELVIC PAIN IN FEMALE: ICD-10-CM

## 2024-10-14 DIAGNOSIS — I73.9 PERIPHERAL VASCULAR DISEASE, UNSPECIFIED (HCC): ICD-10-CM

## 2024-10-14 DIAGNOSIS — E78.2 MIXED HYPERLIPIDEMIA: ICD-10-CM

## 2024-10-14 DIAGNOSIS — R10.30 LOWER ABDOMINAL PAIN: ICD-10-CM

## 2024-10-14 DIAGNOSIS — M51.360 DISCOGENIC LOW BACK PAIN: ICD-10-CM

## 2024-10-14 DIAGNOSIS — E03.9 ACQUIRED HYPOTHYROIDISM: ICD-10-CM

## 2024-10-14 LAB
GLUCOSE, POC: 138 MG/DL
HBA1C MFR BLD: 6.7 %

## 2024-10-14 PROCEDURE — 1036F TOBACCO NON-USER: CPT | Performed by: FAMILY MEDICINE

## 2024-10-14 PROCEDURE — PBSHW AMB POC HEMOGLOBIN A1C: Performed by: FAMILY MEDICINE

## 2024-10-14 PROCEDURE — 2022F DILAT RTA XM EVC RTNOPTHY: CPT | Performed by: FAMILY MEDICINE

## 2024-10-14 PROCEDURE — PBSHW AMB POC GLUCOSE BLOOD, BY GLUCOSE MONITORING DEVICE: Performed by: FAMILY MEDICINE

## 2024-10-14 PROCEDURE — G8482 FLU IMMUNIZE ORDER/ADMIN: HCPCS | Performed by: FAMILY MEDICINE

## 2024-10-14 PROCEDURE — 1090F PRES/ABSN URINE INCON ASSESS: CPT | Performed by: FAMILY MEDICINE

## 2024-10-14 PROCEDURE — 99214 OFFICE O/P EST MOD 30 MIN: CPT | Performed by: FAMILY MEDICINE

## 2024-10-14 PROCEDURE — 3077F SYST BP >= 140 MM HG: CPT | Performed by: FAMILY MEDICINE

## 2024-10-14 PROCEDURE — 91320 SARSCV2 VAC 30MCG TRS-SUC IM: CPT | Performed by: FAMILY MEDICINE

## 2024-10-14 PROCEDURE — PBSHW INFLUENZA, FLUAD TRIVALENT, (AGE 65 Y+), IM, PRESERVATIVE FREE, 0.5ML: Performed by: FAMILY MEDICINE

## 2024-10-14 PROCEDURE — 1123F ACP DISCUSS/DSCN MKR DOCD: CPT | Performed by: FAMILY MEDICINE

## 2024-10-14 PROCEDURE — 3078F DIAST BP <80 MM HG: CPT | Performed by: FAMILY MEDICINE

## 2024-10-14 PROCEDURE — 82962 GLUCOSE BLOOD TEST: CPT | Performed by: FAMILY MEDICINE

## 2024-10-14 PROCEDURE — 3017F COLORECTAL CA SCREEN DOC REV: CPT | Performed by: FAMILY MEDICINE

## 2024-10-14 PROCEDURE — G8427 DOCREV CUR MEDS BY ELIG CLIN: HCPCS | Performed by: FAMILY MEDICINE

## 2024-10-14 PROCEDURE — 90653 IIV ADJUVANT VACCINE IM: CPT | Performed by: FAMILY MEDICINE

## 2024-10-14 PROCEDURE — PBSHW COVID-19, COMIRNATY, (AGE 12Y+), IM, PF, 30MCG/0.3ML: Performed by: FAMILY MEDICINE

## 2024-10-14 PROCEDURE — 83036 HEMOGLOBIN GLYCOSYLATED A1C: CPT | Performed by: FAMILY MEDICINE

## 2024-10-14 PROCEDURE — G8399 PT W/DXA RESULTS DOCUMENT: HCPCS | Performed by: FAMILY MEDICINE

## 2024-10-14 PROCEDURE — 3046F HEMOGLOBIN A1C LEVEL >9.0%: CPT | Performed by: FAMILY MEDICINE

## 2024-10-14 PROCEDURE — G8419 CALC BMI OUT NRM PARAM NOF/U: HCPCS | Performed by: FAMILY MEDICINE

## 2024-10-14 RX ORDER — FLUTICASONE PROPIONATE 50 MCG
2 SPRAY, SUSPENSION (ML) NASAL DAILY
COMMUNITY
End: 2024-10-14 | Stop reason: SDUPTHER

## 2024-10-14 RX ORDER — ZOLPIDEM TARTRATE 5 MG/1
5 TABLET ORAL NIGHTLY PRN
Qty: 90 TABLET | Refills: 0 | Status: CANCELLED | OUTPATIENT
Start: 2024-10-14 | End: 2025-01-12

## 2024-10-14 RX ORDER — FLUTICASONE PROPIONATE 50 MCG
2 SPRAY, SUSPENSION (ML) NASAL DAILY
Qty: 16 G | Refills: 3 | Status: SHIPPED | OUTPATIENT
Start: 2024-10-14

## 2024-10-14 RX ORDER — GABAPENTIN 100 MG/1
100 CAPSULE ORAL 2 TIMES DAILY
Qty: 60 CAPSULE | Refills: 3 | Status: SHIPPED | OUTPATIENT
Start: 2024-10-14 | End: 2025-04-12

## 2024-10-14 ASSESSMENT — PATIENT HEALTH QUESTIONNAIRE - PHQ9
SUM OF ALL RESPONSES TO PHQ QUESTIONS 1-9: 1
3. TROUBLE FALLING OR STAYING ASLEEP: NOT AT ALL
SUM OF ALL RESPONSES TO PHQ9 QUESTIONS 1 & 2: 1
8. MOVING OR SPEAKING SO SLOWLY THAT OTHER PEOPLE COULD HAVE NOTICED. OR THE OPPOSITE, BEING SO FIGETY OR RESTLESS THAT YOU HAVE BEEN MOVING AROUND A LOT MORE THAN USUAL: NOT AT ALL
7. TROUBLE CONCENTRATING ON THINGS, SUCH AS READING THE NEWSPAPER OR WATCHING TELEVISION: NOT AT ALL
SUM OF ALL RESPONSES TO PHQ QUESTIONS 1-9: 1
9. THOUGHTS THAT YOU WOULD BE BETTER OFF DEAD, OR OF HURTING YOURSELF: NOT AT ALL
SUM OF ALL RESPONSES TO PHQ QUESTIONS 1-9: 1
4. FEELING TIRED OR HAVING LITTLE ENERGY: NOT AT ALL
1. LITTLE INTEREST OR PLEASURE IN DOING THINGS: NOT AT ALL
2. FEELING DOWN, DEPRESSED OR HOPELESS: SEVERAL DAYS
SUM OF ALL RESPONSES TO PHQ QUESTIONS 1-9: 1
5. POOR APPETITE OR OVEREATING: NOT AT ALL
6. FEELING BAD ABOUT YOURSELF - OR THAT YOU ARE A FAILURE OR HAVE LET YOURSELF OR YOUR FAMILY DOWN: NOT AT ALL

## 2024-10-14 ASSESSMENT — ENCOUNTER SYMPTOMS
CONSTIPATION: 0
BLOOD IN STOOL: 0
CHEST TIGHTNESS: 0
SINUS PAIN: 0
ABDOMINAL PAIN: 1
RHINORRHEA: 0
COUGH: 0
SHORTNESS OF BREATH: 0

## 2024-10-14 NOTE — TELEPHONE ENCOUNTER
Pt was called to let know that per Dr. Oneal she can wait until her next appointment with her to get her lab drawn.

## 2024-10-14 NOTE — PROGRESS NOTES
Chief Complaint   Patient presents with    Follow-up     Patient is here today for a 2 month follow up       \"Have you been to the ER, urgent care clinic since your last visit?  Hospitalized since your last visit?\"    NO    “Have you seen or consulted any other health care providers outside of [facility] since your last visit?”    YES, Ortho On Call  for hip pain    Per verbal orders of Dr. Albert, injection of influenza vaccine was given in the Left deltoid . Patient tolerated it well. Patient instructed to report any adverse reaction to me immediately.    FLU LOT: [id number]  EXP: 2025  NDC: 26777-252-21  No LMP recorded. Patient has had a hysterectomy.:       Covid LOT: [id number]  EXP: 2025  NDC: 8008-9943-80    Click Here for Release of Records Request  Vitals:    10/14/24 1055 10/14/24 1104   BP: (!) 148/78 (!) 156/68   Site: Left Upper Arm Right Upper Arm   Position: Sitting Sitting   Cuff Size: Large Adult Large Adult   Pulse: 66    Resp: 18    Temp: 98.9 °F (37.2 °C)    TempSrc: Oral    SpO2: 99%    Weight: 74 kg (163 lb 3.2 oz)    Height: 1.575 m (5' 2\")         There were no vitals filed for this visit.    Health Maintenance Due   Topic Date Due    Diabetic foot exam  2023    Flu vaccine (1) 2024    COVID-19 Vaccine ( season) 2024        The patient, Savita Chávez, identity was verified by name and .

## 2024-10-14 NOTE — TELEPHONE ENCOUNTER
----- Message from Sofia VIRIDIANA sent at 10/14/2024  2:36 PM EDT -----  Regarding: Lab Notification: Samples unable to be obtained  We have been notified that samples could not be obtained for the patient below's most recent lab work. Please place new orders prior to the patient's return.    If additional assistance is required, please contact Client Services at [phone].    Patient: Kyler Barney  : 1950  Ordering Provider: NARESH ANDINO MD    Thank you,    [facility] Client Services

## 2024-10-14 NOTE — PROGRESS NOTES
Subjective:      Patient ID: Savita Chávez is a 74 y.o. female.    HPI  Follow up on chronic medical problems.  Continue with pain in the lower abd, lower back and hip radiating to the pelvic area.  She has seen GI, GYN and ortho.  Now thinking that this pain is from her back.  She did get OLEG but only helped for a short while.  Scheduled for another OLEG on next month.  Some days worse than others.  Since stopping the ozemepic the pain in her abd has gotten some better.  Had MR Dynamic pelvic floor:  1. Moderate inferior and small anterior rectocele.  2. Complete vaginocele.  3. Small cystocele.  4. Near-complete defecation during Valsalva, with little additional excreted gel during defecation phase.    She has done PT which has some with her symptoms.    DM type II follow up:  Compliant w/ meds, diabetic diet, and exercise.  She is tolerating taking januvia.  BS have been stable.  Range in the low to mid 100s.  Checks BS  BID on most days and prn.  No low BS.  Pt does not have BS log at visit today.  Denies any tingling sensation.  No polyuria or polydipsia. No blurred vision.  Weight is up by 10 pounds since being off of the ozempic.          Cardiovascular Review:  The patient has coronary artery disease, HF and status post coronary artery stenting.  Diet and Lifestyle: generally follows a low fat low cholesterol diet, generally follows a low sodium diet, follows a diabetic diet regularly, exercises sporadically   Home BP Monitoring: is not measured at home.   Pertinent ROS: taking medications as instructed, no medication side effects noted, no TIA's, no chest pain on exertion, no dyspnea on exertion,  stable mild swelling of ankles.    Hypercholesterolemia follow up:   Compliant w/ meds, low fat, low cholesterol diet.  Exercising some.  No muscle nor abdominal pain, no skin discoloration.  Patient fasting today.   Depression Review:   Patient is seen for followup of depression and anxiety.  Overall doing well.

## 2024-10-14 NOTE — TELEPHONE ENCOUNTER
Returned the pt call to see when the pt can come back to get her labs redrawn.  She wants to know can she do the labs on her next office visit with Dr. Oneal on 24?  ---------------------------------------------------------------    --- Message from Sofia KEMP sent at 10/14/2024  2:36 PM EDT -----  Regarding: Lab Notification: Samples unable to be obtained  We have been notified that samples could not be obtained for the patient below's most recent lab work. Please place new orders prior to the patient's return.    If additional assistance is required, please contact Client Services at [phone].    Patient: Kyler Barney  : 1950  Ordering Provider: NARESH ANDINO MD    Thank you,    [facility] Client Services

## 2024-10-23 ENCOUNTER — ANESTHESIA (OUTPATIENT)
Facility: HOSPITAL | Age: 74
End: 2024-10-23
Payer: MEDICARE

## 2024-10-23 ENCOUNTER — ANESTHESIA EVENT (OUTPATIENT)
Facility: HOSPITAL | Age: 74
End: 2024-10-23
Payer: MEDICARE

## 2024-10-23 ENCOUNTER — HOSPITAL ENCOUNTER (OUTPATIENT)
Facility: HOSPITAL | Age: 74
Setting detail: OUTPATIENT SURGERY
Discharge: HOME | End: 2024-10-23
Attending: STUDENT IN AN ORGANIZED HEALTH CARE EDUCATION/TRAINING PROGRAM | Admitting: STUDENT IN AN ORGANIZED HEALTH CARE EDUCATION/TRAINING PROGRAM
Payer: MEDICARE

## 2024-10-23 VITALS
WEIGHT: 159 LBS | BODY MASS INDEX: 28.17 KG/M2 | HEART RATE: 62 BPM | RESPIRATION RATE: 15 BRPM | TEMPERATURE: 97.6 F | DIASTOLIC BLOOD PRESSURE: 64 MMHG | HEIGHT: 63 IN | OXYGEN SATURATION: 100 % | SYSTOLIC BLOOD PRESSURE: 174 MMHG

## 2024-10-23 PROCEDURE — 3700000001 HC ADD 15 MINUTES (ANESTHESIA): Performed by: STUDENT IN AN ORGANIZED HEALTH CARE EDUCATION/TRAINING PROGRAM

## 2024-10-23 PROCEDURE — 7100000011 HC PHASE II RECOVERY - ADDTL 15 MIN: Performed by: STUDENT IN AN ORGANIZED HEALTH CARE EDUCATION/TRAINING PROGRAM

## 2024-10-23 PROCEDURE — 2500000003 HC RX 250 WO HCPCS: Performed by: NURSE ANESTHETIST, CERTIFIED REGISTERED

## 2024-10-23 PROCEDURE — 2580000003 HC RX 258: Performed by: NURSE ANESTHETIST, CERTIFIED REGISTERED

## 2024-10-23 PROCEDURE — 2709999900 HC NON-CHARGEABLE SUPPLY: Performed by: STUDENT IN AN ORGANIZED HEALTH CARE EDUCATION/TRAINING PROGRAM

## 2024-10-23 PROCEDURE — 3700000000 HC ANESTHESIA ATTENDED CARE: Performed by: STUDENT IN AN ORGANIZED HEALTH CARE EDUCATION/TRAINING PROGRAM

## 2024-10-23 PROCEDURE — 3600007512: Performed by: STUDENT IN AN ORGANIZED HEALTH CARE EDUCATION/TRAINING PROGRAM

## 2024-10-23 PROCEDURE — 2720000010 HC SURG SUPPLY STERILE: Performed by: STUDENT IN AN ORGANIZED HEALTH CARE EDUCATION/TRAINING PROGRAM

## 2024-10-23 PROCEDURE — 6360000002 HC RX W HCPCS: Performed by: NURSE ANESTHETIST, CERTIFIED REGISTERED

## 2024-10-23 PROCEDURE — 7100000010 HC PHASE II RECOVERY - FIRST 15 MIN: Performed by: STUDENT IN AN ORGANIZED HEALTH CARE EDUCATION/TRAINING PROGRAM

## 2024-10-23 PROCEDURE — 3600007502: Performed by: STUDENT IN AN ORGANIZED HEALTH CARE EDUCATION/TRAINING PROGRAM

## 2024-10-23 RX ORDER — SODIUM CHLORIDE 0.9 % (FLUSH) 0.9 %
5-40 SYRINGE (ML) INJECTION PRN
Status: CANCELLED | OUTPATIENT
Start: 2024-10-23

## 2024-10-23 RX ORDER — SODIUM CHLORIDE 9 MG/ML
25 INJECTION, SOLUTION INTRAVENOUS PRN
Status: CANCELLED | OUTPATIENT
Start: 2024-10-23

## 2024-10-23 RX ORDER — SODIUM CHLORIDE 9 MG/ML
INJECTION, SOLUTION INTRAVENOUS
Status: DISCONTINUED | OUTPATIENT
Start: 2024-10-23 | End: 2024-10-23 | Stop reason: SDUPTHER

## 2024-10-23 RX ORDER — SODIUM CHLORIDE 0.9 % (FLUSH) 0.9 %
5-40 SYRINGE (ML) INJECTION EVERY 12 HOURS SCHEDULED
Status: CANCELLED | OUTPATIENT
Start: 2024-10-23

## 2024-10-23 RX ADMIN — PROPOFOL 150 MG: 10 INJECTION, EMULSION INTRAVENOUS at 10:48

## 2024-10-23 RX ADMIN — SODIUM CHLORIDE: 9 INJECTION, SOLUTION INTRAVENOUS at 10:50

## 2024-10-23 RX ADMIN — SODIUM CHLORIDE: 9 INJECTION, SOLUTION INTRAVENOUS at 10:24

## 2024-10-23 RX ADMIN — LIDOCAINE HYDROCHLORIDE 100 MG: 20 INJECTION, SOLUTION EPIDURAL; INFILTRATION; INTRACAUDAL; PERINEURAL at 10:36

## 2024-10-23 ASSESSMENT — PAIN - FUNCTIONAL ASSESSMENT: PAIN_FUNCTIONAL_ASSESSMENT: NONE - DENIES PAIN

## 2024-10-23 ASSESSMENT — ENCOUNTER SYMPTOMS: SHORTNESS OF BREATH: 1

## 2024-10-23 NOTE — OP NOTE
[facility]                   Endoscopic Gastroduodenoscopy Procedure Note      10/23/2024  Savita Chávez  1950  [id number]    Procedure: Endoscopic Gastroduodenoscopy with balloon dilation of the esophagus    Indication: Dyphagia/odynophagia     Pre-operative Diagnosis: see indication above    Post-operative Diagnosis: see findings below    : Ebony Herron MD    Referring Provider:  Gabriela Garg MD      Anesthesia/Sedation:  MAC anesthesia Propofol    Airway assessment: No airway problems anticipated    Pre-Procedural Exam:      Airway: clear, no airway problems anticipated  Heart: RRR, without gallops or rubs  Lungs: clear bilaterally without wheezes, crackles, or rhonchi  Abdomen: soft, nontender, nondistended, bowel sounds present  Mental Status: awake, alert and oriented to person, place and time       Procedure Details     After infomed consent was obtained for the procedure, with all risks and benefits of procedure explained the patient was taken to the endoscopy suite and placed in the left lateral decubitus position.  Following sequential administration of sedation as per above, the endoscope was inserted into the mouth and advanced under direct vision to the proximal jejunum    Findings:   Esophagus:Normal esophageal mucosa.  There was what appeared to be a fundoplication at the GE junction which caused narrowing that was easily traversable with the endoscope.  A CRE balloon was inserted and inflated to 12mm-13.5mm-15mm with no visible mucosal effect but improved appearance at the junction.  The Z line was normal in appearance  Stomach: postsurgical changes of gastric bypass surgery (bilroth II type anastomosis), normal mucosa of a relatively small gastric pouch  Jejunum: both jejunal limbs were intubated and normal and healthy in appearance    Therapies:  esophageal dilation with 12-15mm sized balloon    Specimens: none           Complications:

## 2024-10-23 NOTE — H&P
1/22/24   Gabriela Garg MD   pravastatin (PRAVACHOL) 40 MG tablet Take 1 tablet by mouth nightly 1/22/24   Gabriela Garg MD   Multiple Vitamins-Minerals (CENTRUM SILVER) TABS Take 1 tablet by mouth daily 8/30/23   Gabriela Garg MD       Physical Exam:   HEENT: Head: Normocephalic, no lesions, without obvious abnormality.  Mallampati II   Heart: regular rate and rhythm, S1, S2 normal   Lungs: chest clear, no wheezing, rales, normal symmetric air entry   Abdominal:  abdomen is soft, nontender, nondistended     I discussed the details of the procedure for EGD with the patient and the associated risks including sedation/airway complication, bleeding, and perforation.  The patient provided consent and agreed with proceeding.    Signed:  Ebony Herron MD 10/23/2024

## 2024-10-23 NOTE — ANESTHESIA POSTPROCEDURE EVALUATION
Department of Anesthesiology  Postprocedure Note    Patient: Savita Chávez  MRN: [id number]  YOB: 1950  Date of evaluation: 10/23/2024    Procedure Summary       Date: 10/23/24 Room / Location: [facility] ENDO 01 / [facility] ENDOSCOPY    Anesthesia Start: 1036 Anesthesia Stop: 1052    Procedure: ESOPHAGOGASTRODUODENOSCOPY BIOPSY/ DILATION (Upper GI Region) Diagnosis:       Dysphagia, pharyngoesophageal      (Dysphagia, pharyngoesophageal [R13.14])    Surgeons: Ebony Herron MD Responsible Provider: Gaurav Pereira MD    Anesthesia Type: MAC ASA Status: 3            Anesthesia Type: MAC    Iris Phase I: Iris Score: 10    Iris Phase II: Iris Score: 10    Anesthesia Post Evaluation    Patient location during evaluation: PACU  Patient participation: complete - patient participated  Level of consciousness: awake and alert  Airway patency: patent  Nausea & Vomiting: no nausea and no vomiting  Cardiovascular status: hemodynamically stable  Respiratory status: acceptable  Hydration status: stable    No notable events documented.

## 2024-10-23 NOTE — ANESTHESIA PRE PROCEDURE
LABANTI NEG 11/09/2020       Drug/Infectious Status (If Applicable):  No results found for: \"HIV\", \"HEPCAB\"    COVID-19 Screening (If Applicable):   Lab Results   Component Value Date/Time    COVID19 Not detected 01/28/2022 06:17 AM           Anesthesia Evaluation  Patient summary reviewed and Nursing notes reviewed   no history of anesthetic complications:   Airway: Mallampati: II  TM distance: >3 FB   Neck ROM: full  Mouth opening: > = 3 FB   Dental: normal exam         Pulmonary:normal exam  breath sounds clear to auscultation  (+)   shortness of breath: no interval change,                                    Cardiovascular:    (+) hypertension:, CAD:, CABG/stent (Stents x3):, HERNANDEZ:, hyperlipidemia        Rhythm: regular  Rate: normal                 ROS comment: TTE (2021): · LV: Estimated LVEF is 55 - 60%. Normal cavity size, wall thickness and systolic function (ejection fraction normal). Wall motion: normal. Mild (grade 1) left ventricular diastolic dysfunction.  · AV: Mild aortic valve regurgitation is present.       Neuro/Psych:   (+) CVA: no interval change, headaches: migraine headaches, psychiatric history:depression/anxiety             GI/Hepatic/Renal:   (+) GERD:         ROS comment: S/P bypass gastrojejunostomy .   Endo/Other:    (+) DiabetesType II DM.                 Abdominal: normal exam            Vascular: negative vascular ROS.         Other Findings:             Anesthesia Plan      MAC     ASA 3       Induction: intravenous.      Anesthetic plan and risks discussed with patient.      Plan discussed with CRNA.                    Gaurav Pereira MD   10/23/2024

## 2024-10-23 NOTE — PROGRESS NOTES
ARRIVAL INFORMATION:  Verified patient name and date of birth, scheduled procedure, and informed consent.     : Rob rebolledo contact number: [phone]  Physician and staff can share information with the .     Receive texts: yes    Belongings with patient include:  Clothing,None    GI FOCUSED ASSESSMENT:  Neuro: Awake, alert, oriented x4  Respiratory: even and unlabored   GI: soft and non-distended  EKG Rhythm: normal sinus rhythm    Education:Reviewed general discharge instructions and  information.   The risks and benefits of the bite block have been explained to patient.  Patient verbalizes understanding.

## 2024-10-23 NOTE — PROGRESS NOTES
Endoscopy Case End Note:    1049:  Procedure scope was pre-cleaned, per protocol, at bedside by Lamberto.      1052:  Report received from anesthesia - UZMA Richter.  See anesthesia flowsheet for intra-procedure vital signs and events.

## 2024-10-23 NOTE — DISCHARGE INSTRUCTIONS
Savita Chávez  [id number]  1950    EGD DISCHARGE INSTRUCTIONS  Discomfort:  Sore throat- throat lozenges or warm salt water gargle  redness at IV site- apply warm compress to area; if redness or soreness persist- contact your physician  Gaseous discomfort- walking, belching will help relieve any discomfort  You may not operate a vehicle for 12 hours  You may not engage in an occupation involving machinery or appliances for rest of today  You may not drink alcoholic beverages for at least 12 hours  Avoid making any critical decisions for at least 24 hour  DIET  You may have minimal sips at this time-- do not eat or drink for two hours.  You may eat and drink after your recovery  You may resume your regular diet - however -  remember your stomach is empty and a heavy meal will produce gas.   Avoid these foods:  vegetables, fried / greasy foods, carbonated drinks    MEDICATIONS:  (See attached)    BLOOD THINNERS:    ACTIVITY  You may resume your normal daily activities until tomorrow AM;  Spend the remainder of the day resting -  avoid any strenuous activity.    CALL M.D. WITH ANY SIGN OF   Increasing pain, nausea, vomiting  Abdominal distension (swelling)  New increased bleeding (oral or rectal)  Fever (chills)  Pain in chest area  Bloody discharge from nose or mouth  Shortness of breath    You may not take any Advil, Aspirin, Ibuprofen, Motrin, Aleve, or Goody’s for 10 days, ONLY  Tylenol as needed for pain.    IMPRESSION:  Postsurgical changes -- we dilated your esophagus where it meets your stomach    Follow-up Instructions:  We can repeat your endoscopy as needed if you are gaining symptomatic improvements  Please call Dr. Herron with other questions about the results of your procedure  Telephone #[phone]      Ebony Herron MD     Patient Education on Sedation / Analgesia Administered for Procedure      For 24 hours after general anesthesia or intravenous analgesia / sedation:  Have someone

## 2024-10-23 NOTE — PROGRESS NOTES
1042-   CRE balloon dilatation of the esophagus   12 mm Balloon inflated to 3 ATMs and held for 30 seconds.  13.5 mm Balloon inflated to 4.5 ATMs and held for 30 seconds.  15 mm Balloon inflated to 8 ATMs and held for 60 seconds.    No subcutaneous crepitus of the chest or cervical region was noted post dilatation.

## 2024-11-06 NOTE — PERIOP NOTE
[facility]  Ambulatory Surgery Unit  Pre-operative Instructions    Procedure Date  Tuesday, November 12, 2024            Tentative Arrival Time 1100      1. On the day of your procedure, please report to the Ambulatory Surgery Unit Registration Desk and sign in at your designated time. The Ambulatory Surgery Unit is located in [facility] on the [facility] side of the [facility] across from the [facility]. Please have all of your health insurance cards, copayment, and a photo ID.    **TWO adults may accompany you the day of the procedure.  We have limited seating available.  If our waiting room is at capacity, your ride may be asked to remain in their vehicle.  No one under 15 is allowed in the waiting room.   Masks, fully covering the mouth and nose, are required in the waiting room.    2. You must have someone with you to drive you home as directed by your surgeon.    3. You may have a light breakfast and take normal morning medications.    4. We recommend you do not drink any alcoholic beverages for 24 hours before and after your procedure.    5. Contact your surgeon’s office for instructions on the following medications: non-steroidal anti-inflammatory drugs (i.e. Advil, Aleve), vitamins, and supplements. (Some surgeon’s will want you to stop these medications prior to surgery and others may allow you to take them)   **If you are currently taking Plavix, Coumadin, Aspirin and/or other blood-thinning agents, contact your surgeon for instructions.** Your surgeon will partner with the physician prescribing these medications to determine if it is safe to stop or if you need to continue taking. Please do not stop taking these medications without instructions from your surgeon.    6. In an effort to help prevent surgical site infection, we ask that you shower with an anti-bacterial soap (i.e. Dial or Safeguard) on the morning of your procedure. Do not apply any lotions, powders, or

## 2024-11-12 ENCOUNTER — HOSPITAL ENCOUNTER (OUTPATIENT)
Facility: HOSPITAL | Age: 74
Discharge: HOME | End: 2024-11-15

## 2024-11-12 ENCOUNTER — HOSPITAL ENCOUNTER (OUTPATIENT)
Facility: HOSPITAL | Age: 74
Setting detail: OUTPATIENT SURGERY
Discharge: HOME | End: 2024-11-12
Attending: PHYSICAL MEDICINE & REHABILITATION | Admitting: PHYSICAL MEDICINE & REHABILITATION
Payer: MEDICARE

## 2024-11-12 VITALS
HEIGHT: 63 IN | WEIGHT: 154 LBS | RESPIRATION RATE: 18 BRPM | TEMPERATURE: 97 F | DIASTOLIC BLOOD PRESSURE: 55 MMHG | BODY MASS INDEX: 27.29 KG/M2 | SYSTOLIC BLOOD PRESSURE: 138 MMHG | OXYGEN SATURATION: 100 % | HEART RATE: 65 BPM

## 2024-11-12 LAB
GLUCOSE BLD STRIP.AUTO-MCNC: 123 MG/DL (ref 65–117)
SERVICE CMNT-IMP: ABNORMAL

## 2024-11-12 PROCEDURE — 7100000010 HC PHASE II RECOVERY - FIRST 15 MIN: Performed by: PHYSICAL MEDICINE & REHABILITATION

## 2024-11-12 PROCEDURE — 7100000011 HC PHASE II RECOVERY - ADDTL 15 MIN: Performed by: PHYSICAL MEDICINE & REHABILITATION

## 2024-11-12 PROCEDURE — 3600000012 HC SURGERY LEVEL 2 ADDTL 15MIN: Performed by: PHYSICAL MEDICINE & REHABILITATION

## 2024-11-12 PROCEDURE — 82962 GLUCOSE BLOOD TEST: CPT

## 2024-11-12 PROCEDURE — 2500000003 HC RX 250 WO HCPCS: Performed by: PHYSICAL MEDICINE & REHABILITATION

## 2024-11-12 PROCEDURE — 6360000002 HC RX W HCPCS: Performed by: PHYSICAL MEDICINE & REHABILITATION

## 2024-11-12 PROCEDURE — 3600000002 HC SURGERY LEVEL 2 BASE: Performed by: PHYSICAL MEDICINE & REHABILITATION

## 2024-11-12 PROCEDURE — 2709999900 HC NON-CHARGEABLE SUPPLY: Performed by: PHYSICAL MEDICINE & REHABILITATION

## 2024-11-12 PROCEDURE — 6360000004 HC RX CONTRAST MEDICATION: Performed by: PHYSICAL MEDICINE & REHABILITATION

## 2024-11-12 RX ORDER — BUPIVACAINE HYDROCHLORIDE 5 MG/ML
10 INJECTION, SOLUTION EPIDURAL; INTRACAUDAL ONCE
Status: DISCONTINUED | OUTPATIENT
Start: 2024-11-12 | End: 2024-11-12 | Stop reason: HOSPADM

## 2024-11-12 RX ORDER — LIDOCAINE HYDROCHLORIDE 20 MG/ML
10 INJECTION, SOLUTION EPIDURAL; INFILTRATION; INTRACAUDAL; PERINEURAL ONCE
Status: COMPLETED | OUTPATIENT
Start: 2024-11-12 | End: 2024-11-12

## 2024-11-12 RX ORDER — DEXAMETHASONE SODIUM PHOSPHATE 10 MG/ML
20 INJECTION INTRAMUSCULAR; INTRAVENOUS ONCE
Status: COMPLETED | OUTPATIENT
Start: 2024-11-12 | End: 2024-11-12

## 2024-11-12 ASSESSMENT — PAIN - FUNCTIONAL ASSESSMENT
PAIN_FUNCTIONAL_ASSESSMENT: 0-10
PAIN_FUNCTIONAL_ASSESSMENT: NONE - DENIES PAIN

## 2024-11-12 NOTE — PERIOP NOTE
Patient received to PACU, VSS. Patient awake and alert with no complaints of pain. Injection site intact.     Neuro:  Push/Pull assessment:       LLE Response: push/pull strong   RLE Response: push/pull strong    Discharge instructions given. Patient verbalized understanding of instructions and follow up.     Pt. W/weakness to right leg.  Discussed w/pt due to procedure and will resolve.  Had pt. Sit in wheelchair for 15 min then  place. Pt. Still wobbly but better to right leg.  Discussed w/ at car who stated was comfortable taking pt home.    Patient states ready for discharge - patient discharged at this time by wheelchair with all belongings.  to provide transportation home.

## 2024-11-12 NOTE — OP NOTE
Operative Note      Patient: Savita Chávez  YOB: 1950  MRN: [id number]    Date of Procedure: 11/12/2024  Epidural Steroid Injection Operative Report    Indications: This is a 74 y.o. female who presents with low back pain and radiculopathy. She was positive for LS DDD with radiculopathy.  The patient was admitted for spinal intervention as conservative measures have failed.      Preoperative Diagnosis: LS DDD with Radiculopathy    Postoperative Diagnosis: LS DDD with Radiculopathy    Surgeons and Role:     * Carlos Ramirez MD - Primary     Procedure:  Procedure(s):  RIGHT L5 and S1 TRANSFORAMINAL EPIDURAL STEROID INJECTION    Procedure in Detail:  After appropriate informed consent was obtained, the patient was taken to the operating suite and placed in the prone position on the operating table on appropriate padding.  The LS region was prepped and draped in the usual sterile fashion. Intraoperative fluoroscopy was used to localize the LS spine. The skin was infiltrated with 2% lidocaine. A 25-g needle was advanced into the Right L5 and S1 neuroforamen under fluoroscopic guidance. A small amount of contrast was injected into the epidural space, confirming appropriate needle placement on fluoroscopy. Permanent fluoroscopic images were saved in the patient's record.    Next, 2 ml of 2% lidocaine and 20 mg of Dexamethasone were injected. The needle was removed from the patient. The patient was then turned back into the supine position on the stretcher and was taken to the Recovery Room in stable condition.    Estimated Blood Loss:  none     Specimens: None       Drains: None          Complications:  None    Signed By: Carlos Ramirez MD                        November 12, 2024        Electronically signed by Carlos Ramirez MD on 11/12/2024 at 1:52 PM

## 2024-11-12 NOTE — H&P
Procedural Case Note    11/12/2024    (12:30 PM)    Savita Chávez    1950   (74 y.o.)    [id number]    CC:  pain    ROS:   Complete ROS obtained, no CP, no SOB, no N or V    PMH:     Past Medical History:   Diagnosis Date    Anxiety     Arthritis     ASHD (arteriosclerotic heart disease)     Bilateral carotid artery stenosis     CAD (coronary artery disease)     Chronic pain     Depression     Diabetes (HCC)     TypeII    Diarrhea     Gastroparesis     GERD (gastroesophageal reflux disease)     History of cardioembolic cerebrovascular accident (CVA)     Hypercholesterolemia     Hypertension     S/P bypass gastrojejunostomy 1/23/2012    Stroke (HCC)     TIA    Thyroid disease        ALLERGIES:     Allergies   Allergen Reactions    Meperidine Rash       MEDS:     No current facility-administered medications for this encounter.          Vitals:    11/12/24 1222   BP: (!) 142/70   Pulse: 65   Resp: 12   Temp: 97.4 °F (36.3 °C)   SpO2: 97%     PE:  Gen: NAD  Head: normocephalic  Heart: RRR  Lungs: CTA eulogio  Abd: NT, ND, soft  Neuro: awake and alert  Skin: intact    IMPRESSION:   Lumbar radiculopathy    Note:  The clinical status was discussed in detail with the patient.  The procedure was again discussed and described in detail.  All understand and accept the planned procedure and risks; reject other forms of treatment.  All questions are answered.    Carlos Ramirez MD

## 2024-11-12 NOTE — PERIOP NOTE
Savita Chávez  1950  [id number]    Situation:  Verbal report given from: Adry Loaiza RN  Procedure: Procedure(s):  RIGHT L5-S1 TRANSFORAMINAL EPIDURAL STEROID INJECTION    Background:    Preoperative diagnosis: Intervertebral disc disorder with radiculopathy of lumbar region [M51.16]    Postoperative diagnosis: * No post-op diagnosis entered *    :  Dr. Ramirez    Assessment:  Intra-procedure medications       Vital signs stable      Recommendation:    Discharged to home after instructions reviewed with patient. Recommend rest until local anesthetic has worn off.

## 2024-11-12 NOTE — PERIOP NOTE
Neuro:  Push/Pull assessment:     LUE Response: strong    LLE Response: strong push/pull   RUE Response: strong    RLE Response: strong push/pull

## 2024-11-12 NOTE — DISCHARGE INSTRUCTIONS
Dr. Ramirez Discharge Instructions  Transforaminal Epidural Steroid Injection/ Selective Nerve Block    You had a transforaminal epidural steroid injection/ selective nerve block today. You will probably have some numbness, and possibly weakness, in your leg for the next 6 to 8 hours. The steroids will slowly become effective, reducing your pain, over the next 2 weeks. You should begin feeling better after a few days, but it may take up to 2 weeks to notice the difference. The benefit you get from your injection will last a variable amount of time, depending on the severity of your lumbar spine problem.  Pain: Most people do not have any increase in pain after this injection. However, you might experience some soreness in your low back. If this happens, putting an ice pack over the sore area will help.  Bandage:  You will have a small bandage covering the site of the injection. You may remove it once you get home.  Restrictions: Someone should drive you home after the injection.  After that, you have no restrictions. You need to be careful while walking, as you may still have some numbness or weakness in your leg. You may resume your normal level of activity. You may take a shower or bath, and you may eat normally. You should continue your current exercises and/or therapy routine.   Medications: Continue your current medications as prescribed. If your pain decreases, you may reduce the amount of your pain medicines.  If you stopped taking anticoagulants or blood-thinners before the injection, start them tomorrow. If you have diabetes, your blood sugar may be elevated for a few days. Call your primary doctor with any questions.  Call Dr. Ramirez at [phone] if you experience:  Fever (101 degrees Fahrenheit or greater)  Nausea or vomiting  Headache unrelieved by your normal pain medicine  Redness or swelling at the injection site that lasts more than 1 day  New numbness, tingling, weakness, or pain that you

## 2024-11-15 DIAGNOSIS — F51.01 PRIMARY INSOMNIA: ICD-10-CM

## 2024-11-15 RX ORDER — ZOLPIDEM TARTRATE 5 MG/1
TABLET ORAL
Qty: 90 TABLET | Refills: 1 | OUTPATIENT
Start: 2024-11-15 | End: 2025-05-14

## 2024-11-15 NOTE — TELEPHONE ENCOUNTER
Last appointment: 10/14/24  Next appointment: 11/25/24  Previous refill encounter(s): 9/6/24 #90    Requested Prescriptions     Pending Prescriptions Disp Refills    zolpidem (AMBIEN) 5 MG tablet [Pharmacy Med Name: Zolpidem Tartrate Oral Tablet 5 MG] 90 tablet 1     Sig: TAKE 1 TABLET EVERY NIGHT AS NEEDED FOR SLEEP (MAX DAILY AMOUNT: 5MG)         For Pharmacy Admin Tracking Only    Program: Medication Refill  CPA in place:    Recommendation Provided To:   Intervention Detail: New Rx: 1, reason: Patient Preference  Intervention Accepted By:   Gap Closed?:    Time Spent (min): 5

## 2024-11-16 DIAGNOSIS — F51.01 PRIMARY INSOMNIA: ICD-10-CM

## 2024-11-19 RX ORDER — ZOLPIDEM TARTRATE 5 MG/1
TABLET ORAL
Qty: 90 TABLET | OUTPATIENT
Start: 2024-11-19

## 2024-11-19 RX ORDER — PRAVASTATIN SODIUM 40 MG
40 TABLET ORAL NIGHTLY
Qty: 90 TABLET | Refills: 3 | OUTPATIENT
Start: 2024-11-19

## 2024-11-19 RX ORDER — BLOOD-GLUCOSE METER
EACH MISCELLANEOUS
Qty: 1 KIT | Refills: 3 | OUTPATIENT
Start: 2024-11-19

## 2024-11-19 NOTE — TELEPHONE ENCOUNTER
Ambien was sent on 9/6/24 for #90 - too soon.  Pravachol was sent on 1/22/24 for #90 with 3 refills - too soon.  Accu-Chek Guide kit was sent on 8/29/24 for #1 with 3 refills.    For Pharmacy Admin Tracking Only    Program: Medication Refill  CPA in place:    Recommendation Provided To:   Intervention Detail: Discontinued Rx: 3, reason: Duplicate Therapy  Intervention Accepted By:   Gap Closed?:    Time Spent (min): 5

## 2024-11-25 ENCOUNTER — OFFICE VISIT (OUTPATIENT)
Age: 74
End: 2024-11-25
Payer: MEDICARE

## 2024-11-25 DIAGNOSIS — E78.2 MIXED HYPERLIPIDEMIA: ICD-10-CM

## 2024-11-25 DIAGNOSIS — Z79.899 ENCOUNTER FOR LONG-TERM (CURRENT) USE OF MEDICATIONS: ICD-10-CM

## 2024-11-25 DIAGNOSIS — E03.9 ACQUIRED HYPOTHYROIDISM: ICD-10-CM

## 2024-11-25 DIAGNOSIS — F41.8 MIXED ANXIETY AND DEPRESSIVE DISORDER: ICD-10-CM

## 2024-11-25 DIAGNOSIS — F51.01 PRIMARY INSOMNIA: ICD-10-CM

## 2024-11-25 DIAGNOSIS — M51.360 DISCOGENIC LOW BACK PAIN: ICD-10-CM

## 2024-11-25 DIAGNOSIS — I25.10 ATHEROSCLEROSIS OF NATIVE CORONARY ARTERY OF NATIVE HEART WITHOUT ANGINA PECTORIS: ICD-10-CM

## 2024-11-25 DIAGNOSIS — R10.2 CHRONIC PELVIC PAIN IN FEMALE: ICD-10-CM

## 2024-11-25 DIAGNOSIS — E11.9 TYPE 2 DIABETES MELLITUS WITHOUT COMPLICATION, WITHOUT LONG-TERM CURRENT USE OF INSULIN (HCC): Primary | ICD-10-CM

## 2024-11-25 DIAGNOSIS — G89.29 CHRONIC PELVIC PAIN IN FEMALE: ICD-10-CM

## 2024-11-25 DIAGNOSIS — I73.9 PERIPHERAL VASCULAR DISEASE, UNSPECIFIED (HCC): ICD-10-CM

## 2024-11-25 DIAGNOSIS — Z86.73 HISTORY OF STROKE: ICD-10-CM

## 2024-11-25 LAB
COMMENT:: NORMAL
SPECIMEN HOLD: NORMAL

## 2024-11-25 PROCEDURE — 1123F ACP DISCUSS/DSCN MKR DOCD: CPT | Performed by: FAMILY MEDICINE

## 2024-11-25 PROCEDURE — 99214 OFFICE O/P EST MOD 30 MIN: CPT | Performed by: FAMILY MEDICINE

## 2024-11-25 PROCEDURE — 1090F PRES/ABSN URINE INCON ASSESS: CPT | Performed by: FAMILY MEDICINE

## 2024-11-25 PROCEDURE — 2022F DILAT RTA XM EVC RTNOPTHY: CPT | Performed by: FAMILY MEDICINE

## 2024-11-25 PROCEDURE — G8427 DOCREV CUR MEDS BY ELIG CLIN: HCPCS | Performed by: FAMILY MEDICINE

## 2024-11-25 PROCEDURE — G8419 CALC BMI OUT NRM PARAM NOF/U: HCPCS | Performed by: FAMILY MEDICINE

## 2024-11-25 PROCEDURE — G8482 FLU IMMUNIZE ORDER/ADMIN: HCPCS | Performed by: FAMILY MEDICINE

## 2024-11-25 PROCEDURE — G8399 PT W/DXA RESULTS DOCUMENT: HCPCS | Performed by: FAMILY MEDICINE

## 2024-11-25 PROCEDURE — 3017F COLORECTAL CA SCREEN DOC REV: CPT | Performed by: FAMILY MEDICINE

## 2024-11-25 PROCEDURE — 1036F TOBACCO NON-USER: CPT | Performed by: FAMILY MEDICINE

## 2024-11-25 PROCEDURE — 3046F HEMOGLOBIN A1C LEVEL >9.0%: CPT | Performed by: FAMILY MEDICINE

## 2024-11-25 ASSESSMENT — ENCOUNTER SYMPTOMS
BLOOD IN STOOL: 0
SINUS PAIN: 0
CHEST TIGHTNESS: 0
COUGH: 0
SHORTNESS OF BREATH: 0
RHINORRHEA: 0
CONSTIPATION: 0

## 2024-11-25 NOTE — PROGRESS NOTES
Subjective:      Patient ID: Savita Chávez is a 74 y.o. female.    HPI  Follow up on chronic medical problems.  Continue with pain in the lower abd, lower back and hip radiating to the pelvic area.  She has seen GI, GYN and ortho.  Now thinking that this pain is from her back.  She did get another OLEG  which has helped with her pain.  Overall she is feeling much less pain and pleased with the second OLEG.     DM type II follow up:  Compliant w/ meds, diabetic diet, and exercise.  She is tolerating taking januvia.  Weight gain since she has been off of the ozmepic d/t abd pain.   BS have been stable.  Range in the low to mid 100s.  Checks BS  BID on most days and prn.  No low BS.  Pt does not have BS log at visit today.  Denies any tingling sensation.  No polyuria or polydipsia. No blurred vision.    Cardiovascular Review:  The patient has coronary artery disease, HF and status post coronary artery stenting.  Diet and Lifestyle: generally follows a low fat low cholesterol diet, generally follows a low sodium diet, follows a diabetic diet regularly, exercises sporadically   Home BP Monitoring: is not measured at home.   Pertinent ROS: taking medications as instructed, no medication side effects noted, no TIA's, no chest pain on exertion, no dyspnea on exertion,  stable mild swelling of ankles.    Hypercholesterolemia follow up:   Compliant w/ meds, low fat, low cholesterol diet.  Exercising some.  No muscle nor abdominal pain, no skin discoloration.  Patient fasting today.   Depression Review:   Patient is seen for followup of depression and anxiety.  Overall doing well.  Taking only Ambien to help with her sleep.     Anxiety usually triggered by stress.     She denies depressed mood and insomnia.     Thyroid Review:   Patient is seen for followup of hypothyroidism.  TSH in April was stable.     Thyroid ROS: denies fatigue, weight changes, heat/cold intolerance, bowel/skin changes or CVS symptoms.   HM:   Mammogram

## 2024-11-26 LAB
ALBUMIN SERPL-MCNC: 4.1 G/DL (ref 3.5–5)
ALBUMIN/GLOB SERPL: 1.6 (ref 1.1–2.2)
ALP SERPL-CCNC: 110 U/L (ref 45–117)
ALT SERPL-CCNC: 20 U/L (ref 12–78)
ANION GAP SERPL CALC-SCNC: 7 MMOL/L (ref 2–12)
AST SERPL-CCNC: 17 U/L (ref 15–37)
BILIRUB SERPL-MCNC: 0.3 MG/DL (ref 0.2–1)
BUN SERPL-MCNC: 23 MG/DL (ref 6–20)
BUN/CREAT SERPL: 20 (ref 12–20)
CALCIUM SERPL-MCNC: 9.6 MG/DL (ref 8.5–10.1)
CHLORIDE SERPL-SCNC: 108 MMOL/L (ref 97–108)
CHOLEST SERPL-MCNC: 182 MG/DL
CO2 SERPL-SCNC: 25 MMOL/L (ref 21–32)
CREAT SERPL-MCNC: 1.17 MG/DL (ref 0.55–1.02)
GLOBULIN SER CALC-MCNC: 2.5 G/DL (ref 2–4)
GLUCOSE SERPL-MCNC: 106 MG/DL (ref 65–100)
HDLC SERPL-MCNC: 70 MG/DL
HDLC SERPL: 2.6 (ref 0–5)
LDLC SERPL CALC-MCNC: 74.2 MG/DL (ref 0–100)
POTASSIUM SERPL-SCNC: 4.7 MMOL/L (ref 3.5–5.1)
PROT SERPL-MCNC: 6.6 G/DL (ref 6.4–8.2)
SODIUM SERPL-SCNC: 140 MMOL/L (ref 136–145)
TRIGL SERPL-MCNC: 189 MG/DL
VLDLC SERPL CALC-MCNC: 37.8 MG/DL

## 2024-12-09 DIAGNOSIS — F51.01 PRIMARY INSOMNIA: Primary | ICD-10-CM

## 2024-12-10 RX ORDER — ZOLPIDEM TARTRATE 5 MG/1
TABLET ORAL
Qty: 90 TABLET | Refills: 0 | Status: SHIPPED | OUTPATIENT
Start: 2024-12-10 | End: 2025-06-08

## 2024-12-10 RX ORDER — METOPROLOL SUCCINATE 25 MG/1
25 TABLET, EXTENDED RELEASE ORAL DAILY
Qty: 90 TABLET | Refills: 3 | Status: SHIPPED | OUTPATIENT
Start: 2024-12-10

## 2024-12-10 RX ORDER — PRAVASTATIN SODIUM 40 MG
40 TABLET ORAL NIGHTLY
Qty: 90 TABLET | Refills: 3 | Status: SHIPPED | OUTPATIENT
Start: 2024-12-10

## 2024-12-10 RX ORDER — FUROSEMIDE 20 MG/1
20 TABLET ORAL DAILY
Qty: 90 TABLET | Refills: 3 | Status: SHIPPED | OUTPATIENT
Start: 2024-12-10

## 2024-12-10 RX ORDER — BLOOD-GLUCOSE METER
EACH MISCELLANEOUS
Qty: 1 KIT | Refills: 3 | Status: SHIPPED | OUTPATIENT
Start: 2024-12-10

## 2024-12-10 RX ORDER — FLUTICASONE PROPIONATE 50 MCG
2 SPRAY, SUSPENSION (ML) NASAL DAILY
Qty: 48 G | Refills: 3 | OUTPATIENT
Start: 2024-12-10

## 2024-12-10 NOTE — TELEPHONE ENCOUNTER
Flonase was sent on 10/14/24 for #1 with 3 refills.    Last appointment: 11/25/24  Next appointment: 1/27/25  Previous refill encounter(s): 1/22/24 Pravachol, Lasix & Toprol #90 with 3 refills, 8/29/24 Guide #1, 9/6/24 Ambien #90    Requested Prescriptions     Pending Prescriptions Disp Refills    pravastatin (PRAVACHOL) 40 MG tablet [Pharmacy Med Name: Pravastatin Sodium Oral Tablet 40 MG] 90 tablet 3     Sig: TAKE 1 TABLET EVERY NIGHT    furosemide (LASIX) 20 MG tablet [Pharmacy Med Name: Furosemide Oral Tablet 20 MG] 90 tablet 3     Sig: TAKE 1 TABLET EVERY DAY    zolpidem (AMBIEN) 5 MG tablet [Pharmacy Med Name: Zolpidem Tartrate Oral Tablet 5 MG] 90 tablet 1     Sig: TAKE 1 TABLET EVERY NIGHT AS NEEDED FOR SLEEP (MAX DAILY AMOUNT: 5MG)    metoprolol succinate (TOPROL XL) 25 MG extended release tablet [Pharmacy Med Name: Metoprolol Succinate ER Oral Tablet Extended Release 24 Hour 25 MG] 90 tablet 3     Sig: TAKE 1 TABLET EVERY DAY    Blood Glucose Monitoring Suppl (ACCU-CHEK GUIDE) w/Device KIT [Pharmacy Med Name: Accu-Chek Guide Kit w/Device] 1 kit 3     Sig: USE AS DIRECTED     Refused Prescriptions Disp Refills    fluticasone (FLONASE) 50 MCG/ACT nasal spray [Pharmacy Med Name: Fluticasone Propionate Nasal Suspension 50 MCG/ACT] 48 g 3     Sig: USE 2 SPRAYS IN EACH NOSTRIL EVERY DAY         For Pharmacy Admin Tracking Only    Program: Medication Refill  CPA in place:    Recommendation Provided To:   Intervention Detail: Discontinued Rx: 1, reason: Duplicate Therapy and New Rx: 5, reason: Patient Preference  Intervention Accepted By:   Gap Closed?:    Time Spent (min): 5         Vermilion Border Text: The closure involved the vermilion border.

## 2024-12-30 RX ORDER — VALACYCLOVIR HYDROCHLORIDE 500 MG/1
500 TABLET, FILM COATED ORAL 2 TIMES DAILY
Qty: 6 TABLET | Refills: 1 | Status: SHIPPED | OUTPATIENT
Start: 2024-12-30 | End: 2025-01-02

## 2024-12-30 NOTE — TELEPHONE ENCOUNTER
Patient calling for the pills that were used for an infection on her behind.  Noted was only for a few pills.   Stated she needs a refill.    Noted valacyclovir 500mg in hx 8/14/24.      RE-Entered as last prescribed if appropriate.  Thanks, Soo    Last appointment: 11/25/24 MD Garg   Next appointment: 1/27/25 MD VALLEJO  Previous refill encounter(s): 8/14/24 6 + 1    Requested Prescriptions     Pending Prescriptions Disp Refills    valACYclovir (VALTREX) 500 MG tablet 6 tablet 1     Sig: Take 1 tablet by mouth 2 times daily for 3 days     For Pharmacy Admin Tracking Only    Program: Medication Refill  CPA in place:    Recommendation Provided To:   Intervention Detail: New Rx: 1, reason: Patient Preference  Intervention Accepted By:   Gap Closed?:    Time Spent (min): 5

## 2025-01-06 RX ORDER — FLUTICASONE PROPIONATE 50 MCG
2 SPRAY, SUSPENSION (ML) NASAL DAILY
Qty: 48 G | Refills: 3 | Status: SHIPPED | OUTPATIENT
Start: 2025-01-06

## 2025-01-06 NOTE — TELEPHONE ENCOUNTER
Last appointment: 11/25/24  Next appointment: 1/27/25  Previous refill encounter(s): 10/14/24 #1 with 3 refills    Requested Prescriptions     Pending Prescriptions Disp Refills    fluticasone (FLONASE) 50 MCG/ACT nasal spray [Pharmacy Med Name: Fluticasone Propionate Nasal Suspension 50 MCG/ACT] 48 g 3     Sig: USE 2 SPRAYS IN EACH NOSTRIL EVERY DAY         For Pharmacy Admin Tracking Only    Program: Medication Refill  CPA in place:    Recommendation Provided To:   Intervention Detail: New Rx: 1, reason: Patient Preference  Intervention Accepted By:   Gap Closed?:    Time Spent (min): 5

## 2025-01-07 NOTE — TELEPHONE ENCOUNTER
Last appointment: 11/25/24  Next appointment: 1/27/25  Previous refill encounter(s): 7/16/24    Requested Prescriptions     Pending Prescriptions Disp Refills    SITagliptin (JANUVIA) 100 MG tablet 90 tablet 3     Sig: Take 1 tablet by mouth daily         For Pharmacy Admin Tracking Only    Program: Medication Refill  CPA in place:    Recommendation Provided To:   Intervention Detail: New Rx: 1, reason: Patient Preference  Intervention Accepted By:   Gap Closed?:    Time Spent (min): 5

## 2025-01-22 RX ORDER — LISINOPRIL 2.5 MG/1
2.5 TABLET ORAL DAILY
Qty: 90 TABLET | Refills: 3 | Status: SHIPPED | OUTPATIENT
Start: 2025-01-22

## 2025-01-31 DIAGNOSIS — E03.9 ACQUIRED HYPOTHYROIDISM: ICD-10-CM

## 2025-01-31 RX ORDER — LEVOTHYROXINE SODIUM 25 UG/1
TABLET ORAL
Qty: 90 TABLET | Refills: 3 | Status: SHIPPED | OUTPATIENT
Start: 2025-01-31

## 2025-01-31 NOTE — TELEPHONE ENCOUNTER
Last appointment: 11/25/24  Next appointment: 2/19/25  Previous refill encounter(s): 1/22/24    Requested Prescriptions     Pending Prescriptions Disp Refills    levothyroxine (SYNTHROID) 25 MCG tablet [Pharmacy Med Name: Levothyroxine Sodium Oral Tablet 25 MCG] 90 tablet 3     Sig: TAKE 1 TABLET BY MOUTH DAILY BEFORE BREAKFAST         For Pharmacy Admin Tracking Only    Program: Medication Refill  CPA in place:    Recommendation Provided To:   Intervention Detail: New Rx: 1, reason: Patient Preference  Intervention Accepted By:   Gap Closed?:    Time Spent (min): 5

## 2025-02-26 NOTE — H&P
Colonoscopy H&P    History:  Preop office visit H&P reviewed, no changes.   74 y.o. female here for colonoscopy for constipation and pelvic pain .    Past Medical History:   Diagnosis Date    Anxiety     Arthritis     ASHD (arteriosclerotic heart disease)     Bilateral carotid artery stenosis     CAD (coronary artery disease)     Chronic pain     Depression     Diabetes (HCC)     TypeII    Diarrhea     Gastroparesis     GERD (gastroesophageal reflux disease)     History of cardioembolic cerebrovascular accident (CVA)     Hypercholesterolemia     Hypertension     S/P bypass gastrojejunostomy 1/23/2012    Stroke (HCC)     TIA    Thyroid disease         Past Surgical History:   Procedure Laterality Date    APPENDECTOMY      BLADDER SUSPENSION      CARDIAC CATHETERIZATION  2017    stents remain open, no new stents placed    CHOLECYSTECTOMY      COLONOSCOPY N/A 2/2/2022    COLONOSCOPY performed by Bairon Nicole MD at [facility] ENDOSCOPY    COLONOSCOPY N/A 10/16/2020    COLONOSCOPY performed by Jeremias Husain MD at [facility] AMBULATORY OR    COLONOSCOPY N/A 5/26/2016    COLONOSCOPY performed by Jeremias Husain MD at [facility] ENDOSCOPY    COLONOSCOPY FLX DX W/COLLJ SPEC WHEN PFRMD  5/03/2006    Dr. Husain    COLONOSCOPY W/BIOPSY SINGLE/MULTIPLE  2/9/2012         CORONARY ANGIOPLASTY WITH STENT PLACEMENT  06/2013    3 stents    EGD BALLOON DILATION ESOPHAGUS <30 MM DIAM  1/23/2012         EGD TRANSORAL BIOPSY SINGLE/MULTIPLE  1/23/2012         EGD TRANSORAL BIOPSY SINGLE/MULTIPLE  7/11/2011         GASTRIC BYPASS SURGERY  6/02    GASTROJEJUNOSTOMY      HYSTERECTOMY (CERVIX STATUS UNKNOWN)      LAPAROTOMY  8/02    2/2 intra abd abscess    LUMBAR LAMINECTOMY  01/2008    rods and screws    ORTHOPEDIC SURGERY Bilateral     carpal tunnel    ORTHOPEDIC SURGERY Bilateral     bone spurs both feet    OVARY REMOVAL Left     PAIN MANAGEMENT PROCEDURE Right 11/12/2024    RIGHT L5-S1 TRANSFORAMINAL EPIDURAL STEROID INJECTION performed by James

## 2025-02-27 ENCOUNTER — ANESTHESIA (OUTPATIENT)
Facility: HOSPITAL | Age: 75
End: 2025-02-27
Payer: MEDICARE

## 2025-02-27 ENCOUNTER — ANESTHESIA EVENT (OUTPATIENT)
Facility: HOSPITAL | Age: 75
End: 2025-02-27
Payer: MEDICARE

## 2025-02-27 ENCOUNTER — HOSPITAL ENCOUNTER (OUTPATIENT)
Facility: HOSPITAL | Age: 75
Setting detail: OUTPATIENT SURGERY
Discharge: HOME | End: 2025-02-27
Attending: STUDENT IN AN ORGANIZED HEALTH CARE EDUCATION/TRAINING PROGRAM | Admitting: STUDENT IN AN ORGANIZED HEALTH CARE EDUCATION/TRAINING PROGRAM
Payer: MEDICARE

## 2025-02-27 VITALS
WEIGHT: 164 LBS | SYSTOLIC BLOOD PRESSURE: 126 MMHG | HEART RATE: 83 BPM | BODY MASS INDEX: 29.06 KG/M2 | RESPIRATION RATE: 14 BRPM | HEIGHT: 63 IN | DIASTOLIC BLOOD PRESSURE: 50 MMHG | TEMPERATURE: 97.7 F | OXYGEN SATURATION: 100 %

## 2025-02-27 PROCEDURE — 3600007512: Performed by: STUDENT IN AN ORGANIZED HEALTH CARE EDUCATION/TRAINING PROGRAM

## 2025-02-27 PROCEDURE — 3700000001 HC ADD 15 MINUTES (ANESTHESIA): Performed by: STUDENT IN AN ORGANIZED HEALTH CARE EDUCATION/TRAINING PROGRAM

## 2025-02-27 PROCEDURE — 6360000002 HC RX W HCPCS: Performed by: NURSE ANESTHETIST, CERTIFIED REGISTERED

## 2025-02-27 PROCEDURE — 2709999900 HC NON-CHARGEABLE SUPPLY: Performed by: STUDENT IN AN ORGANIZED HEALTH CARE EDUCATION/TRAINING PROGRAM

## 2025-02-27 PROCEDURE — 7100000011 HC PHASE II RECOVERY - ADDTL 15 MIN: Performed by: STUDENT IN AN ORGANIZED HEALTH CARE EDUCATION/TRAINING PROGRAM

## 2025-02-27 PROCEDURE — 3600007502: Performed by: STUDENT IN AN ORGANIZED HEALTH CARE EDUCATION/TRAINING PROGRAM

## 2025-02-27 PROCEDURE — 3700000000 HC ANESTHESIA ATTENDED CARE: Performed by: STUDENT IN AN ORGANIZED HEALTH CARE EDUCATION/TRAINING PROGRAM

## 2025-02-27 PROCEDURE — 2580000003 HC RX 258: Performed by: STUDENT IN AN ORGANIZED HEALTH CARE EDUCATION/TRAINING PROGRAM

## 2025-02-27 PROCEDURE — 7100000010 HC PHASE II RECOVERY - FIRST 15 MIN: Performed by: STUDENT IN AN ORGANIZED HEALTH CARE EDUCATION/TRAINING PROGRAM

## 2025-02-27 RX ORDER — SODIUM CHLORIDE 0.9 % (FLUSH) 0.9 %
5-40 SYRINGE (ML) INJECTION PRN
Status: DISCONTINUED | OUTPATIENT
Start: 2025-02-27 | End: 2025-02-27 | Stop reason: HOSPADM

## 2025-02-27 RX ORDER — SODIUM CHLORIDE 0.9 % (FLUSH) 0.9 %
5-40 SYRINGE (ML) INJECTION EVERY 12 HOURS SCHEDULED
Status: DISCONTINUED | OUTPATIENT
Start: 2025-02-27 | End: 2025-02-27 | Stop reason: HOSPADM

## 2025-02-27 RX ORDER — LIDOCAINE HYDROCHLORIDE 20 MG/ML
INJECTION, SOLUTION EPIDURAL; INFILTRATION; INTRACAUDAL; PERINEURAL
Status: DISCONTINUED | OUTPATIENT
Start: 2025-02-27 | End: 2025-02-27 | Stop reason: SDUPTHER

## 2025-02-27 RX ORDER — SODIUM CHLORIDE 9 MG/ML
25 INJECTION, SOLUTION INTRAVENOUS PRN
Status: DISCONTINUED | OUTPATIENT
Start: 2025-02-27 | End: 2025-02-27 | Stop reason: HOSPADM

## 2025-02-27 RX ADMIN — PROPOFOL 30 MG: 10 INJECTION, EMULSION INTRAVENOUS at 07:34

## 2025-02-27 RX ADMIN — LIDOCAINE HYDROCHLORIDE 60 MG: 20 INJECTION, SOLUTION EPIDURAL; INFILTRATION; INTRACAUDAL; PERINEURAL at 07:29

## 2025-02-27 RX ADMIN — PROPOFOL 90 MG: 10 INJECTION, EMULSION INTRAVENOUS at 07:29

## 2025-02-27 RX ADMIN — PROPOFOL 20 MG: 10 INJECTION, EMULSION INTRAVENOUS at 07:36

## 2025-02-27 RX ADMIN — SODIUM CHLORIDE: 9 INJECTION, SOLUTION INTRAVENOUS at 07:28

## 2025-02-27 ASSESSMENT — ENCOUNTER SYMPTOMS: SHORTNESS OF BREATH: 1

## 2025-02-27 ASSESSMENT — PAIN - FUNCTIONAL ASSESSMENT
PAIN_FUNCTIONAL_ASSESSMENT: NONE - DENIES PAIN
PAIN_FUNCTIONAL_ASSESSMENT: NONE - DENIES PAIN

## 2025-02-27 NOTE — ANESTHESIA PRE PROCEDURE
Department of Anesthesiology  Preprocedure Note       Name:  Savita Chávez   Age:  74 y.o.  :  1950                                          MRN:  [id number]         Date:  2025      Surgeon: Surgeon(s):  Nuzhat Muhammad MD    Procedure: Procedure(s):  COLONOSCOPY    Medications prior to admission:   Prior to Admission medications    Medication Sig Start Date End Date Taking? Authorizing Provider   levothyroxine (SYNTHROID) 25 MCG tablet TAKE 1 TABLET BY MOUTH DAILY BEFORE BREAKFAST 25   Gabriela Garg MD   lisinopril (PRINIVIL;ZESTRIL) 2.5 MG tablet TAKE 1 TABLET EVERY DAY 25   Gabriela Garg MD   SITagliptin (JANUVIA) 100 MG tablet Take 1 tablet by mouth daily 25   Gabriela Garg MD   fluticasone (FLONASE) 50 MCG/ACT nasal spray USE 2 SPRAYS IN EACH NOSTRIL EVERY DAY 25   Gabriela Garg MD   pravastatin (PRAVACHOL) 40 MG tablet TAKE 1 TABLET EVERY NIGHT 12/10/24   Gabriela Garg MD   furosemide (LASIX) 20 MG tablet TAKE 1 TABLET EVERY DAY 12/10/24   Gabriela Garg MD   zolpidem (AMBIEN) 5 MG tablet TAKE 1 TABLET EVERY NIGHT AS NEEDED FOR SLEEP (MAX DAILY AMOUNT: 5MG) 12/10/24 6/8/25  Gabriela Garg MD   metoprolol succinate (TOPROL XL) 25 MG extended release tablet TAKE 1 TABLET EVERY DAY 12/10/24   Gabriela Garg MD   Blood Glucose Monitoring Suppl (ACCU-CHEK GUIDE) w/Device KIT USE AS DIRECTED 12/10/24   Gabriela Garg MD   Cholecalciferol (VITAMIN D) 50 MCG (2000 UT) CAPS capsule Take 2,000 Units by mouth daily 24   Gabriela Garg MD   blood glucose test strips (ACCU-CHEK GUIDE) strip Check glucose once daily as directed  Dx E11.9 24   Gabriela Garg MD   Accu-Chek Softclix Lancets MISC Check glucose once daily as directed  Dx E11.9 24   Gabriela Garg MD   Multiple Vitamins-Minerals (CENTRUM SILVER) TABS Take 1 tablet by mouth daily 23

## 2025-02-27 NOTE — PROGRESS NOTES
ARRIVAL INFORMATION:  Verified patient name and date of birth, scheduled procedure, and informed consent.     : Rob () contact number: [phone]  Physician and staff can share information with the .     Receive texts: yes    Belongings with patient include:  Clothing    GI FOCUSED ASSESSMENT:  Neuro: Awake, alert, oriented x4  Respiratory: even and unlabored   GI: soft and non-distended  EKG Rhythm: normal sinus rhythm    Education:Reviewed general discharge instructions and  information.

## 2025-02-27 NOTE — PROGRESS NOTES
Endoscopy Case End Note:    0739:  Procedure scope was pre-cleaned, per protocol, at bedside by VIRIDIANA Powers RN.      0741:  Report received from anesthesia - E Gume GUSMAN.  See anesthesia flowsheet for intra-procedure vital signs and events.

## 2025-02-27 NOTE — DISCHARGE INSTRUCTIONS
Savita Chávez  [id number]  1950    COLON DISCHARGE INSTRUCTIONS  Discomfort:  Redness at IV site- apply warm compress to area; if redness or soreness persist- contact your physician  There may be a slight amount of blood passed from the rectum  Gaseous discomfort- walking, belching will help relieve any discomfort  You may not operate a vehicle for 12 hours  You may not engage in an occupation involving machinery or appliances for rest of today  You may not drink alcoholic beverages for at least 12 hours  Avoid making any critical decisions for at least 24 hour  DIET:   High fiber diet.   - however -  remember your colon is empty and a heavy meal will produce gas.   Avoid these foods:  vegetables, fried / greasy foods, carbonated drinks for today    MEDICATIONS:  [unfilled]     ACTIVITY:  You may resume your normal daily activities it is recommended that you spend the remainder of the day resting -  avoid any strenuous activity.    CALL M.D.  ANY SIGN OF:   Increasing pain, nausea, vomiting  Abdominal distension (swelling)  New increased bleeding (oral or rectal)  Fever (chills)  Pain in chest area  Bloody discharge from nose or mouth  Shortness of breath     Follow-up Instructions:   Call Nuzhat Muhammad MD if any questions or problems.   Telephone # [phone]  Biopsy results will be available in  7 to10 days  Should have a repeat colonoscopy only if surgical resection is performed, otherwise no benefit to future attempts    COLONOSCOPY FINDINGS:  Your colonoscopy showed: impassable turn/stricture at the same spot that was seen before.    Patient Education on Sedation / Analgesia Administered for Procedure      For 24 hours after general anesthesia or intravenous analgesia / sedation:  Have someone responsible help you with your care  Limit your activities  Do not drive and operate hazardous machinery  Do not make important personal, legal or business decisions  Do not drink alcoholic beverages  If you

## 2025-02-27 NOTE — ANESTHESIA POSTPROCEDURE EVALUATION
Department of Anesthesiology  Postprocedure Note    Patient: Savita Chávez  MRN: [id number]  YOB: 1950  Date of evaluation: 2/27/2025    Procedure Summary       Date: 02/27/25 Room / Location: [facility] ENDO 01 / [facility] ENDOSCOPY    Anesthesia Start: 0728 Anesthesia Stop: 0743    Procedure: COLONOSCOPY (Lower GI Region) Diagnosis:       Diarrhea, unspecified type      (Diarrhea, unspecified type [R19.7])    Surgeons: Nuzhat Muhammad MD Responsible Provider: Checo Arroyo MD    Anesthesia Type: MAC, TIVA ASA Status: 3            Anesthesia Type: MAC, TIVA    Iris Phase I: Iris Score: 10    Iris Phase II: Iris Score: 10    Anesthesia Post Evaluation    Patient location during evaluation: PACU  Patient participation: complete - patient participated  Level of consciousness: awake  Airway patency: patent  Nausea & Vomiting: no vomiting  Cardiovascular status: hemodynamically stable  Respiratory status: acceptable  Hydration status: euvolemic    No notable events documented.

## 2025-03-24 ENCOUNTER — OFFICE VISIT (OUTPATIENT)
Age: 75
End: 2025-03-24
Payer: MEDICARE

## 2025-03-24 VITALS
RESPIRATION RATE: 16 BRPM | HEART RATE: 82 BPM | OXYGEN SATURATION: 97 % | WEIGHT: 164.8 LBS | SYSTOLIC BLOOD PRESSURE: 127 MMHG | HEIGHT: 63 IN | BODY MASS INDEX: 29.2 KG/M2 | DIASTOLIC BLOOD PRESSURE: 68 MMHG | TEMPERATURE: 98.3 F

## 2025-03-24 DIAGNOSIS — E11.9 TYPE 2 DIABETES MELLITUS WITHOUT COMPLICATION, WITHOUT LONG-TERM CURRENT USE OF INSULIN: Primary | ICD-10-CM

## 2025-03-24 DIAGNOSIS — E78.2 MIXED HYPERLIPIDEMIA: ICD-10-CM

## 2025-03-24 DIAGNOSIS — G89.29 CHRONIC ABDOMINAL PAIN: ICD-10-CM

## 2025-03-24 DIAGNOSIS — R10.9 CHRONIC ABDOMINAL PAIN: ICD-10-CM

## 2025-03-24 DIAGNOSIS — Z12.31 ENCOUNTER FOR SCREENING MAMMOGRAM FOR BREAST CANCER: ICD-10-CM

## 2025-03-24 DIAGNOSIS — Z86.73 HISTORY OF STROKE: ICD-10-CM

## 2025-03-24 DIAGNOSIS — Z79.899 ENCOUNTER FOR LONG-TERM (CURRENT) USE OF MEDICATIONS: ICD-10-CM

## 2025-03-24 DIAGNOSIS — M51.360 DISCOGENIC LOW BACK PAIN: ICD-10-CM

## 2025-03-24 DIAGNOSIS — I87.2 PERIPHERAL VENOUS INSUFFICIENCY: ICD-10-CM

## 2025-03-24 DIAGNOSIS — I25.10 ATHEROSCLEROSIS OF NATIVE CORONARY ARTERY OF NATIVE HEART WITHOUT ANGINA PECTORIS: ICD-10-CM

## 2025-03-24 DIAGNOSIS — E03.9 ACQUIRED HYPOTHYROIDISM: ICD-10-CM

## 2025-03-24 DIAGNOSIS — F51.01 PRIMARY INSOMNIA: ICD-10-CM

## 2025-03-24 PROBLEM — E11.65 TYPE 2 DIABETES MELLITUS WITH HYPERGLYCEMIA (HCC): Status: ACTIVE | Noted: 2025-03-24

## 2025-03-24 LAB
GLUCOSE, POC: 191 MG/DL
HBA1C MFR BLD: 8 %

## 2025-03-24 PROCEDURE — 3074F SYST BP LT 130 MM HG: CPT | Performed by: FAMILY MEDICINE

## 2025-03-24 PROCEDURE — 99214 OFFICE O/P EST MOD 30 MIN: CPT | Performed by: FAMILY MEDICINE

## 2025-03-24 PROCEDURE — PBSHW AMB POC GLUCOSE BLOOD, BY GLUCOSE MONITORING DEVICE: Performed by: FAMILY MEDICINE

## 2025-03-24 PROCEDURE — 1090F PRES/ABSN URINE INCON ASSESS: CPT | Performed by: FAMILY MEDICINE

## 2025-03-24 PROCEDURE — G2211 COMPLEX E/M VISIT ADD ON: HCPCS | Performed by: FAMILY MEDICINE

## 2025-03-24 PROCEDURE — G8419 CALC BMI OUT NRM PARAM NOF/U: HCPCS | Performed by: FAMILY MEDICINE

## 2025-03-24 PROCEDURE — 1159F MED LIST DOCD IN RCRD: CPT | Performed by: FAMILY MEDICINE

## 2025-03-24 PROCEDURE — 1123F ACP DISCUSS/DSCN MKR DOCD: CPT | Performed by: FAMILY MEDICINE

## 2025-03-24 PROCEDURE — 3017F COLORECTAL CA SCREEN DOC REV: CPT | Performed by: FAMILY MEDICINE

## 2025-03-24 PROCEDURE — G8427 DOCREV CUR MEDS BY ELIG CLIN: HCPCS | Performed by: FAMILY MEDICINE

## 2025-03-24 PROCEDURE — 3046F HEMOGLOBIN A1C LEVEL >9.0%: CPT | Performed by: FAMILY MEDICINE

## 2025-03-24 PROCEDURE — 82962 GLUCOSE BLOOD TEST: CPT | Performed by: FAMILY MEDICINE

## 2025-03-24 PROCEDURE — 1126F AMNT PAIN NOTED NONE PRSNT: CPT | Performed by: FAMILY MEDICINE

## 2025-03-24 PROCEDURE — 3052F HG A1C>EQUAL 8.0%<EQUAL 9.0%: CPT | Performed by: FAMILY MEDICINE

## 2025-03-24 PROCEDURE — 83036 HEMOGLOBIN GLYCOSYLATED A1C: CPT | Performed by: FAMILY MEDICINE

## 2025-03-24 PROCEDURE — 1036F TOBACCO NON-USER: CPT | Performed by: FAMILY MEDICINE

## 2025-03-24 PROCEDURE — 3078F DIAST BP <80 MM HG: CPT | Performed by: FAMILY MEDICINE

## 2025-03-24 PROCEDURE — PBSHW AMB POC HEMOGLOBIN A1C: Performed by: FAMILY MEDICINE

## 2025-03-24 PROCEDURE — 2022F DILAT RTA XM EVC RTNOPTHY: CPT | Performed by: FAMILY MEDICINE

## 2025-03-24 PROCEDURE — G8399 PT W/DXA RESULTS DOCUMENT: HCPCS | Performed by: FAMILY MEDICINE

## 2025-03-24 PROCEDURE — 1160F RVW MEDS BY RX/DR IN RCRD: CPT | Performed by: FAMILY MEDICINE

## 2025-03-24 RX ORDER — HYDROCHLOROTHIAZIDE 12.5 MG/1
CAPSULE ORAL
Qty: 7 EACH | Refills: 3 | Status: SHIPPED | OUTPATIENT
Start: 2025-03-24

## 2025-03-24 RX ORDER — GABAPENTIN 300 MG/1
300 CAPSULE ORAL NIGHTLY
Qty: 90 CAPSULE | Refills: 1 | Status: SHIPPED | OUTPATIENT
Start: 2025-03-24 | End: 2025-09-20

## 2025-03-24 RX ORDER — KETOROLAC TROMETHAMINE 30 MG/ML
INJECTION, SOLUTION INTRAMUSCULAR; INTRAVENOUS
Qty: 1 EACH | Refills: 0 | Status: SHIPPED | OUTPATIENT
Start: 2025-03-24

## 2025-03-24 RX ORDER — GABAPENTIN 300 MG/1
300 CAPSULE ORAL NIGHTLY
Qty: 90 CAPSULE | Refills: 1 | Status: SHIPPED | OUTPATIENT
Start: 2025-03-24 | End: 2025-03-24 | Stop reason: SDUPTHER

## 2025-03-24 SDOH — ECONOMIC STABILITY: FOOD INSECURITY: WITHIN THE PAST 12 MONTHS, YOU WORRIED THAT YOUR FOOD WOULD RUN OUT BEFORE YOU GOT MONEY TO BUY MORE.: NEVER TRUE

## 2025-03-24 SDOH — ECONOMIC STABILITY: FOOD INSECURITY: WITHIN THE PAST 12 MONTHS, THE FOOD YOU BOUGHT JUST DIDN'T LAST AND YOU DIDN'T HAVE MONEY TO GET MORE.: NEVER TRUE

## 2025-03-24 ASSESSMENT — ENCOUNTER SYMPTOMS
CONSTIPATION: 0
COUGH: 0
BLOOD IN STOOL: 0
CHEST TIGHTNESS: 0
SINUS PAIN: 0
SHORTNESS OF BREATH: 0
RHINORRHEA: 0

## 2025-03-24 ASSESSMENT — PATIENT HEALTH QUESTIONNAIRE - PHQ9
8. MOVING OR SPEAKING SO SLOWLY THAT OTHER PEOPLE COULD HAVE NOTICED. OR THE OPPOSITE, BEING SO FIGETY OR RESTLESS THAT YOU HAVE BEEN MOVING AROUND A LOT MORE THAN USUAL: NOT AT ALL
SUM OF ALL RESPONSES TO PHQ QUESTIONS 1-9: 0
3. TROUBLE FALLING OR STAYING ASLEEP: NOT AT ALL
SUM OF ALL RESPONSES TO PHQ QUESTIONS 1-9: 0
7. TROUBLE CONCENTRATING ON THINGS, SUCH AS READING THE NEWSPAPER OR WATCHING TELEVISION: NOT AT ALL
4. FEELING TIRED OR HAVING LITTLE ENERGY: NOT AT ALL
SUM OF ALL RESPONSES TO PHQ QUESTIONS 1-9: 0
1. LITTLE INTEREST OR PLEASURE IN DOING THINGS: NOT AT ALL
SUM OF ALL RESPONSES TO PHQ QUESTIONS 1-9: 0
6. FEELING BAD ABOUT YOURSELF - OR THAT YOU ARE A FAILURE OR HAVE LET YOURSELF OR YOUR FAMILY DOWN: NOT AT ALL
2. FEELING DOWN, DEPRESSED OR HOPELESS: NOT AT ALL
5. POOR APPETITE OR OVEREATING: NOT AT ALL
9. THOUGHTS THAT YOU WOULD BE BETTER OFF DEAD, OR OF HURTING YOURSELF: NOT AT ALL

## 2025-03-24 NOTE — PROGRESS NOTES
Chief Complaint   Patient presents with    Follow-up     Patient is hete today for a 2 month follow-up       \"Have you been to the ER, urgent care clinic since your last visit?  Hospitalized since your last visit?\"    NO    “Have you seen or consulted any other health care providers outside of [facility] since your last visit?”    NO            Click Here for Release of Records Request      Vitals:    25 1525   BP: 127/68   Pulse: 82   Resp: 16   Temp: 98.3 °F (36.8 °C)   SpO2: 97%       Health Maintenance Due   Topic Date Due    Annual Wellness Visit (Medicare Advantage)  2025    A1C test (Diabetic or Prediabetic)  2025    Diabetic retinal exam  2025        The patient, Savita Chávez, identity was verified by name and .

## 2025-03-24 NOTE — PROGRESS NOTES
Subjective:      Patient ID: Savita Chávez is a 74 y.o. female.    HPI  Follow up on chronic medical problems.  Abd pain has been improved.  Now thinking that this pain may have been from her back.  She did get another OLEG  which has helped with her pain.  Overall she is feeling much less pain and pleased with the second OLEG.     DM type II follow up:  Compliant w/ meds, diabetic diet, and exercise.  She is tolerating taking januvia.  Weight gain since she has been off of the ozmepic d/t abd pain.   BS have been stable.  Range in the low to mid 100s.  Checks BS  BID on most days and prn.  No low BS.  Pt does not have BS log at visit today.  Denies any tingling sensation.  No polyuria or polydipsia. No blurred vision.    Cardiovascular Review:  The patient has coronary artery disease, history HF and status post coronary artery stenting.  Diet and Lifestyle: generally follows a low fat low cholesterol diet, generally follows a low sodium diet, follows a diabetic diet regularly, exercises sporadically  Home BP Monitoring: is not measured at home.  Pertinent ROS: taking medications as instructed, no medication side effects noted, no TIA's, no chest pain on exertion, no dyspnea on exertion,  stable mild swelling of ankles.   Hypercholesterolemia follow up:  Compliant w/ meds, low fat, low cholesterol diet.  Exercising some.  No muscle nor abdominal pain, no skin discoloration.  Patient fasting today.   Depression Review:  Patient is seen for followup of depression and anxiety.  Overall doing well.  Taking only Ambien to help with her sleep.    Anxiety usually triggered by stress.    She denies depressed mood and insomnia.    Thyroid Review:  Patient is seen for followup of hypothyroidism.  TSH in April was stable.    Thyroid ROS: denies fatigue, weight changes, heat/cold intolerance, bowel/skin changes or CVS symptoms.   HM:   Mammogram 5/15/24    Colonoscopy 2/2/22 - repeat in 5 years       Past Medical History:

## 2025-03-26 RX ORDER — ACETAMINOPHEN 160 MG
2000 TABLET,DISINTEGRATING ORAL DAILY
Qty: 90 CAPSULE | Refills: 3 | Status: SHIPPED | OUTPATIENT
Start: 2025-03-26

## 2025-03-26 NOTE — TELEPHONE ENCOUNTER
Last appointment: 3/24/25  Next appointment: 4/25/25  Previous refill encounter(s): 1/22/24    Requested Prescriptions     Pending Prescriptions Disp Refills    VITAMIN D3 50 MCG (2000 UT) CAPS capsule [Pharmacy Med Name: Vitamin D3 Oral Capsule 50 MCG (2000 UT)] 90 capsule 3     Sig: TAKE 1 CAPSULE EVERY DAY         For Pharmacy Admin Tracking Only    Program: Medication Refill  CPA in place:    Recommendation Provided To:   Intervention Detail: New Rx: 1, reason: Patient Preference  Intervention Accepted By:   Gap Closed?:    Time Spent (min): 5

## 2025-04-01 DIAGNOSIS — F51.01 PRIMARY INSOMNIA: ICD-10-CM

## 2025-04-02 RX ORDER — ZOLPIDEM TARTRATE 5 MG/1
TABLET ORAL
Qty: 90 TABLET | Refills: 0 | Status: SHIPPED | OUTPATIENT
Start: 2025-04-02 | End: 2025-09-29

## 2025-04-02 RX ORDER — BLOOD-GLUCOSE METER
EACH MISCELLANEOUS
Qty: 1 KIT | Refills: 3 | Status: SHIPPED | OUTPATIENT
Start: 2025-04-02

## 2025-04-02 NOTE — TELEPHONE ENCOUNTER
Last appointment: 3/24/25  Next appointment: 4/25/25  Previous refill encounter(s): 12/10/24    Requested Prescriptions     Pending Prescriptions Disp Refills    zolpidem (AMBIEN) 5 MG tablet [Pharmacy Med Name: Zolpidem Tartrate Oral Tablet 5 MG] 90 tablet 1     Sig: TAKE 1 TABLET EVERY NIGHT AS NEEDED FOR SLEEP (MAX DAILY AMOUNT: 5MG)    Blood Glucose Monitoring Suppl (ACCU-CHEK GUIDE) w/Device KIT [Pharmacy Med Name: Accu-Chek Guide Kit w/Device] 1 kit 3     Sig: USE AS DIRECTED         For Pharmacy Admin Tracking Only    Program: Medication Refill  CPA in place:    Recommendation Provided To:   Intervention Detail: New Rx: 2, reason: Patient Preference  Intervention Accepted By:   Gap Closed?:    Time Spent (min): 5

## 2025-04-25 ENCOUNTER — OFFICE VISIT (OUTPATIENT)
Age: 75
End: 2025-04-25
Payer: MEDICARE

## 2025-04-25 VITALS
OXYGEN SATURATION: 99 % | RESPIRATION RATE: 18 BRPM | WEIGHT: 167 LBS | TEMPERATURE: 98.2 F | DIASTOLIC BLOOD PRESSURE: 66 MMHG | BODY MASS INDEX: 29.58 KG/M2 | SYSTOLIC BLOOD PRESSURE: 118 MMHG | HEART RATE: 77 BPM

## 2025-04-25 DIAGNOSIS — I87.2 PERIPHERAL VENOUS INSUFFICIENCY: ICD-10-CM

## 2025-04-25 DIAGNOSIS — E78.2 MIXED HYPERLIPIDEMIA: ICD-10-CM

## 2025-04-25 DIAGNOSIS — Z12.31 ENCOUNTER FOR SCREENING MAMMOGRAM FOR BREAST CANCER: ICD-10-CM

## 2025-04-25 DIAGNOSIS — G89.29 CHRONIC ABDOMINAL PAIN: ICD-10-CM

## 2025-04-25 DIAGNOSIS — E03.9 ACQUIRED HYPOTHYROIDISM: ICD-10-CM

## 2025-04-25 DIAGNOSIS — I25.10 ATHEROSCLEROSIS OF NATIVE CORONARY ARTERY OF NATIVE HEART WITHOUT ANGINA PECTORIS: ICD-10-CM

## 2025-04-25 DIAGNOSIS — R10.9 CHRONIC ABDOMINAL PAIN: ICD-10-CM

## 2025-04-25 DIAGNOSIS — Z86.73 HISTORY OF STROKE: ICD-10-CM

## 2025-04-25 DIAGNOSIS — F51.01 PRIMARY INSOMNIA: ICD-10-CM

## 2025-04-25 DIAGNOSIS — Z79.899 ENCOUNTER FOR LONG-TERM (CURRENT) USE OF MEDICATIONS: ICD-10-CM

## 2025-04-25 DIAGNOSIS — M51.360 DISCOGENIC LOW BACK PAIN: ICD-10-CM

## 2025-04-25 DIAGNOSIS — E11.9 TYPE 2 DIABETES MELLITUS WITHOUT COMPLICATION, WITHOUT LONG-TERM CURRENT USE OF INSULIN (HCC): Primary | ICD-10-CM

## 2025-04-25 PROCEDURE — G8419 CALC BMI OUT NRM PARAM NOF/U: HCPCS | Performed by: FAMILY MEDICINE

## 2025-04-25 PROCEDURE — 1036F TOBACCO NON-USER: CPT | Performed by: FAMILY MEDICINE

## 2025-04-25 PROCEDURE — 1160F RVW MEDS BY RX/DR IN RCRD: CPT | Performed by: FAMILY MEDICINE

## 2025-04-25 PROCEDURE — 3017F COLORECTAL CA SCREEN DOC REV: CPT | Performed by: FAMILY MEDICINE

## 2025-04-25 PROCEDURE — 3046F HEMOGLOBIN A1C LEVEL >9.0%: CPT | Performed by: FAMILY MEDICINE

## 2025-04-25 PROCEDURE — 1126F AMNT PAIN NOTED NONE PRSNT: CPT | Performed by: FAMILY MEDICINE

## 2025-04-25 PROCEDURE — 3074F SYST BP LT 130 MM HG: CPT | Performed by: FAMILY MEDICINE

## 2025-04-25 PROCEDURE — 2022F DILAT RTA XM EVC RTNOPTHY: CPT | Performed by: FAMILY MEDICINE

## 2025-04-25 PROCEDURE — 3078F DIAST BP <80 MM HG: CPT | Performed by: FAMILY MEDICINE

## 2025-04-25 PROCEDURE — 3052F HG A1C>EQUAL 8.0%<EQUAL 9.0%: CPT | Performed by: FAMILY MEDICINE

## 2025-04-25 PROCEDURE — 99214 OFFICE O/P EST MOD 30 MIN: CPT | Performed by: FAMILY MEDICINE

## 2025-04-25 PROCEDURE — G2211 COMPLEX E/M VISIT ADD ON: HCPCS | Performed by: FAMILY MEDICINE

## 2025-04-25 PROCEDURE — G8427 DOCREV CUR MEDS BY ELIG CLIN: HCPCS | Performed by: FAMILY MEDICINE

## 2025-04-25 PROCEDURE — 1090F PRES/ABSN URINE INCON ASSESS: CPT | Performed by: FAMILY MEDICINE

## 2025-04-25 PROCEDURE — 1123F ACP DISCUSS/DSCN MKR DOCD: CPT | Performed by: FAMILY MEDICINE

## 2025-04-25 PROCEDURE — G8399 PT W/DXA RESULTS DOCUMENT: HCPCS | Performed by: FAMILY MEDICINE

## 2025-04-25 PROCEDURE — 1159F MED LIST DOCD IN RCRD: CPT | Performed by: FAMILY MEDICINE

## 2025-04-25 RX ORDER — OMEPRAZOLE 40 MG/1
40 CAPSULE, DELAYED RELEASE ORAL DAILY
COMMUNITY
Start: 2025-04-18

## 2025-04-25 ASSESSMENT — ENCOUNTER SYMPTOMS
CHEST TIGHTNESS: 0
RHINORRHEA: 0
SINUS PAIN: 0
COUGH: 0
CONSTIPATION: 0
BLOOD IN STOOL: 0
SHORTNESS OF BREATH: 0

## 2025-04-25 ASSESSMENT — PATIENT HEALTH QUESTIONNAIRE - PHQ9
6. FEELING BAD ABOUT YOURSELF - OR THAT YOU ARE A FAILURE OR HAVE LET YOURSELF OR YOUR FAMILY DOWN: NOT AT ALL
SUM OF ALL RESPONSES TO PHQ QUESTIONS 1-9: 0
5. POOR APPETITE OR OVEREATING: NOT AT ALL
1. LITTLE INTEREST OR PLEASURE IN DOING THINGS: NOT AT ALL
SUM OF ALL RESPONSES TO PHQ QUESTIONS 1-9: 0
9. THOUGHTS THAT YOU WOULD BE BETTER OFF DEAD, OR OF HURTING YOURSELF: NOT AT ALL
10. IF YOU CHECKED OFF ANY PROBLEMS, HOW DIFFICULT HAVE THESE PROBLEMS MADE IT FOR YOU TO DO YOUR WORK, TAKE CARE OF THINGS AT HOME, OR GET ALONG WITH OTHER PEOPLE: NOT DIFFICULT AT ALL
3. TROUBLE FALLING OR STAYING ASLEEP: NOT AT ALL
7. TROUBLE CONCENTRATING ON THINGS, SUCH AS READING THE NEWSPAPER OR WATCHING TELEVISION: NOT AT ALL
SUM OF ALL RESPONSES TO PHQ QUESTIONS 1-9: 0
SUM OF ALL RESPONSES TO PHQ QUESTIONS 1-9: 0
8. MOVING OR SPEAKING SO SLOWLY THAT OTHER PEOPLE COULD HAVE NOTICED. OR THE OPPOSITE, BEING SO FIGETY OR RESTLESS THAT YOU HAVE BEEN MOVING AROUND A LOT MORE THAN USUAL: NOT AT ALL
4. FEELING TIRED OR HAVING LITTLE ENERGY: NOT AT ALL
2. FEELING DOWN, DEPRESSED OR HOPELESS: NOT AT ALL

## 2025-04-25 NOTE — PROGRESS NOTES
Subjective:      Patient ID: Savita Chávez is a 74 y.o. female.    HPI  Follow up on chronic medical problems.  Abd pain has been improved.  Now thinking that this pain may have been from her back.  Still has c/o constipation and pain with getting her bowels to move.  Wanted to restart back on ozmepic.  We discussed increased abd pain and constipation risks related to the medication.  She has appt f/u with GI in June and will discussed further whether she can try being back on ozempic for her diabetes.  She has increased her weight since being off of the medication.  .      DM type II follow up:  Compliant w/ meds, diabetic diet, and exercise.  She is tolerating taking januvia and adding jardiance.  Weight gain since she has been off of the ozmepic d/t abd pain.   BS have been stable.  Range in the low to mid 100s.  Checks BS  BID on most days and prn.  No low BS.  Pt does not have BS log at visit today.  Denies any tingling sensation.  No polyuria or polydipsia. No blurred vision.  No longer has the libre3 but she is interested in restarting it.  We discussed cost and she wants ot talk to Isra as well about the aisha.    Cardiovascular Review:  The patient has coronary artery disease, history HF and status post coronary artery stenting.  Diet and Lifestyle: generally follows a low fat low cholesterol diet, generally follows a low sodium diet, follows a diabetic diet regularly, exercises sporadically  Home BP Monitoring: is not measured at home.  Pertinent ROS: taking medications as instructed, no medication side effects noted, no TIA's, no chest pain on exertion, no dyspnea on exertion,  stable mild swelling of ankles.   Hypercholesterolemia follow up:  Compliant w/ meds, low fat, low cholesterol diet.  Exercising some.  No muscle nor abdominal pain, no skin discoloration.  Patient fasting today.   Depression Review:  Patient is seen for followup of depression and anxiety.  Overall doing well.  Taking only Ambien

## 2025-04-25 NOTE — PROGRESS NOTES
Chief Complaint   Patient presents with    1 Month Follow-Up    Diabetes     Patient is here today for 1 month follow up.       \"Have you been to the ER, urgent care clinic since your last visit?  Hospitalized since your last visit?\"    NO    “Have you seen or consulted any other health care providers outside of [facility] since your last visit?”    NO            Click Here for Release of Records Request       There were no vitals filed for this visit.   Health Maintenance Due   Topic Date Due    Respiratory Syncytial Virus (RSV) Pregnant or age 60 yrs+ (1 - Risk 60-74 years 1-dose series) Never done    Annual Wellness Visit (Medicare Advantage)  2025    COVID-19 Vaccine ( season) 2025    Diabetic retinal exam  2025    Breast cancer screen  05/15/2025        The patient, Savita Chávez, identity was verified by name and .

## 2025-04-27 LAB
ALBUMIN SERPL-MCNC: 4.1 G/DL (ref 3.5–5)
ALBUMIN/GLOB SERPL: 1.8 (ref 1.1–2.2)
ALP SERPL-CCNC: 113 U/L (ref 45–117)
ALT SERPL-CCNC: 18 U/L (ref 12–78)
ANION GAP SERPL CALC-SCNC: 4 MMOL/L (ref 2–12)
ANION GAP SERPL CALC-SCNC: 7 MMOL/L (ref 2–12)
AST SERPL-CCNC: 21 U/L (ref 15–37)
BILIRUB SERPL-MCNC: 0.2 MG/DL (ref 0.2–1)
BUN SERPL-MCNC: 16 MG/DL (ref 6–20)
BUN SERPL-MCNC: 16 MG/DL (ref 6–20)
BUN/CREAT SERPL: 13 (ref 12–20)
BUN/CREAT SERPL: 13 (ref 12–20)
CALCIUM SERPL-MCNC: 8.8 MG/DL (ref 8.5–10.1)
CALCIUM SERPL-MCNC: 9 MG/DL (ref 8.5–10.1)
CHLORIDE SERPL-SCNC: 106 MMOL/L (ref 97–108)
CHLORIDE SERPL-SCNC: 107 MMOL/L (ref 97–108)
CHOLEST SERPL-MCNC: 174 MG/DL
CO2 SERPL-SCNC: 26 MMOL/L (ref 21–32)
CO2 SERPL-SCNC: 29 MMOL/L (ref 21–32)
CREAT SERPL-MCNC: 1.21 MG/DL (ref 0.55–1.02)
CREAT SERPL-MCNC: 1.26 MG/DL (ref 0.55–1.02)
ERYTHROCYTE [DISTWIDTH] IN BLOOD BY AUTOMATED COUNT: 12.7 % (ref 11.5–14.5)
GLOBULIN SER CALC-MCNC: 2.3 G/DL (ref 2–4)
GLUCOSE SERPL-MCNC: 144 MG/DL (ref 65–100)
GLUCOSE SERPL-MCNC: 149 MG/DL (ref 65–100)
HCT VFR BLD AUTO: 37.7 % (ref 35–47)
HDLC SERPL-MCNC: 47 MG/DL
HDLC SERPL: 3.7 (ref 0–5)
HGB BLD-MCNC: 12 G/DL (ref 11.5–16)
LDLC SERPL CALC-MCNC: 69.6 MG/DL (ref 0–100)
MCH RBC QN AUTO: 30.2 PG (ref 26–34)
MCHC RBC AUTO-ENTMCNC: 31.8 G/DL (ref 30–36.5)
MCV RBC AUTO: 94.7 FL (ref 80–99)
NRBC # BLD: 0 K/UL (ref 0–0.01)
NRBC BLD-RTO: 0 PER 100 WBC
PLATELET # BLD AUTO: 281 K/UL (ref 150–400)
PMV BLD AUTO: 11.3 FL (ref 8.9–12.9)
POTASSIUM SERPL-SCNC: 4.8 MMOL/L (ref 3.5–5.1)
POTASSIUM SERPL-SCNC: 4.8 MMOL/L (ref 3.5–5.1)
PROT SERPL-MCNC: 6.4 G/DL (ref 6.4–8.2)
RBC # BLD AUTO: 3.98 M/UL (ref 3.8–5.2)
SODIUM SERPL-SCNC: 139 MMOL/L (ref 136–145)
SODIUM SERPL-SCNC: 140 MMOL/L (ref 136–145)
TRIGL SERPL-MCNC: 287 MG/DL
TSH SERPL DL<=0.05 MIU/L-ACNC: 2.63 UIU/ML (ref 0.36–3.74)
VLDLC SERPL CALC-MCNC: 57.4 MG/DL
WBC # BLD AUTO: 7.8 K/UL (ref 3.6–11)

## 2025-05-05 ENCOUNTER — RESULTS FOLLOW-UP (OUTPATIENT)
Age: 75
End: 2025-05-05

## 2025-05-08 ENCOUNTER — TELEPHONE (OUTPATIENT)
Age: 75
End: 2025-05-08

## 2025-05-08 NOTE — TELEPHONE ENCOUNTER
[facility] Clinical Staff  [facility] Referral Request    Reason for referral request: Lab/Test Order    Specialist/Lab/Test patient is requesting (if known): bone density, breast exam    Specialist Phone Number (if applicable): andrzej caballero [facility], [phone]    Additional Information would like to get an order from the primary care  --------------------------------------------------------------------------------------------------------------------------    Relationship to Patient: Self    Call Back Information: OK to leave message on voicemail  Preferred Call Back Number: Phone  [phone]

## 2025-05-08 NOTE — TELEPHONE ENCOUNTER
Pt need an order for a bone density exam.  She has an order for her mammogram from 4/25/25.  Will call the pt when done.

## 2025-05-29 ENCOUNTER — TRANSCRIBE ORDERS (OUTPATIENT)
Facility: HOSPITAL | Age: 75
End: 2025-05-29

## 2025-05-29 DIAGNOSIS — M51.16 LUMBAR DISC DISEASE WITH RADICULOPATHY: Primary | ICD-10-CM

## 2025-05-29 DIAGNOSIS — R29.898 BILATERAL LEG WEAKNESS: ICD-10-CM

## 2025-05-29 DIAGNOSIS — M47.817 LUMBOSACRAL SPONDYLOSIS WITHOUT MYELOPATHY: ICD-10-CM

## 2025-05-29 DIAGNOSIS — M47.818 ARTHROPATHY OF BOTH SACROILIAC JOINTS: ICD-10-CM

## 2025-05-29 DIAGNOSIS — M51.360 DISCOGENIC LOW BACK PAIN: ICD-10-CM

## 2025-05-29 DIAGNOSIS — Z98.1 S/P SPINAL FUSION: ICD-10-CM

## 2025-06-09 ENCOUNTER — HOSPITAL ENCOUNTER (OUTPATIENT)
Facility: HOSPITAL | Age: 75
Discharge: HOME | End: 2025-06-12
Payer: MEDICARE

## 2025-06-09 DIAGNOSIS — M47.818 ARTHROPATHY OF BOTH SACROILIAC JOINTS: ICD-10-CM

## 2025-06-09 DIAGNOSIS — R29.898 BILATERAL LEG WEAKNESS: ICD-10-CM

## 2025-06-09 DIAGNOSIS — Z98.1 S/P SPINAL FUSION: ICD-10-CM

## 2025-06-09 DIAGNOSIS — M47.817 LUMBOSACRAL SPONDYLOSIS WITHOUT MYELOPATHY: ICD-10-CM

## 2025-06-09 DIAGNOSIS — M51.360 DISCOGENIC LOW BACK PAIN: ICD-10-CM

## 2025-06-09 DIAGNOSIS — M51.16 LUMBAR DISC DISEASE WITH RADICULOPATHY: ICD-10-CM

## 2025-06-09 PROCEDURE — 72148 MRI LUMBAR SPINE W/O DYE: CPT

## 2025-06-10 RX ORDER — BLOOD-GLUCOSE METER
EACH MISCELLANEOUS
Qty: 1 KIT | Refills: 3 | Status: SHIPPED | OUTPATIENT
Start: 2025-06-10

## 2025-06-10 NOTE — TELEPHONE ENCOUNTER
Last appointment: 4/25/25  Next appointment: 6/30/25  Previous refill encounter(s): 4/2/25    Requested Prescriptions     Pending Prescriptions Disp Refills    Blood Glucose Monitoring Suppl (ACCU-CHEK GUIDE) w/Device KIT [Pharmacy Med Name: Accu-Chek Guide Kit w/Device] 1 kit 3     Sig: USE AS DIRECTED         For Pharmacy Admin Tracking Only    Program: Medication Refill  CPA in place:    Recommendation Provided To:   Intervention Detail: New Rx: 1, reason: Patient Preference  Intervention Accepted By:   Gap Closed?:    Time Spent (min): 5

## 2025-06-13 ENCOUNTER — HOSPITAL ENCOUNTER (OUTPATIENT)
Facility: HOSPITAL | Age: 75
Discharge: HOME | End: 2025-06-16
Attending: FAMILY MEDICINE
Payer: MEDICARE

## 2025-06-13 DIAGNOSIS — Z12.31 ENCOUNTER FOR SCREENING MAMMOGRAM FOR BREAST CANCER: ICD-10-CM

## 2025-06-13 PROCEDURE — 77063 BREAST TOMOSYNTHESIS BI: CPT

## 2025-06-27 DIAGNOSIS — F51.01 PRIMARY INSOMNIA: ICD-10-CM

## 2025-06-27 RX ORDER — ZOLPIDEM TARTRATE 5 MG/1
TABLET ORAL
Qty: 90 TABLET | Refills: 0 | Status: SHIPPED | OUTPATIENT
Start: 2025-06-27 | End: 2025-12-24

## 2025-06-27 NOTE — TELEPHONE ENCOUNTER
Last appointment: 4/25/25  Next appointment: 6/30/25  Previous refill encounter(s): 4/2/25 #90    Requested Prescriptions     Pending Prescriptions Disp Refills    zolpidem (AMBIEN) 5 MG tablet [Pharmacy Med Name: Zolpidem Tartrate Oral Tablet 5 MG] 90 tablet 1     Sig: TAKE 1 TABLET EVERY NIGHT AS NEEDED FOR SLEEP (MAX DAILY AMOUNT: 5MG)         For Pharmacy Admin Tracking Only    Program: Medication Refill  CPA in place:    Recommendation Provided To:   Intervention Detail: New Rx: 1, reason: Patient Preference  Intervention Accepted By:   Gap Closed?:    Time Spent (min): 5

## 2025-06-30 ENCOUNTER — OFFICE VISIT (OUTPATIENT)
Age: 75
End: 2025-06-30
Payer: MEDICARE

## 2025-06-30 VITALS
SYSTOLIC BLOOD PRESSURE: 113 MMHG | HEIGHT: 63 IN | TEMPERATURE: 97.9 F | OXYGEN SATURATION: 99 % | HEART RATE: 73 BPM | BODY MASS INDEX: 27.85 KG/M2 | RESPIRATION RATE: 24 BRPM | WEIGHT: 157.2 LBS | DIASTOLIC BLOOD PRESSURE: 70 MMHG

## 2025-06-30 DIAGNOSIS — E11.9 TYPE 2 DIABETES MELLITUS WITHOUT COMPLICATION, WITHOUT LONG-TERM CURRENT USE OF INSULIN (HCC): ICD-10-CM

## 2025-06-30 DIAGNOSIS — E03.9 ACQUIRED HYPOTHYROIDISM: ICD-10-CM

## 2025-06-30 DIAGNOSIS — I87.2 PERIPHERAL VENOUS INSUFFICIENCY: ICD-10-CM

## 2025-06-30 DIAGNOSIS — I25.10 ATHEROSCLEROSIS OF NATIVE CORONARY ARTERY OF NATIVE HEART WITHOUT ANGINA PECTORIS: ICD-10-CM

## 2025-06-30 DIAGNOSIS — E78.2 MIXED HYPERLIPIDEMIA: ICD-10-CM

## 2025-06-30 DIAGNOSIS — F51.01 PRIMARY INSOMNIA: ICD-10-CM

## 2025-06-30 DIAGNOSIS — M51.360 DISCOGENIC LOW BACK PAIN: ICD-10-CM

## 2025-06-30 DIAGNOSIS — Z00.00 MEDICARE ANNUAL WELLNESS VISIT, SUBSEQUENT: Primary | ICD-10-CM

## 2025-06-30 DIAGNOSIS — Z79.899 ENCOUNTER FOR LONG-TERM (CURRENT) USE OF MEDICATIONS: ICD-10-CM

## 2025-06-30 DIAGNOSIS — K56.699 STRICTURE OF SIGMOID COLON (HCC): ICD-10-CM

## 2025-06-30 DIAGNOSIS — Z86.73 HISTORY OF STROKE: ICD-10-CM

## 2025-06-30 LAB
COMMENT:: NORMAL
GLUCOSE, POC: 196 MG/DL
HBA1C MFR BLD: 8.2 %
SPECIMEN HOLD: NORMAL

## 2025-06-30 PROCEDURE — 3078F DIAST BP <80 MM HG: CPT | Performed by: FAMILY MEDICINE

## 2025-06-30 PROCEDURE — 82962 GLUCOSE BLOOD TEST: CPT | Performed by: FAMILY MEDICINE

## 2025-06-30 PROCEDURE — 1036F TOBACCO NON-USER: CPT | Performed by: FAMILY MEDICINE

## 2025-06-30 PROCEDURE — G8399 PT W/DXA RESULTS DOCUMENT: HCPCS | Performed by: FAMILY MEDICINE

## 2025-06-30 PROCEDURE — 1126F AMNT PAIN NOTED NONE PRSNT: CPT | Performed by: FAMILY MEDICINE

## 2025-06-30 PROCEDURE — 99214 OFFICE O/P EST MOD 30 MIN: CPT | Performed by: FAMILY MEDICINE

## 2025-06-30 PROCEDURE — PBSHW AMB POC HEMOGLOBIN A1C: Performed by: FAMILY MEDICINE

## 2025-06-30 PROCEDURE — 3017F COLORECTAL CA SCREEN DOC REV: CPT | Performed by: FAMILY MEDICINE

## 2025-06-30 PROCEDURE — 1123F ACP DISCUSS/DSCN MKR DOCD: CPT | Performed by: FAMILY MEDICINE

## 2025-06-30 PROCEDURE — PBSHW AMB POC GLUCOSE BLOOD, BY GLUCOSE MONITORING DEVICE: Performed by: FAMILY MEDICINE

## 2025-06-30 PROCEDURE — 3046F HEMOGLOBIN A1C LEVEL >9.0%: CPT | Performed by: FAMILY MEDICINE

## 2025-06-30 PROCEDURE — 1160F RVW MEDS BY RX/DR IN RCRD: CPT | Performed by: FAMILY MEDICINE

## 2025-06-30 PROCEDURE — 1159F MED LIST DOCD IN RCRD: CPT | Performed by: FAMILY MEDICINE

## 2025-06-30 PROCEDURE — G0439 PPPS, SUBSEQ VISIT: HCPCS | Performed by: FAMILY MEDICINE

## 2025-06-30 PROCEDURE — 83036 HEMOGLOBIN GLYCOSYLATED A1C: CPT | Performed by: FAMILY MEDICINE

## 2025-06-30 PROCEDURE — G8427 DOCREV CUR MEDS BY ELIG CLIN: HCPCS | Performed by: FAMILY MEDICINE

## 2025-06-30 PROCEDURE — 2022F DILAT RTA XM EVC RTNOPTHY: CPT | Performed by: FAMILY MEDICINE

## 2025-06-30 PROCEDURE — 1090F PRES/ABSN URINE INCON ASSESS: CPT | Performed by: FAMILY MEDICINE

## 2025-06-30 PROCEDURE — 3052F HG A1C>EQUAL 8.0%<EQUAL 9.0%: CPT | Performed by: FAMILY MEDICINE

## 2025-06-30 PROCEDURE — G8419 CALC BMI OUT NRM PARAM NOF/U: HCPCS | Performed by: FAMILY MEDICINE

## 2025-06-30 PROCEDURE — 3074F SYST BP LT 130 MM HG: CPT | Performed by: FAMILY MEDICINE

## 2025-06-30 RX ORDER — FUROSEMIDE 20 MG/1
10 TABLET ORAL DAILY
Qty: 90 TABLET | Refills: 3
Start: 2025-06-30

## 2025-06-30 SDOH — ECONOMIC STABILITY: FOOD INSECURITY: WITHIN THE PAST 12 MONTHS, THE FOOD YOU BOUGHT JUST DIDN'T LAST AND YOU DIDN'T HAVE MONEY TO GET MORE.: NEVER TRUE

## 2025-06-30 SDOH — HEALTH STABILITY: PHYSICAL HEALTH: ON AVERAGE, HOW MANY MINUTES DO YOU ENGAGE IN EXERCISE AT THIS LEVEL?: 0 MIN

## 2025-06-30 SDOH — ECONOMIC STABILITY: FOOD INSECURITY: WITHIN THE PAST 12 MONTHS, YOU WORRIED THAT YOUR FOOD WOULD RUN OUT BEFORE YOU GOT MONEY TO BUY MORE.: NEVER TRUE

## 2025-06-30 SDOH — HEALTH STABILITY: PHYSICAL HEALTH: ON AVERAGE, HOW MANY DAYS PER WEEK DO YOU ENGAGE IN MODERATE TO STRENUOUS EXERCISE (LIKE A BRISK WALK)?: 0 DAYS

## 2025-06-30 ASSESSMENT — ENCOUNTER SYMPTOMS
SHORTNESS OF BREATH: 0
CONSTIPATION: 1
CHEST TIGHTNESS: 0
COUGH: 0
RHINORRHEA: 0
SINUS PAIN: 0
BLOOD IN STOOL: 0

## 2025-06-30 ASSESSMENT — LIFESTYLE VARIABLES
HOW OFTEN DO YOU HAVE A DRINK CONTAINING ALCOHOL: NEVER
HOW MANY STANDARD DRINKS CONTAINING ALCOHOL DO YOU HAVE ON A TYPICAL DAY: 0
HOW OFTEN DO YOU HAVE A DRINK CONTAINING ALCOHOL: 1
HOW OFTEN DO YOU HAVE SIX OR MORE DRINKS ON ONE OCCASION: 1
HOW MANY STANDARD DRINKS CONTAINING ALCOHOL DO YOU HAVE ON A TYPICAL DAY: PATIENT DOES NOT DRINK

## 2025-06-30 ASSESSMENT — PATIENT HEALTH QUESTIONNAIRE - PHQ9
SUM OF ALL RESPONSES TO PHQ QUESTIONS 1-9: 0
SUM OF ALL RESPONSES TO PHQ QUESTIONS 1-9: 0
1. LITTLE INTEREST OR PLEASURE IN DOING THINGS: NOT AT ALL
SUM OF ALL RESPONSES TO PHQ QUESTIONS 1-9: 0
2. FEELING DOWN, DEPRESSED OR HOPELESS: NOT AT ALL
SUM OF ALL RESPONSES TO PHQ QUESTIONS 1-9: 0

## 2025-06-30 ASSESSMENT — ANXIETY QUESTIONNAIRES: GAD7 TOTAL SCORE: 0

## 2025-06-30 NOTE — PATIENT INSTRUCTIONS
Learning About Being Active as an Older Adult  Why is being active important as you get older?     Being active is one of the best things you can do for your health. And it's never too late to start. Being active--or getting active, if you aren't already--has definite benefits. It can:  Give you more energy,  Keep your mind sharp.  Improve balance to reduce your risk of falls.  Help you manage chronic illness with fewer medicines.  No matter how old you are, how fit you are, or what health problems you have, there is a form of activity that will work for you. And the more physical activity you can do, the better your overall health will be.  What kinds of activity can help you stay healthy?  Being more active will make your daily activities easier. Physical activity includes planned exercise and things you do in daily life. There are four types of activity:  Aerobic.  Doing aerobic activity makes your heart and lungs strong.  Includes walking, dancing, and gardening.  Aim for at least 2½ hours spread throughout the week.  It improves your energy and can help you sleep better.  Muscle-strengthening.  This type of activity can help maintain muscle and strengthen bones.  Includes climbing stairs, using resistance bands, and lifting or carrying heavy loads.  Aim for at least twice a week.  It can help protect the knees and other joints.  Stretching.  Stretching gives you better range of motion in joints and muscles.  Includes upper arm stretches, calf stretches, and gentle yoga.  Aim for at least twice a week, preferably after your muscles are warmed up from other activities.  It can help you function better in daily life.  Balancing.  This helps you stay coordinated and have good posture.  Includes heel-to-toe walking, elisha chi, and certain types of yoga.  Aim for at least 3 days a week.  It can reduce your risk of falling.  Even if you have a hard time meeting the recommendations, it's better to be more active

## 2025-06-30 NOTE — PROGRESS NOTES
Chief Complaint   Patient presents with    Medicare AWV         Here for annual medicare wellness exam.    No concerns.        \"Have you been to the ER, urgent care clinic since your last visit?  Hospitalized since your last visit?\"    NO    “Have you seen or consulted any other health care providers outside of [facility] since your last visit?”    NO            Click Here for Release of Records Request    
Subjective:      Patient ID: Savita Chávez is a 75 y.o. female.    HPI  Follow up on chronic medical problems.  Abd pain has been improved.  Now thinking that this pain may have been from her back. Wanted to restart back on ozmepic and per pt GI has stated that it was ok to restart the ozempic.    However planing for colectomy d/t stricture on her colonoscopy in 2/2025:  Rectal exam revealed diminished tone, otherwise normal. The pediatric colonoscope was inserted and advanced to about 20cm at the distal sigmoid colon just proximal to a few diverticula at which point a non-traversable stricture/turn was encountered despite multiple attempts. Unclear if this is due to adhesive disease versus intrinsic folding due to diverticular disease, but certainly it appeared benign. The colon wall appeared thin, and risk of perforation was too high for aggressive attempts to get beyond this area.      DM type II follow up:  Compliant w/ meds, diabetic diet, and exercise.  She is tolerating taking januvia and adding jardiance.  Weight gain since she has been off of the ozmepic d/t abd pain.  Since she has upcoming surgery will defer starting ozempic until after her surgery.   Checks BS  BID on most days and prn.  BS in the high 100 low 200s.  No low BS.  Pt does not have BS log at visit today.    Denies any tingling sensation.  No polyuria or polydipsia. No blurred vision.  No longer has the libre3 but she is interested in restarting it.     Cardiovascular Review:  The patient has coronary artery disease, history HF and status post coronary artery stenting.  Diet and Lifestyle: generally follows a low fat low cholesterol diet, generally follows a low sodium diet, follows a diabetic diet regularly, exercises sporadically  Home BP Monitoring: is not measured at home.  Pertinent ROS: taking medications as instructed, no medication side effects noted, no TIA's, no chest pain on exertion, no dyspnea on exertion,  stable mild swelling 
for the next 5-10 years is provided to the patient in written form: see Patient Instructions/AVS.     Reviewed and updated this visit:  Tobacco  Allergies  Meds  Problems  Med Hx  Surg Hx  Fam Hx  Sexual   Hx

## 2025-07-01 LAB
APPEARANCE UR: CLEAR
BACTERIA URNS QL MICRO: NEGATIVE /HPF
BILIRUB UR QL: NEGATIVE
COLOR UR: ABNORMAL
CREAT UR-MCNC: 48.7 MG/DL
EPITH CASTS URNS QL MICRO: ABNORMAL /LPF
GLUCOSE UR STRIP.AUTO-MCNC: >1000 MG/DL
HGB UR QL STRIP: NEGATIVE
HYALINE CASTS URNS QL MICRO: ABNORMAL /LPF (ref 0–5)
KETONES UR QL STRIP.AUTO: NEGATIVE MG/DL
LEUKOCYTE ESTERASE UR QL STRIP.AUTO: NEGATIVE
MICROALBUMIN UR-MCNC: <0.5 MG/DL
MICROALBUMIN/CREAT UR-RTO: <10 MG/G (ref 0–30)
NITRITE UR QL STRIP.AUTO: NEGATIVE
PH UR STRIP: 5.5 (ref 5–8)
PROT UR STRIP-MCNC: NEGATIVE MG/DL
RBC #/AREA URNS HPF: ABNORMAL /HPF (ref 0–5)
SP GR UR REFRACTOMETRY: 1.02 (ref 1–1.03)
UROBILINOGEN UR QL STRIP.AUTO: 0.2 EU/DL (ref 0.2–1)
WBC URNS QL MICRO: ABNORMAL /HPF (ref 0–4)

## (undated) DEVICE — 3M™ TEGADERM™ TRANSPARENT FILM DRESSING FRAME STYLE, 1624W, 2-3/8 IN X 2-3/4 IN (6 CM X 7 CM), 100/CT 4CT/CASE: Brand: 3M™ TEGADERM™

## (undated) DEVICE — DRAPE MON DISP FOR EXCELSIUSGPS ROBOTIC NAVIGATION PLATFRM

## (undated) DEVICE — LAMINECTOMY RICHMOND-LF: Brand: MEDLINE INDUSTRIES, INC.

## (undated) DEVICE — NEEDLE HYPO 22GA L1.5IN BLK S STL HUB POLYPR SHLD REG BVL

## (undated) DEVICE — RETRACTOR KIT FOR INTERCONTINENTAL PLATE SPACER SYS MARS 3V

## (undated) DEVICE — STERILE POLYISOPRENE POWDER-FREE SURGICAL GLOVES WITH EMOLLIENT COATING: Brand: PROTEXIS

## (undated) DEVICE — SET ADMIN 16ML TBNG L100IN 2 Y INJ SITE IV PIGGY BK DISP

## (undated) DEVICE — 3.5 DRILL BIT

## (undated) DEVICE — SUTURE STRATAFIX SPRL MCRYL + SZ 2-0 L18IN ABSRB UD CT-1 SXMP1B413

## (undated) DEVICE — HYPODERMIC SAFETY NEEDLE: Brand: MONOJECT

## (undated) DEVICE — STRAP,POSITIONING,KNEE/BODY,FOAM,4X60": Brand: MEDLINE

## (undated) DEVICE — IV START KIT: Brand: MEDLINE

## (undated) DEVICE — PICO 7 15CM X 15CM: Brand: PICO™ 7

## (undated) DEVICE — SUTURE VCRL SZ 1 L18IN ABSRB VLT CT-1 L36MM 1/2 CIR J741D

## (undated) DEVICE — NEONATAL-ADULT SPO2 SENSOR: Brand: NELLCOR

## (undated) DEVICE — SPONGE GZ W4XL4IN COT 12 PLY TYP VII WVN C FLD DSGN

## (undated) DEVICE — C-ARMOR C-ARM EQUIPMENT COVERS CLEAR STERILE UNIVERSAL FIT 12 PER CASE: Brand: C-ARMOR

## (undated) DEVICE — ESOPHAGEAL BALLOON DILATATION CATHETER: Brand: CRE FIXED WIRE

## (undated) DEVICE — BLOCK BITE ENDOSCP AD 21 MM W/ DIL BLU LF DISP

## (undated) DEVICE — DEVICE INFL 60ML 15ATM DISPOSABLE STERIFLATE CRE

## (undated) DEVICE — DISSECTOR LAP DIA5MM BLNT TIP ENDOPATH

## (undated) DEVICE — GLOVE SURG SZ 65 THK91MIL LTX FREE SYN POLYISOPRENE

## (undated) DEVICE — DEVICE TRNSF SPIK STL 2008S] MICROTEK MEDICAL INC]

## (undated) DEVICE — SUTURE VCRL SZ 2-0 L18IN ABSRB UD CT-1 L36MM 1/2 CIR J839D

## (undated) DEVICE — SYRINGE MED 5ML STD CLR PLAS LUERLOCK TIP N CTRL DISP

## (undated) DEVICE — APPLICATOR MEDICATED 26 CC SOLUTION HI LT ORNG CHLORAPREP

## (undated) DEVICE — BONE WAX WHITE: Brand: BONE WAX WHITE

## (undated) DEVICE — KIT JACK TBL PT CARE

## (undated) DEVICE — POLYLINED TOWEL: Brand: CONVERTORS

## (undated) DEVICE — NEEDLE SPNL L4.75IN OD25GA QNCKE TYP SPINOCAN

## (undated) DEVICE — CATHETER IV 22GA L1IN OD0.8382-0.9144MM ID0.6096-0.6858MM 382523

## (undated) DEVICE — CATHETER IV 24GA L0.75IN OD0.6604-0.7366MM

## (undated) DEVICE — SOLIDIFIER MEDC 1200ML -- CONVERT TO 356117

## (undated) DEVICE — BAG SPEC BIOHZRD 10 X 10 IN --

## (undated) DEVICE — SYR 50ML LR LCK 1ML GRAD NSAF --

## (undated) DEVICE — Device

## (undated) DEVICE — BASIN EMESIS 500CC GRAY 250/CS 60/PLT: Brand: MEDEGEN MEDICAL PRODUCTS, LLC

## (undated) DEVICE — BITE BLK ENDOSCP AD 54FR GRN POLYETH ENDOSCP W STRP SLD

## (undated) DEVICE — SOLUTION IRRIG 1000ML H2O STRL BLT

## (undated) DEVICE — SOLUTION IV 1000ML 0.9% SOD CHL

## (undated) DEVICE — SOLIDIFIER FLD 2OZ 1500CC N DISINF IN BTL DISP SAFESORB

## (undated) DEVICE — MARKER,SKIN,WI/RULER AND LABELS: Brand: MEDLINE

## (undated) DEVICE — PENCIL SMK EVAC L10FT DIA95MM TBNG NONSTICK W ADPT TO 22MM

## (undated) DEVICE — CONTINU-FLO SOLUTION SET, 2 INJECTION SITES, MALE LUER LOCK ADAPTER WITH RETRACTABLE COLLAR, LARGE BORE STOPCOCK WITH ROTATING MALE LUER LOCK EXTENSION SET, 2 INJECTION SITES, MALE LUER LOCK ADAPTER WITH RETRACTABLE COLLAR: Brand: INTERLINK/CONTINU-FLO

## (undated) DEVICE — BIPOLAR FORCEPS CORD: Brand: VALLEYLAB

## (undated) DEVICE — 450 ML BOTTLE OF 0.05% CHLORHEXIDINE GLUCONATE IN 99.95% STERILE WATER FOR IRRIGATION, USP AND APPLICATOR.: Brand: IRRISEPT ANTIMICROBIAL WOUND LAVAGE

## (undated) DEVICE — TOTAL TRAY, 16FR 10ML SIL FOLEY, URN: Brand: MEDLINE

## (undated) DEVICE — CATHETER IV 18GA L1.16IN OD1.27-1.3462MM ID0.9398-1.016MM

## (undated) DEVICE — INFECTION CONTROL KIT SYS

## (undated) DEVICE — CONTAINER SPEC 20 ML LID NEUT BUFF FORMALIN 10 % POLYPR STS

## (undated) DEVICE — TOWEL,OR,DSP,ST,BLUE,STD,4/PK,20PK/CS: Brand: MEDLINE

## (undated) DEVICE — NDL SPNE QNCKE 18GX3.5IN LF --

## (undated) DEVICE — FORCEPS BX L240CM JAW DIA2.8MM L CAP W/ NDL MIC MESH TOOTH

## (undated) DEVICE — 4-PORT MANIFOLD: Brand: NEPTUNE 2

## (undated) DEVICE — FLOSEAL MATRIX IS INDICATED IN SURGICAL PROCEDURES (OTHER THAN IN OPHTHALMIC) AS AN ADJUNCT TO HEMOSTASIS WHEN CONTROL OF BLEEDING BY LIGATURE OR CONVENTIONALPROCEDURES IS INEFFECTIVE OR IMPRACTICAL.: Brand: FLOSEAL HEMOSTATIC MATRIX

## (undated) DEVICE — ELECTRODE,RADIOTRANSLUCENT,FOAM,5PK: Brand: MEDLINE

## (undated) DEVICE — DRAPE,CHEST,FENES,15X10,STERIL: Brand: MEDLINE

## (undated) DEVICE — 3M™ TEGADERM™ TRANSPARENT FILM DRESSING FRAME STYLE, 1626W, 4 IN X 4-3/4 IN (10 CM X 12 CM), 50/CT 4CT/CASE: Brand: 3M™ TEGADERM™

## (undated) DEVICE — COVIDIEN KENDALL DL DISPOSABLE 3 LEAD SY: Brand: MEDLINE RENEWAL

## (undated) DEVICE — SYR 3ML LL TIP 1/10ML GRAD --

## (undated) DEVICE — ENDO CARRY-ON PROCEDURE KIT INCLUDES ENZYMATIC SPONGE, GAUZE, BIOHAZARD LABEL, TRAY, LUBRICANT, DIRTY SCOPE LABEL, WATER LABEL, TRAY, DRAWSTRING PAD, AND DEFENDO 4-PIECE KIT.: Brand: ENDO CARRY-ON PROCEDURE KIT

## (undated) DEVICE — BASIN EMSIS 16OZ GRAPHITE PLAS KID SHP MOLD GRAD FOR ORAL

## (undated) DEVICE — GLOVE ORANGE PI 8   MSG9080

## (undated) DEVICE — SET GRAV CK VLV NEEDLESS ST 3 GANGED 4WAY STPCOCK HI FLO 10

## (undated) DEVICE — STOPCOCK IV 4 W TRNSPAR

## (undated) DEVICE — SPHERE STEALTH 12PK/TY --

## (undated) DEVICE — KIT,1200CC CANISTER,3/16"X6' TUBING: Brand: MEDLINE INDUSTRIES, INC.

## (undated) DEVICE — SYSTEM SKIN CLSR 22CM DERMBND PRINEO

## (undated) DEVICE — PREP SKN CHLRAPRP APL 26ML STR --

## (undated) DEVICE — TUBING, SUCTION, 1/4" X 10', STRAIGHT: Brand: MEDLINE

## (undated) DEVICE — LABEL MED MRMC ORTH STRL

## (undated) DEVICE — SOLUTION LACTATED RINGERS INJECTION USP

## (undated) DEVICE — Device: Brand: JELCO

## (undated) DEVICE — CATH IV AUTOGRD BC BLU 22GA 25 -- INSYTE

## (undated) DEVICE — DRAPE C ARM DISP FOR EXCELSIUSGPS ROBOTIC NAVIGATION PLATFRM

## (undated) DEVICE — TOWEL 4 PLY TISS 19X30 SUE WHT

## (undated) DEVICE — MUI SCIENTIFIC BALLOON SR1B

## (undated) DEVICE — NEEDLE BX 11GA L152MM STREAMLINED SUP SHRP T HNDL TRCR TAPR

## (undated) DEVICE — FORCEPS BX L160CM DIA8MM GRSP DISECT CUP TIP NONLOCKING ROT

## (undated) DEVICE — HYPODERMIC SAFETY NEEDLE: Brand: MAGELLAN

## (undated) DEVICE — FORCEP BX LG CAP 2.4 MMX120 CM W/ NDL YEL RADIAL JAW 4 DISP

## (undated) DEVICE — GARMENT,MEDLINE,DVT,INT,CALF,MED, GEN2: Brand: MEDLINE

## (undated) DEVICE — SYR 10ML LUER LOK 1/5ML GRAD --

## (undated) DEVICE — SHEATH CATH ANORECT MNOMTR

## (undated) DEVICE — KENDALL DL ECG CABLE AND LEAD WIRE SYSTEM, 3-LEAD, SINGLE PATIENT USE: Brand: KENDALL

## (undated) DEVICE — SET EXTN PRIMING 0.59ML 8.5IN 1.55LB PRSS RATE MINIBOR PUR

## (undated) DEVICE — Z DISCONTINUED PER MEDLINE LINE GAS SAMPLING O2/CO2 LNG AD 13 FT NSL W/ TBNG FILTERLINE

## (undated) DEVICE — SYRINGE 50ML E/T

## (undated) DEVICE — CATHETER IV 20GA L1.16IN OD1.0414-1.1176MM ID0.762-0.8382MM

## (undated) DEVICE — SUTURE V-LOC 180 SZ 0 L12IN ABSRB GRN L37MM GS-21 1/2 CIR VLOCL0316

## (undated) DEVICE — STERILE POLYISOPRENE POWDER-FREE SURGICAL GLOVES: Brand: PROTEXIS

## (undated) DEVICE — DRAPE,LAP,CHOLE,W/TROUGHS,STERILE: Brand: MEDLINE

## (undated) DEVICE — YANKAUER,TAPERED BULBOUS TIP,W/O VENT: Brand: MEDLINE

## (undated) DEVICE — ELECTRODE BLDE L4IN NONINSULATED EDGE

## (undated) DEVICE — 3M™ STERI-DRAPE™ INSTRUMENT POUCH 1018: Brand: STERI-DRAPE™

## (undated) DEVICE — SURGIFOAM SPNG SZ 100

## (undated) DEVICE — Z CONVERTED USE 2107985 COVER FLROSCP W36XL28IN 4 SIDE ADH

## (undated) DEVICE — ENDOSCOPIC KIT COMPLIANCE ENDOKIT

## (undated) DEVICE — BNDG ADH FABRIC 2X4IN ST LF --

## (undated) DEVICE — 1200 GUARD II KIT W/5MM TUBE W/O VAC TUBE: Brand: GUARDIAN

## (undated) DEVICE — GOWN,SIRUS,NONRNF,SETINSLV,2XL,18/CS: Brand: MEDLINE

## (undated) DEVICE — SYRINGE MED 10ML LUERLOCK TIP W/O SFTY DISP

## (undated) DEVICE — COVER,TABLE,60X90,STERILE: Brand: MEDLINE

## (undated) DEVICE — CUFF BLD PRSS AD CLTH SGL TB W/ BAYNT CONN ROUNDED CORNER